# Patient Record
Sex: MALE | Race: OTHER | NOT HISPANIC OR LATINO
[De-identification: names, ages, dates, MRNs, and addresses within clinical notes are randomized per-mention and may not be internally consistent; named-entity substitution may affect disease eponyms.]

---

## 2017-02-20 ENCOUNTER — RX RENEWAL (OUTPATIENT)
Age: 67
End: 2017-02-20

## 2017-03-07 ENCOUNTER — MEDICATION RENEWAL (OUTPATIENT)
Age: 67
End: 2017-03-07

## 2017-03-17 ENCOUNTER — APPOINTMENT (OUTPATIENT)
Dept: INTERNAL MEDICINE | Facility: CLINIC | Age: 67
End: 2017-03-17

## 2017-03-17 ENCOUNTER — LABORATORY RESULT (OUTPATIENT)
Age: 67
End: 2017-03-17

## 2017-03-17 VITALS
DIASTOLIC BLOOD PRESSURE: 76 MMHG | SYSTOLIC BLOOD PRESSURE: 116 MMHG | OXYGEN SATURATION: 96 % | BODY MASS INDEX: 29.07 KG/M2 | HEIGHT: 68.5 IN | TEMPERATURE: 98.6 F | HEART RATE: 84 BPM | WEIGHT: 194 LBS

## 2017-03-17 DIAGNOSIS — Z01.818 ENCOUNTER FOR OTHER PREPROCEDURAL EXAMINATION: ICD-10-CM

## 2017-03-17 DIAGNOSIS — H26.9 UNSPECIFIED CATARACT: ICD-10-CM

## 2017-03-17 DIAGNOSIS — Z86.73 PERSONAL HISTORY OF TRANSIENT ISCHEMIC ATTACK (TIA), AND CEREBRAL INFARCTION W/OUT RESIDUAL DEFICITS: ICD-10-CM

## 2017-03-18 LAB
24R-OH-CALCIDIOL SERPL-MCNC: 41.3 PG/ML
25(OH)D3 SERPL-MCNC: 40.7 NG/ML
ALBUMIN SERPL ELPH-MCNC: 4.6 G/DL
ALP BLD-CCNC: 54 U/L
ALT SERPL-CCNC: 25 U/L
AMYLASE/CREAT SERPL: 91 U/L
ANION GAP SERPL CALC-SCNC: 18 MMOL/L
AST SERPL-CCNC: 36 U/L
BILIRUB SERPL-MCNC: 0.4 MG/DL
BUN SERPL-MCNC: 20 MG/DL
CALCIUM SERPL-MCNC: 9.5 MG/DL
CHLORIDE SERPL-SCNC: 98 MMOL/L
CHOLEST SERPL-MCNC: 213 MG/DL
CHOLEST/HDLC SERPL: 5 RATIO
CO2 SERPL-SCNC: 23 MMOL/L
CREAT SERPL-MCNC: 0.84 MG/DL
CRP SERPL-MCNC: <0.2 MG/DL
FOLATE SERPL-MCNC: >20 NG/ML
GLUCOSE SERPL-MCNC: 189 MG/DL
HDLC SERPL-MCNC: 43 MG/DL
LDLC SERPL CALC-MCNC: 110 MG/DL
LPL SERPL-CCNC: 56 U/L
POTASSIUM SERPL-SCNC: 4.6 MMOL/L
PROT SERPL-MCNC: 7.2 G/DL
PSA SERPL-MCNC: 0.71 NG/ML
SODIUM SERPL-SCNC: 139 MMOL/L
TRIGL SERPL-MCNC: 300 MG/DL
TSH SERPL-ACNC: 0.38 UIU/ML
URATE SERPL-MCNC: 5.6 MG/DL
VIT B12 SERPL-MCNC: 965 PG/ML

## 2017-04-03 ENCOUNTER — MEDICATION RENEWAL (OUTPATIENT)
Age: 67
End: 2017-04-03

## 2017-04-10 ENCOUNTER — MEDICATION RENEWAL (OUTPATIENT)
Age: 67
End: 2017-04-10

## 2017-05-03 ENCOUNTER — APPOINTMENT (OUTPATIENT)
Dept: INTERNAL MEDICINE | Facility: CLINIC | Age: 67
End: 2017-05-03

## 2017-05-03 VITALS
WEIGHT: 188 LBS | DIASTOLIC BLOOD PRESSURE: 72 MMHG | BODY MASS INDEX: 28.49 KG/M2 | HEIGHT: 68 IN | HEART RATE: 91 BPM | OXYGEN SATURATION: 95 % | SYSTOLIC BLOOD PRESSURE: 115 MMHG | TEMPERATURE: 98.1 F

## 2017-05-04 LAB
ALBUMIN SERPL ELPH-MCNC: 4.3 G/DL
ALP BLD-CCNC: 47 U/L
ALT SERPL-CCNC: 22 U/L
ANION GAP SERPL CALC-SCNC: 19 MMOL/L
AST SERPL-CCNC: 36 U/L
BASOPHILS # BLD AUTO: 0.02 K/UL
BASOPHILS NFR BLD AUTO: 0.6 %
BILIRUB SERPL-MCNC: 0.6 MG/DL
BUN SERPL-MCNC: 17 MG/DL
CALCIUM SERPL-MCNC: 9.2 MG/DL
CHLORIDE SERPL-SCNC: 97 MMOL/L
CO2 SERPL-SCNC: 22 MMOL/L
CREAT SERPL-MCNC: 0.88 MG/DL
CRP SERPL-MCNC: <0.2 MG/DL
EOSINOPHIL # BLD AUTO: 0.08 K/UL
EOSINOPHIL NFR BLD AUTO: 2.3 %
GLUCOSE SERPL-MCNC: 184 MG/DL
HBA1C MFR BLD HPLC: 6.4 %
HCT VFR BLD CALC: 31.7 %
HGB BLD-MCNC: 10.2 G/DL
IMM GRANULOCYTES NFR BLD AUTO: 0.3 %
LPL SERPL-CCNC: 53 U/L
LYMPHOCYTES # BLD AUTO: 1.11 K/UL
LYMPHOCYTES NFR BLD AUTO: 32.1 %
MAN DIFF?: NORMAL
MCHC RBC-ENTMCNC: 30.8 PG
MCHC RBC-ENTMCNC: 32.2 GM/DL
MCV RBC AUTO: 95.8 FL
MONOCYTES # BLD AUTO: 0.13 K/UL
MONOCYTES NFR BLD AUTO: 3.8 %
NEUTROPHILS # BLD AUTO: 2.11 K/UL
NEUTROPHILS NFR BLD AUTO: 60.9 %
PLATELET # BLD AUTO: 215 K/UL
POTASSIUM SERPL-SCNC: 4.2 MMOL/L
PROT SERPL-MCNC: 7.2 G/DL
RBC # BLD: 3.31 M/UL
RBC # FLD: 13.8 %
SODIUM SERPL-SCNC: 138 MMOL/L
URATE SERPL-MCNC: 5.1 MG/DL
WBC # FLD AUTO: 3.46 K/UL

## 2017-05-05 LAB — AMYLASE/CREAT SERPL: 89 U/L

## 2017-05-09 ENCOUNTER — APPOINTMENT (OUTPATIENT)
Dept: OTOLARYNGOLOGY | Facility: CLINIC | Age: 67
End: 2017-05-09

## 2017-05-09 VITALS — BODY MASS INDEX: 25.77 KG/M2 | HEIGHT: 70 IN | WEIGHT: 180 LBS

## 2017-05-09 DIAGNOSIS — H93.233 HYPERACUSIS, BILATERAL: ICD-10-CM

## 2017-05-09 DIAGNOSIS — H90.3 SENSORINEURAL HEARING LOSS, BILATERAL: ICD-10-CM

## 2017-05-11 ENCOUNTER — APPOINTMENT (OUTPATIENT)
Dept: PHARMACY | Facility: CLINIC | Age: 67
End: 2017-05-11

## 2017-05-14 LAB
6-MMPN: 1130
6-TGN: 286

## 2017-06-05 ENCOUNTER — MEDICATION RENEWAL (OUTPATIENT)
Age: 67
End: 2017-06-05

## 2017-06-15 ENCOUNTER — MEDICATION RENEWAL (OUTPATIENT)
Age: 67
End: 2017-06-15

## 2017-06-16 ENCOUNTER — APPOINTMENT (OUTPATIENT)
Dept: INTERNAL MEDICINE | Facility: CLINIC | Age: 67
End: 2017-06-16

## 2017-06-16 ENCOUNTER — LABORATORY RESULT (OUTPATIENT)
Age: 67
End: 2017-06-16

## 2017-06-16 VITALS
DIASTOLIC BLOOD PRESSURE: 75 MMHG | WEIGHT: 183 LBS | SYSTOLIC BLOOD PRESSURE: 137 MMHG | BODY MASS INDEX: 26.2 KG/M2 | TEMPERATURE: 98.2 F | OXYGEN SATURATION: 98 % | HEIGHT: 70 IN | HEART RATE: 90 BPM

## 2017-06-17 LAB
ALBUMIN SERPL ELPH-MCNC: 4.3 G/DL
ALP BLD-CCNC: 52 U/L
ALT SERPL-CCNC: 13 U/L
AMYLASE/CREAT SERPL: 91 U/L
ANION GAP SERPL CALC-SCNC: 17 MMOL/L
AST SERPL-CCNC: 20 U/L
BASOPHILS # BLD AUTO: 0.01 K/UL
BASOPHILS NFR BLD AUTO: 0.3 %
BILIRUB SERPL-MCNC: 0.7 MG/DL
BUN SERPL-MCNC: 18 MG/DL
CALCIUM SERPL-MCNC: 9.5 MG/DL
CHLORIDE SERPL-SCNC: 98 MMOL/L
CO2 SERPL-SCNC: 23 MMOL/L
CREAT SERPL-MCNC: 0.83 MG/DL
CRP SERPL-MCNC: 0.2 MG/DL
EOSINOPHIL # BLD AUTO: 0.05 K/UL
EOSINOPHIL NFR BLD AUTO: 1.3 %
GLUCOSE SERPL-MCNC: 192 MG/DL
HCT VFR BLD CALC: 29.2 %
HGB BLD-MCNC: 9.7 G/DL
IMM GRANULOCYTES NFR BLD AUTO: 0.3 %
LPL SERPL-CCNC: 58 U/L
LYMPHOCYTES # BLD AUTO: 2.02 K/UL
LYMPHOCYTES NFR BLD AUTO: 53 %
MAN DIFF?: NORMAL
MCHC RBC-ENTMCNC: 33.2 GM/DL
MCHC RBC-ENTMCNC: 33.2 PG
MCV RBC AUTO: 100 FL
MONOCYTES # BLD AUTO: 0.19 K/UL
MONOCYTES NFR BLD AUTO: 5 %
NEUTROPHILS # BLD AUTO: 1.53 K/UL
NEUTROPHILS NFR BLD AUTO: 40.1 %
PLATELET # BLD AUTO: 150 K/UL
POTASSIUM SERPL-SCNC: 4.5 MMOL/L
PROT SERPL-MCNC: 6.8 G/DL
RBC # BLD: 2.92 M/UL
RBC # FLD: 16.1 %
SODIUM SERPL-SCNC: 138 MMOL/L
WBC # FLD AUTO: 3.81 K/UL

## 2017-06-20 LAB — HBA1C MFR BLD HPLC: 6.4 %

## 2017-07-07 ENCOUNTER — RX RENEWAL (OUTPATIENT)
Age: 67
End: 2017-07-07

## 2017-08-18 ENCOUNTER — MEDICATION RENEWAL (OUTPATIENT)
Age: 67
End: 2017-08-18

## 2017-08-22 ENCOUNTER — APPOINTMENT (OUTPATIENT)
Dept: INTERNAL MEDICINE | Facility: CLINIC | Age: 67
End: 2017-08-22
Payer: COMMERCIAL

## 2017-08-22 ENCOUNTER — LABORATORY RESULT (OUTPATIENT)
Age: 67
End: 2017-08-22

## 2017-08-22 VITALS
HEIGHT: 70 IN | WEIGHT: 180 LBS | BODY MASS INDEX: 25.77 KG/M2 | DIASTOLIC BLOOD PRESSURE: 76 MMHG | SYSTOLIC BLOOD PRESSURE: 116 MMHG

## 2017-08-22 LAB
BILIRUB UR QL STRIP: NEGATIVE
GLUCOSE BLDC GLUCOMTR-MCNC: 244
GLUCOSE UR-MCNC: NORMAL
HCG UR QL: 0.2 EU/DL
HGB UR QL STRIP.AUTO: NEGATIVE
KETONES UR-MCNC: NORMAL
LEUKOCYTE ESTERASE UR QL STRIP: NEGATIVE
NITRITE UR QL STRIP: NEGATIVE
PH UR STRIP: 5
PROT UR STRIP-MCNC: NORMAL
SP GR UR STRIP: 1.03

## 2017-08-22 PROCEDURE — 82962 GLUCOSE BLOOD TEST: CPT

## 2017-08-22 PROCEDURE — 99214 OFFICE O/P EST MOD 30 MIN: CPT | Mod: 25

## 2017-08-22 PROCEDURE — 81002 URINALYSIS NONAUTO W/O SCOPE: CPT

## 2017-08-23 ENCOUNTER — CLINICAL ADVICE (OUTPATIENT)
Age: 67
End: 2017-08-23

## 2017-08-23 LAB
AMYLASE/CREAT SERPL: 109 U/L
BASOPHILS # BLD AUTO: 0.03 K/UL
BASOPHILS NFR BLD AUTO: 0.9 %
CRP SERPL-MCNC: <0.2 MG/DL
EOSINOPHIL # BLD AUTO: 0.06 K/UL
EOSINOPHIL NFR BLD AUTO: 1.9 %
HBA1C MFR BLD HPLC: 5.9 %
HCT VFR BLD CALC: 30.6 %
HGB BLD-MCNC: 9.8 G/DL
LPL SERPL-CCNC: 104 U/L
LYMPHOCYTES # BLD AUTO: 0.95 K/UL
LYMPHOCYTES NFR BLD AUTO: 28 %
MAN DIFF?: NORMAL
MCHC RBC-ENTMCNC: 32 GM/DL
MCHC RBC-ENTMCNC: 34 PG
MCV RBC AUTO: 106.3 FL
MONOCYTES # BLD AUTO: 0.22 K/UL
MONOCYTES NFR BLD AUTO: 6.5 %
NEUTROPHILS # BLD AUTO: 2.02 K/UL
NEUTROPHILS NFR BLD AUTO: 58 %
PLATELET # BLD AUTO: 236 K/UL
RBC # BLD: 2.88 M/UL
RBC # FLD: 17.1 %
VALPROATE SERPL-MCNC: <2 UG/ML
WBC # FLD AUTO: 3.38 K/UL

## 2017-08-28 ENCOUNTER — MOBILE ON CALL (OUTPATIENT)
Age: 67
End: 2017-08-28

## 2017-09-18 ENCOUNTER — APPOINTMENT (OUTPATIENT)
Dept: INTERNAL MEDICINE | Facility: CLINIC | Age: 67
End: 2017-09-18
Payer: COMMERCIAL

## 2017-09-18 VITALS
DIASTOLIC BLOOD PRESSURE: 76 MMHG | BODY MASS INDEX: 28.73 KG/M2 | TEMPERATURE: 98.7 F | HEART RATE: 104 BPM | SYSTOLIC BLOOD PRESSURE: 131 MMHG | HEIGHT: 69 IN | WEIGHT: 194 LBS | OXYGEN SATURATION: 98 %

## 2017-09-18 PROCEDURE — 99214 OFFICE O/P EST MOD 30 MIN: CPT | Mod: 25

## 2017-09-18 PROCEDURE — G0008: CPT

## 2017-09-18 PROCEDURE — 36415 COLL VENOUS BLD VENIPUNCTURE: CPT

## 2017-09-18 PROCEDURE — 90662 IIV NO PRSV INCREASED AG IM: CPT

## 2017-09-18 RX ORDER — MESALAMINE 375 MG/1
0.38 CAPSULE, EXTENDED RELEASE ORAL TWICE DAILY
Qty: 540 | Refills: 3 | Status: DISCONTINUED | COMMUNITY
Start: 2017-03-21 | End: 2017-09-18

## 2017-09-20 LAB
ALBUMIN SERPL ELPH-MCNC: 4.2 G/DL
ALP BLD-CCNC: 65 U/L
ALT SERPL-CCNC: 11 U/L
AMYLASE/CREAT SERPL: 137 U/L
ANION GAP SERPL CALC-SCNC: 21 MMOL/L
AST SERPL-CCNC: 17 U/L
BASOPHILS # BLD AUTO: 0.02 K/UL
BASOPHILS NFR BLD AUTO: 0.3 %
BILIRUB SERPL-MCNC: 0.2 MG/DL
BUN SERPL-MCNC: 22 MG/DL
CALCIUM SERPL-MCNC: 9.4 MG/DL
CHLORIDE SERPL-SCNC: 103 MMOL/L
CO2 SERPL-SCNC: 19 MMOL/L
CREAT SERPL-MCNC: 0.98 MG/DL
CRP SERPL-MCNC: <0.2 MG/DL
EOSINOPHIL # BLD AUTO: 0.07 K/UL
EOSINOPHIL NFR BLD AUTO: 1 %
ERYTHROCYTE [SEDIMENTATION RATE] IN BLOOD BY WESTERGREN METHOD: 6 MM/HR
GLUCOSE SERPL-MCNC: 227 MG/DL
HBA1C MFR BLD HPLC: 5.6 %
HCT VFR BLD CALC: 38.6 %
HGB BLD-MCNC: 11.6 G/DL
IMM GRANULOCYTES NFR BLD AUTO: 0.4 %
LPL SERPL-CCNC: 116 U/L
LYMPHOCYTES # BLD AUTO: 2.47 K/UL
LYMPHOCYTES NFR BLD AUTO: 34.4 %
MAN DIFF?: NORMAL
MCHC RBC-ENTMCNC: 30.1 GM/DL
MCHC RBC-ENTMCNC: 30.9 PG
MCV RBC AUTO: 102.7 FL
MONOCYTES # BLD AUTO: 0.94 K/UL
MONOCYTES NFR BLD AUTO: 13.1 %
NEUTROPHILS # BLD AUTO: 3.64 K/UL
NEUTROPHILS NFR BLD AUTO: 50.8 %
PLATELET # BLD AUTO: 142 K/UL
POTASSIUM SERPL-SCNC: 4.5 MMOL/L
PROT SERPL-MCNC: 6.9 G/DL
RBC # BLD: 3.76 M/UL
RBC # FLD: 13.3 %
SODIUM SERPL-SCNC: 143 MMOL/L
WBC # FLD AUTO: 7.17 K/UL

## 2017-11-20 ENCOUNTER — MEDICATION RENEWAL (OUTPATIENT)
Age: 67
End: 2017-11-20

## 2017-12-01 ENCOUNTER — LABORATORY RESULT (OUTPATIENT)
Age: 67
End: 2017-12-01

## 2017-12-01 ENCOUNTER — APPOINTMENT (OUTPATIENT)
Dept: INTERNAL MEDICINE | Facility: CLINIC | Age: 67
End: 2017-12-01
Payer: MEDICARE

## 2017-12-01 VITALS
BODY MASS INDEX: 27.4 KG/M2 | DIASTOLIC BLOOD PRESSURE: 70 MMHG | HEIGHT: 69 IN | SYSTOLIC BLOOD PRESSURE: 130 MMHG | WEIGHT: 185 LBS

## 2017-12-01 PROCEDURE — 99214 OFFICE O/P EST MOD 30 MIN: CPT | Mod: 25

## 2017-12-01 PROCEDURE — 36415 COLL VENOUS BLD VENIPUNCTURE: CPT

## 2017-12-02 ENCOUNTER — MOBILE ON CALL (OUTPATIENT)
Age: 67
End: 2017-12-02

## 2017-12-03 LAB
AMYLASE/CREAT SERPL: 87 U/L
BASOPHILS # BLD AUTO: 0.02 K/UL
BASOPHILS NFR BLD AUTO: 0.2 %
CHOLEST SERPL-MCNC: 174 MG/DL
CHOLEST/HDLC SERPL: 3.9 RATIO
CRP SERPL-MCNC: 0.4 MG/DL
EOSINOPHIL # BLD AUTO: 0.06 K/UL
EOSINOPHIL NFR BLD AUTO: 0.7 %
FOLATE SERPL-MCNC: >20 NG/ML
HBA1C MFR BLD HPLC: 5.6 %
HCT VFR BLD CALC: 38.4 %
HDLC SERPL-MCNC: 45 MG/DL
HGB BLD-MCNC: 12.6 G/DL
IMM GRANULOCYTES NFR BLD AUTO: 0.5 %
LDH SERPL-CCNC: 182 U/L
LDLC SERPL CALC-MCNC: 84 MG/DL
LPL SERPL-CCNC: 57 U/L
LYMPHOCYTES # BLD AUTO: 1.57 K/UL
LYMPHOCYTES NFR BLD AUTO: 19.1 %
MAN DIFF?: NORMAL
MCHC RBC-ENTMCNC: 30 PG
MCHC RBC-ENTMCNC: 32.8 GM/DL
MCV RBC AUTO: 91.4 FL
MONOCYTES # BLD AUTO: 0.46 K/UL
MONOCYTES NFR BLD AUTO: 5.6 %
NEUTROPHILS # BLD AUTO: 6.08 K/UL
NEUTROPHILS NFR BLD AUTO: 73.9 %
PLATELET # BLD AUTO: 142 K/UL
RBC # BLD: 4.2 M/UL
RBC # BLD: 4.2 M/UL
RBC # FLD: 13.4 %
RETICS # AUTO: 1.6 %
RETICS AGGREG/RBC NFR: 67.2 K/UL
TRIGL SERPL-MCNC: 225 MG/DL
VIT B12 SERPL-MCNC: 1250 PG/ML
WBC # FLD AUTO: 8.23 K/UL

## 2017-12-18 ENCOUNTER — APPOINTMENT (OUTPATIENT)
Dept: INTERNAL MEDICINE | Facility: CLINIC | Age: 67
End: 2017-12-18

## 2018-02-14 ENCOUNTER — LABORATORY RESULT (OUTPATIENT)
Age: 68
End: 2018-02-14

## 2018-02-14 ENCOUNTER — APPOINTMENT (OUTPATIENT)
Dept: INTERNAL MEDICINE | Facility: CLINIC | Age: 68
End: 2018-02-14
Payer: MEDICARE

## 2018-02-14 VITALS
OXYGEN SATURATION: 98 % | DIASTOLIC BLOOD PRESSURE: 73 MMHG | BODY MASS INDEX: 29.03 KG/M2 | SYSTOLIC BLOOD PRESSURE: 153 MMHG | RESPIRATION RATE: 14 BRPM | TEMPERATURE: 98.2 F | HEIGHT: 69 IN | WEIGHT: 196 LBS | HEART RATE: 96 BPM

## 2018-02-14 PROCEDURE — 36415 COLL VENOUS BLD VENIPUNCTURE: CPT

## 2018-02-14 PROCEDURE — 99214 OFFICE O/P EST MOD 30 MIN: CPT | Mod: 25

## 2018-02-14 RX ORDER — GLIMEPIRIDE 1 MG/1
1 TABLET ORAL
Refills: 0 | Status: DISCONTINUED | COMMUNITY
End: 2018-02-14

## 2018-02-14 RX ORDER — SACCHAROMYCES BOULARDII 50 MG
250 CAPSULE ORAL
Refills: 0 | Status: ACTIVE | COMMUNITY

## 2018-02-14 RX ORDER — CEFDINIR 300 MG/1
300 CAPSULE ORAL
Refills: 0 | Status: DISCONTINUED | COMMUNITY
End: 2018-02-14

## 2018-02-14 RX ORDER — METRONIDAZOLE 500 MG/1
500 TABLET ORAL
Refills: 0 | Status: DISCONTINUED | COMMUNITY
End: 2018-02-14

## 2018-02-15 LAB
25(OH)D3 SERPL-MCNC: 32.8 NG/ML
BASOPHILS # BLD AUTO: 0.03 K/UL
BASOPHILS NFR BLD AUTO: 0.7 %
CHOLEST SERPL-MCNC: 113 MG/DL
CHOLEST/HDLC SERPL: 4.7 RATIO
EOSINOPHIL # BLD AUTO: 0.12 K/UL
EOSINOPHIL NFR BLD AUTO: 2.8 %
FOLATE SERPL-MCNC: >20 NG/ML
HBA1C MFR BLD HPLC: 5.6 %
HCT VFR BLD CALC: 31.9 %
HDLC SERPL-MCNC: 24 MG/DL
HGB BLD-MCNC: 9.7 G/DL
IMM GRANULOCYTES NFR BLD AUTO: 0 %
IRON SATN MFR SERPL: 40 %
IRON SERPL-MCNC: 103 UG/DL
LDLC SERPL CALC-MCNC: 47 MG/DL
LYMPHOCYTES # BLD AUTO: 2.35 K/UL
LYMPHOCYTES NFR BLD AUTO: 55.4 %
MAN DIFF?: NORMAL
MCHC RBC-ENTMCNC: 29.8 PG
MCHC RBC-ENTMCNC: 30.4 GM/DL
MCV RBC AUTO: 97.9 FL
MONOCYTES # BLD AUTO: 0.36 K/UL
MONOCYTES NFR BLD AUTO: 8.5 %
NEUTROPHILS # BLD AUTO: 1.38 K/UL
NEUTROPHILS NFR BLD AUTO: 32.6 %
PLATELET # BLD AUTO: 235 K/UL
RBC # BLD: 3.26 M/UL
RBC # FLD: 15.3 %
T4 SERPL-MCNC: 10.9 UG/DL
TIBC SERPL-MCNC: 255 UG/DL
TRIGL SERPL-MCNC: 209 MG/DL
TSH SERPL-ACNC: 0.35 UIU/ML
UIBC SERPL-MCNC: 152 UG/DL
URATE SERPL-MCNC: 6 MG/DL
VIT B12 SERPL-MCNC: 1539 PG/ML
WBC # FLD AUTO: 4.24 K/UL

## 2018-02-16 LAB — FERRITIN SERPL-MCNC: 377 NG/ML

## 2018-02-22 LAB — C DIFF TOX B STL QL CT TISS CULT: NORMAL

## 2018-02-26 ENCOUNTER — MEDICATION RENEWAL (OUTPATIENT)
Age: 68
End: 2018-02-26

## 2018-03-05 RX ORDER — BUDESONIDE 9 MG/1
9 TABLET, EXTENDED RELEASE ORAL DAILY
Qty: 48 | Refills: 0 | Status: DISCONTINUED | OUTPATIENT
Start: 2018-02-14 | End: 2018-03-05

## 2018-03-12 ENCOUNTER — MED ADMIN CHARGE (OUTPATIENT)
Age: 68
End: 2018-03-12

## 2018-03-14 ENCOUNTER — APPOINTMENT (OUTPATIENT)
Dept: INTERNAL MEDICINE | Facility: CLINIC | Age: 68
End: 2018-03-14
Payer: MEDICARE

## 2018-03-14 ENCOUNTER — MESSAGE (OUTPATIENT)
Age: 68
End: 2018-03-14

## 2018-03-14 VITALS
BODY MASS INDEX: 27.25 KG/M2 | OXYGEN SATURATION: 98 % | TEMPERATURE: 98.6 F | WEIGHT: 184 LBS | HEIGHT: 69 IN | HEART RATE: 99 BPM | SYSTOLIC BLOOD PRESSURE: 138 MMHG | DIASTOLIC BLOOD PRESSURE: 78 MMHG

## 2018-03-14 DIAGNOSIS — B78.0: ICD-10-CM

## 2018-03-14 PROCEDURE — G0439: CPT

## 2018-03-14 PROCEDURE — 36415 COLL VENOUS BLD VENIPUNCTURE: CPT

## 2018-03-14 PROCEDURE — 99214 OFFICE O/P EST MOD 30 MIN: CPT | Mod: 25

## 2018-03-14 RX ORDER — MEMANTINE HYDROCHLORIDE 28 MG/1
28 CAPSULE, EXTENDED RELEASE ORAL
Qty: 90 | Refills: 2 | Status: DISCONTINUED | COMMUNITY
Start: 2017-01-19 | End: 2018-03-14

## 2018-03-14 RX ORDER — GLIMEPIRIDE 1 MG/1
1 TABLET ORAL
Qty: 90 | Refills: 3 | Status: DISCONTINUED | COMMUNITY
Start: 2017-08-18 | End: 2018-03-14

## 2018-03-15 LAB
ALBUMIN SERPL ELPH-MCNC: 3.9 G/DL
ALP BLD-CCNC: 76 U/L
ALT SERPL-CCNC: 15 U/L
ANION GAP SERPL CALC-SCNC: 18 MMOL/L
AST SERPL-CCNC: 14 U/L
BASOPHILS # BLD AUTO: 0.01 K/UL
BASOPHILS NFR BLD AUTO: 0.1 %
BILIRUB SERPL-MCNC: 0.3 MG/DL
BUN SERPL-MCNC: 24 MG/DL
CALCIUM SERPL-MCNC: 9 MG/DL
CHLORIDE SERPL-SCNC: 99 MMOL/L
CO2 SERPL-SCNC: 23 MMOL/L
CREAT SERPL-MCNC: 0.82 MG/DL
EOSINOPHIL # BLD AUTO: 0 K/UL
EOSINOPHIL NFR BLD AUTO: 0 %
GLUCOSE SERPL-MCNC: 258 MG/DL
HBA1C MFR BLD HPLC: 6.8 %
HCT VFR BLD CALC: 40.1 %
HGB BLD-MCNC: 12.2 G/DL
IMM GRANULOCYTES NFR BLD AUTO: 0.8 %
LYMPHOCYTES # BLD AUTO: 1.74 K/UL
LYMPHOCYTES NFR BLD AUTO: 22.3 %
MAN DIFF?: NORMAL
MCHC RBC-ENTMCNC: 27.2 PG
MCHC RBC-ENTMCNC: 30.4 GM/DL
MCV RBC AUTO: 89.3 FL
MONOCYTES # BLD AUTO: 0.49 K/UL
MONOCYTES NFR BLD AUTO: 6.3 %
NEUTROPHILS # BLD AUTO: 5.5 K/UL
NEUTROPHILS NFR BLD AUTO: 70.5 %
PLATELET # BLD AUTO: 214 K/UL
POTASSIUM SERPL-SCNC: 4.7 MMOL/L
PROT SERPL-MCNC: 7.4 G/DL
PSA SERPL-MCNC: 20.01 NG/ML
RBC # BLD: 4.49 M/UL
RBC # FLD: 12.9 %
SODIUM SERPL-SCNC: 140 MMOL/L
WBC # FLD AUTO: 7.8 K/UL

## 2018-03-16 ENCOUNTER — APPOINTMENT (OUTPATIENT)
Dept: GASTROENTEROLOGY | Facility: CLINIC | Age: 68
End: 2018-03-16
Payer: MEDICARE

## 2018-03-16 ENCOUNTER — LABORATORY RESULT (OUTPATIENT)
Age: 68
End: 2018-03-16

## 2018-03-16 ENCOUNTER — MEDICATION RENEWAL (OUTPATIENT)
Age: 68
End: 2018-03-16

## 2018-03-16 VITALS
SYSTOLIC BLOOD PRESSURE: 108 MMHG | BODY MASS INDEX: 27.11 KG/M2 | HEIGHT: 69 IN | WEIGHT: 183 LBS | DIASTOLIC BLOOD PRESSURE: 72 MMHG | OXYGEN SATURATION: 98 % | HEART RATE: 91 BPM

## 2018-03-16 PROCEDURE — 99215 OFFICE O/P EST HI 40 MIN: CPT

## 2018-03-19 ENCOUNTER — CHART COPY (OUTPATIENT)
Age: 68
End: 2018-03-19

## 2018-03-19 DIAGNOSIS — Z87.898 PERSONAL HISTORY OF OTHER SPECIFIED CONDITIONS: ICD-10-CM

## 2018-03-19 LAB
HBV CORE IGG+IGM SER QL: NONREACTIVE
HBV SURFACE AG SER QL: NONREACTIVE

## 2018-03-20 LAB
C DIFF TOX GENS STL QL NAA+PROBE: NORMAL
CDIFF BY PCR: NOT DETECTED

## 2018-03-21 LAB
CRP SERPL-MCNC: 0.2 MG/DL
FOLATE SERPL-MCNC: >20 NG/ML

## 2018-03-23 ENCOUNTER — CHART COPY (OUTPATIENT)
Age: 68
End: 2018-03-23

## 2018-03-23 LAB — CALPROTECTIN FECAL: 345 UG/G

## 2018-03-26 LAB — HBV E AB SER QL: NEGATIVE

## 2018-03-27 ENCOUNTER — APPOINTMENT (OUTPATIENT)
Dept: UROLOGY | Facility: CLINIC | Age: 68
End: 2018-03-27
Payer: MEDICARE

## 2018-03-27 VITALS
RESPIRATION RATE: 16 BRPM | WEIGHT: 185 LBS | BODY MASS INDEX: 26.48 KG/M2 | HEIGHT: 70 IN | HEART RATE: 101 BPM | DIASTOLIC BLOOD PRESSURE: 79 MMHG | SYSTOLIC BLOOD PRESSURE: 126 MMHG

## 2018-03-27 DIAGNOSIS — M79.622 PAIN IN LEFT UPPER ARM: ICD-10-CM

## 2018-03-27 PROCEDURE — 99205 OFFICE O/P NEW HI 60 MIN: CPT

## 2018-03-29 ENCOUNTER — MEDICATION RENEWAL (OUTPATIENT)
Age: 68
End: 2018-03-29

## 2018-04-08 LAB
PSA FREE FLD-MCNC: 4.3
PSA FREE SERPL-MCNC: 0.28 NG/ML
PSA SERPL-MCNC: 6.52 NG/ML
TESTOST SERPL-MCNC: 240.9 NG/DL

## 2018-04-09 ENCOUNTER — RESULT REVIEW (OUTPATIENT)
Age: 68
End: 2018-04-09

## 2018-04-09 LAB
APPEARANCE: ABNORMAL
BACTERIA UR CULT: ABNORMAL
BACTERIA: ABNORMAL
BILIRUBIN URINE: NEGATIVE
BLOOD URINE: ABNORMAL
COLOR: YELLOW
CORE LAB FLUID CYTOLOGY: NORMAL
GLUCOSE QUALITATIVE U: 500 MG/DL
HYALINE CASTS: 0 /LPF
KETONES URINE: ABNORMAL
LEUKOCYTE ESTERASE URINE: ABNORMAL
MICROSCOPIC-UA: NORMAL
NITRITE URINE: NEGATIVE
PH URINE: 6
PROTEIN URINE: NEGATIVE MG/DL
RED BLOOD CELLS URINE: 3 /HPF
SPECIFIC GRAVITY URINE: 1.02
SQUAMOUS EPITHELIAL CELLS: 0 /HPF
UROBILINOGEN URINE: 1 MG/DL
WHITE BLOOD CELLS URINE: 103 /HPF

## 2018-04-10 ENCOUNTER — APPOINTMENT (OUTPATIENT)
Dept: GASTROENTEROLOGY | Facility: CLINIC | Age: 68
End: 2018-04-10

## 2018-04-17 ENCOUNTER — APPOINTMENT (OUTPATIENT)
Dept: UROLOGY | Facility: CLINIC | Age: 68
End: 2018-04-17
Payer: MEDICARE

## 2018-04-17 LAB
APPEARANCE: CLEAR
BACTERIA UR CULT: NORMAL
BACTERIA: NEGATIVE
BILIRUBIN URINE: NEGATIVE
BLOOD URINE: NEGATIVE
COLOR: ABNORMAL
GLUCOSE QUALITATIVE U: 100 MG/DL
HYALINE CASTS: 1 /LPF
KETONES URINE: ABNORMAL
LEUKOCYTE ESTERASE URINE: NEGATIVE
MICROSCOPIC-UA: NORMAL
NITRITE URINE: NEGATIVE
PH URINE: 6
PROTEIN URINE: NEGATIVE MG/DL
PSA FREE FLD-MCNC: NORMAL
PSA FREE SERPL-MCNC: 0.1 NG/ML
PSA SERPL-MCNC: 2.73 NG/ML
RED BLOOD CELLS URINE: 7 /HPF
SPECIFIC GRAVITY URINE: 1.02
SQUAMOUS EPITHELIAL CELLS: 0 /HPF
UROBILINOGEN URINE: 1 MG/DL
WHITE BLOOD CELLS URINE: 2 /HPF

## 2018-04-17 PROCEDURE — 99214 OFFICE O/P EST MOD 30 MIN: CPT

## 2018-04-22 LAB
APPEARANCE: CLEAR
BACTERIA UR CULT: ABNORMAL
BACTERIA: NEGATIVE
BILIRUBIN URINE: NEGATIVE
BLOOD URINE: NEGATIVE
COLOR: ABNORMAL
CORE LAB FLUID CYTOLOGY: NORMAL
GLUCOSE QUALITATIVE U: 500 MG/DL
HYALINE CASTS: 2 /LPF
KETONES URINE: ABNORMAL
LEUKOCYTE ESTERASE URINE: NEGATIVE
MICROSCOPIC-UA: NORMAL
NITRITE URINE: NEGATIVE
PH URINE: 6
PROTEIN URINE: NEGATIVE MG/DL
RED BLOOD CELLS URINE: 8 /HPF
SPECIFIC GRAVITY URINE: 1.02
SQUAMOUS EPITHELIAL CELLS: 1 /HPF
UROBILINOGEN URINE: NEGATIVE MG/DL
WHITE BLOOD CELLS URINE: 2 /HPF

## 2018-04-24 ENCOUNTER — APPOINTMENT (OUTPATIENT)
Dept: MRI IMAGING | Facility: IMAGING CENTER | Age: 68
End: 2018-04-24

## 2018-04-30 ENCOUNTER — APPOINTMENT (OUTPATIENT)
Dept: UROLOGY | Facility: CLINIC | Age: 68
End: 2018-04-30

## 2018-05-16 ENCOUNTER — LABORATORY RESULT (OUTPATIENT)
Age: 68
End: 2018-05-16

## 2018-05-16 ENCOUNTER — APPOINTMENT (OUTPATIENT)
Dept: INTERNAL MEDICINE | Facility: CLINIC | Age: 68
End: 2018-05-16
Payer: MEDICARE

## 2018-05-16 VITALS
TEMPERATURE: 99 F | HEART RATE: 104 BPM | DIASTOLIC BLOOD PRESSURE: 72 MMHG | OXYGEN SATURATION: 98 % | WEIGHT: 180 LBS | HEIGHT: 70 IN | BODY MASS INDEX: 25.77 KG/M2 | SYSTOLIC BLOOD PRESSURE: 116 MMHG

## 2018-05-16 PROCEDURE — 99214 OFFICE O/P EST MOD 30 MIN: CPT | Mod: 25

## 2018-05-16 PROCEDURE — 36415 COLL VENOUS BLD VENIPUNCTURE: CPT

## 2018-05-16 RX ORDER — MESALAMINE 1000 MG/1
1000 SUPPOSITORY RECTAL
Qty: 28 | Refills: 11 | Status: DISCONTINUED | COMMUNITY
Start: 2017-12-01 | End: 2018-05-16

## 2018-05-16 NOTE — PHYSICAL EXAM
[No Acute Distress] : no acute distress [Well Nourished] : well nourished [Normal Outer Ear/Nose] : the outer ears and nose were normal in appearance [Normal Oropharynx] : the oropharynx was normal [No Respiratory Distress] : no respiratory distress  [Clear to Auscultation] : lungs were clear to auscultation bilaterally [Normal Rate] : normal rate  [Regular Rhythm] : with a regular rhythm

## 2018-05-16 NOTE — HISTORY OF PRESENT ILLNESS
[FreeTextEntry1] : colitis, diabetes, mental issue [de-identified] : Here for follow up\par more confused, not following instruction and some shaking\par may accidental be on both  memantine 10 bid plus memantine 28 mg xr?????\par eating sweets, despite diabetes, no insulin?\par eating salads etc\par \par psa was 20 now 2.73 after antibiotics  resolved prostatitis\par still severe nocturia despite flomax and finasteride\par \par bowel still an issue\par 4 bm daily  no blood??\par \par await colon and biolgoic therapy\par still on prednisone 40 daily

## 2018-05-16 NOTE — ASSESSMENT
[FreeTextEntry1] : colitis still active despite prednisone 40\par await colon and therapy with biologic and tapering of prednisone\par \par confusion  psych to adjust med\par to take memantine properly??\par will get valproic acid level\par \par diabetes to check blood\par

## 2018-05-17 LAB
25(OH)D3 SERPL-MCNC: 33.8 NG/ML
BASOPHILS # BLD AUTO: 0.01 K/UL
BASOPHILS NFR BLD AUTO: 0.2 %
CHOLEST SERPL-MCNC: 162 MG/DL
CHOLEST/HDLC SERPL: 2.3 RATIO
CRP SERPL-MCNC: <0.2 MG/DL
EOSINOPHIL # BLD AUTO: 0 K/UL
EOSINOPHIL NFR BLD AUTO: 0 %
ERYTHROCYTE [SEDIMENTATION RATE] IN BLOOD BY WESTERGREN METHOD: 8 MM/HR
HBA1C MFR BLD HPLC: 6.5 %
HCT VFR BLD CALC: 41.8 %
HDLC SERPL-MCNC: 72 MG/DL
HGB BLD-MCNC: 12.8 G/DL
IMM GRANULOCYTES NFR BLD AUTO: 1.6 %
LDLC SERPL CALC-MCNC: 63 MG/DL
LYMPHOCYTES # BLD AUTO: 2.83 K/UL
LYMPHOCYTES NFR BLD AUTO: 44.3 %
MAN DIFF?: NORMAL
MCHC RBC-ENTMCNC: 29 PG
MCHC RBC-ENTMCNC: 30.6 GM/DL
MCV RBC AUTO: 94.8 FL
MONOCYTES # BLD AUTO: 0.64 K/UL
MONOCYTES NFR BLD AUTO: 10 %
NEUTROPHILS # BLD AUTO: 2.81 K/UL
NEUTROPHILS NFR BLD AUTO: 43.9 %
PLATELET # BLD AUTO: 160 K/UL
RBC # BLD: 4.41 M/UL
RBC # FLD: 13.7 %
T4 SERPL-MCNC: 7.9 UG/DL
TRIGL SERPL-MCNC: 135 MG/DL
TSH SERPL-ACNC: 0.21 UIU/ML
VALPROATE SERPL-MCNC: 99 UG/ML
WBC # FLD AUTO: 6.39 K/UL

## 2018-05-21 ENCOUNTER — APPOINTMENT (OUTPATIENT)
Dept: GASTROENTEROLOGY | Facility: HOSPITAL | Age: 68
End: 2018-05-21

## 2018-05-21 ENCOUNTER — OUTPATIENT (OUTPATIENT)
Dept: OUTPATIENT SERVICES | Facility: HOSPITAL | Age: 68
LOS: 1 days | End: 2018-05-21
Payer: COMMERCIAL

## 2018-05-21 ENCOUNTER — RESULT REVIEW (OUTPATIENT)
Age: 68
End: 2018-05-21

## 2018-05-21 DIAGNOSIS — K51.90 ULCERATIVE COLITIS, UNSPECIFIED, WITHOUT COMPLICATIONS: ICD-10-CM

## 2018-05-21 LAB — GLUCOSE BLDC GLUCOMTR-MCNC: 70 MG/DL — SIGNIFICANT CHANGE UP (ref 70–99)

## 2018-05-21 PROCEDURE — 82962 GLUCOSE BLOOD TEST: CPT

## 2018-05-21 PROCEDURE — 88342 IMHCHEM/IMCYTCHM 1ST ANTB: CPT

## 2018-05-21 PROCEDURE — 88305 TISSUE EXAM BY PATHOLOGIST: CPT | Mod: 26

## 2018-05-21 PROCEDURE — 88342 IMHCHEM/IMCYTCHM 1ST ANTB: CPT | Mod: 26

## 2018-05-21 PROCEDURE — 45380 COLONOSCOPY AND BIOPSY: CPT

## 2018-05-21 PROCEDURE — 88305 TISSUE EXAM BY PATHOLOGIST: CPT

## 2018-05-23 LAB — SURGICAL PATHOLOGY STUDY: SIGNIFICANT CHANGE UP

## 2018-05-24 ENCOUNTER — MEDICATION RENEWAL (OUTPATIENT)
Age: 68
End: 2018-05-24

## 2018-05-24 ENCOUNTER — APPOINTMENT (OUTPATIENT)
Dept: UROLOGY | Facility: CLINIC | Age: 68
End: 2018-05-24
Payer: MEDICARE

## 2018-05-24 PROCEDURE — 99214 OFFICE O/P EST MOD 30 MIN: CPT

## 2018-05-25 ENCOUNTER — CHART COPY (OUTPATIENT)
Age: 68
End: 2018-05-25

## 2018-05-26 LAB
ANION GAP SERPL CALC-SCNC: 20 MMOL/L
APPEARANCE: ABNORMAL
BACTERIA UR CULT: ABNORMAL
BACTERIA: ABNORMAL
BILIRUBIN URINE: NEGATIVE
BLOOD URINE: ABNORMAL
BUN SERPL-MCNC: 16 MG/DL
CALCIUM SERPL-MCNC: 9.4 MG/DL
CHLORIDE SERPL-SCNC: 98 MMOL/L
CO2 SERPL-SCNC: 20 MMOL/L
COLOR: YELLOW
CORE LAB FLUID CYTOLOGY: NORMAL
CREAT SERPL-MCNC: 1.02 MG/DL
GLUCOSE QUALITATIVE U: 1000 MG/DL
GLUCOSE SERPL-MCNC: 316 MG/DL
HYALINE CASTS: 4 /LPF
KETONES URINE: ABNORMAL
LEUKOCYTE ESTERASE URINE: ABNORMAL
MICROSCOPIC-UA: NORMAL
NITRITE URINE: POSITIVE
OSMOLALITY SERPL: 307 MOS/KG
PH URINE: 7
POTASSIUM SERPL-SCNC: 4.5 MMOL/L
PROTEIN URINE: ABNORMAL MG/DL
RED BLOOD CELLS URINE: 6 /HPF
SODIUM SERPL-SCNC: 138 MMOL/L
SPECIFIC GRAVITY URINE: 1.02
SQUAMOUS EPITHELIAL CELLS: 0 /HPF
UROBILINOGEN URINE: NEGATIVE MG/DL
WHITE BLOOD CELLS URINE: 84 /HPF

## 2018-06-05 ENCOUNTER — APPOINTMENT (OUTPATIENT)
Dept: GASTROENTEROLOGY | Facility: CLINIC | Age: 68
End: 2018-06-05
Payer: MEDICARE

## 2018-06-05 VITALS
SYSTOLIC BLOOD PRESSURE: 112 MMHG | HEART RATE: 106 BPM | DIASTOLIC BLOOD PRESSURE: 70 MMHG | OXYGEN SATURATION: 98 % | BODY MASS INDEX: 25.77 KG/M2 | HEIGHT: 70 IN | WEIGHT: 180 LBS

## 2018-06-05 PROCEDURE — 99214 OFFICE O/P EST MOD 30 MIN: CPT

## 2018-06-12 ENCOUNTER — RX RENEWAL (OUTPATIENT)
Age: 68
End: 2018-06-12

## 2018-06-12 ENCOUNTER — OTHER (OUTPATIENT)
Age: 68
End: 2018-06-12

## 2018-06-12 ENCOUNTER — APPOINTMENT (OUTPATIENT)
Dept: UROLOGY | Facility: CLINIC | Age: 68
End: 2018-06-12
Payer: MEDICARE

## 2018-06-12 PROCEDURE — 99214 OFFICE O/P EST MOD 30 MIN: CPT

## 2018-06-13 ENCOUNTER — OTHER (OUTPATIENT)
Age: 68
End: 2018-06-13

## 2018-06-14 ENCOUNTER — APPOINTMENT (OUTPATIENT)
Dept: INTERNAL MEDICINE | Facility: CLINIC | Age: 68
End: 2018-06-14
Payer: MEDICARE

## 2018-06-14 VITALS
BODY MASS INDEX: 27.2 KG/M2 | DIASTOLIC BLOOD PRESSURE: 70 MMHG | HEIGHT: 70 IN | HEART RATE: 80 BPM | WEIGHT: 190 LBS | TEMPERATURE: 100.2 F | SYSTOLIC BLOOD PRESSURE: 110 MMHG

## 2018-06-14 LAB
BILIRUB UR QL STRIP: NORMAL
CLARITY UR: CLEAR
COLLECTION METHOD: NORMAL
GLUCOSE UR-MCNC: NEGATIVE
HCG UR QL: 1 EU/DL
HGB UR QL STRIP.AUTO: NORMAL
KETONES UR-MCNC: NORMAL
LEUKOCYTE ESTERASE UR QL STRIP: NORMAL
NITRITE UR QL STRIP: NEGATIVE
PH UR STRIP: 5.5
PROT UR STRIP-MCNC: NORMAL
SP GR UR STRIP: 1.02

## 2018-06-14 PROCEDURE — 99214 OFFICE O/P EST MOD 30 MIN: CPT | Mod: 25

## 2018-06-14 PROCEDURE — 81003 URINALYSIS AUTO W/O SCOPE: CPT | Mod: QW

## 2018-06-14 NOTE — HISTORY OF PRESENT ILLNESS
[FreeTextEntry1] : fever [de-identified] : Urine issue\par couple day fever\par had uti responded to bactrim before\par no apppetite\par off steroid on mesalamine\par blood sugar good\par mesalamine mine for colitis

## 2018-06-14 NOTE — REVIEW OF SYSTEMS
[Fever] : fever [Chills] : chills [Abdominal Pain] : no abdominal pain [Diarrhea] : diarrhea [Frequency] : frequency [Negative] : Respiratory

## 2018-06-14 NOTE — PHYSICAL EXAM
[No Acute Distress] : no acute distress [Normal Outer Ear/Nose] : the outer ears and nose were normal in appearance [Normal Oropharynx] : the oropharynx was normal [No JVD] : no jugular venous distention [Supple] : supple [No Respiratory Distress] : no respiratory distress  [Normal Rate] : normal rate  [Regular Rhythm] : with a regular rhythm [No Carotid Bruits] : no carotid bruits [No Edema] : there was no peripheral edema [Soft] : abdomen soft [No HSM] : no HSM [de-identified] : no distended bladder

## 2018-06-14 NOTE — PLAN
[FreeTextEntry1] : colitis off steroids on mesalamine\par diabetes under control\par urine larger wbc and blood\par recurrent uti\par will send culture and treat with bactrim ds

## 2018-06-17 LAB — BACTERIA UR CULT: ABNORMAL

## 2018-06-19 ENCOUNTER — CLINICAL ADVICE (OUTPATIENT)
Age: 68
End: 2018-06-19

## 2018-06-21 ENCOUNTER — APPOINTMENT (OUTPATIENT)
Dept: UROLOGY | Facility: CLINIC | Age: 68
End: 2018-06-21

## 2018-06-28 ENCOUNTER — MESSAGE (OUTPATIENT)
Age: 68
End: 2018-06-28

## 2018-06-29 RX ORDER — ACETAMINOPHEN 325 MG/1
325 TABLET ORAL
Qty: 2 | Refills: 6 | Status: DISCONTINUED | OUTPATIENT
Start: 2018-06-28 | End: 2018-06-29

## 2018-06-29 RX ORDER — VEDOLIZUMAB 300 MG/5ML
300 INJECTION, POWDER, LYOPHILIZED, FOR SOLUTION INTRAVENOUS
Qty: 1 | Refills: 2 | Status: DISCONTINUED | OUTPATIENT
Start: 2018-06-28 | End: 2018-06-29

## 2018-07-04 ENCOUNTER — FORM ENCOUNTER (OUTPATIENT)
Age: 68
End: 2018-07-04

## 2018-07-05 ENCOUNTER — OUTPATIENT (OUTPATIENT)
Dept: OUTPATIENT SERVICES | Facility: HOSPITAL | Age: 68
LOS: 1 days | End: 2018-07-05
Payer: COMMERCIAL

## 2018-07-05 ENCOUNTER — APPOINTMENT (OUTPATIENT)
Dept: CT IMAGING | Facility: IMAGING CENTER | Age: 68
End: 2018-07-05
Payer: MEDICARE

## 2018-07-05 ENCOUNTER — APPOINTMENT (OUTPATIENT)
Dept: UROLOGY | Facility: CLINIC | Age: 68
End: 2018-07-05
Payer: MEDICARE

## 2018-07-05 VITALS
RESPIRATION RATE: 18 BRPM | DIASTOLIC BLOOD PRESSURE: 78 MMHG | TEMPERATURE: 98.2 F | SYSTOLIC BLOOD PRESSURE: 136 MMHG | HEART RATE: 85 BPM

## 2018-07-05 DIAGNOSIS — N39.0 URINARY TRACT INFECTION, SITE NOT SPECIFIED: ICD-10-CM

## 2018-07-05 DIAGNOSIS — R35.0 FREQUENCY OF MICTURITION: ICD-10-CM

## 2018-07-05 PROCEDURE — 52000 CYSTOURETHROSCOPY: CPT

## 2018-07-05 PROCEDURE — 99215 OFFICE O/P EST HI 40 MIN: CPT | Mod: 25

## 2018-07-05 PROCEDURE — 74178 CT ABD&PLV WO CNTR FLWD CNTR: CPT

## 2018-07-05 PROCEDURE — 74178 CT ABD&PLV WO CNTR FLWD CNTR: CPT | Mod: 26

## 2018-07-05 PROCEDURE — 82565 ASSAY OF CREATININE: CPT

## 2018-07-08 LAB
ALBUMIN SERPL ELPH-MCNC: 3.9 G/DL
ALP BLD-CCNC: 64 U/L
ALT SERPL-CCNC: 10 U/L
ANION GAP SERPL CALC-SCNC: 18 MMOL/L
APPEARANCE: CLEAR
AST SERPL-CCNC: 21 U/L
BACTERIA UR CULT: NORMAL
BACTERIA: NEGATIVE
BASOPHILS # BLD AUTO: 0.04 K/UL
BASOPHILS NFR BLD AUTO: 0.7 %
BILIRUB SERPL-MCNC: 0.3 MG/DL
BILIRUBIN URINE: NEGATIVE
BLOOD URINE: NEGATIVE
BUN SERPL-MCNC: 9 MG/DL
CALCIUM SERPL-MCNC: 9.3 MG/DL
CHLORIDE SERPL-SCNC: 102 MMOL/L
CO2 SERPL-SCNC: 22 MMOL/L
COLOR: YELLOW
CREAT SERPL-MCNC: 1.09 MG/DL
EOSINOPHIL # BLD AUTO: 0.11 K/UL
EOSINOPHIL NFR BLD AUTO: 1.9 %
GLUCOSE QUALITATIVE U: NEGATIVE MG/DL
GLUCOSE SERPL-MCNC: 95 MG/DL
HBA1C MFR BLD HPLC: 5 %
HCT VFR BLD CALC: 38.4 %
HGB BLD-MCNC: 11.6 G/DL
HYALINE CASTS: 0 /LPF
IMM GRANULOCYTES NFR BLD AUTO: 0.5 %
KETONES URINE: NEGATIVE
LEUKOCYTE ESTERASE URINE: NEGATIVE
LYMPHOCYTES # BLD AUTO: 3.24 K/UL
LYMPHOCYTES NFR BLD AUTO: 57 %
MAN DIFF?: NORMAL
MCHC RBC-ENTMCNC: 26.9 PG
MCHC RBC-ENTMCNC: 30.2 GM/DL
MCV RBC AUTO: 89.1 FL
MICROSCOPIC-UA: NORMAL
MONOCYTES # BLD AUTO: 0.51 K/UL
MONOCYTES NFR BLD AUTO: 9 %
NEUTROPHILS # BLD AUTO: 1.75 K/UL
NEUTROPHILS NFR BLD AUTO: 30.9 %
NITRITE URINE: NEGATIVE
PH URINE: 6.5
PLATELET # BLD AUTO: 218 K/UL
POTASSIUM SERPL-SCNC: 4.4 MMOL/L
PROT SERPL-MCNC: 7.1 G/DL
PROTEIN URINE: NEGATIVE MG/DL
PSA FREE FLD-MCNC: 8.5
PSA FREE SERPL-MCNC: 0.2 NG/ML
PSA SERPL-MCNC: 2.36 NG/ML
RBC # BLD: 4.31 M/UL
RBC # FLD: 13.6 %
RED BLOOD CELLS URINE: 0 /HPF
SODIUM SERPL-SCNC: 142 MMOL/L
SPECIFIC GRAVITY URINE: 1.02
SQUAMOUS EPITHELIAL CELLS: 0 /HPF
TESTOST SERPL-MCNC: 512.5 NG/DL
UROBILINOGEN URINE: NEGATIVE MG/DL
WBC # FLD AUTO: 5.68 K/UL
WHITE BLOOD CELLS URINE: 0 /HPF

## 2018-07-09 ENCOUNTER — OTHER (OUTPATIENT)
Age: 68
End: 2018-07-09

## 2018-07-10 ENCOUNTER — APPOINTMENT (OUTPATIENT)
Dept: GASTROENTEROLOGY | Facility: CLINIC | Age: 68
End: 2018-07-10

## 2018-07-12 DIAGNOSIS — N41.0 ACUTE PROSTATITIS: ICD-10-CM

## 2018-07-12 DIAGNOSIS — R97.20 ELEVATED PROSTATE SPECIFIC ANTIGEN [PSA]: ICD-10-CM

## 2018-07-12 DIAGNOSIS — E11.65 TYPE 2 DIABETES MELLITUS WITH HYPERGLYCEMIA: ICD-10-CM

## 2018-07-12 DIAGNOSIS — N12 TUBULO-INTERSTITIAL NEPHRITIS, NOT SPECIFIED AS ACUTE OR CHRONIC: ICD-10-CM

## 2018-07-13 LAB — CORE LAB FLUID CYTOLOGY: NORMAL

## 2018-07-17 ENCOUNTER — OTHER (OUTPATIENT)
Age: 68
End: 2018-07-17

## 2018-07-17 DIAGNOSIS — Z79.2 LONG TERM (CURRENT) USE OF ANTIBIOTICS: ICD-10-CM

## 2018-07-18 ENCOUNTER — APPOINTMENT (OUTPATIENT)
Dept: INTERNAL MEDICINE | Facility: CLINIC | Age: 68
End: 2018-07-18
Payer: MEDICARE

## 2018-07-18 VITALS
OXYGEN SATURATION: 95 % | TEMPERATURE: 98.5 F | DIASTOLIC BLOOD PRESSURE: 58 MMHG | WEIGHT: 180 LBS | RESPIRATION RATE: 18 BRPM | HEIGHT: 70 IN | BODY MASS INDEX: 25.77 KG/M2 | SYSTOLIC BLOOD PRESSURE: 98 MMHG | HEART RATE: 98 BPM

## 2018-07-18 LAB
BILIRUB UR QL STRIP: NORMAL
CLARITY UR: CLEAR
COLLECTION METHOD: NORMAL
GLUCOSE UR-MCNC: NEGATIVE
HCG UR QL: 0.2 EU/DL
HGB UR QL STRIP.AUTO: NEGATIVE
KETONES UR-MCNC: 15
LEUKOCYTE ESTERASE UR QL STRIP: NEGATIVE
NITRITE UR QL STRIP: NEGATIVE
PH UR STRIP: 6
PROT UR STRIP-MCNC: NORMAL
SP GR UR STRIP: 1.01

## 2018-07-18 PROCEDURE — 81003 URINALYSIS AUTO W/O SCOPE: CPT | Mod: QW

## 2018-07-18 PROCEDURE — 99213 OFFICE O/P EST LOW 20 MIN: CPT | Mod: 25

## 2018-07-18 RX ORDER — FUROSEMIDE 20 MG/1
20 TABLET ORAL DAILY
Qty: 30 | Refills: 5 | Status: DISCONTINUED | COMMUNITY
Start: 2018-06-12 | End: 2018-07-18

## 2018-07-18 RX ORDER — NITROFURANTOIN MACROCRYSTALS 100 MG/1
100 CAPSULE ORAL TWICE DAILY
Qty: 20 | Refills: 0 | Status: DISCONTINUED | COMMUNITY
Start: 2018-05-26 | End: 2018-07-18

## 2018-07-18 RX ORDER — SODIUM SULFATE, POTASSIUM SULFATE, MAGNESIUM SULFATE 17.5; 3.13; 1.6 G/ML; G/ML; G/ML
17.5-3.13-1.6 SOLUTION, CONCENTRATE ORAL
Qty: 1 | Refills: 0 | Status: DISCONTINUED | COMMUNITY
Start: 2018-03-16 | End: 2018-07-18

## 2018-07-18 RX ORDER — SULFAMETHOXAZOLE AND TRIMETHOPRIM 800; 160 MG/1; MG/1
800-160 TABLET ORAL
Qty: 28 | Refills: 0 | Status: DISCONTINUED | COMMUNITY
Start: 2018-03-27 | End: 2018-07-18

## 2018-07-18 RX ORDER — PREDNISONE 20 MG/1
20 TABLET ORAL TWICE DAILY
Qty: 60 | Refills: 3 | Status: DISCONTINUED | COMMUNITY
Start: 2018-02-26 | End: 2018-07-18

## 2018-07-18 NOTE — PHYSICAL EXAM
[No Acute Distress] : no acute distress [Well Nourished] : well nourished [Normal Outer Ear/Nose] : the outer ears and nose were normal in appearance [Normal Oropharynx] : the oropharynx was normal [No JVD] : no jugular venous distention [Supple] : supple [No Respiratory Distress] : no respiratory distress  [Clear to Auscultation] : lungs were clear to auscultation bilaterally [Normal Rate] : normal rate  [Regular Rhythm] : with a regular rhythm [Soft] : abdomen soft [No HSM] : no HSM

## 2018-07-18 NOTE — HISTORY OF PRESENT ILLNESS
[FreeTextEntry1] : Fever yesterday [de-identified] : Fever yesterday\par none today\par on nitrofurantoin 100 bid\par to get prostate biopsy next week\par This am some bowel incontence

## 2018-07-25 ENCOUNTER — LABORATORY RESULT (OUTPATIENT)
Age: 68
End: 2018-07-25

## 2018-07-25 ENCOUNTER — APPOINTMENT (OUTPATIENT)
Dept: UROLOGY | Facility: CLINIC | Age: 68
End: 2018-07-25
Payer: MEDICARE

## 2018-07-25 ENCOUNTER — APPOINTMENT (OUTPATIENT)
Dept: MRI IMAGING | Facility: IMAGING CENTER | Age: 68
End: 2018-07-25

## 2018-07-25 ENCOUNTER — OUTPATIENT (OUTPATIENT)
Dept: OUTPATIENT SERVICES | Facility: HOSPITAL | Age: 68
LOS: 1 days | End: 2018-07-25
Payer: COMMERCIAL

## 2018-07-25 VITALS
RESPIRATION RATE: 16 BRPM | TEMPERATURE: 97.3 F | HEART RATE: 103 BPM | SYSTOLIC BLOOD PRESSURE: 144 MMHG | DIASTOLIC BLOOD PRESSURE: 78 MMHG

## 2018-07-25 DIAGNOSIS — R35.0 FREQUENCY OF MICTURITION: ICD-10-CM

## 2018-07-25 PROCEDURE — 76942 ECHO GUIDE FOR BIOPSY: CPT | Mod: 26,59

## 2018-07-25 PROCEDURE — 55700: CPT

## 2018-07-25 PROCEDURE — 76872 US TRANSRECTAL: CPT | Mod: 26

## 2018-07-25 PROCEDURE — 76942 ECHO GUIDE FOR BIOPSY: CPT | Mod: 59

## 2018-07-25 PROCEDURE — 76872 US TRANSRECTAL: CPT

## 2018-07-25 RX ORDER — AMIKACIN SULFATE 250 MG/ML
500 INJECTION, SOLUTION INTRAMUSCULAR; INTRAVENOUS
Qty: 2 | Refills: 0 | Status: COMPLETED | OUTPATIENT
Start: 2018-07-17 | End: 2018-07-25

## 2018-07-25 RX ORDER — AMIKACIN SULFATE 250 MG/ML
500 INJECTION, SOLUTION INTRAMUSCULAR; INTRAVENOUS
Refills: 0 | Status: COMPLETED | OUTPATIENT
Start: 2018-07-25

## 2018-07-25 RX ADMIN — AMIKACIN SULFATE 0 MG/2ML: 500 INJECTION, SOLUTION INTRAMUSCULAR; INTRAVENOUS at 00:00

## 2018-07-30 DIAGNOSIS — R97.20 ELEVATED PROSTATE SPECIFIC ANTIGEN [PSA]: ICD-10-CM

## 2018-08-06 ENCOUNTER — RX RENEWAL (OUTPATIENT)
Age: 68
End: 2018-08-06

## 2018-08-08 ENCOUNTER — APPOINTMENT (OUTPATIENT)
Dept: UROLOGY | Facility: CLINIC | Age: 68
End: 2018-08-08
Payer: MEDICARE

## 2018-08-08 PROCEDURE — 99214 OFFICE O/P EST MOD 30 MIN: CPT

## 2018-08-10 LAB — BACTERIA UR CULT: NORMAL

## 2018-08-16 ENCOUNTER — APPOINTMENT (OUTPATIENT)
Dept: GASTROENTEROLOGY | Facility: CLINIC | Age: 68
End: 2018-08-16
Payer: MEDICARE

## 2018-08-16 VITALS
WEIGHT: 169.38 LBS | TEMPERATURE: 97.1 F | HEART RATE: 96 BPM | OXYGEN SATURATION: 98 % | DIASTOLIC BLOOD PRESSURE: 60 MMHG | SYSTOLIC BLOOD PRESSURE: 110 MMHG | BODY MASS INDEX: 24.25 KG/M2 | HEIGHT: 70 IN

## 2018-08-16 PROCEDURE — 99214 OFFICE O/P EST MOD 30 MIN: CPT

## 2018-08-17 ENCOUNTER — INPATIENT (INPATIENT)
Facility: HOSPITAL | Age: 68
LOS: 6 days | Discharge: HOME | End: 2018-08-24
Attending: HOSPITALIST | Admitting: HOSPITALIST
Payer: MEDICARE

## 2018-08-17 VITALS
TEMPERATURE: 99 F | OXYGEN SATURATION: 98 % | DIASTOLIC BLOOD PRESSURE: 57 MMHG | SYSTOLIC BLOOD PRESSURE: 117 MMHG | HEART RATE: 86 BPM | RESPIRATION RATE: 16 BRPM

## 2018-08-17 DIAGNOSIS — E11.9 TYPE 2 DIABETES MELLITUS WITHOUT COMPLICATIONS: ICD-10-CM

## 2018-08-17 DIAGNOSIS — Z98.49 CATARACT EXTRACTION STATUS, UNSPECIFIED EYE: Chronic | ICD-10-CM

## 2018-08-17 DIAGNOSIS — F32.9 MAJOR DEPRESSIVE DISORDER, SINGLE EPISODE, UNSPECIFIED: ICD-10-CM

## 2018-08-17 DIAGNOSIS — Z29.9 ENCOUNTER FOR PROPHYLACTIC MEASURES, UNSPECIFIED: ICD-10-CM

## 2018-08-17 DIAGNOSIS — R55 SYNCOPE AND COLLAPSE: ICD-10-CM

## 2018-08-17 DIAGNOSIS — I10 ESSENTIAL (PRIMARY) HYPERTENSION: ICD-10-CM

## 2018-08-17 DIAGNOSIS — F03.90 UNSPECIFIED DEMENTIA WITHOUT BEHAVIORAL DISTURBANCE: ICD-10-CM

## 2018-08-17 DIAGNOSIS — R19.5 OTHER FECAL ABNORMALITIES: ICD-10-CM

## 2018-08-17 DIAGNOSIS — K50.90 CROHN'S DISEASE, UNSPECIFIED, WITHOUT COMPLICATIONS: ICD-10-CM

## 2018-08-17 DIAGNOSIS — I63.9 CEREBRAL INFARCTION, UNSPECIFIED: ICD-10-CM

## 2018-08-17 LAB
ALBUMIN SERPL ELPH-MCNC: 3.7 G/DL — SIGNIFICANT CHANGE UP (ref 3.3–5)
ALP SERPL-CCNC: 73 U/L — SIGNIFICANT CHANGE UP (ref 40–120)
ALT FLD-CCNC: 12 U/L — SIGNIFICANT CHANGE UP (ref 4–41)
APPEARANCE UR: CLEAR — SIGNIFICANT CHANGE UP
AST SERPL-CCNC: 24 U/L — SIGNIFICANT CHANGE UP (ref 4–40)
BACTERIA # UR AUTO: NEGATIVE — SIGNIFICANT CHANGE UP
BASOPHILS # BLD AUTO: 0.04 K/UL — SIGNIFICANT CHANGE UP (ref 0–0.2)
BASOPHILS NFR BLD AUTO: 0.6 % — SIGNIFICANT CHANGE UP (ref 0–2)
BILIRUB SERPL-MCNC: 0.4 MG/DL — SIGNIFICANT CHANGE UP (ref 0.2–1.2)
BILIRUB UR-MCNC: NEGATIVE — SIGNIFICANT CHANGE UP
BLOOD UR QL VISUAL: NEGATIVE — SIGNIFICANT CHANGE UP
BUN SERPL-MCNC: 15 MG/DL — SIGNIFICANT CHANGE UP (ref 7–23)
CALCIUM SERPL-MCNC: 9.1 MG/DL — SIGNIFICANT CHANGE UP (ref 8.4–10.5)
CHLORIDE SERPL-SCNC: 101 MMOL/L — SIGNIFICANT CHANGE UP (ref 98–107)
CO2 SERPL-SCNC: 23 MMOL/L — SIGNIFICANT CHANGE UP (ref 22–31)
COLOR SPEC: YELLOW — SIGNIFICANT CHANGE UP
CREAT SERPL-MCNC: 1.33 MG/DL — HIGH (ref 0.5–1.3)
EOSINOPHIL # BLD AUTO: 0.55 K/UL — HIGH (ref 0–0.5)
EOSINOPHIL NFR BLD AUTO: 8.3 % — HIGH (ref 0–6)
GLUCOSE SERPL-MCNC: 114 MG/DL — HIGH (ref 70–99)
GLUCOSE UR-MCNC: NEGATIVE — SIGNIFICANT CHANGE UP
HCT VFR BLD CALC: 31.2 % — LOW (ref 39–50)
HGB BLD-MCNC: 9.9 G/DL — LOW (ref 13–17)
HYALINE CASTS # UR AUTO: SIGNIFICANT CHANGE UP (ref 0–?)
IMM GRANULOCYTES # BLD AUTO: 0.01 # — SIGNIFICANT CHANGE UP
IMM GRANULOCYTES NFR BLD AUTO: 0.2 % — SIGNIFICANT CHANGE UP (ref 0–1.5)
KETONES UR-MCNC: NEGATIVE — SIGNIFICANT CHANGE UP
LEUKOCYTE ESTERASE UR-ACNC: NEGATIVE — SIGNIFICANT CHANGE UP
LYMPHOCYTES # BLD AUTO: 2.4 K/UL — SIGNIFICANT CHANGE UP (ref 1–3.3)
LYMPHOCYTES # BLD AUTO: 36 % — SIGNIFICANT CHANGE UP (ref 13–44)
MCHC RBC-ENTMCNC: 27.6 PG — SIGNIFICANT CHANGE UP (ref 27–34)
MCHC RBC-ENTMCNC: 31.7 % — LOW (ref 32–36)
MCV RBC AUTO: 86.9 FL — SIGNIFICANT CHANGE UP (ref 80–100)
MONOCYTES # BLD AUTO: 0.86 K/UL — SIGNIFICANT CHANGE UP (ref 0–0.9)
MONOCYTES NFR BLD AUTO: 12.9 % — SIGNIFICANT CHANGE UP (ref 2–14)
NEUTROPHILS # BLD AUTO: 2.8 K/UL — SIGNIFICANT CHANGE UP (ref 1.8–7.4)
NEUTROPHILS NFR BLD AUTO: 42 % — LOW (ref 43–77)
NITRITE UR-MCNC: NEGATIVE — SIGNIFICANT CHANGE UP
NRBC # FLD: 0 — SIGNIFICANT CHANGE UP
NT-PROBNP SERPL-SCNC: 129.7 PG/ML — SIGNIFICANT CHANGE UP
OB PNL STL: POSITIVE — SIGNIFICANT CHANGE UP
PH UR: 6 — SIGNIFICANT CHANGE UP (ref 5–8)
PLATELET # BLD AUTO: 168 K/UL — SIGNIFICANT CHANGE UP (ref 150–400)
PMV BLD: 10.1 FL — SIGNIFICANT CHANGE UP (ref 7–13)
POTASSIUM SERPL-MCNC: 3.5 MMOL/L — SIGNIFICANT CHANGE UP (ref 3.5–5.3)
POTASSIUM SERPL-SCNC: 3.5 MMOL/L — SIGNIFICANT CHANGE UP (ref 3.5–5.3)
PROT SERPL-MCNC: 6.6 G/DL — SIGNIFICANT CHANGE UP (ref 6–8.3)
PROT UR-MCNC: SIGNIFICANT CHANGE UP
RBC # BLD: 3.59 M/UL — LOW (ref 4.2–5.8)
RBC # FLD: 12.5 % — SIGNIFICANT CHANGE UP (ref 10.3–14.5)
RBC CASTS # UR COMP ASSIST: SIGNIFICANT CHANGE UP (ref 0–?)
SODIUM SERPL-SCNC: 138 MMOL/L — SIGNIFICANT CHANGE UP (ref 135–145)
SP GR SPEC: 1.02 — SIGNIFICANT CHANGE UP (ref 1–1.04)
SQUAMOUS # UR AUTO: SIGNIFICANT CHANGE UP
TROPONIN T, HIGH SENSITIVITY: 17 NG/L — SIGNIFICANT CHANGE UP (ref ?–14)
TROPONIN T, HIGH SENSITIVITY: 17 NG/L — SIGNIFICANT CHANGE UP (ref ?–14)
UROBILINOGEN FLD QL: SIGNIFICANT CHANGE UP
WBC # BLD: 6.66 K/UL — SIGNIFICANT CHANGE UP (ref 3.8–10.5)
WBC # FLD AUTO: 6.66 K/UL — SIGNIFICANT CHANGE UP (ref 3.8–10.5)
WBC UR QL: SIGNIFICANT CHANGE UP (ref 0–?)

## 2018-08-17 PROCEDURE — 71046 X-RAY EXAM CHEST 2 VIEWS: CPT | Mod: 26

## 2018-08-17 PROCEDURE — 99223 1ST HOSP IP/OBS HIGH 75: CPT

## 2018-08-17 PROCEDURE — 70450 CT HEAD/BRAIN W/O DYE: CPT | Mod: 26

## 2018-08-17 PROCEDURE — 72125 CT NECK SPINE W/O DYE: CPT | Mod: 26

## 2018-08-17 RX ORDER — SERTRALINE 25 MG/1
100 TABLET, FILM COATED ORAL DAILY
Qty: 0 | Refills: 0 | Status: DISCONTINUED | OUTPATIENT
Start: 2018-08-17 | End: 2018-08-24

## 2018-08-17 RX ORDER — DEXTROSE 50 % IN WATER 50 %
15 SYRINGE (ML) INTRAVENOUS ONCE
Qty: 0 | Refills: 0 | Status: DISCONTINUED | OUTPATIENT
Start: 2018-08-17 | End: 2018-08-24

## 2018-08-17 RX ORDER — QUETIAPINE FUMARATE 200 MG/1
25 TABLET, FILM COATED ORAL
Qty: 0 | Refills: 0 | Status: DISCONTINUED | OUTPATIENT
Start: 2018-08-17 | End: 2018-08-24

## 2018-08-17 RX ORDER — INSULIN LISPRO 100/ML
VIAL (ML) SUBCUTANEOUS
Qty: 0 | Refills: 0 | Status: DISCONTINUED | OUTPATIENT
Start: 2018-08-17 | End: 2018-08-24

## 2018-08-17 RX ORDER — DEXTROSE 50 % IN WATER 50 %
12.5 SYRINGE (ML) INTRAVENOUS ONCE
Qty: 0 | Refills: 0 | Status: DISCONTINUED | OUTPATIENT
Start: 2018-08-17 | End: 2018-08-24

## 2018-08-17 RX ORDER — GLUCAGON INJECTION, SOLUTION 0.5 MG/.1ML
1 INJECTION, SOLUTION SUBCUTANEOUS ONCE
Qty: 0 | Refills: 0 | Status: DISCONTINUED | OUTPATIENT
Start: 2018-08-17 | End: 2018-08-24

## 2018-08-17 RX ORDER — DEXTROSE 50 % IN WATER 50 %
25 SYRINGE (ML) INTRAVENOUS ONCE
Qty: 0 | Refills: 0 | Status: DISCONTINUED | OUTPATIENT
Start: 2018-08-17 | End: 2018-08-24

## 2018-08-17 RX ORDER — HEPARIN SODIUM 5000 [USP'U]/ML
5000 INJECTION INTRAVENOUS; SUBCUTANEOUS EVERY 8 HOURS
Qty: 0 | Refills: 0 | Status: DISCONTINUED | OUTPATIENT
Start: 2018-08-17 | End: 2018-08-17

## 2018-08-17 RX ORDER — SODIUM CHLORIDE 9 MG/ML
1000 INJECTION INTRAMUSCULAR; INTRAVENOUS; SUBCUTANEOUS
Qty: 0 | Refills: 0 | Status: COMPLETED | OUTPATIENT
Start: 2018-08-17 | End: 2018-08-17

## 2018-08-17 RX ORDER — SODIUM CHLORIDE 9 MG/ML
1000 INJECTION, SOLUTION INTRAVENOUS
Qty: 0 | Refills: 0 | Status: DISCONTINUED | OUTPATIENT
Start: 2018-08-17 | End: 2018-08-24

## 2018-08-17 RX ORDER — MEMANTINE HYDROCHLORIDE 10 MG/1
10 TABLET ORAL
Qty: 0 | Refills: 0 | Status: DISCONTINUED | OUTPATIENT
Start: 2018-08-17 | End: 2018-08-24

## 2018-08-17 RX ORDER — TAMSULOSIN HYDROCHLORIDE 0.4 MG/1
0.4 CAPSULE ORAL
Qty: 0 | Refills: 0 | Status: DISCONTINUED | OUTPATIENT
Start: 2018-08-17 | End: 2018-08-24

## 2018-08-17 RX ORDER — ATORVASTATIN CALCIUM 80 MG/1
40 TABLET, FILM COATED ORAL AT BEDTIME
Qty: 0 | Refills: 0 | Status: DISCONTINUED | OUTPATIENT
Start: 2018-08-17 | End: 2018-08-24

## 2018-08-17 RX ORDER — MESALAMINE 400 MG
1600 TABLET, DELAYED RELEASE (ENTERIC COATED) ORAL THREE TIMES A DAY
Qty: 0 | Refills: 0 | Status: DISCONTINUED | OUTPATIENT
Start: 2018-08-17 | End: 2018-08-24

## 2018-08-17 RX ORDER — CHOLECALCIFEROL (VITAMIN D3) 125 MCG
400 CAPSULE ORAL DAILY
Qty: 0 | Refills: 0 | Status: DISCONTINUED | OUTPATIENT
Start: 2018-08-17 | End: 2018-08-24

## 2018-08-17 RX ADMIN — TAMSULOSIN HYDROCHLORIDE 0.4 MILLIGRAM(S): 0.4 CAPSULE ORAL at 21:44

## 2018-08-17 RX ADMIN — Medication 1600 MILLIGRAM(S): at 21:44

## 2018-08-17 RX ADMIN — ATORVASTATIN CALCIUM 40 MILLIGRAM(S): 80 TABLET, FILM COATED ORAL at 21:44

## 2018-08-17 RX ADMIN — MEMANTINE HYDROCHLORIDE 10 MILLIGRAM(S): 10 TABLET ORAL at 17:42

## 2018-08-17 RX ADMIN — SODIUM CHLORIDE 75 MILLILITER(S): 9 INJECTION INTRAMUSCULAR; INTRAVENOUS; SUBCUTANEOUS at 18:49

## 2018-08-17 NOTE — H&P ADULT - NSHPSOCIALHISTORY_GEN_ALL_CORE
, lives with wife  smoked 1 ppd x 50 years, stopped 3 years ago  drank 2 glasses of whiskey per day in the past  no drug use

## 2018-08-17 NOTE — H&P ADULT - NEUROLOGICAL DETAILS
sensation intact/normal strength/alert and oriented x 3 sensation intact/responds to verbal commands/normal strength/alert and oriented x 3

## 2018-08-17 NOTE — H&P ADULT - FAMILY HISTORY
Family history of MI (myocardial infarction)     Family history of hypertension     Family history of Alzheimer's disease     Father  Still living? Unknown  Family history of cerebrovascular accident (CVA) in father, Age at diagnosis: Age Unknown

## 2018-08-17 NOTE — H&P ADULT - RS GEN PE MLT RESP DETAILS PC
good air movement/breath sounds equal/respirations non-labored/clear to auscultation bilaterally/airway patent airway patent/no wheezes/clear to auscultation bilaterally/no rhonchi/breath sounds equal/good air movement/respirations non-labored/no rales

## 2018-08-17 NOTE — H&P ADULT - PROBLEM SELECTOR PLAN 3
continue mesalamine - continue mesalamine though given his is still symptomatic he would liekly benefit from alternate therapy  - Would therefore consult GI in AM for help in management of his crohn's

## 2018-08-17 NOTE — ED ADULT NURSE NOTE - NSIMPLEMENTINTERV_GEN_ALL_ED
Implemented All Fall Risk Interventions:  [place] to call system. Call bell, personal items and telephone within reach. Instruct patient to call for assistance. Room bathroom lighting operational. Non-slip footwear when patient is off stretcher. Physically safe environment: no spills, clutter or unnecessary equipment. Stretcher in lowest position, wheels locked, appropriate side rails in place. Provide visual cue, wrist band, yellow gown, etc. Monitor gait and stability. Monitor for mental status changes and reorient to person, place, and time. Review medications for side effects contributing to fall risk. Reinforce activity limits and safety measures with patient and family.

## 2018-08-17 NOTE — PATIENT PROFILE ADULT. - NS SC CAGE ALCOHOL GUILTY ABOUT
Consent: Written consent was obtained and risks were reviewed including but not limited to scarring, infection, bleeding, scabbing, incomplete removal, nerve damage and allergy to anesthesia. Dressing: bandage Wound Care: Vaseline Cryotherapy Text: The wound bed was treated with cryotherapy after the biopsy was performed. X Size Of Lesion In Cm: 0 Electrodesiccation And Curettage Text: The wound bed was treated with electrodesiccation and curettage after the biopsy was performed. Size Of Lesion In Cm: 0.7 Render Post-Care Instructions In Note?: no Post-Care Instructions: I reviewed with the patient in detail post-care instructions. Patient is to keep the biopsy site dry overnight, and then apply bacitracin twice daily until healed. Patient may apply hydrogen peroxide soaks to remove any crusting. Silver Nitrate Text: The wound bed was treated with silver nitrate after the biopsy was performed. Notification Instructions: Patient will be notified of biopsy results. However, patient instructed to call the office if not contacted within 2 weeks. Anesthesia Volume In Cc (Will Not Render If 0): 0.5 Billing Type: Third-Party Bill Lab: [id number] Biopsy Type: H and E Electrodesiccation Text: The wound bed was treated with electrodesiccation after the biopsy was performed. Type Of Destruction Used: Curettage Anesthesia Type: 1% lidocaine with epinephrine Hemostasis: Aluminum Chloride Detail Level: Detailed Additional Anticipated Plans: If malignant consider excision Biopsy Method: Personna blade Curettage Text: The wound bed was treated with curettage after the biopsy was performed. Lab Facility: [id number] Billing Type: Third-Party Bill Lab: [id number] Lab Facility: [id number] no

## 2018-08-17 NOTE — H&P ADULT - NEGATIVE NEUROLOGICAL SYMPTOMS
no headache/no transient paralysis/no focal seizures/no paresthesias/no generalized seizures/no vertigo/no hemiparesis

## 2018-08-17 NOTE — ED PROVIDER NOTE - PHYSICAL EXAMINATION
MSK: 5/5 strength in UE, 4/5 strength in LE b/l  Neuro: sensation intact to light touch in UE/LE b/l, CN II-XII grossly intact, able to perform FTN/CHIDI/HTS  CV: pulses intact 2+ b/l in UE/LE

## 2018-08-17 NOTE — ED PROVIDER NOTE - PMH
Alcohol abuse    Crohn disease  diagnosed 2012  Diabetes type 2, controlled    H/O: CVA  2013 | residual speech deficits  Herniated disc    HLD (hyperlipidemia)    HTN (hypertension)  Not taking any meds, noncompliant

## 2018-08-17 NOTE — H&P ADULT - NEGATIVE ENMT SYMPTOMS
no hearing difficulty/no nasal congestion/no tinnitus/no nasal discharge/no ear pain/no throat pain/no vertigo

## 2018-08-17 NOTE — ED PROVIDER NOTE - OBJECTIVE STATEMENT
69 YO M hx of HTN, CVA, psychiatric illness, presents after syncopal episode. per wife bedside, pt used restroom this morning and after leaving fell onto his back. pt was unconsciousness for 2 minutes, and took an additional 2 minutes to return to baseline. denied incontinence or tongue biting during episode, no seizure-like activity noted. denies prodromal sx prior to syncopal episode, no n/v, cp, ha, f/c. pt states he had a similar episode 6 months ago and had a 5 day hospital stay, denies cardiac work up, unsure of previous syncope etiology. denies ever receiving stress test. in ED pt denies all acute complaints.

## 2018-08-17 NOTE — H&P ADULT - PROBLEM SELECTOR PLAN 4
Check Fingersticks with Meals and cover with ISS TID, check HgbA1C - Check Fingersticks with Meals and cover with ISS TID, check HgbA1C

## 2018-08-17 NOTE — ED ADULT TRIAGE NOTE - CHIEF COMPLAINT QUOTE
Pt s/p fall /syncope this am after coming from the BR, hitting her head on the floor.  Pt c/o dizziness , R knee pain, lower back pain and weakness.  Denies chest pain, sob

## 2018-08-17 NOTE — ED PROVIDER NOTE - MEDICAL DECISION MAKING DETAILS
69 YO M presents after syncopal episode. No reported cardiac hx however no work ups reported either. Labs/imaging. reassess. On multiple psychiatric medications, QTc prolonged from previous EKG by 50 s.

## 2018-08-17 NOTE — H&P ADULT - ASSESSMENT
67 yo M p/w Syncopal episode w/ head trauma  EKG- NSR @ 78 68M with history as above presents to the hospital with syncopal episode at home.

## 2018-08-17 NOTE — ED PROVIDER NOTE - PROGRESS NOTE DETAILS
Occult exam performed with stool on diaper. Medical Student Sage Murry chaperoned. occult +, Hg 9 with no baseline reference. no change in troponins. admit for echo and h/h serials.  -hermilo penn, pgy1

## 2018-08-17 NOTE — H&P ADULT - PMH
Alcohol abuse    Crohn disease  diagnosed 2012  Dementia    Depression    Diabetes type 2, controlled    H/O: CVA  2013 | residual speech deficits  Herniated disc    HLD (hyperlipidemia)    HTN (hypertension)  Not taking any meds, noncompliant

## 2018-08-17 NOTE — ED ADULT NURSE NOTE - OBJECTIVE STATEMENT
Pt presents to trauma C, A&Ox3, ambulatory at baseline without assistance, pmhx of etoh abuse, htn, hld, dm2, etoh abuse, and cva, here for evaluation of syncope after using the restroom. pt finished using the restroom, was walking out of the bathroom, got dizzy and passed out. pt hit his head on the floor. c/o left knee pain. has no other complaints. not on any a/c

## 2018-08-17 NOTE — ED PROVIDER NOTE - ATTENDING CONTRIBUTION TO CARE
MD Ni:  patient seen and evaluated with the resident.  I was present for key portions of the History & Physical, and I agree with the Impression & Plan.  MD Ni:  69 yo M, bib EMS after syncopal MD Ni:  patient seen and evaluated with the resident.  I was present for key portions of the History & Physical, and I agree with the Impression & Plan.  MD Ni:  69 yo M, bib EMS after syncopal episode at home.  Context:  patient went to urinate, and on way back to bed he suddenly lost consciousness; there was no aura; he remembers walking to bed and next thing he knows he's on the floor.  This is 2nd ED visit for same problem; last visit 6 mos ago, and was hospitalized, but no echo.  Patient has MHx of GI bleed.  Hb =9.9 (no prior).  No CP/SOB, no HA, no neck pain, no weakness/numbness.  VS: wnl. Physical Exam: adult M, NAD, NCAT, PERRL, EOMI, neck supple, CTA B, Abd: s/nd/nt, Rectal: brown stool, Ext: no edema, Neuro:  AAOx3, moving all 4 extremities equally strength is 5/5 throughout.  ECG NSR, but QTc 456.  Impression: cardiac vs vasovagal syncope.  Doubt seizure, given no loss of urine, tongue biting, no post-ictal state.  Plan:  CT head, cardiac labs, ua, rectal occult, reassess.

## 2018-08-17 NOTE — H&P ADULT - NSHPLABSRESULTS_GEN_ALL_CORE
LABS and ADDITIONAL STUDIES:                        9.9    6.66  )-----------( 168      ( 17 Aug 2018 11:30 )             31.2     Occult Blood: POSITIVE: ** Results**        138  |  101  |  15  ----------------------------<  114<H>  3.5   |  23  |  1.33<H>    Ca    9.1      17 Aug 2018 11:30    TPro  6.6  /  Alb  3.7  /  TBili  0.4  /  DBili  x   /  AST  24  /  ALT  12  /  AlkPhos  73      LIVER FUNCTIONS - ( 17 Aug 2018 11:30 )  Alb: 3.7 g/dL / Pro: 6.6 g/dL / ALK PHOS: 73 u/L / ALT: 12 u/L / AST: 24 u/L / GGT: x           Urinalysis Basic - ( 17 Aug 2018 15:07 )  Color: YELLOW / Appearance: CLEAR / S.023 / pH: 6.0  Gluc: NEGATIVE / Ketone: NEGATIVE  / Bili: NEGATIVE / Urobili: TRACE   Blood: NEGATIVE / Protein: TRACE / Nitrite: NEGATIVE   Leuk Esterase: NEGATIVE / RBC: 2-5 / WBC 2-5   Sq Epi: OCC / Non Sq Epi: x / Bacteria: NEGATIVE      < from: CT Head/Cervical Spine No Cont (18 @ 13:37) >    IMPRESSION:  No intracranial hemorrhage.  No cervical spine fracture.    < end of copied text >    < from: Xray Chest 2 Views PA/Lat (18 @ 12:51) >    IMPRESSION:  Clear lungs. No pleural effusions or pneumothorax.    Indistinct heart borders due to epicardial fat limits accurate assessment   of heart size.     Trachea midline.    Generalized osteopenia and mild spinal degenerative changes.    < end of copied text >    EKG - NSR at 78, QTc 456 LABS and ADDITIONAL STUDIES:                        9.9    6.66  )-----------( 168      ( 17 Aug 2018 11:30 )             31.2     Occult Blood: POSITIVE: ** Results**        138  |  101  |  15  ----------------------------<  114<H>  3.5   |  23  |  1.33<H>    Ca    9.1      17 Aug 2018 11:30    TPro  6.6  /  Alb  3.7  /  TBili  0.4  /  DBili  x   /  AST  24  /  ALT  12  /  AlkPhos  73      LIVER FUNCTIONS - ( 17 Aug 2018 11:30 )  Alb: 3.7 g/dL / Pro: 6.6 g/dL / ALK PHOS: 73 u/L / ALT: 12 u/L / AST: 24 u/L / GGT: x           hsTrop     Urinalysis Basic - ( 17 Aug 2018 15:07 )  Color: YELLOW / Appearance: CLEAR / S.023 / pH: 6.0  Gluc: NEGATIVE / Ketone: NEGATIVE  / Bili: NEGATIVE / Urobili: TRACE   Blood: NEGATIVE / Protein: TRACE / Nitrite: NEGATIVE   Leuk Esterase: NEGATIVE / RBC: 2-5 / WBC 2-5   Sq Epi: OCC / Non Sq Epi: x / Bacteria: NEGATIVE      < from: CT Head/Cervical Spine No Cont (18 @ 13:37) >    IMPRESSION:  No intracranial hemorrhage.  No cervical spine fracture.    < end of copied text >    < from: Xray Chest 2 Views PA/Lat (18 @ 12:51) >    IMPRESSION:  Clear lungs. No pleural effusions or pneumothorax.    Indistinct heart borders due to epicardial fat limits accurate assessment   of heart size.     Trachea midline.    Generalized osteopenia and mild spinal degenerative changes.    < end of copied text >    EKG - NSR at 78, QTc 456

## 2018-08-17 NOTE — H&P ADULT - PROBLEM SELECTOR PLAN 1
Admit to Telemetry, serial CE's, EKG's, check orthostatics, echocardiogram. F/U MD note - Unclear cause for patient's syncope though suspect it is due to his poor PO intake over the past few months  - Would monitor on telemetry for now, hsTrop was elevated to 17 but on repeat was same ruling out MI, EKG without acute changes   - TTE, check orthostatics

## 2018-08-17 NOTE — H&P ADULT - CARDIOVASCULAR SYMPTOMS
peripheral edema/has had pedal edema in the past, given amedicine in 6/18 short term with resolution of edema

## 2018-08-17 NOTE — H&P ADULT - PROBLEM SELECTOR PLAN 5
consider starting ASA if ok with GI - Patient denies being on standing aspirin, unclear why, would need to clarify in AM, c/w lipitor for now

## 2018-08-18 LAB
ALBUMIN SERPL ELPH-MCNC: 3.3 G/DL — SIGNIFICANT CHANGE UP (ref 3.3–5)
ALP SERPL-CCNC: 75 U/L — SIGNIFICANT CHANGE UP (ref 40–120)
ALT FLD-CCNC: 10 U/L — SIGNIFICANT CHANGE UP (ref 4–41)
AST SERPL-CCNC: 20 U/L — SIGNIFICANT CHANGE UP (ref 4–40)
BASOPHILS # BLD AUTO: 0.05 K/UL — SIGNIFICANT CHANGE UP (ref 0–0.2)
BASOPHILS NFR BLD AUTO: 1 % — SIGNIFICANT CHANGE UP (ref 0–2)
BILIRUB SERPL-MCNC: 0.5 MG/DL — SIGNIFICANT CHANGE UP (ref 0.2–1.2)
BUN SERPL-MCNC: 12 MG/DL — SIGNIFICANT CHANGE UP (ref 7–23)
C DIFF TOX GENS STL QL NAA+PROBE: SIGNIFICANT CHANGE UP
CALCIUM SERPL-MCNC: 8.6 MG/DL — SIGNIFICANT CHANGE UP (ref 8.4–10.5)
CHLORIDE SERPL-SCNC: 103 MMOL/L — SIGNIFICANT CHANGE UP (ref 98–107)
CHOLEST SERPL-MCNC: 98 MG/DL — LOW (ref 120–199)
CO2 SERPL-SCNC: 21 MMOL/L — LOW (ref 22–31)
CREAT SERPL-MCNC: 0.99 MG/DL — SIGNIFICANT CHANGE UP (ref 0.5–1.3)
EOSINOPHIL # BLD AUTO: 0.59 K/UL — HIGH (ref 0–0.5)
EOSINOPHIL NFR BLD AUTO: 11.2 % — HIGH (ref 0–6)
GLUCOSE SERPL-MCNC: 111 MG/DL — HIGH (ref 70–99)
HBA1C BLD-MCNC: 4.6 % — SIGNIFICANT CHANGE UP (ref 4–5.6)
HCT VFR BLD CALC: 31.1 % — LOW (ref 39–50)
HDLC SERPL-MCNC: 24 MG/DL — LOW (ref 35–55)
HGB BLD-MCNC: 9.6 G/DL — LOW (ref 13–17)
IMM GRANULOCYTES # BLD AUTO: 0.01 # — SIGNIFICANT CHANGE UP
IMM GRANULOCYTES NFR BLD AUTO: 0.2 % — SIGNIFICANT CHANGE UP (ref 0–1.5)
LIPID PNL WITH DIRECT LDL SERPL: 54 MG/DL — SIGNIFICANT CHANGE UP
LYMPHOCYTES # BLD AUTO: 2.37 K/UL — SIGNIFICANT CHANGE UP (ref 1–3.3)
LYMPHOCYTES # BLD AUTO: 45.1 % — HIGH (ref 13–44)
MAGNESIUM SERPL-MCNC: 2.1 MG/DL — SIGNIFICANT CHANGE UP (ref 1.6–2.6)
MCHC RBC-ENTMCNC: 27.6 PG — SIGNIFICANT CHANGE UP (ref 27–34)
MCHC RBC-ENTMCNC: 30.9 % — LOW (ref 32–36)
MCV RBC AUTO: 89.4 FL — SIGNIFICANT CHANGE UP (ref 80–100)
MONOCYTES # BLD AUTO: 0.63 K/UL — SIGNIFICANT CHANGE UP (ref 0–0.9)
MONOCYTES NFR BLD AUTO: 12 % — SIGNIFICANT CHANGE UP (ref 2–14)
NEUTROPHILS # BLD AUTO: 1.61 K/UL — LOW (ref 1.8–7.4)
NEUTROPHILS NFR BLD AUTO: 30.5 % — LOW (ref 43–77)
NRBC # FLD: 0 — SIGNIFICANT CHANGE UP
OB PNL STL: NEGATIVE — SIGNIFICANT CHANGE UP
PHOSPHATE SERPL-MCNC: 4.5 MG/DL — SIGNIFICANT CHANGE UP (ref 2.5–4.5)
PLATELET # BLD AUTO: 151 K/UL — SIGNIFICANT CHANGE UP (ref 150–400)
PMV BLD: 10.4 FL — SIGNIFICANT CHANGE UP (ref 7–13)
POTASSIUM SERPL-MCNC: 3.7 MMOL/L — SIGNIFICANT CHANGE UP (ref 3.5–5.3)
POTASSIUM SERPL-SCNC: 3.7 MMOL/L — SIGNIFICANT CHANGE UP (ref 3.5–5.3)
PROT SERPL-MCNC: 6.2 G/DL — SIGNIFICANT CHANGE UP (ref 6–8.3)
RBC # BLD: 3.48 M/UL — LOW (ref 4.2–5.8)
RBC # FLD: 12.4 % — SIGNIFICANT CHANGE UP (ref 10.3–14.5)
SODIUM SERPL-SCNC: 138 MMOL/L — SIGNIFICANT CHANGE UP (ref 135–145)
SPECIMEN SOURCE: SIGNIFICANT CHANGE UP
TRIGL SERPL-MCNC: 129 MG/DL — SIGNIFICANT CHANGE UP (ref 10–149)
TSH SERPL-MCNC: 0.66 UIU/ML — SIGNIFICANT CHANGE UP (ref 0.27–4.2)
WBC # BLD: 5.26 K/UL — SIGNIFICANT CHANGE UP (ref 3.8–10.5)
WBC # FLD AUTO: 5.26 K/UL — SIGNIFICANT CHANGE UP (ref 3.8–10.5)

## 2018-08-18 PROCEDURE — 99222 1ST HOSP IP/OBS MODERATE 55: CPT | Mod: GC

## 2018-08-18 PROCEDURE — 99233 SBSQ HOSP IP/OBS HIGH 50: CPT

## 2018-08-18 RX ORDER — SODIUM CHLORIDE 9 MG/ML
1000 INJECTION INTRAMUSCULAR; INTRAVENOUS; SUBCUTANEOUS
Qty: 0 | Refills: 0 | Status: COMPLETED | OUTPATIENT
Start: 2018-08-18 | End: 2018-08-18

## 2018-08-18 RX ADMIN — TAMSULOSIN HYDROCHLORIDE 0.4 MILLIGRAM(S): 0.4 CAPSULE ORAL at 17:54

## 2018-08-18 RX ADMIN — SERTRALINE 100 MILLIGRAM(S): 25 TABLET, FILM COATED ORAL at 11:20

## 2018-08-18 RX ADMIN — Medication 1600 MILLIGRAM(S): at 21:37

## 2018-08-18 RX ADMIN — Medication 1600 MILLIGRAM(S): at 05:53

## 2018-08-18 RX ADMIN — QUETIAPINE FUMARATE 25 MILLIGRAM(S): 200 TABLET, FILM COATED ORAL at 21:37

## 2018-08-18 RX ADMIN — MEMANTINE HYDROCHLORIDE 10 MILLIGRAM(S): 10 TABLET ORAL at 05:53

## 2018-08-18 RX ADMIN — TAMSULOSIN HYDROCHLORIDE 0.4 MILLIGRAM(S): 0.4 CAPSULE ORAL at 05:53

## 2018-08-18 RX ADMIN — MEMANTINE HYDROCHLORIDE 10 MILLIGRAM(S): 10 TABLET ORAL at 17:54

## 2018-08-18 RX ADMIN — Medication 1600 MILLIGRAM(S): at 13:06

## 2018-08-18 RX ADMIN — Medication 400 UNIT(S): at 11:20

## 2018-08-18 RX ADMIN — ATORVASTATIN CALCIUM 40 MILLIGRAM(S): 80 TABLET, FILM COATED ORAL at 21:37

## 2018-08-18 RX ADMIN — Medication 1: at 17:53

## 2018-08-18 RX ADMIN — SODIUM CHLORIDE 75 MILLILITER(S): 9 INJECTION INTRAMUSCULAR; INTRAVENOUS; SUBCUTANEOUS at 13:01

## 2018-08-18 NOTE — CONSULT NOTE ADULT - SUBJECTIVE AND OBJECTIVE BOX
Chief Complaint:  Patient is a 68y old  Male who presents with a chief complaint of Syncope and diarrhea.       HPI:  68-year-old male, with long-standing history of inflammatory bowel disease (Ulcerative colitis), CVA without residual deficits, HTN, HLD, and DM2 presented to the emergency room with chief complain of passing out at home.   As per the patient and his wife, he had gone to the bathroom at night and his wife heard a loud noise and went to see him and he had fallen down on the bathroom floor. Patient states that he has been having >10 bowel movements a day, watery/pinkish in color/consistency over the past few weeks and has thus been feeling weak. He has increased urgency to have a bowel movement, more after meals. He denies nausea, vomiting, abdominal pain, blood in stool, or black colored stool.   Of note, he has had a UTI and was on levofloxacin for it for the past week.     In the ER: VS T 98.7, P 86, /57, R 16, O2 sat 98% RA.     IBD History: Patient used to follow with Dr Mendoza and was transfered to Dr Gatito Oro's care in 2016. He was diagnosed with UC around . Patient was previously on 6-mercaptopurine which was discontinued in the setting of infectious complications and hospitalization. More recently, he has been on mesalamine 4.8 g daily along with a steroid taper. He was currently being evaluated for Entyvio. His last colonoscopy was performed on May 21, 2018 confirming colitis from the anal rectum to 25 cm consistent with left-sided ulcerative colitis.       Allergies:  No Known Allergies      Home Medications:   Patient Currently Takes Medications as of 17-Aug-2018 16:56 documented in Structured Notes  · 	mesalamine 800 mg oral delayed release tablet: 2 tab(s) orally 3 times a day  · 	Centrum Silver oral tablet: 1 tab(s) orally once a day  · 	tamsulosin 0.4 mg oral capsule: 1 cap(s) orally 2 times a day  · 	levoFLOXacin 500 mg oral tablet: 1 tab(s) orally every 24 hours x 14 days, last dose tomorrow   · 	sertraline 100 mg oral tablet: 1 tab(s) orally once a day  · 	Vitamin D3 400 intl units oral tablet: 1 tab(s) orally once a day  · 	Namenda XR 28 mg oral capsule, extended release: 1 cap(s) orally once a day  · 	glipiZIDE 5 mg oral tablet: 1 tab(s) orally 2 times a day  · 	QUEtiapine 25 mg oral tablet: 0.5 tab(s) orally 2 times a day, As Needed - for agitation  · 	atorvastatin 40 mg oral tablet: 1 tab(s) orally once a day (at bedtime)    Hospital Medications:  atorvastatin 40 milliGRAM(s) Oral at bedtime  cholecalciferol 400 Unit(s) Oral daily  dextrose 40% Gel 15 Gram(s) Oral once PRN  dextrose 5%. 1000 milliLiter(s) IV Continuous <Continuous>  dextrose 50% Injectable 12.5 Gram(s) IV Push once  dextrose 50% Injectable 25 Gram(s) IV Push once  dextrose 50% Injectable 25 Gram(s) IV Push once  glucagon  Injectable 1 milliGRAM(s) IntraMuscular once PRN  insulin lispro (HumaLOG) corrective regimen sliding scale   SubCutaneous three times a day before meals  memantine 10 milliGRAM(s) Oral two times a day  mesalamine DR Capsule 1600 milliGRAM(s) Oral three times a day  QUEtiapine 25 milliGRAM(s) Oral two times a day PRN  sertraline 100 milliGRAM(s) Oral daily  tamsulosin 0.4 milliGRAM(s) Oral two times a day      PMHX/PSHX:  Dementia  Depression  Alcohol abuse  Crohn disease  Diabetes type 2, controlled  HLD (hyperlipidemia)  H/O: CVA  Herniated disc  HTN (hypertension)  History of cataract surgery  No Past Surgical History      Family history:    Family history of cerebrovascular accident (CVA) in father (Father)  Family history of Alzheimer's disease  Family history of hypertension  Family history of MI (myocardial infarction)  Denies family history of colon cancer, liver cancer, uterine cancer, ovarian cancer, breast cancer, gastric ulcers, colon polyps, gallstones    Social History:   , lives with wife  smoked 1 ppd x 50 years, stopped 3 years ago  drank 2 glasses of whiskey per day in the past  no drug use    ROS:     General:  No wt loss, fevers, chills, night sweats, fatigue,   Eyes:  Good vision, no reported pain  ENT:  No sore throat, pain, runny nose, dysphagia  CV:  No pain, palpitations, hypo/hypertension  Resp:  No dyspnea, cough, tachypnea, wheezing  GI:  See HPI  :  No pain, bleeding, incontinence, nocturia  Muscle:  No pain, weakness  Neuro:  No weakness, tingling, memory problems  Psych:  No fatigue, insomnia, mood problems, depression  Endocrine:  No polyuria, polydipsia, cold/heat intolerance  Heme:  No petechiae, ecchymosis, easy bruisability  Skin:  No rash, edema      PHYSICAL EXAM:     GENERAL:  Appears stated age, well-groomed, well-nourished, no distress  HEENT:  NC/AT,  conjunctivae clear and pink,  no JVD  CHEST:  Full & symmetric excursion, no increased effort, breath sounds clear  HEART:  Regular rhythm, S1, S2, no murmur/rub/S3/S4, no abdominal bruit, no edema  ABDOMEN:  Soft, non-tender, non-distended, normoactive bowel sounds,  no masses ,  EXTREMITIES:  no cyanosis,clubbing or edema  SKIN:  No rash/erythema/ecchymoses/petechiae/wounds/abscess/warm/dry  NEURO:  Alert, oriented    Vital Signs:  Vital Signs Last 24 Hrs  T(C): 36.9 (18 Aug 2018 05:35), Max: 37.1 (17 Aug 2018 10:48)  T(F): 98.4 (18 Aug 2018 05:35), Max: 98.7 (17 Aug 2018 10:48)  HR: 75 (18 Aug 2018 05:35) (74 - 86)  BP: 102/59 (18 Aug 2018 05:35) (102/59 - 143/58)  BP(mean): --  RR: 16 (18 Aug 2018 05:35) (16 - 18)  SpO2: 100% (18 Aug 2018 05:35) (98% - 100%)  Daily Height in cm: 177.8 (17 Aug 2018 17:46)    Daily Weight in k.7 (18 Aug 2018 05:35)    LABS:                        9.6    5.26  )-----------( 151      ( 18 Aug 2018 06:15 )             31.1     08-18    138  |  103  |  12  ----------------------------<  111<H>  3.7   |  21<L>  |  0.99    Ca    8.6      18 Aug 2018 06:15  Phos  4.5     08-18  Mg     2.1     08-18    TPro  6.2  /  Alb  3.3  /  TBili  0.5  /  DBili  x   /  AST  20  /  ALT  10  /  AlkPhos  75      LIVER FUNCTIONS - ( 18 Aug 2018 06:15 )  Alb: 3.3 g/dL / Pro: 6.2 g/dL / ALK PHOS: 75 u/L / ALT: 10 u/L / AST: 20 u/L / GGT: x             Urinalysis Basic - ( 17 Aug 2018 15:07 )    Color: YELLOW / Appearance: CLEAR / S.023 / pH: 6.0  Gluc: NEGATIVE / Ketone: NEGATIVE  / Bili: NEGATIVE / Urobili: TRACE   Blood: NEGATIVE / Protein: TRACE / Nitrite: NEGATIVE   Leuk Esterase: NEGATIVE / RBC: 2-5 / WBC 2-5   Sq Epi: OCC / Non Sq Epi: x / Bacteria: NEGATIVE      Imaging:  < from: CT Abdomen and Pelvis w/wo IV Cont (18 @ 13:12) >  FINDINGS:  LOWER CHEST: Aortic and coronary artery calcification.  LIVER: Within normal limits.  BILE DUCTS: Normal caliber.  GALLBLADDER: Cholelithiasis.  SPLEEN: Within normal limits.  PANCREAS: Within normal limits.  ADRENALS: Within normal limits.  KIDNEYS/URETERS: A 3 mm nonobstructing right renal calculus. No hydronephrosis. Small left renal cyst. Heterogeneous enhancement of the right kidney upper pole suspicious for pyelonephritis. No suspicious renal mass or evidence of urotheliallesion.  BLADDER: Within normal limits.  REPRODUCTIVE ORGANS: Heterogeneous focal enhancement right aspect of the   prostate. Consider correlation with prostate MRI.  BOWEL: No bowel obstruction. Thickening of the rectosigmoid colon consistent with colitis.     PERITONEUM: No ascites.  VESSELS:  Atherosclerotic changes.  RETROPERITONEUM: No lymphadenopathy.    ABDOMINAL WALL: Within normal limits.  BONES: Degenerative changes of the spine.  IMPRESSION:   Heterogeneous enhancement of theright kidney upper pole suspicious for pyelonephritis.  A 3 mm nonobstructing right renal calculus. No hydronephrosis.  Heterogeneous enhancement of the right aspect of the prostate for which correlation with PSA and potentially prostate MRI is recommended.  Rectosigmoid colitis in this patient with known history of Crohn's disease.  < end of copied text >      < from: Colonoscopy (18 @ 11:05) >  Impression:            - Localized moderate inflammation was found from rectum to sigmoid colon secondary to left-sided colitis. Biopsied.  - Pseudopolyps in the descending colon.  - Localized mild inflammation was found in the cecum. Findings consistent with active ulcerative colitis Preparation adequate for assessment of disease activity, not adequate for dysplasia/surveillance purposes  < end of copied text >    Surgical Pathology Report (18 @ 11:30)    Surgical Pathology Report:   ACCESSION No:  [id number]    SMILEY VICKI GABRIELA                   3    Surgical Final Report  Final Diagnosis  1. Cecum, endoscopic biopsy:  - Colonic mucosa with active chronic colitis, see comment  2. Ascending colon, endoscopic biopsy:  - Colonic mucosa with crypt architectural irregularity, see comment  3. Proximal transverse colon, endoscopic biopsy:  - Colonic mucosa with crypt architectural irregularity, see comment  4. Distal transverse colon, endoscopic biopsy:  - Colonic mucosa with crypt architectural irregularity, see comment  5. Descending colon, endoscopic biopsy:  - Colonic mucosa with crypt architectural irregularity, see comment  6. Sigmoid colon, endoscopic biopsy:  - Colonic mucosa with crypt architectural irregularity, see comment  7. Distal sigmoid colon, endoscopic biopsy:  - Colonic mucosa with active chronic colitis, see comment  - Negative for cytomegalovirus (CMV), immunohistochemical stain  8. Rectum, endoscopic biopsy:  - Rectal mucosa with active chronic proctitis, see comment   - Negative for cytomegalovirus (CMV), immunohistochemical stain Chief Complaint:  Patient is a 68y old  Male who presents with a chief complaint of Syncope and diarrhea.       HPI:  68-year-old male, with long-standing history of inflammatory bowel disease (Ulcerative colitis), CVA without residual deficits, depression (on medications), HTN, HLD, and DM2 presented to the emergency room with chief complain of passing out at home.   As per the patient and his wife, he had gone to the bathroom at night and his wife heard a loud noise and went to see him and he had fallen down on the bathroom floor. Patient states that he has been having >10 bowel movements a day, watery/pinkish in color/consistency over the past few weeks and has thus been feeling weak. He has increased urgency to have a bowel movement, more after meals. He denies nausea, vomiting, abdominal pain, blood in stool, or black colored stool.   Of note, he has had a UTI and was on levofloxacin for it for the past week.     In the ER: VS T 98.7, P 86, /57, R 16, O2 sat 98% RA.     IBD History: Patient used to follow with Dr Mendoza and was transfered to Dr Gatito Oro's care in 2016. He was diagnosed with UC around . Patient was previously on 6-mercaptopurine which was discontinued in the setting of infectious complications and hospitalization. More recently, he has been on mesalamine 4.8 g daily along with a steroid taper. He was currently being evaluated for Entyvio. His last colonoscopy was performed on May 21, 2018 confirming colitis from the anal rectum to 25 cm consistent with left-sided ulcerative colitis.       Allergies:  No Known Allergies      Home Medications:   Patient Currently Takes Medications as of 17-Aug-2018 16:56 documented in Structured Notes  · 	mesalamine 800 mg oral delayed release tablet: 2 tab(s) orally 3 times a day  · 	Centrum Silver oral tablet: 1 tab(s) orally once a day  · 	tamsulosin 0.4 mg oral capsule: 1 cap(s) orally 2 times a day  · 	levoFLOXacin 500 mg oral tablet: 1 tab(s) orally every 24 hours x 14 days, last dose tomorrow   · 	sertraline 100 mg oral tablet: 1 tab(s) orally once a day  · 	Vitamin D3 400 intl units oral tablet: 1 tab(s) orally once a day  · 	Namenda XR 28 mg oral capsule, extended release: 1 cap(s) orally once a day  · 	glipiZIDE 5 mg oral tablet: 1 tab(s) orally 2 times a day  · 	QUEtiapine 25 mg oral tablet: 0.5 tab(s) orally 2 times a day, As Needed - for agitation  · 	atorvastatin 40 mg oral tablet: 1 tab(s) orally once a day (at bedtime)    Hospital Medications:  atorvastatin 40 milliGRAM(s) Oral at bedtime  cholecalciferol 400 Unit(s) Oral daily  dextrose 40% Gel 15 Gram(s) Oral once PRN  dextrose 5%. 1000 milliLiter(s) IV Continuous <Continuous>  dextrose 50% Injectable 12.5 Gram(s) IV Push once  dextrose 50% Injectable 25 Gram(s) IV Push once  dextrose 50% Injectable 25 Gram(s) IV Push once  glucagon  Injectable 1 milliGRAM(s) IntraMuscular once PRN  insulin lispro (HumaLOG) corrective regimen sliding scale   SubCutaneous three times a day before meals  memantine 10 milliGRAM(s) Oral two times a day  mesalamine DR Capsule 1600 milliGRAM(s) Oral three times a day  QUEtiapine 25 milliGRAM(s) Oral two times a day PRN  sertraline 100 milliGRAM(s) Oral daily  tamsulosin 0.4 milliGRAM(s) Oral two times a day      PMHX/PSHX:  Dementia  Depression  Alcohol abuse  Crohn disease  Diabetes type 2, controlled  HLD (hyperlipidemia)  H/O: CVA  Herniated disc  HTN (hypertension)  History of cataract surgery  No Past Surgical History      Family history:    Family history of cerebrovascular accident (CVA) in father (Father)  Family history of Alzheimer's disease  Family history of hypertension  Family history of MI (myocardial infarction)  Denies family history of colon cancer, liver cancer, uterine cancer, ovarian cancer, breast cancer, gastric ulcers, colon polyps, gallstones    Social History:   , lives with wife  smoked 1 ppd x 50 years, stopped 3 years ago  drank 2 glasses of whiskey per day in the past  no drug use    ROS:     General:  No wt loss, fevers, chills, night sweats, fatigue,   Eyes:  Good vision, no reported pain  ENT:  No sore throat, pain, runny nose, dysphagia  CV:  No pain, palpitations, hypo/hypertension  Resp:  No dyspnea, cough, tachypnea, wheezing  GI:  See HPI  :  No pain, bleeding, incontinence, nocturia  Muscle:  No pain, weakness  Neuro:  No weakness, tingling, memory problems  Psych:  No fatigue, insomnia, mood problems, depression  Endocrine:  No polyuria, polydipsia, cold/heat intolerance  Heme:  No petechiae, ecchymosis, easy bruisability  Skin:  No rash, edema      PHYSICAL EXAM:     GENERAL:  Appears stated age, well-groomed, well-nourished, no distress  HEENT:  NC/AT,  conjunctivae clear and pink,  no JVD  CHEST:  Full & symmetric excursion, no increased effort, breath sounds clear  HEART:  Regular rhythm, S1, S2, no murmur/rub/S3/S4, no abdominal bruit, no edema  ABDOMEN:  Soft, non-tender, non-distended, normoactive bowel sounds,  no masses ,  EXTREMITIES:  no cyanosis,clubbing or edema  SKIN:  No rash/erythema/ecchymoses/petechiae/wounds/abscess/warm/dry  NEURO:  Alert, oriented    Vital Signs:  Vital Signs Last 24 Hrs  T(C): 36.9 (18 Aug 2018 05:35), Max: 37.1 (17 Aug 2018 10:48)  T(F): 98.4 (18 Aug 2018 05:35), Max: 98.7 (17 Aug 2018 10:48)  HR: 75 (18 Aug 2018 05:35) (74 - 86)  BP: 102/59 (18 Aug 2018 05:35) (102/59 - 143/58)  BP(mean): --  RR: 16 (18 Aug 2018 05:35) (16 - 18)  SpO2: 100% (18 Aug 2018 05:35) (98% - 100%)  Daily Height in cm: 177.8 (17 Aug 2018 17:46)    Daily Weight in k.7 (18 Aug 2018 05:35)    LABS:                        9.6    5.26  )-----------( 151      ( 18 Aug 2018 06:15 )             31.1     08-18    138  |  103  |  12  ----------------------------<  111<H>  3.7   |  21<L>  |  0.99    Ca    8.6      18 Aug 2018 06:15  Phos  4.5     08-18  Mg     2.1     -    TPro  6.2  /  Alb  3.3  /  TBili  0.5  /  DBili  x   /  AST  20  /  ALT  10  /  AlkPhos  75      LIVER FUNCTIONS - ( 18 Aug 2018 06:15 )  Alb: 3.3 g/dL / Pro: 6.2 g/dL / ALK PHOS: 75 u/L / ALT: 10 u/L / AST: 20 u/L / GGT: x             Urinalysis Basic - ( 17 Aug 2018 15:07 )    Color: YELLOW / Appearance: CLEAR / S.023 / pH: 6.0  Gluc: NEGATIVE / Ketone: NEGATIVE  / Bili: NEGATIVE / Urobili: TRACE   Blood: NEGATIVE / Protein: TRACE / Nitrite: NEGATIVE   Leuk Esterase: NEGATIVE / RBC: 2-5 / WBC 2-5   Sq Epi: OCC / Non Sq Epi: x / Bacteria: NEGATIVE      Imaging:  < from: CT Abdomen and Pelvis w/wo IV Cont (18 @ 13:12) >  FINDINGS:  LOWER CHEST: Aortic and coronary artery calcification.  LIVER: Within normal limits.  BILE DUCTS: Normal caliber.  GALLBLADDER: Cholelithiasis.  SPLEEN: Within normal limits.  PANCREAS: Within normal limits.  ADRENALS: Within normal limits.  KIDNEYS/URETERS: A 3 mm nonobstructing right renal calculus. No hydronephrosis. Small left renal cyst. Heterogeneous enhancement of the right kidney upper pole suspicious for pyelonephritis. No suspicious renal mass or evidence of urotheliallesion.  BLADDER: Within normal limits.  REPRODUCTIVE ORGANS: Heterogeneous focal enhancement right aspect of the   prostate. Consider correlation with prostate MRI.  BOWEL: No bowel obstruction. Thickening of the rectosigmoid colon consistent with colitis.     PERITONEUM: No ascites.  VESSELS:  Atherosclerotic changes.  RETROPERITONEUM: No lymphadenopathy.    ABDOMINAL WALL: Within normal limits.  BONES: Degenerative changes of the spine.  IMPRESSION:   Heterogeneous enhancement of theright kidney upper pole suspicious for pyelonephritis.  A 3 mm nonobstructing right renal calculus. No hydronephrosis.  Heterogeneous enhancement of the right aspect of the prostate for which correlation with PSA and potentially prostate MRI is recommended.  Rectosigmoid colitis in this patient with known history of Crohn's disease.  < end of copied text >      < from: Colonoscopy (18 @ 11:05) >  Impression:            - Localized moderate inflammation was found from rectum to sigmoid colon secondary to left-sided colitis. Biopsied.  - Pseudopolyps in the descending colon.  - Localized mild inflammation was found in the cecum. Findings consistent with active ulcerative colitis Preparation adequate for assessment of disease activity, not adequate for dysplasia/surveillance purposes  < end of copied text >    Surgical Pathology Report (18 @ 11:30)    Surgical Pathology Report:   ACCESSION No:  [id number]    REISVICKI GABRIELA                   3    Surgical Final Report  Final Diagnosis  1. Cecum, endoscopic biopsy:  - Colonic mucosa with active chronic colitis, see comment  2. Ascending colon, endoscopic biopsy:  - Colonic mucosa with crypt architectural irregularity, see comment  3. Proximal transverse colon, endoscopic biopsy:  - Colonic mucosa with crypt architectural irregularity, see comment  4. Distal transverse colon, endoscopic biopsy:  - Colonic mucosa with crypt architectural irregularity, see comment  5. Descending colon, endoscopic biopsy:  - Colonic mucosa with crypt architectural irregularity, see comment  6. Sigmoid colon, endoscopic biopsy:  - Colonic mucosa with crypt architectural irregularity, see comment  7. Distal sigmoid colon, endoscopic biopsy:  - Colonic mucosa with active chronic colitis, see comment  - Negative for cytomegalovirus (CMV), immunohistochemical stain  8. Rectum, endoscopic biopsy:  - Rectal mucosa with active chronic proctitis, see comment   - Negative for cytomegalovirus (CMV), immunohistochemical stain

## 2018-08-18 NOTE — PROGRESS NOTE ADULT - PROBLEM SELECTOR PLAN 3
- continue mesalamine though given his is still symptomatic he would liekly benefit from alternate therapy  - Would therefore consult GI in AM for help in management of his crohn's

## 2018-08-18 NOTE — PROGRESS NOTE ADULT - SUBJECTIVE AND OBJECTIVE BOX
Patient is a 68y old  Male who presents with a chief complaint of Syncope (17 Aug 2018 17:46)      SUBJECTIVE / OVERNIGHT EVENTS  Patient reports feeling much better. Denies dizziness, headaches, nausea, or vomiting.      Review of Systems:   General: Denies fever or chills  Skin: Denies presence of new rash  Ophthalmologic: Denies acute changes in vision, discoloration or discharge  Respiratory and Thorax: Denies any shortness of breath  Cardiovascular: Denies chest pains or palpitations  Gastroenterology: Diarrhea and abdominal pain.  Genitourinary: Denies dysruria or urgency  Neurological: Denies new focal neurological deficits. Syncopal episode that brought him in.  Psychiatric: Denies hallucinations or delusions  Endocirine: Denies heat or cold intolerance    MEDICATIONS  (STANDING):  atorvastatin 40 milliGRAM(s) Oral at bedtime  cholecalciferol 400 Unit(s) Oral daily  dextrose 5%. 1000 milliLiter(s) (50 mL/Hr) IV Continuous <Continuous>  dextrose 50% Injectable 12.5 Gram(s) IV Push once  dextrose 50% Injectable 25 Gram(s) IV Push once  dextrose 50% Injectable 25 Gram(s) IV Push once  insulin lispro (HumaLOG) corrective regimen sliding scale   SubCutaneous three times a day before meals  memantine 10 milliGRAM(s) Oral two times a day  mesalamine DR Capsule 1600 milliGRAM(s) Oral three times a day  sertraline 100 milliGRAM(s) Oral daily  tamsulosin 0.4 milliGRAM(s) Oral two times a day    MEDICATIONS  (PRN):  dextrose 40% Gel 15 Gram(s) Oral once PRN Blood Glucose LESS THAN 70 milliGRAM(s)/deciliter  glucagon  Injectable 1 milliGRAM(s) IntraMuscular once PRN Glucose LESS THAN 70 milligrams/deciliter  QUEtiapine 25 milliGRAM(s) Oral two times a day PRN agitation      PHYSICAL EXAM:  T(C): 36.9 (18 @ 12:46), Max: 36.9 (18 @ 05:35)  HR: 76 (18 @ 12:46) (74 - 77)  BP: 92/54 (18 @ 12:46) (92/54 - 112/62)  RR: 18 (18 @ 12:46) (16 - 18)  SpO2: 100% (18 @ 12:46) (100% - 100%)  I&O's Summary    17 Aug 2018 07:  -  18 Aug 2018 07:00  --------------------------------------------------------  IN: 550 mL / OUT: 400 mL / NET: 150 mL    18 Aug 2018 07:  -  18 Aug 2018 15:56  --------------------------------------------------------  IN: 630 mL / OUT: 200 mL / NET: 430 mL      GENERAL: NAD, well-developed  HEAD:  Atraumatic, Normocephalic  EYES: EOMI, PERRLA, conjunctiva and sclera clear  NECK: Supple, No elevated JVD  CHEST/LUNG: Clear to auscultation bilaterally; No wheeze  HEART: Regular rate and rhythm; No murmurs, rubs, or gallops  ABDOMEN: Soft, Nontender, Nondistended; Bowel sounds present  EXTREMITIES:  2+ Peripheral Pulses, No clubbing, cyanosis, or edema  PSYCH: AAOx3  NEUROLOGY: CN II-XII grossly intact, moving all extremities  SKIN: No rashes or lesions    LABS:  CAPILLARY BLOOD GLUCOSE      POCT Blood Glucose.: 138 mg/dL (18 Aug 2018 12:43)  POCT Blood Glucose.: 142 mg/dL (18 Aug 2018 08:46)  POCT Blood Glucose.: 131 mg/dL (17 Aug 2018 21:40)  POCT Blood Glucose.: 124 mg/dL (17 Aug 2018 17:35)                          9.6    5.26  )-----------( 151      ( 18 Aug 2018 06:15 )             31.1         138  |  103  |  12  ----------------------------<  111<H>  3.7   |  21<L>  |  0.99    Ca    8.6      18 Aug 2018 06:15  Phos  4.5     08-18  Mg     2.1     08-18    TPro  6.2  /  Alb  3.3  /  TBili  0.5  /  DBili  x   /  AST  20  /  ALT  10  /  AlkPhos  75  -18          Urinalysis Basic - ( 17 Aug 2018 15:07 )    Color: YELLOW / Appearance: CLEAR / S.023 / pH: 6.0  Gluc: NEGATIVE / Ketone: NEGATIVE  / Bili: NEGATIVE / Urobili: TRACE   Blood: NEGATIVE / Protein: TRACE / Nitrite: NEGATIVE   Leuk Esterase: NEGATIVE / RBC: 2-5 / WBC 2-5   Sq Epi: OCC / Non Sq Epi: x / Bacteria: NEGATIVE        RADIOLOGY & ADDITIONAL TESTS:    CT Cervical spine/Head radiology report  IMPRESSION:   No intracranial hemorrhage.   No cervical spine fracture.     CXR  Clear lungs. No pleural effusions or pneumothorax.   Indistinct heart borders due to epicardial fat limits accurate assessment of   heart size.   Trachea midline.   Generalized osteopenia and mild spinal degenerative changes.     GI consult appreciated

## 2018-08-18 NOTE — PROGRESS NOTE ADULT - PROBLEM SELECTOR PLAN 5
- Patient denies being on standing aspirin, unclear why, would need to clarify in AM, c/w lipitor for now

## 2018-08-18 NOTE — PROGRESS NOTE ADULT - ASSESSMENT
68M with history of UC and multiple episodes of diarrhea lately presents to the hospital with syncopal episode at home.

## 2018-08-19 DIAGNOSIS — R82.90 UNSPECIFIED ABNORMAL FINDINGS IN URINE: ICD-10-CM

## 2018-08-19 LAB
-  AMIKACIN: SIGNIFICANT CHANGE UP
-  AMPICILLIN/SULBACTAM: SIGNIFICANT CHANGE UP
-  AMPICILLIN: SIGNIFICANT CHANGE UP
-  AZTREONAM: SIGNIFICANT CHANGE UP
-  CEFAZOLIN: SIGNIFICANT CHANGE UP
-  CEFEPIME: SIGNIFICANT CHANGE UP
-  CEFOXITIN: SIGNIFICANT CHANGE UP
-  CEFTAZIDIME: SIGNIFICANT CHANGE UP
-  CEFTRIAXONE: SIGNIFICANT CHANGE UP
-  CIPROFLOXACIN: SIGNIFICANT CHANGE UP
-  ERTAPENEM: SIGNIFICANT CHANGE UP
-  GENTAMICIN: SIGNIFICANT CHANGE UP
-  IMIPENEM: SIGNIFICANT CHANGE UP
-  LEVOFLOXACIN: SIGNIFICANT CHANGE UP
-  MEROPENEM: SIGNIFICANT CHANGE UP
-  NITROFURANTOIN: SIGNIFICANT CHANGE UP
-  PIPERACILLIN/TAZOBACTAM: SIGNIFICANT CHANGE UP
-  TIGECYCLINE: SIGNIFICANT CHANGE UP
-  TOBRAMYCIN: SIGNIFICANT CHANGE UP
-  TRIMETHOPRIM/SULFAMETHOXAZOLE: SIGNIFICANT CHANGE UP
BACTERIA UR CULT: SIGNIFICANT CHANGE UP
BUN SERPL-MCNC: 9 MG/DL — SIGNIFICANT CHANGE UP (ref 7–23)
CALCIUM SERPL-MCNC: 8.6 MG/DL — SIGNIFICANT CHANGE UP (ref 8.4–10.5)
CHLORIDE SERPL-SCNC: 105 MMOL/L — SIGNIFICANT CHANGE UP (ref 98–107)
CO2 SERPL-SCNC: 21 MMOL/L — LOW (ref 22–31)
CREAT SERPL-MCNC: 0.86 MG/DL — SIGNIFICANT CHANGE UP (ref 0.5–1.3)
FERRITIN SERPL-MCNC: 184.4 NG/ML — SIGNIFICANT CHANGE UP (ref 30–400)
FOLATE SERPL-MCNC: > 20 NG/ML — HIGH (ref 4.7–20)
GLUCOSE SERPL-MCNC: 122 MG/DL — HIGH (ref 70–99)
HCT VFR BLD CALC: 31.8 % — LOW (ref 39–50)
HGB BLD-MCNC: 10.1 G/DL — LOW (ref 13–17)
IRON SATN MFR SERPL: 215 UG/DL — SIGNIFICANT CHANGE UP (ref 155–535)
IRON SATN MFR SERPL: 55 UG/DL — SIGNIFICANT CHANGE UP (ref 45–165)
MAGNESIUM SERPL-MCNC: 2 MG/DL — SIGNIFICANT CHANGE UP (ref 1.6–2.6)
MCHC RBC-ENTMCNC: 28.5 PG — SIGNIFICANT CHANGE UP (ref 27–34)
MCHC RBC-ENTMCNC: 31.8 % — LOW (ref 32–36)
MCV RBC AUTO: 89.6 FL — SIGNIFICANT CHANGE UP (ref 80–100)
METHOD TYPE: SIGNIFICANT CHANGE UP
NRBC # FLD: 0 — SIGNIFICANT CHANGE UP
ORGANISM # SPEC MICROSCOPIC CNT: SIGNIFICANT CHANGE UP
PLATELET # BLD AUTO: 155 K/UL — SIGNIFICANT CHANGE UP (ref 150–400)
PMV BLD: 10.4 FL — SIGNIFICANT CHANGE UP (ref 7–13)
POTASSIUM SERPL-MCNC: 3.7 MMOL/L — SIGNIFICANT CHANGE UP (ref 3.5–5.3)
POTASSIUM SERPL-SCNC: 3.7 MMOL/L — SIGNIFICANT CHANGE UP (ref 3.5–5.3)
RBC # BLD: 3.55 M/UL — LOW (ref 4.2–5.8)
RBC # FLD: 12.6 % — SIGNIFICANT CHANGE UP (ref 10.3–14.5)
SODIUM SERPL-SCNC: 140 MMOL/L — SIGNIFICANT CHANGE UP (ref 135–145)
UIBC SERPL-MCNC: 159.6 UG/DL — SIGNIFICANT CHANGE UP (ref 110–370)
VIT B12 SERPL-MCNC: 774 PG/ML — SIGNIFICANT CHANGE UP (ref 200–900)
WBC # BLD: 6.33 K/UL — SIGNIFICANT CHANGE UP (ref 3.8–10.5)
WBC # FLD AUTO: 6.33 K/UL — SIGNIFICANT CHANGE UP (ref 3.8–10.5)

## 2018-08-19 PROCEDURE — 99233 SBSQ HOSP IP/OBS HIGH 50: CPT

## 2018-08-19 RX ORDER — SODIUM CHLORIDE 9 MG/ML
1000 INJECTION INTRAMUSCULAR; INTRAVENOUS; SUBCUTANEOUS
Qty: 0 | Refills: 0 | Status: COMPLETED | OUTPATIENT
Start: 2018-08-19 | End: 2018-08-19

## 2018-08-19 RX ADMIN — Medication 1600 MILLIGRAM(S): at 05:21

## 2018-08-19 RX ADMIN — MEMANTINE HYDROCHLORIDE 10 MILLIGRAM(S): 10 TABLET ORAL at 05:21

## 2018-08-19 RX ADMIN — MEMANTINE HYDROCHLORIDE 10 MILLIGRAM(S): 10 TABLET ORAL at 17:29

## 2018-08-19 RX ADMIN — ATORVASTATIN CALCIUM 40 MILLIGRAM(S): 80 TABLET, FILM COATED ORAL at 21:03

## 2018-08-19 RX ADMIN — Medication 400 UNIT(S): at 09:29

## 2018-08-19 RX ADMIN — Medication 1600 MILLIGRAM(S): at 12:35

## 2018-08-19 RX ADMIN — TAMSULOSIN HYDROCHLORIDE 0.4 MILLIGRAM(S): 0.4 CAPSULE ORAL at 17:29

## 2018-08-19 RX ADMIN — TAMSULOSIN HYDROCHLORIDE 0.4 MILLIGRAM(S): 0.4 CAPSULE ORAL at 05:21

## 2018-08-19 RX ADMIN — SERTRALINE 100 MILLIGRAM(S): 25 TABLET, FILM COATED ORAL at 09:29

## 2018-08-19 RX ADMIN — SODIUM CHLORIDE 75 MILLILITER(S): 9 INJECTION INTRAMUSCULAR; INTRAVENOUS; SUBCUTANEOUS at 15:41

## 2018-08-19 RX ADMIN — Medication 1600 MILLIGRAM(S): at 21:04

## 2018-08-19 NOTE — PROGRESS NOTE ADULT - SUBJECTIVE AND OBJECTIVE BOX
CC: Patient is a 68y old  Male who presents with a chief complaint of Syncope (17 Aug 2018 17:46    SUBJECTIVE / OVERNIGHT EVENTS: Denies headache, weakness, malaise, fevers, chills, visual changes, chest pain, dyspnea, cough, abdominal pain, nausea, vomiting, diarrhea, constipation, dysuria, hematuria, polyuria, pruritis, joint pain    MEDICATIONS  (STANDING):  atorvastatin 40 milliGRAM(s) Oral at bedtime  cholecalciferol 400 Unit(s) Oral daily  dextrose 5%. 1000 milliLiter(s) (50 mL/Hr) IV Continuous <Continuous>  dextrose 50% Injectable 12.5 Gram(s) IV Push once  dextrose 50% Injectable 25 Gram(s) IV Push once  dextrose 50% Injectable 25 Gram(s) IV Push once  insulin lispro (HumaLOG) corrective regimen sliding scale   SubCutaneous three times a day before meals  memantine 10 milliGRAM(s) Oral two times a day  mesalamine DR Capsule 1600 milliGRAM(s) Oral three times a day  sertraline 100 milliGRAM(s) Oral daily  tamsulosin 0.4 milliGRAM(s) Oral two times a day    MEDICATIONS  (PRN):  dextrose 40% Gel 15 Gram(s) Oral once PRN Blood Glucose LESS THAN 70 milliGRAM(s)/deciliter  glucagon  Injectable 1 milliGRAM(s) IntraMuscular once PRN Glucose LESS THAN 70 milligrams/deciliter  QUEtiapine 25 milliGRAM(s) Oral two times a day PRN agitation      Vital Signs Last 24 Hrs  T(C): 36.8 (19 Aug 2018 05:18), Max: 37.2 (18 Aug 2018 19:15)  T(F): 98.3 (19 Aug 2018 05:18), Max: 98.9 (18 Aug 2018 19:15)  HR: 77 (19 Aug 2018 05:18) (77 - 77)  BP: 110/56 (19 Aug 2018 05:18) (110/56 - 117/66)  BP(mean): --  RR: 16 (19 Aug 2018 05:18) (16 - 17)  SpO2: 100% (19 Aug 2018 05:18) (100% - 100%)  CAPILLARY BLOOD GLUCOSE    POCT Blood Glucose.: 140 mg/dL (19 Aug 2018 12:55)  POCT Blood Glucose.: 144 mg/dL (19 Aug 2018 08:56)  POCT Blood Glucose.: 119 mg/dL (18 Aug 2018 21:30)  POCT Blood Glucose.: 159 mg/dL (18 Aug 2018 17:40)    PHYSICAL EXAM:  GENERAL: NAD, well-developed  HEAD:  Atraumatic, Normocephalic  EYES: EOMI, conjunctiva and sclera clear  NECK: Supple, No JVD  CHEST/LUNG: Clear to auscultation bilaterally; No wheeze  HEART: Regular rate and rhythm; No murmurs, rubs, or gallops  ABDOMEN: Soft, Nontender, Nondistended; Bowel sounds present  EXTREMITIES:  2+ Peripheral Pulses, No clubbing, cyanosis, or edema  PSYCH: AAOx3  NEUROLOGY: non-focal  SKIN: No rashes or lesions    LABS:                        10.1   6.33  )-----------( 155      ( 19 Aug 2018 06:25 )             31.8     -    140  |  105  |  9   ----------------------------<  122<H>  3.7   |  21<L>  |  0.86    Ca    8.6      19 Aug 2018 06:25  Phos  4.5     -  Mg     2.0     -    TPro  6.2  /  Alb  3.3  /  TBili  0.5  /  DBili  x   /  AST  20  /  ALT  10  /  AlkPhos  75  08-18          Urinalysis Basic - ( 17 Aug 2018 15:07 )    Color: YELLOW / Appearance: CLEAR / S.023 / pH: 6.0  Gluc: NEGATIVE / Ketone: NEGATIVE  / Bili: NEGATIVE / Urobili: TRACE   Blood: NEGATIVE / Protein: TRACE / Nitrite: NEGATIVE   Leuk Esterase: NEGATIVE / RBC: 2-5 / WBC 2-5   Sq Epi: OCC / Non Sq Epi: x / Bacteria: NEGATIVE        RADIOLOGY & ADDITIONAL TESTS:    Imaging Personally Reviewed:    Consultant(s) Notes Reviewed:  GI    Care Discussed with Consultants/Other Providers:

## 2018-08-19 NOTE — PROGRESS NOTE ADULT - PROBLEM SELECTOR PLAN 4
- Check Fingersticks with Meals and cover with ISS TID, check HgbA1C As per GI, patient to continue mesalamine, Cdiff PCR negative, awaiting stool PCR.  If infectious workup completely negative patient might need biologics therapy as per GI note.

## 2018-08-19 NOTE — PROGRESS NOTE ADULT - PROBLEM SELECTOR PLAN 3
As per GI, patient to continue mesalamine, Cdiff PCR negative, awaiting stool PCR.  If infectious workup completely negative patient might need biologics therapy as per GI note. - Likely related to his known crohn's disease  - Monitor CBC

## 2018-08-19 NOTE — PROGRESS NOTE ADULT - PROBLEM SELECTOR PLAN 2
- Likely related to his known crohn's disease  - Monitor CBC UCx (+) for 10-49K ESBL E.Coli. Patient is asymptomatic and not meeting sepsis critieria. Will hold antibiotics for now, but primary team tomorrow to d/w epidemiology or ID tomorrow about patient need for isolation.

## 2018-08-19 NOTE — PROGRESS NOTE ADULT - PROBLEM SELECTOR PLAN 6
- monitor BP - Patient denies being on standing aspirin, unclear why, would need to clarify in AM, c/w lipitor for now

## 2018-08-19 NOTE — PROGRESS NOTE ADULT - PROBLEM SELECTOR PLAN 5
- Patient denies being on standing aspirin, unclear why, would need to clarify in AM, c/w lipitor for now - Check Fingersticks with Meals and cover with ISS TID, check HgbA1C

## 2018-08-20 ENCOUNTER — MESSAGE (OUTPATIENT)
Age: 68
End: 2018-08-20

## 2018-08-20 LAB
BUN SERPL-MCNC: 7 MG/DL — SIGNIFICANT CHANGE UP (ref 7–23)
CALCIUM SERPL-MCNC: 8.6 MG/DL — SIGNIFICANT CHANGE UP (ref 8.4–10.5)
CHLORIDE SERPL-SCNC: 107 MMOL/L — SIGNIFICANT CHANGE UP (ref 98–107)
CO2 SERPL-SCNC: 21 MMOL/L — LOW (ref 22–31)
CREAT SERPL-MCNC: 0.76 MG/DL — SIGNIFICANT CHANGE UP (ref 0.5–1.3)
GLUCOSE BLDC GLUCOMTR-MCNC: 120 MG/DL — HIGH (ref 70–99)
GLUCOSE BLDC GLUCOMTR-MCNC: 175 MG/DL — HIGH (ref 70–99)
GLUCOSE SERPL-MCNC: 123 MG/DL — HIGH (ref 70–99)
HCT VFR BLD CALC: 33.4 % — LOW (ref 39–50)
HGB BLD-MCNC: 10.6 G/DL — LOW (ref 13–17)
MAGNESIUM SERPL-MCNC: 2 MG/DL — SIGNIFICANT CHANGE UP (ref 1.6–2.6)
MCHC RBC-ENTMCNC: 28.4 PG — SIGNIFICANT CHANGE UP (ref 27–34)
MCHC RBC-ENTMCNC: 31.7 % — LOW (ref 32–36)
MCV RBC AUTO: 89.5 FL — SIGNIFICANT CHANGE UP (ref 80–100)
NRBC # FLD: 0 — SIGNIFICANT CHANGE UP
PLATELET # BLD AUTO: 142 K/UL — LOW (ref 150–400)
PMV BLD: 10.6 FL — SIGNIFICANT CHANGE UP (ref 7–13)
POTASSIUM SERPL-MCNC: 3.9 MMOL/L — SIGNIFICANT CHANGE UP (ref 3.5–5.3)
POTASSIUM SERPL-SCNC: 3.9 MMOL/L — SIGNIFICANT CHANGE UP (ref 3.5–5.3)
RBC # BLD: 3.73 M/UL — LOW (ref 4.2–5.8)
RBC # FLD: 12.5 % — SIGNIFICANT CHANGE UP (ref 10.3–14.5)
SODIUM SERPL-SCNC: 141 MMOL/L — SIGNIFICANT CHANGE UP (ref 135–145)
SPECIMEN SOURCE: SIGNIFICANT CHANGE UP
WBC # BLD: 5.12 K/UL — SIGNIFICANT CHANGE UP (ref 3.8–10.5)
WBC # FLD AUTO: 5.12 K/UL — SIGNIFICANT CHANGE UP (ref 3.8–10.5)

## 2018-08-20 PROCEDURE — 99232 SBSQ HOSP IP/OBS MODERATE 35: CPT | Mod: GC

## 2018-08-20 PROCEDURE — 99233 SBSQ HOSP IP/OBS HIGH 50: CPT

## 2018-08-20 RX ORDER — SODIUM CHLORIDE 9 MG/ML
1000 INJECTION INTRAMUSCULAR; INTRAVENOUS; SUBCUTANEOUS
Qty: 0 | Refills: 0 | Status: COMPLETED | OUTPATIENT
Start: 2018-08-20 | End: 2018-08-20

## 2018-08-20 RX ORDER — LANOLIN ALCOHOL/MO/W.PET/CERES
3 CREAM (GRAM) TOPICAL AT BEDTIME
Qty: 0 | Refills: 0 | Status: DISCONTINUED | OUTPATIENT
Start: 2018-08-20 | End: 2018-08-24

## 2018-08-20 RX ADMIN — TAMSULOSIN HYDROCHLORIDE 0.4 MILLIGRAM(S): 0.4 CAPSULE ORAL at 05:47

## 2018-08-20 RX ADMIN — SODIUM CHLORIDE 75 MILLILITER(S): 9 INJECTION INTRAMUSCULAR; INTRAVENOUS; SUBCUTANEOUS at 18:31

## 2018-08-20 RX ADMIN — Medication 1600 MILLIGRAM(S): at 14:07

## 2018-08-20 RX ADMIN — Medication 1600 MILLIGRAM(S): at 19:55

## 2018-08-20 RX ADMIN — Medication 400 UNIT(S): at 09:51

## 2018-08-20 RX ADMIN — SERTRALINE 100 MILLIGRAM(S): 25 TABLET, FILM COATED ORAL at 09:51

## 2018-08-20 RX ADMIN — MEMANTINE HYDROCHLORIDE 10 MILLIGRAM(S): 10 TABLET ORAL at 05:47

## 2018-08-20 RX ADMIN — Medication 1600 MILLIGRAM(S): at 05:47

## 2018-08-20 RX ADMIN — ATORVASTATIN CALCIUM 40 MILLIGRAM(S): 80 TABLET, FILM COATED ORAL at 19:56

## 2018-08-20 RX ADMIN — Medication 1: at 18:30

## 2018-08-20 RX ADMIN — TAMSULOSIN HYDROCHLORIDE 0.4 MILLIGRAM(S): 0.4 CAPSULE ORAL at 17:26

## 2018-08-20 RX ADMIN — Medication 3 MILLIGRAM(S): at 19:56

## 2018-08-20 RX ADMIN — Medication 1: at 13:00

## 2018-08-20 RX ADMIN — MEMANTINE HYDROCHLORIDE 10 MILLIGRAM(S): 10 TABLET ORAL at 17:26

## 2018-08-20 NOTE — PROGRESS NOTE ADULT - SUBJECTIVE AND OBJECTIVE BOX
Chief Complaint:  Patient is a 68y old  Male who presents with a chief complaint of Syncope and diarrhea.       Interval Events:   -      HPI: (from admission)  68-year-old male, with long-standing history of inflammatory bowel disease (Ulcerative colitis), CVA without residual deficits, depression (on medications), HTN, HLD, and DM2 presented to the emergency room with chief complain of passing out at home.   As per the patient and his wife, he had gone to the bathroom at night and his wife heard a loud noise and went to see him and he had fallen down on the bathroom floor. Patient states that he has been having >10 bowel movements a day, watery/pinkish in color/consistency over the past few weeks and has thus been feeling weak. He has increased urgency to have a bowel movement, more after meals. He denies nausea, vomiting, abdominal pain, blood in stool, or black colored stool.   Of note, he has had a UTI and was on levofloxacin for it for the past week. In the ER: VS T 98.7, P 86, /57, R 16, O2 sat 98% RA.     IBD History: Patient used to follow with Dr Mendoza and was transfered to Dr Gatito Oro's care in 2016. He was diagnosed with UC around . Patient was previously on 6-mercaptopurine which was discontinued in the setting of infectious complications and hospitalization. More recently, he has been on mesalamine 4.8 g daily along with a steroid taper. He was currently being evaluated for Entyvio. His last colonoscopy was performed on May 21, 2018 confirming colitis from the anal rectum to 25 cm consistent with left-sided ulcerative colitis.         Allergies:  No Known Allergies      Hospital Medications:  atorvastatin 40 milliGRAM(s) Oral at bedtime  cholecalciferol 400 Unit(s) Oral daily  dextrose 40% Gel 15 Gram(s) Oral once PRN  dextrose 5%. 1000 milliLiter(s) IV Continuous <Continuous>  dextrose 50% Injectable 12.5 Gram(s) IV Push once  dextrose 50% Injectable 25 Gram(s) IV Push once  dextrose 50% Injectable 25 Gram(s) IV Push once  glucagon  Injectable 1 milliGRAM(s) IntraMuscular once PRN  insulin lispro (HumaLOG) corrective regimen sliding scale   SubCutaneous three times a day before meals  memantine 10 milliGRAM(s) Oral two times a day  mesalamine DR Capsule 1600 milliGRAM(s) Oral three times a day  QUEtiapine 25 milliGRAM(s) Oral two times a day PRN  sertraline 100 milliGRAM(s) Oral daily  tamsulosin 0.4 milliGRAM(s) Oral two times a day      PMHX/PSHX:  Dementia  Depression  Alcohol abuse  Crohn disease  Diabetes type 2, controlled  HLD (hyperlipidemia)  H/O: CVA  Herniated disc  HTN (hypertension)  History of cataract surgery  No Past Surgical History      Family history:  Family history of cerebrovascular accident (CVA) in father (Father)  Family history of Alzheimer's disease  Family history of hypertension  Family history of MI (myocardial infarction)      ROS:     General:  No wt loss, fevers, chills, night sweats, fatigue,   Eyes:  Good vision, no reported pain  ENT:  No sore throat, pain, runny nose, dysphagia  CV:  No pain, palpitations, hypo/hypertension  Resp:  No dyspnea, cough, tachypnea, wheezing  GI:  See HPI  :  No pain, bleeding, incontinence, nocturia  Muscle:  No pain, weakness  Neuro:  No weakness, tingling, memory problems  Psych:  No fatigue, insomnia, mood problems, depression  Endocrine:  No polyuria, polydipsia, cold/heat intolerance  Heme:  No petechiae, ecchymosis, easy bruisability  Skin:  No rash, edema      PHYSICAL EXAM:     GENERAL:  Appears stated age, well-groomed, well-nourished, no distress  HEENT:  NC/AT,  conjunctivae clear, sclera -anicteric  CHEST:  Full & symmetric excursion, no increased effort, breath sounds clear  HEART:  Regular rhythm, S1, S2, no murmur/rub/S3/S4,  no edema  ABDOMEN:  Soft, non-tender, non-distended, normoactive bowel sounds,  no masses ,no hepato-splenomegaly,   EXTREMITIES:  no cyanosis,clubbing or edema  SKIN:  No rash/erythema/ecchymoses/petechiae/wounds/abscess/warm/dry  NEURO:  Alert, oriented    Vital Signs:  Vital Signs Last 24 Hrs  T(C): 37 (20 Aug 2018 05:49), Max: 37 (20 Aug 2018 05:49)  T(F): 98.6 (20 Aug 2018 05:49), Max: 98.6 (20 Aug 2018 05:49)  HR: 76 (20 Aug 2018 05:49) (70 - 77)  BP: 127/50 (20 Aug 2018 05:49) (110/70 - 127/50)  BP(mean): --  RR: 16 (20 Aug 2018 05:49) (16 - 19)  SpO2: 100% (20 Aug 2018 05:49) (98% - 100%)  Daily     Daily Weight in k.6 (20 Aug 2018 01:12)    LABS:                        10.6   5.12  )-----------( 142      ( 20 Aug 2018 06:45 )             33.4         140  |  105  |  9   ----------------------------<  122<H>  3.7   |  21<L>  |  0.86    Ca    8.6      19 Aug 2018 06:25  Mg     2.0           Imaging:  < from: CT Abdomen and Pelvis w/wo IV Cont (18 @ 13:12) >  FINDINGS:  LOWER CHEST: Aortic and coronary artery calcification.  LIVER: Within normal limits.  BILE DUCTS: Normal caliber.  GALLBLADDER: Cholelithiasis.  SPLEEN: Within normal limits.  PANCREAS: Within normal limits.  ADRENALS: Within normal limits.  KIDNEYS/URETERS: A 3 mm nonobstructing right renal calculus. No hydronephrosis. Small left renal cyst. Heterogeneous enhancement of the right kidney upper pole suspicious for pyelonephritis. No suspicious renal mass or evidence of urotheliallesion.  BLADDER: Within normal limits.  REPRODUCTIVE ORGANS: Heterogeneous focal enhancement right aspect of the   prostate. Consider correlation with prostate MRI.  BOWEL: No bowel obstruction. Thickening of the rectosigmoid colon consistent with colitis.     PERITONEUM: No ascites.  VESSELS:  Atherosclerotic changes.  RETROPERITONEUM: No lymphadenopathy.    ABDOMINAL WALL: Within normal limits.  BONES: Degenerative changes of the spine.  IMPRESSION:   Heterogeneous enhancement of theright kidney upper pole suspicious for pyelonephritis.  A 3 mm nonobstructing right renal calculus. No hydronephrosis.  Heterogeneous enhancement of the right aspect of the prostate for which correlation with PSA and potentially prostate MRI is recommended.  Rectosigmoid colitis in this patient with known history of Crohn's disease.  < end of copied text >      < from: Colonoscopy (18 @ 11:05) >  Impression:            - Localized moderate inflammation was found from rectum to sigmoid colon secondary to left-sided colitis. Biopsied.  - Pseudopolyps in the descending colon.  - Localized mild inflammation was found in the cecum. Findings consistent with active ulcerative colitis Preparation adequate for assessment of disease activity, not adequate for dysplasia/surveillance purposes  < end of copied text >    Surgical Pathology Report (18 @ 11:30)    Surgical Pathology Report:   ACCESSION No:  [id number]    VICKI REIS                   3    Surgical Final Report  Final Diagnosis  1. Cecum, endoscopic biopsy:  - Colonic mucosa with active chronic colitis, see comment  2. Ascending colon, endoscopic biopsy:  - Colonic mucosa with crypt architectural irregularity, see comment  3. Proximal transverse colon, endoscopic biopsy:  - Colonic mucosa with crypt architectural irregularity, see comment  4. Distal transverse colon, endoscopic biopsy:  - Colonic mucosa with crypt architectural irregularity, see comment  5. Descending colon, endoscopic biopsy:  - Colonic mucosa with crypt architectural irregularity, see comment  6. Sigmoid colon, endoscopic biopsy:  - Colonic mucosa with crypt architectural irregularity, see comment  7. Distal sigmoid colon, endoscopic biopsy:  - Colonic mucosa with active chronic colitis, see comment  - Negative for cytomegalovirus (CMV), immunohistochemical stain  8. Rectum, endoscopic biopsy:  - Rectal mucosa with active chronic proctitis, see comment   - Negative for cytomegalovirus (CMV), immunohistochemical stain Chief Complaint:  Patient is a 68y old  Male who presents with a chief complaint of Syncope and diarrhea.       Interval Events:   - patient seen and examined.  - he still continues to have multiple loose watery BMs, unchanged from admission.   - he denies nausea, vomiting or abdominal pain.       HPI: (from admission)  68-year-old male, with long-standing history of inflammatory bowel disease (Ulcerative colitis), CVA without residual deficits, depression (on medications), HTN, HLD, and DM2 presented to the emergency room with chief complain of passing out at home.   As per the patient and his wife, he had gone to the bathroom at night and his wife heard a loud noise and went to see him and he had fallen down on the bathroom floor. Patient states that he has been having >10 bowel movements a day, watery/pinkish in color/consistency over the past few weeks and has thus been feeling weak. He has increased urgency to have a bowel movement, more after meals. He denies nausea, vomiting, abdominal pain, blood in stool, or black colored stool.   Of note, he has had a UTI and was on levofloxacin for it for the past week. In the ER: VS T 98.7, P 86, /57, R 16, O2 sat 98% RA.     IBD History: Patient used to follow with Dr Mendoza and was transfered to Dr Gatito Oro's care in 2016. He was diagnosed with UC around . Patient was previously on 6-mercaptopurine which was discontinued in the setting of infectious complications and hospitalization. More recently, he has been on mesalamine 4.8 g daily along with a steroid taper. He was currently being evaluated for Entyvio. His last colonoscopy was performed on May 21, 2018 confirming colitis from the anal rectum to 25 cm consistent with left-sided ulcerative colitis.         Allergies:  No Known Allergies      Hospital Medications:  atorvastatin 40 milliGRAM(s) Oral at bedtime  cholecalciferol 400 Unit(s) Oral daily  dextrose 40% Gel 15 Gram(s) Oral once PRN  dextrose 5%. 1000 milliLiter(s) IV Continuous <Continuous>  dextrose 50% Injectable 12.5 Gram(s) IV Push once  dextrose 50% Injectable 25 Gram(s) IV Push once  dextrose 50% Injectable 25 Gram(s) IV Push once  glucagon  Injectable 1 milliGRAM(s) IntraMuscular once PRN  insulin lispro (HumaLOG) corrective regimen sliding scale   SubCutaneous three times a day before meals  memantine 10 milliGRAM(s) Oral two times a day  mesalamine DR Capsule 1600 milliGRAM(s) Oral three times a day  QUEtiapine 25 milliGRAM(s) Oral two times a day PRN  sertraline 100 milliGRAM(s) Oral daily  tamsulosin 0.4 milliGRAM(s) Oral two times a day      PMHX/PSHX:  Dementia  Depression  Alcohol abuse  Crohn disease  Diabetes type 2, controlled  HLD (hyperlipidemia)  H/O: CVA  Herniated disc  HTN (hypertension)  History of cataract surgery  No Past Surgical History      Family history:  Family history of cerebrovascular accident (CVA) in father (Father)  Family history of Alzheimer's disease  Family history of hypertension  Family history of MI (myocardial infarction)      ROS:     General:  No wt loss, fevers, chills, night sweats, fatigue,   Eyes:  Good vision, no reported pain  ENT:  No sore throat, pain, runny nose, dysphagia  CV:  No pain, palpitations, hypo/hypertension  Resp:  No dyspnea, cough, tachypnea, wheezing  GI:  See HPI  :  No pain, bleeding, incontinence, nocturia  Muscle:  No pain, weakness  Neuro:  No weakness, tingling, memory problems  Psych:  No fatigue, insomnia, mood problems, depression  Endocrine:  No polyuria, polydipsia, cold/heat intolerance  Heme:  No petechiae, ecchymosis, easy bruisability  Skin:  No rash, edema      PHYSICAL EXAM:     GENERAL:  Appears stated age, well-groomed, well-nourished, no distress  HEENT:  NC/AT,  conjunctivae clear, sclera -anicteric  CHEST:  Full & symmetric excursion, no increased effort, breath sounds clear  HEART:  Regular rhythm, S1, S2, no murmur/rub/S3/S4,  no edema  ABDOMEN:  Soft, non-tender, non-distended, normoactive bowel sounds,  no masses ,no hepato-splenomegaly,   EXTREMITIES:  no cyanosis,clubbing or edema  SKIN:  No rash/erythema/ecchymoses/petechiae/wounds/abscess/warm/dry  NEURO:  Alert, oriented    Vital Signs:  Vital Signs Last 24 Hrs  T(C): 37 (20 Aug 2018 05:49), Max: 37 (20 Aug 2018 05:49)  T(F): 98.6 (20 Aug 2018 05:49), Max: 98.6 (20 Aug 2018 05:49)  HR: 76 (20 Aug 2018 05:49) (70 - 77)  BP: 127/50 (20 Aug 2018 05:49) (110/70 - 127/50)  BP(mean): --  RR: 16 (20 Aug 2018 05:49) (16 - 19)  SpO2: 100% (20 Aug 2018 05:49) (98% - 100%)  Daily     Daily Weight in k.6 (20 Aug 2018 01:12)    LABS:                        10.6   5.12  )-----------( 142      ( 20 Aug 2018 06:45 )             33.4         140  |  105  |  9   ----------------------------<  122<H>  3.7   |  21<L>  |  0.86    Ca    8.6      19 Aug 2018 06:25  Mg     2.0           Imaging:  < from: CT Abdomen and Pelvis w/wo IV Cont (18 @ 13:12) >  FINDINGS:  LOWER CHEST: Aortic and coronary artery calcification.  LIVER: Within normal limits.  BILE DUCTS: Normal caliber.  GALLBLADDER: Cholelithiasis.  SPLEEN: Within normal limits.  PANCREAS: Within normal limits.  ADRENALS: Within normal limits.  KIDNEYS/URETERS: A 3 mm nonobstructing right renal calculus. No hydronephrosis. Small left renal cyst. Heterogeneous enhancement of the right kidney upper pole suspicious for pyelonephritis. No suspicious renal mass or evidence of urotheliallesion.  BLADDER: Within normal limits.  REPRODUCTIVE ORGANS: Heterogeneous focal enhancement right aspect of the   prostate. Consider correlation with prostate MRI.  BOWEL: No bowel obstruction. Thickening of the rectosigmoid colon consistent with colitis.     PERITONEUM: No ascites.  VESSELS:  Atherosclerotic changes.  RETROPERITONEUM: No lymphadenopathy.    ABDOMINAL WALL: Within normal limits.  BONES: Degenerative changes of the spine.  IMPRESSION:   Heterogeneous enhancement of theright kidney upper pole suspicious for pyelonephritis.  A 3 mm nonobstructing right renal calculus. No hydronephrosis.  Heterogeneous enhancement of the right aspect of the prostate for which correlation with PSA and potentially prostate MRI is recommended.  Rectosigmoid colitis in this patient with known history of Crohn's disease.  < end of copied text >      < from: Colonoscopy (18 @ 11:05) >  Impression:            - Localized moderate inflammation was found from rectum to sigmoid colon secondary to left-sided colitis. Biopsied.  - Pseudopolyps in the descending colon.  - Localized mild inflammation was found in the cecum. Findings consistent with active ulcerative colitis Preparation adequate for assessment of disease activity, not adequate for dysplasia/surveillance purposes  < end of copied text >    Surgical Pathology Report (18 @ 11:30)    Surgical Pathology Report:   ACCESSION No:  [id number]    VICKI REIS                   3    Surgical Final Report  Final Diagnosis  1. Cecum, endoscopic biopsy:  - Colonic mucosa with active chronic colitis, see comment  2. Ascending colon, endoscopic biopsy:  - Colonic mucosa with crypt architectural irregularity, see comment  3. Proximal transverse colon, endoscopic biopsy:  - Colonic mucosa with crypt architectural irregularity, see comment  4. Distal transverse colon, endoscopic biopsy:  - Colonic mucosa with crypt architectural irregularity, see comment  5. Descending colon, endoscopic biopsy:  - Colonic mucosa with crypt architectural irregularity, see comment  6. Sigmoid colon, endoscopic biopsy:  - Colonic mucosa with crypt architectural irregularity, see comment  7. Distal sigmoid colon, endoscopic biopsy:  - Colonic mucosa with active chronic colitis, see comment  - Negative for cytomegalovirus (CMV), immunohistochemical stain  8. Rectum, endoscopic biopsy:  - Rectal mucosa with active chronic proctitis, see comment   - Negative for cytomegalovirus (CMV), immunohistochemical stain

## 2018-08-20 NOTE — PROGRESS NOTE ADULT - PROBLEM SELECTOR PLAN 4
As per GI, patient to continue mesalamine,   Cdiff PCR negative, awaiting stool O&P, cx  If infectious workup completely negative patient might need biologics therapy as per GI note.

## 2018-08-20 NOTE — PROGRESS NOTE ADULT - ASSESSMENT
68-year-old male, with long-standing history of inflammatory bowel disease (Ulcerative colitis), CVA without residual deficits, depression (on medications), HTN, HLD, and DM2 presented to the emergency room with chief complain of passing out at home. Patient complains of >10BMs a day and thus GI was consulted.     IMPRESSION  - Diarrhea, could be 2/2 infection vs UC flare vs recent antibiotic use   - Syncope, unclear cause - no obvious dehydration or blood loss; pending workup: CT head negative blood glucose level 115, pending TTE  - Normocytic anemia, could be 2/2 blood loss with bowel movements (7/2018 with Hb 11.6)  - History of Ulcerative colitis: diagnosed in 2011, previously on 6-mercaptopurine but discontinued due to infectious complications; recently on mesalamine 4.8 g daily with steroid taper; being evaluated for Entyvio; last colonoscopy 5/2018 with colitis from the anal rectum to 25 cm consistent with left-sided ulcerative colitis and no dysplasia, follows with Dr Oro     RECOMMENDATIONS    - trend CBC, CMP, INR  - pending infectious workup: GI stool PCR (C diff is negative)  - continue mesalamine home dose  - pending TTE  - once infectious workup is negative, will discuss with Dr Oro re: starting biologics as inpatient vs outpatient  - patient will follow with Dr Gatito Oro on discharge as outpatient 68-year-old male, with long-standing history of inflammatory bowel disease (Ulcerative colitis), CVA without residual deficits, depression (on medications), HTN, HLD, and DM2 presented to the emergency room with chief complain of passing out at home. Patient complains of >10BMs a day and thus GI was consulted.     IMPRESSION  - Diarrhea, could be 2/2 infection vs UC flare vs recent antibiotic use     - Syncope, unclear cause - no obvious dehydration or blood loss; pending workup: CT head negative blood glucose level 115, pending TTE    - Normocytic anemia, could be 2/2 blood loss with bowel movements (7/2018 with Hb 11.6)    - History of Ulcerative colitis: diagnosed in 2011, previously on 6-mercaptopurine but discontinued due to infectious complications; recently on mesalamine 4.8 g daily with steroid taper; being evaluated for Entyvio; last colonoscopy 5/2018 with colitis from the anal rectum to 25 cm consistent with left-sided ulcerative colitis and no dysplasia, follows with Dr Oro     RECOMMENDATIONS    - trend CBC, CMP, INR  - pending infectious workup: GI stool PCR (C diff is negative)  - continue mesalamine home dose  - pending TTE as part of syncope workup  - once infectious workup is negative, will discuss with Dr Oro re: starting biologics as inpatient vs outpatient  - patient will follow with Dr Gatito Oro on discharge as outpatient 68-year-old male, with long-standing history of inflammatory bowel disease (Ulcerative colitis), CVA without residual deficits, depression (on medications), HTN, HLD, and DM2 presented to the emergency room with chief complain of passing out at home. Patient complains of >10BMs a day and thus GI was consulted.     IMPRESSION  - Diarrhea, could be 2/2 infection vs UC flare vs recent antibiotic use     - Syncope, unclear cause - no obvious dehydration or blood loss; pending workup: CT head negative blood glucose level 115, pending TTE    - Normocytic anemia, could be 2/2 blood loss with bowel movements (7/2018 with Hb 11.6)    - History of Ulcerative colitis: diagnosed in 2011, previously on 6-mercaptopurine but discontinued due to infectious complications; recently on mesalamine 4.8 g daily with steroid taper; being evaluated for Entyvio; last colonoscopy 5/2018 with colitis from the anal rectum to 25 cm consistent with left-sided ulcerative colitis and no dysplasia, follows with Dr Oro     RECOMMENDATIONS  - Plan for Flex sig with biopsies to rule out superimposed viral infection on Tuesday 8/21 (HSV, CMV)  - trend CBC, CMP, INR  - pending infectious workup: GI stool PCR (C diff is negative)  - continue mesalamine home dose  - pending TTE as part of syncope workup  - once infectious workup is negative, will discuss with Dr Oro re: starting biologics as inpatient vs outpatient  - patient will follow with Dr Gatito Oro on discharge as outpatient

## 2018-08-20 NOTE — PROGRESS NOTE ADULT - SUBJECTIVE AND OBJECTIVE BOX
Patient is a 68y old  Male who presents with a chief complaint of Syncope         SUBJECTIVE / OVERNIGHT EVENTS: wife at bedside states pt lost weight bc of not wanting to eat due to fear of diarrhea; reports he is unsteady on his feet; had a similar syncopal episode approx 6 month ago, felt to be due to diarrhea; wife reports recent abx for UTI from  as outpt      MEDICATIONS  (STANDING):  atorvastatin 40 milliGRAM(s) Oral at bedtime  cholecalciferol 400 Unit(s) Oral daily  dextrose 5%. 1000 milliLiter(s) (50 mL/Hr) IV Continuous <Continuous>  dextrose 50% Injectable 12.5 Gram(s) IV Push once  dextrose 50% Injectable 25 Gram(s) IV Push once  dextrose 50% Injectable 25 Gram(s) IV Push once  insulin lispro (HumaLOG) corrective regimen sliding scale   SubCutaneous three times a day before meals  memantine 10 milliGRAM(s) Oral two times a day  mesalamine DR Capsule 1600 milliGRAM(s) Oral three times a day  sertraline 100 milliGRAM(s) Oral daily  tamsulosin 0.4 milliGRAM(s) Oral two times a day    MEDICATIONS  (PRN):  dextrose 40% Gel 15 Gram(s) Oral once PRN Blood Glucose LESS THAN 70 milliGRAM(s)/deciliter  glucagon  Injectable 1 milliGRAM(s) IntraMuscular once PRN Glucose LESS THAN 70 milligrams/deciliter  QUEtiapine 25 milliGRAM(s) Oral two times a day PRN agitation      Vital Signs Last 24 Hrs  T(F): 98.6 (20 Aug 2018 05:49), Max: 98.6 (20 Aug 2018 05:49)  HR: 76 (20 Aug 2018 05:49) (76 - 77)  BP: 127/50 (20 Aug 2018 05:49) (117/60 - 127/50)  RR: 16 (20 Aug 2018 05:49) (16 - 19)  SpO2: 100% (20 Aug 2018 05:49) (98% - 100%)      CAPILLARY BLOOD GLUCOSE  POCT Blood Glucose.: 132 mg/dL (20 Aug 2018 08:41)  POCT Blood Glucose.: 143 mg/dL (19 Aug 2018 21:27)  POCT Blood Glucose.: 144 mg/dL (19 Aug 2018 17:31)  POCT Blood Glucose.: 140 mg/dL (19 Aug 2018 12:55)            PHYSICAL EXAM  GENERAL: NAD, well-developed  HEAD:  Atraumatic, Normocephalic  EYES: EOMI, PERRLA, conjunctiva and sclera clear  CHEST/LUNG: Clear to auscultation bilaterally;  HEART: Regular rate and rhythm;  ABDOMEN: Soft, Nontender, Nondistended; Bowel sounds present  EXTREMITIES:   No clubbing, cyanosis, or edema  PSYCH: pleasant, coop; poor hsitorian      LABS:                        10.6   5.12  )-----------( 142      ( 20 Aug 2018 06:45 )             33.4     08-20    141  |  107  |  7   ----------------------------<  123<H>  3.9   |  21<L>  |  0.76    Ca    8.6      20 Aug 2018 06:45  Mg     2.0     08-20

## 2018-08-20 NOTE — PROGRESS NOTE ADULT - ASSESSMENT
68M with history of UC and multiple episodes of diarrhea lately presents to the hospital with syncopal episode

## 2018-08-21 LAB
BUN SERPL-MCNC: 7 MG/DL — SIGNIFICANT CHANGE UP (ref 7–23)
CALCIUM SERPL-MCNC: 8.7 MG/DL — SIGNIFICANT CHANGE UP (ref 8.4–10.5)
CHLORIDE SERPL-SCNC: 101 MMOL/L — SIGNIFICANT CHANGE UP (ref 98–107)
CO2 SERPL-SCNC: 20 MMOL/L — LOW (ref 22–31)
CREAT SERPL-MCNC: 0.77 MG/DL — SIGNIFICANT CHANGE UP (ref 0.5–1.3)
GLUCOSE BLDC GLUCOMTR-MCNC: 123 MG/DL — HIGH (ref 70–99)
GLUCOSE BLDC GLUCOMTR-MCNC: 128 MG/DL — HIGH (ref 70–99)
GLUCOSE BLDC GLUCOMTR-MCNC: 128 MG/DL — HIGH (ref 70–99)
GLUCOSE BLDC GLUCOMTR-MCNC: 172 MG/DL — HIGH (ref 70–99)
GLUCOSE SERPL-MCNC: 128 MG/DL — HIGH (ref 70–99)
HCT VFR BLD CALC: 32.7 % — LOW (ref 39–50)
HGB BLD-MCNC: 10.3 G/DL — LOW (ref 13–17)
MAGNESIUM SERPL-MCNC: 1.8 MG/DL — SIGNIFICANT CHANGE UP (ref 1.6–2.6)
MCHC RBC-ENTMCNC: 27.5 PG — SIGNIFICANT CHANGE UP (ref 27–34)
MCHC RBC-ENTMCNC: 31.5 % — LOW (ref 32–36)
MCV RBC AUTO: 87.2 FL — SIGNIFICANT CHANGE UP (ref 80–100)
NRBC # FLD: 0 — SIGNIFICANT CHANGE UP
O+P SPEC CONC: SIGNIFICANT CHANGE UP
PLATELET # BLD AUTO: 145 K/UL — LOW (ref 150–400)
PMV BLD: 10.6 FL — SIGNIFICANT CHANGE UP (ref 7–13)
POTASSIUM SERPL-MCNC: 3.9 MMOL/L — SIGNIFICANT CHANGE UP (ref 3.5–5.3)
POTASSIUM SERPL-SCNC: 3.9 MMOL/L — SIGNIFICANT CHANGE UP (ref 3.5–5.3)
RBC # BLD: 3.75 M/UL — LOW (ref 4.2–5.8)
RBC # FLD: 12.3 % — SIGNIFICANT CHANGE UP (ref 10.3–14.5)
SODIUM SERPL-SCNC: 139 MMOL/L — SIGNIFICANT CHANGE UP (ref 135–145)
SPECIMEN SOURCE: SIGNIFICANT CHANGE UP
TRI STN SPEC: SIGNIFICANT CHANGE UP
WBC # BLD: 5.17 K/UL — SIGNIFICANT CHANGE UP (ref 3.8–10.5)
WBC # FLD AUTO: 5.17 K/UL — SIGNIFICANT CHANGE UP (ref 3.8–10.5)

## 2018-08-21 PROCEDURE — 99232 SBSQ HOSP IP/OBS MODERATE 35: CPT | Mod: GC

## 2018-08-21 PROCEDURE — 99233 SBSQ HOSP IP/OBS HIGH 50: CPT

## 2018-08-21 RX ORDER — SODIUM CHLORIDE 9 MG/ML
1000 INJECTION INTRAMUSCULAR; INTRAVENOUS; SUBCUTANEOUS
Qty: 0 | Refills: 0 | Status: COMPLETED | OUTPATIENT
Start: 2018-08-21 | End: 2018-08-21

## 2018-08-21 RX ADMIN — SERTRALINE 100 MILLIGRAM(S): 25 TABLET, FILM COATED ORAL at 12:34

## 2018-08-21 RX ADMIN — TAMSULOSIN HYDROCHLORIDE 0.4 MILLIGRAM(S): 0.4 CAPSULE ORAL at 05:03

## 2018-08-21 RX ADMIN — Medication 1600 MILLIGRAM(S): at 05:03

## 2018-08-21 RX ADMIN — MEMANTINE HYDROCHLORIDE 10 MILLIGRAM(S): 10 TABLET ORAL at 16:54

## 2018-08-21 RX ADMIN — Medication 1600 MILLIGRAM(S): at 21:01

## 2018-08-21 RX ADMIN — ATORVASTATIN CALCIUM 40 MILLIGRAM(S): 80 TABLET, FILM COATED ORAL at 21:01

## 2018-08-21 RX ADMIN — Medication 1: at 12:38

## 2018-08-21 RX ADMIN — SODIUM CHLORIDE 75 MILLILITER(S): 9 INJECTION INTRAMUSCULAR; INTRAVENOUS; SUBCUTANEOUS at 15:43

## 2018-08-21 RX ADMIN — Medication 400 UNIT(S): at 12:34

## 2018-08-21 RX ADMIN — MEMANTINE HYDROCHLORIDE 10 MILLIGRAM(S): 10 TABLET ORAL at 05:03

## 2018-08-21 RX ADMIN — Medication 3 MILLIGRAM(S): at 21:13

## 2018-08-21 RX ADMIN — TAMSULOSIN HYDROCHLORIDE 0.4 MILLIGRAM(S): 0.4 CAPSULE ORAL at 16:54

## 2018-08-21 RX ADMIN — Medication 1600 MILLIGRAM(S): at 12:34

## 2018-08-21 NOTE — CHART NOTE - NSCHARTNOTEFT_GEN_A_CORE
Reviewed orthostatic blood pressures with Dr. Wright. Patient remains orthostatic despite hydration with IVF.  May be due to diarrhea vs component of autonomic dysfunction. Will additional 1L of fluid and repeat orthostatics. If remains orthostatic then will likely start Florinef 0.1mg qD in addition to compression stockings to improve venous return.

## 2018-08-21 NOTE — PROGRESS NOTE ADULT - PROBLEM SELECTOR PLAN 4
continue mesalamine,   Cdiff PCR negative, awaiting stool O&P, cx  If infectious workup completely negative patient might need biologics therapy as per GI   flex sig tomorrow

## 2018-08-21 NOTE — PROGRESS NOTE ADULT - ASSESSMENT
68-year-old male, with long-standing history of inflammatory bowel disease (Ulcerative colitis), CVA without residual deficits, depression (on medications), HTN, HLD, and DM2 presented to the emergency room with chief complain of passing out at home. Patient complains of >10BMs a day and thus GI was consulted.     IMPRESSION  - Diarrhea, could be 2/2 infection vs UC flare vs recent antibiotic use     - Syncope, unclear cause - no obvious dehydration or blood loss; pending workup: CT head negative blood glucose level 115, pending TTE    - Normocytic anemia, could be 2/2 blood loss with bowel movements (7/2018 with Hb 11.6)    - History of Ulcerative colitis: diagnosed in 2011, previously on 6-mercaptopurine but discontinued due to infectious complications; recently on mesalamine 4.8 g daily with steroid taper; being evaluated for Entyvio; last colonoscopy 5/2018 with colitis from the anal rectum to 25 cm consistent with left-sided ulcerative colitis and no dysplasia, follows with Dr Oro     RECOMMENDATIONS      - trend CBC, CMP, INR  - pending infectious workup: GI stool PCR (C diff is negative)  - continue mesalamine home dose  - plan for Flex sig with biopsies to rule out superimposed viral infection on Wednesday 8/22/2018 (HSV, CMV)  - please obtain a neurology consult for new dementia, changes in gait, postural changes (concern for Parkinson's disease etc)  - once infectious workup is negative, will discuss with Dr Oro re: starting biologics as inpatient vs outpatient  - patient will follow with Dr Gatito Oro on discharge as outpatient 68-year-old male, with long-standing history of inflammatory bowel disease (Ulcerative colitis), CVA without residual deficits, depression (on medications), HTN, HLD, and DM2 presented to the emergency room with chief complain of passing out at home. Patient complains of >10BMs a day and thus GI was consulted.     IMPRESSION  - Diarrhea, could be 2/2 infection vs UC flare vs recent antibiotic use     - Syncope, unclear cause - no obvious dehydration or blood loss; pending workup: CT head negative blood glucose level 115, pending TTE    - Normocytic anemia, could be 2/2 blood loss with bowel movements (7/2018 with Hb 11.6)    - History of Ulcerative colitis: diagnosed in 2011, previously on 6-mercaptopurine but discontinued due to infectious complications; recently on mesalamine 4.8 g daily with steroid taper; being evaluated for Entyvio; last colonoscopy 5/2018 with colitis from the anal rectum to 25 cm consistent with left-sided ulcerative colitis and no dysplasia, follows with Dr Oro     RECOMMENDATIONS      - trend CBC, CMP, INR  - pending infectious workup: GI stool PCR (C diff is negative)  - continue mesalamine home dose  - plan for Flex sig with biopsies to rule out superimposed viral infection on Wednesday 8/22/2018 (HSV, CMV)  - please keep NPO after midnight tonight   - please obtain a neurology consult for new dementia, changes in gait, postural changes (concern for Parkinson's disease etc)  - patient will follow with Dr Gatito Oro on discharge as outpatient, for Entyvio

## 2018-08-21 NOTE — PROGRESS NOTE ADULT - SUBJECTIVE AND OBJECTIVE BOX
Patient is a 68y old  Male who presents with a chief complaint of Syncope assoc with freq diarrhea and poor PO intake      SUBJECTIVE / OVERNIGHT EVENTS: feeling better, not dizzy, wife reports still limited PO; pt states BM freq 3-4/day rather than 10; no change in urinary freq, no burning      MEDICATIONS  (STANDING):  atorvastatin 40 milliGRAM(s) Oral at bedtime  cholecalciferol 400 Unit(s) Oral daily  dextrose 5%. 1000 milliLiter(s) (50 mL/Hr) IV Continuous <Continuous>  dextrose 50% Injectable 12.5 Gram(s) IV Push once  dextrose 50% Injectable 25 Gram(s) IV Push once  dextrose 50% Injectable 25 Gram(s) IV Push once  insulin lispro (HumaLOG) corrective regimen sliding scale   SubCutaneous three times a day before meals  memantine 10 milliGRAM(s) Oral two times a day  mesalamine DR Capsule 1600 milliGRAM(s) Oral three times a day  sertraline 100 milliGRAM(s) Oral daily  tamsulosin 0.4 milliGRAM(s) Oral two times a day    MEDICATIONS  (PRN):  dextrose 40% Gel 15 Gram(s) Oral once PRN Blood Glucose LESS THAN 70 milliGRAM(s)/deciliter  glucagon  Injectable 1 milliGRAM(s) IntraMuscular once PRN Glucose LESS THAN 70 milligrams/deciliter  melatonin 3 milliGRAM(s) Oral at bedtime PRN Insomnia  QUEtiapine 25 milliGRAM(s) Oral two times a day PRN agitation      Vital Signs Last 24 Hrs  T(F): 98 (21 Aug 2018 05:06), Max: 98.7 (20 Aug 2018 19:32)  HR: 70 (21 Aug 2018 05:06) (67 - 72)  BP: 112/62 (21 Aug 2018 05:06) (110/67 - 120/66)  RR: 16 (21 Aug 2018 05:06) (16 - 18)  SpO2: 100% (21 Aug 2018 05:06) (98% - 100%)      CAPILLARY BLOOD GLUCOSE  POCT Blood Glucose.: 128 mg/dL (21 Aug 2018 08:44)  POCT Blood Glucose.: 120 mg/dL (20 Aug 2018 22:15)  POCT Blood Glucose.: 175 mg/dL (20 Aug 2018 17:37)  POCT Blood Glucose.: 185 mg/dL (20 Aug 2018 13:03)              PHYSICAL EXAM  GENERAL: NAD, well-developed  HEAD:  Atraumatic, Normocephalic  EYES: EOMI, PERRLA, conjunctiva and sclera clear  CHEST/LUNG: Clear to auscultation bilaterally  HEART: Regular rate and rhythm  ABDOMEN: Soft, Nontender, Nondistended; Bowel sounds present  EXTREMITIES:  No clubbing, cyanosis, or edema  PSYCH: pleasant, poor hsitorian      LABS:                        10.3   5.17  )-----------( 145      ( 21 Aug 2018 07:40 )             32.7     08-21    139  |  101  |  7   ----------------------------<  128<H>  3.9   |  20<L>  |  0.77    Ca    8.7      21 Aug 2018 07:40  Mg     1.8     08-21

## 2018-08-21 NOTE — PROGRESS NOTE ADULT - SUBJECTIVE AND OBJECTIVE BOX
Chief Complaint:  Patient is a 68y old  Male who presents with a chief complaint of Syncope (17 Aug 2018 17:46)      Interval Events:     HPI: (from admission)  68-year-old male, with long-standing history of inflammatory bowel disease (Ulcerative colitis), CVA without residual deficits, depression (on medications), HTN, HLD, and DM2 presented to the emergency room with chief complain of passing out at home.   As per the patient and his wife, he had gone to the bathroom at night and his wife heard a loud noise and went to see him and he had fallen down on the bathroom floor. Patient states that he has been having >10 bowel movements a day, watery/pinkish in color/consistency over the past few weeks and has thus been feeling weak. He has increased urgency to have a bowel movement, more after meals. He denies nausea, vomiting, abdominal pain, blood in stool, or black colored stool.   Of note, he has had a UTI and was on levofloxacin for it for the past week. In the ER: VS T 98.7, P 86, /57, R 16, O2 sat 98% RA.     IBD History: Patient used to follow with Dr Mendoza and was transfered to Dr Gatito Oro's care in 2016. He was diagnosed with UC around 2011. Patient was previously on 6-mercaptopurine which was discontinued in the setting of infectious complications and hospitalization. More recently, he has been on mesalamine 4.8 g daily along with a steroid taper. He was currently being evaluated for Entyvio. His last colonoscopy was performed on May 21, 2018 confirming colitis from the anal rectum to 25 cm consistent with left-sided ulcerative colitis.       Allergies:  No Known Allergies      Hospital Medications:  atorvastatin 40 milliGRAM(s) Oral at bedtime  cholecalciferol 400 Unit(s) Oral daily  dextrose 40% Gel 15 Gram(s) Oral once PRN  dextrose 5%. 1000 milliLiter(s) IV Continuous <Continuous>  dextrose 50% Injectable 12.5 Gram(s) IV Push once  dextrose 50% Injectable 25 Gram(s) IV Push once  dextrose 50% Injectable 25 Gram(s) IV Push once  glucagon  Injectable 1 milliGRAM(s) IntraMuscular once PRN  insulin lispro (HumaLOG) corrective regimen sliding scale   SubCutaneous three times a day before meals  melatonin 3 milliGRAM(s) Oral at bedtime PRN  memantine 10 milliGRAM(s) Oral two times a day  mesalamine DR Capsule 1600 milliGRAM(s) Oral three times a day  QUEtiapine 25 milliGRAM(s) Oral two times a day PRN  sertraline 100 milliGRAM(s) Oral daily  tamsulosin 0.4 milliGRAM(s) Oral two times a day      PMHX/PSHX:  Dementia  Depression  Alcohol abuse  Crohn disease  Diabetes type 2, controlled  HLD (hyperlipidemia)  H/O: CVA  Herniated disc  HTN (hypertension)  History of cataract surgery  No Past Surgical History      Family history:  Family history of cerebrovascular accident (CVA) in father (Father)  Family history of Alzheimer's disease  Family history of hypertension  Family history of MI (myocardial infarction)      ROS:     General:  No wt loss, fevers, chills, night sweats, fatigue,   Eyes:  Good vision, no reported pain  ENT:  No sore throat, pain, runny nose, dysphagia  CV:  No pain, palpitations, hypo/hypertension  Resp:  No dyspnea, cough, tachypnea, wheezing  GI:  See HPI  :  No pain, bleeding, incontinence, nocturia  Muscle:  No pain, weakness  Neuro:  No weakness, tingling, memory problems  Psych:  No fatigue, insomnia, mood problems, depression  Endocrine:  No polyuria, polydipsia, cold/heat intolerance  Heme:  No petechiae, ecchymosis, easy bruisability  Skin:  No rash, edema      PHYSICAL EXAM:     GENERAL:  Appears stated age, well-groomed, well-nourished, no distress  HEENT:  NC/AT,  conjunctivae clear, sclera -anicteric  CHEST:  Full & symmetric excursion, no increased effort, breath sounds clear  HEART:  Regular rhythm, S1, S2, no murmur/rub/S3/S4,  no edema  ABDOMEN:  Soft, non-tender, non-distended, normoactive bowel sounds,  no masses ,no hepato-splenomegaly,   EXTREMITIES:  no cyanosis,clubbing or edema  SKIN:  No rash/erythema/ecchymoses/petechiae/wounds/abscess/warm/dry  NEURO:  Alert, oriented    Vital Signs:  Vital Signs Last 24 Hrs  T(C): 36.7 (21 Aug 2018 05:06), Max: 37.1 (20 Aug 2018 19:32)  T(F): 98 (21 Aug 2018 05:06), Max: 98.7 (20 Aug 2018 19:32)  HR: 70 (21 Aug 2018 05:06) (67 - 72)  BP: 112/62 (21 Aug 2018 05:06) (110/67 - 120/66)  BP(mean): --  RR: 16 (21 Aug 2018 05:06) (16 - 18)  SpO2: 100% (21 Aug 2018 05:06) (98% - 100%)  Daily     Daily     LABS:                        10.3   5.17  )-----------( 145      ( 21 Aug 2018 07:40 )             32.7     08-21    139  |  101  |  7   ----------------------------<  128<H>  3.9   |  20<L>  |  0.77    Ca    8.7      21 Aug 2018 07:40  Mg     1.8     08-21                Imaging:      < from: CT Abdomen and Pelvis w/wo IV Cont (07.05.18 @ 13:12) >  FINDINGS:  LOWER CHEST: Aortic and coronary artery calcification.  LIVER: Within normal limits.  BILE DUCTS: Normal caliber.  GALLBLADDER: Cholelithiasis.  SPLEEN: Within normal limits.  PANCREAS: Within normal limits.  ADRENALS: Within normal limits.  KIDNEYS/URETERS: A 3 mm nonobstructing right renal calculus. No hydronephrosis. Small left renal cyst. Heterogeneous enhancement of the right kidney upper pole suspicious for pyelonephritis. No suspicious renal mass or evidence of urotheliallesion.  BLADDER: Within normal limits.  REPRODUCTIVE ORGANS: Heterogeneous focal enhancement right aspect of the   prostate. Consider correlation with prostate MRI.  BOWEL: No bowel obstruction. Thickening of the rectosigmoid colon consistent with colitis.     PERITONEUM: No ascites.  VESSELS:  Atherosclerotic changes.  RETROPERITONEUM: No lymphadenopathy.    ABDOMINAL WALL: Within normal limits.  BONES: Degenerative changes of the spine.  IMPRESSION:   Heterogeneous enhancement of theright kidney upper pole suspicious for pyelonephritis.  A 3 mm nonobstructing right renal calculus. No hydronephrosis.  Heterogeneous enhancement of the right aspect of the prostate for which correlation with PSA and potentially prostate MRI is recommended.  Rectosigmoid colitis in this patient with known history of Crohn's disease.  < end of copied text >      < from: Colonoscopy (05.21.18 @ 11:05) >  Impression:            - Localized moderate inflammation was found from rectum to sigmoid colon secondary to left-sided colitis. Biopsied.  - Pseudopolyps in the descending colon.  - Localized mild inflammation was found in the cecum. Findings consistent with active ulcerative colitis Preparation adequate for assessment of disease activity, not adequate for dysplasia/surveillance purposes  < end of copied text >    Surgical Pathology Report (05.21.18 @ 11:30)    Surgical Pathology Report:   ACCESSION No:  [id number]    VICKI REIS                   3    Surgical Final Report  Final Diagnosis  1. Cecum, endoscopic biopsy:  - Colonic mucosa with active chronic colitis, see comment  2. Ascending colon, endoscopic biopsy:  - Colonic mucosa with crypt architectural irregularity, see comment  3. Proximal transverse colon, endoscopic biopsy:  - Colonic mucosa with crypt architectural irregularity, see comment  4. Distal transverse colon, endoscopic biopsy:  - Colonic mucosa with crypt architectural irregularity, see comment  5. Descending colon, endoscopic biopsy:  - Colonic mucosa with crypt architectural irregularity, see comment  6. Sigmoid colon, endoscopic biopsy:  - Colonic mucosa with crypt architectural irregularity, see comment  7. Distal sigmoid colon, endoscopic biopsy:  - Colonic mucosa with active chronic colitis, see comment  - Negative for cytomegalovirus (CMV), immunohistochemical stain  8. Rectum, endoscopic biopsy:  - Rectal mucosa with active chronic proctitis, see comment   - Negative for cytomegalovirus (CMV), immunohistochemical stain      < from: JUDY w/o TTE (w/3D Echo) (12.31.12 @ 12:37) >  ------------------------------------------------------------------------  Conclusions:  1. Mitral annular calcification, otherwise normal mitral  valve. Mild mitral regurgitation.  2. Calcified trileafletaortic valve with normal opening.  No aortic valve regurgitation seen.  3. Normal aortic root. Moderate atheroma is seen in the  descending aorta. Severe non-mobile atheroma noted in  aortic arch measuring 0.8 cm at its longest dimension.  4. Normal left atrium.  No left atrial or left atrial  appendage (THERESA) thrombus visualized. Spectral doppler  revealed normal velocities in the THERESA (0.46 m/s).  5. Normal left ventricular systolic function. No segmental  wall motion abnormalities.  6. Normal right ventricular size and function.  7. Contrast injection demonstrates no evidence of a patent  foramen ovale.    < end of copied text > Chief Complaint:  Patient is a 68y old  Male who presents with a chief complaint of Syncope and diarrhea.      Interval Events:   - C diff negative, prelim of GI stool PCR negative  - denies nay new complains overnight.     HPI: (from admission)  68-year-old male, with long-standing history of inflammatory bowel disease (Ulcerative colitis), CVA without residual deficits, depression (on medications), HTN, HLD, and DM2 presented to the emergency room with chief complain of passing out at home.   As per the patient and his wife, he had gone to the bathroom at night and his wife heard a loud noise and went to see him and he had fallen down on the bathroom floor. Patient states that he has been having >10 bowel movements a day, watery/pinkish in color/consistency over the past few weeks and has thus been feeling weak. He has increased urgency to have a bowel movement, more after meals. He denies nausea, vomiting, abdominal pain, blood in stool, or black colored stool.   Of note, he has had a UTI and was on levofloxacin for it for the past week. In the ER: VS T 98.7, P 86, /57, R 16, O2 sat 98% RA.     IBD History: Patient used to follow with Dr Mendoza and was transfered to Dr Gatito Oro's care in 2016. He was diagnosed with UC around 2011. Patient was previously on 6-mercaptopurine which was discontinued in the setting of infectious complications and hospitalization. More recently, he has been on mesalamine 4.8 g daily along with a steroid taper. He was currently being evaluated for Entyvio. His last colonoscopy was performed on May 21, 2018 confirming colitis from the anal rectum to 25 cm consistent with left-sided ulcerative colitis.       Allergies:  No Known Allergies      Hospital Medications:  atorvastatin 40 milliGRAM(s) Oral at bedtime  cholecalciferol 400 Unit(s) Oral daily  dextrose 40% Gel 15 Gram(s) Oral once PRN  dextrose 5%. 1000 milliLiter(s) IV Continuous <Continuous>  dextrose 50% Injectable 12.5 Gram(s) IV Push once  dextrose 50% Injectable 25 Gram(s) IV Push once  dextrose 50% Injectable 25 Gram(s) IV Push once  glucagon  Injectable 1 milliGRAM(s) IntraMuscular once PRN  insulin lispro (HumaLOG) corrective regimen sliding scale   SubCutaneous three times a day before meals  melatonin 3 milliGRAM(s) Oral at bedtime PRN  memantine 10 milliGRAM(s) Oral two times a day  mesalamine DR Capsule 1600 milliGRAM(s) Oral three times a day  QUEtiapine 25 milliGRAM(s) Oral two times a day PRN  sertraline 100 milliGRAM(s) Oral daily  tamsulosin 0.4 milliGRAM(s) Oral two times a day      PMHX/PSHX:  Dementia  Depression  Alcohol abuse  Crohn disease  Diabetes type 2, controlled  HLD (hyperlipidemia)  H/O: CVA  Herniated disc  HTN (hypertension)  History of cataract surgery  No Past Surgical History      Family history:  Family history of cerebrovascular accident (CVA) in father (Father)  Family history of Alzheimer's disease  Family history of hypertension  Family history of MI (myocardial infarction)      ROS:     General:  No wt loss, fevers, chills, night sweats, fatigue,   Eyes:  Good vision, no reported pain  ENT:  No sore throat, pain, runny nose, dysphagia  CV:  No pain, palpitations, hypo/hypertension  Resp:  No dyspnea, cough, tachypnea, wheezing  GI:  See HPI  :  No pain, bleeding, incontinence, nocturia  Muscle:  No pain, weakness  Neuro:  No weakness, tingling, memory problems  Psych:  No fatigue, insomnia, mood problems, depression  Endocrine:  No polyuria, polydipsia, cold/heat intolerance  Heme:  No petechiae, ecchymosis, easy bruisability  Skin:  No rash, edema      PHYSICAL EXAM:     GENERAL:  Appears stated age, well-groomed, well-nourished, no distress  HEENT:  NC/AT,  conjunctivae clear, sclera -anicteric  CHEST:  Full & symmetric excursion, no increased effort, breath sounds clear  HEART:  Regular rhythm, S1, S2, no murmur/rub/S3/S4,  no edema  ABDOMEN:  Soft, non-tender, non-distended, normoactive bowel sounds,  no masses ,no hepato-splenomegaly,   EXTREMITIES:  no cyanosis,clubbing or edema  SKIN:  No rash/erythema/ecchymoses/petechiae/wounds/abscess/warm/dry  NEURO:  Alert, oriented    Vital Signs:  Vital Signs Last 24 Hrs  T(C): 36.7 (21 Aug 2018 05:06), Max: 37.1 (20 Aug 2018 19:32)  T(F): 98 (21 Aug 2018 05:06), Max: 98.7 (20 Aug 2018 19:32)  HR: 70 (21 Aug 2018 05:06) (67 - 72)  BP: 112/62 (21 Aug 2018 05:06) (110/67 - 120/66)  BP(mean): --  RR: 16 (21 Aug 2018 05:06) (16 - 18)  SpO2: 100% (21 Aug 2018 05:06) (98% - 100%)  Daily     Daily     LABS:                        10.3   5.17  )-----------( 145      ( 21 Aug 2018 07:40 )             32.7     08-21    139  |  101  |  7   ----------------------------<  128<H>  3.9   |  20<L>  |  0.77    Ca    8.7      21 Aug 2018 07:40  Mg     1.8     08-21                Imaging:      < from: CT Abdomen and Pelvis w/wo IV Cont (07.05.18 @ 13:12) >  FINDINGS:  LOWER CHEST: Aortic and coronary artery calcification.  LIVER: Within normal limits.  BILE DUCTS: Normal caliber.  GALLBLADDER: Cholelithiasis.  SPLEEN: Within normal limits.  PANCREAS: Within normal limits.  ADRENALS: Within normal limits.  KIDNEYS/URETERS: A 3 mm nonobstructing right renal calculus. No hydronephrosis. Small left renal cyst. Heterogeneous enhancement of the right kidney upper pole suspicious for pyelonephritis. No suspicious renal mass or evidence of urotheliallesion.  BLADDER: Within normal limits.  REPRODUCTIVE ORGANS: Heterogeneous focal enhancement right aspect of the   prostate. Consider correlation with prostate MRI.  BOWEL: No bowel obstruction. Thickening of the rectosigmoid colon consistent with colitis.     PERITONEUM: No ascites.  VESSELS:  Atherosclerotic changes.  RETROPERITONEUM: No lymphadenopathy.    ABDOMINAL WALL: Within normal limits.  BONES: Degenerative changes of the spine.  IMPRESSION:   Heterogeneous enhancement of theright kidney upper pole suspicious for pyelonephritis.  A 3 mm nonobstructing right renal calculus. No hydronephrosis.  Heterogeneous enhancement of the right aspect of the prostate for which correlation with PSA and potentially prostate MRI is recommended.  Rectosigmoid colitis in this patient with known history of Crohn's disease.  < end of copied text >      < from: Colonoscopy (05.21.18 @ 11:05) >  Impression:            - Localized moderate inflammation was found from rectum to sigmoid colon secondary to left-sided colitis. Biopsied.  - Pseudopolyps in the descending colon.  - Localized mild inflammation was found in the cecum. Findings consistent with active ulcerative colitis Preparation adequate for assessment of disease activity, not adequate for dysplasia/surveillance purposes  < end of copied text >    Surgical Pathology Report (05.21.18 @ 11:30)    Surgical Pathology Report:   ACCESSION No:  [id number]    VICKI REIS                   3    Surgical Final Report  Final Diagnosis  1. Cecum, endoscopic biopsy:  - Colonic mucosa with active chronic colitis, see comment  2. Ascending colon, endoscopic biopsy:  - Colonic mucosa with crypt architectural irregularity, see comment  3. Proximal transverse colon, endoscopic biopsy:  - Colonic mucosa with crypt architectural irregularity, see comment  4. Distal transverse colon, endoscopic biopsy:  - Colonic mucosa with crypt architectural irregularity, see comment  5. Descending colon, endoscopic biopsy:  - Colonic mucosa with crypt architectural irregularity, see comment  6. Sigmoid colon, endoscopic biopsy:  - Colonic mucosa with crypt architectural irregularity, see comment  7. Distal sigmoid colon, endoscopic biopsy:  - Colonic mucosa with active chronic colitis, see comment  - Negative for cytomegalovirus (CMV), immunohistochemical stain  8. Rectum, endoscopic biopsy:  - Rectal mucosa with active chronic proctitis, see comment   - Negative for cytomegalovirus (CMV), immunohistochemical stain      < from: JUDY w/o TTE (w/3D Echo) (12.31.12 @ 12:37) >  ------------------------------------------------------------------------  Conclusions:  1. Mitral annular calcification, otherwise normal mitral  valve. Mild mitral regurgitation.  2. Calcified trileafletaortic valve with normal opening.  No aortic valve regurgitation seen.  3. Normal aortic root. Moderate atheroma is seen in the  descending aorta. Severe non-mobile atheroma noted in  aortic arch measuring 0.8 cm at its longest dimension.  4. Normal left atrium.  No left atrial or left atrial  appendage (THERESA) thrombus visualized. Spectral doppler  revealed normal velocities in the THERESA (0.46 m/s).  5. Normal left ventricular systolic function. No segmental  wall motion abnormalities.  6. Normal right ventricular size and function.  7. Contrast injection demonstrates no evidence of a patent  foramen ovale.    < end of copied text >

## 2018-08-21 NOTE — PROGRESS NOTE ADULT - PROBLEM SELECTOR PLAN 2
recent rx for UTI  no further treatment at this time as no change in sx  pt with freq + ESBL E Coli cx as outpt, likely colonized

## 2018-08-22 ENCOUNTER — RESULT REVIEW (OUTPATIENT)
Age: 68
End: 2018-08-22

## 2018-08-22 LAB
BACTERIA STL CULT: SIGNIFICANT CHANGE UP
BUN SERPL-MCNC: 7 MG/DL — SIGNIFICANT CHANGE UP (ref 7–23)
CALCIUM SERPL-MCNC: 8.6 MG/DL — SIGNIFICANT CHANGE UP (ref 8.4–10.5)
CHLORIDE SERPL-SCNC: 106 MMOL/L — SIGNIFICANT CHANGE UP (ref 98–107)
CO2 SERPL-SCNC: 21 MMOL/L — LOW (ref 22–31)
CREAT SERPL-MCNC: 0.75 MG/DL — SIGNIFICANT CHANGE UP (ref 0.5–1.3)
GLUCOSE BLDC GLUCOMTR-MCNC: 117 MG/DL — HIGH (ref 70–99)
GLUCOSE BLDC GLUCOMTR-MCNC: 130 MG/DL — HIGH (ref 70–99)
GLUCOSE BLDC GLUCOMTR-MCNC: 137 MG/DL — HIGH (ref 70–99)
GLUCOSE SERPL-MCNC: 134 MG/DL — HIGH (ref 70–99)
HCT VFR BLD CALC: 32.3 % — LOW (ref 39–50)
HGB BLD-MCNC: 10 G/DL — LOW (ref 13–17)
MAGNESIUM SERPL-MCNC: 1.7 MG/DL — SIGNIFICANT CHANGE UP (ref 1.6–2.6)
MCHC RBC-ENTMCNC: 27.5 PG — SIGNIFICANT CHANGE UP (ref 27–34)
MCHC RBC-ENTMCNC: 31 % — LOW (ref 32–36)
MCV RBC AUTO: 89 FL — SIGNIFICANT CHANGE UP (ref 80–100)
NRBC # FLD: 0 — SIGNIFICANT CHANGE UP
PLATELET # BLD AUTO: 116 K/UL — LOW (ref 150–400)
PMV BLD: 10.4 FL — SIGNIFICANT CHANGE UP (ref 7–13)
POTASSIUM SERPL-MCNC: 3.7 MMOL/L — SIGNIFICANT CHANGE UP (ref 3.5–5.3)
POTASSIUM SERPL-SCNC: 3.7 MMOL/L — SIGNIFICANT CHANGE UP (ref 3.5–5.3)
RBC # BLD: 3.63 M/UL — LOW (ref 4.2–5.8)
RBC # FLD: 12.1 % — SIGNIFICANT CHANGE UP (ref 10.3–14.5)
SODIUM SERPL-SCNC: 138 MMOL/L — SIGNIFICANT CHANGE UP (ref 135–145)
WBC # BLD: 5.03 K/UL — SIGNIFICANT CHANGE UP (ref 3.8–10.5)
WBC # FLD AUTO: 5.03 K/UL — SIGNIFICANT CHANGE UP (ref 3.8–10.5)

## 2018-08-22 PROCEDURE — 88342 IMHCHEM/IMCYTCHM 1ST ANTB: CPT | Mod: 26

## 2018-08-22 PROCEDURE — 99233 SBSQ HOSP IP/OBS HIGH 50: CPT

## 2018-08-22 PROCEDURE — 45331 SIGMOIDOSCOPY AND BIOPSY: CPT | Mod: GC

## 2018-08-22 PROCEDURE — 88305 TISSUE EXAM BY PATHOLOGIST: CPT | Mod: 26

## 2018-08-22 RX ORDER — FLUDROCORTISONE ACETATE 0.1 MG/1
0.1 TABLET ORAL DAILY
Qty: 0 | Refills: 0 | Status: DISCONTINUED | OUTPATIENT
Start: 2018-08-22 | End: 2018-08-24

## 2018-08-22 RX ORDER — MESALAMINE 400 MG
4 TABLET, DELAYED RELEASE (ENTERIC COATED) ORAL AT BEDTIME
Qty: 0 | Refills: 0 | Status: DISCONTINUED | OUTPATIENT
Start: 2018-08-22 | End: 2018-08-23

## 2018-08-22 RX ADMIN — MEMANTINE HYDROCHLORIDE 10 MILLIGRAM(S): 10 TABLET ORAL at 04:51

## 2018-08-22 RX ADMIN — Medication 1600 MILLIGRAM(S): at 04:51

## 2018-08-22 RX ADMIN — TAMSULOSIN HYDROCHLORIDE 0.4 MILLIGRAM(S): 0.4 CAPSULE ORAL at 04:51

## 2018-08-22 RX ADMIN — ATORVASTATIN CALCIUM 40 MILLIGRAM(S): 80 TABLET, FILM COATED ORAL at 21:26

## 2018-08-22 RX ADMIN — Medication 1600 MILLIGRAM(S): at 21:26

## 2018-08-22 RX ADMIN — Medication 3 MILLIGRAM(S): at 22:08

## 2018-08-22 NOTE — PROGRESS NOTE ADULT - PROBLEM SELECTOR PLAN 4
continue mesalamine,   Cdiff PCR negative, stool cx and O&P neg  If infectious workup completely negative patient might need biologics therapy as per GI

## 2018-08-22 NOTE — PROGRESS NOTE ADULT - SUBJECTIVE AND OBJECTIVE BOX
Patient is a 68y old  Male who presents with a chief complaint of Syncope        SUBJECTIVE / OVERNIGHT EVENTS: awaiting flex sig today; wife at bedside states PO remains limited although pt states he's hungry bc he's NPO; denies any dysuria, inc freq      MEDICATIONS  (STANDING):  atorvastatin 40 milliGRAM(s) Oral at bedtime  cholecalciferol 400 Unit(s) Oral daily  dextrose 5%. 1000 milliLiter(s) (50 mL/Hr) IV Continuous <Continuous>  dextrose 50% Injectable 12.5 Gram(s) IV Push once  dextrose 50% Injectable 25 Gram(s) IV Push once  dextrose 50% Injectable 25 Gram(s) IV Push once  insulin lispro (HumaLOG) corrective regimen sliding scale   SubCutaneous three times a day before meals  memantine 10 milliGRAM(s) Oral two times a day  mesalamine DR Capsule 1600 milliGRAM(s) Oral three times a day  sertraline 100 milliGRAM(s) Oral daily  tamsulosin 0.4 milliGRAM(s) Oral two times a day    MEDICATIONS  (PRN):  dextrose 40% Gel 15 Gram(s) Oral once PRN Blood Glucose LESS THAN 70 milliGRAM(s)/deciliter  glucagon  Injectable 1 milliGRAM(s) IntraMuscular once PRN Glucose LESS THAN 70 milligrams/deciliter  melatonin 3 milliGRAM(s) Oral at bedtime PRN Insomnia  QUEtiapine 25 milliGRAM(s) Oral two times a day PRN agitation      Vital Signs Last 24 Hrs  T(F): 98.7 (22 Aug 2018 11:43), Max: 98.7 (21 Aug 2018 19:25)  HR: 68 (22 Aug 2018 11:43) (68 - 73)  BP: 108/65 (22 Aug 2018 11:43) (108/65 - 127/66)  RR: 16 (22 Aug 2018 11:43) (16 - 16)  SpO2: 100% (22 Aug 2018 11:43) (100% - 100%)      CAPILLARY BLOOD GLUCOSE  POCT Blood Glucose.: 130 mg/dL (22 Aug 2018 12:38)  POCT Blood Glucose.: 137 mg/dL (22 Aug 2018 08:15)  POCT Blood Glucose.: 123 mg/dL (21 Aug 2018 21:43)  POCT Blood Glucose.: 128 mg/dL (21 Aug 2018 17:15)            PHYSICAL EXAM  GENERAL: NAD, well-developed  HEAD:  Atraumatic, Normocephalic  EYES: EOMI, PERRLA, conjunctiva and sclera clear  CHEST/LUNG: non labored  EXTREMITIES:   No clubbing, cyanosis, or edema  PSYCH: isidoro, covern  NEURO: moves all ext    LABS:                        10.0   5.03  )-----------( 116      ( 22 Aug 2018 08:07 )             32.3     08-22    138  |  106  |  7   ----------------------------<  134<H>  3.7   |  21<L>  |  0.75    Ca    8.6      22 Aug 2018 08:07  Mg     1.7     08-22

## 2018-08-23 LAB
BUN SERPL-MCNC: 8 MG/DL — SIGNIFICANT CHANGE UP (ref 7–23)
CALCIUM SERPL-MCNC: 8.7 MG/DL — SIGNIFICANT CHANGE UP (ref 8.4–10.5)
CHLORIDE SERPL-SCNC: 104 MMOL/L — SIGNIFICANT CHANGE UP (ref 98–107)
CO2 SERPL-SCNC: 21 MMOL/L — LOW (ref 22–31)
CREAT SERPL-MCNC: 0.73 MG/DL — SIGNIFICANT CHANGE UP (ref 0.5–1.3)
GI PCR PANEL, STOOL: SIGNIFICANT CHANGE UP
GLUCOSE BLDC GLUCOMTR-MCNC: 103 MG/DL — HIGH (ref 70–99)
GLUCOSE BLDC GLUCOMTR-MCNC: 113 MG/DL — HIGH (ref 70–99)
GLUCOSE BLDC GLUCOMTR-MCNC: 117 MG/DL — HIGH (ref 70–99)
GLUCOSE BLDC GLUCOMTR-MCNC: 173 MG/DL — HIGH (ref 70–99)
GLUCOSE SERPL-MCNC: 111 MG/DL — HIGH (ref 70–99)
HCT VFR BLD CALC: 31.9 % — LOW (ref 39–50)
HGB BLD-MCNC: 10.2 G/DL — LOW (ref 13–17)
MAGNESIUM SERPL-MCNC: 1.8 MG/DL — SIGNIFICANT CHANGE UP (ref 1.6–2.6)
MCHC RBC-ENTMCNC: 27.8 PG — SIGNIFICANT CHANGE UP (ref 27–34)
MCHC RBC-ENTMCNC: 32 % — SIGNIFICANT CHANGE UP (ref 32–36)
MCV RBC AUTO: 86.9 FL — SIGNIFICANT CHANGE UP (ref 80–100)
NRBC # FLD: 0 — SIGNIFICANT CHANGE UP
PLATELET # BLD AUTO: 129 K/UL — LOW (ref 150–400)
PMV BLD: 10.7 FL — SIGNIFICANT CHANGE UP (ref 7–13)
POTASSIUM SERPL-MCNC: 3.9 MMOL/L — SIGNIFICANT CHANGE UP (ref 3.5–5.3)
POTASSIUM SERPL-SCNC: 3.9 MMOL/L — SIGNIFICANT CHANGE UP (ref 3.5–5.3)
RBC # BLD: 3.67 M/UL — LOW (ref 4.2–5.8)
RBC # FLD: 12.2 % — SIGNIFICANT CHANGE UP (ref 10.3–14.5)
SODIUM SERPL-SCNC: 138 MMOL/L — SIGNIFICANT CHANGE UP (ref 135–145)
SPECIMEN SOURCE: SIGNIFICANT CHANGE UP
WBC # BLD: 5.76 K/UL — SIGNIFICANT CHANGE UP (ref 3.8–10.5)
WBC # FLD AUTO: 5.76 K/UL — SIGNIFICANT CHANGE UP (ref 3.8–10.5)

## 2018-08-23 PROCEDURE — 99232 SBSQ HOSP IP/OBS MODERATE 35: CPT | Mod: GC

## 2018-08-23 PROCEDURE — 93306 TTE W/DOPPLER COMPLETE: CPT | Mod: 26

## 2018-08-23 PROCEDURE — 99233 SBSQ HOSP IP/OBS HIGH 50: CPT

## 2018-08-23 RX ORDER — MESALAMINE 400 MG
4 TABLET, DELAYED RELEASE (ENTERIC COATED) ORAL
Qty: 0 | Refills: 0 | Status: DISCONTINUED | OUTPATIENT
Start: 2018-08-23 | End: 2018-08-24

## 2018-08-23 RX ADMIN — Medication 1600 MILLIGRAM(S): at 13:08

## 2018-08-23 RX ADMIN — SERTRALINE 100 MILLIGRAM(S): 25 TABLET, FILM COATED ORAL at 10:50

## 2018-08-23 RX ADMIN — MEMANTINE HYDROCHLORIDE 10 MILLIGRAM(S): 10 TABLET ORAL at 17:57

## 2018-08-23 RX ADMIN — Medication 3 MILLIGRAM(S): at 22:45

## 2018-08-23 RX ADMIN — Medication 400 UNIT(S): at 10:50

## 2018-08-23 RX ADMIN — Medication 1600 MILLIGRAM(S): at 05:47

## 2018-08-23 RX ADMIN — Medication 4 GRAM(S): at 10:50

## 2018-08-23 RX ADMIN — Medication 1: at 13:08

## 2018-08-23 RX ADMIN — ATORVASTATIN CALCIUM 40 MILLIGRAM(S): 80 TABLET, FILM COATED ORAL at 21:17

## 2018-08-23 RX ADMIN — MEMANTINE HYDROCHLORIDE 10 MILLIGRAM(S): 10 TABLET ORAL at 05:47

## 2018-08-23 RX ADMIN — TAMSULOSIN HYDROCHLORIDE 0.4 MILLIGRAM(S): 0.4 CAPSULE ORAL at 17:57

## 2018-08-23 RX ADMIN — Medication 1600 MILLIGRAM(S): at 21:17

## 2018-08-23 RX ADMIN — FLUDROCORTISONE ACETATE 0.1 MILLIGRAM(S): 0.1 TABLET ORAL at 10:50

## 2018-08-23 RX ADMIN — Medication 4 GRAM(S): at 22:45

## 2018-08-23 RX ADMIN — TAMSULOSIN HYDROCHLORIDE 0.4 MILLIGRAM(S): 0.4 CAPSULE ORAL at 05:47

## 2018-08-23 NOTE — PROGRESS NOTE ADULT - SUBJECTIVE AND OBJECTIVE BOX
Chief Complaint:  Patient is a 68y old  Male who presents with a chief complaint of Syncope (17 Aug 2018 17:46)      Interval Events:   - patient had a flexible sigmoidoscopy done yesterday and tolerated the procedure.       HPI: (from admission)  68-year-old male, with long-standing history of inflammatory bowel disease (Ulcerative colitis), CVA without residual deficits, depression (on medications), HTN, HLD, and DM2 presented to the emergency room with chief complain of passing out at home.   As per the patient and his wife, he had gone to the bathroom at night and his wife heard a loud noise and went to see him and he had fallen down on the bathroom floor. Patient states that he has been having >10 bowel movements a day, watery/pinkish in color/consistency over the past few weeks and has thus been feeling weak. He has increased urgency to have a bowel movement, more after meals. He denies nausea, vomiting, abdominal pain, blood in stool, or black colored stool.   Of note, he has had a UTI and was on levofloxacin for it for the past week. In the ER: VS T 98.7, P 86, /57, R 16, O2 sat 98% RA.     IBD History: Patient used to follow with Dr Mendoza and was transfered to Dr Gatito Oro's care in 2016. He was diagnosed with UC around . Patient was previously on 6-mercaptopurine which was discontinued in the setting of infectious complications and hospitalization. More recently, he has been on mesalamine 4.8 g daily along with a steroid taper. He was currently being evaluated for Entyvio. His last colonoscopy was performed on May 21, 2018 confirming colitis from the anal rectum to 25 cm consistent with left-sided ulcerative colitis.    Allergies:  No Known Allergies      Hospital Medications:  atorvastatin 40 milliGRAM(s) Oral at bedtime  cholecalciferol 400 Unit(s) Oral daily  dextrose 40% Gel 15 Gram(s) Oral once PRN  dextrose 5%. 1000 milliLiter(s) IV Continuous <Continuous>  dextrose 50% Injectable 12.5 Gram(s) IV Push once  dextrose 50% Injectable 25 Gram(s) IV Push once  dextrose 50% Injectable 25 Gram(s) IV Push once  fludroCORTISONE 0.1 milliGRAM(s) Oral daily  glucagon  Injectable 1 milliGRAM(s) IntraMuscular once PRN  insulin lispro (HumaLOG) corrective regimen sliding scale   SubCutaneous three times a day before meals  melatonin 3 milliGRAM(s) Oral at bedtime PRN  memantine 10 milliGRAM(s) Oral two times a day  mesalamine DR Capsule 1600 milliGRAM(s) Oral three times a day  QUEtiapine 25 milliGRAM(s) Oral two times a day PRN  sertraline 100 milliGRAM(s) Oral daily  tamsulosin 0.4 milliGRAM(s) Oral two times a day      PMHX/PSHX:  Dementia  Depression  Alcohol abuse  Crohn disease  Diabetes type 2, controlled  HLD (hyperlipidemia)  H/O: CVA  Herniated disc  HTN (hypertension)  History of cataract surgery  No Past Surgical History      Family history:  Family history of cerebrovascular accident (CVA) in father (Father)  Family history of Alzheimer's disease  Family history of hypertension  Family history of MI (myocardial infarction)      ROS:     General:  No wt loss, fevers, chills, night sweats, fatigue,   Eyes:  Good vision, no reported pain  ENT:  No sore throat, pain, runny nose, dysphagia  CV:  No pain, palpitations, hypo/hypertension  Resp:  No dyspnea, cough, tachypnea, wheezing  GI:  See HPI  :  No pain, bleeding, incontinence, nocturia  Muscle:  No pain, weakness  Neuro:  No weakness, tingling, memory problems  Psych:  No fatigue, insomnia, mood problems, depression  Endocrine:  No polyuria, polydipsia, cold/heat intolerance  Heme:  No petechiae, ecchymosis, easy bruisability  Skin:  No rash, edema      PHYSICAL EXAM:     GENERAL:  Appears stated age, well-groomed, well-nourished, no distress  HEENT:  NC/AT,  conjunctivae clear, sclera -anicteric  CHEST:  Full & symmetric excursion, no increased effort, breath sounds clear  HEART:  Regular rhythm, S1, S2, no murmur/rub/S3/S4,  no edema  ABDOMEN:  Soft, non-tender, non-distended, normoactive bowel sounds,  no masses ,no hepato-splenomegaly,   EXTREMITIES:  no cyanosis,clubbing or edema  SKIN:  No rash/erythema/ecchymoses/petechiae/wounds/abscess/warm/dry  NEURO:  Alert, oriented    Vital Signs:  Vital Signs Last 24 Hrs  T(C): 37.1 (23 Aug 2018 05:43), Max: 37.1 (22 Aug 2018 11:41)  T(F): 98.7 (23 Aug 2018 05:43), Max: 98.7 (22 Aug 2018 11:41)  HR: 74 (23 Aug 2018 05:43) (68 - 81)  BP: 127/72 (23 Aug 2018 05:43) (108/65 - 127/72)  BP(mean): --  RR: 16 (23 Aug 2018 05:43) (16 - 16)  SpO2: 100% (23 Aug 2018 05:43) (100% - 100%)  Daily Height in cm: 177.8 (22 Aug 2018 11:43)    Daily Weight in k (23 Aug 2018 05:43)    LABS:                        10.2   5.76  )-----------( 129      ( 23 Aug 2018 07:29 )             31.9         138  |  106  |  7   ----------------------------<  134<H>  3.7   |  21<L>  |  0.75    Ca    8.6      22 Aug 2018 08:07  Mg     1.7             Imaging:      < from: Colonoscopy (18 @ 15:16) >  Impression:          - Non-thrombosed internal hemorrhoids found on perianal exam.                       - Inflammation was found secondary to left-sided                        ulcerative colitis. The findings are improved compared                        to previous examinations. Biopsied.                       - A few small, non-bleeding pseudopolyps (from                  inflammation) in the descending colon. Biopsied.                       - Diverticulosis in the left colon.                       - Stool in the colon.  Recommendation:      - [facility] patient to [facility] for [facility] care.             - Resume regular diet.                       - Await pathology results.                       - Start mesalamine enemas BID.                       - Patient will follow up with Dr Gatito Oro on                        discharge.    < end of copied text >      < from: CT Abdomen and Pelvis w/wo IV Cont (18 @ 13:12) >  FINDINGS:  LOWER CHEST: Aortic and coronary artery calcification.  LIVER: Within normal limits.  BILE DUCTS: Normal caliber.  GALLBLADDER: Cholelithiasis.  SPLEEN: Within normal limits.  PANCREAS: Within normal limits.  ADRENALS: Within normal limits.  KIDNEYS/URETERS: A 3 mm nonobstructing right renal calculus. No hydronephrosis. Small left renal cyst. Heterogeneous enhancement of the right kidney upper pole suspicious for pyelonephritis. No suspicious renal mass or evidence of urotheliallesion.  BLADDER: Within normal limits.  REPRODUCTIVE ORGANS: Heterogeneous focal enhancement right aspect of the   prostate. Consider correlation with prostate MRI.  BOWEL: No bowel obstruction. Thickening of the rectosigmoid colon consistent with colitis.     PERITONEUM: No ascites.  VESSELS:  Atherosclerotic changes.  RETROPERITONEUM: No lymphadenopathy.    ABDOMINAL WALL: Within normal limits.  BONES: Degenerative changes of the spine.  IMPRESSION:   Heterogeneous enhancement of theright kidney upper pole suspicious for pyelonephritis.  A 3 mm nonobstructing right renal calculus. No hydronephrosis.  Heterogeneous enhancement of the right aspect of the prostate for which correlation with PSA and potentially prostate MRI is recommended.  Rectosigmoid colitis in this patient with known history of Crohn's disease.  < end of copied text >      < from: Colonoscopy (18 @ 11:05) >  Impression:            - Localized moderate inflammation was found from rectum to sigmoid colon secondary to left-sided colitis. Biopsied.  - Pseudopolyps in the descending colon.  - Localized mild inflammation was found in the cecum. Findings consistent with active ulcerative colitis Preparation adequate for assessment of disease activity, not adequate for dysplasia/surveillance purposes  < end of copied text >    Surgical Pathology Report (18 @ 11:30)    Surgical Pathology Report:   ACCESSION No:  [id number]    VICKI REIS                   3    Surgical Final Report  Final Diagnosis  1. Cecum, endoscopic biopsy:  - Colonic mucosa with active chronic colitis, see comment  2. Ascending colon, endoscopic biopsy:  - Colonic mucosa with crypt architectural irregularity, see comment  3. Proximal transverse colon, endoscopic biopsy:  - Colonic mucosa with crypt architectural irregularity, see comment  4. Distal transverse colon, endoscopic biopsy:  - Colonic mucosa with crypt architectural irregularity, see comment  5. Descending colon, endoscopic biopsy:  - Colonic mucosa with crypt architectural irregularity, see comment  6. Sigmoid colon, endoscopic biopsy:  - Colonic mucosa with crypt architectural irregularity, see comment  7. Distal sigmoid colon, endoscopic biopsy:  - Colonic mucosa with active chronic colitis, see comment  - Negative for cytomegalovirus (CMV), immunohistochemical stain  8. Rectum, endoscopic biopsy:  - Rectal mucosa with active chronic proctitis, see comment   - Negative for cytomegalovirus (CMV), immunohistochemical stain      < from: JUDY w/o TTE (w/3D Echo) (12 @ 12:37) >  ------------------------------------------------------------------------  Conclusions:  1. Mitral annular calcification, otherwise normal mitral  valve. Mild mitral regurgitation.  2. Calcified trileafletaortic valve with normal opening.  No aortic valve regurgitation seen.  3. Normal aortic root. Moderate atheroma is seen in the  descending aorta. Severe non-mobile atheroma noted in  aortic arch measuring 0.8 cm at its longest dimension.  4. Normal left atrium.  No left atrial or left atrial  appendage (THERESA) thrombus visualized. Spectral doppler  revealed normal velocities in the THERESA (0.46 m/s).  5. Normal left ventricular systolic function. No segmental  wall motion abnormalities.  6. Normal right ventricular size and function.  7. Contrast injection demonstrates no evidence of a patent  foramen ovale.    < end of copied text >

## 2018-08-23 NOTE — PROGRESS NOTE ADULT - SUBJECTIVE AND OBJECTIVE BOX
Patient is a 68y old  Male who presents with a chief complaint of Syncope, diarrhea        SUBJECTIVE / OVERNIGHT EVENTS: reports he slept well; still with c/o nocturia (chronic)      MEDICATIONS  (STANDING):  atorvastatin 40 milliGRAM(s) Oral at bedtime  cholecalciferol 400 Unit(s) Oral daily  dextrose 5%. 1000 milliLiter(s) (50 mL/Hr) IV Continuous <Continuous>  dextrose 50% Injectable 12.5 Gram(s) IV Push once  dextrose 50% Injectable 25 Gram(s) IV Push once  dextrose 50% Injectable 25 Gram(s) IV Push once  fludroCORTISONE 0.1 milliGRAM(s) Oral daily  insulin lispro (HumaLOG) corrective regimen sliding scale   SubCutaneous three times a day before meals  memantine 10 milliGRAM(s) Oral two times a day  mesalamine DR Capsule 1600 milliGRAM(s) Oral three times a day  mesalamine Enema 4 Gram(s) Rectal <User Schedule>  sertraline 100 milliGRAM(s) Oral daily  tamsulosin 0.4 milliGRAM(s) Oral two times a day    MEDICATIONS  (PRN):  dextrose 40% Gel 15 Gram(s) Oral once PRN Blood Glucose LESS THAN 70 milliGRAM(s)/deciliter  glucagon  Injectable 1 milliGRAM(s) IntraMuscular once PRN Glucose LESS THAN 70 milligrams/deciliter  melatonin 3 milliGRAM(s) Oral at bedtime PRN Insomnia  QUEtiapine 25 milliGRAM(s) Oral two times a day PRN agitation      Vital Signs Last 24 Hrs  T(F): 98.7 (23 Aug 2018 05:43), Max: 98.7 (22 Aug 2018 11:43)  HR: 74 (23 Aug 2018 05:43) (68 - 81)  BP: 127/72 (23 Aug 2018 05:43) (108/65 - 127/72)  RR: 16 (23 Aug 2018 05:43) (16 - 16)  SpO2: 100% (23 Aug 2018 05:43) (100% - 100%)      CAPILLARY BLOOD GLUCOSE  POCT Blood Glucose.: 117 mg/dL (23 Aug 2018 08:46)  POCT Blood Glucose.: 117 mg/dL (22 Aug 2018 22:34)  POCT Blood Glucose.: 130 mg/dL (22 Aug 2018 12:38)          PHYSICAL EXAM  GENERAL: NAD, well-developed  HEAD:  Atraumatic, Normocephalic  EYES: EOMI, PERRLA, conjunctiva and sclera clear  CHEST/LUNG: Clear to auscultation bilaterally  HEART: Regular rate and rhythm;  ABDOMEN: Soft, Nontender, Nondistended; Bowel sounds present  EXTREMITIES:   No clubbing, cyanosis, or edema  PSYCH: isidoro, rodo      LABS:                        10.2   5.76  )-----------( 129      ( 23 Aug 2018 07:29 )             31.9     08-23    138  |  104  |  8   ----------------------------<  111<H>  3.9   |  21<L>  |  0.73    Ca    8.7      23 Aug 2018 07:29  Mg     1.8     08-23

## 2018-08-23 NOTE — PROGRESS NOTE ADULT - ASSESSMENT
68-year-old male, with long-standing history of inflammatory bowel disease (Ulcerative colitis), CVA without residual deficits, depression (on medications), HTN, HLD, and DM2 presented to the emergency room with chief complain of passing out at home. Patient complains of >10BMs a day and thus GI was consulted.     IMPRESSION  - Diarrhea, could be 2/2 infection vs UC flare vs recent antibiotic use, colonoscopy on 8/22/2018 with left sided UC, GI PCR and C diff negative     - Normocytic anemia, could be 2/2 blood loss with bowel movements (7/2018 with Hb 11.6)    - History of Ulcerative colitis: diagnosed in 2011, previously on 6-mercaptopurine but discontinued due to infectious complications; recently on mesalamine 4.8 g daily with steroid taper; being evaluated for Entyvio; last colonoscopy 5/2018 with colitis from the anal rectum to 25 cm consistent with left-sided ulcerative colitis and no dysplasia, follows with Dr Oro     RECOMMENDATIONS      - trend CBC, CMP, INR  - pending pathology from colonoscopy 8.22.2018  - continue mesalamine home dose  - please start mesalamine enemas BID  - patient will follow with Dr Gatito Oro on discharge as outpatient, for Entyvio 68-year-old male, with long-standing history of inflammatory bowel disease (Ulcerative colitis), CVA without residual deficits, depression (on medications), HTN, HLD, and DM2 presented to the emergency room with chief complain of passing out at home. Patient complains of >10BMs a day and thus GI was consulted.     IMPRESSION  - Diarrhea, could be 2/2 infection vs UC flare vs recent antibiotic use, colonoscopy on 8/22/2018 with left sided UC, GI PCR and C diff negative     - Normocytic anemia, could be 2/2 blood loss with bowel movements (7/2018 with Hb 11.6)    - History of Ulcerative colitis: diagnosed in 2011, previously on 6-mercaptopurine but discontinued due to infectious complications; recently on mesalamine 4.8 g daily with steroid taper; being evaluated for Entyvio; last colonoscopy 5/2018 with colitis from the anal rectum to 25 cm consistent with left-sided ulcerative colitis and no dysplasia, follows with Dr Oro     RECOMMENDATIONS      - trend CBC, CMP, INR  - pending pathology from colonoscopy 8.22.2018 to rule out CMV/HSV though does not appear that way on flex sig  - continue mesalamine home dose  - please start mesalamine 1 g enemas BID  - patient will follow with Dr Gatito Oro on discharge as outpatient, for Entyvio

## 2018-08-23 NOTE — PROGRESS NOTE ADULT - PROBLEM SELECTOR PLAN 1
- Unclear cause for patient's syncope though suspect it is due to multiple episodes of diarrhea and poor po intake  - Would monitor on telemetry for now, hsTrop was elevated to 17 but on repeat was same ruling out MI, EKG without acute changes   - GI consult appreciated  - TTE, check orthostatics
suspect syncope is due to multiple episodes of diarrhea and poor po intake  Would monitor on telemetry for now   ECHO P  pt remains orthostatic despite now being euvolemic, suspect element of autonomic dysfunction, trial of florinef and compression stockings
suspect syncope is due to multiple episodes of diarrhea and poor po intake  Would monitor on telemetry for now   ECHO P  pt remains orthostatic despite now being euvolemic, suspect element of autonomic dysfunction, trial of florinef and compression stockings; which may also be responsible for syncope
suspect syncope is due to multiple episodes of diarrhea and poor po intake  Would monitor on telemetry for now   ECHO P  orthostatics positive last PM ,add'l IVF given will repeat today
suspect syncope is due to multiple episodes of diarrhea and poor po intake  Would monitor on telemetry for now   - GI consult appreciated  ECHO P  orthostatics
- Unclear cause for patient's syncope though suspect it is due to multiple episodes of diarrhea and poor po intake  - Would monitor on telemetry for now, hsTrop was elevated to 17 but on repeat was same ruling out MI, EKG without acute changes   - GI consult appreciated  - TTE, check orthostatics

## 2018-08-24 ENCOUNTER — TRANSCRIPTION ENCOUNTER (OUTPATIENT)
Age: 68
End: 2018-08-24

## 2018-08-24 VITALS — WEIGHT: 179.9 LBS

## 2018-08-24 LAB
ALBUMIN SERPL ELPH-MCNC: 3.5 G/DL — SIGNIFICANT CHANGE UP (ref 3.3–5)
ALP SERPL-CCNC: 80 U/L — SIGNIFICANT CHANGE UP (ref 40–120)
ALT FLD-CCNC: 10 U/L — SIGNIFICANT CHANGE UP (ref 4–41)
AST SERPL-CCNC: 18 U/L — SIGNIFICANT CHANGE UP (ref 4–40)
BILIRUB SERPL-MCNC: 0.6 MG/DL — SIGNIFICANT CHANGE UP (ref 0.2–1.2)
BUN SERPL-MCNC: 10 MG/DL — SIGNIFICANT CHANGE UP (ref 7–23)
CALCIUM SERPL-MCNC: 9.2 MG/DL — SIGNIFICANT CHANGE UP (ref 8.4–10.5)
CHLORIDE SERPL-SCNC: 104 MMOL/L — SIGNIFICANT CHANGE UP (ref 98–107)
CO2 SERPL-SCNC: 21 MMOL/L — LOW (ref 22–31)
CREAT SERPL-MCNC: 0.8 MG/DL — SIGNIFICANT CHANGE UP (ref 0.5–1.3)
GLUCOSE BLDC GLUCOMTR-MCNC: 116 MG/DL — HIGH (ref 70–99)
GLUCOSE BLDC GLUCOMTR-MCNC: 137 MG/DL — HIGH (ref 70–99)
GLUCOSE BLDC GLUCOMTR-MCNC: 179 MG/DL — HIGH (ref 70–99)
GLUCOSE SERPL-MCNC: 120 MG/DL — HIGH (ref 70–99)
HCT VFR BLD CALC: 33.8 % — LOW (ref 39–50)
HGB BLD-MCNC: 10.9 G/DL — LOW (ref 13–17)
INR BLD: 1.06 — SIGNIFICANT CHANGE UP (ref 0.88–1.17)
MAGNESIUM SERPL-MCNC: 1.9 MG/DL — SIGNIFICANT CHANGE UP (ref 1.6–2.6)
MCHC RBC-ENTMCNC: 28.5 PG — SIGNIFICANT CHANGE UP (ref 27–34)
MCHC RBC-ENTMCNC: 32.2 % — SIGNIFICANT CHANGE UP (ref 32–36)
MCV RBC AUTO: 88.3 FL — SIGNIFICANT CHANGE UP (ref 80–100)
NRBC # FLD: 0 — SIGNIFICANT CHANGE UP
PLATELET # BLD AUTO: 135 K/UL — LOW (ref 150–400)
PMV BLD: 10.7 FL — SIGNIFICANT CHANGE UP (ref 7–13)
POTASSIUM SERPL-MCNC: 4.1 MMOL/L — SIGNIFICANT CHANGE UP (ref 3.5–5.3)
POTASSIUM SERPL-SCNC: 4.1 MMOL/L — SIGNIFICANT CHANGE UP (ref 3.5–5.3)
PROT SERPL-MCNC: 6.6 G/DL — SIGNIFICANT CHANGE UP (ref 6–8.3)
PROTHROM AB SERPL-ACNC: 12.2 SEC — SIGNIFICANT CHANGE UP (ref 9.8–13.1)
RBC # BLD: 3.83 M/UL — LOW (ref 4.2–5.8)
RBC # FLD: 12.3 % — SIGNIFICANT CHANGE UP (ref 10.3–14.5)
SODIUM SERPL-SCNC: 139 MMOL/L — SIGNIFICANT CHANGE UP (ref 135–145)
SURGICAL PATHOLOGY STUDY: SIGNIFICANT CHANGE UP
WBC # BLD: 5.6 K/UL — SIGNIFICANT CHANGE UP (ref 3.8–10.5)
WBC # FLD AUTO: 5.6 K/UL — SIGNIFICANT CHANGE UP (ref 3.8–10.5)

## 2018-08-24 PROCEDURE — 99232 SBSQ HOSP IP/OBS MODERATE 35: CPT | Mod: GC

## 2018-08-24 PROCEDURE — 99239 HOSP IP/OBS DSCHRG MGMT >30: CPT

## 2018-08-24 RX ORDER — FLUDROCORTISONE ACETATE 0.1 MG/1
1 TABLET ORAL
Qty: 0 | Refills: 0 | COMMUNITY
Start: 2018-08-24

## 2018-08-24 RX ORDER — MESALAMINE 400 MG
1 TABLET, DELAYED RELEASE (ENTERIC COATED) ORAL
Qty: 0 | Refills: 0 | Status: DISCONTINUED | OUTPATIENT
Start: 2018-08-24 | End: 2018-08-24

## 2018-08-24 RX ORDER — QUETIAPINE FUMARATE 200 MG/1
0.5 TABLET, FILM COATED ORAL
Qty: 0 | Refills: 0 | COMMUNITY

## 2018-08-24 RX ORDER — QUETIAPINE FUMARATE 200 MG/1
1 TABLET, FILM COATED ORAL
Qty: 0 | Refills: 0 | DISCHARGE
Start: 2018-08-24

## 2018-08-24 RX ORDER — QUETIAPINE FUMARATE 200 MG/1
2 TABLET, FILM COATED ORAL
Qty: 0 | Refills: 0 | DISCHARGE
Start: 2018-08-24

## 2018-08-24 RX ORDER — MESALAMINE 400 MG
1 TABLET, DELAYED RELEASE (ENTERIC COATED) ORAL
Qty: 28 | Refills: 0 | OUTPATIENT
Start: 2018-08-24 | End: 2018-09-06

## 2018-08-24 RX ORDER — FLUDROCORTISONE ACETATE 0.1 MG/1
1 TABLET ORAL
Qty: 30 | Refills: 0
Start: 2018-08-24 | End: 2018-09-22

## 2018-08-24 RX ORDER — MESALAMINE 400 MG
1 TABLET, DELAYED RELEASE (ENTERIC COATED) ORAL
Qty: 14 | Refills: 0
Start: 2018-08-24 | End: 2018-09-06

## 2018-08-24 RX ADMIN — Medication 400 UNIT(S): at 11:56

## 2018-08-24 RX ADMIN — TAMSULOSIN HYDROCHLORIDE 0.4 MILLIGRAM(S): 0.4 CAPSULE ORAL at 05:26

## 2018-08-24 RX ADMIN — Medication 1600 MILLIGRAM(S): at 05:26

## 2018-08-24 RX ADMIN — MEMANTINE HYDROCHLORIDE 10 MILLIGRAM(S): 10 TABLET ORAL at 18:00

## 2018-08-24 RX ADMIN — Medication 1600 MILLIGRAM(S): at 13:25

## 2018-08-24 RX ADMIN — FLUDROCORTISONE ACETATE 0.1 MILLIGRAM(S): 0.1 TABLET ORAL at 05:26

## 2018-08-24 RX ADMIN — MEMANTINE HYDROCHLORIDE 10 MILLIGRAM(S): 10 TABLET ORAL at 05:26

## 2018-08-24 RX ADMIN — TAMSULOSIN HYDROCHLORIDE 0.4 MILLIGRAM(S): 0.4 CAPSULE ORAL at 18:00

## 2018-08-24 RX ADMIN — SERTRALINE 100 MILLIGRAM(S): 25 TABLET, FILM COATED ORAL at 11:56

## 2018-08-24 NOTE — DISCHARGE NOTE ADULT - MEDICATION SUMMARY - MEDICATIONS TO TAKE
I will START or STAY ON the medications listed below when I get home from the hospital:    mesalamine 800 mg oral delayed release tablet  -- 2 tab(s) by mouth 3 times a day  -- Indication: For Ulcerative colitis    mesalamine 4 g/60 mL rectal enema  -- 1 gram(s) rectally 2 times a day   -- For rectal use only.  Shake well before use.    -- Indication: For Ulcerative colitis    fludrocortisone 0.1 mg oral tablet  -- 1 tab(s) by mouth once a day  -- Indication: For Orthostatic hypotension    tamsulosin 0.4 mg oral capsule  -- 1 cap(s) by mouth 2 times a day  -- Indication: For BPH    sertraline 100 mg oral tablet  -- 1 tab(s) by mouth once a day  -- Indication: For Depression    glipiZIDE 5 mg oral tablet  -- 1 tab(s) by mouth 2 times a day  -- Indication: For Diabetes type 2, controlled    atorvastatin 40 mg oral tablet  -- 1 tab(s) by mouth once a day (at bedtime)  -- Indication: For High cholesterol    QUEtiapine 25 mg oral tablet  -- 1 tab(s) by mouth 2 times a day, As needed, agitation  -- Indication: For As needed for agitation    Namenda XR 28 mg oral capsule, extended release  -- 1 cap(s) by mouth once a day  -- Indication: For Dementia    Centrum Silver oral tablet  -- 1 tab(s) by mouth once a day  -- Indication: For Multivitamin    Vitamin D3 400 intl units oral tablet  -- 1 tab(s) by mouth once a day  -- Indication: For Supplement I will START or STAY ON the medications listed below when I get home from the hospital:    mesalamine 800 mg oral delayed release tablet  -- 2 tab(s) by mouth 3 times a day  -- Indication: For Ulcerative colitis    mesalamine 1000 mg rectal suppository  -- 1 suppository(ies) rectally 2 times a day   -- For rectal use only.    -- Indication: For Ulcerative colitis    fludrocortisone 0.1 mg oral tablet  -- 1 tab(s) by mouth once a day  -- Indication: For Orthostatic hypotension    tamsulosin 0.4 mg oral capsule  -- 1 cap(s) by mouth 2 times a day  -- Indication: For BPH    sertraline 100 mg oral tablet  -- 1 tab(s) by mouth once a day  -- Indication: For Depression    glipiZIDE 5 mg oral tablet  -- 1 tab(s) by mouth 2 times a day  -- Indication: For Diabetes type 2, controlled    atorvastatin 40 mg oral tablet  -- 1 tab(s) by mouth once a day (at bedtime)  -- Indication: For High cholesterol    QUEtiapine 25 mg oral tablet  -- 1 tab(s) by mouth 2 times a day, As needed, agitation  -- Indication: For As needed for agitation    Namenda XR 28 mg oral capsule, extended release  -- 1 cap(s) by mouth once a day  -- Indication: For Dementia    Centrum Silver oral tablet  -- 1 tab(s) by mouth once a day  -- Indication: For Multivitamin    Vitamin D3 400 intl units oral tablet  -- 1 tab(s) by mouth once a day  -- Indication: For Supplement

## 2018-08-24 NOTE — DISCHARGE NOTE ADULT - HOSPITAL COURSE
HPI:  68M with history of Crohn's disease, CVA without residual deficits, HTN, HLD, and DM2 presents s/p syncopal episode last night. Wife heard a noise ~1am and found pt on the floor, pt's son had arrived before her and noticed pt was slightly confused, didn't know what happened. Pt recalls going to the bathroom to urinate and walking into the hallway before he suddenly "knocked out". Pt states he didn't know what happened, felt lost, wife reports pt fell backwards, hitting the back of his head on a closed door, which opened after hit. Per wife pt c/o feeling dizzy before falling but did not c/o CP, palp's or SOB. No  or GI incontinence was observed. Pt has had 1 episode of LOC ~6 months ago while on a plane, was hospitalized in [facility] where he was found to have an infection. Currently pt is on his last day of levofloxacin for a UTI.     Pt admits to decreased appetite for the last 3 months and reports a 21 lb weight loss over the last 2 months due to being scared of eating due to his diarrhea/ fecal incontinence at times which he attributes to Crohn's disease. Pt describes BM's as small and loose and notices the toilet paper is pinkish in color after wiping but no gross hematochezia or melena or BRBPR. Pt had his last colonoscopy this year and last EGD 1 year ago which was reportedly negative for ulcers.       On arrival to the ED, his vitals were T 98.7, P 86, /57, R 16, O2 sat 98% RA. His lab work here showed him to be anemic to 9.9 (baseline Hgb is ~11-12) with occult positive stools. He was given 1L NS. He was admitted to medicine on telemetry. (17 Aug 2018 17:46)    On admission:   EKG: NSR at 78 bpm  CE x2: Trop 17-->17  Occult: Positive  UA: Negative  C. diff: Negative  8/17 CXR: Clear lungs. No pleural effusions or pneumothorax. Indistinct heart borders due to epicardial fat limits accurate assessment of heart size. Trachea midline. Generalized osteopenia and mild spinal degenerative changes.  8/17 CT head and C-spine: No intracranial hemorrhage. No cervical spine fracture.  8/22 FlegSig/ colonoscopy: - Non-thrombosed internal hemorrhoids found on perianal   exam. - Inflammation was found secondary to left-sided  ulcerative colitis. The findings are improved compared   to previous examinations. Biopsied.  A few small, non-bleeding pseudopolyps (from inflammation) in the descending colon. Biopsied.  Diverticulosis in the left colon. Stool in the colon.  8/23 Echo: EF 67% 1. Mitral annular calcification, otherwise normal mitral  valve.  2. Calcified trileaflet aortic valve with decreased  opening. Peak transaortic valve gradient equals 19 mm Hg,  mean transaortic valve gradient equals 11 mm Hg, consistent  with mild aortic stenosis. Minimal aortic regurgitation.  3. Normal left ventricular internal dimensions and wall  thicknesses.  4. Endocardium not well visualized; grossly normal left  ventricular systolic function.  5. Normal right ventricular size and function.    Evaluated by GI: 68-year-old male, with long-standing history of inflammatory bowel disease (Ulcerative colitis), CVA without residual deficits, depression (on medications), HTN, HLD, and DM2 presented to the emergency room with chief complain of passing out at home. Patient complains of >10BMs a day and thus GI was consulted.   - Diarrhea, could be 2/2 infection vs UC flare vs recent antibiotic use, colonoscopy on 8/22/2018 with left sided UC, GI PCR and C diff negative   - Normocytic anemia, could be 2/2 blood loss with bowel movements (7/2018 with Hb 11.6)  - History of Ulcerative colitis: diagnosed in 2011, previously on 6-mercaptopurine but discontinued due to infectious complications; recently on mesalamine 4.8 g daily with steroid taper; being evaluated for Entyvio; last colonoscopy 5/2018 with colitis from the anal rectum to 25 cm consistent with left-sided ulcerative colitis and no dysplasia, follows with Dr Oro   - pending pathology from colonoscopy 8.22.2018 to rule out CMV/HSV though does not appear that way on flex sig  - continue mesalamine home dose  - continue mesalamine 1 g enemas BID  - patient will follow with Dr Gatito Oro on discharge as outpatient, for Entyvio     Evaluated by medicine: 68M with history of UC and multiple episodes of diarrhea lately presents to the hospital with syncopal episode  -Syncope and collapse: suspect syncope is due to multiple episodes of diarrhea and poor po intake. Would monitor on telemetry for now.  Echo as above.  pt remains orthostatic despite now being euvolemic, suspect element of autonomic dysfunction, trial of florinef and compression stockings; which may also be responsible for syncope.   -Abnormal urinalysis: recent rx for UTI. no further treatment at this time as no change in sx. pt with freq + ESBL E Coli cx as outpt, likely colonized.   -Occult blood in stools: flex sig as above revealed L sided colitis, improved from previous.   -Crohn disease: flex sig with L sided colitis. added mesalamine enema BID per GI recs.       Patient cleared for discharge home.  Patient to f/u with PCP, GI, outpt neuro, geripsych follow up for dementia.

## 2018-08-24 NOTE — DISCHARGE NOTE ADULT - CARE PROVIDER_API CALL
Gatito Oro), Gastroenterology; Internal Medicine  [address]  [address]  [place] [address]  Phone: [phone]  Fax: [phone]    Osei Mendoza), Gastroenterology; Internal Medicine  [address]  [place] [address]  Phone: [phone]  Fax: [phone] Gatito Oro), Gastroenterology; Internal Medicine  [address]  [address]  [place] [address]  Phone: [phone]  Fax: [phone]    Osei Mendoza), Gastroenterology; Internal Medicine  [address]  [address]  [place] [address]  Phone: [phone]  Fax: [phone]    Psychiatry Follow Up,   [facility] ([facility]) Geriatric outpatient clinic: [phone]  [facility] outpt. walk in clinic : [phone] (9AM-7PM)  [facility] Outpatient clinic: [phone]  Phone: (   )    -  Fax: (   )    -    Neurology Clinic at [facility],   Phone: [phone]  Fax: (   )    -

## 2018-08-24 NOTE — CHART NOTE - NSCHARTNOTEFT_GEN_A_CORE
pt seen and examined  reports some improvement of diarrhea  eating some  denies abd pain  ABD exam: soft +BS, NT, ND  stable for dc  f/u with Dr Oro  35 min spent with dc planning

## 2018-08-24 NOTE — DIETITIAN INITIAL EVALUATION ADULT. - OTHER INFO
Initial Dietitian Evaluation 2/2 to extended length of stay. 69y/o Female with medical history of CVA, HTN, HLD, T2DM, Crohn's disease and dementia presents to ED s/p syncope episode, decreased appetite. Patient reports fair appetite and po intake at this time. Patient denies any nausea/vomiting/diarrhea/constipation or difficulty chewing and swallowing.  NKFA. As per outpatient record, patient weighed 180lbs (7/18/18) and he currently weighs 169lbs (8/16/18). This is suggestive of 11lbs weight loss over 1 month. Encouraged patient to continue with nutrient and protein dense foods.

## 2018-08-24 NOTE — DIETITIAN INITIAL EVALUATION ADULT. - NS AS NUTRI INTERV MEALS SNACK
General/healthful diet/1. Suggest liberalize diet to Regular given HbA1c 4.6 (8/18/18), suboptimal po intake and recent unintentional weight loss./Fluid - modified diet General/healthful diet/Fluid - modified diet/1. Suggest liberalize diet to Regular given HbA1c 4.6 (8/18/18), suboptimal po intake and recent unintentional weight loss. 2. Recommend Glucerna Therapeutic Nutrition 240mls 2x daily (440kcals, 20g protein)for optimal nutrition.

## 2018-08-24 NOTE — PROGRESS NOTE ADULT - PROVIDER SPECIALTY LIST ADULT
Gastroenterology
Hospitalist

## 2018-08-24 NOTE — DISCHARGE NOTE ADULT - INSTRUCTIONS
Continue diet modification. Avoid complex carbohydrates such as bread, pasta, cereal, white rice, white potatoes, etc. Avoid concentrated sugar as found in desserts, candy, soda, juice, etc. Consume a diet based on lean protein (chicken, fish) and vegetables. Low salt diet. Low salt diet.  Glucerna Therapeutic Nutrition 240mls 2x daily (440kcals, 20g protein).

## 2018-08-24 NOTE — DISCHARGE NOTE ADULT - ADDITIONAL INSTRUCTIONS
Follow with gastroenterologist Dr. Gatito Oro within 1-2 weeks for further management & to follow up the report of your colonoscopy; call for appointment.   Follow up with neurology & geriatric psychiatry within 1-2 weeks; call for appointment.  Follow up with your PCP within 1-2 weeks; call for appointment. Follow with gastroenterologist Dr. Gatito Oro within 1-2 weeks for further management & to follow up the report of your colonoscopy; call for appointment.     Follow up with geriatric psychiatry within 1-2 weeks; call for appointment.  [facility] ([facility]) Geriatric outpatient clinic: [phone]  [facility] outpt. walk in clinic : [phone] (9AM-7PM)  [facility] Outpatient clinic: [phone]    Follow up with your PCP within 1-2 weeks; call for appointment.    Follow up with neurology within 1-2 weeks.  You may follow up at the neurology clinic at [facility] by calling [phone] to make an appointment.

## 2018-08-24 NOTE — DISCHARGE NOTE ADULT - PROVIDER TOKENS
TOKEN:'[id number]',TOKEN:'[id number]' TOKEN:'[id number]',TOKEN:'[id number]',FREE:[LAST:[Psychiatry Follow Up],PHONE:[(   )    -],FAX:[(   )    -],ADDRESS:[[facility] ([facility]) Geriatric outpatient clinic: [phone]  [facility] outpt. walk in clinic : [phone] (9AM-7PM)  [facility] Outpatient clinic: [phone]]],FREE:[LAST:[Neurology Clinic at [facility]],PHONE:[phone]],FAX:[(   )    -]]

## 2018-08-24 NOTE — DISCHARGE NOTE ADULT - CARE PROVIDERS DIRECT ADDRESSES
,[email or web address],DirectAddress_Unknown,DirectAddress_Unknown

## 2018-08-24 NOTE — PROGRESS NOTE ADULT - ASSESSMENT
68-year-old male, with long-standing history of inflammatory bowel disease (Ulcerative colitis), CVA without residual deficits, depression (on medications), HTN, HLD, and DM2 presented to the emergency room with chief complain of passing out at home. Patient complains of >10BMs a day and thus GI was consulted.     IMPRESSION  - Diarrhea, could be 2/2 infection vs UC flare vs recent antibiotic use, colonoscopy on 8/22/2018 with left sided UC, GI PCR and C diff negative     - Normocytic anemia, could be 2/2 blood loss with bowel movements (7/2018 with Hb 11.6)    - History of Ulcerative colitis: diagnosed in 2011, previously on 6-mercaptopurine but discontinued due to infectious complications; recently on mesalamine 4.8 g daily with steroid taper; being evaluated for Entyvio; last colonoscopy 5/2018 with colitis from the anal rectum to 25 cm consistent with left-sided ulcerative colitis and no dysplasia, follows with Dr Oro     RECOMMENDATIONS      - trend CBC, CMP, INR  - pending pathology from colonoscopy 8.22.2018 to rule out CMV/HSV though does not appear that way on flex sig  - continue mesalamine home dose  - please start mesalamine 1 g enemas BID  - patient will follow with Dr Gatito Oro on discharge as outpatient, for Entyvio 68-year-old male, with long-standing history of inflammatory bowel disease (Ulcerative colitis), CVA without residual deficits, depression (on medications), HTN, HLD, and DM2 presented to the emergency room with chief complain of passing out at home. Patient complains of >10BMs a day and thus GI was consulted.     IMPRESSION  - Diarrhea, could be 2/2 infection vs UC flare vs recent antibiotic use, colonoscopy on 8/22/2018 with left sided UC, GI PCR and C diff negative   - Normocytic anemia, could be 2/2 blood loss with bowel movements (7/2018 with Hb 11.6)  - History of Ulcerative colitis: diagnosed in 2011, previously on 6-mercaptopurine but discontinued due to infectious complications; recently on mesalamine 4.8 g daily with steroid taper; being evaluated for Entyvio; last colonoscopy 5/2018 with colitis from the anal rectum to 25 cm consistent with left-sided ulcerative colitis and no dysplasia, follows with Dr Oro     RECOMMENDATIONS    - trend CBC, CMP, INR  - pending pathology from colonoscopy 8.22.2018 to rule out CMV/HSV though does not appear that way on flex sig  - continue mesalamine home dose  - continue mesalamine 1 g enemas BID  - patient will follow with Dr Gatito Oro on discharge as outpatient, for Entyvio     Please call us back with questions.

## 2018-08-24 NOTE — DIETITIAN INITIAL EVALUATION ADULT. - PROBLEM SELECTOR PLAN 1
- Unclear cause for patient's syncope though suspect it is due to his poor PO intake over the past few months  - Would monitor on telemetry for now, hsTrop was elevated to 17 but on repeat was same ruling out MI, EKG without acute changes   - TTE, check orthostatics

## 2018-08-24 NOTE — DISCHARGE NOTE ADULT - SECONDARY DIAGNOSIS.
Diabetes type 2, controlled HTN (hypertension) Depression Dementia HLD (hyperlipidemia) Orthostatic hypotension

## 2018-08-24 NOTE — PROGRESS NOTE ADULT - SUBJECTIVE AND OBJECTIVE BOX
Chief Complaint:  Patient is a 68y old  Male who presents with a chief complaint of Syncope (17 Aug 2018 17:46)      Interval Events:   -   -    HPI: (from admission)  68-year-old male, with long-standing history of inflammatory bowel disease (Ulcerative colitis), CVA without residual deficits, depression (on medications), HTN, HLD, and DM2 presented to the emergency room with chief complain of passing out at home.   As per the patient and his wife, he had gone to the bathroom at night and his wife heard a loud noise and went to see him and he had fallen down on the bathroom floor. Patient states that he has been having >10 bowel movements a day, watery/pinkish in color/consistency over the past few weeks and has thus been feeling weak. He has increased urgency to have a bowel movement, more after meals. He denies nausea, vomiting, abdominal pain, blood in stool, or black colored stool.   Of note, he has had a UTI and was on levofloxacin for it for the past week. In the ER: VS T 98.7, P 86, /57, R 16, O2 sat 98% RA.     IBD History: Patient used to follow with Dr Mendoza and was transfered to Dr Gatito Oro's care in 2016. He was diagnosed with UC around . Patient was previously on 6-mercaptopurine which was discontinued in the setting of infectious complications and hospitalization. More recently, he has been on mesalamine 4.8 g daily along with a steroid taper. He was currently being evaluated for Entyvio. His last colonoscopy was performed on May 21, 2018 confirming colitis from the anal rectum to 25 cm consistent with left-sided ulcerative colitis.      Allergies:  No Known Allergies      Hospital Medications:  atorvastatin 40 milliGRAM(s) Oral at bedtime  cholecalciferol 400 Unit(s) Oral daily  dextrose 40% Gel 15 Gram(s) Oral once PRN  dextrose 5%. 1000 milliLiter(s) IV Continuous <Continuous>  dextrose 50% Injectable 12.5 Gram(s) IV Push once  dextrose 50% Injectable 25 Gram(s) IV Push once  dextrose 50% Injectable 25 Gram(s) IV Push once  fludroCORTISONE 0.1 milliGRAM(s) Oral daily  glucagon  Injectable 1 milliGRAM(s) IntraMuscular once PRN  insulin lispro (HumaLOG) corrective regimen sliding scale   SubCutaneous three times a day before meals  melatonin 3 milliGRAM(s) Oral at bedtime PRN  memantine 10 milliGRAM(s) Oral two times a day  mesalamine DR Capsule 1600 milliGRAM(s) Oral three times a day  mesalamine Enema 4 Gram(s) Rectal <User Schedule>  QUEtiapine 25 milliGRAM(s) Oral two times a day PRN  sertraline 100 milliGRAM(s) Oral daily  tamsulosin 0.4 milliGRAM(s) Oral two times a day      PMHX/PSHX:  Dementia  Depression  Alcohol abuse  Crohn disease  Diabetes type 2, controlled  HLD (hyperlipidemia)  H/O: CVA  Herniated disc  HTN (hypertension)  History of cataract surgery  No Past Surgical History      Family history:  Family history of cerebrovascular accident (CVA) in father (Father)  Family history of Alzheimer's disease  Family history of hypertension  Family history of MI (myocardial infarction)      ROS:     General:  No wt loss, fevers, chills, night sweats, fatigue,   Eyes:  Good vision, no reported pain  ENT:  No sore throat, pain, runny nose, dysphagia  CV:  No pain, palpitations, hypo/hypertension  Resp:  No dyspnea, cough, tachypnea, wheezing  GI:  See HPI  :  No pain, bleeding, incontinence, nocturia  Muscle:  No pain, weakness  Neuro:  No weakness, tingling, memory problems  Psych:  No fatigue, insomnia, mood problems, depression  Endocrine:  No polyuria, polydipsia, cold/heat intolerance  Heme:  No petechiae, ecchymosis, easy bruisability  Skin:  No rash, edema      PHYSICAL EXAM:     GENERAL:  Appears stated age, well-groomed, well-nourished, no distress  HEENT:  NC/AT,  conjunctivae clear, sclera -anicteric  CHEST:  Full & symmetric excursion, no increased effort, breath sounds clear  HEART:  Regular rhythm, S1, S2, no murmur/rub/S3/S4,  no edema  ABDOMEN:  Soft, non-tender, non-distended, normoactive bowel sounds,  no masses ,no hepato-splenomegaly,   EXTREMITIES:  no cyanosis,clubbing or edema  SKIN:  No rash/erythema/ecchymoses/petechiae/wounds/abscess/warm/dry  NEURO:  Alert, oriented    Vital Signs:  Vital Signs Last 24 Hrs  T(C): 36.4 (24 Aug 2018 05:24), Max: 37 (23 Aug 2018 11:30)  T(F): 97.6 (24 Aug 2018 05:24), Max: 98.6 (23 Aug 2018 11:30)  HR: 78 (24 Aug 2018 05:24) (78 - 86)  BP: 120/68 (24 Aug 2018 05:24) (120/68 - 129/71)  BP(mean): --  RR: 17 (24 Aug 2018 05:24) (17 - 17)  SpO2: 100% (24 Aug 2018 05:24) (100% - 100%)  Daily     Daily Weight in k.3 (24 Aug 2018 07:05)    LABS:                        10.2   5.76  )-----------( 129      ( 23 Aug 2018 07:29 )             31.9         138  |  104  |  8   ----------------------------<  111<H>  3.9   |  21<L>  |  0.73    Ca    8.7      23 Aug 2018 07:29  Mg     1.8           Imaging:      < from: Colonoscopy (18 @ 15:16) >  Impression:          - Non-thrombosed internal hemorrhoids found on perianal exam.                       - Inflammation was found secondary to left-sided                        ulcerative colitis. The findings are improved compared                        to previous examinations. Biopsied.                       - A few small, non-bleeding pseudopolyps (from                  inflammation) in the descending colon. Biopsied.                       - Diverticulosis in the left colon.                       - Stool in the colon.  Recommendation:      - Return patient to hospital [facility] for ongoing care.             - Resume regular diet.                       - Await pathology results.                       - Start mesalamine enemas BID.                       - Patient will follow up with Dr Gatito Oro on                        discharge.    < end of copied text >      < from: CT Abdomen and Pelvis w/wo IV Cont (18 @ 13:12) >  FINDINGS:  LOWER CHEST: Aortic and coronary artery calcification.  LIVER: Within normal limits.  BILE DUCTS: Normal caliber.  GALLBLADDER: Cholelithiasis.  SPLEEN: Within normal limits.  PANCREAS: Within normal limits.  ADRENALS: Within normal limits.  KIDNEYS/URETERS: A 3 mm nonobstructing right renal calculus. No hydronephrosis. Small left renal cyst. Heterogeneous enhancement of the right kidney upper pole suspicious for pyelonephritis. No suspicious renal mass or evidence of urotheliallesion.  BLADDER: Within normal limits.  REPRODUCTIVE ORGANS: Heterogeneous focal enhancement right aspect of the   prostate. Consider correlation with prostate MRI.  BOWEL: No bowel obstruction. Thickening of the rectosigmoid colon consistent with colitis.     PERITONEUM: No ascites.  VESSELS:  Atherosclerotic changes.  RETROPERITONEUM: No lymphadenopathy.    ABDOMINAL WALL: Within normal limits.  BONES: Degenerative changes of the spine.  IMPRESSION:   Heterogeneous enhancement of theright kidney upper pole suspicious for pyelonephritis.  A 3 mm nonobstructing right renal calculus. No hydronephrosis.  Heterogeneous enhancement of the right aspect of the prostate for which correlation with PSA and potentially prostate MRI is recommended.  Rectosigmoid colitis in this patient with known history of Crohn's disease.  < end of copied text >      < from: Colonoscopy (18 @ 11:05) >  Impression:            - Localized moderate inflammation was found from rectum to sigmoid colon secondary to left-sided colitis. Biopsied.  - Pseudopolyps in the descending colon.  - Localized mild inflammation was found in the cecum. Findings consistent with active ulcerative colitis Preparation adequate for assessment of disease activity, not adequate for dysplasia/surveillance purposes  < end of copied text >    Surgical Pathology Report (18 @ 11:30)    Surgical Pathology Report:   ACCESSION No:  [id number]    VICKI REIS                   3    Surgical Final Report  Final Diagnosis  1. Cecum, endoscopic biopsy:  - Colonic mucosa with active chronic colitis, see comment  2. Ascending colon, endoscopic biopsy:  - Colonic mucosa with crypt architectural irregularity, see comment  3. Proximal transverse colon, endoscopic biopsy:  - Colonic mucosa with crypt architectural irregularity, see comment  4. Distal transverse colon, endoscopic biopsy:  - Colonic mucosa with crypt architectural irregularity, see comment  5. Descending colon, endoscopic biopsy:  - Colonic mucosa with crypt architectural irregularity, see comment  6. Sigmoid colon, endoscopic biopsy:  - Colonic mucosa with crypt architectural irregularity, see comment  7. Distal sigmoid colon, endoscopic biopsy:  - Colonic mucosa with active chronic colitis, see comment  - Negative for cytomegalovirus (CMV), immunohistochemical stain  8. Rectum, endoscopic biopsy:  - Rectal mucosa with active chronic proctitis, see comment   - Negative for cytomegalovirus (CMV), immunohistochemical stain      < from: JUDY w/o TTE (w/3D Echo) (12 @ 12:37) >  ------------------------------------------------------------------------  Conclusions:  1. Mitral annular calcification, otherwise normal mitral  valve. Mild mitral regurgitation.  2. Calcified trileafletaortic valve with normal opening.  No aortic valve regurgitation seen.  3. Normal aortic root. Moderate atheroma is seen in the  descending aorta. Severe non-mobile atheroma noted in  aortic arch measuring 0.8 cm at its longest dimension.  4. Normal left atrium.  No left atrial or left atrial  appendage (THERESA) thrombus visualized. Spectral doppler  revealed normal velocities in the THERESA (0.46 m/s).  5. Normal left ventricular systolic function. No segmental  wall motion abnormalities.  6. Normal right ventricular size and function.  7. Contrast injection demonstrates no evidence of a patent  foramen ovale.    < end of copied text > Chief Complaint:  Patient is a 68y old  Male who presents with a chief complaint of Syncope and ulcerative colitis.       Interval Events:   - patient seen and examined.  - he feels well and denies nausea, vomiting, abdominal pain.   - patient states his diarrhea is better now.     HPI: (from admission)  68-year-old male, with long-standing history of inflammatory bowel disease (Ulcerative colitis), CVA without residual deficits, depression (on medications), HTN, HLD, and DM2 presented to the emergency room with chief complain of passing out at home.   As per the patient and his wife, he had gone to the bathroom at night and his wife heard a loud noise and went to see him and he had fallen down on the bathroom floor. Patient states that he has been having >10 bowel movements a day, watery/pinkish in color/consistency over the past few weeks and has thus been feeling weak. He has increased urgency to have a bowel movement, more after meals. He denies nausea, vomiting, abdominal pain, blood in stool, or black colored stool.   Of note, he has had a UTI and was on levofloxacin for it for the past week. In the ER: VS T 98.7, P 86, /57, R 16, O2 sat 98% RA.     IBD History: Patient used to follow with Dr Mendoza and was transfered to Dr Gatito Oro's care in 2016. He was diagnosed with UC around . Patient was previously on 6-mercaptopurine which was discontinued in the setting of infectious complications and hospitalization. More recently, he has been on mesalamine 4.8 g daily along with a steroid taper. He was currently being evaluated for Entyvio. His last colonoscopy was performed on May 21, 2018 confirming colitis from the anal rectum to 25 cm consistent with left-sided ulcerative colitis.      Allergies:  No Known Allergies      Hospital Medications:  atorvastatin 40 milliGRAM(s) Oral at bedtime  cholecalciferol 400 Unit(s) Oral daily  dextrose 40% Gel 15 Gram(s) Oral once PRN  dextrose 5%. 1000 milliLiter(s) IV Continuous <Continuous>  dextrose 50% Injectable 12.5 Gram(s) IV Push once  dextrose 50% Injectable 25 Gram(s) IV Push once  dextrose 50% Injectable 25 Gram(s) IV Push once  fludroCORTISONE 0.1 milliGRAM(s) Oral daily  glucagon  Injectable 1 milliGRAM(s) IntraMuscular once PRN  insulin lispro (HumaLOG) corrective regimen sliding scale   SubCutaneous three times a day before meals  melatonin 3 milliGRAM(s) Oral at bedtime PRN  memantine 10 milliGRAM(s) Oral two times a day  mesalamine DR Capsule 1600 milliGRAM(s) Oral three times a day  mesalamine Enema 4 Gram(s) Rectal <User Schedule>  QUEtiapine 25 milliGRAM(s) Oral two times a day PRN  sertraline 100 milliGRAM(s) Oral daily  tamsulosin 0.4 milliGRAM(s) Oral two times a day      PMHX/PSHX:  Dementia  Depression  Alcohol abuse  Crohn disease  Diabetes type 2, controlled  HLD (hyperlipidemia)  H/O: CVA  Herniated disc  HTN (hypertension)  History of cataract surgery  No Past Surgical History      Family history:  Family history of cerebrovascular accident (CVA) in father (Father)  Family history of Alzheimer's disease  Family history of hypertension  Family history of MI (myocardial infarction)      ROS:     General:  No wt loss, fevers, chills, night sweats, fatigue,   Eyes:  Good vision, no reported pain  ENT:  No sore throat, pain, runny nose, dysphagia  CV:  No pain, palpitations, hypo/hypertension  Resp:  No dyspnea, cough, tachypnea, wheezing  GI:  See HPI  :  No pain, bleeding, incontinence, nocturia  Muscle:  No pain, weakness  Neuro:  No weakness, tingling, memory problems  Psych:  No fatigue, insomnia, mood problems, depression  Endocrine:  No polyuria, polydipsia, cold/heat intolerance  Heme:  No petechiae, ecchymosis, easy bruisability  Skin:  No rash, edema      PHYSICAL EXAM:     GENERAL:  Appears stated age, well-groomed, well-nourished, no distress  HEENT:  NC/AT,  conjunctivae clear, sclera -anicteric  CHEST:  Full & symmetric excursion, no increased effort, breath sounds clear  HEART:  Regular rhythm, S1, S2, no murmur/rub/S3/S4,  no edema  ABDOMEN:  Soft, non-tender, non-distended, normoactive bowel sounds,  no masses ,no hepato-splenomegaly,   EXTREMITIES:  no cyanosis,clubbing or edema  SKIN:  No rash/erythema/ecchymoses/petechiae/wounds/abscess/warm/dry  NEURO:  Alert, oriented    Vital Signs:  Vital Signs Last 24 Hrs  T(C): 36.4 (24 Aug 2018 05:24), Max: 37 (23 Aug 2018 11:30)  T(F): 97.6 (24 Aug 2018 05:24), Max: 98.6 (23 Aug 2018 11:30)  HR: 78 (24 Aug 2018 05:24) (78 - 86)  BP: 120/68 (24 Aug 2018 05:24) (120/68 - 129/71)  BP(mean): --  RR: 17 (24 Aug 2018 05:24) (17 - 17)  SpO2: 100% (24 Aug 2018 05:24) (100% - 100%)  Daily     Daily Weight in k.3 (24 Aug 2018 07:05)    LABS:                        10.2   5.76  )-----------( 129      ( 23 Aug 2018 07:29 )             31.9     08-    138  |  104  |  8   ----------------------------<  111<H>  3.9   |  21<L>  |  0.73    Ca    8.7      23 Aug 2018 07:29  Mg     1.8     08-23      Imaging:      < from: Colonoscopy (18 @ 15:16) >  Impression:          - Non-thrombosed internal hemorrhoids found on perianal exam.                       - Inflammation was found secondary to left-sided                        ulcerative colitis. The findings are improved compared                        to previous examinations. Biopsied.                       - A few small, non-bleeding pseudopolyps (from                  inflammation) in the descending colon. Biopsied.                       - Diverticulosis in the left colon.                       - Stool in the colon.  Recommendation:      - Return patient to [facility] for ongoing care.             - Resume regular diet.                       - Await pathology results.                       - Start mesalamine enemas BID.                       - Patient will follow up with Dr Gatito Oro on                        discharge.    < end of copied text >      < from: CT Abdomen and Pelvis w/wo IV Cont (18 @ 13:12) >  FINDINGS:  LOWER CHEST: Aortic and coronary artery calcification.  LIVER: Within normal limits.  BILE DUCTS: Normal caliber.  GALLBLADDER: Cholelithiasis.  SPLEEN: Within normal limits.  PANCREAS: Within normal limits.  ADRENALS: Within normal limits.  KIDNEYS/URETERS: A 3 mm nonobstructing right renal calculus. No hydronephrosis. Small left renal cyst. Heterogeneous enhancement of the right kidney upper pole suspicious for pyelonephritis. No suspicious renal mass or evidence of urotheliallesion.  BLADDER: Within normal limits.  REPRODUCTIVE ORGANS: Heterogeneous focal enhancement right aspect of the   prostate. Consider correlation with prostate MRI.  BOWEL: No bowel obstruction. Thickening of the rectosigmoid colon consistent with colitis.     PERITONEUM: No ascites.  VESSELS:  Atherosclerotic changes.  RETROPERITONEUM: No lymphadenopathy.    ABDOMINAL WALL: Within normal limits.  BONES: Degenerative changes of the spine.  IMPRESSION:   Heterogeneous enhancement of theright kidney upper pole suspicious for pyelonephritis.  A 3 mm nonobstructing right renal calculus. No hydronephrosis.  Heterogeneous enhancement of the right aspect of the prostate for which correlation with PSA and potentially prostate MRI is recommended.  Rectosigmoid colitis in this patient with known history of Crohn's disease.  < end of copied text >      < from: Colonoscopy (18 @ 11:05) >  Impression:            - Localized moderate inflammation was found from rectum to sigmoid colon secondary to left-sided colitis. Biopsied.  - Pseudopolyps in the descending colon.  - Localized mild inflammation was found in the cecum. Findings consistent with active ulcerative colitis Preparation adequate for assessment of disease activity, not adequate for dysplasia/surveillance purposes  < end of copied text >    Surgical Pathology Report (18 @ 11:30)    Surgical Pathology Report:   ACCESSION No:  [id number]    VICKI REIS                   3    Surgical Final Report  Final Diagnosis  1. Cecum, endoscopic biopsy:  - Colonic mucosa with active chronic colitis, see comment  2. Ascending colon, endoscopic biopsy:  - Colonic mucosa with crypt architectural irregularity, see comment  3. Proximal transverse colon, endoscopic biopsy:  - Colonic mucosa with crypt architectural irregularity, see comment  4. Distal transverse colon, endoscopic biopsy:  - Colonic mucosa with crypt architectural irregularity, see comment  5. Descending colon, endoscopic biopsy:  - Colonic mucosa with crypt architectural irregularity, see comment  6. Sigmoid colon, endoscopic biopsy:  - Colonic mucosa with crypt architectural irregularity, see comment  7. Distal sigmoid colon, endoscopic biopsy:  - Colonic mucosa with active chronic colitis, see comment  - Negative for cytomegalovirus (CMV), immunohistochemical stain  8. Rectum, endoscopic biopsy:  - Rectal mucosa with active chronic proctitis, see comment   - Negative for cytomegalovirus (CMV), immunohistochemical stain      < from: JUDY w/o TTE (w/3D Echo) (12.31.12 @ 12:37) >  ------------------------------------------------------------------------  Conclusions:  1. Mitral annular calcification, otherwise normal mitral valve. Mild mitral regurgitation.  2. Calcified trileafletaortic valve with normal opening.  No aortic valve regurgitation seen.  3. Normal aortic root. Moderate atheroma is seen in the descending aorta. Severe non-mobile atheroma noted in  aortic arch measuring 0.8 cm at its longest dimension.  4. Normal left atrium.  No left atrial or left atrial appendage (THERESA) thrombus visualized. Spectral doppler  revealed normal velocities in the THERESA (0.46 m/s).  5. Normal left ventricular systolic function. No segmental wall motion abnormalities.  6. Normal right ventricular size and function.  7. Contrast injection demonstrates no evidence of a patent foramen ovale.    < end of copied text >

## 2018-08-24 NOTE — DISCHARGE NOTE ADULT - PLAN OF CARE
To maintain a normal blood glucose level and HgA1C level < 5.7 and to prevent diabetic complications such as uncontrolled diabetes, diabetic coma, blindness, renal failure, and amputations. Continue diet modification. Avoid complex carbohydrates such as bread, pasta, cereal, white rice, white potatoes, etc. Avoid concentrated sugar as found in desserts, candy, soda, juice, etc. Consume a diet based on lean protein (chicken, fish) and vegetables.   Follow up with your PCP for a repeat HgA1C blood test in 3 months; call for appointment.  You should have yearly routine eye & foot examinations. To maintain a normal blood pressure to prevent heart attack, stroke and renal failure. Low salt diet.  Follow up with your PCP within 1-2 weeks; call for appointment. Outpatient geriatric psychiatry follow up. Continue sertraline as prescribed.  Follow up with geriatric psychiatry within 1-2 weeks; call for appointment. Outpatient neurology follow up. Continue memantine as prescribed.  Follow up with neurology within 1-2 weeks; call for appointment. To maintain normal cholesterol levels to prevent stroke, coronary artery disease, peripheral vascular disease and heart attacks. Low fat diet and continue current medications. Follow up with primary care physician and cardiologist for management. Close outpatient gastroenterology follow up for further management. Continue mesalamine by mouth & enemas as prescribed.   Follow up with gastroenterologist Dr. Gatito Oro within 1-2 weeks to go over your biopsy results from your colonoscopy & for further management; call for appointment.  Your gastroenterologist may start Entyvio as outpatient. Resolution of orthostasis.  To prevent dizziness & falls. Continue flornief as prescribed.  Stay well hydrated.  When getting up from a sitting position to a standing position, please do so slowly to prevent dizziness.  If you feel dizzy when ambulating, please return to a seated position to avoid falls.  Practice fall precautions in your home by removing obstacles on the floor.  Follow up with your PCP within 1-2 weeks; call for appointment. Continue flornief as prescribed.  Continue wearing your compression stockings on your lower extremities.   Stay well hydrated.  When getting up from a sitting position to a standing position, please do so slowly to prevent dizziness.  If you feel dizzy when ambulating, please return to a seated position to avoid falls.  Practice fall precautions in your home by removing obstacles on the floor.  Follow up with your PCP within 1-2 weeks; call for appointment. Continue sertraline as prescribed.  Follow up with geriatric psychiatry within 1-2 weeks; call for appointment.  [facility] ([facility] Geriatric outpatient clinic: [phone]  [facility] outpt. walk in clinic : [phone] (9AM-7PM)  [facility] Outpatient clinic: [phone] Continue memantine as prescribed.  Follow up with neurology within 1-2 weeks.  You may follow up at the neurology clinic at [facility] by calling [phone] to make an appointment. Continue mesalamine by mouth & suppositories as prescribed.   Follow up with gastroenterologist Dr. Gatito Oro within 1-2 weeks to go over your biopsy results from your colonoscopy & for further management; call for appointment.  Your gastroenterologist may start Entyvio as outpatient.

## 2018-08-24 NOTE — DISCHARGE NOTE ADULT - MEDICATION SUMMARY - MEDICATIONS TO STOP TAKING
I will STOP taking the medications listed below when I get home from the hospital:    levoFLOXacin 500 mg oral tablet  -- 1 tab(s) by mouth every 24 hours x 14 days, last dose tomorrow 8/18

## 2018-08-24 NOTE — DISCHARGE NOTE ADULT - MEDICATION SUMMARY - MEDICATIONS TO CHANGE
I will SWITCH the dose or number of times a day I take the medications listed below when I get home from the hospital:    QUEtiapine 25 mg oral tablet  -- 0.5 tab(s) by mouth 2 times a day, As Needed - for agitation

## 2018-08-24 NOTE — DISCHARGE NOTE ADULT - PATIENT PORTAL LINK FT
You can access the [facility] Patient Portal, offered by [facility], by registering with the following website: [email or web address]

## 2018-08-24 NOTE — DIETITIAN INITIAL EVALUATION ADULT. - ENERGY NEEDS
current weight 169lbs HT 5'10" BMI 24.2kg/m2  IBW: 166lbs (+/-10%) %IBW: 102%  No edema noted. Skin intact.

## 2018-08-24 NOTE — PROGRESS NOTE ADULT - ATTENDING COMMENTS
as per wife, pt's memory issues/cognitive impairment and unsteady gait are not new; Pt has private neurologist and shade [facility] with whom he follows every 3-4 months.  He has an appt at the end of this month.  No acute need for inpt neuro eval
I have seen and examined this patient with the GI fellow. Agree with above.
outpt neuro, geripsych follow up foe dementia

## 2018-08-24 NOTE — DISCHARGE NOTE ADULT - CARE PLAN
Principal Discharge DX:	Ulcerative colitis  Goal:	Close outpatient gastroenterology follow up for further management.  Assessment and plan of treatment:	Continue mesalamine by mouth & enemas as prescribed.   Follow up with gastroenterologist Dr. Gatito Oro within 1-2 weeks to go over your biopsy results from your colonoscopy & for further management; call for appointment.  Your gastroenterologist may start Entyvio as outpatient.  Secondary Diagnosis:	Orthostatic hypotension  Goal:	Resolution of orthostasis.  To prevent dizziness & falls.  Assessment and plan of treatment:	Continue flornief as prescribed.  Stay well hydrated.  When getting up from a sitting position to a standing position, please do so slowly to prevent dizziness.  If you feel dizzy when ambulating, please return to a seated position to avoid falls.  Practice fall precautions in your home by removing obstacles on the floor.  Follow up with your PCP within 1-2 weeks; call for appointment.  Secondary Diagnosis:	Diabetes type 2, controlled  Goal:	To maintain a normal blood glucose level and HgA1C level < 5.7 and to prevent diabetic complications such as uncontrolled diabetes, diabetic coma, blindness, renal failure, and amputations.  Assessment and plan of treatment:	Continue diet modification. Avoid complex carbohydrates such as bread, pasta, cereal, white rice, white potatoes, etc. Avoid concentrated sugar as found in desserts, candy, soda, juice, etc. Consume a diet based on lean protein (chicken, fish) and vegetables.   Follow up with your PCP for a repeat HgA1C blood test in 3 months; call for appointment.  You should have yearly routine eye & foot examinations.  Secondary Diagnosis:	HTN (hypertension)  Goal:	To maintain a normal blood pressure to prevent heart attack, stroke and renal failure.  Assessment and plan of treatment:	Low salt diet.  Follow up with your PCP within 1-2 weeks; call for appointment.  Secondary Diagnosis:	Depression  Goal:	Outpatient geriatric psychiatry follow up.  Assessment and plan of treatment:	Continue sertraline as prescribed.  Follow up with geriatric psychiatry within 1-2 weeks; call for appointment.  Secondary Diagnosis:	Dementia  Goal:	Outpatient neurology follow up.  Assessment and plan of treatment:	Continue memantine as prescribed.  Follow up with neurology within 1-2 weeks; call for appointment.  Secondary Diagnosis:	HLD (hyperlipidemia)  Goal:	To maintain normal cholesterol levels to prevent stroke, coronary artery disease, peripheral vascular disease and heart attacks.  Assessment and plan of treatment:	Low fat diet and continue current medications. Follow up with primary care physician and cardiologist for management. Principal Discharge DX:	Ulcerative colitis  Goal:	Close outpatient gastroenterology follow up for further management.  Assessment and plan of treatment:	Continue mesalamine by mouth & enemas as prescribed.   Follow up with gastroenterologist Dr. Gatito Oro within 1-2 weeks to go over your biopsy results from your colonoscopy & for further management; call for appointment.  Your gastroenterologist may start Entyvio as outpatient.  Secondary Diagnosis:	Orthostatic hypotension  Goal:	Resolution of orthostasis.  To prevent dizziness & falls.  Assessment and plan of treatment:	Continue flornief as prescribed.  Continue wearing your compression stockings on your lower extremities.   Stay well hydrated.  When getting up from a sitting position to a standing position, please do so slowly to prevent dizziness.  If you feel dizzy when ambulating, please return to a seated position to avoid falls.  Practice fall precautions in your home by removing obstacles on the floor.  Follow up with your PCP within 1-2 weeks; call for appointment.  Secondary Diagnosis:	Diabetes type 2, controlled  Goal:	To maintain a normal blood glucose level and HgA1C level < 5.7 and to prevent diabetic complications such as uncontrolled diabetes, diabetic coma, blindness, renal failure, and amputations.  Assessment and plan of treatment:	Continue diet modification. Avoid complex carbohydrates such as bread, pasta, cereal, white rice, white potatoes, etc. Avoid concentrated sugar as found in desserts, candy, soda, juice, etc. Consume a diet based on lean protein (chicken, fish) and vegetables.   Follow up with your PCP for a repeat HgA1C blood test in 3 months; call for appointment.  You should have yearly routine eye & foot examinations.  Secondary Diagnosis:	HTN (hypertension)  Goal:	To maintain a normal blood pressure to prevent heart attack, stroke and renal failure.  Assessment and plan of treatment:	Low salt diet.  Follow up with your PCP within 1-2 weeks; call for appointment.  Secondary Diagnosis:	Depression  Goal:	Outpatient geriatric psychiatry follow up.  Assessment and plan of treatment:	Continue sertraline as prescribed.  Follow up with geriatric psychiatry within 1-2 weeks; call for appointment.  [facility] ([facility]) Geriatric outpatient clinic: [phone]  [facility] outpt. walk in clinic : [phone] (9AM-7PM)  [facility] Outpatient clinic: [phone]  Secondary Diagnosis:	Dementia  Goal:	Outpatient neurology follow up.  Assessment and plan of treatment:	Continue memantine as prescribed.  Follow up with neurology within 1-2 weeks.  You may follow up at the neurology clinic at [facility] by calling [phone] to make an appointment.  Secondary Diagnosis:	HLD (hyperlipidemia)  Goal:	To maintain normal cholesterol levels to prevent stroke, coronary artery disease, peripheral vascular disease and heart attacks.  Assessment and plan of treatment:	Low fat diet and continue current medications. Follow up with primary care physician and cardiologist for management. Principal Discharge DX:	Ulcerative colitis  Goal:	Close outpatient gastroenterology follow up for further management.  Assessment and plan of treatment:	Continue mesalamine by mouth & suppositories as prescribed.   Follow up with gastroenterologist Dr. Gatito Oro within 1-2 weeks to go over your biopsy results from your colonoscopy & for further management; call for appointment.  Your gastroenterologist may start Entyvio as outpatient.  Secondary Diagnosis:	Orthostatic hypotension  Goal:	Resolution of orthostasis.  To prevent dizziness & falls.  Assessment and plan of treatment:	Continue flornief as prescribed.  Continue wearing your compression stockings on your lower extremities.   Stay well hydrated.  When getting up from a sitting position to a standing position, please do so slowly to prevent dizziness.  If you feel dizzy when ambulating, please return to a seated position to avoid falls.  Practice fall precautions in your home by removing obstacles on the floor.  Follow up with your PCP within 1-2 weeks; call for appointment.  Secondary Diagnosis:	Diabetes type 2, controlled  Goal:	To maintain a normal blood glucose level and HgA1C level < 5.7 and to prevent diabetic complications such as uncontrolled diabetes, diabetic coma, blindness, renal failure, and amputations.  Assessment and plan of treatment:	Continue diet modification. Avoid complex carbohydrates such as bread, pasta, cereal, white rice, white potatoes, etc. Avoid concentrated sugar as found in desserts, candy, soda, juice, etc. Consume a diet based on lean protein (chicken, fish) and vegetables.   Follow up with your PCP for a repeat HgA1C blood test in 3 months; call for appointment.  You should have yearly routine eye & foot examinations.  Secondary Diagnosis:	HTN (hypertension)  Goal:	To maintain a normal blood pressure to prevent heart attack, stroke and renal failure.  Assessment and plan of treatment:	Low salt diet.  Follow up with your PCP within 1-2 weeks; call for appointment.  Secondary Diagnosis:	Depression  Goal:	Outpatient geriatric psychiatry follow up.  Assessment and plan of treatment:	Continue sertraline as prescribed.  Follow up with geriatric psychiatry within 1-2 weeks; call for appointment.  [facility] ([facility]) Geriatric outpatient clinic: [phone]  [facility] outpt. walk in clinic : [phone] (9AM-7PM)  [facility] Outpatient clinic: [phone]  Secondary Diagnosis:	Dementia  Goal:	Outpatient neurology follow up.  Assessment and plan of treatment:	Continue memantine as prescribed.  Follow up with neurology within 1-2 weeks.  You may follow up at the neurology clinic at [facility] by calling [phone] to make an appointment.  Secondary Diagnosis:	HLD (hyperlipidemia)  Goal:	To maintain normal cholesterol levels to prevent stroke, coronary artery disease, peripheral vascular disease and heart attacks.  Assessment and plan of treatment:	Low fat diet and continue current medications. Follow up with primary care physician and cardiologist for management.

## 2018-08-27 ENCOUNTER — CHART COPY (OUTPATIENT)
Age: 68
End: 2018-08-27

## 2018-08-27 PROBLEM — F03.90 UNSPECIFIED DEMENTIA, UNSPECIFIED SEVERITY, WITHOUT BEHAVIORAL DISTURBANCE, PSYCHOTIC DISTURBANCE, MOOD DISTURBANCE, AND ANXIETY: Chronic | Status: ACTIVE | Noted: 2018-08-17

## 2018-08-27 PROBLEM — F32.9 MAJOR DEPRESSIVE DISORDER, SINGLE EPISODE, UNSPECIFIED: Chronic | Status: ACTIVE | Noted: 2018-08-17

## 2018-08-27 PROBLEM — F03.90 UNSPECIFIED DEMENTIA WITHOUT BEHAVIORAL DISTURBANCE: Chronic | Status: ACTIVE | Noted: 2018-08-17

## 2018-08-29 ENCOUNTER — APPOINTMENT (OUTPATIENT)
Dept: UROLOGY | Facility: CLINIC | Age: 68
End: 2018-08-29
Payer: MEDICARE

## 2018-08-29 PROCEDURE — 99214 OFFICE O/P EST MOD 30 MIN: CPT

## 2018-08-29 RX ORDER — NITROFURANTOIN (MONOHYDRATE/MACROCRYSTALS) 25; 75 MG/1; MG/1
100 CAPSULE ORAL
Qty: 30 | Refills: 11 | Status: COMPLETED | COMMUNITY
Start: 2018-07-05 | End: 2018-08-29

## 2018-08-30 ENCOUNTER — NON-APPOINTMENT (OUTPATIENT)
Age: 68
End: 2018-08-30

## 2018-08-30 ENCOUNTER — APPOINTMENT (OUTPATIENT)
Dept: CARDIOLOGY | Facility: CLINIC | Age: 68
End: 2018-08-30
Payer: MEDICARE

## 2018-08-30 VITALS
BODY MASS INDEX: 24.05 KG/M2 | DIASTOLIC BLOOD PRESSURE: 66 MMHG | SYSTOLIC BLOOD PRESSURE: 104 MMHG | HEIGHT: 70 IN | OXYGEN SATURATION: 97 % | WEIGHT: 168 LBS | HEART RATE: 96 BPM

## 2018-08-30 DIAGNOSIS — I34.0 NONRHEUMATIC MITRAL (VALVE) INSUFFICIENCY: ICD-10-CM

## 2018-08-30 PROCEDURE — 99214 OFFICE O/P EST MOD 30 MIN: CPT

## 2018-08-30 PROCEDURE — 93000 ELECTROCARDIOGRAM COMPLETE: CPT

## 2018-09-03 LAB
ALBUMIN SERPL ELPH-MCNC: 4.3 G/DL
ALP BLD-CCNC: 90 U/L
ALT SERPL-CCNC: 8 U/L
ANION GAP SERPL CALC-SCNC: 15 MMOL/L
AST SERPL-CCNC: 22 U/L
BASOPHILS # BLD AUTO: 0.02 K/UL
BASOPHILS NFR BLD AUTO: 0.3 %
BILIRUB SERPL-MCNC: 0.7 MG/DL
BUN SERPL-MCNC: 11 MG/DL
CALCIUM SERPL-MCNC: 9.5 MG/DL
CHLORIDE SERPL-SCNC: 103 MMOL/L
CO2 SERPL-SCNC: 22 MMOL/L
CREAT SERPL-MCNC: 0.89 MG/DL
EOSINOPHIL # BLD AUTO: 0.79 K/UL
EOSINOPHIL NFR BLD AUTO: 12.3 %
GLUCOSE SERPL-MCNC: 132 MG/DL
HBA1C MFR BLD HPLC: 4.9 %
HCT VFR BLD CALC: 34.6 %
HGB BLD-MCNC: 10.8 G/DL
IMM GRANULOCYTES NFR BLD AUTO: 0 %
LYMPHOCYTES # BLD AUTO: 3.2 K/UL
LYMPHOCYTES NFR BLD AUTO: 49.8 %
MAN DIFF?: NORMAL
MCHC RBC-ENTMCNC: 27 PG
MCHC RBC-ENTMCNC: 31.2 GM/DL
MCV RBC AUTO: 86.5 FL
MONOCYTES # BLD AUTO: 0.58 K/UL
MONOCYTES NFR BLD AUTO: 9 %
NEUTROPHILS # BLD AUTO: 1.83 K/UL
NEUTROPHILS NFR BLD AUTO: 28.6 %
PLATELET # BLD AUTO: 186 K/UL
POTASSIUM SERPL-SCNC: 3.8 MMOL/L
PROT SERPL-MCNC: 6.9 G/DL
PSA SERPL-MCNC: 1.46 NG/ML
RBC # BLD: 4 M/UL
RBC # FLD: 12.9 %
SODIUM SERPL-SCNC: 140 MMOL/L
TESTOST SERPL-MCNC: 680.6 NG/DL
TSH SERPL-ACNC: 0.43 UIU/ML
WBC # FLD AUTO: 6.42 K/UL

## 2018-09-07 ENCOUNTER — APPOINTMENT (OUTPATIENT)
Dept: INTERNAL MEDICINE | Facility: CLINIC | Age: 68
End: 2018-09-07
Payer: MEDICARE

## 2018-09-07 VITALS
BODY MASS INDEX: 24.34 KG/M2 | SYSTOLIC BLOOD PRESSURE: 91 MMHG | HEIGHT: 70 IN | TEMPERATURE: 99 F | HEART RATE: 97 BPM | OXYGEN SATURATION: 97 % | WEIGHT: 170 LBS | DIASTOLIC BLOOD PRESSURE: 58 MMHG

## 2018-09-07 VITALS — SYSTOLIC BLOOD PRESSURE: 80 MMHG | DIASTOLIC BLOOD PRESSURE: 50 MMHG

## 2018-09-07 DIAGNOSIS — R26.89 OTHER ABNORMALITIES OF GAIT AND MOBILITY: ICD-10-CM

## 2018-09-07 PROCEDURE — 99214 OFFICE O/P EST MOD 30 MIN: CPT

## 2018-09-07 RX ORDER — POLYETHYLENE GLYCOL-3350 AND ELECTROLYTES 236; 6.74; 5.86; 2.97; 22.74 G/274.31G; G/274.31G; G/274.31G; G/274.31G; G/274.31G
236 POWDER, FOR SOLUTION ORAL
Qty: 1 | Refills: 0 | Status: DISCONTINUED | OUTPATIENT
Start: 2018-03-19 | End: 2018-09-07

## 2018-09-07 RX ORDER — LEVOFLOXACIN 500 MG/1
500 TABLET, FILM COATED ORAL DAILY
Qty: 14 | Refills: 0 | Status: DISCONTINUED | COMMUNITY
Start: 2018-08-08 | End: 2018-09-07

## 2018-09-07 RX ORDER — MEMANTINE HYDROCHLORIDE 10 MG/1
10 TABLET, FILM COATED ORAL
Qty: 180 | Refills: 3 | Status: DISCONTINUED | COMMUNITY
Start: 2018-03-29 | End: 2018-09-07

## 2018-09-07 RX ORDER — AMIKACIN SULFATE 250 MG/ML
500 INJECTION, SOLUTION INTRAMUSCULAR; INTRAVENOUS
Qty: 2 | Refills: 0 | Status: DISCONTINUED | OUTPATIENT
Start: 2018-07-25 | End: 2018-09-07

## 2018-09-07 RX ORDER — PANTOPRAZOLE 40 MG/1
40 TABLET, DELAYED RELEASE ORAL
Refills: 0 | Status: DISCONTINUED | COMMUNITY
End: 2018-09-07

## 2018-09-07 RX ORDER — MIRTAZAPINE 45 MG/1
45 TABLET, FILM COATED ORAL
Qty: 90 | Refills: 3 | Status: DISCONTINUED | COMMUNITY
End: 2018-09-07

## 2018-09-07 RX ORDER — DIAZEPAM 5 MG/1
5 TABLET ORAL
Qty: 3 | Refills: 0 | Status: DISCONTINUED | COMMUNITY
Start: 2018-07-17 | End: 2018-09-07

## 2018-09-07 RX ORDER — CEFDINIR 300 MG/1
300 CAPSULE ORAL
Qty: 6 | Refills: 0 | Status: DISCONTINUED | COMMUNITY
Start: 2018-07-17 | End: 2018-09-07

## 2018-09-07 RX ORDER — FUROSEMIDE 20 MG/1
20 TABLET ORAL DAILY
Qty: 90 | Refills: 1 | Status: DISCONTINUED | COMMUNITY
Start: 2018-05-07 | End: 2018-09-07

## 2018-09-07 RX ORDER — TRIMETHOPRIM 100 MG/1
100 TABLET ORAL
Qty: 90 | Refills: 3 | Status: DISCONTINUED | COMMUNITY
Start: 2018-08-29 | End: 2018-09-07

## 2018-09-07 NOTE — REVIEW OF SYSTEMS
[Chills] : no chills [Night Sweats] : no night sweats [Discharge] : no discharge [Vision Problems] : no vision problems [Earache] : no earache [Nasal Discharge] : no nasal discharge [Chest Pain] : no chest pain [Orthopena] : no orthopnea [Abdominal Pain] : no abdominal pain [Vomiting] : no vomiting [Dysuria] : no dysuria [Hematuria] : no hematuria

## 2018-09-07 NOTE — HISTORY OF PRESENT ILLNESS
[FreeTextEntry1] : follow up hospital [de-identified] : Follow up hospital\par syncope with orthostatic hypotension\par bowels still bad await entyvio\par under care of psych\par recent blood hgb 10.8

## 2018-09-07 NOTE — PLAN
[FreeTextEntry1] : Blood pressure  still orthostatic and low\par high salt diet and increased fluid\par reduce glipizide to 1 daily\par continue psych care\par await entyvio\par need able ride unable to drive and gait poor\par edema resolved\par

## 2018-09-24 ENCOUNTER — APPOINTMENT (OUTPATIENT)
Dept: INTERNAL MEDICINE | Facility: CLINIC | Age: 68
End: 2018-09-24
Payer: MEDICARE

## 2018-09-24 VITALS
HEART RATE: 98 BPM | SYSTOLIC BLOOD PRESSURE: 120 MMHG | BODY MASS INDEX: 24.48 KG/M2 | HEIGHT: 70 IN | TEMPERATURE: 98.5 F | DIASTOLIC BLOOD PRESSURE: 60 MMHG | OXYGEN SATURATION: 97 % | WEIGHT: 171 LBS

## 2018-09-24 DIAGNOSIS — L30.9 DERMATITIS, UNSPECIFIED: ICD-10-CM

## 2018-09-24 PROCEDURE — 99213 OFFICE O/P EST LOW 20 MIN: CPT

## 2018-09-24 RX ORDER — DIVALPROEX SODIUM 500 1/1
500 TABLET, EXTENDED RELEASE ORAL AT BEDTIME
Qty: 90 | Refills: 3 | Status: DISCONTINUED | COMMUNITY
Start: 2018-03-14 | End: 2018-09-24

## 2018-09-24 NOTE — REVIEW OF SYSTEMS
[Chills] : chills [Itching] : itching [Skin Rash] : skin rash [Fever] : no fever [Fatigue] : no fatigue [Postnasal Drip] : no postnasal drip [Nasal Discharge] : no nasal discharge [Sore Throat] : no sore throat [Chest Pain] : no chest pain [Palpitations] : no palpitations [Shortness Of Breath] : no shortness of breath [Cough] : no cough [Abdominal Pain] : no abdominal pain [Nausea] : no nausea [Constipation] : no constipation [Diarrhea] : no diarrhea [Vomiting] : no vomiting [Dysuria] : no dysuria [Headache] : no headache [Dizziness] : no dizziness [de-identified] : itchy rash back and bilateral arms and legs

## 2018-09-24 NOTE — PLAN
[FreeTextEntry1] : Dermatitis likely due to xerosis/dry skin\par -encourage patient to apply thicker moisturizers and creams 1-2 times per day and keep skin well hydrated throughout the day \par -can try oatmeal bath to sooth skin irritation\par -if no relief from moisturizers, trial of OTC hydrocortisone cream on small area to see if any relief\par -will consider more potent topical agent if symptoms worsen/persist

## 2018-09-24 NOTE — PHYSICAL EXAM
[No Acute Distress] : no acute distress [Well Nourished] : well nourished [Well-Appearing] : well-appearing [Normal Sclera/Conjunctiva] : normal sclera/conjunctiva [No Respiratory Distress] : no respiratory distress  [Clear to Auscultation] : lungs were clear to auscultation bilaterally [No Accessory Muscle Use] : no accessory muscle use [Normal Rate] : normal rate  [Regular Rhythm] : with a regular rhythm [Normal S1, S2] : normal S1 and S2 [No Edema] : there was no peripheral edema [Soft] : abdomen soft [Non Tender] : non-tender [Non-distended] : non-distended [No Focal Deficits] : no focal deficits [de-identified] : very dry skin noted on back and bilateral upper and lower extremities, mild excoriations noted due to scratching, no erythema/edema/discharge noted

## 2018-09-24 NOTE — HISTORY OF PRESENT ILLNESS
[Rash (Location) ___] : rash on [unfilled] [Spouse] : spouse [FreeTextEntry8] : Patient presents with rash noted on his back and bilateral forearms/calves. He states that this began about one week ago. Admits to rash being itchy, has tried using moisturizers at times. He denies any changes in body washes, soaps, detergents, medications, etc. Denies any recent travel; denies fevers. Admits to having chills on Sunday but wife states that he is generally cold. Denies other acute symptoms.

## 2018-09-27 ENCOUNTER — APPOINTMENT (OUTPATIENT)
Dept: CARDIOLOGY | Facility: CLINIC | Age: 68
End: 2018-09-27

## 2018-09-27 DIAGNOSIS — N41.0 ACUTE PROSTATITIS: ICD-10-CM

## 2018-10-01 PROBLEM — M25.562 CHRONIC PAIN OF LEFT KNEE: Status: ACTIVE | Noted: 2018-10-01

## 2018-10-02 ENCOUNTER — APPOINTMENT (OUTPATIENT)
Dept: ORTHOPEDIC SURGERY | Facility: CLINIC | Age: 68
End: 2018-10-02
Payer: MEDICARE

## 2018-10-02 VITALS — DIASTOLIC BLOOD PRESSURE: 65 MMHG | HEART RATE: 109 BPM | SYSTOLIC BLOOD PRESSURE: 111 MMHG

## 2018-10-02 VITALS — HEIGHT: 70 IN | BODY MASS INDEX: 25.62 KG/M2 | WEIGHT: 179 LBS

## 2018-10-02 DIAGNOSIS — Z86.79 PERSONAL HISTORY OF OTHER DISEASES OF THE CIRCULATORY SYSTEM: ICD-10-CM

## 2018-10-02 DIAGNOSIS — Z72.3 LACK OF PHYSICAL EXERCISE: ICD-10-CM

## 2018-10-02 DIAGNOSIS — Z78.9 OTHER SPECIFIED HEALTH STATUS: ICD-10-CM

## 2018-10-02 DIAGNOSIS — G89.29 PAIN IN LEFT KNEE: ICD-10-CM

## 2018-10-02 DIAGNOSIS — M25.562 PAIN IN LEFT KNEE: ICD-10-CM

## 2018-10-02 PROCEDURE — 20610 DRAIN/INJ JOINT/BURSA W/O US: CPT | Mod: RT

## 2018-10-02 PROCEDURE — 73564 X-RAY EXAM KNEE 4 OR MORE: CPT | Mod: RT

## 2018-10-02 PROCEDURE — 99204 OFFICE O/P NEW MOD 45 MIN: CPT | Mod: 25

## 2018-10-17 ENCOUNTER — APPOINTMENT (OUTPATIENT)
Dept: UROLOGY | Facility: CLINIC | Age: 68
End: 2018-10-17
Payer: MEDICARE

## 2018-10-17 VITALS
HEART RATE: 87 BPM | RESPIRATION RATE: 18 BRPM | SYSTOLIC BLOOD PRESSURE: 118 MMHG | TEMPERATURE: 98.5 F | DIASTOLIC BLOOD PRESSURE: 65 MMHG

## 2018-10-17 DIAGNOSIS — N39.41 URGE INCONTINENCE: ICD-10-CM

## 2018-10-17 PROCEDURE — 99214 OFFICE O/P EST MOD 30 MIN: CPT

## 2018-10-17 RX ORDER — INSULIN LISPRO 100 [IU]/ML
100 INJECTION, SOLUTION INTRAVENOUS; SUBCUTANEOUS
Qty: 1 | Refills: 1 | Status: COMPLETED | COMMUNITY
Start: 2018-03-12 | End: 2018-10-17

## 2018-10-19 LAB — BACTERIA UR CULT: NORMAL

## 2018-10-20 LAB
APPEARANCE: ABNORMAL
BACTERIA: NEGATIVE
BILIRUBIN URINE: NEGATIVE
BLOOD URINE: NEGATIVE
COLOR: YELLOW
GLUCOSE QUALITATIVE U: 100 MG/DL
HYALINE CASTS: 0 /LPF
KETONES URINE: ABNORMAL
LEUKOCYTE ESTERASE URINE: NEGATIVE
MICROSCOPIC-UA: NORMAL
NITRITE URINE: NEGATIVE
PH URINE: 5.5
PROTEIN URINE: NEGATIVE MG/DL
RED BLOOD CELLS URINE: 2 /HPF
SPECIFIC GRAVITY URINE: 1.02
SQUAMOUS EPITHELIAL CELLS: 0 /HPF
URINE COMMENTS: NORMAL
UROBILINOGEN URINE: NEGATIVE MG/DL
WHITE BLOOD CELLS URINE: 3 /HPF

## 2018-10-22 LAB — CORE LAB FLUID CYTOLOGY: NORMAL

## 2018-10-23 ENCOUNTER — APPOINTMENT (OUTPATIENT)
Dept: RHEUMATOLOGY | Facility: CLINIC | Age: 68
End: 2018-10-23
Payer: MEDICARE

## 2018-10-23 PROCEDURE — 96413 CHEMO IV INFUSION 1 HR: CPT

## 2018-11-06 ENCOUNTER — APPOINTMENT (OUTPATIENT)
Dept: RHEUMATOLOGY | Facility: CLINIC | Age: 68
End: 2018-11-06
Payer: MEDICARE

## 2018-11-06 PROCEDURE — 96413 CHEMO IV INFUSION 1 HR: CPT

## 2018-11-07 ENCOUNTER — APPOINTMENT (OUTPATIENT)
Dept: UROLOGY | Facility: CLINIC | Age: 68
End: 2018-11-07

## 2018-11-09 ENCOUNTER — APPOINTMENT (OUTPATIENT)
Dept: INTERNAL MEDICINE | Facility: CLINIC | Age: 68
End: 2018-11-09
Payer: MEDICARE

## 2018-11-09 VITALS — HEIGHT: 70 IN | WEIGHT: 171 LBS | BODY MASS INDEX: 24.48 KG/M2

## 2018-11-09 DIAGNOSIS — E10.65 TYPE 1 DIABETES MELLITUS WITH HYPERGLYCEMIA: ICD-10-CM

## 2018-11-09 DIAGNOSIS — R60.0 LOCALIZED EDEMA: ICD-10-CM

## 2018-11-09 DIAGNOSIS — Z87.898 PERSONAL HISTORY OF OTHER SPECIFIED CONDITIONS: ICD-10-CM

## 2018-11-09 DIAGNOSIS — E11.65 TYPE 2 DIABETES MELLITUS WITH HYPERGLYCEMIA: ICD-10-CM

## 2018-11-09 LAB
BILIRUB UR QL STRIP: NEGATIVE
CLARITY UR: CLEAR
COLLECTION METHOD: NORMAL
GLUCOSE UR-MCNC: 500
HCG UR QL: 0.2 EU/DL
HGB UR QL STRIP.AUTO: NEGATIVE
KETONES UR-MCNC: NORMAL
LEUKOCYTE ESTERASE UR QL STRIP: NEGATIVE
NITRITE UR QL STRIP: NEGATIVE
PH UR STRIP: 5.5
PROT UR STRIP-MCNC: NEGATIVE
SP GR UR STRIP: 1.03

## 2018-11-09 PROCEDURE — 36415 COLL VENOUS BLD VENIPUNCTURE: CPT

## 2018-11-09 PROCEDURE — 99214 OFFICE O/P EST MOD 30 MIN: CPT | Mod: 25

## 2018-11-09 PROCEDURE — 81002 URINALYSIS NONAUTO W/O SCOPE: CPT

## 2018-11-09 RX ORDER — INSULIN ASPART 100 [IU]/ML
100 INJECTION, SOLUTION INTRAVENOUS; SUBCUTANEOUS
Qty: 1 | Refills: 3 | Status: DISCONTINUED | COMMUNITY
Start: 2018-03-16 | End: 2018-11-09

## 2018-11-09 RX ORDER — PEN NEEDLE, DIABETIC 31 GX3/16"
31G X 5 MM NEEDLE, DISPOSABLE MISCELLANEOUS
Qty: 200 | Refills: 1 | Status: DISCONTINUED | COMMUNITY
Start: 2018-03-12 | End: 2018-11-09

## 2018-11-09 RX ORDER — SERTRALINE HYDROCHLORIDE 25 MG/1
TABLET, FILM COATED ORAL
Refills: 0 | Status: DISCONTINUED | COMMUNITY
End: 2018-11-09

## 2018-11-09 RX ORDER — TAMSULOSIN HYDROCHLORIDE 0.4 MG/1
CAPSULE ORAL
Refills: 0 | Status: DISCONTINUED | COMMUNITY
End: 2018-11-09

## 2018-11-09 NOTE — HISTORY OF PRESENT ILLNESS
[FreeTextEntry1] : Colitis [de-identified] : Colitis improving with entyvio\par on glipizide 5 once daily blood sugar 120\par on florinef for bp support\par urine still an issue under care\par itching skin?\par Psych issue persist on med undercare

## 2018-11-09 NOTE — REVIEW OF SYSTEMS
[Fever] : no fever [Night Sweats] : no night sweats [Earache] : no earache [Nasal Discharge] : no nasal discharge [Chest Pain] : no chest pain [Orthopena] : no orthopnea [Shortness Of Breath] : no shortness of breath [Wheezing] : no wheezing [Abdominal Pain] : no abdominal pain [Diarrhea] : diarrhea [Vomiting] : no vomiting [Nocturia] : nocturia [Frequency] : frequency

## 2018-11-09 NOTE — PLAN
[FreeTextEntry1] : Colitis improved\par check blood continue entyvio per gi\par check urine\par cerave for pruritis\par diabetes to check if a1c good to stop glipizide\par bp better or florinef to continue

## 2018-11-10 LAB
25(OH)D3 SERPL-MCNC: 31.4 NG/ML
ALBUMIN SERPL ELPH-MCNC: 4.2 G/DL
ALP BLD-CCNC: 126 U/L
ALT SERPL-CCNC: 13 U/L
ANION GAP SERPL CALC-SCNC: 12 MMOL/L
AST SERPL-CCNC: 17 U/L
BASOPHILS # BLD AUTO: 0.03 K/UL
BASOPHILS NFR BLD AUTO: 0.4 %
BILIRUB SERPL-MCNC: 0.5 MG/DL
BUN SERPL-MCNC: 15 MG/DL
CALCIUM SERPL-MCNC: 9.2 MG/DL
CHLORIDE SERPL-SCNC: 102 MMOL/L
CHOLEST SERPL-MCNC: 126 MG/DL
CHOLEST/HDLC SERPL: 3.5 RATIO
CO2 SERPL-SCNC: 24 MMOL/L
CREAT SERPL-MCNC: 0.69 MG/DL
CRP SERPL-MCNC: 0.33 MG/DL
EOSINOPHIL # BLD AUTO: 0.83 K/UL
EOSINOPHIL NFR BLD AUTO: 12 %
ERYTHROCYTE [SEDIMENTATION RATE] IN BLOOD BY WESTERGREN METHOD: 26 MM/HR
FOLATE SERPL-MCNC: 18 NG/ML
GLUCOSE SERPL-MCNC: 186 MG/DL
HBA1C MFR BLD HPLC: 5.4 %
HCT VFR BLD CALC: 35.3 %
HDLC SERPL-MCNC: 36 MG/DL
HGB BLD-MCNC: 10.8 G/DL
IMM GRANULOCYTES NFR BLD AUTO: 0.3 %
LDLC SERPL CALC-MCNC: 62 MG/DL
LYMPHOCYTES # BLD AUTO: 3.24 K/UL
LYMPHOCYTES NFR BLD AUTO: 46.8 %
MAGNESIUM SERPL-MCNC: 2.1 MG/DL
MAN DIFF?: NORMAL
MCHC RBC-ENTMCNC: 27.2 PG
MCHC RBC-ENTMCNC: 30.6 GM/DL
MCV RBC AUTO: 88.9 FL
MONOCYTES # BLD AUTO: 0.79 K/UL
MONOCYTES NFR BLD AUTO: 11.4 %
NEUTROPHILS # BLD AUTO: 2.02 K/UL
NEUTROPHILS NFR BLD AUTO: 29.1 %
PLATELET # BLD AUTO: 272 K/UL
POTASSIUM SERPL-SCNC: 4.4 MMOL/L
PROT SERPL-MCNC: 7.2 G/DL
RBC # BLD: 3.97 M/UL
RBC # FLD: 13.3 %
SODIUM SERPL-SCNC: 138 MMOL/L
T4 SERPL-MCNC: 9.5 UG/DL
TRIGL SERPL-MCNC: 142 MG/DL
TSH SERPL-ACNC: 0.54 UIU/ML
VIT B12 SERPL-MCNC: 765 PG/ML
WBC # FLD AUTO: 6.93 K/UL

## 2018-11-13 ENCOUNTER — MEDICATION RENEWAL (OUTPATIENT)
Age: 68
End: 2018-11-13

## 2018-11-16 ENCOUNTER — RX RENEWAL (OUTPATIENT)
Age: 68
End: 2018-11-16

## 2018-12-04 ENCOUNTER — APPOINTMENT (OUTPATIENT)
Dept: RHEUMATOLOGY | Facility: CLINIC | Age: 68
End: 2018-12-04
Payer: MEDICARE

## 2018-12-04 PROCEDURE — 96413 CHEMO IV INFUSION 1 HR: CPT

## 2018-12-27 ENCOUNTER — INPATIENT (INPATIENT)
Facility: HOSPITAL | Age: 68
LOS: 2 days | Discharge: HOME | End: 2018-12-30
Attending: HOSPITALIST | Admitting: HOSPITALIST
Payer: MEDICARE

## 2018-12-27 ENCOUNTER — MEDICATION RENEWAL (OUTPATIENT)
Age: 68
End: 2018-12-27

## 2018-12-27 VITALS
TEMPERATURE: 98 F | RESPIRATION RATE: 16 BRPM | SYSTOLIC BLOOD PRESSURE: 144 MMHG | DIASTOLIC BLOOD PRESSURE: 75 MMHG | HEART RATE: 102 BPM | OXYGEN SATURATION: 100 %

## 2018-12-27 DIAGNOSIS — Z98.49 CATARACT EXTRACTION STATUS, UNSPECIFIED EYE: Chronic | ICD-10-CM

## 2018-12-27 LAB
ALBUMIN SERPL ELPH-MCNC: 4.4 G/DL — SIGNIFICANT CHANGE UP (ref 3.3–5)
ALP SERPL-CCNC: 107 U/L — SIGNIFICANT CHANGE UP (ref 40–120)
ALT FLD-CCNC: 22 U/L — SIGNIFICANT CHANGE UP (ref 4–41)
AST SERPL-CCNC: 27 U/L — SIGNIFICANT CHANGE UP (ref 4–40)
BASE EXCESS BLDV CALC-SCNC: -4.1 MMOL/L — SIGNIFICANT CHANGE UP
BILIRUB SERPL-MCNC: 0.7 MG/DL — SIGNIFICANT CHANGE UP (ref 0.2–1.2)
BLOOD GAS VENOUS - CREATININE: 1.38 MG/DL — HIGH (ref 0.5–1.3)
BUN SERPL-MCNC: 33 MG/DL — HIGH (ref 7–23)
CALCIUM SERPL-MCNC: 8.9 MG/DL — SIGNIFICANT CHANGE UP (ref 8.4–10.5)
CHLORIDE BLDV-SCNC: 102 MMOL/L — SIGNIFICANT CHANGE UP (ref 96–108)
CHLORIDE SERPL-SCNC: 92 MMOL/L — LOW (ref 98–107)
CO2 SERPL-SCNC: 16 MMOL/L — LOW (ref 22–31)
CREAT SERPL-MCNC: 1.38 MG/DL — HIGH (ref 0.5–1.3)
GAS PNL BLDV: 125 MMOL/L — LOW (ref 136–146)
GLUCOSE BLDV-MCNC: 239 — HIGH (ref 70–99)
GLUCOSE SERPL-MCNC: 229 MG/DL — HIGH (ref 70–99)
HCO3 BLDV-SCNC: 20 MMOL/L — SIGNIFICANT CHANGE UP (ref 20–27)
HCT VFR BLD CALC: 36.5 % — LOW (ref 39–50)
HCT VFR BLDV CALC: 38.8 % — LOW (ref 39–51)
HGB BLD-MCNC: 12.1 G/DL — LOW (ref 13–17)
HGB BLDV-MCNC: 12.6 G/DL — LOW (ref 13–17)
LACTATE BLDV-MCNC: 2 MMOL/L — SIGNIFICANT CHANGE UP (ref 0.5–2)
MAGNESIUM SERPL-MCNC: 1.6 MG/DL — SIGNIFICANT CHANGE UP (ref 1.6–2.6)
MCHC RBC-ENTMCNC: 26.9 PG — LOW (ref 27–34)
MCHC RBC-ENTMCNC: 33.2 % — SIGNIFICANT CHANGE UP (ref 32–36)
MCV RBC AUTO: 81.1 FL — SIGNIFICANT CHANGE UP (ref 80–100)
NRBC # FLD: 0 — SIGNIFICANT CHANGE UP
PCO2 BLDV: 42 MMHG — SIGNIFICANT CHANGE UP (ref 41–51)
PH BLDV: 7.32 PH — SIGNIFICANT CHANGE UP (ref 7.32–7.43)
PHOSPHATE SERPL-MCNC: 3.7 MG/DL — SIGNIFICANT CHANGE UP (ref 2.5–4.5)
PLATELET # BLD AUTO: 185 K/UL — SIGNIFICANT CHANGE UP (ref 150–400)
PMV BLD: 10.8 FL — SIGNIFICANT CHANGE UP (ref 7–13)
PO2 BLDV: < 24 MMHG — LOW (ref 35–40)
POTASSIUM BLDV-SCNC: 3.4 MMOL/L — SIGNIFICANT CHANGE UP (ref 3.4–4.5)
POTASSIUM SERPL-MCNC: 3.6 MMOL/L — SIGNIFICANT CHANGE UP (ref 3.5–5.3)
POTASSIUM SERPL-SCNC: 3.6 MMOL/L — SIGNIFICANT CHANGE UP (ref 3.5–5.3)
PROT SERPL-MCNC: 7.8 G/DL — SIGNIFICANT CHANGE UP (ref 6–8.3)
RBC # BLD: 4.5 M/UL — SIGNIFICANT CHANGE UP (ref 4.2–5.8)
RBC # FLD: 12.4 % — SIGNIFICANT CHANGE UP (ref 10.3–14.5)
SAO2 % BLDV: 28.9 % — LOW (ref 60–85)
SODIUM SERPL-SCNC: 127 MMOL/L — LOW (ref 135–145)
TROPONIN T, HIGH SENSITIVITY: 8 NG/L — SIGNIFICANT CHANGE UP (ref ?–14)
WBC # BLD: 7.66 K/UL — SIGNIFICANT CHANGE UP (ref 3.8–10.5)
WBC # FLD AUTO: 7.66 K/UL — SIGNIFICANT CHANGE UP (ref 3.8–10.5)

## 2018-12-27 PROCEDURE — 74177 CT ABD & PELVIS W/CONTRAST: CPT | Mod: 26

## 2018-12-27 RX ORDER — CIPROFLOXACIN LACTATE 400MG/40ML
400 VIAL (ML) INTRAVENOUS EVERY 12 HOURS
Qty: 0 | Refills: 0 | Status: DISCONTINUED | OUTPATIENT
Start: 2018-12-27 | End: 2018-12-29

## 2018-12-27 RX ORDER — METRONIDAZOLE 500 MG
500 TABLET ORAL ONCE
Qty: 0 | Refills: 0 | Status: COMPLETED | OUTPATIENT
Start: 2018-12-27 | End: 2018-12-28

## 2018-12-27 RX ORDER — SODIUM CHLORIDE 9 MG/ML
1000 INJECTION INTRAMUSCULAR; INTRAVENOUS; SUBCUTANEOUS ONCE
Qty: 0 | Refills: 0 | Status: COMPLETED | OUTPATIENT
Start: 2018-12-27 | End: 2018-12-27

## 2018-12-27 RX ADMIN — SODIUM CHLORIDE 1000 MILLILITER(S): 9 INJECTION INTRAMUSCULAR; INTRAVENOUS; SUBCUTANEOUS at 23:01

## 2018-12-27 RX ADMIN — SODIUM CHLORIDE 2000 MILLILITER(S): 9 INJECTION INTRAMUSCULAR; INTRAVENOUS; SUBCUTANEOUS at 19:32

## 2018-12-27 RX ADMIN — SODIUM CHLORIDE 1000 MILLILITER(S): 9 INJECTION INTRAMUSCULAR; INTRAVENOUS; SUBCUTANEOUS at 23:05

## 2018-12-27 NOTE — ED PROVIDER NOTE - OBJECTIVE STATEMENT
68y m with pmhx of UC on remicade and mesalamine, HTN, DM2, HLD, prior hx of syncope p/w 2 episodes of NBNB vomitus on 10/26 followed by 20 episodes of diarrhea. Endorses syncopal episode today at 430 am. Denies CP, SOB, abdominal pain, fever, chills, sick contacts.

## 2018-12-27 NOTE — ED ADULT TRIAGE NOTE - CHIEF COMPLAINT QUOTE
c/o abdominal pain with nausea/vomiting/diarrhea. Hx Crohn's disease. Pt also had unwitnessed fall in bathroom at 0300 today. Pt states was in bathroom and when coming out of bathroom and loss balance and fell onto left shoulder.

## 2018-12-27 NOTE — ED PROVIDER NOTE - MEDICAL DECISION MAKING DETAILS
68y m with pmhx of UC on remicade and mesalamine, HTN, DM2, HLD, prior hx of syncope p/w GI sx's and syncope. Will check labs, EKG, CT A/P with oral contrast

## 2018-12-27 NOTE — ED ADULT NURSE NOTE - OBJECTIVE STATEMENT
Pt c/o lower abd pain since yesterday with multiple episodes of diarrhea. Denies N/V, fever/chills. Hx: DM, HTN, Crohns, Dementia. Pt Awake/ alert, in NAD. Pt is ambulatory with cane at baseline. Aox3 at this time. Awaiting MD umana. Will continue to monitor. Pt also reports falling last night due to weakness. Denies LOC. C/o L shoulder pain

## 2018-12-27 NOTE — ED PROVIDER NOTE - NS ED ROS FT
Constitutional: denies fevers, chills, night sweats, weight loss  HEENT: denies visual changes, hearing changes, rhinitis, odynophagia, or dysphagia  Cardiovascular: denies palpitations, chest pain, edema  Respiratory: denies SOB, wheezing  Gastrointestinal: +N/V/D, abdominal pain. Denies hematochezia, melena  : denies dysuria, hematuria  MSK: denies weakness, joint pain  Neuro: Denies Paresthesia/ weakness

## 2018-12-27 NOTE — ED PROVIDER NOTE - CARE PLAN
Principal Discharge DX:	Colitis  Secondary Diagnosis:	CRISTINA (acute kidney injury)  Secondary Diagnosis:	Hyponatremia

## 2018-12-27 NOTE — ED PROVIDER NOTE - NS ED MD DISPO SPECIAL CONSIDERATION1
Danish Thacker MD  [address] [id number]  [place] [phone]    Akil Jain MD  09/22/18 [id number] None

## 2018-12-27 NOTE — ED PROVIDER NOTE - PHYSICAL EXAMINATION
Constitutional: NAD, awake and alert  EYES: EOMI  ENT:  Normal Hearing, no tonsillar exudates   Neck: Soft and supple, No JVD  Respiratory: Breath sounds are clear bilaterally, No wheezing, rales or rhonchi  Cardiovascular: S1 and S2, regular rate and rhythm, no Murmurs, gallops or rubs  Gastrointestinal: Bowel Sounds present, soft, nontender, distended, no guarding, no rebound  Extremities: No cyanosis or clubbing; warm to touch  Vascular: 2+ peripheral pulses lower ex  Neurological: A/O x 3, no focal deficits  Musculoskeletal: 5/5 strength b/l upper and lower extremities  Skin: No rashes, warm & dry

## 2018-12-27 NOTE — ED PROVIDER NOTE - ATTENDING CONTRIBUTION TO CARE
Dr. Neal: I have personally performed a face to face bedside history and physical examination of this patient. I have discussed the history, examination, review of systems, assessment and plan of management with the resident. I have reviewed the electronic medical record and amended it to reflect my history, review of systems, physical exam, assessment and plan.    Attending Exam - Dr. Neal: GEN: well appearing, NAD  HEENT: +PERRL, EOMI  RESP: CTAB, no signs of respiratory distress CV: s1s2 RRR ABD: soft/non tender/non distended  MSK: no deformities / swelling, normal range of motion, spine grossly normal NEURO: alert, non focal exam SKIN: normal color / temperature / condition.

## 2018-12-28 DIAGNOSIS — K50.118 CROHN'S DISEASE OF LARGE INTESTINE WITH OTHER COMPLICATION: ICD-10-CM

## 2018-12-28 DIAGNOSIS — E87.1 HYPO-OSMOLALITY AND HYPONATREMIA: ICD-10-CM

## 2018-12-28 DIAGNOSIS — I95.1 ORTHOSTATIC HYPOTENSION: ICD-10-CM

## 2018-12-28 DIAGNOSIS — E11.69 TYPE 2 DIABETES MELLITUS WITH OTHER SPECIFIED COMPLICATION: ICD-10-CM

## 2018-12-28 DIAGNOSIS — N17.9 ACUTE KIDNEY FAILURE, UNSPECIFIED: ICD-10-CM

## 2018-12-28 DIAGNOSIS — K52.9 NONINFECTIVE GASTROENTERITIS AND COLITIS, UNSPECIFIED: ICD-10-CM

## 2018-12-28 LAB
APPEARANCE UR: CLEAR — SIGNIFICANT CHANGE UP
BACTERIA # UR AUTO: NEGATIVE — SIGNIFICANT CHANGE UP
BILIRUB UR-MCNC: NEGATIVE — SIGNIFICANT CHANGE UP
BLOOD UR QL VISUAL: NEGATIVE — SIGNIFICANT CHANGE UP
BUN SERPL-MCNC: 23 MG/DL — SIGNIFICANT CHANGE UP (ref 7–23)
C DIFF TOX GENS STL QL NAA+PROBE: SIGNIFICANT CHANGE UP
CALCIUM SERPL-MCNC: 8 MG/DL — LOW (ref 8.4–10.5)
CHLORIDE SERPL-SCNC: 103 MMOL/L — SIGNIFICANT CHANGE UP (ref 98–107)
CO2 SERPL-SCNC: 14 MMOL/L — LOW (ref 22–31)
COLOR SPEC: YELLOW — SIGNIFICANT CHANGE UP
CREAT SERPL-MCNC: 0.85 MG/DL — SIGNIFICANT CHANGE UP (ref 0.5–1.3)
GLUCOSE BLDC GLUCOMTR-MCNC: 130 MG/DL — HIGH (ref 70–99)
GLUCOSE BLDC GLUCOMTR-MCNC: 202 MG/DL — HIGH (ref 70–99)
GLUCOSE SERPL-MCNC: 189 MG/DL — HIGH (ref 70–99)
GLUCOSE UR-MCNC: NEGATIVE — SIGNIFICANT CHANGE UP
HCT VFR BLD CALC: 32.4 % — LOW (ref 39–50)
HGB BLD-MCNC: 10.7 G/DL — LOW (ref 13–17)
HYALINE CASTS # UR AUTO: SIGNIFICANT CHANGE UP
KETONES UR-MCNC: NEGATIVE — SIGNIFICANT CHANGE UP
LEUKOCYTE ESTERASE UR-ACNC: NEGATIVE — SIGNIFICANT CHANGE UP
MAGNESIUM SERPL-MCNC: 1.6 MG/DL — SIGNIFICANT CHANGE UP (ref 1.6–2.6)
MCHC RBC-ENTMCNC: 26.7 PG — LOW (ref 27–34)
MCHC RBC-ENTMCNC: 33 % — SIGNIFICANT CHANGE UP (ref 32–36)
MCV RBC AUTO: 80.8 FL — SIGNIFICANT CHANGE UP (ref 80–100)
NITRITE UR-MCNC: NEGATIVE — SIGNIFICANT CHANGE UP
NRBC # FLD: 0 — SIGNIFICANT CHANGE UP
PH UR: 6 — SIGNIFICANT CHANGE UP (ref 5–8)
PHOSPHATE SERPL-MCNC: 2.9 MG/DL — SIGNIFICANT CHANGE UP (ref 2.5–4.5)
PLATELET # BLD AUTO: 173 K/UL — SIGNIFICANT CHANGE UP (ref 150–400)
PMV BLD: 11.4 FL — SIGNIFICANT CHANGE UP (ref 7–13)
POTASSIUM SERPL-MCNC: 3.9 MMOL/L — SIGNIFICANT CHANGE UP (ref 3.5–5.3)
POTASSIUM SERPL-SCNC: 3.9 MMOL/L — SIGNIFICANT CHANGE UP (ref 3.5–5.3)
PROT UR-MCNC: 20 — SIGNIFICANT CHANGE UP
RBC # BLD: 4.01 M/UL — LOW (ref 4.2–5.8)
RBC # FLD: 12.4 % — SIGNIFICANT CHANGE UP (ref 10.3–14.5)
RBC CASTS # UR COMP ASSIST: SIGNIFICANT CHANGE UP (ref 0–?)
SODIUM SERPL-SCNC: 131 MMOL/L — LOW (ref 135–145)
SP GR SPEC: > 1.05 — HIGH (ref 1–1.04)
SQUAMOUS # UR AUTO: SIGNIFICANT CHANGE UP
TROPONIN T, HIGH SENSITIVITY: 8 NG/L — SIGNIFICANT CHANGE UP (ref ?–14)
UROBILINOGEN FLD QL: NORMAL — SIGNIFICANT CHANGE UP
WBC # BLD: 5.83 K/UL — SIGNIFICANT CHANGE UP (ref 3.8–10.5)
WBC # FLD AUTO: 5.83 K/UL — SIGNIFICANT CHANGE UP (ref 3.8–10.5)
WBC UR QL: SIGNIFICANT CHANGE UP (ref 0–?)

## 2018-12-28 PROCEDURE — 99233 SBSQ HOSP IP/OBS HIGH 50: CPT

## 2018-12-28 PROCEDURE — 99222 1ST HOSP IP/OBS MODERATE 55: CPT | Mod: GC

## 2018-12-28 RX ORDER — GLUCAGON INJECTION, SOLUTION 0.5 MG/.1ML
1 INJECTION, SOLUTION SUBCUTANEOUS ONCE
Qty: 0 | Refills: 0 | Status: DISCONTINUED | OUTPATIENT
Start: 2018-12-28 | End: 2018-12-30

## 2018-12-28 RX ORDER — ATORVASTATIN CALCIUM 80 MG/1
40 TABLET, FILM COATED ORAL AT BEDTIME
Qty: 0 | Refills: 0 | Status: DISCONTINUED | OUTPATIENT
Start: 2018-12-28 | End: 2018-12-30

## 2018-12-28 RX ORDER — SODIUM CHLORIDE 9 MG/ML
1000 INJECTION, SOLUTION INTRAVENOUS
Qty: 0 | Refills: 0 | Status: DISCONTINUED | OUTPATIENT
Start: 2018-12-28 | End: 2018-12-30

## 2018-12-28 RX ORDER — INFLUENZA VIRUS VACCINE 15; 15; 15; 15 UG/.5ML; UG/.5ML; UG/.5ML; UG/.5ML
0.5 SUSPENSION INTRAMUSCULAR ONCE
Qty: 0 | Refills: 0 | Status: DISCONTINUED | OUTPATIENT
Start: 2018-12-28 | End: 2018-12-29

## 2018-12-28 RX ORDER — INSULIN LISPRO 100/ML
VIAL (ML) SUBCUTANEOUS
Qty: 0 | Refills: 0 | Status: DISCONTINUED | OUTPATIENT
Start: 2018-12-28 | End: 2018-12-30

## 2018-12-28 RX ORDER — SODIUM CHLORIDE 9 MG/ML
1000 INJECTION, SOLUTION INTRAVENOUS ONCE
Qty: 0 | Refills: 0 | Status: COMPLETED | OUTPATIENT
Start: 2018-12-28 | End: 2018-12-28

## 2018-12-28 RX ORDER — METRONIDAZOLE 500 MG
500 TABLET ORAL EVERY 8 HOURS
Qty: 0 | Refills: 0 | Status: DISCONTINUED | OUTPATIENT
Start: 2018-12-28 | End: 2018-12-29

## 2018-12-28 RX ORDER — HEPARIN SODIUM 5000 [USP'U]/ML
5000 INJECTION INTRAVENOUS; SUBCUTANEOUS EVERY 12 HOURS
Qty: 0 | Refills: 0 | Status: DISCONTINUED | OUTPATIENT
Start: 2018-12-28 | End: 2018-12-30

## 2018-12-28 RX ORDER — SODIUM CHLORIDE 9 MG/ML
250 INJECTION, SOLUTION INTRAVENOUS ONCE
Qty: 0 | Refills: 0 | Status: COMPLETED | OUTPATIENT
Start: 2018-12-28 | End: 2018-12-28

## 2018-12-28 RX ORDER — DEXTROSE 50 % IN WATER 50 %
12.5 SYRINGE (ML) INTRAVENOUS ONCE
Qty: 0 | Refills: 0 | Status: DISCONTINUED | OUTPATIENT
Start: 2018-12-28 | End: 2018-12-30

## 2018-12-28 RX ORDER — MEMANTINE HYDROCHLORIDE 10 MG/1
10 TABLET ORAL
Qty: 0 | Refills: 0 | Status: DISCONTINUED | OUTPATIENT
Start: 2018-12-28 | End: 2018-12-30

## 2018-12-28 RX ORDER — INSULIN LISPRO 100/ML
VIAL (ML) SUBCUTANEOUS AT BEDTIME
Qty: 0 | Refills: 0 | Status: DISCONTINUED | OUTPATIENT
Start: 2018-12-28 | End: 2018-12-30

## 2018-12-28 RX ORDER — DEXTROSE 50 % IN WATER 50 %
15 SYRINGE (ML) INTRAVENOUS ONCE
Qty: 0 | Refills: 0 | Status: DISCONTINUED | OUTPATIENT
Start: 2018-12-28 | End: 2018-12-30

## 2018-12-28 RX ORDER — FLUDROCORTISONE ACETATE 0.1 MG/1
0.1 TABLET ORAL DAILY
Qty: 0 | Refills: 0 | Status: DISCONTINUED | OUTPATIENT
Start: 2018-12-28 | End: 2018-12-30

## 2018-12-28 RX ORDER — DEXTROSE 50 % IN WATER 50 %
25 SYRINGE (ML) INTRAVENOUS ONCE
Qty: 0 | Refills: 0 | Status: DISCONTINUED | OUTPATIENT
Start: 2018-12-28 | End: 2018-12-30

## 2018-12-28 RX ORDER — SERTRALINE 25 MG/1
100 TABLET, FILM COATED ORAL DAILY
Qty: 0 | Refills: 0 | Status: DISCONTINUED | OUTPATIENT
Start: 2018-12-28 | End: 2018-12-30

## 2018-12-28 RX ORDER — MESALAMINE 400 MG
1000 TABLET, DELAYED RELEASE (ENTERIC COATED) ORAL AT BEDTIME
Qty: 0 | Refills: 0 | Status: DISCONTINUED | OUTPATIENT
Start: 2018-12-28 | End: 2018-12-30

## 2018-12-28 RX ORDER — CHOLECALCIFEROL (VITAMIN D3) 125 MCG
400 CAPSULE ORAL DAILY
Qty: 0 | Refills: 0 | Status: DISCONTINUED | OUTPATIENT
Start: 2018-12-28 | End: 2018-12-30

## 2018-12-28 RX ORDER — TAMSULOSIN HYDROCHLORIDE 0.4 MG/1
0.4 CAPSULE ORAL
Qty: 0 | Refills: 0 | Status: DISCONTINUED | OUTPATIENT
Start: 2018-12-28 | End: 2018-12-30

## 2018-12-28 RX ORDER — MESALAMINE 400 MG
1000 TABLET, DELAYED RELEASE (ENTERIC COATED) ORAL DAILY
Qty: 0 | Refills: 0 | Status: DISCONTINUED | OUTPATIENT
Start: 2018-12-28 | End: 2018-12-30

## 2018-12-28 RX ORDER — QUETIAPINE FUMARATE 200 MG/1
25 TABLET, FILM COATED ORAL
Qty: 0 | Refills: 0 | Status: DISCONTINUED | OUTPATIENT
Start: 2018-12-28 | End: 2018-12-30

## 2018-12-28 RX ORDER — MESALAMINE 400 MG
1600 TABLET, DELAYED RELEASE (ENTERIC COATED) ORAL EVERY 8 HOURS
Qty: 0 | Refills: 0 | Status: DISCONTINUED | OUTPATIENT
Start: 2018-12-28 | End: 2018-12-30

## 2018-12-28 RX ADMIN — SERTRALINE 100 MILLIGRAM(S): 25 TABLET, FILM COATED ORAL at 13:45

## 2018-12-28 RX ADMIN — Medication 100 MILLIGRAM(S): at 02:11

## 2018-12-28 RX ADMIN — Medication 200 MILLIGRAM(S): at 00:36

## 2018-12-28 RX ADMIN — TAMSULOSIN HYDROCHLORIDE 0.4 MILLIGRAM(S): 0.4 CAPSULE ORAL at 05:45

## 2018-12-28 RX ADMIN — Medication 1000 MILLIGRAM(S): at 13:34

## 2018-12-28 RX ADMIN — Medication 100 MILLIGRAM(S): at 18:01

## 2018-12-28 RX ADMIN — MEMANTINE HYDROCHLORIDE 10 MILLIGRAM(S): 10 TABLET ORAL at 05:45

## 2018-12-28 RX ADMIN — Medication 1600 MILLIGRAM(S): at 05:45

## 2018-12-28 RX ADMIN — SODIUM CHLORIDE 1000 MILLILITER(S): 9 INJECTION, SOLUTION INTRAVENOUS at 05:45

## 2018-12-28 RX ADMIN — HEPARIN SODIUM 5000 UNIT(S): 5000 INJECTION INTRAVENOUS; SUBCUTANEOUS at 18:05

## 2018-12-28 RX ADMIN — SODIUM CHLORIDE 100 MILLILITER(S): 9 INJECTION, SOLUTION INTRAVENOUS at 07:25

## 2018-12-28 RX ADMIN — FLUDROCORTISONE ACETATE 0.1 MILLIGRAM(S): 0.1 TABLET ORAL at 05:45

## 2018-12-28 RX ADMIN — Medication 100 MILLIGRAM(S): at 10:18

## 2018-12-28 RX ADMIN — Medication 200 MILLIGRAM(S): at 13:34

## 2018-12-28 RX ADMIN — ATORVASTATIN CALCIUM 40 MILLIGRAM(S): 80 TABLET, FILM COATED ORAL at 21:45

## 2018-12-28 RX ADMIN — Medication 1000 MILLIGRAM(S): at 21:46

## 2018-12-28 RX ADMIN — SODIUM CHLORIDE 250 MILLILITER(S): 9 INJECTION, SOLUTION INTRAVENOUS at 16:40

## 2018-12-28 RX ADMIN — HEPARIN SODIUM 5000 UNIT(S): 5000 INJECTION INTRAVENOUS; SUBCUTANEOUS at 05:45

## 2018-12-28 RX ADMIN — Medication 2: at 15:15

## 2018-12-28 RX ADMIN — Medication 1: at 10:18

## 2018-12-28 RX ADMIN — Medication 1600 MILLIGRAM(S): at 21:46

## 2018-12-28 NOTE — H&P ADULT - GASTROINTESTINAL COMMENTS
pain at lower abdomen, especially LLQ tenderness over lower abdomen - especially at LLQ area, slightly hyperactive bowel sounds

## 2018-12-28 NOTE — CONSULT NOTE ADULT - ATTENDING COMMENTS
History/PE as above. Patient seen/examined. Wife at bedside. History of chronic colitis noted-? UC versus Crohn's colitis (current CT with detection of colitis associated with skip areas-possibly compatible with Crohn's colitis). Wife reports patient was doing relatively well on current regimen of mesalamine 1600 mg b.i.d. and Entyvio up until 2 days ago when he experienced acute onset of watery nonbloody diarrhea. No fever or vomiting. PE/labs as noted. Evaluate for possible intercurrent acute diarrhea of infectious or foodborne etiology versus possible IBD flare. Recommendations as noted above-for now, continue IV hydration as needed in conjunction with low residue diet and maintenance medications including mesalamine and Canasa. Stool studies advised as noted. Pending clinical course may warrant sigmoidoscopy on 12/31/18. Defer corticosteroids for now but will reassess pending clinical course, stool studies and laboratory evaluation.

## 2018-12-28 NOTE — H&P ADULT - NSHPLABSRESULTS_GEN_ALL_CORE
12.1   7.66  )-----------( 185      ( 27 Dec 2018 18:50 )             36.5       12-27    127<L>  |  92<L>  |  33<H>  ----------------------------<  229<H>  3.6   |  16<L>  |  1.38<H>    Ca    8.9      27 Dec 2018 18:50  Phos  3.7     12-27  Mg     1.6     12-27    TPro  7.8  /  Alb  4.4  /  TBili  0.7  /  DBili  x   /  AST  27  /  ALT  22  /  AlkPhos  107  12-27          Urinalysis Basic - ( 27 Dec 2018 23:50 )    Color: YELLOW / Appearance: CLEAR / SG: > 1.050 / pH: 6.0  Gluc: NEGATIVE / Ketone: NEGATIVE  / Bili: NEGATIVE / Urobili: NORMAL   Blood: NEGATIVE / Protein: 20 / Nitrite: NEGATIVE   Leuk Esterase: NEGATIVE / RBC: 0-2 / WBC 0-2   Sq Epi: OCC / Non Sq Epi: x / Bacteria: NEGATIVE    Lactate Trend    CAPILLARY BLOOD GLUCOSE    =============================================    ECG = NSR at 97 bpm, QTc = 485    ============================================= 12.1   7.66  )-----------( 185      ( 27 Dec 2018 18:50 )             36.5       12-27    127<L>  |  92<L>  |  33<H>  ----------------------------<  229<H>  3.6   |  16<L>  |  1.38<H>    Ca    8.9      27 Dec 2018 18:50  Phos  3.7     12-27  Mg     1.6     12-27    TPro  7.8  /  Alb  4.4  /  TBili  0.7  /  DBili  x   /  AST  27  /  ALT  22  /  AlkPhos  107  12-27          Urinalysis Basic - ( 27 Dec 2018 23:50 )    Color: YELLOW / Appearance: CLEAR / SG: > 1.050 / pH: 6.0  Gluc: NEGATIVE / Ketone: NEGATIVE  / Bili: NEGATIVE / Urobili: NORMAL   Blood: NEGATIVE / Protein: 20 / Nitrite: NEGATIVE   Leuk Esterase: NEGATIVE / RBC: 0-2 / WBC 0-2   Sq Epi: OCC / Non Sq Epi: x / Bacteria: NEGATIVE    Lactate Trend    CAPILLARY BLOOD GLUCOSE    =======================================================    ECG = NSR at 97 bpm, QTc = 485    =======================================================  < from: CT Abdomen and Pelvis w/ Oral Cont and w/ IV Cont (12.27.18 @ 22:40) >    EXAM:  CT ABDOMEN AND PELVIS OC IC;           PROCEDURE DATE:  Dec 27 2018       INTERPRETATION:  CLINICAL INFORMATION: Crohn's disease with abdominal   pain, vomiting, and diarrhea.    COMPARISON: CT abdomen pelvis 7/5/2018.    PROCEDURE:   CT of the Abdomen and Pelvis was performed with intravenous contrast.   Intravenous contrast: 90 ml Omnipaque 350. 10 ml discarded.  Oral contrast: None.  Sagittal and coronal reformats were performed.    FINDINGS:    LOWER CHEST: Within normal limits.    LIVER: Within normal limits.  BILE DUCTS: Normal caliber.  GALLBLADDER: Within normal limits.  SPLEEN: Within normal limits.  PANCREAS: Within normal limits.  ADRENALS: Within normal limits.  KIDNEYS/URETERS: Nonobstructing right renal calculus measures 3 mm. Small   left renal cyst. No hydronephrosis.    BLADDER: Within normal limits.  REPRODUCTIVE ORGANS: Prostate is within normal limits.    BOWEL: Skip areas of colonic wall thickening and pericolonic infiltration involving the rectum/distal sigmoid colon and cecum/appendix.   The terminal ileum is unremarkable. Ingested pills are noted within the duodenum.  PERITONEUM: No ascites.  VESSELS:  Atheromatous disease of aorta and iliac vessels.  RETROPERITONEUM: No lymphadenopathy.    ABDOMINALWALL: Within normal limits.  BONES: Degenerative changes of the spine.    IMPRESSION:   Crohn's disease flare involving the rectum/distal sigmoid colon and the cecum/appendix.      VICKIE HARRIS M.D., RADIOLOGY RESIDENT  This document has been electronically signed.  BENEDICTO SHERWOOD M.D., ATTENDING RADIOLOGIST  This document has been electronically signed. Dec 27 2018 11:44PM    < end of copied text >

## 2018-12-28 NOTE — H&P ADULT - HISTORY OF PRESENT ILLNESS
68 year old male, with past history significant for Crohn disease, Alcohol abuse, Dementia, Depression, Type-II DM, CVA w/ residual speech deficits, Herniated disc, Hypertension, Hyperlipidemia and Syncope, presented to the ED secondary to abdominal pain with persistent diarrhea.  Seen and evaluated at bedside;    Vital signs upon ED presentation as follows: BP = 144/75, HR = 102, RR = 16, T = 36.8 C (98.2 F), O2 Sat = 100% on RA.  CT of A/P with finding of ..."Crohn's diease flare involving the rectum/distal sigmoid colon and the cecum/appendix."  BUN/Cr = 33/1.38.  Sodium = 127.  Diagnosed with Acute Kidney Injury and Hyponatremia.  Prescribed NaCl 2 liters and ciprofloxacin 400 mg IV x one in the ED. 68 year old male, with past history significant for Crohn disease, Alcohol abuse, Dementia, Depression, Type-II DM, CVA w/ residual speech deficits, Herniated disc, Hypertension, Hyperlipidemia and Syncope, presented to the ED secondary to abdominal pain with persistent diarrhea.  Seen and evaluated at bedside;    Vital signs upon ED presentation as follows: BP = 144/75, HR = 102, RR = 16, T = 36.8 C (98.2 F), O2 Sat = 100% on RA.  CT of A/P with finding of ..."Crohn's diease flare involving the rectum/distal sigmoid colon and the cecum/appendix."  BUN/Cr = 33/1.38.  Sodium = 127.  Diagnosed with Acute Kidney Injury and Hyponatremia.  Prescribed NaCl 2 liters and ciprofloxacin 400 mg IV, and metronidazole 500 mg IV in the ED. 68 year old male, with past history significant for Crohn disease, Alcohol abuse, Dementia, Depression, Type-II DM, CVA w/ residual speech deficits, Herniated disc, Hypertension, Hyperlipidemia and Syncope, presented to the ED secondary to abdominal pain with persistent diarrhea.  Seen and evaluated at bedside; NAD.  Patient relates onset of diarrhea after drinking 2 shots of liquor on [place] day.  Cites multiple episodes of non-bloody diarrhea.  States no nausea or vomiting, even though documented report of 2 instances of NBNB vomitus on 10/26.  Has lower abdominal pain.  No reports of fevers, chills, sweating.  Reports decreased oral intake since onset of diarrhea.    Vital signs upon ED presentation as follows: BP = 144/75, HR = 102, RR = 16, T = 36.8 C (98.2 F), O2 Sat = 100% on RA.  CT of A/P with finding of ..."Crohn's diease flare involving the rectum/distal sigmoid colon and the cecum/appendix."  BUN/Cr = 33/1.38.  Sodium = 127.  Diagnosed with Acute Kidney Injury and Hyponatremia.  Prescribed NaCl 2 liters and ciprofloxacin 400 mg IV, and metronidazole 500 mg IV in the ED.

## 2018-12-28 NOTE — H&P ADULT - FAMILY HISTORY
Family history of MI (myocardial infarction)     Family history of Alzheimer's disease     Father  Still living? Unknown  Family history of hypertension, Age at diagnosis: Age Unknown  Family history of cerebrovascular accident (CVA) in father, Age at diagnosis: Age Unknown     Sibling  Still living? Unknown  Family history of cerebrovascular accident (CVA) in father, Age at diagnosis: Age Unknown

## 2018-12-28 NOTE — H&P ADULT - PROBLEM SELECTOR PLAN 4
- syncopal episode in 08/2018 thought to be due to same, in the setting of fluid loss similar to current situation  - orthostatic also on this admission  - continues on florinef 0.1 mg daily  - IVF as above  - f/u with repeat measurements

## 2018-12-28 NOTE — H&P ADULT - PMH
Alcohol abuse    Anemia    Crohn disease  diagnosed 2012  Dementia    Depression    Diabetes type 2, controlled    H/O: CVA  2013 | residual speech deficits  Herniated disc    HLD (hyperlipidemia)    HTN (hypertension)  Not taking any meds, noncompliant

## 2018-12-28 NOTE — H&P ADULT - NSHPSOCIALHISTORY_GEN_ALL_CORE
Lives with wife  History of smoking; stopped mid 2015 after 30 years max daily usage of 2 ppd, and tapering down to discontinuation  No history of alcohol abuse (per patient), but documented history of alcohol abuse noted  No history of illegal drug use

## 2018-12-28 NOTE — H&P ADULT - PROBLEM SELECTOR PLAN 5
- glucose = 229 on CMP  - on glipizide 5 mg BID PTA; held for now  - ISS per FS  - consistent carb diet  - f/u HgbA1c level in the AM

## 2018-12-28 NOTE — H&P ADULT - PROBLEM SELECTOR PLAN 2
- BUN/Cr = 33/1.38 (10/0.80 in 08/2018)  - in the setting of decreased oral intake and multiple episodes of diarrhea  - s/p 2 liters NaCl in the ED; additional fluid of 1 liter LR and maintenance at 100 mL x 10 hours added  - f/u AM lab-work  - if no improvement, would do renal ultrasound, obtain nephrology consult

## 2018-12-28 NOTE — H&P ADULT - PROBLEM SELECTOR PLAN 1
- onset of multiple episodes of NB diarrhea after drinking 2 shots of liquor on 12/25/2018  - reports decreased diarrheal episodes since coming to the ED  - appears dehydrated  - on mesalamine suppository and orally PTA; continued  - no steroids (aside from PTA fludrocortisone)  - started on metronidazole and ciprofloxacin in the ED; continued  - IVF hydration  - f/u AM lab-work  - f/u GI consult in the AM (e-mailed)

## 2018-12-28 NOTE — ED ADULT NURSE REASSESSMENT NOTE - NS ED NURSE REASSESS COMMENT FT1
Pt admitted to med.  awaiting bed.  VSS.  pt denies pain.  awaiting further orders.  will continue to monitor

## 2018-12-28 NOTE — H&P ADULT - PROBLEM SELECTOR PLAN 3
- sodium = 127  - in the setting of dehydration (decreased oral intake, multiple episodes of diarrhea)  - fluid administration as above  - f/u for improvement with repeat lab-work

## 2018-12-28 NOTE — CONSULT NOTE ADULT - SUBJECTIVE AND OBJECTIVE BOX
Chief Complaint:  Patient is a 68y old  Male who presents with a chief complaint of Acute kidney injury, Hyponatremia, Crohn's disease flare (28 Dec 2018 04:32)      HPI:  68M with IBD (documented Ulcerative colitis and/or Crohn's dz since 2012; previously on 6-MP, most recently started on Entivyo 9/2018, last infusion 12/4/18; in addition on Mesalamine 1600mg BID and Canasa suppository qhs), +ETOH abuse, HTN, DM2 (A1c 4.6%), presenting from home with diarrhea. The patient reports 3 loose stools yesterday and one loose stool today associated with abdominal cramping. He denies fevers, chills, sick contacts or recent travel. His diarrhea is non-bloody and associated with lower abdominal (LLQ and RLQ) cramping. He denies nausea or vomiting and is able to tolerate PO. The patient is a poor historian and is unsure of his current medications. His medication history was confirmed with [employer] record.  Primary GI- Dr. Gatito Oro     Allergies:  No Known Allergies      Home Medications:  Home Medications:   * Patient Currently Takes Medications as of 24-Aug-2018 16:31 documented in Structured Notes  · 	mesalamine 1000 mg rectal suppository: 1 suppository(ies) rectally 2 times a day   · 	fludrocortisone 0.1 mg oral tablet: 1 tab(s) orally once a day  · 	QUEtiapine 25 mg oral tablet: 1 tab(s) orally 2 times a day, As needed, agitation  · 	mesalamine 800 mg oral delayed release tablet: 2 tab(s) orally 3 times a day  · 	Centrum Silver oral tablet: 1 tab(s) orally once a day  · 	tamsulosin 0.4 mg oral capsule: 1 cap(s) orally 2 times a day  · 	sertraline 100 mg oral tablet: 1 tab(s) orally once a day  · 	Vitamin D3 400 intl units oral tablet: 1 tab(s) orally once a day  · 	Namenda XR 28 mg oral capsule, extended release: 1 cap(s) orally once a day  · 	glipiZIDE 5 mg oral tablet: 1 tab(s) orally 2 times a day  · 	atorvastatin 40 mg oral tablet: 1 tab(s) orally once a day (at bedtime)    Hospital Medications:  atorvastatin 40 milliGRAM(s) Oral at bedtime  cholecalciferol 400 Unit(s) Oral daily  ciprofloxacin   IVPB 400 milliGRAM(s) IV Intermittent every 12 hours  dextrose 40% Gel 15 Gram(s) Oral once PRN  dextrose 5%. 1000 milliLiter(s) IV Continuous <Continuous>  dextrose 50% Injectable 12.5 Gram(s) IV Push once  dextrose 50% Injectable 25 Gram(s) IV Push once  dextrose 50% Injectable 25 Gram(s) IV Push once  fludroCORTISONE 0.1 milliGRAM(s) Oral daily  glucagon  Injectable 1 milliGRAM(s) IntraMuscular once PRN  heparin  Injectable 5000 Unit(s) SubCutaneous every 12 hours  insulin lispro (HumaLOG) corrective regimen sliding scale   SubCutaneous three times a day before meals  insulin lispro (HumaLOG) corrective regimen sliding scale   SubCutaneous at bedtime  lactated ringers. 1000 milliLiter(s) IV Continuous <Continuous>  memantine 10 milliGRAM(s) Oral two times a day  mesalamine DR Capsule 1600 milliGRAM(s) Oral every 8 hours  mesalamine Suppository 1000 milliGRAM(s) Rectal daily  mesalamine Suppository 1000 milliGRAM(s) Rectal at bedtime  metroNIDAZOLE  IVPB 500 milliGRAM(s) IV Intermittent every 8 hours  multivitamin 1 Tablet(s) Oral daily  QUEtiapine 25 milliGRAM(s) Oral two times a day PRN  sertraline 100 milliGRAM(s) Oral daily  tamsulosin 0.4 milliGRAM(s) Oral two times a day      PMHX/PSHX:  Anemia  Dementia  Depression  Alcohol abuse  Crohn disease  Diabetes type 2, controlled  HLD (hyperlipidemia)  H/O: CVA  Herniated disc  HTN (hypertension)  History of cataract surgery  No Past Surgical History      Family history:  Family history of cerebrovascular accident (CVA) in father (Father, Sibling)  Family history of Alzheimer's disease  Family history of hypertension (Father)  Family history of MI (myocardial infarction)      Social History: +ETOH, former tobacco, no illicit drug use     ROS:     General:  No wt loss, fevers, chills, night sweats, fatigue,   Eyes:  Good vision, no reported pain  ENT:  No sore throat, pain, runny nose, dysphagia  CV:  No pain, palpitations, hypo/hypertension  Resp:  No dyspnea, cough, tachypnea, wheezing  GI:  + pain, No nausea, No vomiting, + diarrhea, No constipation, No weight loss, No fever, No pruritis, No rectal bleeding, No tarry stools, No dysphagia,  :  No pain, bleeding, incontinence, nocturia  Muscle:  No pain, weakness  Neuro:  No weakness, tingling, memory problems  Psych:  No fatigue, insomnia, mood problems, depression  Endocrine:  No polyuria, polydipsia, cold/heat intolerance  Heme:  No petechiae, ecchymosis, easy bruisability  Skin:  No rash, tattoos, scars, edema      PHYSICAL EXAM:     GENERAL:  Appears stated age, well-groomed, well-nourished, no distress  HEENT:  NC/AT,  conjunctivae clear and pink, no thyromegaly, nodules, adenopathy, no JVD, sclera -anicteric  CHEST:  Full & symmetric excursion, no increased effort, breath sounds clear  HEART:  Regular rhythm, S1, S2, no murmur/rub/S3/S4, no abdominal bruit, no edema  ABDOMEN:  Soft, periumbilical tenderness to palpation , non-distended, normoactive bowel sounds +adipose tissue  EXTEREMITIES:  no cyanosis,clubbing or edema  SKIN:  No rash/erythema, intact   NEURO:  Alert, oriented, no asterixis, no tremor, no encephalopathy    Vital Signs:  Vital Signs Last 24 Hrs  T(C): 36.8 (28 Dec 2018 05:44), Max: 36.8 (27 Dec 2018 16:41)  T(F): 98.2 (28 Dec 2018 05:44), Max: 98.2 (27 Dec 2018 16:41)  HR: 117 (28 Dec 2018 05:44) (102 - 117)  BP: 110/54 (28 Dec 2018 05:44) (110/54 - 163/80)  BP(mean): --  RR: 18 (28 Dec 2018 05:44) (16 - 18)  SpO2: 100% (28 Dec 2018 05:44) (100% - 100%)  Daily     Daily     LABS:                        10.7   5.83  )-----------( 173      ( 28 Dec 2018 06:00 )             32.4     Mean Cell Volume: 80.8 fL (12-28-18 @ 06:00)    12-28    131<L>  |  103  |  23  ----------------------------<  189<H>  3.9   |  14<L>  |  0.85    Ca    8.0<L>      28 Dec 2018 06:38  Phos  2.9     12-28  Mg     1.6     12-28    TPro  7.8  /  Alb  4.4  /  TBili  0.7  /  DBili  x   /  AST  27  /  ALT  22  /  AlkPhos  107  12-27    LIVER FUNCTIONS - ( 27 Dec 2018 18:50 )  Alb: 4.4 g/dL / Pro: 7.8 g/dL / ALK PHOS: 107 u/L / ALT: 22 u/L / AST: 27 u/L / GGT: x             Urinalysis Basic - ( 27 Dec 2018 23:50 )    Color: YELLOW / Appearance: CLEAR / SG: > 1.050 / pH: 6.0  Gluc: NEGATIVE / Ketone: NEGATIVE  / Bili: NEGATIVE / Urobili: NORMAL   Blood: NEGATIVE / Protein: 20 / Nitrite: NEGATIVE   Leuk Esterase: NEGATIVE / RBC: 0-2 / WBC 0-2   Sq Epi: OCC / Non Sq Epi: x / Bacteria: NEGATIVE                              10.7   5.83  )-----------( 173      ( 28 Dec 2018 06:00 )             32.4                         12.1   7.66  )-----------( 185      ( 27 Dec 2018 18:50 )             36.5     Imaging:  < from: CT Abdomen and Pelvis w/ Oral Cont and w/ IV Cont (12.27.18 @ 22:40) >  EXAM:  CT ABDOMEN AND PELVIS OC IC        PROCEDURE DATE:  Dec 27 2018         INTERPRETATION:  CLINICAL INFORMATION: Crohn's disease with abdominal   pain, vomiting, and diarrhea.    COMPARISON: CT abdomen pelvis 7/5/2018.    PROCEDURE:   CT of the Abdomen and Pelvis was performed with intravenous contrast.   Intravenous contrast: 90 ml Omnipaque 350. 10 ml discarded.  Oral contrast: None.  Sagittal and coronal reformats were performed.    FINDINGS:    LOWER CHEST: Within normal limits.    LIVER: Within normal limits.  BILE DUCTS: Normal caliber.  GALLBLADDER: Within normal limits.  SPLEEN: Within normal limits.  PANCREAS: Within normal limits.  ADRENALS: Within normal limits.  KIDNEYS/URETERS: Nonobstructing right renal calculus measures 3 mm. Small   left renal cyst. No hydronephrosis.    BLADDER: Within normal limits.  REPRODUCTIVE ORGANS: Prostate is within normal limits.    BOWEL: Skip areas of colonic wall thickening and pericolonic infiltration   involving the rectum/distal sigmoid colon and cecum/appendix. The   terminal ileum is unremarkable. Ingested pills are noted within the   duodenum.  PERITONEUM: No ascites.  VESSELS:  Atheromatous disease of aorta and iliac vessels.  RETROPERITONEUM: No lymphadenopathy.    ABDOMINALWALL: Within normal limits.  BONES: Degenerative changes of the spine.    IMPRESSION:   Crohn's disease flare involving the rectum/distal sigmoid colon and the   cecum/appendix.    < end of copied text >

## 2018-12-28 NOTE — CONSULT NOTE ADULT - ASSESSMENT
Impression:  1)Diarrhea- differential includes infectious versus IBD flare versus ischemic colitis   2)IBD- on Entivyo (last dose 12/4/18); mesalamine (PO and suppository)  3)ETOH abuse- no signs of withdrawal at this time     Recommendations:  -check GI stool PCR and C. Diff PCR  -check ESR, CRP  -monitor bowel movements  -IVF hydration, low residue diet as tolerated  -DVT PPx   -hold steroids for now, await results of stool studies  -encouraged ETOH cessation   -can c/w mesalamine 1600mg BID and Canasa suppository qhs     Please call with questions  Tresa Acosta  GI Fellow  Pager: [phone] Impression:  1)Diarrhea- differential includes infectious versus IBD flare versus ischemic colitis   2)IBD- on Entivyo (last dose 12/4/18); mesalamine (PO and suppository)  3)ETOH abuse- no signs of withdrawal at this time     Recommendations:  -check GI stool PCR, Stool culture and C. Diff PCR. Fecal calprotectin.  -check ESR, CRP  -monitor bowel movements  -Low residue diet  -No aspirin/NSAIDs  -IVF hydration, low residue diet as tolerated  -DVT PPx   -hold steroids for now, await results of stool studies  -encouraged ETOH cessation   -can c/w mesalamine 1600mg BID and Canasa suppository qhs     Please call with questions  Tresa Acosta  GI Fellow  Pager: [phone]

## 2018-12-28 NOTE — H&P ADULT - ASSESSMENT
68 year old male, with past history significant for Crohn disease, Alcohol abuse, Dementia, Depression, Type-II DM, CVA w/ residual speech deficits, Herniated disc, Hypertension, Hyperlipidemia and Syncope, presented to the ED secondary to abdominal pain with persistent diarrhea.  Vital signs upon ED presentation as follows: BP = 144/75, HR = 102, RR = 16, T = 36.8 C (98.2 F), O2 Sat = 100% on RA.  CT of A/P with finding of ..."Crohn's diease flare involving the rectum/distal sigmoid colon and the cecum/appendix."  BUN/Cr = 33/1.38.  Sodium = 127.  Diagnosed with Acute Kidney Injury and Hyponatremia.  Admitted for further management of:

## 2018-12-29 LAB
BUN SERPL-MCNC: 8 MG/DL — SIGNIFICANT CHANGE UP (ref 7–23)
CALCIUM SERPL-MCNC: 8.2 MG/DL — LOW (ref 8.4–10.5)
CHLORIDE SERPL-SCNC: 105 MMOL/L — SIGNIFICANT CHANGE UP (ref 98–107)
CO2 SERPL-SCNC: 18 MMOL/L — LOW (ref 22–31)
CREAT SERPL-MCNC: 0.66 MG/DL — SIGNIFICANT CHANGE UP (ref 0.5–1.3)
GI PCR PANEL, STOOL: SIGNIFICANT CHANGE UP
GLUCOSE BLDC GLUCOMTR-MCNC: 127 MG/DL — HIGH (ref 70–99)
GLUCOSE BLDC GLUCOMTR-MCNC: 134 MG/DL — HIGH (ref 70–99)
GLUCOSE BLDC GLUCOMTR-MCNC: 139 MG/DL — HIGH (ref 70–99)
GLUCOSE BLDC GLUCOMTR-MCNC: 156 MG/DL — HIGH (ref 70–99)
GLUCOSE SERPL-MCNC: 132 MG/DL — HIGH (ref 70–99)
HBA1C BLD-MCNC: 6.7 % — HIGH (ref 4–5.6)
POTASSIUM SERPL-MCNC: 3.1 MMOL/L — LOW (ref 3.5–5.3)
POTASSIUM SERPL-SCNC: 3.1 MMOL/L — LOW (ref 3.5–5.3)
SODIUM SERPL-SCNC: 138 MMOL/L — SIGNIFICANT CHANGE UP (ref 135–145)
SPECIMEN SOURCE: SIGNIFICANT CHANGE UP

## 2018-12-29 PROCEDURE — 99232 SBSQ HOSP IP/OBS MODERATE 35: CPT

## 2018-12-29 RX ORDER — BACLOFEN 100 %
2.5 POWDER (GRAM) MISCELLANEOUS AT BEDTIME
Qty: 0 | Refills: 0 | Status: DISCONTINUED | OUTPATIENT
Start: 2018-12-29 | End: 2018-12-30

## 2018-12-29 RX ORDER — POTASSIUM CHLORIDE 20 MEQ
40 PACKET (EA) ORAL EVERY 4 HOURS
Qty: 0 | Refills: 0 | Status: COMPLETED | OUTPATIENT
Start: 2018-12-29 | End: 2018-12-30

## 2018-12-29 RX ORDER — INFLUENZA VIRUS VACCINE 15; 15; 15; 15 UG/.5ML; UG/.5ML; UG/.5ML; UG/.5ML
0.5 SUSPENSION INTRAMUSCULAR ONCE
Qty: 0 | Refills: 0 | Status: DISCONTINUED | OUTPATIENT
Start: 2018-12-29 | End: 2018-12-30

## 2018-12-29 RX ADMIN — TAMSULOSIN HYDROCHLORIDE 0.4 MILLIGRAM(S): 0.4 CAPSULE ORAL at 05:16

## 2018-12-29 RX ADMIN — FLUDROCORTISONE ACETATE 0.1 MILLIGRAM(S): 0.1 TABLET ORAL at 05:16

## 2018-12-29 RX ADMIN — Medication 1600 MILLIGRAM(S): at 13:10

## 2018-12-29 RX ADMIN — Medication 1 TABLET(S): at 11:25

## 2018-12-29 RX ADMIN — Medication 40 MILLIEQUIVALENT(S): at 17:30

## 2018-12-29 RX ADMIN — Medication 40 MILLIEQUIVALENT(S): at 21:57

## 2018-12-29 RX ADMIN — ATORVASTATIN CALCIUM 40 MILLIGRAM(S): 80 TABLET, FILM COATED ORAL at 21:57

## 2018-12-29 RX ADMIN — Medication 1: at 08:05

## 2018-12-29 RX ADMIN — Medication 100 MILLIGRAM(S): at 03:31

## 2018-12-29 RX ADMIN — Medication 100 MILLIGRAM(S): at 09:00

## 2018-12-29 RX ADMIN — Medication 1600 MILLIGRAM(S): at 22:02

## 2018-12-29 RX ADMIN — SODIUM CHLORIDE 100 MILLILITER(S): 9 INJECTION, SOLUTION INTRAVENOUS at 01:28

## 2018-12-29 RX ADMIN — MEMANTINE HYDROCHLORIDE 10 MILLIGRAM(S): 10 TABLET ORAL at 17:30

## 2018-12-29 RX ADMIN — Medication 1600 MILLIGRAM(S): at 06:44

## 2018-12-29 RX ADMIN — SERTRALINE 100 MILLIGRAM(S): 25 TABLET, FILM COATED ORAL at 11:24

## 2018-12-29 RX ADMIN — TAMSULOSIN HYDROCHLORIDE 0.4 MILLIGRAM(S): 0.4 CAPSULE ORAL at 17:30

## 2018-12-29 RX ADMIN — Medication 1000 MILLIGRAM(S): at 22:03

## 2018-12-29 RX ADMIN — Medication 200 MILLIGRAM(S): at 12:54

## 2018-12-29 RX ADMIN — Medication 1000 MILLIGRAM(S): at 13:10

## 2018-12-29 RX ADMIN — Medication 400 UNIT(S): at 11:25

## 2018-12-29 RX ADMIN — Medication 200 MILLIGRAM(S): at 01:27

## 2018-12-29 RX ADMIN — HEPARIN SODIUM 5000 UNIT(S): 5000 INJECTION INTRAVENOUS; SUBCUTANEOUS at 17:30

## 2018-12-29 RX ADMIN — HEPARIN SODIUM 5000 UNIT(S): 5000 INJECTION INTRAVENOUS; SUBCUTANEOUS at 05:16

## 2018-12-29 RX ADMIN — MEMANTINE HYDROCHLORIDE 10 MILLIGRAM(S): 10 TABLET ORAL at 05:16

## 2018-12-29 NOTE — PROGRESS NOTE ADULT - PROBLEM SELECTOR PLAN 4
- syncopal episode in 08/2018 thought to be due to same, in the setting of fluid loss similar to current situation  - orthostatic also on this admission  - continues on florinef 0.1 mg daily  - IVF as above  - f/u with repeat measurements
- syncopal episode in 08/2018 thought to be due to same, in the setting of fluid loss similar to current situation  - orthostatic also on this admission  - continues on florinef 0.1 mg daily  - IVF as above  - f/u with repeat measurements

## 2018-12-29 NOTE — CHART NOTE - NSCHARTNOTEFT_GEN_A_CORE
Called by nursing staff for positive stool PCR for norovirus.Notified attending. will place patient on contact precautions.

## 2018-12-29 NOTE — PROGRESS NOTE ADULT - PROBLEM SELECTOR PROBLEM 1
Crohn's disease of large intestine with other complication
Crohn's disease of large intestine with other complication

## 2018-12-29 NOTE — PROGRESS NOTE ADULT - PROBLEM SELECTOR PROBLEM 5
Type 2 diabetes mellitus with other specified complication, without long-term current use of insulin
Type 2 diabetes mellitus with other specified complication, without long-term current use of insulin

## 2018-12-29 NOTE — PROGRESS NOTE ADULT - PROBLEM SELECTOR PLAN 5
- ISS per FS  - consistent carb diet  -a1c 6.7
- ISS per FS  - consistent carb diet  - f/u HgbA1c level

## 2018-12-29 NOTE — PROGRESS NOTE ADULT - PROBLEM SELECTOR PLAN 2
- prerenal azotemia improving  cont iv fluids  monitor bicarb- improving  AG 15
- BUN/Cr = 33/1.38 (10/0.80 in 08/2018)  - prerenal azotemia improving  cont iv fluids  monitor bicarb

## 2018-12-29 NOTE — PROGRESS NOTE ADULT - PROBLEM SELECTOR PLAN 1
- reports decreased diarrheal episodes since coming to the ED  seen by GI- f/u recs  - await stool studies   - cont metronidazole and ciprofloxacin  - IVF hydration
- reports decreased diarrheal episodes since coming to the ED  seen by GI- f/u recs  - await stool studies to r/o infection before steroids can be initiated  - cont metronidazole and ciprofloxacin in the ED;  - IVF hydration

## 2018-12-30 ENCOUNTER — TRANSCRIPTION ENCOUNTER (OUTPATIENT)
Age: 68
End: 2018-12-30

## 2018-12-30 VITALS
OXYGEN SATURATION: 99 % | RESPIRATION RATE: 18 BRPM | TEMPERATURE: 99 F | HEART RATE: 81 BPM | DIASTOLIC BLOOD PRESSURE: 57 MMHG | SYSTOLIC BLOOD PRESSURE: 112 MMHG

## 2018-12-30 LAB
BUN SERPL-MCNC: 6 MG/DL — LOW (ref 7–23)
CALCIUM SERPL-MCNC: 8.6 MG/DL — SIGNIFICANT CHANGE UP (ref 8.4–10.5)
CHLORIDE SERPL-SCNC: 104 MMOL/L — SIGNIFICANT CHANGE UP (ref 98–107)
CO2 SERPL-SCNC: 21 MMOL/L — LOW (ref 22–31)
CREAT SERPL-MCNC: 0.68 MG/DL — SIGNIFICANT CHANGE UP (ref 0.5–1.3)
GLUCOSE BLDC GLUCOMTR-MCNC: 116 MG/DL — HIGH (ref 70–99)
GLUCOSE BLDC GLUCOMTR-MCNC: 200 MG/DL — HIGH (ref 70–99)
GLUCOSE BLDC GLUCOMTR-MCNC: 211 MG/DL — HIGH (ref 70–99)
GLUCOSE SERPL-MCNC: 241 MG/DL — HIGH (ref 70–99)
HCT VFR BLD CALC: 33.2 % — LOW (ref 39–50)
HGB BLD-MCNC: 10.4 G/DL — LOW (ref 13–17)
MAGNESIUM SERPL-MCNC: 1.5 MG/DL — LOW (ref 1.6–2.6)
MCHC RBC-ENTMCNC: 26.5 PG — LOW (ref 27–34)
MCHC RBC-ENTMCNC: 31.3 % — LOW (ref 32–36)
MCV RBC AUTO: 84.5 FL — SIGNIFICANT CHANGE UP (ref 80–100)
NRBC # FLD: 0 — SIGNIFICANT CHANGE UP
PHOSPHATE SERPL-MCNC: 2.7 MG/DL — SIGNIFICANT CHANGE UP (ref 2.5–4.5)
PLATELET # BLD AUTO: 166 K/UL — SIGNIFICANT CHANGE UP (ref 150–400)
PMV BLD: 10.4 FL — SIGNIFICANT CHANGE UP (ref 7–13)
POTASSIUM SERPL-MCNC: 3.6 MMOL/L — SIGNIFICANT CHANGE UP (ref 3.5–5.3)
POTASSIUM SERPL-SCNC: 3.6 MMOL/L — SIGNIFICANT CHANGE UP (ref 3.5–5.3)
RBC # BLD: 3.93 M/UL — LOW (ref 4.2–5.8)
RBC # FLD: 12.7 % — SIGNIFICANT CHANGE UP (ref 10.3–14.5)
SODIUM SERPL-SCNC: 137 MMOL/L — SIGNIFICANT CHANGE UP (ref 135–145)
WBC # BLD: 3.89 K/UL — SIGNIFICANT CHANGE UP (ref 3.8–10.5)
WBC # FLD AUTO: 3.89 K/UL — SIGNIFICANT CHANGE UP (ref 3.8–10.5)

## 2018-12-30 PROCEDURE — 99232 SBSQ HOSP IP/OBS MODERATE 35: CPT | Mod: GC

## 2018-12-30 PROCEDURE — 99239 HOSP IP/OBS DSCHRG MGMT >30: CPT

## 2018-12-30 RX ORDER — MAGNESIUM SULFATE 500 MG/ML
1 VIAL (ML) INJECTION ONCE
Qty: 0 | Refills: 0 | Status: COMPLETED | OUTPATIENT
Start: 2018-12-30 | End: 2018-12-30

## 2018-12-30 RX ADMIN — SODIUM CHLORIDE 100 MILLILITER(S): 9 INJECTION, SOLUTION INTRAVENOUS at 02:13

## 2018-12-30 RX ADMIN — TAMSULOSIN HYDROCHLORIDE 0.4 MILLIGRAM(S): 0.4 CAPSULE ORAL at 05:15

## 2018-12-30 RX ADMIN — Medication 1600 MILLIGRAM(S): at 13:16

## 2018-12-30 RX ADMIN — MEMANTINE HYDROCHLORIDE 10 MILLIGRAM(S): 10 TABLET ORAL at 05:15

## 2018-12-30 RX ADMIN — Medication 1: at 11:42

## 2018-12-30 RX ADMIN — Medication 40 MILLIEQUIVALENT(S): at 02:09

## 2018-12-30 RX ADMIN — TAMSULOSIN HYDROCHLORIDE 0.4 MILLIGRAM(S): 0.4 CAPSULE ORAL at 17:16

## 2018-12-30 RX ADMIN — SERTRALINE 100 MILLIGRAM(S): 25 TABLET, FILM COATED ORAL at 11:42

## 2018-12-30 RX ADMIN — Medication 100 GRAM(S): at 09:56

## 2018-12-30 RX ADMIN — Medication 2: at 17:02

## 2018-12-30 RX ADMIN — Medication 1000 MILLIGRAM(S): at 11:42

## 2018-12-30 RX ADMIN — Medication 1 TABLET(S): at 11:41

## 2018-12-30 RX ADMIN — Medication 1600 MILLIGRAM(S): at 05:16

## 2018-12-30 RX ADMIN — HEPARIN SODIUM 5000 UNIT(S): 5000 INJECTION INTRAVENOUS; SUBCUTANEOUS at 05:15

## 2018-12-30 RX ADMIN — FLUDROCORTISONE ACETATE 0.1 MILLIGRAM(S): 0.1 TABLET ORAL at 05:16

## 2018-12-30 RX ADMIN — Medication 400 UNIT(S): at 11:42

## 2018-12-30 RX ADMIN — HEPARIN SODIUM 5000 UNIT(S): 5000 INJECTION INTRAVENOUS; SUBCUTANEOUS at 17:16

## 2018-12-30 RX ADMIN — MEMANTINE HYDROCHLORIDE 10 MILLIGRAM(S): 10 TABLET ORAL at 17:16

## 2018-12-30 NOTE — PROGRESS NOTE ADULT - ASSESSMENT
68 year old male, with past history significant for Crohn disease, Alcohol abuse, Dementia, Depression, Type-II DM, CVA w/ residual speech deficits, Herniated disc, Hypertension, Hyperlipidemia and Syncope, presented to the ED secondary to abdominal pain with persistent diarrhea.  likely d/t Crohn's flare- diarrhea improving
68 year old male, with past history significant for Crohn disease, Alcohol abuse, Dementia, Depression, Type-II DM, CVA w/ residual speech deficits, Herniated disc, Hypertension, Hyperlipidemia and Syncope, presented to the ED secondary to abdominal pain with persistent diarrhea.  likely d/t Crohn's flare
Impression:  1)Diarrhea- 2/2 Norovirus. Improving.  2)IBD- on Entivyo (last dose 12/4/18); mesalamine (PO and suppository)  3)ETOH abuse- no signs of withdrawal at this time     Recommendations:  -patient may soon be stable for d/c given resolving viral enteritis  -can c/w mesalamine 1600mg BID and Canasa suppository qhs

## 2018-12-30 NOTE — PROGRESS NOTE ADULT - SUBJECTIVE AND OBJECTIVE BOX
Dr. Leone pager [phone]    Patient is a 68y old  Male who presents with a chief complaint of Acute kidney injury, Hyponatremia, Crohn's disease flare (28 Dec 2018 15:05)      SUBJECTIVE / OVERNIGHT EVENTS: seen at 1145am w/ wife at bedside- diarrhea improving.  having hiccups on/off since he's come in  explained to wife that meds for hiccups will make him sleepy.      MEDICATIONS  (STANDING):  atorvastatin 40 milliGRAM(s) Oral at bedtime  cholecalciferol 400 Unit(s) Oral daily  ciprofloxacin   IVPB 400 milliGRAM(s) IV Intermittent every 12 hours  dextrose 5%. 1000 milliLiter(s) (50 mL/Hr) IV Continuous <Continuous>  dextrose 50% Injectable 12.5 Gram(s) IV Push once  dextrose 50% Injectable 25 Gram(s) IV Push once  dextrose 50% Injectable 25 Gram(s) IV Push once  fludroCORTISONE 0.1 milliGRAM(s) Oral daily  heparin  Injectable 5000 Unit(s) SubCutaneous every 12 hours  influenza   Vaccine 0.5 milliLiter(s) IntraMuscular once  influenza  Vaccine (HIGH DOSE) 0.5 milliLiter(s) IntraMuscular once  insulin lispro (HumaLOG) corrective regimen sliding scale   SubCutaneous three times a day before meals  insulin lispro (HumaLOG) corrective regimen sliding scale   SubCutaneous at bedtime  lactated ringers. 1000 milliLiter(s) (100 mL/Hr) IV Continuous <Continuous>  lactated ringers. 1000 milliLiter(s) (100 mL/Hr) IV Continuous <Continuous>  memantine 10 milliGRAM(s) Oral two times a day  mesalamine DR Capsule 1600 milliGRAM(s) Oral every 8 hours  mesalamine Suppository 1000 milliGRAM(s) Rectal daily  mesalamine Suppository 1000 milliGRAM(s) Rectal at bedtime  metroNIDAZOLE  IVPB 500 milliGRAM(s) IV Intermittent every 8 hours  multivitamin 1 Tablet(s) Oral daily  potassium chloride    Tablet ER 40 milliEquivalent(s) Oral every 4 hours  sertraline 100 milliGRAM(s) Oral daily  tamsulosin 0.4 milliGRAM(s) Oral two times a day    MEDICATIONS  (PRN):  baclofen 2.5 milliGRAM(s) Oral at bedtime PRN hiccups  dextrose 40% Gel 15 Gram(s) Oral once PRN Blood Glucose LESS THAN 70 milliGRAM(s)/deciliter  glucagon  Injectable 1 milliGRAM(s) IntraMuscular once PRN Glucose LESS THAN 70 milligrams/deciliter  QUEtiapine 25 milliGRAM(s) Oral two times a day PRN agitation      Meds ordered within last 24hours  (ADM OVERRIDE):   Qty Removed: 1 each  Route - Dose Given <see task> (12-28 @ 17:57)  (ADM OVERRIDE):   Qty Removed: 1 each  Route - Dose Given <see task> (12-28 @ 17:57)  influenza   Vaccine:   0.5 milliLiter(s), IntraMuscular, once  Indication: Immunization  Administration Instructions: Shake well and refrigerate.  If vaccine is to be given at time of discharge, please allow a miniumum of 30 minutes for patient observation.  If to be given concomitantly with Pneumococcal Vaccine, please use a different arm for each vaccination.  This is (12-28 @ 19:46)  baclofen: [Known as LIORESAL]  2.5 milliGRAM(s), Oral, at bedtime, PRN for hiccups, Stop After 1 Doses  Provider's Contact #: [phone] (12-29 @ 12:03)  influenza  Vaccine (HIGH DOSE): [Ordered as FLUZONE HIGH DOSE]  0.5 milliLiter(s), IntraMuscular, once, Stop After 1 Doses  Administration Instructions: Shake well and refrigerate  If vaccine is to be given at time of discharge, please allow a minimum of 30 minutes for patient observation.  If to be given concomitantly with Pneumococcal Vaccine, please use a different arm for each vaccination.  This is a  Provider's Contact #: [phone] (12-29 @ 15:15)  potassium chloride    Tablet ER:   40 milliEquivalent(s), Oral, every 4 hours, Stop After 3 Doses  Administration Instructions: swallow whole * don't crush/chew  Provider's Contact #: [phone] (12-29 @ 15:39)      T(C): 37.2 (12-29-18 @ 13:55), Max: 37.4 (12-28-18 @ 19:46)  HR: 87 (12-29-18 @ 13:55) (84 - 87)  BP: 125/63 (12-29-18 @ 13:55) (100/47 - 125/63)  RR: 18 (12-29-18 @ 13:55) (18 - 18)  SpO2: 99% (12-29-18 @ 13:55) (99% - 100%)    CAPILLARY BLOOD GLUCOSE      POCT Blood Glucose.: 134 mg/dL (29 Dec 2018 11:35)  POCT Blood Glucose.: 156 mg/dL (29 Dec 2018 07:43)  POCT Blood Glucose.: 130 mg/dL (28 Dec 2018 21:44)    I&O's Summary      PHYSICAL EXAM:  GENERAL: NAD  CHEST/LUNG: Clear to auscultation bilaterally; No wheeze  HEART: Regular rate and rhythm; No murmurs, rubs, or gallops  ABDOMEN: Soft, Nontender, Nondistended; Bowel sounds present  EXTREMITIES:  No clubbing, cyanosis, or edema        LABS:                        10.7   5.83  )-----------( 173      ( 28 Dec 2018 06:00 )             32.4     12-29    138  |  105  |  8   ----------------------------<  132<H>  3.1<L>   |  18<L>  |  0.66    Ca    8.2<L>      29 Dec 2018 11:46  Phos  2.9     12-28  Mg     1.6     12-28    TPro  7.8  /  Alb  4.4  /  TBili  0.7  /  DBili  x   /  AST  27  /  ALT  22  /  AlkPhos  107  12-27          Urinalysis Basic - ( 27 Dec 2018 23:50 )    Color: YELLOW / Appearance: CLEAR / SG: > 1.050 / pH: 6.0  Gluc: NEGATIVE / Ketone: NEGATIVE  / Bili: NEGATIVE / Urobili: NORMAL   Blood: NEGATIVE / Protein: 20 / Nitrite: NEGATIVE   Leuk Esterase: NEGATIVE / RBC: 0-2 / WBC 0-2   Sq Epi: OCC / Non Sq Epi: x / Bacteria: NEGATIVE        RADIOLOGY & ADDITIONAL TESTS:    Imaging Personally Reviewed:    Consultant(s) Notes Reviewed:      Care Discussed with Consultants/Other Providers:
Dr. Leone pager [phone]    Patient is a 68y old  Male who presents with a chief complaint of Acute kidney injury, Hyponatremia, Crohn's disease flare (28 Dec 2018 08:56)      SUBJECTIVE / OVERNIGHT EVENTS: pt seen in [facility] at 1130am- was feeling slightly better less diarrhea and less abd pain  no vomiting; +sick contacts  wife was answering questions for the pt    MEDICATIONS  (STANDING):  atorvastatin 40 milliGRAM(s) Oral at bedtime  cholecalciferol 400 Unit(s) Oral daily  ciprofloxacin   IVPB 400 milliGRAM(s) IV Intermittent every 12 hours  dextrose 5%. 1000 milliLiter(s) (50 mL/Hr) IV Continuous <Continuous>  dextrose 50% Injectable 12.5 Gram(s) IV Push once  dextrose 50% Injectable 25 Gram(s) IV Push once  dextrose 50% Injectable 25 Gram(s) IV Push once  fludroCORTISONE 0.1 milliGRAM(s) Oral daily  heparin  Injectable 5000 Unit(s) SubCutaneous every 12 hours  insulin lispro (HumaLOG) corrective regimen sliding scale   SubCutaneous three times a day before meals  insulin lispro (HumaLOG) corrective regimen sliding scale   SubCutaneous at bedtime  lactated ringers. 1000 milliLiter(s) (100 mL/Hr) IV Continuous <Continuous>  lactated ringers. 1000 milliLiter(s) (100 mL/Hr) IV Continuous <Continuous>  memantine 10 milliGRAM(s) Oral two times a day  mesalamine DR Capsule 1600 milliGRAM(s) Oral every 8 hours  mesalamine Suppository 1000 milliGRAM(s) Rectal daily  mesalamine Suppository 1000 milliGRAM(s) Rectal at bedtime  metroNIDAZOLE  IVPB 500 milliGRAM(s) IV Intermittent every 8 hours  multivitamin 1 Tablet(s) Oral daily  sertraline 100 milliGRAM(s) Oral daily  tamsulosin 0.4 milliGRAM(s) Oral two times a day    MEDICATIONS  (PRN):  dextrose 40% Gel 15 Gram(s) Oral once PRN Blood Glucose LESS THAN 70 milliGRAM(s)/deciliter  glucagon  Injectable 1 milliGRAM(s) IntraMuscular once PRN Glucose LESS THAN 70 milligrams/deciliter  QUEtiapine 25 milliGRAM(s) Oral two times a day PRN agitation      Meds ordered within last 24hours  sodium chloride 0.9% Bolus:   1000 milliLiter(s), IV Bolus, once, infuse over 30 Minute(s), Stop After 1 Doses  Provider's Contact #: [phone] (12-27 @ 18:48)  sodium chloride 0.9% Bolus:   1000 milliLiter(s), IV Bolus, once, infuse over 1 Hr, Stop After 1 Doses  Provider's Contact #: [phone] (12-27 @ 20:59)  ciprofloxacin   IVPB: [Ordered as CIPRO  IVPB]  400 milliGRAM(s) in dextrose 5% 200 milliLiter(s), IV Intermittent, every 12 hours, infuse over 60 Minute(s)  Indication: crohns flare  Administration Instructions: DO NOT Refrigerate  Provider's Contact #: [phone] (12-27 @ 23:54)  metroNIDAZOLE  IVPB: [Ordered as FLAGYL IVPB]  500 milliGRAM(s) in IV Solution 100 milliLiter(s), IV Intermittent, Once, infuse over 60 Minute(s), Stop After 1 Doses  Indication: colitis  Administration Instructions: DO NOT Refrigerate  This is a Look-alike/Sound-alike Medication  Provider's Contact #: [phone] (12-27 @ 23:54)  (Floorstock):   Qty Removed: 1 each (12-28 @ 00:38)  (Floorstock):   Qty Removed: 1 each (12-28 @ 02:11)  lactated ringers Bolus:   1000 milliLiter(s), IV Bolus, once, infuse over 60 Minute(s), Stop After 1 Doses  Provider's Contact #: [phone] (12-28 @ 04:28)  lactated ringers.: Solution, 1000 milliLiter(s) infuse at 100 mL/Hr, Stop After 10 Hours  Special Instructions: ~ Please start after IVF LR 1 liter bolus ends.  Re-evaluate after 10 hours.  Thanks.  Provider's Contact #: [phone] (12-28 @ 04:28)  mesalamine Suppository: [Known as CANASA]  1000 milliGRAM(s), Rectal, daily  Administration Instructions: for rectal use  Provider's Contact #: [phone] (12-28 @ 04:57)  mesalamine Suppository: [Known as CANASA]  1000 milliGRAM(s), Rectal, at bedtime  Administration Instructions: for rectal use  Provider's Contact #: [phone] (12-28 @ 04:57)  mesalamine DR Capsule: [Known as DELZICOL]  1600 milliGRAM(s), Oral, every 8 hours  Administration Instructions: Give 1 hour before or 2 hours after a meal. Swallow whole. Do not crush or chew.  Provider's Contact #: [phone] (12-28 @ 04:57)  fludroCORTISONE: [Known as FLORINEF]  0.1 milliGRAM(s), Oral, daily  Administration Instructions: This is a Look-alike/Sound-alike Medication  Provider's Contact #: [phone] (12-28 @ 04:57)  tamsulosin: [Known as FLOMAX]  0.4 milliGRAM(s), Oral, two times a day  Indication: BPH  Administration Instructions: swallow whole *don't crush/chew  This is a Look-alike/Sound-alike Medication  Provider's Contact #: [phone] (12-28 @ 04:57)  sertraline: [Known as ZOLOFT]  100 milliGRAM(s), Oral, daily  Provider's Contact #: [phone] (12-28 @ 04:57)  atorvastatin: [Known as LIPITOR]  40 milliGRAM(s), Oral, at bedtime  Provider's Contact #: [phone] (12-28 @ 04:57)  QUEtiapine: [Known as SEROquel]  25 milliGRAM(s), Oral, two times a day, PRN for agitation  Administration Instructions: This is a Look-alike/Sound-alike Medication  Provider's Contact #: [phone] (12-28 @ 04:57)  multivitamin:   1 Tablet(s), Oral, daily  Provider's Contact #: [phone] (12-28 @ 04:57)  cholecalciferol: [Known as VITAMIN D3]  400 Unit(s), Oral, daily  Provider's Contact #: [phone] (12-28 @ 04:57)  memantine: [Known as NAMENDA]  10 milliGRAM(s), Oral, two times a day  Provider's Contact #: [phone] (12-28 @ 04:57)  heparin  Injectable:   5000 Unit(s), SubCutaneous, every 12 hours  Provider's Contact #: [phone] (12-28 @ 05:02)  (Floorstock):   Qty Removed: 1 each (12-28 @ 05:41)  (Floorstock):   Qty Removed: 1 each (12-28 @ 05:41)  (Floorstock):   Qty Removed: 1 each (12-28 @ 05:41)  (Floorstock):   Qty Removed: 4 each (12-28 @ 05:41)  (Floorstock):   Qty Removed: 1 each (12-28 @ 05:41)  insulin lispro (HumaLOG) corrective regimen sliding scale:       1 Unit(s) if Glucose 151 - 200      2 Unit(s) if Glucose 201 - 250      3 Unit(s) if Glucose 251 - 300      4 Unit(s) if Glucose 301 - 350      5 Unit(s) if Glucose 351 - 400      6 Unit(s) if Glucose Greater Than 400 + Contact MD  SubCutaneous, three times a day before meals  Special Instructions: Give correctional scale insulin REGARDLESS of PO status NOTIFY Provider for blood glucose LESS THAN 70 milliGRAM(s)/deciLiter or above 400 milliGRAM(s)/deciLiter.  Administration Instructions: *Per Sliding Scale*  Dispose unused medication in BLACK bin.  This is a Look-alike/Sound-alike Medication  Provider's Contact #: [phone] (12-28 @ 06:04)  insulin lispro (HumaLOG) corrective regimen sliding scale:       0 Unit(s) if Glucose 0 - 250      1 Unit(s) if Glucose 251 - 300      2 Unit(s) if Glucose 301 - 350      3 Unit(s) if Glucose 351 - 400      4 Unit(s) if Glucose Greater Than 400 + Contact Provider  SubCutaneous, at bedtime  Special Instructions: Give correctional scale insulin REGARDLESS of PO status NOTIFY Provider for blood glucose LESS THAN 70 milliGRAM(s)/deciLiter or above 400 milliGRAM(s)/deciLiter.  Administration Instructions: Dispose unused medication in BLACK bin.  This is a Look-alike/Sound-alike Medication  Provider's Contact #: [phone] (12-28 @ 06:04)  dextrose 5%.: Solution, 1000 milliLiter(s) infuse at 50 mL/Hr  Special Instructions: Conditional Order: HYPOGLYCEMIA PROTOCOL  Provider's Contact #: [phone] (12-28 @ 06:04)  dextrose 40% Gel: [Known as GLUTOSE 15]  15 Gram(s), Oral, once, PRN for Blood Glucose LESS THAN 70 milliGRAM(s)/deciliter, Stop After 1 Doses  Special Instructions: Conditional Order: HYPOGLYCEMIA PROTOCOL  Administration Instructions: Contents of 37.5 Gram(s) tube delivers 15 Gram(s) dextrose  Provider's Contact #: [phone] (12-28 @ 06:04)  dextrose 50% Injectable:   12.5 Gram(s), IV Push, once, Stop After 1 Doses  Special Instructions: Conditional Order: HYPOGLYCEMIA PROTOCOL  Provider's Contact #: [phone] (12-28 @ 06:04)  dextrose 50% Injectable:   25 Gram(s), IV Push, once, Stop After 1 Doses  Special Instructions: Conditional Order: HYPOGLYCEMIA PROTOCOL  Provider's Contact #: [phone] (12-28 @ 06:04)  dextrose 50% Injectable:   25 Gram(s), IV Push, once, Stop After 1 Doses  Special Instructions: Conditional Order: HYPOGLYCEMIA PROTOCOL  Provider's Contact #: [phone] (12-28 @ 06:04)  glucagon  Injectable:   1 milliGRAM(s), IntraMuscular, once, PRN for Glucose LESS THAN 70 milligrams/deciliter, Stop After 1 Doses  Special Instructions: Conditional Order: HYPOGLYCEMIA PROTOCOL  Provider's Contact #: [phone] (12-28 @ 06:04)  metroNIDAZOLE  IVPB: [Ordered as FLAGYL IVPB]  500 milliGRAM(s) in IV Solution 100 milliLiter(s), IV Intermittent, every 8 hours, infuse over 60 Minute(s)  Indication: Colitis  Administration Instructions: DO NOT Refrigerate  This is a Look-alike/Sound-alike Medication  Provider's Contact #: [phone] (12-28 @ 06:04)  (ADM OVERRIDE):   Qty Removed: 1 each  Route - Dose Given <see task> (12-28 @ 10:14)  (ADM OVERRIDE):   Qty Removed: 1 each  Route - Dose Given <see task> (12-28 @ 10:18)  lactated ringers.: Solution, 1000 milliLiter(s) infuse at 100 mL/Hr  Provider's Contact #: [phone] (12-28 @ 10:19)  (Floorstock):   Qty Removed: 1 each (12-28 @ 12:22)  (Floorstock):   Qty Removed: 1 each (12-28 @ 12:22)  (Floorstock):   Qty Removed: 1 each (12-28 @ 12:22)  (Floorstock):   Qty Removed: 1 each (12-28 @ 13:31)      T(C): 36.8 (12-28-18 @ 05:44), Max: 36.8 (12-27-18 @ 16:41)  HR: 117 (12-28-18 @ 05:44) (102 - 117)  BP: 110/54 (12-28-18 @ 05:44) (110/54 - 163/80)  RR: 18 (12-28-18 @ 05:44) (16 - 18)  SpO2: 100% (12-28-18 @ 05:44) (100% - 100%)    CAPILLARY BLOOD GLUCOSE      POCT Blood Glucose.: 194 mg/dL (28 Dec 2018 09:49)    I&O's Summary      PHYSICAL EXAM:  GENERAL: NAD, looks tired  CHEST/LUNG: Clear to auscultation bilaterally; No wheeze  HEART: Regular rate and rhythm; No murmurs, rubs, or gallops  ABDOMEN: Soft, Nontender, slightly distended; Bowel sounds present  EXTREMITIES:  No clubbing, cyanosis, or edema        LABS:                        10.7   5.83  )-----------( 173      ( 28 Dec 2018 06:00 )             32.4     12-28    131<L>  |  103  |  23  ----------------------------<  189<H>  3.9   |  14<L>  |  0.85    Ca    8.0<L>      28 Dec 2018 06:38  Phos  2.9     12-28  Mg     1.6     12-28    TPro  7.8  /  Alb  4.4  /  TBili  0.7  /  DBili  x   /  AST  27  /  ALT  22  /  AlkPhos  107  12-27          Urinalysis Basic - ( 27 Dec 2018 23:50 )    Color: YELLOW / Appearance: CLEAR / SG: > 1.050 / pH: 6.0  Gluc: NEGATIVE / Ketone: NEGATIVE  / Bili: NEGATIVE / Urobili: NORMAL   Blood: NEGATIVE / Protein: 20 / Nitrite: NEGATIVE   Leuk Esterase: NEGATIVE / RBC: 0-2 / WBC 0-2   Sq Epi: OCC / Non Sq Epi: x / Bacteria: NEGATIVE        RADIOLOGY & ADDITIONAL TESTS:    Imaging Personally Reviewed:    Consultant(s) Notes Reviewed:      Care Discussed with Consultants/Other Providers:
Patient is a 68y old  Male who presents with a chief complaint of Acute kidney injury, Hyponatremia, Crohn's disease flare (29 Dec 2018 15:37)      SUBJECTIVE / OVERNIGHT EVENTS:  2 small bm overnight. Appears to be improving.  MEDICATIONS  (STANDING):  atorvastatin 40 milliGRAM(s) Oral at bedtime  cholecalciferol 400 Unit(s) Oral daily  dextrose 5%. 1000 milliLiter(s) (50 mL/Hr) IV Continuous <Continuous>  dextrose 50% Injectable 12.5 Gram(s) IV Push once  dextrose 50% Injectable 25 Gram(s) IV Push once  dextrose 50% Injectable 25 Gram(s) IV Push once  fludroCORTISONE 0.1 milliGRAM(s) Oral daily  heparin  Injectable 5000 Unit(s) SubCutaneous every 12 hours  influenza  Vaccine (HIGH DOSE) 0.5 milliLiter(s) IntraMuscular once  insulin lispro (HumaLOG) corrective regimen sliding scale   SubCutaneous three times a day before meals  insulin lispro (HumaLOG) corrective regimen sliding scale   SubCutaneous at bedtime  lactated ringers. 1000 milliLiter(s) (100 mL/Hr) IV Continuous <Continuous>  lactated ringers. 1000 milliLiter(s) (100 mL/Hr) IV Continuous <Continuous>  memantine 10 milliGRAM(s) Oral two times a day  mesalamine DR Capsule 1600 milliGRAM(s) Oral every 8 hours  mesalamine Suppository 1000 milliGRAM(s) Rectal daily  mesalamine Suppository 1000 milliGRAM(s) Rectal at bedtime  multivitamin 1 Tablet(s) Oral daily  sertraline 100 milliGRAM(s) Oral daily  tamsulosin 0.4 milliGRAM(s) Oral two times a day    MEDICATIONS  (PRN):  baclofen 2.5 milliGRAM(s) Oral at bedtime PRN hiccups  dextrose 40% Gel 15 Gram(s) Oral once PRN Blood Glucose LESS THAN 70 milliGRAM(s)/deciliter  glucagon  Injectable 1 milliGRAM(s) IntraMuscular once PRN Glucose LESS THAN 70 milligrams/deciliter  QUEtiapine 25 milliGRAM(s) Oral two times a day PRN agitation              PHYSICAL EXAM:  GENERAL: NAD, well-developed  HEAD:  Atraumatic, Normocephalic  EYES: EOMI, PERRLA, conjunctiva and sclera anicteric  NECK: Supple, No JVD  CHEST/LUNG: Clear to auscultation bilaterally; No wheeze  HEART: Regular rate and rhythm; No murmurs, rubs, or gallops  ABDOMEN: Soft, Nontender, Nondistended; Bowel sounds present, no hepatosplenomegaly, no rebound or guarding  EXTREMITIES:  2+ Peripheral Pulses, No clubbing, cyanosis, or edema  PSYCH: AAOx3  NEUROLOGY: non-focal, no asterixis  SKIN: No rashes or lesion    LABS:                        10.4   3.89  )-----------( 166      ( 30 Dec 2018 09:17 )             33.2     12-30    137  |  104  |  6<L>  ----------------------------<  241<H>  3.6   |  21<L>  |  0.68    Ca    8.6      30 Dec 2018 09:17  Phos  2.7     12-30  Mg     1.5     12-30                  RADIOLOGY & ADDITIONAL TESTS:

## 2018-12-30 NOTE — CHART NOTE - NSCHARTNOTEFT_GEN_A_CORE
pt seen and examined at 315p- felt better  for dc home today  op f/u w/ GI  d/w gi fellow as well  stools less frequent  wife would prefer dc home today  see dc note time 40min

## 2018-12-30 NOTE — DISCHARGE NOTE ADULT - HOSPITAL COURSE
68 year old male, with past history significant for Crohn disease, Alcohol abuse, Dementia, Depression, Type-II DM, CVA w/ residual speech deficits, Herniated disc, Hypertension, Hyperlipidemia and Syncope, presented to the ED secondary to abdominal pain with persistent diarrhea.      Crohn's disease   onset of multiple episodes of NB diarrhea after drinking 2 shots of liquor on 12/25/2018  - reports decreased diarrheal episodes since coming to the ED  - appears dehydrated  - on mesalamine suppository and orally PTA; continued  - no steroids (aside from PTA fludrocortisone)  - started on metronidazole and ciprofloxacin    CRISTINA    Plan:  - BUN/Cr = 33/1.38 (10/0.80 in 08/2018)  - in the setting of decreased oral intake and multiple episodes of diarrhea  - if no improvement, would do renal ultrasound, obtain nephrology consult.     Hyponatremia.     Plan:  - sodium = 127  - in the setting of dehydration (decreased oral intake, multiple episodes of diarrhea)  - fluid administration as above  - f/u for improvement with repeat lab-work.      Orthostatic hypotension.    Plan:  - syncopal episode in 08/2018 thought to be due to same, in the setting of fluid loss similar to current situation  - orthostatic also on this admission  - continues on florinef 0.1 mg daily  - IVF as above  - f/u with repeat measurements.     Type 2 diabetes  - glucose = 229 on CMP  - on glipizide 5 mg BID PTA; held for now  - ISS per FS  - consistent carb diet  - A1c: 6.7 68 year old male, with past history significant for Crohn disease, Alcohol abuse, Dementia, Depression, Type-II DM, CVA w/ residual speech deficits, Herniated disc, Hypertension, Hyperlipidemia and Syncope, presented to the ED secondary to abdominal pain with persistent diarrhea.      Crohn's disease   onset of multiple episodes of NB diarrhea after drinking 2 shots of liquor on 12/25/2018  - reports decreased diarrheal episodes since coming to the ED  - appears dehydrated  - on mesalamine suppository and orally PTA; continued  - no steroids (aside from PTA fludrocortisone)  - started on metronidazole and ciprofloxacin---> d/c'd   - Stool cx +norovirus, supportive care   - outpatient follow up with GI     +Norovirus  -s/p IVF  -Supportive care, diarrhea resolved     CRISTINA    Plan:  - BUN/Cr = 33/1.38 (10/0.80 in 08/2018)  - in the setting of decreased oral intake and multiple episodes of diarrhea  - if no improvement, would do renal ultrasound, obtain nephrology consult.     Hyponatremia.    - in the setting of dehydration (decreased oral intake, multiple episodes of diarrhea)  - resolved w/ IVF      Orthostatic hypotension.   - syncopal episode in 08/2018 thought to be due to same, in the setting of fluid loss similar to current situation  - orthostatic also on this admission  - continues on florinef 0.1 mg daily  - IVF as above      Type 2 diabetes  - on glipizide 5 mg BID PTA; resumed on d/c   - ISS per FS while inpatient   - consistent carb diet  - A1c: 6.7    Dispo: Home, PT c/s: no skilled needs. Pt medically stable for d/c per attg

## 2018-12-30 NOTE — DISCHARGE NOTE ADULT - PATIENT PORTAL LINK FT
You can access the [facility] Patient Portal, offered by [facility], by registering with the following website: [email or web address]

## 2018-12-30 NOTE — DISCHARGE NOTE ADULT - CARE PLAN
Principal Discharge DX:	Colitis  Assessment and plan of treatment:	You tested positive for norovirus in the stool, no antibiotics are needed. Continue supportive care. Outpatient follow up with PCP within 1 week of discharge from hospital  Secondary Diagnosis:	Hyponatremia  Assessment and plan of treatment:	resolved  Secondary Diagnosis:	Type 2 diabetes mellitus with other specified complication, without long-term current use of insulin  Assessment and plan of treatment:	Continue consistent carbohydrate diet.  Monitor blood glucose levels throughout the day before meals and at bedtime.  Record blood sugars and bring to outpatient providers appointment in order to be reviewed by your doctor for management modifications.  Be aware of diabetes management symptoms including feeling cool and clammy may be related to low glucose levels.  Feeling hot and dry may indicate high glucose levels.  If  you feel these symptoms, check your blood sugar.  Make regular podiatry appointments in order to have feet checked for wounds and toe nails cut by a doctor to prevent infections.  Secondary Diagnosis:	Crohn's disease of large intestine with other complication  Assessment and plan of treatment:	Continue medications as directed. Outpatient follow up with your PCP/gastroenterologist within 1 week of discharge from hospital  Secondary Diagnosis:	CRISTINA (acute kidney injury)  Assessment and plan of treatment:	resolved after receiving intravenous fluids.  Secondary Diagnosis:	Dementia  Assessment and plan of treatment:	Continue Namenda Principal Discharge DX:	Colitis  Goal:	resolution of symptoms  Assessment and plan of treatment:	You tested positive for norovirus in the stool, no antibiotics are needed. Continue supportive care. Outpatient follow up with PCP within 1 week of discharge from hospital  Secondary Diagnosis:	Hyponatremia  Assessment and plan of treatment:	resolved  Secondary Diagnosis:	Type 2 diabetes mellitus with other specified complication, without long-term current use of insulin  Assessment and plan of treatment:	Continue consistent carbohydrate diet.  Monitor blood glucose levels throughout the day before meals and at bedtime.  Record blood sugars and bring to outpatient providers appointment in order to be reviewed by your doctor for management modifications.  Be aware of diabetes management symptoms including feeling cool and clammy may be related to low glucose levels.  Feeling hot and dry may indicate high glucose levels.  If  you feel these symptoms, check your blood sugar.  Make regular podiatry appointments in order to have feet checked for wounds and toe nails cut by a doctor to prevent infections.  Secondary Diagnosis:	Crohn's disease of large intestine with other complication  Assessment and plan of treatment:	Continue medications as directed. Outpatient follow up with your PCP/gastroenterologist within 1 week of discharge from hospital  Secondary Diagnosis:	CRISTINA (acute kidney injury)  Assessment and plan of treatment:	resolved after receiving intravenous fluids.  Secondary Diagnosis:	Dementia  Assessment and plan of treatment:	Continue Namenda  Secondary Diagnosis:	Alcohol abuse  Assessment and plan of treatment:	Please abstain from ingesting alcohol in order to optimize your health and prevent adverse events. Continue to supplement with vitamins and follow-up with your primary care provider for further medical care. If you need immediate assistance with substance abuse you may contact the [facility] by calling [phone] open 9am-7pm.

## 2018-12-30 NOTE — DISCHARGE NOTE ADULT - PLAN OF CARE
You tested positive for norovirus in the stool, no antibiotics are needed. Continue supportive care. Outpatient follow up with PCP within 1 week of discharge from hospital resolved Continue consistent carbohydrate diet.  Monitor blood glucose levels throughout the day before meals and at bedtime.  Record blood sugars and bring to outpatient providers appointment in order to be reviewed by your doctor for management modifications.  Be aware of diabetes management symptoms including feeling cool and clammy may be related to low glucose levels.  Feeling hot and dry may indicate high glucose levels.  If  you feel these symptoms, check your blood sugar.  Make regular podiatry appointments in order to have feet checked for wounds and toe nails cut by a doctor to prevent infections. Continue medications as directed. Outpatient follow up with your PCP/gastroenterologist within 1 week of discharge from hospital resolved after receiving intravenous fluids. Continue Namenda resolution of symptoms Please abstain from ingesting alcohol in order to optimize your health and prevent adverse events. Continue to supplement with vitamins and follow-up with your primary care provider for further medical care. If you need immediate assistance with substance abuse you may contact the [facility] by calling [phone] open 9am-7pm.

## 2018-12-30 NOTE — PHYSICAL THERAPY INITIAL EVALUATION ADULT - ACTIVE RANGE OF MOTION EXAMINATION, REHAB EVAL
bilateral lower extremity Active ROM was WNL (within normal limits)/adelso. upper extremity Active ROM was WNL (within normal limits)

## 2018-12-30 NOTE — PROGRESS NOTE ADULT - ATTENDING COMMENTS
Symptomatically improved today-decreased diarrhea. Reports no fever, nausea or vomiting. GI PCR positive for Norovirus. Admission for acute onset increased severity of watery diarrhea consistent with acute viral gastroenteritis from Nororvirus without indication of IBD flare. Continue maintenance IBD meds of mesalamine/Canasa. Low-residue diet.

## 2018-12-30 NOTE — PHYSICAL THERAPY INITIAL EVALUATION ADULT - PERTINENT HX OF CURRENT PROBLEM, REHAB EVAL
68 year old male, with past history significant for Crohn disease, Alcohol abuse, Dementia, Depression, Type-II DM, CVA w/ residual speech deficits, Herniated disc, Hypertension, Hyperlipidemia and Syncope, presented to the ED secondary to abdominal pain with persistent diarrhea

## 2018-12-30 NOTE — DISCHARGE NOTE ADULT - ADDITIONAL INSTRUCTIONS
Please follow up with your primary care physician/gastroenterologist within 1 week of discharge from hospital.

## 2018-12-30 NOTE — PHYSICAL THERAPY INITIAL EVALUATION ADULT - PATIENT PROFILE REVIEW, REHAB EVAL
No formal activity order in the computer; spoke with RN Emanuel White prior to PT evaluation--> Pt OK for PT consult/OOB activity/yes

## 2018-12-30 NOTE — DISCHARGE NOTE ADULT - SECONDARY DIAGNOSIS.
Hyponatremia Type 2 diabetes mellitus with other specified complication, without long-term current use of insulin Crohn's disease of large intestine with other complication CRISTINA (acute kidney injury) Dementia Alcohol abuse

## 2018-12-30 NOTE — DISCHARGE NOTE ADULT - CARE PROVIDER_API CALL
Osei Mendoza), Gastroenterology; Internal Medicine  [address]  [place] [address]  Phone: [phone]  Fax: [phone]

## 2018-12-30 NOTE — PHYSICAL THERAPY INITIAL EVALUATION ADULT - ADDITIONAL COMMENTS
Pt reports that he lives in a private house with wife, son, daughter, and daughter in law, with ~2 SPENSER; (+)bilateral Handrails, and a flight of stairs to negotiate to bedroom; (+)1 handrail. Prior to hospital admission pt was completely independent and used no assistive device with ambulation, although wife does supervise. Pt's last fall was 12/26/2018 after he slipped trying to get to the bathroom and prior to that he fell in July of 2018.    Pt left comfortable in bed, NAD, all lines intact, all precautions maintained, with call bell in reach, wife @bedside, and RN aware of PT evaluation.

## 2018-12-30 NOTE — DISCHARGE NOTE ADULT - MEDICATION SUMMARY - MEDICATIONS TO TAKE
I will START or STAY ON the medications listed below when I get home from the hospital:    mesalamine 800 mg oral delayed release tablet  -- 2 tab(s) by mouth 3 times a day  -- Indication: For Crohn's disease of large intestine with other complication    mesalamine 1000 mg rectal suppository  -- 1 suppository(ies) rectally 2 times a day   -- For rectal use only.    -- Indication: For Crohn's disease of large intestine with other complication    fludrocortisone 0.1 mg oral tablet  -- 1 tab(s) by mouth once a day  -- Indication: For Crohn's disease of large intestine with other complication    tamsulosin 0.4 mg oral capsule  -- 1 cap(s) by mouth 2 times a day  -- Indication: For bph     sertraline 100 mg oral tablet  -- 1 tab(s) by mouth once a day  -- Indication: For Dementia    glipiZIDE 5 mg oral tablet  -- 1 tab(s) by mouth 2 times a day  -- Indication: For Type 2 diabetes mellitus with other specified complication, without long-term current use of insulin    atorvastatin 40 mg oral tablet  -- 1 tab(s) by mouth once a day (at bedtime)  -- Indication: For elevated cholesterol     QUEtiapine 25 mg oral tablet  -- 1 tab(s) by mouth 2 times a day, As needed, agitation  -- Indication: For Dementia    Namenda XR 28 mg oral capsule, extended release  -- 1 cap(s) by mouth once a day  -- Indication: For Dementia    Centrum Silver oral tablet  -- 1 tab(s) by mouth once a day  -- Indication: For vitamin     Vitamin D3 400 intl units oral tablet  -- 1 tab(s) by mouth once a day  -- Indication: For supplement

## 2018-12-30 NOTE — PROGRESS NOTE ADULT - REASON FOR ADMISSION
Acute kidney injury, Hyponatremia, Crohn's disease flare

## 2019-01-02 PROBLEM — D64.9 ANEMIA, UNSPECIFIED: Chronic | Status: ACTIVE | Noted: 2018-12-28

## 2019-01-07 ENCOUNTER — APPOINTMENT (OUTPATIENT)
Dept: INTERNAL MEDICINE | Facility: CLINIC | Age: 69
End: 2019-01-07
Payer: MEDICARE

## 2019-01-07 VITALS
BODY MASS INDEX: 24.34 KG/M2 | HEIGHT: 70 IN | SYSTOLIC BLOOD PRESSURE: 124 MMHG | TEMPERATURE: 98.6 F | HEART RATE: 86 BPM | WEIGHT: 170 LBS | DIASTOLIC BLOOD PRESSURE: 79 MMHG | OXYGEN SATURATION: 95 %

## 2019-01-07 PROCEDURE — 99495 TRANSJ CARE MGMT MOD F2F 14D: CPT | Mod: 25

## 2019-01-07 PROCEDURE — G0009: CPT

## 2019-01-07 PROCEDURE — 90732 PPSV23 VACC 2 YRS+ SUBQ/IM: CPT

## 2019-01-07 PROCEDURE — 90662 IIV NO PRSV INCREASED AG IM: CPT

## 2019-01-07 PROCEDURE — G0008: CPT

## 2019-01-07 RX ORDER — FLUDROCORTISONE ACETATE 0.1 MG/1
0.1 TABLET ORAL
Qty: 30 | Refills: 5 | Status: DISCONTINUED | COMMUNITY
Start: 2018-09-07 | End: 2019-01-07

## 2019-01-07 NOTE — HEALTH RISK ASSESSMENT
[] : No [No falls in past year] : Patient reported no falls in the past year [1] : 2) Feeling down, depressed, or hopeless for several days (1) [[id number]] : 2

## 2019-01-07 NOTE — REVIEW OF SYSTEMS
[Night Sweats] : no night sweats [Earache] : no earache [Nasal Discharge] : no nasal discharge [Chest Pain] : no chest pain [Orthopena] : no orthopnea [Shortness Of Breath] : no shortness of breath [Diarrhea] : no diarrhea [Vomiting] : no vomiting

## 2019-01-07 NOTE — PLAN
[FreeTextEntry1] : crohns  continue meds and entyvio infusion\par diabetes good control a 1 c 6.7\par recent norwalk virus resolved\par under psych care

## 2019-01-07 NOTE — PHYSICAL EXAM
[No Acute Distress] : no acute distress [Well Nourished] : well nourished [Normal Outer Ear/Nose] : the outer ears and nose were normal in appearance [No Respiratory Distress] : no respiratory distress  [Clear to Auscultation] : lungs were clear to auscultation bilaterally [Normal Rate] : normal rate  [Regular Rhythm] : with a regular rhythm [Soft] : abdomen soft [No HSM] : no HSM [Normal Bowel Sounds] : normal bowel sounds [No Joint Swelling] : no joint swelling [Moderate Complexity requires multiple possible diagnoses and/or the management options, moderate complexity of the medical data (tests, etc.) to be reviewed, and moderate risk of significant complications, morbidity, and/or mortality as well as co-morbidi] : Moderate Complexity

## 2019-01-07 NOTE — HISTORY OF PRESENT ILLNESS
[Post-hospitalization from ___ Hospital] : Post-hospitalization from [unfilled] Hospital [Admitted on: ___] : The patient was admitted on [unfilled] [Discharged on ___] : discharged on [unfilled] [FreeTextEntry2] : Hospitalized with vomting and diarrhea\par stool positive for norwalk virus\par ct scan showed crohns sith skip\par has started entyvio and also on mesalamine

## 2019-01-08 ENCOUNTER — APPOINTMENT (OUTPATIENT)
Dept: GASTROENTEROLOGY | Facility: CLINIC | Age: 69
End: 2019-01-08

## 2019-01-14 ENCOUNTER — APPOINTMENT (OUTPATIENT)
Dept: INTERNAL MEDICINE | Facility: CLINIC | Age: 69
End: 2019-01-14

## 2019-01-18 ENCOUNTER — MEDICATION RENEWAL (OUTPATIENT)
Age: 69
End: 2019-01-18

## 2019-01-30 ENCOUNTER — APPOINTMENT (OUTPATIENT)
Dept: RHEUMATOLOGY | Facility: CLINIC | Age: 69
End: 2019-01-30
Payer: MEDICARE

## 2019-01-30 PROCEDURE — 96413 CHEMO IV INFUSION 1 HR: CPT

## 2019-02-22 NOTE — PHYSICAL THERAPY INITIAL EVALUATION ADULT - STANDING BALANCE: DYNAMIC, REHAB EVAL
[facility]    PATIENT'S NAME: Herminio Wangnldebbie   ATTENDING PHYSICIAN: Dean Leija M.D. OPERATING PHYSICIAN: Alexander Portillo M.D.    PATIENT ACCOUNT#:   [de-identified]    LOCATION:  [address] [place] [address]  MEDICAL RECORD #:   [id number]       DATE OF BIRTH:  [birth year] good [birth year]

## 2019-03-08 ENCOUNTER — APPOINTMENT (OUTPATIENT)
Dept: INTERNAL MEDICINE | Facility: CLINIC | Age: 69
End: 2019-03-08
Payer: MEDICARE

## 2019-03-08 VITALS
WEIGHT: 174 LBS | HEIGHT: 70 IN | OXYGEN SATURATION: 97 % | DIASTOLIC BLOOD PRESSURE: 65 MMHG | SYSTOLIC BLOOD PRESSURE: 134 MMHG | HEART RATE: 88 BPM | BODY MASS INDEX: 24.91 KG/M2 | TEMPERATURE: 99.5 F

## 2019-03-08 PROCEDURE — 36415 COLL VENOUS BLD VENIPUNCTURE: CPT

## 2019-03-08 PROCEDURE — 99214 OFFICE O/P EST MOD 30 MIN: CPT | Mod: 25

## 2019-03-08 PROCEDURE — G0439: CPT

## 2019-03-08 RX ORDER — GLIPIZIDE 5 MG/1
5 TABLET ORAL DAILY
Qty: 90 | Refills: 3 | Status: DISCONTINUED | COMMUNITY
Start: 2018-03-14 | End: 2019-03-08

## 2019-03-08 RX ORDER — MEMANTINE HYDROCHLORIDE 28 MG/1
28 CAPSULE, EXTENDED RELEASE ORAL
Qty: 90 | Refills: 0 | Status: DISCONTINUED | COMMUNITY
Start: 2018-09-07 | End: 2019-03-08

## 2019-03-08 NOTE — HISTORY OF PRESENT ILLNESS
[de-identified] : Annual visit\par Had all vaccines and recent colon\par \par \par \par Crohns under reasonable control  only 2-3 bm daily\par diabetes on med\par some neck pain\par take occasional celebrex\par urine with chronic suppressive therapy with trimethoprim\par sleep ok with seroquel\par on zoloft also\par still with aphasia

## 2019-03-08 NOTE — REVIEW OF SYSTEMS
[Nocturia] : nocturia [Frequency] : frequency [Back Pain] : back pain [Fever] : no fever [Night Sweats] : no night sweats [Earache] : no earache [Nasal Discharge] : no nasal discharge [Chest Pain] : no chest pain [Shortness Of Breath] : no shortness of breath [Wheezing] : no wheezing [Abdominal Pain] : no abdominal pain [Vomiting] : no vomiting [Dysuria] : no dysuria [FreeTextEntry9] : neck pain

## 2019-03-08 NOTE — HEALTH RISK ASSESSMENT
[Fair] :  ~his/her~ mood as fair [One fall no injury in past year] : Patient reported one fall in the past year without injury [1] : 2) Feeling down, depressed, or hopeless for several days (1) [Learning/Retaining New Information] : difficulty learning/retaining new information [Handling Complex Tasks] : difficulty handling complex tasks [Reasoning] : difficulty with reasoning [Spatial Ability and Orientation] : difficulty with spatial ability and orientation [None] : None [Retired] : retired [] :  [Feels Safe at Home] : Feels safe at home [Fully functional (bathing, dressing, toileting, transferring, walking, feeding)] : Fully functional (bathing, dressing, toileting, transferring, walking, feeding) [Fully functional (using the telephone, shopping, preparing meals, housekeeping, doing laundry, using] : Fully functional and needs no help or supervision to perform IADLs (using the telephone, shopping, preparing meals, housekeeping, doing laundry, using transportation, managing medications and managing finances) [High School] : high school [Reports changes in vision] : Reports changes in vision [Smoke Detector] : smoke detector [Carbon Monoxide Detector] : carbon monoxide detector [] : No [[id number]] : 2 [Change in mental status noted] : No change in mental status noted [Language] : denies difficulty with language [Behavior] : denies difficulty with behavior [Reports changes in hearing] : Reports no changes in hearing [Reports changes in dental health] : Reports no changes in dental health [Guns at Home] : no guns at home

## 2019-03-08 NOTE — HEALTH RISK ASSESSMENT
[Fair] :  ~his/her~ mood as fair [One fall no injury in past year] : Patient reported one fall in the past year without injury [1] : 2) Feeling down, depressed, or hopeless for several days (1) [Learning/Retaining New Information] : difficulty learning/retaining new information [Handling Complex Tasks] : difficulty handling complex tasks [Reasoning] : difficulty with reasoning [Spatial Ability and Orientation] : difficulty with spatial ability and orientation [None] : None [Retired] : retired [] :  [Feels Safe at Home] : Feels safe at home [Fully functional (bathing, dressing, toileting, transferring, walking, feeding)] : Fully functional (bathing, dressing, toileting, transferring, walking, feeding) [Fully functional (using the telephone, shopping, preparing meals, housekeeping, doing laundry, using] : Fully functional and needs no help or supervision to perform IADLs (using the telephone, shopping, preparing meals, housekeeping, doing laundry, using transportation, managing medications and managing finances) [High School] : high school [Reports changes in vision] : Reports changes in vision [Smoke Detector] : smoke detector [Carbon Monoxide Detector] : carbon monoxide detector [] : No [[id number]] : 2 [Change in mental status noted] : No change in mental status noted [Language] : denies difficulty with language [Behavior] : denies difficulty with behavior [Reports changes in hearing] : Reports no changes in hearing [Reports changes in dental health] : Reports no changes in dental health [Guns at Home] : no guns at home

## 2019-03-08 NOTE — PLAN
[FreeTextEntry1] : Medicare Annual\par had flu and pneumonia vaccine\par recent colon\par \par crohns under control\par diabetes on med\par bph and frequent uti on med\par psy on med\par full bloods

## 2019-03-08 NOTE — PHYSICAL EXAM
[No Acute Distress] : no acute distress [Well Nourished] : well nourished [Normal Outer Ear/Nose] : the outer ears and nose were normal in appearance [Normal Oropharynx] : the oropharynx was normal [No JVD] : no jugular venous distention [Supple] : supple [No Respiratory Distress] : no respiratory distress  [Clear to Auscultation] : lungs were clear to auscultation bilaterally [Normal Rate] : normal rate  [Regular Rhythm] : with a regular rhythm [No Edema] : there was no peripheral edema [Soft] : abdomen soft [No HSM] : no HSM [Normal Bowel Sounds] : normal bowel sounds [No Joint Swelling] : no joint swelling [Grossly Normal Strength/Tone] : grossly normal strength/tone [Normal Gait] : normal gait [Coordination Grossly Intact] : coordination grossly intact

## 2019-03-08 NOTE — HISTORY OF PRESENT ILLNESS
[de-identified] : Annual visit\par Had all vaccines and recent colon\par \par \par \par Crohns under reasonable control  only 2-3 bm daily\par diabetes on med\par some neck pain\par take occasional celebrex\par urine with chronic suppressive therapy with trimethoprim\par sleep ok with seroquel\par on zoloft also\par still with aphasia

## 2019-03-09 LAB
ALBUMIN SERPL ELPH-MCNC: 4.5 G/DL
ALP BLD-CCNC: 120 U/L
ALT SERPL-CCNC: 16 U/L
ANION GAP SERPL CALC-SCNC: 11 MMOL/L
AST SERPL-CCNC: 17 U/L
BASOPHILS # BLD AUTO: 0.04 K/UL
BASOPHILS NFR BLD AUTO: 0.5 %
BILIRUB SERPL-MCNC: 0.5 MG/DL
BUN SERPL-MCNC: 17 MG/DL
CALCIUM SERPL-MCNC: 10 MG/DL
CHLORIDE SERPL-SCNC: 101 MMOL/L
CHOLEST SERPL-MCNC: 107 MG/DL
CHOLEST/HDLC SERPL: 3.5 RATIO
CO2 SERPL-SCNC: 25 MMOL/L
CREAT SERPL-MCNC: 0.91 MG/DL
CRP SERPL-MCNC: <0.1 MG/DL
EOSINOPHIL # BLD AUTO: 0.31 K/UL
EOSINOPHIL NFR BLD AUTO: 3.6 %
ERYTHROCYTE [SEDIMENTATION RATE] IN BLOOD BY WESTERGREN METHOD: 39 MM/HR
GLUCOSE SERPL-MCNC: 169 MG/DL
HBA1C MFR BLD HPLC: 5.6 %
HCT VFR BLD CALC: 38.2 %
HDLC SERPL-MCNC: 31 MG/DL
HGB BLD-MCNC: 11.6 G/DL
IMM GRANULOCYTES NFR BLD AUTO: 0.3 %
LDLC SERPL CALC-MCNC: 42 MG/DL
LYMPHOCYTES # BLD AUTO: 3.83 K/UL
LYMPHOCYTES NFR BLD AUTO: 44 %
MAN DIFF?: NORMAL
MCHC RBC-ENTMCNC: 26.7 PG
MCHC RBC-ENTMCNC: 30.4 GM/DL
MCV RBC AUTO: 87.8 FL
MONOCYTES # BLD AUTO: 0.81 K/UL
MONOCYTES NFR BLD AUTO: 9.3 %
NEUTROPHILS # BLD AUTO: 3.68 K/UL
NEUTROPHILS NFR BLD AUTO: 42.3 %
PLATELET # BLD AUTO: 219 K/UL
POTASSIUM SERPL-SCNC: 4.3 MMOL/L
PROT SERPL-MCNC: 7.6 G/DL
RBC # BLD: 4.35 M/UL
RBC # FLD: 12.1 %
SODIUM SERPL-SCNC: 137 MMOL/L
T4 SERPL-MCNC: 9.4 UG/DL
TRIGL SERPL-MCNC: 172 MG/DL
TSH SERPL-ACNC: 0.6 UIU/ML
WBC # FLD AUTO: 8.7 K/UL

## 2019-03-25 ENCOUNTER — LABORATORY RESULT (OUTPATIENT)
Age: 69
End: 2019-03-25

## 2019-03-25 ENCOUNTER — APPOINTMENT (OUTPATIENT)
Dept: RHEUMATOLOGY | Facility: CLINIC | Age: 69
End: 2019-03-25
Payer: MEDICARE

## 2019-03-25 PROCEDURE — 36415 COLL VENOUS BLD VENIPUNCTURE: CPT

## 2019-03-25 PROCEDURE — 96413 CHEMO IV INFUSION 1 HR: CPT

## 2019-04-03 ENCOUNTER — APPOINTMENT (OUTPATIENT)
Dept: ORTHOPEDIC SURGERY | Facility: CLINIC | Age: 69
End: 2019-04-03

## 2019-04-09 NOTE — ED ADULT NURSE NOTE - NSFALLRSKASSESSTYPE_ED_ALL_ED
Initial (On Arrival)
63 y/o male on dialysis (MWF) c/o vomiting x4 days and SOB after missing dialysis Monday. Denies abd pain, fevers, chills. Abd soft, non tender. Reports SOB is worse with exertion. LUE AV fistula in place, +thrill/bruit.

## 2019-04-12 ENCOUNTER — APPOINTMENT (OUTPATIENT)
Dept: GASTROENTEROLOGY | Facility: CLINIC | Age: 69
End: 2019-04-12

## 2019-05-07 ENCOUNTER — MEDICATION RENEWAL (OUTPATIENT)
Age: 69
End: 2019-05-07

## 2019-05-23 ENCOUNTER — APPOINTMENT (OUTPATIENT)
Dept: GASTROENTEROLOGY | Facility: CLINIC | Age: 69
End: 2019-05-23
Payer: MEDICARE

## 2019-05-23 VITALS
HEART RATE: 110 BPM | SYSTOLIC BLOOD PRESSURE: 122 MMHG | WEIGHT: 189 LBS | RESPIRATION RATE: 16 BRPM | HEIGHT: 70 IN | DIASTOLIC BLOOD PRESSURE: 68 MMHG | OXYGEN SATURATION: 97 % | BODY MASS INDEX: 27.06 KG/M2

## 2019-05-23 PROCEDURE — 99214 OFFICE O/P EST MOD 30 MIN: CPT

## 2019-05-23 NOTE — ASSESSMENT
[FreeTextEntry1] : Ulcerative colitis good response to vedolizumab\par \par Plan\par Continue current medications including mesalamine\par Office visit in 4 months prior to surveillance colonoscopy\par \par Note: Some paperwork filled out for patient, his wife documenting his ulcerative colitis, persistent bowel complaints, need for diapers, but also potential need for some homecare given his neuropsychiatric issues.\par Patient's wife advised to have additional paperwork filled out by his geriatric psychiatry to help support home care request

## 2019-05-23 NOTE — PHYSICAL EXAM
[General Appearance - Alert] : alert [General Appearance - In No Acute Distress] : in no acute distress [Sclera] : the sclera and conjunctiva were normal [PERRL With Normal Accommodation] : pupils were equal in size, round, and reactive to light [Extraocular Movements] : extraocular movements were intact [Outer Ear] : the ears and nose were normal in appearance [Oropharynx] : the oropharynx was normal [Neck Appearance] : the appearance of the neck was normal [Neck Cervical Mass (___cm)] : no neck mass was observed [Jugular Venous Distention Increased] : there was no jugular-venous distention [Thyroid Diffuse Enlargement] : the thyroid was not enlarged [Thyroid Nodule] : there were no palpable thyroid nodules [Auscultation Breath Sounds / Voice Sounds] : lungs were clear to auscultation bilaterally [Heart Rate And Rhythm] : heart rate was normal and rhythm regular [Heart Sounds] : normal S1 and S2 [Heart Sounds Gallop] : no gallops [Murmurs] : no murmurs [Heart Sounds Pericardial Friction Rub] : no pericardial rub [Edema] : there was no peripheral edema [Bowel Sounds] : normal bowel sounds [Abdomen Soft] : soft [Abdomen Tenderness] : non-tender [] : no hepato-splenomegaly [Abdomen Mass (___ Cm)] : no abdominal mass palpated [FreeTextEntry1] : Alert, well-oriented, flat affect

## 2019-05-24 ENCOUNTER — APPOINTMENT (OUTPATIENT)
Dept: RHEUMATOLOGY | Facility: CLINIC | Age: 69
End: 2019-05-24
Payer: MEDICARE

## 2019-05-24 PROCEDURE — 96413 CHEMO IV INFUSION 1 HR: CPT

## 2019-06-11 ENCOUNTER — APPOINTMENT (OUTPATIENT)
Dept: INTERNAL MEDICINE | Facility: CLINIC | Age: 69
End: 2019-06-11
Payer: MEDICARE

## 2019-06-11 VITALS
HEART RATE: 89 BPM | WEIGHT: 190 LBS | OXYGEN SATURATION: 95 % | HEIGHT: 70 IN | DIASTOLIC BLOOD PRESSURE: 68 MMHG | BODY MASS INDEX: 27.2 KG/M2 | SYSTOLIC BLOOD PRESSURE: 133 MMHG

## 2019-06-11 DIAGNOSIS — Z87.898 PERSONAL HISTORY OF OTHER SPECIFIED CONDITIONS: ICD-10-CM

## 2019-06-11 DIAGNOSIS — A08.11 ACUTE GASTROENTEROPATHY DUE TO NORWALK AGENT: ICD-10-CM

## 2019-06-11 DIAGNOSIS — R53.81 OTHER MALAISE: ICD-10-CM

## 2019-06-11 DIAGNOSIS — N39.0 URINARY TRACT INFECTION, SITE NOT SPECIFIED: ICD-10-CM

## 2019-06-11 DIAGNOSIS — K52.9 NONINFECTIVE GASTROENTERITIS AND COLITIS, UNSPECIFIED: ICD-10-CM

## 2019-06-11 DIAGNOSIS — Z87.438 PERSONAL HISTORY OF OTHER DISEASES OF MALE GENITAL ORGANS: ICD-10-CM

## 2019-06-11 DIAGNOSIS — R06.09 OTHER FORMS OF DYSPNEA: ICD-10-CM

## 2019-06-11 DIAGNOSIS — R39.9 UNSPECIFIED SYMPTOMS AND SIGNS INVOLVING THE GENITOURINARY SYSTEM: ICD-10-CM

## 2019-06-11 DIAGNOSIS — Z87.448 PERSONAL HISTORY OF OTHER DISEASES OF URINARY SYSTEM: ICD-10-CM

## 2019-06-11 DIAGNOSIS — R53.83 OTHER MALAISE: ICD-10-CM

## 2019-06-11 DIAGNOSIS — Z87.19 PERSONAL HISTORY OF OTHER DISEASES OF THE DIGESTIVE SYSTEM: ICD-10-CM

## 2019-06-11 DIAGNOSIS — N40.1 BENIGN PROSTATIC HYPERPLASIA WITH LOWER URINARY TRACT SYMPMS: ICD-10-CM

## 2019-06-11 DIAGNOSIS — N13.8 BENIGN PROSTATIC HYPERPLASIA WITH LOWER URINARY TRACT SYMPMS: ICD-10-CM

## 2019-06-11 DIAGNOSIS — Z86.79 PERSONAL HISTORY OF OTHER DISEASES OF THE CIRCULATORY SYSTEM: ICD-10-CM

## 2019-06-11 PROCEDURE — 99214 OFFICE O/P EST MOD 30 MIN: CPT | Mod: 25

## 2019-06-11 PROCEDURE — 36415 COLL VENOUS BLD VENIPUNCTURE: CPT

## 2019-06-11 NOTE — HISTORY OF PRESENT ILLNESS
[FreeTextEntry1] : diabetes, cholesterol and crohn [de-identified] : Diabetes on glucotrol\par does not do fs last a1c low\par 2-3 formed bm on entyvio every 8 week\par on statin\par psych issues under control with med

## 2019-06-11 NOTE — PHYSICAL EXAM
[Well Nourished] : well nourished [No Acute Distress] : no acute distress [No JVD] : no jugular venous distention [Supple] : supple [No Respiratory Distress] : no respiratory distress  [Normal Rate] : normal rate  [Clear to Auscultation] : lungs were clear to auscultation bilaterally [No HSM] : no HSM [Regular Rhythm] : with a regular rhythm

## 2019-06-11 NOTE — PLAN
[FreeTextEntry1] : Crohns excellent control\par blood pressure good\par bph good\par psych issue good\par diabetes and lipids to check\par full blood\par if well f/u 6 months

## 2019-06-12 LAB
24R-OH-CALCIDIOL SERPL-MCNC: 40.7 PG/ML
25(OH)D3 SERPL-MCNC: 29.5 NG/ML
ALBUMIN SERPL ELPH-MCNC: 4.2 G/DL
ALP BLD-CCNC: 128 U/L
ALT SERPL-CCNC: 12 U/L
ANION GAP SERPL CALC-SCNC: 14 MMOL/L
AST SERPL-CCNC: 15 U/L
BASOPHILS # BLD AUTO: 0.05 K/UL
BASOPHILS NFR BLD AUTO: 0.7 %
BILIRUB SERPL-MCNC: 0.4 MG/DL
BUN SERPL-MCNC: 15 MG/DL
CALCIUM SERPL-MCNC: 9.5 MG/DL
CHLORIDE SERPL-SCNC: 100 MMOL/L
CHOLEST SERPL-MCNC: 127 MG/DL
CHOLEST/HDLC SERPL: 3.7 RATIO
CO2 SERPL-SCNC: 23 MMOL/L
CREAT SERPL-MCNC: 1.04 MG/DL
CRP SERPL-MCNC: 0.11 MG/DL
EOSINOPHIL # BLD AUTO: 0.33 K/UL
EOSINOPHIL NFR BLD AUTO: 4.4 %
ERYTHROCYTE [SEDIMENTATION RATE] IN BLOOD BY WESTERGREN METHOD: 38 MM/HR
ESTIMATED AVERAGE GLUCOSE: 126 MG/DL
FOLATE SERPL-MCNC: >20 NG/ML
GLUCOSE SERPL-MCNC: 268 MG/DL
HBA1C MFR BLD HPLC: 6 %
HCT VFR BLD CALC: 35.9 %
HDLC SERPL-MCNC: 34 MG/DL
HGB BLD-MCNC: 11.1 G/DL
IMM GRANULOCYTES NFR BLD AUTO: 0.4 %
LDLC SERPL CALC-MCNC: 55 MG/DL
LYMPHOCYTES # BLD AUTO: 3.02 K/UL
LYMPHOCYTES NFR BLD AUTO: 40 %
MAN DIFF?: NORMAL
MCHC RBC-ENTMCNC: 26.4 PG
MCHC RBC-ENTMCNC: 30.9 GM/DL
MCV RBC AUTO: 85.3 FL
MONOCYTES # BLD AUTO: 0.57 K/UL
MONOCYTES NFR BLD AUTO: 7.5 %
NEUTROPHILS # BLD AUTO: 3.55 K/UL
NEUTROPHILS NFR BLD AUTO: 47 %
PLATELET # BLD AUTO: 209 K/UL
POTASSIUM SERPL-SCNC: 4.5 MMOL/L
PROT SERPL-MCNC: 7.2 G/DL
RBC # BLD: 4.21 M/UL
RBC # FLD: 12 %
SODIUM SERPL-SCNC: 137 MMOL/L
T4 FREE SERPL-MCNC: 1.3 NG/DL
TRIGL SERPL-MCNC: 189 MG/DL
TSH SERPL-ACNC: 0.56 UIU/ML
VIT B12 SERPL-MCNC: 670 PG/ML
WBC # FLD AUTO: 7.55 K/UL

## 2019-07-15 ENCOUNTER — APPOINTMENT (OUTPATIENT)
Dept: RHEUMATOLOGY | Facility: CLINIC | Age: 69
End: 2019-07-15
Payer: MEDICARE

## 2019-07-15 PROCEDURE — 96413 CHEMO IV INFUSION 1 HR: CPT

## 2019-07-18 LAB
25(OH)D3 SERPL-MCNC: 35.2 NG/ML
ADJUSTED MITOGEN: 1.05 IU/ML
ADJUSTED TB AG: 0.01 IU/ML
M TB IFN-G BLD-IMP: NEGATIVE
QUANTIFERON GOLD NIL: 0.03 IU/ML
VIT B12 SERPL-MCNC: 836 PG/ML

## 2019-09-09 ENCOUNTER — APPOINTMENT (OUTPATIENT)
Dept: RHEUMATOLOGY | Facility: CLINIC | Age: 69
End: 2019-09-09
Payer: MEDICARE

## 2019-09-09 PROCEDURE — 96413 CHEMO IV INFUSION 1 HR: CPT

## 2019-09-24 ENCOUNTER — APPOINTMENT (OUTPATIENT)
Dept: GASTROENTEROLOGY | Facility: CLINIC | Age: 69
End: 2019-09-24

## 2019-10-14 ENCOUNTER — MEDICATION RENEWAL (OUTPATIENT)
Age: 69
End: 2019-10-14

## 2019-11-04 ENCOUNTER — APPOINTMENT (OUTPATIENT)
Dept: RHEUMATOLOGY | Facility: CLINIC | Age: 69
End: 2019-11-04
Payer: MEDICARE

## 2019-11-04 PROCEDURE — 96413 CHEMO IV INFUSION 1 HR: CPT

## 2019-12-31 ENCOUNTER — APPOINTMENT (OUTPATIENT)
Dept: RHEUMATOLOGY | Facility: CLINIC | Age: 69
End: 2019-12-31
Payer: MEDICARE

## 2019-12-31 PROCEDURE — 96413 CHEMO IV INFUSION 1 HR: CPT

## 2020-01-20 ENCOUNTER — APPOINTMENT (OUTPATIENT)
Dept: INTERNAL MEDICINE | Facility: CLINIC | Age: 70
End: 2020-01-20
Payer: MEDICARE

## 2020-01-20 VITALS
HEART RATE: 96 BPM | WEIGHT: 193 LBS | DIASTOLIC BLOOD PRESSURE: 72 MMHG | SYSTOLIC BLOOD PRESSURE: 117 MMHG | TEMPERATURE: 99 F | OXYGEN SATURATION: 95 % | BODY MASS INDEX: 27.63 KG/M2 | HEIGHT: 70 IN

## 2020-01-20 PROCEDURE — 90653 IIV ADJUVANT VACCINE IM: CPT

## 2020-01-20 PROCEDURE — G0008: CPT

## 2020-01-20 PROCEDURE — 99214 OFFICE O/P EST MOD 30 MIN: CPT | Mod: 25

## 2020-01-20 PROCEDURE — 36415 COLL VENOUS BLD VENIPUNCTURE: CPT

## 2020-01-20 NOTE — PHYSICAL EXAM
[Normal Outer Ear/Nose] : the outer ears and nose were normal in appearance [Normal Voice/Communication] : normal voice/communication [No Acute Distress] : no acute distress [No JVD] : no jugular venous distention [Normal Oropharynx] : the oropharynx was normal [No Accessory Muscle Use] : no accessory muscle use [No Lymphadenopathy] : no lymphadenopathy [No Respiratory Distress] : no respiratory distress  [No Edema] : there was no peripheral edema [Normal Rate] : normal rate  [Soft] : abdomen soft [Regular Rhythm] : with a regular rhythm [No HSM] : no HSM

## 2020-01-20 NOTE — HISTORY OF PRESENT ILLNESS
[FreeTextEntry1] : follow up [de-identified] : colitis getting entyvio doing well\par diabetes on orals\par tests sugar good\par not low or high\par bp on med\par dementia/psych on med and stable\par no sob or cp\par on chronic med to avoid uti\par s/p cva with some aphasia

## 2020-01-20 NOTE — ASSESSMENT
[FreeTextEntry1] : diabetes to check blood\par bp good\par s/p cva stable\par crohns clinically stable check sed and crp\par psych stable\par check urine\par f/u 6 month physical

## 2020-01-21 LAB
24R-OH-CALCIDIOL SERPL-MCNC: 36.8 PG/ML
25(OH)D3 SERPL-MCNC: 30.5 NG/ML
ALBUMIN SERPL ELPH-MCNC: 4.2 G/DL
ALP BLD-CCNC: 130 U/L
ALT SERPL-CCNC: 13 U/L
ANION GAP SERPL CALC-SCNC: 13 MMOL/L
AST SERPL-CCNC: 14 U/L
BASOPHILS # BLD AUTO: 0.06 K/UL
BASOPHILS NFR BLD AUTO: 0.9 %
BILIRUB SERPL-MCNC: 0.5 MG/DL
BUN SERPL-MCNC: 16 MG/DL
CALCIUM SERPL-MCNC: 9.1 MG/DL
CHLORIDE SERPL-SCNC: 105 MMOL/L
CHOLEST SERPL-MCNC: 114 MG/DL
CHOLEST/HDLC SERPL: 3.8 RATIO
CO2 SERPL-SCNC: 21 MMOL/L
CREAT SERPL-MCNC: 1.02 MG/DL
CRP SERPL-MCNC: 0.22 MG/DL
EOSINOPHIL # BLD AUTO: 0.34 K/UL
EOSINOPHIL NFR BLD AUTO: 5.3 %
ERYTHROCYTE [SEDIMENTATION RATE] IN BLOOD BY WESTERGREN METHOD: 61 MM/HR
ESTIMATED AVERAGE GLUCOSE: 137 MG/DL
FOLATE SERPL-MCNC: >20 NG/ML
GLUCOSE SERPL-MCNC: 240 MG/DL
HBA1C MFR BLD HPLC: 6.4 %
HCT VFR BLD CALC: 35.3 %
HDLC SERPL-MCNC: 30 MG/DL
HGB BLD-MCNC: 10.7 G/DL
IMM GRANULOCYTES NFR BLD AUTO: 0.3 %
LDLC SERPL CALC-MCNC: 56 MG/DL
LYMPHOCYTES # BLD AUTO: 3.06 K/UL
LYMPHOCYTES NFR BLD AUTO: 47.9 %
MAN DIFF?: NORMAL
MCHC RBC-ENTMCNC: 24.8 PG
MCHC RBC-ENTMCNC: 30.3 GM/DL
MCV RBC AUTO: 81.9 FL
MONOCYTES # BLD AUTO: 0.64 K/UL
MONOCYTES NFR BLD AUTO: 10 %
NEUTROPHILS # BLD AUTO: 2.27 K/UL
NEUTROPHILS NFR BLD AUTO: 35.6 %
PLATELET # BLD AUTO: 256 K/UL
POTASSIUM SERPL-SCNC: 4.6 MMOL/L
PROT SERPL-MCNC: 7.3 G/DL
PSA FREE FLD-MCNC: 15 %
PSA FREE SERPL-MCNC: 0.07 NG/ML
PSA SERPL-MCNC: 0.49 NG/ML
RBC # BLD: 4.31 M/UL
RBC # FLD: 12.9 %
SODIUM SERPL-SCNC: 139 MMOL/L
T4 FREE SERPL-MCNC: 1.3 NG/DL
TRIGL SERPL-MCNC: 140 MG/DL
TSH SERPL-ACNC: 0.53 UIU/ML
VIT B12 SERPL-MCNC: 842 PG/ML
WBC # FLD AUTO: 6.39 K/UL

## 2020-02-04 ENCOUNTER — RX RENEWAL (OUTPATIENT)
Age: 70
End: 2020-02-04

## 2020-02-21 ENCOUNTER — APPOINTMENT (OUTPATIENT)
Dept: GASTROENTEROLOGY | Facility: CLINIC | Age: 70
End: 2020-02-21
Payer: MEDICARE

## 2020-02-21 VITALS
DIASTOLIC BLOOD PRESSURE: 60 MMHG | WEIGHT: 192.25 LBS | BODY MASS INDEX: 27.52 KG/M2 | TEMPERATURE: 98.8 F | HEIGHT: 70 IN | HEART RATE: 100 BPM | SYSTOLIC BLOOD PRESSURE: 112 MMHG | OXYGEN SATURATION: 97 %

## 2020-02-21 PROCEDURE — 99214 OFFICE O/P EST MOD 30 MIN: CPT

## 2020-02-21 NOTE — HISTORY OF PRESENT ILLNESS
[de-identified] : 69-year-old male long-standing history of colitis, suspected Crohn's colitis\par Clinical improvement since he began entyvio \par Patient on maintenance therapy\par Patient himself reports 2 bowel movements a day, wife states that it's more like 5 or 6\par Loose but no bleeding\par Last colonoscopy August of 2018 with significant disease activity\par \par Recent blood work reveals continued anemia\par \par Patient denies abdominal pain\par Weight stable\par \par Mild dementia, history of CVA, much of the history provided by patient's wife\par \par Patient's wife has observed that the patient is significantly improved immediately following his entyvio infusions, with diminishing effects in the weeks that follow leading up to his 8 week interval

## 2020-02-21 NOTE — ASSESSMENT
[FreeTextEntry1] : Clinically continued colitis activity evidenced by bowel frequency, continued anemia, foul-smelling flatus\par Diminishing effect of entyvio following infusion suggests potential benefit of decreased interval between infusions\par As noted last colonoscopy August of 2018 with significant disease activity,\par Patient at high risk for polyps and cancer, due for surveillance examination\par \par Plan\par Recommend entyvio every 6 weeks\par Will update orders and await authorization\par Indications risks benefits and alternatives to colonoscopy reviewed\par Patient agreeable to examination to be performed in the hospital endoscopy unit\par Patient and his wife understand the patient does not stop aspirin\par Continue mesalamine as ordered\par Office visit 6 months

## 2020-02-21 NOTE — PHYSICAL EXAM
[General Appearance - Alert] : alert [General Appearance - In No Acute Distress] : in no acute distress [Sclera] : the sclera and conjunctiva were normal [Extraocular Movements] : extraocular movements were intact [PERRL With Normal Accommodation] : pupils were equal in size, round, and reactive to light [Outer Ear] : the ears and nose were normal in appearance [Oropharynx] : the oropharynx was normal [Neck Appearance] : the appearance of the neck was normal [Jugular Venous Distention Increased] : there was no jugular-venous distention [Neck Cervical Mass (___cm)] : no neck mass was observed [Thyroid Diffuse Enlargement] : the thyroid was not enlarged [Thyroid Nodule] : there were no palpable thyroid nodules [Auscultation Breath Sounds / Voice Sounds] : lungs were clear to auscultation bilaterally [Heart Sounds] : normal S1 and S2 [Heart Rate And Rhythm] : heart rate was normal and rhythm regular [Heart Sounds Gallop] : no gallops [Murmurs] : no murmurs [Heart Sounds Pericardial Friction Rub] : no pericardial rub [Edema] : there was no peripheral edema [Bowel Sounds] : normal bowel sounds [Abdomen Soft] : soft [Abdomen Tenderness] : non-tender [] : no hepato-splenomegaly [Abdomen Mass (___ Cm)] : no abdominal mass palpated [FreeTextEntry1] : Alert, pleasant affect

## 2020-02-25 ENCOUNTER — APPOINTMENT (OUTPATIENT)
Dept: RHEUMATOLOGY | Facility: CLINIC | Age: 70
End: 2020-02-25
Payer: MEDICARE

## 2020-02-25 PROCEDURE — 96413 CHEMO IV INFUSION 1 HR: CPT

## 2020-03-10 ENCOUNTER — RX RENEWAL (OUTPATIENT)
Age: 70
End: 2020-03-10

## 2020-04-07 ENCOUNTER — APPOINTMENT (OUTPATIENT)
Dept: RHEUMATOLOGY | Facility: CLINIC | Age: 70
End: 2020-04-07
Payer: MEDICARE

## 2020-04-07 PROCEDURE — 36415 COLL VENOUS BLD VENIPUNCTURE: CPT

## 2020-04-07 PROCEDURE — 96413 CHEMO IV INFUSION 1 HR: CPT

## 2020-05-19 ENCOUNTER — APPOINTMENT (OUTPATIENT)
Dept: RHEUMATOLOGY | Facility: CLINIC | Age: 70
End: 2020-05-19
Payer: MEDICARE

## 2020-05-19 PROCEDURE — 96413 CHEMO IV INFUSION 1 HR: CPT

## 2020-05-22 ENCOUNTER — APPOINTMENT (OUTPATIENT)
Dept: INTERNAL MEDICINE | Facility: CLINIC | Age: 70
End: 2020-05-22
Payer: MEDICARE

## 2020-05-22 ENCOUNTER — NON-APPOINTMENT (OUTPATIENT)
Age: 70
End: 2020-05-22

## 2020-05-22 VITALS
SYSTOLIC BLOOD PRESSURE: 145 MMHG | TEMPERATURE: 98.8 F | DIASTOLIC BLOOD PRESSURE: 76 MMHG | HEIGHT: 70 IN | HEART RATE: 96 BPM | WEIGHT: 195 LBS | OXYGEN SATURATION: 95 % | BODY MASS INDEX: 27.92 KG/M2

## 2020-05-22 VITALS
HEIGHT: 70 IN | WEIGHT: 195 LBS | BODY MASS INDEX: 27.92 KG/M2 | DIASTOLIC BLOOD PRESSURE: 81 MMHG | OXYGEN SATURATION: 95 % | TEMPERATURE: 97.87 F | SYSTOLIC BLOOD PRESSURE: 133 MMHG

## 2020-05-22 DIAGNOSIS — G47.00 INSOMNIA, UNSPECIFIED: ICD-10-CM

## 2020-05-22 DIAGNOSIS — Z11.59 ENCOUNTER FOR SCREENING FOR OTHER VIRAL DISEASES: ICD-10-CM

## 2020-05-22 DIAGNOSIS — M17.11 UNILATERAL PRIMARY OSTEOARTHRITIS, RIGHT KNEE: ICD-10-CM

## 2020-05-22 PROCEDURE — 36415 COLL VENOUS BLD VENIPUNCTURE: CPT

## 2020-05-22 PROCEDURE — 99214 OFFICE O/P EST MOD 30 MIN: CPT | Mod: 25

## 2020-05-22 PROCEDURE — 93000 ELECTROCARDIOGRAM COMPLETE: CPT

## 2020-05-22 PROCEDURE — G0439: CPT

## 2020-05-22 RX ORDER — MESALAMINE 1000 MG/1
1000 SUPPOSITORY RECTAL
Qty: 14 | Refills: 5 | Status: DISCONTINUED | COMMUNITY
Start: 2018-09-07 | End: 2020-05-22

## 2020-05-22 RX ORDER — CETIRIZINE HYDROCHLORIDE 10 MG/1
10 TABLET, FILM COATED ORAL
Qty: 1 | Refills: 5 | Status: DISCONTINUED | OUTPATIENT
Start: 2018-08-16 | End: 2020-05-22

## 2020-05-22 RX ORDER — SODIUM SULFATE, POTASSIUM SULFATE, MAGNESIUM SULFATE 17.5; 3.13; 1.6 G/ML; G/ML; G/ML
17.5-3.13-1.6 SOLUTION, CONCENTRATE ORAL
Qty: 1 | Refills: 0 | Status: DISCONTINUED | COMMUNITY
Start: 2020-02-21 | End: 2020-05-22

## 2020-05-22 RX ORDER — QUETIAPINE FUMARATE 25 MG/1
25 TABLET ORAL
Qty: 30 | Refills: 0 | Status: DISCONTINUED | COMMUNITY
Start: 2018-07-18 | End: 2020-05-22

## 2020-05-22 RX ORDER — ONDANSETRON 4 MG/1
4 TABLET, ORALLY DISINTEGRATING ORAL
Qty: 15 | Refills: 3 | Status: DISCONTINUED | COMMUNITY
Start: 2018-12-27 | End: 2020-05-22

## 2020-05-22 NOTE — PHYSICAL EXAM
[No Acute Distress] : no acute distress [Well Nourished] : well nourished [Normal Outer Ear/Nose] : the outer ears and nose were normal in appearance [Normal Oropharynx] : the oropharynx was normal [No JVD] : no jugular venous distention [No Respiratory Distress] : no respiratory distress  [No Lymphadenopathy] : no lymphadenopathy [No Accessory Muscle Use] : no accessory muscle use [Regular Rhythm] : with a regular rhythm [Normal Rate] : normal rate  [Soft] : abdomen soft [No Edema] : there was no peripheral edema [No Joint Swelling] : no joint swelling [No HSM] : no HSM [de-identified] : aphasic

## 2020-05-22 NOTE — PLAN
[FreeTextEntry1] : Annual exam\par had all vaccine\par colon 2 years ago\par \par \par diabetes on med to check blood\par s/p cva with aphasia\par confusion and depression under care of psych\par bph on med\par colitis on entyvio

## 2020-05-22 NOTE — HISTORY OF PRESENT ILLNESS
[de-identified] : Annual exam\par had all vaccine\par last colon 2 years ago\par \par diabetes with sugar 160 and good a1c\par see opth\par on statin and arb\par colitis on entyvio\par bph on med\par depression on med\par s/p cva\par right knee issue need f/u ortho\par med reviewed\par diaper for stool\par no issue with urine

## 2020-05-24 ENCOUNTER — NON-APPOINTMENT (OUTPATIENT)
Age: 70
End: 2020-05-24

## 2020-05-26 LAB
25(OH)D3 SERPL-MCNC: 25.4 NG/ML
ALBUMIN SERPL ELPH-MCNC: 4.1 G/DL
ALP BLD-CCNC: 127 U/L
ALT SERPL-CCNC: 17 U/L
ANION GAP SERPL CALC-SCNC: 14 MMOL/L
AST SERPL-CCNC: 16 U/L
BASOPHILS # BLD AUTO: 0.07 K/UL
BASOPHILS NFR BLD AUTO: 1 %
BILIRUB SERPL-MCNC: 0.2 MG/DL
BUN SERPL-MCNC: 16 MG/DL
CALCIUM SERPL-MCNC: 9.2 MG/DL
CHLORIDE SERPL-SCNC: 99 MMOL/L
CHOLEST SERPL-MCNC: 112 MG/DL
CHOLEST/HDLC SERPL: 4.1 RATIO
CO2 SERPL-SCNC: 23 MMOL/L
CREAT SERPL-MCNC: 1.03 MG/DL
EOSINOPHIL # BLD AUTO: 0.33 K/UL
EOSINOPHIL NFR BLD AUTO: 4.9 %
ESTIMATED AVERAGE GLUCOSE: 157 MG/DL
FERRITIN SERPL-MCNC: 26 NG/ML
GLUCOSE SERPL-MCNC: 329 MG/DL
HBA1C MFR BLD HPLC: 7.1 %
HCT VFR BLD CALC: 33.9 %
HDLC SERPL-MCNC: 27 MG/DL
HGB BLD-MCNC: 9.6 G/DL
IMM GRANULOCYTES NFR BLD AUTO: 0.4 %
IRON SATN MFR SERPL: 8 %
IRON SERPL-MCNC: 28 UG/DL
LDLC SERPL CALC-MCNC: 28 MG/DL
LYMPHOCYTES # BLD AUTO: 3.17 K/UL
LYMPHOCYTES NFR BLD AUTO: 46.8 %
MAN DIFF?: NORMAL
MCHC RBC-ENTMCNC: 23.7 PG
MCHC RBC-ENTMCNC: 28.3 GM/DL
MCV RBC AUTO: 83.7 FL
MONOCYTES # BLD AUTO: 0.66 K/UL
MONOCYTES NFR BLD AUTO: 9.7 %
NEUTROPHILS # BLD AUTO: 2.51 K/UL
NEUTROPHILS NFR BLD AUTO: 37.2 %
PLATELET # BLD AUTO: 246 K/UL
POTASSIUM SERPL-SCNC: 4.6 MMOL/L
PROT SERPL-MCNC: 7.2 G/DL
RBC # BLD: 4.05 M/UL
RBC # FLD: 14 %
SARS-COV-2 IGG SERPL IA-ACNC: <0.1 INDEX
SARS-COV-2 IGG SERPL QL IA: NEGATIVE
SODIUM SERPL-SCNC: 136 MMOL/L
T4 FREE SERPL-MCNC: 1.2 NG/DL
TIBC SERPL-MCNC: 361 UG/DL
TRIGL SERPL-MCNC: 281 MG/DL
TSH SERPL-ACNC: 0.81 UIU/ML
UIBC SERPL-MCNC: 334 UG/DL
WBC # FLD AUTO: 6.77 K/UL

## 2020-06-02 ENCOUNTER — APPOINTMENT (OUTPATIENT)
Dept: ORTHOPEDIC SURGERY | Facility: CLINIC | Age: 70
End: 2020-06-02
Payer: MEDICARE

## 2020-06-02 VITALS
BODY MASS INDEX: 27.92 KG/M2 | DIASTOLIC BLOOD PRESSURE: 74 MMHG | TEMPERATURE: 97.3 F | HEART RATE: 85 BPM | SYSTOLIC BLOOD PRESSURE: 144 MMHG | HEIGHT: 70 IN | WEIGHT: 195 LBS

## 2020-06-02 DIAGNOSIS — M25.561 PAIN IN RIGHT KNEE: ICD-10-CM

## 2020-06-02 DIAGNOSIS — M17.11 UNILATERAL PRIMARY OSTEOARTHRITIS, RIGHT KNEE: ICD-10-CM

## 2020-06-02 PROCEDURE — 73564 X-RAY EXAM KNEE 4 OR MORE: CPT | Mod: RT

## 2020-06-02 PROCEDURE — 20611 DRAIN/INJ JOINT/BURSA W/US: CPT | Mod: RT

## 2020-06-02 PROCEDURE — 99214 OFFICE O/P EST MOD 30 MIN: CPT | Mod: 25

## 2020-06-02 NOTE — PROCEDURE
[de-identified] : The right knee was injected with Depo-Medrol 40 mg with lidocaine.\par \par A discussion took place with the patient regarding the procedure. General risks and benefits were reviewed.\par The suprapatellar region of the knee was prepped to attain a sterile field. Ethyl Chloride spray was used as a topical anesthetic. \par A 22-gauge 1-1/2 inch needle was used to inject the medication.\par The procedure was performed utilizing ultrasound guided needle placement with the Sonosite linear transducer in order to visualize and confirm the location of the needle tip and intra-articular delivery of the medication in the exact location desired to achieve maximal benefit from the medication. The images were recorded and saved.\par The injection site was sterilely dressed and the patient tolerated the procedure well, without complications.\par The patient was given post injection instructions including rest, cool pack applications, and take NSAIDs or acetaminophen. The patient was advised that if there are worsening symptoms or any associated problems, to contact our office accordingly

## 2020-06-02 NOTE — HISTORY OF PRESENT ILLNESS
[Worsening] : worsening [___ yrs] : [unfilled] year(s) ago [Standing] : standing [10] : a maximum pain level of 10/10 [Constant] : ~He/She~ states the symptoms seem to be constant [Knee Flexion] : worsened with knee flexion [Walking] : worsened by walking [None] : No exacerbating factors are noted [de-identified] : Pt presents for initial evaluation with pain in his right knee. Pt was ref by Dr Begum. Pt received a cortisone injection approx 3 years ago with relief. Pt states there is buckling and crepitus.  Pt is using a soft knee wrap. [de-identified] : stairs, certain movments,

## 2020-06-02 NOTE — DISCUSSION/SUMMARY
[de-identified] : Patient was informed of his findings.  You elected to undergo a cortisone injection at this time.  The patient will continue taking OTC analgesics or NSAIDs as previously prescribed.  Follow-up in 4 to 6 weeks if he still symptomatic

## 2020-06-02 NOTE — PHYSICAL EXAM
[de-identified] : Patient is noted to have tenderness to palpation medial joint line right knee.  Range of motion between 5 to 110 degrees with terminal tenderness.  No acute instability.  Mild effusion [de-identified] : X-rays taken of the right knee and AP lateral skyline and open notch views disclose mild to moderate joint space narrowing limited to those x-rays obtained in 2018.

## 2020-06-30 ENCOUNTER — APPOINTMENT (OUTPATIENT)
Dept: RHEUMATOLOGY | Facility: CLINIC | Age: 70
End: 2020-06-30
Payer: MEDICARE

## 2020-06-30 PROCEDURE — 96413 CHEMO IV INFUSION 1 HR: CPT

## 2020-07-10 ENCOUNTER — APPOINTMENT (OUTPATIENT)
Dept: DISASTER EMERGENCY | Facility: CLINIC | Age: 70
End: 2020-07-10

## 2020-07-11 LAB — SARS-COV-2 N GENE NPH QL NAA+PROBE: NOT DETECTED

## 2020-07-13 ENCOUNTER — OUTPATIENT (OUTPATIENT)
Dept: OUTPATIENT SERVICES | Facility: HOSPITAL | Age: 70
LOS: 1 days | End: 2020-07-13
Payer: MEDICARE

## 2020-07-13 ENCOUNTER — RESULT REVIEW (OUTPATIENT)
Age: 70
End: 2020-07-13

## 2020-07-13 ENCOUNTER — APPOINTMENT (OUTPATIENT)
Dept: GASTROENTEROLOGY | Facility: HOSPITAL | Age: 70
End: 2020-07-13

## 2020-07-13 DIAGNOSIS — Z98.49 CATARACT EXTRACTION STATUS, UNSPECIFIED EYE: Chronic | ICD-10-CM

## 2020-07-13 DIAGNOSIS — K50.80 CROHN'S DISEASE OF BOTH SMALL AND LARGE INTESTINE WITHOUT COMPLICATIONS: ICD-10-CM

## 2020-07-13 LAB — GLUCOSE BLDC GLUCOMTR-MCNC: 199 MG/DL — HIGH (ref 70–99)

## 2020-07-13 PROCEDURE — 45385 COLONOSCOPY W/LESION REMOVAL: CPT

## 2020-07-13 PROCEDURE — 88305 TISSUE EXAM BY PATHOLOGIST: CPT

## 2020-07-13 PROCEDURE — 88342 IMHCHEM/IMCYTCHM 1ST ANTB: CPT | Mod: 26

## 2020-07-13 PROCEDURE — 45380 COLONOSCOPY AND BIOPSY: CPT | Mod: XS

## 2020-07-13 PROCEDURE — 88305 TISSUE EXAM BY PATHOLOGIST: CPT | Mod: 26

## 2020-07-13 PROCEDURE — 88341 IMHCHEM/IMCYTCHM EA ADD ANTB: CPT

## 2020-07-13 PROCEDURE — 88341 IMHCHEM/IMCYTCHM EA ADD ANTB: CPT | Mod: 26

## 2020-07-13 PROCEDURE — 82962 GLUCOSE BLOOD TEST: CPT

## 2020-07-16 LAB — SURGICAL PATHOLOGY STUDY: SIGNIFICANT CHANGE UP

## 2020-07-30 ENCOUNTER — APPOINTMENT (OUTPATIENT)
Dept: RHEUMATOLOGY | Facility: CLINIC | Age: 70
End: 2020-07-30
Payer: MEDICARE

## 2020-07-30 PROCEDURE — 96413 CHEMO IV INFUSION 1 HR: CPT

## 2020-08-03 ENCOUNTER — APPOINTMENT (OUTPATIENT)
Dept: INTERNAL MEDICINE | Facility: CLINIC | Age: 70
End: 2020-08-03
Payer: MEDICARE

## 2020-08-03 VITALS
WEIGHT: 201 LBS | OXYGEN SATURATION: 96 % | TEMPERATURE: 99 F | DIASTOLIC BLOOD PRESSURE: 78 MMHG | SYSTOLIC BLOOD PRESSURE: 145 MMHG | BODY MASS INDEX: 28.77 KG/M2 | HEART RATE: 103 BPM | HEIGHT: 70 IN

## 2020-08-03 PROCEDURE — 99213 OFFICE O/P EST LOW 20 MIN: CPT

## 2020-08-03 RX ORDER — GLIPIZIDE 5 MG/1
5 TABLET ORAL
Qty: 180 | Refills: 3 | Status: DISCONTINUED | COMMUNITY
Start: 2019-01-07 | End: 2020-08-03

## 2020-08-03 NOTE — HISTORY OF PRESENT ILLNESS
[FreeTextEntry1] : weight gain\par  [de-identified] : Crohns colitis\par now on entyvio every 4 weeks\par gqaining weight\par diabetic on glybizide\par \par

## 2020-08-25 ENCOUNTER — APPOINTMENT (OUTPATIENT)
Dept: RHEUMATOLOGY | Facility: CLINIC | Age: 70
End: 2020-08-25

## 2020-08-31 ENCOUNTER — APPOINTMENT (OUTPATIENT)
Dept: RHEUMATOLOGY | Facility: CLINIC | Age: 70
End: 2020-08-31
Payer: MEDICARE

## 2020-08-31 PROCEDURE — 96413 CHEMO IV INFUSION 1 HR: CPT

## 2020-09-14 ENCOUNTER — APPOINTMENT (OUTPATIENT)
Dept: INTERNAL MEDICINE | Facility: CLINIC | Age: 70
End: 2020-09-14
Payer: MEDICARE

## 2020-09-14 VITALS
BODY MASS INDEX: 28.63 KG/M2 | HEIGHT: 70 IN | HEART RATE: 103 BPM | TEMPERATURE: 98.7 F | SYSTOLIC BLOOD PRESSURE: 144 MMHG | WEIGHT: 200 LBS | DIASTOLIC BLOOD PRESSURE: 68 MMHG | OXYGEN SATURATION: 95 %

## 2020-09-14 PROCEDURE — G0008: CPT

## 2020-09-14 PROCEDURE — 90662 IIV NO PRSV INCREASED AG IM: CPT

## 2020-09-14 PROCEDURE — 36415 COLL VENOUS BLD VENIPUNCTURE: CPT

## 2020-09-14 PROCEDURE — 99214 OFFICE O/P EST MOD 30 MIN: CPT | Mod: 25

## 2020-09-14 NOTE — HISTORY OF PRESENT ILLNESS
[FreeTextEntry1] : diabetes [de-identified] : diabetes on med\par colitis better on entyvio\par  every 4 week doing well\par on zoloft doing better\par trouble sleeping\par \par

## 2020-09-14 NOTE — PLAN
[FreeTextEntry1] : coloitis better\par blood pressure good\par insomnia trial of benadryl\par psych on med under care\par diabetes check blood\par cholesterol check blood\par flu shot given

## 2020-09-16 LAB
24R-OH-CALCIDIOL SERPL-MCNC: 44.8 PG/ML
25(OH)D3 SERPL-MCNC: 30.9 NG/ML
ALBUMIN SERPL ELPH-MCNC: 4.6 G/DL
ALP BLD-CCNC: 127 U/L
ALT SERPL-CCNC: 14 U/L
ANION GAP SERPL CALC-SCNC: 14 MMOL/L
AST SERPL-CCNC: 15 U/L
BASOPHILS # BLD AUTO: 0.07 K/UL
BASOPHILS NFR BLD AUTO: 1 %
BILIRUB SERPL-MCNC: 0.3 MG/DL
BUN SERPL-MCNC: 16 MG/DL
CALCIUM SERPL-MCNC: 9.5 MG/DL
CHLORIDE SERPL-SCNC: 98 MMOL/L
CHOLEST SERPL-MCNC: 144 MG/DL
CHOLEST/HDLC SERPL: 4.5 RATIO
CO2 SERPL-SCNC: 22 MMOL/L
CREAT SERPL-MCNC: 1 MG/DL
EOSINOPHIL # BLD AUTO: 0.28 K/UL
EOSINOPHIL NFR BLD AUTO: 3.9 %
ESTIMATED AVERAGE GLUCOSE: 212 MG/DL
GLUCOSE SERPL-MCNC: 401 MG/DL
HBA1C MFR BLD HPLC: 9 %
HCT VFR BLD CALC: 34.4 %
HDLC SERPL-MCNC: 32 MG/DL
HGB BLD-MCNC: 9.7 G/DL
IMM GRANULOCYTES NFR BLD AUTO: 0.4 %
LDLC SERPL CALC-MCNC: 54 MG/DL
LYMPHOCYTES # BLD AUTO: 2.88 K/UL
LYMPHOCYTES NFR BLD AUTO: 39.7 %
MAN DIFF?: NORMAL
MCHC RBC-ENTMCNC: 20.7 PG
MCHC RBC-ENTMCNC: 28.2 GM/DL
MCV RBC AUTO: 73.5 FL
MONOCYTES # BLD AUTO: 0.57 K/UL
MONOCYTES NFR BLD AUTO: 7.9 %
NEUTROPHILS # BLD AUTO: 3.43 K/UL
NEUTROPHILS NFR BLD AUTO: 47.1 %
PLATELET # BLD AUTO: 233 K/UL
POTASSIUM SERPL-SCNC: 4.4 MMOL/L
PROT SERPL-MCNC: 7.4 G/DL
RBC # BLD: 4.68 M/UL
RBC # FLD: 15.9 %
SODIUM SERPL-SCNC: 134 MMOL/L
T4 FREE SERPL-MCNC: 1.4 NG/DL
TRIGL SERPL-MCNC: 288 MG/DL
TSH SERPL-ACNC: 0.71 UIU/ML
WBC # FLD AUTO: 7.26 K/UL

## 2020-09-24 RX ORDER — LANCETS 33 GAUGE
EACH MISCELLANEOUS
Qty: 1 | Refills: 5 | Status: ACTIVE | COMMUNITY
Start: 2020-09-24 | End: 1900-01-01

## 2020-09-24 RX ORDER — BLOOD-GLUCOSE METER
W/DEVICE KIT MISCELLANEOUS
Qty: 1 | Refills: 0 | Status: ACTIVE | COMMUNITY
Start: 2020-09-24 | End: 1900-01-01

## 2020-10-01 ENCOUNTER — APPOINTMENT (OUTPATIENT)
Dept: RHEUMATOLOGY | Facility: CLINIC | Age: 70
End: 2020-10-01
Payer: MEDICARE

## 2020-10-01 PROCEDURE — 96413 CHEMO IV INFUSION 1 HR: CPT

## 2020-10-30 ENCOUNTER — APPOINTMENT (OUTPATIENT)
Dept: RHEUMATOLOGY | Facility: CLINIC | Age: 70
End: 2020-10-30
Payer: MEDICARE

## 2020-10-30 PROCEDURE — 99072 ADDL SUPL MATRL&STAF TM PHE: CPT

## 2020-10-30 PROCEDURE — 96413 CHEMO IV INFUSION 1 HR: CPT

## 2020-11-03 RX ORDER — METFORMIN ER 500 MG 500 MG/1
500 TABLET ORAL TWICE DAILY
Qty: 180 | Refills: 3 | Status: DISCONTINUED | COMMUNITY
Start: 2020-09-23 | End: 2020-11-03

## 2020-11-05 ENCOUNTER — NON-APPOINTMENT (OUTPATIENT)
Age: 70
End: 2020-11-05

## 2020-11-11 ENCOUNTER — RX RENEWAL (OUTPATIENT)
Age: 70
End: 2020-11-11

## 2020-11-18 ENCOUNTER — NON-APPOINTMENT (OUTPATIENT)
Age: 70
End: 2020-11-18

## 2020-11-23 ENCOUNTER — NON-APPOINTMENT (OUTPATIENT)
Age: 70
End: 2020-11-23

## 2020-11-24 ENCOUNTER — APPOINTMENT (OUTPATIENT)
Dept: RHEUMATOLOGY | Facility: CLINIC | Age: 70
End: 2020-11-24
Payer: MEDICARE

## 2020-11-24 PROCEDURE — 96413 CHEMO IV INFUSION 1 HR: CPT

## 2020-12-02 ENCOUNTER — APPOINTMENT (OUTPATIENT)
Dept: ENDOCRINOLOGY | Facility: CLINIC | Age: 70
End: 2020-12-02
Payer: MEDICARE

## 2020-12-02 ENCOUNTER — NON-APPOINTMENT (OUTPATIENT)
Age: 70
End: 2020-12-02

## 2020-12-02 VITALS
BODY MASS INDEX: 29.03 KG/M2 | SYSTOLIC BLOOD PRESSURE: 122 MMHG | DIASTOLIC BLOOD PRESSURE: 56 MMHG | OXYGEN SATURATION: 98 % | HEART RATE: 113 BPM | TEMPERATURE: 98.9 F | HEIGHT: 68 IN | WEIGHT: 191.55 LBS

## 2020-12-02 DIAGNOSIS — Z01.818 ENCOUNTER FOR OTHER PREPROCEDURAL EXAMINATION: ICD-10-CM

## 2020-12-02 DIAGNOSIS — Z86.39 PERSONAL HISTORY OF OTHER ENDOCRINE, NUTRITIONAL AND METABOLIC DISEASE: ICD-10-CM

## 2020-12-02 LAB — GLUCOSE BLDC GLUCOMTR-MCNC: 276

## 2020-12-02 PROCEDURE — 99072 ADDL SUPL MATRL&STAF TM PHE: CPT

## 2020-12-02 PROCEDURE — 97802 MEDICAL NUTRITION INDIV IN: CPT

## 2020-12-02 PROCEDURE — 99205 OFFICE O/P NEW HI 60 MIN: CPT

## 2020-12-02 RX ORDER — ASPIRIN 81 MG
81 TABLET, DELAYED RELEASE (ENTERIC COATED) ORAL
Refills: 0 | Status: COMPLETED | COMMUNITY
End: 2020-12-02

## 2020-12-02 RX ORDER — PEN NEEDLE, DIABETIC 29 G X1/2"
32G X 4 MM NEEDLE, DISPOSABLE MISCELLANEOUS
Qty: 4 | Refills: 3 | Status: ACTIVE | COMMUNITY
Start: 2020-12-02 | End: 1900-01-01

## 2020-12-02 NOTE — PHYSICAL EXAM
[Alert] : alert [Well Nourished] : well nourished [Healthy Appearance] : healthy appearance [No Acute Distress] : no acute distress [Well Developed] : well developed [Normal Voice/Communication] : normal voice communication [Normal Sclera/Conjunctiva] : normal sclera/conjunctiva [No Proptosis] : no proptosis [No Neck Mass] : no neck mass was observed [No LAD] : no lymphadenopathy [Supple] : the neck was supple [Thyroid Not Enlarged] : the thyroid was not enlarged [No Thyroid Nodules] : no palpable thyroid nodules [No Respiratory Distress] : no respiratory distress [No Accessory Muscle Use] : no accessory muscle use [Normal Rate and Effort] : normal respiratory rate and effort [Normal S1, S2] : normal S1 and S2 [Normal Rate] : heart rate was normal [No Edema] : no peripheral edema [Pedal Pulses Normal] : the pedal pulses are present [Not Tender] : non-tender [Not Distended] : not distended [Soft] : abdomen soft [Spine Straight] : spine straight [No Stigmata of Cushings Syndrome] : no stigmata of Cushings Syndrome [Normal Gait] : normal gait [No Clubbing, Cyanosis] : no clubbing  or cyanosis of the fingernails [No Involuntary Movements] : no involuntary movements were seen [Acanthosis Nigricans] : no acanthosis nigricans [Left Foot Was Examined] : left foot ~C was examined [Normal] : normal [2+] : 2+ in the dorsalis pedis [#4 Diminished] : number 4 was diminished [#6 Diminished] : number 6 was diminished [#5 Diminished] : number 5 was diminished [#7 Diminished] : number 7 was diminished [No Tremors] : no tremors [Normal Affect] : the affect was normal [Normal Insight/Judgement] : insight and judgment were intact [Normal Mood] : the mood was normal

## 2020-12-02 NOTE — REASON FOR VISIT
[Consultation] : a consultation visit [DM Type 2] : DM Type 2 [Other: _____] : [unfilled] [FreeTextEntry2] : Dr. Mendoza

## 2020-12-02 NOTE — HISTORY OF PRESENT ILLNESS
[FreeTextEntry1] : CC: Diabetes\par This is a 70-year-old male with type 2 diabetes mellitus, hypertension, hyperlipidemia, anemia, CVA, anxiety, depression, BPH, Crohn's disease, ulcerative colitis, here for consultation.\par Type 2 diabetes was diagnosed at the age of 65.  He is on glimepiride 2 mg 2 times a day.  He tried Metformin in the past but developed GI side effects.  He also tried Januvia but developed GI side effects as well.  His wife reports that he has GI side effects to many medications due to his Crohn's disease and ulcerative colitis.\par He self monitors blood glucose 1 time a day.  Fasting levels are in mid 200s–300s.  He denies hypoglycemia.\par He saw his ophthalmologist yesterday and denies retinopathy.\par He denies neuropathy.\par He denies nephropathy.\par He is on atorvastatin 40 mg daily.

## 2020-12-02 NOTE — CONSULT LETTER
[Dear  ___] : Dear  [unfilled], [Consult Letter:] : I had the pleasure of evaluating your patient, [unfilled]. [Please see my note below.] : Please see my note below. [Consult Closing:] : Thank you very much for allowing me to participate in the care of this patient.  If you have any questions, please do not hesitate to contact me. [Sincerely,] : Sincerely, [FreeTextEntry3] : Isis Contreras MD, FACE\par

## 2020-12-02 NOTE — ASSESSMENT
[FreeTextEntry1] : This is a 70-year-old male with type 2 diabetes mellitus, hypertension, hyperlipidemia, CVA, anxiety, depression, BPH, Crohn's disease, ulcerative colitis, here for consultation.\par 1.  T2DM\par Hemoglobin A1c from September 2020 was 9.0%, however hemoglobin A1c is not accurate in setting of anemia.\par Fingerstick glucose levels are between 200-300s at home.  He has tried Metformin and Januvia in the past but developed GI side effects.\par Will initiate basal/bolus insulin.\par Appointment with CDE today.\par Check urine microalbumin to evaluate for diabetic nephropathy.\par His last ophthalmology appointment was yesterday and he denies diabetic retinopathy.\par He denies neuropathy.\par He is on atorvastatin 40 mg daily.\par He is interested in Dexcom CGM and will see if it is covered.\par 2.  Hypertension\par Controlled.\par 3.  Hyperlipidemia\par LDL is at goal (54).\par Continue atorvastatin 40 mg daily.

## 2020-12-23 ENCOUNTER — APPOINTMENT (OUTPATIENT)
Dept: RHEUMATOLOGY | Facility: CLINIC | Age: 70
End: 2020-12-23

## 2020-12-31 ENCOUNTER — APPOINTMENT (OUTPATIENT)
Dept: ENDOCRINOLOGY | Facility: CLINIC | Age: 70
End: 2020-12-31
Payer: MEDICARE

## 2020-12-31 VITALS
WEIGHT: 192.66 LBS | OXYGEN SATURATION: 98 % | DIASTOLIC BLOOD PRESSURE: 65 MMHG | TEMPERATURE: 98.4 F | BODY MASS INDEX: 29.54 KG/M2 | HEART RATE: 102 BPM | HEIGHT: 67.91 IN | SYSTOLIC BLOOD PRESSURE: 112 MMHG

## 2020-12-31 DIAGNOSIS — E13.29 OTHER SPECIFIED DIABETES MELLITUS WITH OTHER DIABETIC KIDNEY COMPLICATION: ICD-10-CM

## 2020-12-31 DIAGNOSIS — R80.9 OTHER SPECIFIED DIABETES MELLITUS WITH OTHER DIABETIC KIDNEY COMPLICATION: ICD-10-CM

## 2020-12-31 LAB — GLUCOSE BLDC GLUCOMTR-MCNC: 180

## 2020-12-31 PROCEDURE — 99214 OFFICE O/P EST MOD 30 MIN: CPT

## 2020-12-31 PROCEDURE — 99072 ADDL SUPL MATRL&STAF TM PHE: CPT

## 2021-01-03 PROBLEM — E13.29 DIABETES MELLITUS OF OTHER TYPE WITH MICROALBUMINURIA, WITHOUT LONG-TERM CURRENT USE OF INSULIN: Status: RESOLVED | Noted: 2018-07-05 | Resolved: 2021-01-03

## 2021-01-03 NOTE — PHYSICAL EXAM
[Alert] : alert [Well Nourished] : well nourished [Healthy Appearance] : healthy appearance [No Acute Distress] : no acute distress [Well Developed] : well developed [Normal Voice/Communication] : normal voice communication [Normal Sclera/Conjunctiva] : normal sclera/conjunctiva [No Proptosis] : no proptosis [No Neck Mass] : no neck mass was observed [No LAD] : no lymphadenopathy [Supple] : the neck was supple [Thyroid Not Enlarged] : the thyroid was not enlarged [No Thyroid Nodules] : no palpable thyroid nodules [No Respiratory Distress] : no respiratory distress [No Accessory Muscle Use] : no accessory muscle use [Normal Rate and Effort] : normal respiratory rate and effort [Normal S1, S2] : normal S1 and S2 [Normal Rate] : heart rate was normal [No Edema] : no peripheral edema [Pedal Pulses Normal] : the pedal pulses are present [Not Tender] : non-tender [Not Distended] : not distended [Soft] : abdomen soft [Spine Straight] : spine straight [No Stigmata of Cushings Syndrome] : no stigmata of Cushings Syndrome [Normal Gait] : normal gait [No Clubbing, Cyanosis] : no clubbing  or cyanosis of the fingernails [No Involuntary Movements] : no involuntary movements were seen [Left Foot Was Examined] : left foot ~C was examined [Normal] : normal [2+] : 2+ in the dorsalis pedis [#4 Diminished] : number 4 was diminished [#6 Diminished] : number 6 was diminished [#5 Diminished] : number 5 was diminished [#7 Diminished] : number 7 was diminished [No Tremors] : no tremors [Normal Affect] : the affect was normal [Normal Insight/Judgement] : insight and judgment were intact [Normal Mood] : the mood was normal [Acanthosis Nigricans] : no acanthosis nigricans

## 2021-01-03 NOTE — ASSESSMENT
[FreeTextEntry1] : This is a 70-year-old male with type 2 diabetes mellitus, hypertension, hyperlipidemia, CVA, anxiety, depression, BPH, Crohn's disease, ulcerative colitis, here for follow-up.\par 1.  T2DM\par Increase Fiasp to 10 units before meals to prevent postprandial hyperglycemia.\par Discontinue glimepiride.\par Hemoglobin A1c from September 2020 was 9.0%, however hemoglobin A1c is not accurate in setting of anemia.\par Check urine microalbumin to evaluate for diabetic nephropathy.\par His last ophthalmology appointment was in 12/2020 and he denies diabetic retinopathy.\par He denies neuropathy.\par He is on atorvastatin 40 mg daily.\par He is interested in Dexcom CGM and will see if it is covered.\par 2.  Hypertension\par Controlled.\par 3.  Hyperlipidemia\par LDL is at goal (54).\par Continue atorvastatin 40 mg daily.

## 2021-01-03 NOTE — HISTORY OF PRESENT ILLNESS
[FreeTextEntry1] : CC: Diabetes\par This is a 70-year-old male with type 2 diabetes mellitus, hypertension, hyperlipidemia, anemia, CVA, anxiety, depression, BPH, Crohn's disease, ulcerative colitis, here for follow up.\par Type 2 diabetes was diagnosed at the age of 65.  He is currently on Tresiba 21 units at bedtime and Fiasp 7 units before meals plus correction scale.  He is still taking glimepiride 2 mg 2 times a day.  He tried Metformin in the past but developed GI side effects.  He also tried Januvia but developed GI side effects as well.  His wife reports that he has GI side effects to many medications due to his Crohn's disease and ulcerative colitis.\par He self monitors blood glucose 4 times a day.  See scanned log sheet in chart.\par He denies hypoglycemia.\par He saw his ophthalmologist in December 2020 and denies retinopathy.\par He denies neuropathy.\par He denies nephropathy.\par He is on atorvastatin 40 mg daily.

## 2021-01-09 ENCOUNTER — APPOINTMENT (OUTPATIENT)
Dept: RHEUMATOLOGY | Facility: CLINIC | Age: 71
End: 2021-01-09
Payer: MEDICARE

## 2021-01-09 PROCEDURE — 96413 CHEMO IV INFUSION 1 HR: CPT

## 2021-01-09 PROCEDURE — 99072 ADDL SUPL MATRL&STAF TM PHE: CPT

## 2021-01-20 ENCOUNTER — APPOINTMENT (OUTPATIENT)
Dept: RHEUMATOLOGY | Facility: CLINIC | Age: 71
End: 2021-01-20

## 2021-01-25 ENCOUNTER — RX RENEWAL (OUTPATIENT)
Age: 71
End: 2021-01-25

## 2021-02-03 ENCOUNTER — NON-APPOINTMENT (OUTPATIENT)
Age: 71
End: 2021-02-03

## 2021-02-04 ENCOUNTER — APPOINTMENT (OUTPATIENT)
Dept: RHEUMATOLOGY | Facility: CLINIC | Age: 71
End: 2021-02-04
Payer: MEDICARE

## 2021-02-04 PROCEDURE — 96413 CHEMO IV INFUSION 1 HR: CPT

## 2021-02-04 PROCEDURE — 99072 ADDL SUPL MATRL&STAF TM PHE: CPT

## 2021-02-16 ENCOUNTER — APPOINTMENT (OUTPATIENT)
Dept: RHEUMATOLOGY | Facility: CLINIC | Age: 71
End: 2021-02-16

## 2021-02-25 ENCOUNTER — LABORATORY RESULT (OUTPATIENT)
Age: 71
End: 2021-02-25

## 2021-02-25 ENCOUNTER — APPOINTMENT (OUTPATIENT)
Dept: ENDOCRINOLOGY | Facility: CLINIC | Age: 71
End: 2021-02-25
Payer: MEDICARE

## 2021-02-25 VITALS
HEART RATE: 103 BPM | BODY MASS INDEX: 29.78 KG/M2 | WEIGHT: 194.2 LBS | DIASTOLIC BLOOD PRESSURE: 64 MMHG | SYSTOLIC BLOOD PRESSURE: 114 MMHG | OXYGEN SATURATION: 98 % | TEMPERATURE: 99.5 F | HEIGHT: 67.72 IN

## 2021-02-25 PROCEDURE — 99072 ADDL SUPL MATRL&STAF TM PHE: CPT

## 2021-02-25 PROCEDURE — 36415 COLL VENOUS BLD VENIPUNCTURE: CPT

## 2021-02-25 PROCEDURE — 99214 OFFICE O/P EST MOD 30 MIN: CPT | Mod: 25

## 2021-02-26 ENCOUNTER — APPOINTMENT (OUTPATIENT)
Dept: GASTROENTEROLOGY | Facility: CLINIC | Age: 71
End: 2021-02-26
Payer: MEDICARE

## 2021-02-26 VITALS
OXYGEN SATURATION: 96 % | SYSTOLIC BLOOD PRESSURE: 130 MMHG | TEMPERATURE: 97.5 F | HEART RATE: 104 BPM | DIASTOLIC BLOOD PRESSURE: 60 MMHG

## 2021-02-26 LAB
BASOPHILS # BLD AUTO: 0.06 K/UL
BASOPHILS NFR BLD AUTO: 0.9 %
EOSINOPHIL # BLD AUTO: 0.32 K/UL
EOSINOPHIL NFR BLD AUTO: 4.9 %
HCT VFR BLD CALC: 33.8 %
HGB BLD-MCNC: 9.4 G/DL
IMM GRANULOCYTES NFR BLD AUTO: 0.2 %
IRON SATN MFR SERPL: 9 %
IRON SERPL-MCNC: 33 UG/DL
LYMPHOCYTES # BLD AUTO: 2.48 K/UL
LYMPHOCYTES NFR BLD AUTO: 38.2 %
MAN DIFF?: NORMAL
MCHC RBC-ENTMCNC: 21.9 PG
MCHC RBC-ENTMCNC: 27.8 GM/DL
MCV RBC AUTO: 78.6 FL
MONOCYTES # BLD AUTO: 0.57 K/UL
MONOCYTES NFR BLD AUTO: 8.8 %
NEUTROPHILS # BLD AUTO: 3.05 K/UL
NEUTROPHILS NFR BLD AUTO: 47 %
PLATELET # BLD AUTO: 243 K/UL
RBC # BLD: 4.3 M/UL
RBC # FLD: 15.9 %
TIBC SERPL-MCNC: 372 UG/DL
UIBC SERPL-MCNC: 339 UG/DL
WBC # FLD AUTO: 6.49 K/UL

## 2021-02-26 PROCEDURE — 99214 OFFICE O/P EST MOD 30 MIN: CPT

## 2021-02-26 PROCEDURE — 99072 ADDL SUPL MATRL&STAF TM PHE: CPT

## 2021-02-26 NOTE — HISTORY OF PRESENT ILLNESS
[de-identified] : 70-year-old male, Crohn's colitis\par Active disease noted on last colonoscopy July 2020\par Inflammation from the anus to the descending colon\par Diverticulosis\par Inflammation in the cecum\par At that time patient had been experiencing clinical improvement with Entyvio but still active complaints diarrhea\par Patient's therapy tightened to every 4-week infusion\par Both patient and his wife report improvement of complaints 2-3 stools daily\par No abdominal pain\par Review of recent blood work shows continuing anemia\par History of prior iron deficiency, not on any iron supplement

## 2021-02-26 NOTE — REASON FOR VISIT
[Follow-Up: _____] : a [unfilled] follow-up visit [Spouse] : spouse [FreeTextEntry1] : Crohn's disease, anemia

## 2021-02-26 NOTE — ASSESSMENT
[FreeTextEntry1] : Colitis, history of Crohn's, though clinical appearance more consistent with ulcerative colitis\par Improved on Entyvio, now with shorter interval every 4 weeks\par Anemia, likely still related to iron deficiency\par \par Plan\par Routine blood testing ordered including QuantiFERON and hepatitis B\par Iron studies ordered for follow-up of anemia\par Consider oral iron supplement if levels are still low\par Continue Entyvio as ordered\par Continue oral mesalamine as ordered\par Office visit in 4 months\par We will discuss surveillance colonoscopy with patient and his wife at his next visit

## 2021-02-27 LAB — HBV SURFACE AG SER QL: NONREACTIVE

## 2021-02-28 LAB
ALBUMIN SERPL ELPH-MCNC: 4.2 G/DL
ALP BLD-CCNC: 137 U/L
ALT SERPL-CCNC: 13 U/L
ANION GAP SERPL CALC-SCNC: 15 MMOL/L
AST SERPL-CCNC: 14 U/L
BILIRUB SERPL-MCNC: 0.4 MG/DL
BUN SERPL-MCNC: 20 MG/DL
CALCIUM SERPL-MCNC: 9 MG/DL
CHLORIDE SERPL-SCNC: 101 MMOL/L
CHOLEST SERPL-MCNC: 115 MG/DL
CO2 SERPL-SCNC: 21 MMOL/L
CREAT SERPL-MCNC: 1.12 MG/DL
CREAT SPEC-SCNC: 127 MG/DL
ESTIMATED AVERAGE GLUCOSE: 148 MG/DL
FRUCTOSAMINE SERPL-MCNC: 333 UMOL/L
GLUCOSE SERPL-MCNC: 276 MG/DL
HBA1C MFR BLD HPLC: 6.8 %
HDLC SERPL-MCNC: 28 MG/DL
LDLC SERPL CALC-MCNC: 58 MG/DL
MICROALBUMIN 24H UR DL<=1MG/L-MCNC: <1.2 MG/DL
MICROALBUMIN/CREAT 24H UR-RTO: NORMAL MG/G
NONHDLC SERPL-MCNC: 87 MG/DL
POTASSIUM SERPL-SCNC: 4.6 MMOL/L
PROT SERPL-MCNC: 7.5 G/DL
SODIUM SERPL-SCNC: 137 MMOL/L
TRIGL SERPL-MCNC: 143 MG/DL

## 2021-03-01 ENCOUNTER — NON-APPOINTMENT (OUTPATIENT)
Age: 71
End: 2021-03-01

## 2021-03-01 NOTE — ASSESSMENT
[FreeTextEntry1] : This is a 70-year-old male with type 2 diabetes mellitus, hypertension, hyperlipidemia, CVA, anxiety, depression, BPH, Crohn's disease, ulcerative colitis, here for follow-up.\par 1.  T2DM\par Increase Fiasp to 18 units before meals to prevent postprandial hyperglycemia.\par Continue Tresiba 26 units daily.\par Check hemoglobin A1c.\par Check urine microalbumin to evaluate for diabetic nephropathy.\par His last ophthalmology appointment was in 12/2020 and he denies diabetic retinopathy.\par He denies neuropathy.\par He is on atorvastatin 40 mg daily.\par He is interested in Dexcom CGM and will see if it is covered.\par 2.  Hypertension\par Controlled.\par 3.  Hyperlipidemia\par Check lipid panel.\par Continue atorvastatin 40 mg daily pending results.

## 2021-03-01 NOTE — HISTORY OF PRESENT ILLNESS
[FreeTextEntry1] : CC: Diabetes\par This is a 70-year-old male with type 2 diabetes mellitus, hypertension, hyperlipidemia, anemia, CVA, anxiety, depression, BPH, Crohn's disease, ulcerative colitis, here for follow up.\par Type 2 diabetes was diagnosed at the age of 65.  He is currently on Tresiba 26 units at bedtime and Fiasp 14 units before meals plus correction scale.  He tried Metformin in the past but developed GI side effects.  He also tried Januvia but developed GI side effects as well.  His wife reports that he has GI side effects to many medications due to his Crohn's disease and ulcerative colitis.\par He self monitors blood glucose 3 times a day.  Fasting 110–200s, lunch 230–280s, dinner 208–300s.\par He denies hypoglycemia.\par He saw his ophthalmologist in December 2020 and denies retinopathy.\par He denies neuropathy.\par He denies nephropathy.\par He is on atorvastatin 40 mg daily.

## 2021-03-03 LAB
M TB IFN-G BLD-IMP: NEGATIVE
QUANTIFERON TB PLUS MITOGEN MINUS NIL: 7.6 IU/ML
QUANTIFERON TB PLUS NIL: 0.06 IU/ML
QUANTIFERON TB PLUS TB1 MINUS NIL: 0.05 IU/ML
QUANTIFERON TB PLUS TB2 MINUS NIL: 0.04 IU/ML

## 2021-03-04 ENCOUNTER — APPOINTMENT (OUTPATIENT)
Dept: RHEUMATOLOGY | Facility: CLINIC | Age: 71
End: 2021-03-04
Payer: MEDICARE

## 2021-03-04 PROCEDURE — 99072 ADDL SUPL MATRL&STAF TM PHE: CPT

## 2021-03-04 PROCEDURE — 96413 CHEMO IV INFUSION 1 HR: CPT

## 2021-03-08 ENCOUNTER — NON-APPOINTMENT (OUTPATIENT)
Age: 71
End: 2021-03-08

## 2021-03-19 ENCOUNTER — APPOINTMENT (OUTPATIENT)
Dept: INTERNAL MEDICINE | Facility: CLINIC | Age: 71
End: 2021-03-19
Payer: MEDICARE

## 2021-03-19 VITALS
HEART RATE: 97 BPM | WEIGHT: 197 LBS | HEIGHT: 67 IN | BODY MASS INDEX: 30.92 KG/M2 | SYSTOLIC BLOOD PRESSURE: 140 MMHG | DIASTOLIC BLOOD PRESSURE: 70 MMHG | TEMPERATURE: 98 F | OXYGEN SATURATION: 95 %

## 2021-03-19 PROCEDURE — 99214 OFFICE O/P EST MOD 30 MIN: CPT

## 2021-03-19 PROCEDURE — 99072 ADDL SUPL MATRL&STAF TM PHE: CPT

## 2021-03-19 NOTE — PLAN
[FreeTextEntry1] : painful feet\par neuropathy??\par Trial of gabapentin 100 bid and titrate up\par continue celebrex\par continue psych med\par entyvio and insulin\par cholesterol good on med\par anemia chronic and stable

## 2021-03-19 NOTE — HISTORY OF PRESENT ILLNESS
[FreeTextEntry1] : f/u [de-identified] : diabetes under care of endocrine\par on long and short acting insulin\par a1c down to 6.9 excellent\par colitis under care of GI\par on entyvio every 4 week with improvement\par leg pain almost constant

## 2021-03-26 ENCOUNTER — NON-APPOINTMENT (OUTPATIENT)
Age: 71
End: 2021-03-26

## 2021-04-08 ENCOUNTER — APPOINTMENT (OUTPATIENT)
Dept: RHEUMATOLOGY | Facility: CLINIC | Age: 71
End: 2021-04-08
Payer: MEDICARE

## 2021-04-08 PROCEDURE — 96413 CHEMO IV INFUSION 1 HR: CPT

## 2021-04-08 PROCEDURE — 99072 ADDL SUPL MATRL&STAF TM PHE: CPT

## 2021-05-06 ENCOUNTER — APPOINTMENT (OUTPATIENT)
Dept: RHEUMATOLOGY | Facility: CLINIC | Age: 71
End: 2021-05-06
Payer: MEDICARE

## 2021-05-06 PROCEDURE — 96413 CHEMO IV INFUSION 1 HR: CPT

## 2021-05-06 PROCEDURE — 99072 ADDL SUPL MATRL&STAF TM PHE: CPT

## 2021-05-20 ENCOUNTER — APPOINTMENT (OUTPATIENT)
Dept: ENDOCRINOLOGY | Facility: CLINIC | Age: 71
End: 2021-05-20
Payer: MEDICARE

## 2021-05-20 VITALS
TEMPERATURE: 96.6 F | HEART RATE: 105 BPM | SYSTOLIC BLOOD PRESSURE: 124 MMHG | DIASTOLIC BLOOD PRESSURE: 60 MMHG | BODY MASS INDEX: 30.48 KG/M2 | OXYGEN SATURATION: 98 % | WEIGHT: 194.2 LBS | HEIGHT: 66.93 IN

## 2021-05-20 DIAGNOSIS — E66.9 OBESITY, UNSPECIFIED: ICD-10-CM

## 2021-05-20 LAB — GLUCOSE BLDC GLUCOMTR-MCNC: 254

## 2021-05-20 PROCEDURE — 36415 COLL VENOUS BLD VENIPUNCTURE: CPT

## 2021-05-20 PROCEDURE — 99214 OFFICE O/P EST MOD 30 MIN: CPT | Mod: 25

## 2021-05-20 PROCEDURE — 99072 ADDL SUPL MATRL&STAF TM PHE: CPT

## 2021-05-21 LAB
ALBUMIN SERPL ELPH-MCNC: 4.2 G/DL
ALP BLD-CCNC: 128 U/L
ALT SERPL-CCNC: 11 U/L
ANION GAP SERPL CALC-SCNC: 15 MMOL/L
AST SERPL-CCNC: 13 U/L
BASOPHILS # BLD AUTO: 0.06 K/UL
BASOPHILS NFR BLD AUTO: 0.7 %
BILIRUB SERPL-MCNC: 0.4 MG/DL
BUN SERPL-MCNC: 22 MG/DL
CALCIUM SERPL-MCNC: 9.1 MG/DL
CHLORIDE SERPL-SCNC: 102 MMOL/L
CHOLEST SERPL-MCNC: 123 MG/DL
CO2 SERPL-SCNC: 22 MMOL/L
CREAT SERPL-MCNC: 1.11 MG/DL
EOSINOPHIL # BLD AUTO: 0.39 K/UL
EOSINOPHIL NFR BLD AUTO: 4.7 %
ESTIMATED AVERAGE GLUCOSE: 128 MG/DL
GLUCOSE SERPL-MCNC: 253 MG/DL
HBA1C MFR BLD HPLC: 6.1 %
HCT VFR BLD CALC: 34.2 %
HDLC SERPL-MCNC: 27 MG/DL
HGB BLD-MCNC: 9.6 G/DL
IMM GRANULOCYTES NFR BLD AUTO: 0.5 %
LDLC SERPL CALC-MCNC: 53 MG/DL
LYMPHOCYTES # BLD AUTO: 3.37 K/UL
LYMPHOCYTES NFR BLD AUTO: 40.4 %
MAN DIFF?: NORMAL
MCHC RBC-ENTMCNC: 22.8 PG
MCHC RBC-ENTMCNC: 28.1 GM/DL
MCV RBC AUTO: 81.2 FL
MONOCYTES # BLD AUTO: 0.83 K/UL
MONOCYTES NFR BLD AUTO: 10 %
NEUTROPHILS # BLD AUTO: 3.65 K/UL
NEUTROPHILS NFR BLD AUTO: 43.7 %
NONHDLC SERPL-MCNC: 95 MG/DL
PLATELET # BLD AUTO: 244 K/UL
POTASSIUM SERPL-SCNC: 3.9 MMOL/L
PROT SERPL-MCNC: 7.6 G/DL
RBC # BLD: 4.21 M/UL
RBC # FLD: 15.7 %
SODIUM SERPL-SCNC: 139 MMOL/L
TRIGL SERPL-MCNC: 209 MG/DL
WBC # FLD AUTO: 8.34 K/UL

## 2021-05-22 PROBLEM — E66.9 OBESITY (BMI 30-39.9): Status: ACTIVE | Noted: 2021-05-22

## 2021-05-22 LAB — FRUCTOSAMINE SERPL-MCNC: 317 UMOL/L

## 2021-05-22 RX ORDER — PEN NEEDLE, DIABETIC 29 G X1/2"
29G X 12.7MM NEEDLE, DISPOSABLE MISCELLANEOUS
Qty: 400 | Refills: 0 | Status: ACTIVE | COMMUNITY
Start: 2021-02-25

## 2021-05-22 NOTE — HISTORY OF PRESENT ILLNESS
[FreeTextEntry1] : CC: Diabetes\par This is a 70-year-old male with type 2 diabetes mellitus, hypertension, hyperlipidemia, anemia, CVA, anxiety, depression, BPH, Crohn's disease, ulcerative colitis, here for follow up.\par Type 2 diabetes was diagnosed at the age of 65.  He is currently on Tresiba 26 units at bedtime and Fiasp 14 units before meals plus correction scale.  He tried Metformin in the past but developed GI side effects.  He also tried Januvia but developed GI side effects as well.  His wife reports that he has GI side effects to many medications due to his Crohn's disease and ulcerative colitis.\par He self monitors blood glucose 3 times a day.  Fasting 200s, lunch 170s, dinner 160-200s, HS 200s.\par He denies hypoglycemia.\par He saw his ophthalmologist in December 2020 and denies retinopathy.\par He denies neuropathy.\par He denies nephropathy.\par He is on atorvastatin 40 mg daily.

## 2021-05-22 NOTE — ASSESSMENT
[FreeTextEntry1] : This is a 70-year-old male with type 2 diabetes mellitus, hypertension, hyperlipidemia, CVA, anxiety, depression, BPH, Crohn's disease, ulcerative colitis, here for follow-up.\par 1.  T2DM\par Increase Fiasp to 28 units before meals to prevent postprandial hyperglycemia.\par Increase Tresiba to 30 units qd. \par Check hemoglobin A1c.\par Check urine microalbumin to evaluate for diabetic nephropathy.\par His last ophthalmology appointment was in 12/2020 and he denies diabetic retinopathy.\par He denies neuropathy.\par He is on atorvastatin 40 mg daily.\par He is interested in Dexcom CGM and will see if it is covered.\par 2.  Hypertension\par Controlled.\par 3.  Hyperlipidemia\par Check lipid panel.\par Continue atorvastatin 40 mg daily pending results.\par 4. Obesity\par Lifestyle modification. \par \par

## 2021-05-22 NOTE — PHYSICAL EXAM
[Alert] : alert [Well Nourished] : well nourished [Healthy Appearance] : healthy appearance [No Acute Distress] : no acute distress [Well Developed] : well developed [Normal Voice/Communication] : normal voice communication [Normal Sclera/Conjunctiva] : normal sclera/conjunctiva [No Proptosis] : no proptosis [No Neck Mass] : no neck mass was observed [No LAD] : no lymphadenopathy [Supple] : the neck was supple [Thyroid Not Enlarged] : the thyroid was not enlarged [No Thyroid Nodules] : no palpable thyroid nodules [No Respiratory Distress] : no respiratory distress [No Accessory Muscle Use] : no accessory muscle use [Normal Rate and Effort] : normal respiratory rate and effort [Normal S1, S2] : normal S1 and S2 [Normal Rate] : heart rate was normal [No Edema] : no peripheral edema [Pedal Pulses Normal] : the pedal pulses are present [Not Tender] : non-tender [Not Distended] : not distended [Soft] : abdomen soft [Spine Straight] : spine straight [No Stigmata of Cushings Syndrome] : no stigmata of Cushings Syndrome [Normal Gait] : normal gait [No Clubbing, Cyanosis] : no clubbing  or cyanosis of the fingernails [No Involuntary Movements] : no involuntary movements were seen [Left Foot Was Examined] : left foot ~C was examined [Acanthosis Nigricans] : no acanthosis nigricans [Normal] : normal [2+] : 2+ in the dorsalis pedis [#4 Diminished] : number 4 was diminished [#6 Diminished] : number 6 was diminished [#5 Diminished] : number 5 was diminished [#7 Diminished] : number 7 was diminished [No Tremors] : no tremors [Normal Insight/Judgement] : insight and judgment were intact [Normal Affect] : the affect was normal [Normal Mood] : the mood was normal

## 2021-05-25 ENCOUNTER — NON-APPOINTMENT (OUTPATIENT)
Age: 71
End: 2021-05-25

## 2021-05-28 ENCOUNTER — APPOINTMENT (OUTPATIENT)
Dept: INTERNAL MEDICINE | Facility: CLINIC | Age: 71
End: 2021-05-28
Payer: MEDICARE

## 2021-05-28 ENCOUNTER — NON-APPOINTMENT (OUTPATIENT)
Age: 71
End: 2021-05-28

## 2021-05-28 VITALS
OXYGEN SATURATION: 97 % | HEIGHT: 66.93 IN | BODY MASS INDEX: 30.45 KG/M2 | TEMPERATURE: 97.9 F | WEIGHT: 194 LBS | HEART RATE: 89 BPM | SYSTOLIC BLOOD PRESSURE: 130 MMHG | DIASTOLIC BLOOD PRESSURE: 68 MMHG

## 2021-05-28 DIAGNOSIS — R47.01 APHASIA: ICD-10-CM

## 2021-05-28 PROCEDURE — 99072 ADDL SUPL MATRL&STAF TM PHE: CPT

## 2021-05-28 PROCEDURE — 99213 OFFICE O/P EST LOW 20 MIN: CPT

## 2021-05-31 NOTE — HISTORY OF PRESENT ILLNESS
[FreeTextEntry1] : Yearly forms [de-identified] : Yearly form\par \par colitis on entyvio under reasonable control\par diabetes on med and under care of endocrine\par psych and dementia under care\par bph on med\par s/p cva with some residual aphasia

## 2021-05-31 NOTE — PLAN
[FreeTextEntry1] : continue meds as is\par diabetes good diet and meds\par colitis continue entyvio\par bph on med\par \par continue psych care

## 2021-06-03 ENCOUNTER — APPOINTMENT (OUTPATIENT)
Dept: RHEUMATOLOGY | Facility: CLINIC | Age: 71
End: 2021-06-03
Payer: MEDICARE

## 2021-06-03 PROCEDURE — 99072 ADDL SUPL MATRL&STAF TM PHE: CPT

## 2021-06-03 PROCEDURE — 96413 CHEMO IV INFUSION 1 HR: CPT

## 2021-06-28 ENCOUNTER — RX RENEWAL (OUTPATIENT)
Age: 71
End: 2021-06-28

## 2021-07-01 ENCOUNTER — APPOINTMENT (OUTPATIENT)
Dept: RHEUMATOLOGY | Facility: CLINIC | Age: 71
End: 2021-07-01
Payer: MEDICARE

## 2021-07-01 PROCEDURE — 96413 CHEMO IV INFUSION 1 HR: CPT

## 2021-07-01 PROCEDURE — 99072 ADDL SUPL MATRL&STAF TM PHE: CPT

## 2021-07-22 ENCOUNTER — APPOINTMENT (OUTPATIENT)
Dept: RHEUMATOLOGY | Facility: CLINIC | Age: 71
End: 2021-07-22
Payer: MEDICARE

## 2021-07-22 PROCEDURE — 96413 CHEMO IV INFUSION 1 HR: CPT

## 2021-07-22 PROCEDURE — 99072 ADDL SUPL MATRL&STAF TM PHE: CPT

## 2021-08-13 ENCOUNTER — RX RENEWAL (OUTPATIENT)
Age: 71
End: 2021-08-13

## 2021-08-27 ENCOUNTER — APPOINTMENT (OUTPATIENT)
Dept: GASTROENTEROLOGY | Facility: CLINIC | Age: 71
End: 2021-08-27
Payer: MEDICARE

## 2021-08-27 VITALS
RESPIRATION RATE: 16 BRPM | DIASTOLIC BLOOD PRESSURE: 80 MMHG | TEMPERATURE: 98 F | HEART RATE: 94 BPM | WEIGHT: 200 LBS | HEIGHT: 67 IN | BODY MASS INDEX: 31.39 KG/M2 | OXYGEN SATURATION: 98 % | SYSTOLIC BLOOD PRESSURE: 120 MMHG

## 2021-08-27 LAB
ALBUMIN SERPL ELPH-MCNC: 4.3 G/DL
ALP BLD-CCNC: 123 U/L
ALT SERPL-CCNC: 11 U/L
ANION GAP SERPL CALC-SCNC: 12 MMOL/L
AST SERPL-CCNC: 14 U/L
BASOPHILS # BLD AUTO: 0.06 K/UL
BASOPHILS NFR BLD AUTO: 0.7 %
BILIRUB SERPL-MCNC: 0.4 MG/DL
BUN SERPL-MCNC: 19 MG/DL
CALCIUM SERPL-MCNC: 9.2 MG/DL
CHLORIDE SERPL-SCNC: 102 MMOL/L
CO2 SERPL-SCNC: 24 MMOL/L
CREAT SERPL-MCNC: 1.1 MG/DL
CRP SERPL-MCNC: <3 MG/L
EOSINOPHIL # BLD AUTO: 0.39 K/UL
EOSINOPHIL NFR BLD AUTO: 4.7 %
GLUCOSE SERPL-MCNC: 224 MG/DL
HCT VFR BLD CALC: 35.6 %
HGB BLD-MCNC: 11 G/DL
IMM GRANULOCYTES NFR BLD AUTO: 0.4 %
IRON SATN MFR SERPL: 12 %
IRON SERPL-MCNC: 39 UG/DL
LYMPHOCYTES # BLD AUTO: 3.06 K/UL
LYMPHOCYTES NFR BLD AUTO: 36.6 %
MAN DIFF?: NORMAL
MCHC RBC-ENTMCNC: 25.5 PG
MCHC RBC-ENTMCNC: 30.9 GM/DL
MCV RBC AUTO: 82.6 FL
MONOCYTES # BLD AUTO: 0.66 K/UL
MONOCYTES NFR BLD AUTO: 7.9 %
NEUTROPHILS # BLD AUTO: 4.17 K/UL
NEUTROPHILS NFR BLD AUTO: 49.7 %
PLATELET # BLD AUTO: 240 K/UL
POTASSIUM SERPL-SCNC: 4.6 MMOL/L
PROT SERPL-MCNC: 7.4 G/DL
RBC # BLD: 4.31 M/UL
RBC # FLD: 13.7 %
SODIUM SERPL-SCNC: 138 MMOL/L
TIBC SERPL-MCNC: 325 UG/DL
UIBC SERPL-MCNC: 286 UG/DL
WBC # FLD AUTO: 8.37 K/UL

## 2021-08-27 PROCEDURE — 99214 OFFICE O/P EST MOD 30 MIN: CPT

## 2021-08-27 RX ORDER — VEDOLIZUMAB 300 MG/5ML
300 INJECTION, POWDER, LYOPHILIZED, FOR SOLUTION INTRAVENOUS
Qty: 1 | Refills: 5 | Status: DISCONTINUED | OUTPATIENT
Start: 2018-08-16 | End: 2021-08-27

## 2021-08-27 NOTE — HISTORY OF PRESENT ILLNESS
[de-identified] : Crohn's colitis\par Several years of maintenance therapy on Entyvio, now every 4 weeks\par Still some bloody mucus, stools after each meal\par Persistent anemia, persistent urgency\par Last colonoscopy June 2020 with persistent disease activity\par \par Patient additionally complaining of prominent knee pain

## 2021-08-27 NOTE — ASSESSMENT
[FreeTextEntry1] : Suboptimal response to Entyvio\par Response but clearly without remission\par Patient and his wife agreeable to therapeutic switch to Remicade\par We will send orders today, office visit 3 months for follow-up

## 2021-08-27 NOTE — END OF VISIT
[Time Spent: ___ minutes] : I have spent [unfilled] minutes of time on the encounter. 25yo  at 20w0d GA, EDC 12/3/21 by first trimester sono, presenting to the ED with dysuria, hematuria, urinary urgency, frequency x2 days. Associated with chills and right sided back pain, 6/10 in intensity. Reports nausea and 3 episodes of NBNB vomiting today.Denies fever, chills, CP, SOB, N/V, abdominal pain, contractions, vaginal bleeding, or LE pain/swelling. Good FM. Denies any complications with this pregnancy. Last PO intake at 7pm.

## 2021-08-31 ENCOUNTER — APPOINTMENT (OUTPATIENT)
Dept: RHEUMATOLOGY | Facility: CLINIC | Age: 71
End: 2021-08-31

## 2021-09-05 ENCOUNTER — RX RENEWAL (OUTPATIENT)
Age: 71
End: 2021-09-05

## 2021-09-06 ENCOUNTER — NON-APPOINTMENT (OUTPATIENT)
Age: 71
End: 2021-09-06

## 2021-09-23 ENCOUNTER — APPOINTMENT (OUTPATIENT)
Dept: ENDOCRINOLOGY | Facility: CLINIC | Age: 71
End: 2021-09-23
Payer: MEDICARE

## 2021-09-23 VITALS
BODY MASS INDEX: 29.66 KG/M2 | HEART RATE: 104 BPM | OXYGEN SATURATION: 97 % | SYSTOLIC BLOOD PRESSURE: 130 MMHG | WEIGHT: 197.95 LBS | DIASTOLIC BLOOD PRESSURE: 63 MMHG | HEIGHT: 68.7 IN | TEMPERATURE: 97.7 F

## 2021-09-23 LAB — GLUCOSE BLDC GLUCOMTR-MCNC: 216

## 2021-09-23 PROCEDURE — 99214 OFFICE O/P EST MOD 30 MIN: CPT

## 2021-09-24 ENCOUNTER — APPOINTMENT (OUTPATIENT)
Dept: INTERNAL MEDICINE | Facility: CLINIC | Age: 71
End: 2021-09-24
Payer: MEDICARE

## 2021-09-24 VITALS
WEIGHT: 198 LBS | HEART RATE: 104 BPM | OXYGEN SATURATION: 94 % | DIASTOLIC BLOOD PRESSURE: 69 MMHG | BODY MASS INDEX: 31.82 KG/M2 | SYSTOLIC BLOOD PRESSURE: 133 MMHG | HEIGHT: 66 IN | TEMPERATURE: 98 F

## 2021-09-24 DIAGNOSIS — Z23 ENCOUNTER FOR IMMUNIZATION: ICD-10-CM

## 2021-09-24 PROCEDURE — G0008: CPT

## 2021-09-24 PROCEDURE — G0439: CPT

## 2021-09-24 PROCEDURE — 90662 IIV NO PRSV INCREASED AG IM: CPT

## 2021-09-24 NOTE — HEALTH RISK ASSESSMENT
[Fair] :  ~his/her~ mood as fair [] : No [No] : No [Never (0 pts)] : Never (0 points) [No falls in past year] : Patient reported no falls in the past year [1] : 2) Feeling down, depressed, or hopeless for several days (1) [[id number]] : 2 [Change in mental status noted] : No change in mental status noted [Behavior] : difficulty with behavior [Learning/Retaining New Information] : difficulty learning/retaining new information [Handling Complex Tasks] : difficulty handling complex tasks [Reasoning] : difficulty with reasoning [Spatial Ability and Orientation] : difficulty with spatial ability and orientation [None] : None [With Family] : lives with family [Retired] : retired [High School] : high school [] :  [Feels Safe at Home] : Feels safe at home [Fully functional (bathing, dressing, toileting, transferring, walking, feeding)] : Fully functional (bathing, dressing, toileting, transferring, walking, feeding) [Some assistance needed] : using telephone [Full assistance needed] : managing finances [Reports changes in hearing] : Reports no changes in hearing [Reports changes in vision] : Reports no changes in vision [Smoke Detector] : smoke detector [Carbon Monoxide Detector] : carbon monoxide detector [Guns at Home] : no guns at home [Seat Belt] :  uses seat belt

## 2021-09-24 NOTE — HISTORY OF PRESENT ILLNESS
[de-identified] : Annual\par flu shot today\par had other vaccine\par colon 1 year ago\par \par under care of\par gi endocrine and psych\par to switch to remacaide  colitiis still not in remission\par last blood count good\par no cp or sob\par some trouble sleeping

## 2021-09-24 NOTE — REVIEW OF SYSTEMS
[Shortness Of Breath] : no shortness of breath [Wheezing] : no wheezing [Frequency] : frequency [Diarrhea] : diarrhea [Negative] : Cardiovascular

## 2021-09-24 NOTE — PLAN
[FreeTextEntry1] : Annual exam\par had flu shot\par recent colon\par \par diabetes good\par anemia good\par colitis not good to start remaciade\par to see cardiologst\par flu shot given

## 2021-09-26 NOTE — HISTORY OF PRESENT ILLNESS
[FreeTextEntry1] : CC: Diabetes\par This is a 71-year-old male with type 2 diabetes mellitus, hypertension, hyperlipidemia, anemia, CVA, anxiety, depression, BPH, Crohn's disease, ulcerative colitis, here for follow up.\par Type 2 diabetes was diagnosed at the age of 65.  He is currently on Tresiba 28 units at bedtime and Fiasp 30 units before meals plus correction scale.  He tried Metformin in the past but developed GI side effects.  He also tried Januvia but developed GI side effects as well.  His wife reports that he has GI side effects to many medications due to his Crohn's disease and ulcerative colitis.\par He self monitors blood glucose 3 times a day.  Fasting 170- 200s, lunch 130s-200s, dinner 160-200s.\par He denies hypoglycemia.\par He saw his ophthalmologist in December 2020 and denies retinopathy.\par He denies neuropathy.\par He denies nephropathy.\par He is on atorvastatin 40 mg daily.

## 2021-09-26 NOTE — PHYSICAL EXAM
[Alert] : alert [Well Nourished] : well nourished [Healthy Appearance] : healthy appearance [No Acute Distress] : no acute distress [Well Developed] : well developed [Normal Voice/Communication] : normal voice communication [Normal Sclera/Conjunctiva] : normal sclera/conjunctiva [No Proptosis] : no proptosis [No Neck Mass] : no neck mass was observed [No LAD] : no lymphadenopathy [Supple] : the neck was supple [Thyroid Not Enlarged] : the thyroid was not enlarged [No Thyroid Nodules] : no palpable thyroid nodules [No Respiratory Distress] : no respiratory distress [No Accessory Muscle Use] : no accessory muscle use [Normal Rate and Effort] : normal respiratory rate and effort [Normal S1, S2] : normal S1 and S2 [Normal Rate] : heart rate was normal [No Edema] : no peripheral edema [Pedal Pulses Normal] : the pedal pulses are present [Not Tender] : non-tender [Not Distended] : not distended [Soft] : abdomen soft [Spine Straight] : spine straight [No Stigmata of Cushings Syndrome] : no stigmata of Cushings Syndrome [Normal Gait] : normal gait [No Clubbing, Cyanosis] : no clubbing  or cyanosis of the fingernails [No Involuntary Movements] : no involuntary movements were seen [Acanthosis Nigricans] : no acanthosis nigricans [Left Foot Was Examined] : left foot ~C was examined [Normal] : normal [2+] : 2+ in the dorsalis pedis [#6 Diminished] : number 6 was diminished [#4 Diminished] : number 4 was diminished [#5 Diminished] : number 5 was diminished [#7 Diminished] : number 7 was diminished [No Tremors] : no tremors [Normal Affect] : the affect was normal [Normal Insight/Judgement] : insight and judgment were intact [Normal Mood] : the mood was normal

## 2021-09-26 NOTE — ASSESSMENT
[FreeTextEntry1] : This is a 71-year-old male with type 2 diabetes mellitus, hypertension, hyperlipidemia, CVA, anxiety, depression, BPH, Crohn's disease, ulcerative colitis, here for follow-up.\par 1.  T2DM\par Increase Fiasp to 35 units before meals to prevent postprandial hyperglycemia.\par Increase Tresiba to 33 units qd. \par Urine microalbumin is normal.\par His last ophthalmology appointment was in 12/2020 and he denies diabetic retinopathy.\par He denies neuropathy.\par He is on atorvastatin 40 mg daily. LDL is 53.\par He is interested in Dexcom CGM and will see if it is covered.\par 2.  Hypertension\par Controlled.\par 3.  Hyperlipidemia\par LDL is at goal. \par Continue atorvastatin 40 mg daily.\par 4. Obesity\par Lifestyle modification. \par \par

## 2021-10-01 ENCOUNTER — APPOINTMENT (OUTPATIENT)
Dept: RHEUMATOLOGY | Facility: CLINIC | Age: 71
End: 2021-10-01
Payer: MEDICARE

## 2021-10-01 PROCEDURE — 96413 CHEMO IV INFUSION 1 HR: CPT

## 2021-10-01 PROCEDURE — 96415 CHEMO IV INFUSION ADDL HR: CPT

## 2021-10-01 RX ORDER — ACETAMINOPHEN 325 MG/1
325 TABLET ORAL
Qty: 2 | Refills: 3 | Status: ACTIVE | OUTPATIENT
Start: 2018-08-16

## 2021-10-01 RX ORDER — CETIRIZINE HYDROCHLORIDE 10 MG/1
10 TABLET, FILM COATED ORAL
Qty: 1 | Refills: 5 | Status: ACTIVE | OUTPATIENT
Start: 2018-06-28

## 2021-10-15 ENCOUNTER — APPOINTMENT (OUTPATIENT)
Dept: RHEUMATOLOGY | Facility: CLINIC | Age: 71
End: 2021-10-15
Payer: MEDICARE

## 2021-10-15 ENCOUNTER — APPOINTMENT (OUTPATIENT)
Dept: UROLOGY | Facility: CLINIC | Age: 71
End: 2021-10-15
Payer: MEDICARE

## 2021-10-15 DIAGNOSIS — N40.2 NODULAR PROSTATE W/OUT LOWER URINARY TRACT SYMPTOMS: ICD-10-CM

## 2021-10-15 PROCEDURE — 99215 OFFICE O/P EST HI 40 MIN: CPT

## 2021-10-15 PROCEDURE — 96413 CHEMO IV INFUSION 1 HR: CPT

## 2021-10-15 PROCEDURE — 96415 CHEMO IV INFUSION ADDL HR: CPT

## 2021-10-16 LAB
ALP BLD-CCNC: 118 U/L
ANION GAP SERPL CALC-SCNC: 12 MMOL/L
APPEARANCE: CLEAR
BACTERIA: NEGATIVE
BILIRUBIN URINE: NEGATIVE
BLOOD URINE: NEGATIVE
BUN SERPL-MCNC: 22 MG/DL
CALCIUM SERPL-MCNC: 9.2 MG/DL
CHLORIDE SERPL-SCNC: 106 MMOL/L
CO2 SERPL-SCNC: 20 MMOL/L
COLOR: NORMAL
CREAT SERPL-MCNC: 1.04 MG/DL
GLUCOSE QUALITATIVE U: NEGATIVE
GLUCOSE SERPL-MCNC: 106 MG/DL
HYALINE CASTS: 2 /LPF
KETONES URINE: NEGATIVE
LEUKOCYTE ESTERASE URINE: NEGATIVE
MICROSCOPIC-UA: NORMAL
NITRITE URINE: NEGATIVE
PH URINE: 5.5
POTASSIUM SERPL-SCNC: 4.7 MMOL/L
PROTEIN URINE: NEGATIVE
PSA FREE FLD-MCNC: NORMAL %
PSA FREE SERPL-MCNC: <0.01 NG/ML
PSA SERPL-MCNC: 0.17 NG/ML
RED BLOOD CELLS URINE: 2 /HPF
SODIUM SERPL-SCNC: 139 MMOL/L
SPECIFIC GRAVITY URINE: 1.02
SQUAMOUS EPITHELIAL CELLS: 2 /HPF
UROBILINOGEN URINE: NORMAL
WHITE BLOOD CELLS URINE: 2 /HPF

## 2021-10-17 NOTE — ADDENDUM
[FreeTextEntry1] : I, Kelle Sanchez, acted as the sole scribe for Dr. Hitesh Sorensen on 10/15/2021.

## 2021-10-17 NOTE — HISTORY OF PRESENT ILLNESS
[FreeTextEntry1] : Mr. Best is a 68 year old male presented with elevated PSA, nocturia, and urgency. PSA from was 20.01 ng/mL. Currently taking Tamsulosin 0.4 mg once daily and Finasteride 5 mg. Complained of pain in the left humorous. Patient's wife noted he has mild dementia with episodes of confusion and has depression. Patient has Crohn's disease and Diabetes.  4/17/18: The patient returned and noted he has finished his antibiotics and is currently taking Tamsulosin and Finasteride. PSA from 4/10/18 was 2.73 ng/mL. UA from 4/10/18 had 7 RBC/HPF.  5/24/18: The patient returned and reported he did not undergo a MRI due to his insurance. PSA from 4/10/18 was 2.73 ng/mL. Discontinued use of Finasteride. MRI is not needed. Wife noted he urinates every hour during the night. Currently prescribed but not taking  Furosemide. 6/12/18: The patient returned and reported he urinates every 2 hours during the day and the night. Complained of urinary urgency and urge incontinence. Wife noted he has been having fevers during the night, around 101 F. 7/5/18: The patient returned for a cystoscopy. Aside from cystoscopy, patient was brought back to the office to discuss further evaluation management. Wife noted he had an UTI since last visit and was prescribed Bactrim by Dr. Oro.  Discontinued use of Bactrim due to diarrhea after 6 days of treatment.  7/2518: The patient returned for a US guided biopsy. US of the prostate showed a 2.0 cm hyperechoic right anterior apical nodule.  8/8/18: The patient returned to discuss biopsy results. Pathology report from 7/25/18 findings showed benign prostatic tissue for MRI targeted lesions and all other lesions. Noted his urination s/p biopsy was normal until one day ago he had urinary frequency. Noted he urinated every 1.5 hours. Wakes up 3-4 times during the night to urinate.  I advised the patient to take his Furosemide around 3-4 pm to avoid nocturia.  8/29/18: The patient returned and reported he was recently hospitalized after a fall. Wife noted he had an UTI in the hospital. Complained of urinary urgency and nocturia. Wakes up 4 times during the night to urinate.   10/17/18: The patient returned today and complains of nocturia, urgency, and urge incontinence. Wakes up 5-6 times during the night to urinate. Currently taking tamsulosin once daily. Notes he does not recall if he takes Finasteride. Finished trimethoprim and has not had any infections in the past 2 months. \par \par 10/15/2021: Patient presents today for follow up visit. He was accompanied by his wife who helped him report. He has nocturia 3-4 times per night. The patient does have urinary urgency. No recent UTIs. He has not been taking the Tamsulosin or Finasteride and his urination has gotten worse since he stopped taking it. Denies burning with micturition. Denies any ED. His diabetes is not well controlled. No FHx of prostate cancer. His PSA on 1/20/20 was 0.49 ng/mL. 8/8/18 the patient had a prostate biopsy which was negative.

## 2021-10-17 NOTE — ASSESSMENT
[FreeTextEntry1] : Mr. Best is a 68 year old male presented with elevated PSA, nocturia, and urgency. PSA from was 20.01 ng/mL. Currently taking Tamsulosin 0.4 mg once daily and Finasteride 5 mg. Complained of pain in the left humorous. Patient's wife noted he has mild dementia with episodes of confusion and has depression. Patient has Crohn's disease and Diabetes. \par 4/17/18: The patient returned and noted he has finished his antibiotics and is currently taking Tamsulosin and Finasteride. PSA from 4/10/18 was 2.73 ng/mL. UA from 4/10/18 had 7 RBC/HPF. \par 5/24/18: The patient returned and reported he did not undergo a MRI due to his insurance. PSA from 4/10/18 was 2.73 ng/mL. Discontinued use of Finasteride. MRI is not needed. Wife noted he urinates every hour during the night. Currently prescribed but not taking  Furosemide.\par 6/12/18: The patient returned and reported he urinates every 2 hours during the day and the night. Complained of urinary urgency and urge incontinence. Wife noted he has been having fevers during the night, around 101 F.\par 7/5/18: The patient returned for a cystoscopy. Aside from cystoscopy, patient was brought back to the office to discuss further evaluation management. Wife noted he had an UTI since last visit and was prescribed Bactrim by Dr. Oro.  Discontinued use of Bactrim due to diarrhea after 6 days of treatment. \par 7/2518: The patient returned for a US guided biopsy. US of the prostate showed a 2.0 cm hyperechoic right anterior apical nodule. \par 8/8/18: The patient returned to discuss biopsy results. Pathology report from 7/25/18 findings showed benign prostatic tissue for MRI targeted lesions and all other lesions. Noted his urination s/p biopsy was normal until one day ago he had urinary frequency. Noted he urinated every 1.5 hours. Wakes up 3-4 times during the night to urinate.  I advised the patient to take his Furosemide around 3-4 pm to avoid nocturia. \par 8/29/18: The patient returned and reported he was recently hospitalized after a fall. Wife noted he had an UTI in the hospital. Complained of urinary urgency and nocturia. Wakes up 4 times during the night to urinate. \par \par 10/17/18: The patient returned today and complains of nocturia, urgency, and urge incontinence. Wakes up 5-6 times during the night to urinate. Currently taking tamsulosin once daily. Notes he does not recall if he takes Finasteride. Finished trimethoprim and has not had any infections in the past 2 months. \par \par 10/15/2021: Patient presents today for follow up visit. He was accompanied by his wife who helped him report. He has nocturia 3-4 times per night. The patient does have urinary urgency. No recent UTIs. He has not been taking the Tamsulosin or Finasteride and his urination has gotten worse since he stopped taking it. Denies burning with micturition. Denies any ED. His diabetes is not well controlled. No FHx of prostate cancer. His PSA on 1/20/20 was 0.49 ng/mL. 8/8/18 the patient had a prostate biopsy which was negative. \par \par Digital rectal exam found no suspicious rectal masses. Anal tone is normal. The prostate is non tender, with normal texture, discrete borders, and no nodules. It is an 10g transurethral resection size. There were no rectal mucosal lesions. There was no gross blood on the exam finger. Right side of his prostate is firm. Nodules 1-1.5 cm in size.  No FHx of prostate cancer\par \par The patient produced a urine sample which will be sent for urinalysis, urine cytology, and urine culture. \par Blood work today included BMP, CBC, alkaline phosphatase, PSA, and testosterone. \par \par I am prescribing Tamsulosin 0.4 mg, one capsule daily for 2 weeks. After 2 weeks, he can begin taking Tamsulosin 0.4 mg, one capsule in the morning and one in the evening. I advised them of the side effects of lightheadedness and retrograde ejaculation. \par I am prescribing Finasteride 5 mg, one tablet daily. \par I renewed his Trimethoprim 100 mg, one tablet daily to prevent UTIs. \par \par The patient needs to have an MRI of the prostate. \par \par The patient will return to the office in one week after the MRI. \par \par Preparation, in-person office visit, and coordination of care took: 40 minutes.

## 2021-10-17 NOTE — PHYSICAL EXAM
[General Appearance - Well Developed] : well developed [General Appearance - Well Nourished] : well nourished [Normal Appearance] : normal appearance [Well Groomed] : well groomed [General Appearance - In No Acute Distress] : no acute distress [Abdomen Soft] : soft [Abdomen Tenderness] : non-tender [Edema] : no peripheral edema [] : no respiratory distress [Respiration, Rhythm And Depth] : normal respiratory rhythm and effort [Exaggerated Use Of Accessory Muscles For Inspiration] : no accessory muscle use [Oriented To Time, Place, And Person] : oriented to person, place, and time [Affect] : the affect was normal [Mood] : the mood was normal [Not Anxious] : not anxious [Normal Station and Gait] : the gait and station were normal for the patient's age [No Focal Deficits] : no focal deficits [Prostate Size ___ gm] : prostate size [unfilled] gm [FreeTextEntry1] : Digital rectal exam found no suspicious rectal masses. Anal tone is normal. The prostate is non tender, with normal texture, discrete borders, and no nodules. It is an 10g transurethral resection size. There were no rectal mucosal lesions. There was no gross blood on the exam finger. Right side of his prostate is firm. Nodules 1-1.5 cm in size.

## 2021-10-24 ENCOUNTER — NON-APPOINTMENT (OUTPATIENT)
Age: 71
End: 2021-10-24

## 2021-10-24 LAB
BACTERIA UR CULT: NORMAL
TESTOST BND SERPL-MCNC: 2.7 PG/ML
TESTOSTERONE TOTAL S: 415 NG/DL
URINE CYTOLOGY: NORMAL

## 2021-10-29 ENCOUNTER — APPOINTMENT (OUTPATIENT)
Dept: MRI IMAGING | Facility: IMAGING CENTER | Age: 71
End: 2021-10-29
Payer: MEDICARE

## 2021-10-29 ENCOUNTER — OUTPATIENT (OUTPATIENT)
Dept: OUTPATIENT SERVICES | Facility: HOSPITAL | Age: 71
LOS: 1 days | End: 2021-10-29
Payer: MEDICARE

## 2021-10-29 ENCOUNTER — RESULT REVIEW (OUTPATIENT)
Age: 71
End: 2021-10-29

## 2021-10-29 DIAGNOSIS — N40.2 NODULAR PROSTATE WITHOUT LOWER URINARY TRACT SYMPTOMS: ICD-10-CM

## 2021-10-29 DIAGNOSIS — Z98.49 CATARACT EXTRACTION STATUS, UNSPECIFIED EYE: Chronic | ICD-10-CM

## 2021-10-29 PROCEDURE — 76498 UNLISTED MR PROCEDURE: CPT

## 2021-10-29 PROCEDURE — 72197 MRI PELVIS W/O & W/DYE: CPT | Mod: 26

## 2021-10-29 PROCEDURE — 72197 MRI PELVIS W/O & W/DYE: CPT

## 2021-10-29 PROCEDURE — A9585: CPT

## 2021-10-29 PROCEDURE — 76498P: CUSTOM | Mod: 26

## 2021-11-10 ENCOUNTER — APPOINTMENT (OUTPATIENT)
Dept: INTERNAL MEDICINE | Facility: CLINIC | Age: 71
End: 2021-11-10
Payer: MEDICARE

## 2021-11-10 ENCOUNTER — APPOINTMENT (OUTPATIENT)
Dept: RHEUMATOLOGY | Facility: CLINIC | Age: 71
End: 2021-11-10
Payer: MEDICARE

## 2021-11-10 ENCOUNTER — NON-APPOINTMENT (OUTPATIENT)
Age: 71
End: 2021-11-10

## 2021-11-10 DIAGNOSIS — M79.641 PAIN IN RIGHT HAND: ICD-10-CM

## 2021-11-10 DIAGNOSIS — M79.642 PAIN IN RIGHT HAND: ICD-10-CM

## 2021-11-10 PROCEDURE — 96413 CHEMO IV INFUSION 1 HR: CPT

## 2021-11-10 PROCEDURE — 96415 CHEMO IV INFUSION ADDL HR: CPT

## 2021-11-10 PROCEDURE — 99442: CPT

## 2021-11-10 NOTE — PLAN
[FreeTextEntry1] : hand pain\par \par Arthritis vs gout vs neuropathy \par \par increase gabapentim to 100 tid\par if not better blood and either rheum or ortho hand

## 2021-11-10 NOTE — HISTORY OF PRESENT ILLNESS
[FreeTextEntry1] : Hand pain [de-identified] : Hand pan and swelling over 1 week\par also with foot pains\par on celebrex 100 bid and gabatpentim 100 hs\par last uric acid 6\par being treated for colitis and diabetes on insulin

## 2021-12-23 ENCOUNTER — APPOINTMENT (OUTPATIENT)
Dept: ENDOCRINOLOGY | Facility: CLINIC | Age: 71
End: 2021-12-23
Payer: MEDICARE

## 2021-12-23 VITALS
HEIGHT: 66 IN | BODY MASS INDEX: 32.27 KG/M2 | OXYGEN SATURATION: 97 % | HEART RATE: 93 BPM | DIASTOLIC BLOOD PRESSURE: 74 MMHG | TEMPERATURE: 97.4 F | SYSTOLIC BLOOD PRESSURE: 129 MMHG | WEIGHT: 200.82 LBS

## 2021-12-23 LAB — GLUCOSE BLDC GLUCOMTR-MCNC: 148

## 2021-12-23 PROCEDURE — 99214 OFFICE O/P EST MOD 30 MIN: CPT | Mod: 25

## 2021-12-23 PROCEDURE — 95250 CONT GLUC MNTR PHYS/QHP EQP: CPT

## 2021-12-23 PROCEDURE — 95251 CONT GLUC MNTR ANALYSIS I&R: CPT

## 2021-12-23 PROCEDURE — 36415 COLL VENOUS BLD VENIPUNCTURE: CPT

## 2021-12-25 LAB
ALBUMIN SERPL ELPH-MCNC: 4.5 G/DL
ALP BLD-CCNC: 119 U/L
ALT SERPL-CCNC: 12 U/L
ANION GAP SERPL CALC-SCNC: 16 MMOL/L
AST SERPL-CCNC: 19 U/L
BASOPHILS # BLD AUTO: 0.05 K/UL
BASOPHILS NFR BLD AUTO: 0.7 %
BILIRUB SERPL-MCNC: 0.4 MG/DL
BUN SERPL-MCNC: 20 MG/DL
CALCIUM SERPL-MCNC: 9.2 MG/DL
CHLORIDE SERPL-SCNC: 102 MMOL/L
CHOLEST SERPL-MCNC: 131 MG/DL
CO2 SERPL-SCNC: 23 MMOL/L
CREAT SERPL-MCNC: 1.19 MG/DL
CREAT SPEC-SCNC: 80 MG/DL
EOSINOPHIL # BLD AUTO: 0.35 K/UL
EOSINOPHIL NFR BLD AUTO: 4.9 %
ESTIMATED AVERAGE GLUCOSE: 120 MG/DL
GLUCOSE SERPL-MCNC: 134 MG/DL
HBA1C MFR BLD HPLC: 5.8 %
HCT VFR BLD CALC: 37.8 %
HDLC SERPL-MCNC: 32 MG/DL
HGB BLD-MCNC: 11.1 G/DL
IMM GRANULOCYTES NFR BLD AUTO: 0.3 %
LDLC SERPL CALC-MCNC: 66 MG/DL
LYMPHOCYTES # BLD AUTO: 3.38 K/UL
LYMPHOCYTES NFR BLD AUTO: 47.6 %
MAN DIFF?: NORMAL
MCHC RBC-ENTMCNC: 26 PG
MCHC RBC-ENTMCNC: 29.4 GM/DL
MCV RBC AUTO: 88.5 FL
MICROALBUMIN 24H UR DL<=1MG/L-MCNC: <1.2 MG/DL
MICROALBUMIN/CREAT 24H UR-RTO: NORMAL MG/G
MONOCYTES # BLD AUTO: 0.75 K/UL
MONOCYTES NFR BLD AUTO: 10.6 %
NEUTROPHILS # BLD AUTO: 2.55 K/UL
NEUTROPHILS NFR BLD AUTO: 35.9 %
NONHDLC SERPL-MCNC: 99 MG/DL
PLATELET # BLD AUTO: 186 K/UL
POTASSIUM SERPL-SCNC: 4.3 MMOL/L
PROT SERPL-MCNC: 7.5 G/DL
RBC # BLD: 4.27 M/UL
RBC # FLD: 12.8 %
SODIUM SERPL-SCNC: 140 MMOL/L
TRIGL SERPL-MCNC: 163 MG/DL
WBC # FLD AUTO: 7.1 K/UL

## 2021-12-26 NOTE — PHYSICAL EXAM
[Alert] : alert [Healthy Appearance] : healthy appearance [Well Nourished] : well nourished [No Acute Distress] : no acute distress [Well Developed] : well developed [Normal Voice/Communication] : normal voice communication [Normal Sclera/Conjunctiva] : normal sclera/conjunctiva [No Proptosis] : no proptosis [No Neck Mass] : no neck mass was observed [No LAD] : no lymphadenopathy [Supple] : the neck was supple [Thyroid Not Enlarged] : the thyroid was not enlarged [No Thyroid Nodules] : no palpable thyroid nodules [No Respiratory Distress] : no respiratory distress [No Accessory Muscle Use] : no accessory muscle use [Normal Rate and Effort] : normal respiratory rate and effort [Normal S1, S2] : normal S1 and S2 [Normal Rate] : heart rate was normal [No Edema] : no peripheral edema [Pedal Pulses Normal] : the pedal pulses are present [Not Tender] : non-tender [Not Distended] : not distended [Soft] : abdomen soft [Spine Straight] : spine straight [No Stigmata of Cushings Syndrome] : no stigmata of Cushings Syndrome [Normal Gait] : normal gait [No Clubbing, Cyanosis] : no clubbing  or cyanosis of the fingernails [No Involuntary Movements] : no involuntary movements were seen [Acanthosis Nigricans] : no acanthosis nigricans [Right foot was examined, including] : right foot ~C was examined, including visual inspection with sensory and pulse exams [Left foot was examined, including] : left foot ~C was examined, including visual inspection with sensory and pulse exams [Normal] : normal [2+] : 2+ in the dorsalis pedis [#4 Diminished] : number 4 was diminished [#6 Diminished] : number 6 was diminished [#5 Diminished] : number 5 was diminished [#7 Diminished] : number 7 was diminished [No Tremors] : no tremors [Normal Affect] : the affect was normal [Normal Insight/Judgement] : insight and judgment were intact [Normal Mood] : the mood was normal

## 2021-12-26 NOTE — REVIEW OF SYSTEMS
[Fatigue] : fatigue [Recent Weight Gain (___ Lbs)] : recent weight gain: [unfilled] lbs [All other systems negative] : All other systems negative

## 2021-12-26 NOTE — HISTORY OF PRESENT ILLNESS
[FreeTextEntry1] : CC: Diabetes\par Patient's wife was on the phone during this visit.\par This is a 71-year-old male with type 2 diabetes mellitus, hypertension, hyperlipidemia, anemia, CVA, anxiety, depression, BPH, Crohn's disease, ulcerative colitis, here for follow up.\par Type 2 diabetes was diagnosed at the age of 65.  He is currently on Tresiba 28 units at bedtime and Fiasp 30 units before meals plus correction scale.  He tried Metformin in the past but developed GI side effects.  He also tried Januvia but developed GI side effects as well.  His wife reports that he has GI side effects to many medications due to his Crohn's disease and ulcerative colitis.\par He self monitors blood glucose 3 times a day.  Glucose levels are between 140- 200s.\par He denies hypoglycemia.\par He saw his ophthalmologist in December 2021 and denies retinopathy.\par He denies neuropathy.\par He denies nephropathy.\par He is on atorvastatin 40 mg daily.  He is not on an ACE inhibitor or ARB.

## 2021-12-26 NOTE — ASSESSMENT
[FreeTextEntry1] : This is a 71-year-old male with type 2 diabetes mellitus, hypertension, hyperlipidemia, CVA, anxiety, depression, BPH, Crohn's disease, ulcerative colitis, here for follow-up.\par 1.  T2DM\par Check hemoglobin A1c.\par Freestyle jimmie CGM Pro placed today.  Will adjust insulin based on download.\par Check urine microalbumin to evaluate for diabetic nephropathy.\par His last ophthalmology appointment was in 12/2021 and he denies diabetic retinopathy.\par He denies neuropathy.\par He is on atorvastatin 40 mg daily. \par He is not on an ACE inhibitor or ARB.\par 2.  Hypertension\par Controlled.\par 3.  Hyperlipidemia\par Check lipid panel.\par Continue atorvastatin 40 mg daily pending results.\par 4. Obesity\par Lifestyle modification. \par \par

## 2021-12-28 LAB — FRUCTOSAMINE SERPL-MCNC: 297 UMOL/L

## 2022-01-11 ENCOUNTER — APPOINTMENT (OUTPATIENT)
Dept: RHEUMATOLOGY | Facility: CLINIC | Age: 72
End: 2022-01-11
Payer: MEDICARE

## 2022-01-11 PROCEDURE — 96415 CHEMO IV INFUSION ADDL HR: CPT

## 2022-01-11 PROCEDURE — 96413 CHEMO IV INFUSION 1 HR: CPT

## 2022-01-13 ENCOUNTER — APPOINTMENT (OUTPATIENT)
Dept: ENDOCRINOLOGY | Facility: CLINIC | Age: 72
End: 2022-01-13
Payer: MEDICARE

## 2022-01-13 PROCEDURE — ZZZZZ: CPT

## 2022-01-14 RX ORDER — INSULIN ASPART INJECTION 100 [IU]/ML
100 INJECTION, SOLUTION SUBCUTANEOUS
Qty: 4 | Refills: 2 | Status: COMPLETED | COMMUNITY
Start: 2020-12-10 | End: 2022-10-11

## 2022-01-31 ENCOUNTER — RX RENEWAL (OUTPATIENT)
Age: 72
End: 2022-01-31

## 2022-03-10 ENCOUNTER — APPOINTMENT (OUTPATIENT)
Dept: RHEUMATOLOGY | Facility: CLINIC | Age: 72
End: 2022-03-10
Payer: MEDICARE

## 2022-03-10 ENCOUNTER — LABORATORY RESULT (OUTPATIENT)
Age: 72
End: 2022-03-10

## 2022-03-10 PROCEDURE — 96415 CHEMO IV INFUSION ADDL HR: CPT

## 2022-03-10 PROCEDURE — 96413 CHEMO IV INFUSION 1 HR: CPT

## 2022-03-10 PROCEDURE — 36415 COLL VENOUS BLD VENIPUNCTURE: CPT

## 2022-03-22 ENCOUNTER — RX RENEWAL (OUTPATIENT)
Age: 72
End: 2022-03-22

## 2022-03-24 ENCOUNTER — APPOINTMENT (OUTPATIENT)
Dept: ENDOCRINOLOGY | Facility: CLINIC | Age: 72
End: 2022-03-24
Payer: MEDICARE

## 2022-03-24 VITALS
DIASTOLIC BLOOD PRESSURE: 68 MMHG | WEIGHT: 205 LBS | TEMPERATURE: 96.5 F | HEIGHT: 66 IN | OXYGEN SATURATION: 97 % | SYSTOLIC BLOOD PRESSURE: 145 MMHG | BODY MASS INDEX: 32.95 KG/M2 | HEART RATE: 100 BPM

## 2022-03-24 LAB — GLUCOSE BLDC GLUCOMTR-MCNC: 127

## 2022-03-24 PROCEDURE — 36415 COLL VENOUS BLD VENIPUNCTURE: CPT

## 2022-03-24 PROCEDURE — 99214 OFFICE O/P EST MOD 30 MIN: CPT | Mod: 25

## 2022-03-24 NOTE — ASSESSMENT
[FreeTextEntry1] : This is a 71-year-old male with type 2 diabetes mellitus, hypertension, hyperlipidemia, CVA, anxiety, depression, BPH, Crohn's disease, ulcerative colitis, here for follow-up.\par 1.  T2DM\par Check hemoglobin A1c.\par Decreased Tresiba from 25 units daily to 21 units daily to prevent fasting hypoglycemia.\par Check urine microalbumin to evaluate for diabetic nephropathy.\par His last ophthalmology appointment was in 12/2021 and he denies diabetic retinopathy.\par He denies neuropathy.\par He is on atorvastatin 40 mg daily. \par He is not on an ACE inhibitor or ARB.\par 2.  Hypertension\par Elevated.  Check BP at home and call with readings.\par 3.  Hyperlipidemia\par Check lipid panel.\par Continue atorvastatin 40 mg daily pending results.\par 4. Obesity\par Lifestyle modification. \par \par

## 2022-03-24 NOTE — HISTORY OF PRESENT ILLNESS
[FreeTextEntry1] : CC: Diabetes\par He is accompanied by his son.\par This is a 71-year-old male with type 2 diabetes mellitus, hypertension, hyperlipidemia, anemia, CVA, anxiety, depression, BPH, Crohn's disease, ulcerative colitis, here for follow up.\par Type 2 diabetes was diagnosed at the age of 65.  He is currently on Tresiba 25 units at bedtime and Fiasp 35 units before meals plus correction scale.  He tried Metformin in the past but developed GI side effects.  He also tried Januvia but developed GI side effects as well.  His wife reports that he has GI side effects to many medications due to his Crohn's disease and ulcerative colitis.\par He self monitors blood glucose 4 times a day.  Fasting 60-130s, lunch 120-160s, snack low 100s, dinner 130-150s.  He has hypoglycemia approximately every 2 weeks, usually fasting.\par He saw his ophthalmologist in December 2021 and denies retinopathy.\par He denies neuropathy.\par He denies nephropathy.\par He is on atorvastatin 40 mg daily.  He is not on an ACE inhibitor or ARB.

## 2022-03-24 NOTE — PHYSICAL EXAM
[Alert] : alert [Well Nourished] : well nourished [Healthy Appearance] : healthy appearance [No Acute Distress] : no acute distress [Well Developed] : well developed [Normal Voice/Communication] : normal voice communication [Normal Sclera/Conjunctiva] : normal sclera/conjunctiva [No Proptosis] : no proptosis [No Neck Mass] : no neck mass was observed [No LAD] : no lymphadenopathy [Supple] : the neck was supple [Thyroid Not Enlarged] : the thyroid was not enlarged [No Thyroid Nodules] : no palpable thyroid nodules [No Respiratory Distress] : no respiratory distress [No Accessory Muscle Use] : no accessory muscle use [Normal S1, S2] : normal S1 and S2 [Normal Rate and Effort] : normal respiratory rate and effort [Normal Rate] : heart rate was normal [No Edema] : no peripheral edema [Pedal Pulses Normal] : the pedal pulses are present [Not Tender] : non-tender [Not Distended] : not distended [Soft] : abdomen soft [Spine Straight] : spine straight [No Stigmata of Cushings Syndrome] : no stigmata of Cushings Syndrome [Normal Gait] : normal gait [No Clubbing, Cyanosis] : no clubbing  or cyanosis of the fingernails [No Involuntary Movements] : no involuntary movements were seen [Acanthosis Nigricans] : no acanthosis nigricans [Right foot was examined, including] : right foot ~C was examined, including visual inspection with sensory and pulse exams [Left foot was examined, including] : left foot ~C was examined, including visual inspection with sensory and pulse exams [Normal] : normal [2+] : 2+ in the dorsalis pedis [#4 Diminished] : number 4 was diminished [#6 Diminished] : number 6 was diminished [#5 Diminished] : number 5 was diminished [#7 Diminished] : number 7 was diminished [No Tremors] : no tremors [Normal Affect] : the affect was normal [Normal Insight/Judgement] : insight and judgment were intact [Normal Mood] : the mood was normal

## 2022-03-25 LAB
ALBUMIN SERPL ELPH-MCNC: 4.4 G/DL
ALP BLD-CCNC: 121 U/L
ALT SERPL-CCNC: 13 U/L
ANION GAP SERPL CALC-SCNC: 14 MMOL/L
AST SERPL-CCNC: 16 U/L
BASOPHILS # BLD AUTO: 0.05 K/UL
BASOPHILS NFR BLD AUTO: 0.8 %
BILIRUB SERPL-MCNC: 0.4 MG/DL
BUN SERPL-MCNC: 24 MG/DL
CALCIUM SERPL-MCNC: 9.2 MG/DL
CHLORIDE SERPL-SCNC: 105 MMOL/L
CHOLEST SERPL-MCNC: 126 MG/DL
CO2 SERPL-SCNC: 23 MMOL/L
CREAT SERPL-MCNC: 1.11 MG/DL
CREAT SPEC-SCNC: 106 MG/DL
EGFR: 70 ML/MIN/1.73M2
EOSINOPHIL # BLD AUTO: 0.31 K/UL
EOSINOPHIL NFR BLD AUTO: 4.7 %
ESTIMATED AVERAGE GLUCOSE: 103 MG/DL
GLUCOSE SERPL-MCNC: 151 MG/DL
HBA1C MFR BLD HPLC: 5.2 %
HCT VFR BLD CALC: 36 %
HDLC SERPL-MCNC: 35 MG/DL
HGB BLD-MCNC: 10.7 G/DL
IMM GRANULOCYTES NFR BLD AUTO: 0.3 %
LDLC SERPL CALC-MCNC: 60 MG/DL
LYMPHOCYTES # BLD AUTO: 2.68 K/UL
LYMPHOCYTES NFR BLD AUTO: 40.5 %
MAN DIFF?: NORMAL
MCHC RBC-ENTMCNC: 26 PG
MCHC RBC-ENTMCNC: 29.7 GM/DL
MCV RBC AUTO: 87.6 FL
MICROALBUMIN 24H UR DL<=1MG/L-MCNC: <1.2 MG/DL
MICROALBUMIN/CREAT 24H UR-RTO: NORMAL MG/G
MONOCYTES # BLD AUTO: 0.53 K/UL
MONOCYTES NFR BLD AUTO: 8 %
NEUTROPHILS # BLD AUTO: 3.02 K/UL
NEUTROPHILS NFR BLD AUTO: 45.7 %
NONHDLC SERPL-MCNC: 92 MG/DL
PLATELET # BLD AUTO: 173 K/UL
POTASSIUM SERPL-SCNC: 4.7 MMOL/L
PROT SERPL-MCNC: 7.6 G/DL
RBC # BLD: 4.11 M/UL
RBC # FLD: 13.1 %
SODIUM SERPL-SCNC: 142 MMOL/L
TRIGL SERPL-MCNC: 158 MG/DL
WBC # FLD AUTO: 6.61 K/UL

## 2022-03-28 LAB — FRUCTOSAMINE SERPL-MCNC: 287 UMOL/L

## 2022-03-31 ENCOUNTER — NON-APPOINTMENT (OUTPATIENT)
Age: 72
End: 2022-03-31

## 2022-04-07 NOTE — PATIENT PROFILE ADULT. - FUNCTIONAL SCREEN CURRENT LEVEL: TOILETING, MLM
Discharge Summary - [facility]  Patient: Elly Service 78 y o  male   MRN: [id number]  PCP: Rebecca Gordon DO  Unit/Bed#: [facility] 905-01 Encounter: [id number]            Discharging Physician / Practitioner: Maria T Patel MD  PCP: Rebecca Gordon DO  Admission Date:   Admission Orders (From admission, onward)     Ordered        04/04/22 1548  Inpatient Admission  Once            04/03/22 1420  Place in Observation  Once                      Discharge Date: 04/07/22      Reason for Admission:  Altered mental status       Discharge Diagnoses:     Principal Problem:    Acute encephalopathy    Active Problems:    Atrial fibrillation possibly new onset    Chronic combined systolic and diastolic CHF    Chronic kidney disease stage 4    Coronary artery disease    Essential hypertension    Severe protein calorie malnutrition         Resolved Problems:    Leukocytosis    Thrombocytosis    Hypokalemia      Consultations During Hospital Stay:  · Cardiology  · Neurology      Hospital Course:     * Acute encephalopathy  Assessment & Plan  Etiology fully unclear at this time  CT of head negative for acute intracranial etiology  Urinalysis negative for signs of infection - TSH/ammonia levels within normal limits  EEG normal - MRI of brain and MR angiogram of the carotids MR angiogram of head noting a poorly visualized M1 segment of the right MCA possibly occlusion vs technical artifact, however, no further workup per neurology  Questionable new onset atrial fibrillation noted on admission - stroke workup pending as above  Mentation  improved to baseline over the last few days but still occasionally sluggish in responses - likelihood of underlying cognitive impairment noted  Appreciate PT/OT input -> plan for discharge to skilled rehab today     Suspected atrial fibrillation possibly new onset  Assessment & Plan  As reported on admission -> appreciate cardiology input noting EKG shows sinus rhythm with PACs rather than true atrial fibrillation  Rate controlled on Lopressor - not chronically on anticoagulation, however, on dual anti-platelet therapy with ASA/Plavix  In the setting of altered mentation on presentation, stroke workup ongoing  Echocardiogram earlier this year revealed mild biatrial dilatation     Chronic combined systolic and diastolic CHF  Assessment & Plan  Last EF of 25% in February 2022 w/ severe global LV hypokinesis, mild biatrial dilatation, mild-moderate AR/TR, and moderate MR  Diuresis w/ Lasix previously held due to soft blood pressures and hyponatremia -> per cardiology recommendation, will implement a 3x/weekly regimen when resumed on discharge - on beta-blockade w/ Lopressor - on afterload reduction w/ Imdur     Chronic kidney disease stage 4  Assessment & Plan  Baseline creatinine approximately 1 8-2 0 - currently @  1 78 on day of discharge  Monitor renal function and urine output - limit/avoid nephrotoxins if possible - avoid hypotension as possible   Note chronic Lasix use     Coronary artery disease  Assessment & Plan  On dual anti-platelet therapy with ASA/Plavix - continue statin/Lopressor/Imdur  S/p CABG     Essential hypertension  Assessment & Plan  Continue Lopressor     Severe protein calorie malnutrition   Assessment & Plan  BMI of 21 63 in the setting of aging coupled with decreased appetite/oral intake and evidence of severe muscle wasting/atrophy and subcutaneous fat loss on exam   Initiated Ensure nutritional supplementation w/ meals while hospitalized     Hypokalemia  Assessment & Plan  Likely multifactorial secondary to oral intake and chronic diuretic use  Normalized prior to discharge     Thrombocytopenia   Assessment & Plan  Likely reactive acute medical issue(s)  Monitor platelet count - normalized     Leukocytosis  Assessment & Plan  Likely reactive acute medical issue(s)  Normalized prior to discharge        Condition at Discharge: fair       Discharge Day Visit / Exam: Vitals: Blood Pressure: 116/75 (04/07/22 0829)  Pulse: 98 (04/07/22 0829)  Temperature: 97 8 °F (36 6 °C) (04/07/22 0626)  Temp Source: Axillary (04/06/22 0336)  Respirations: 18 (04/07/22 0626)  Weight - Scale: 59 kg (130 lb) (04/03/22 1124)  SpO2: 94 % (04/07/22 0829)      Physical exam - I had a face-to-face encounter with the patient on day of discharge  Discussion with Patient and/or Family:  The patient has been advised to return to the ER immediately if any symptoms recur or worsen  Discharge instructions/Information to Patient and/or Family:   See after visit summary for information provided to patient and/or family  Provisions for Follow-Up Care:  See after visit summary for information related to follow-up care and any pertinent home health orders  Disposition:   Skilled rehab facility      Discharge Medications:  See after visit summary for reconciled discharge medications provided to patient and/or family  Discharge Statement:  I spent 38 minutes discharging the patient  This time was spent on the day of discharge  I had direct contact with the patient on the day of discharge  Greater than 50% of the total time was spent examining patient, answering all patient questions, arranging and discussing plan of care with patient as well as directly providing post-discharge instructions  Additional time then spent on discharge activities  ** Please Note: This note is constructed using a voice recognition dictation system  An occasional wrong word/phrase or sound-a-like substitution may have been picked up by dictation device due to the inherent limitations of voice recognition software  Read the chart carefully and recognize, using reasonable context, where substitutions may have occurred  ** (0) independent

## 2022-04-11 PROBLEM — Z11.59 SCREENING FOR VIRAL DISEASE: Status: ACTIVE | Noted: 2020-05-22

## 2022-04-21 ENCOUNTER — NON-APPOINTMENT (OUTPATIENT)
Age: 72
End: 2022-04-21

## 2022-04-21 ENCOUNTER — APPOINTMENT (OUTPATIENT)
Dept: INTERNAL MEDICINE | Facility: CLINIC | Age: 72
End: 2022-04-21
Payer: MEDICARE

## 2022-04-21 VITALS
HEART RATE: 115 BPM | OXYGEN SATURATION: 98 % | TEMPERATURE: 97.9 F | WEIGHT: 206 LBS | SYSTOLIC BLOOD PRESSURE: 124 MMHG | HEIGHT: 66 IN | BODY MASS INDEX: 33.11 KG/M2 | DIASTOLIC BLOOD PRESSURE: 94 MMHG

## 2022-04-21 DIAGNOSIS — E11.65 TYPE 2 DIABETES MELLITUS WITH HYPERGLYCEMIA: ICD-10-CM

## 2022-04-21 DIAGNOSIS — K50.10 CROHN'S DISEASE OF LARGE INTESTINE W/OUT COMPLICATIONS: ICD-10-CM

## 2022-04-21 DIAGNOSIS — R06.02 SHORTNESS OF BREATH: ICD-10-CM

## 2022-04-21 PROCEDURE — 93000 ELECTROCARDIOGRAM COMPLETE: CPT

## 2022-04-21 PROCEDURE — 99213 OFFICE O/P EST LOW 20 MIN: CPT | Mod: 25

## 2022-04-21 NOTE — HISTORY OF PRESENT ILLNESS
[FreeTextEntry1] : f/u [de-identified] : colitis doing well on Remicade\par diabetes on insulin\par no chest pain\par shortness of breath on exertion new\par

## 2022-04-21 NOTE — REVIEW OF SYSTEMS
[Chest Pain] : no chest pain [Lower Ext Edema] : no lower extremity edema [Dyspnea on Exertion] : dyspnea on exertion [Negative] : Gastrointestinal

## 2022-04-21 NOTE — PLAN
[FreeTextEntry1] : EKG normal\par Need to f/u with cardiology\par diabetes and colitis and labs are good

## 2022-04-28 ENCOUNTER — RX RENEWAL (OUTPATIENT)
Age: 72
End: 2022-04-28

## 2022-04-29 NOTE — ED PROVIDER NOTE - TEMPLATE, MLM
Called and requested refill(s): patient  Medications: Losartan  Amount: 90 day supply  Pharmacy to be sent to: [facility] [address]  Call back number: [phone]  Okay to leave detailed voice message: yes
General

## 2022-05-04 ENCOUNTER — RX RENEWAL (OUTPATIENT)
Age: 72
End: 2022-05-04

## 2022-05-05 ENCOUNTER — APPOINTMENT (OUTPATIENT)
Dept: RHEUMATOLOGY | Facility: CLINIC | Age: 72
End: 2022-05-05
Payer: MEDICARE

## 2022-05-05 PROCEDURE — 96415 CHEMO IV INFUSION ADDL HR: CPT

## 2022-05-05 PROCEDURE — 96413 CHEMO IV INFUSION 1 HR: CPT

## 2022-05-28 ENCOUNTER — NON-APPOINTMENT (OUTPATIENT)
Age: 72
End: 2022-05-28

## 2022-06-08 ENCOUNTER — NON-APPOINTMENT (OUTPATIENT)
Age: 72
End: 2022-06-08

## 2022-06-23 ENCOUNTER — APPOINTMENT (OUTPATIENT)
Dept: ENDOCRINOLOGY | Facility: CLINIC | Age: 72
End: 2022-06-23

## 2022-06-30 ENCOUNTER — APPOINTMENT (OUTPATIENT)
Dept: RHEUMATOLOGY | Facility: CLINIC | Age: 72
End: 2022-06-30

## 2022-06-30 VITALS
OXYGEN SATURATION: 97 % | RESPIRATION RATE: 18 BRPM | SYSTOLIC BLOOD PRESSURE: 109 MMHG | DIASTOLIC BLOOD PRESSURE: 66 MMHG | HEART RATE: 83 BPM

## 2022-06-30 VITALS
DIASTOLIC BLOOD PRESSURE: 72 MMHG | OXYGEN SATURATION: 98 % | SYSTOLIC BLOOD PRESSURE: 151 MMHG | HEART RATE: 93 BPM | RESPIRATION RATE: 18 BRPM | TEMPERATURE: 98 F

## 2022-06-30 DIAGNOSIS — R19.7 DIARRHEA, UNSPECIFIED: ICD-10-CM

## 2022-06-30 PROCEDURE — 36415 COLL VENOUS BLD VENIPUNCTURE: CPT

## 2022-06-30 PROCEDURE — 96413 CHEMO IV INFUSION 1 HR: CPT

## 2022-06-30 PROCEDURE — 96415 CHEMO IV INFUSION ADDL HR: CPT

## 2022-06-30 RX ORDER — INFLIXIMAB 100 MG/10ML
100 INJECTION, POWDER, LYOPHILIZED, FOR SOLUTION INTRAVENOUS
Qty: 1 | Refills: 0 | Status: COMPLETED | OUTPATIENT
Start: 2022-06-24

## 2022-06-30 RX ORDER — ACETAMINOPHEN 325 MG/1
325 TABLET ORAL
Qty: 0 | Refills: 0 | Status: COMPLETED | OUTPATIENT
Start: 2022-06-24

## 2022-06-30 RX ORDER — CETIRIZINE HYDROCHLORIDE 10 MG/1
10 TABLET, COATED ORAL
Qty: 0 | Refills: 0 | Status: COMPLETED | OUTPATIENT
Start: 2022-06-24

## 2022-07-01 LAB
ALBUMIN SERPL ELPH-MCNC: 4 G/DL
ALP BLD-CCNC: 123 U/L
ALT SERPL-CCNC: 14 U/L
ANION GAP SERPL CALC-SCNC: 18 MMOL/L
AST SERPL-CCNC: 20 U/L
BASOPHILS # BLD AUTO: 0.04 K/UL
BASOPHILS NFR BLD AUTO: 0.6 %
BILIRUB SERPL-MCNC: 0.4 MG/DL
BUN SERPL-MCNC: 22 MG/DL
CALCIUM SERPL-MCNC: 8.7 MG/DL
CHLORIDE SERPL-SCNC: 106 MMOL/L
CO2 SERPL-SCNC: 18 MMOL/L
CREAT SERPL-MCNC: 1.19 MG/DL
EGFR: 64 ML/MIN/1.73M2
EOSINOPHIL # BLD AUTO: 0.51 K/UL
EOSINOPHIL NFR BLD AUTO: 7.5 %
HCT VFR BLD CALC: 32.8 %
HGB BLD-MCNC: 9.6 G/DL
IMM GRANULOCYTES NFR BLD AUTO: 0.6 %
LYMPHOCYTES # BLD AUTO: 2.8 K/UL
LYMPHOCYTES NFR BLD AUTO: 41.4 %
MAN DIFF?: NORMAL
MCHC RBC-ENTMCNC: 25.3 PG
MCHC RBC-ENTMCNC: 29.3 GM/DL
MCV RBC AUTO: 86.5 FL
MONOCYTES # BLD AUTO: 0.73 K/UL
MONOCYTES NFR BLD AUTO: 10.8 %
NEUTROPHILS # BLD AUTO: 2.64 K/UL
NEUTROPHILS NFR BLD AUTO: 39.1 %
PLATELET # BLD AUTO: 207 K/UL
POTASSIUM SERPL-SCNC: 3.9 MMOL/L
PROT SERPL-MCNC: 7.3 G/DL
RBC # BLD: 3.79 M/UL
RBC # FLD: 14 %
SODIUM SERPL-SCNC: 142 MMOL/L
WBC # FLD AUTO: 6.76 K/UL

## 2022-07-11 ENCOUNTER — NON-APPOINTMENT (OUTPATIENT)
Age: 72
End: 2022-07-11

## 2022-07-11 LAB
INFLIXIMAB AB SERPL-MCNC: 308 NG/ML
INFLIXIMAB SERPL-MCNC: <0.4 UG/ML

## 2022-07-29 ENCOUNTER — RX RENEWAL (OUTPATIENT)
Age: 72
End: 2022-07-29

## 2022-08-15 NOTE — REASON FOR VISIT
"Routine Foot Care    Date/Time: 8/15/2022 2:30 PM  Performed by: Colton Aguirre DPM  Authorized by: Colton Aguirre DPM     Time out: Immediately prior to procedure a "time out" was called to verify the correct patient, procedure, equipment, support staff and site/side marked as required.    Consent Done?:  Yes (Verbal)  Hyperkeratotic Skin Lesions?: Yes    Number of trimmed lesions:  2  Location(s):  Left 1st Metatarsal Head and Right 1st Metatarsal Head (plantar medial)    Nail Care Type:  Debride  Location(s): All  (Left 1st Toe, Left 3rd Toe, Left 2nd Toe, Left 4th Toe, Left 5th Toe, Right 1st Toe, Right 2nd Toe, Right 3rd Toe, Right 4th Toe and Right 5th Toe)  Patient tolerance:  Patient tolerated the procedure well with no immediate complications     Used sterile nail nipper and #15 scalpel.        " [Annual Visit] : an annual visit [FreeTextEntry1] : Annual visit, crohns and other

## 2022-08-19 ENCOUNTER — NON-APPOINTMENT (OUTPATIENT)
Age: 72
End: 2022-08-19

## 2022-08-19 PROBLEM — R19.7 ACUTE DIARRHEA: Status: ACTIVE | Noted: 2022-08-19

## 2022-08-25 ENCOUNTER — APPOINTMENT (OUTPATIENT)
Dept: RHEUMATOLOGY | Facility: CLINIC | Age: 72
End: 2022-08-25

## 2022-08-25 ENCOUNTER — RX RENEWAL (OUTPATIENT)
Age: 72
End: 2022-08-25

## 2022-08-25 VITALS — HEART RATE: 79 BPM | SYSTOLIC BLOOD PRESSURE: 113 MMHG | OXYGEN SATURATION: 98 % | DIASTOLIC BLOOD PRESSURE: 65 MMHG

## 2022-08-25 VITALS
TEMPERATURE: 97.1 F | SYSTOLIC BLOOD PRESSURE: 115 MMHG | OXYGEN SATURATION: 100 % | DIASTOLIC BLOOD PRESSURE: 70 MMHG | HEART RATE: 80 BPM

## 2022-08-25 PROCEDURE — 96413 CHEMO IV INFUSION 1 HR: CPT

## 2022-08-25 PROCEDURE — 96415 CHEMO IV INFUSION ADDL HR: CPT

## 2022-08-25 RX ORDER — CETIRIZINE HYDROCHLORIDE 10 MG/1
10 TABLET, COATED ORAL
Qty: 0 | Refills: 0 | Status: COMPLETED | OUTPATIENT
Start: 2022-08-19

## 2022-08-25 RX ORDER — INFLIXIMAB 100 MG/10ML
100 INJECTION, POWDER, LYOPHILIZED, FOR SOLUTION INTRAVENOUS
Qty: 1 | Refills: 0 | Status: COMPLETED | OUTPATIENT
Start: 2022-08-19

## 2022-08-25 RX ORDER — ACETAMINOPHEN 325 MG/1
325 TABLET ORAL
Qty: 0 | Refills: 0 | Status: COMPLETED | OUTPATIENT
Start: 2022-08-19

## 2022-09-06 ENCOUNTER — APPOINTMENT (OUTPATIENT)
Dept: ENDOCRINOLOGY | Facility: CLINIC | Age: 72
End: 2022-09-06

## 2022-09-06 PROCEDURE — 98968 PH1 ASSMT&MGMT NQHP 21-30: CPT

## 2022-09-16 ENCOUNTER — APPOINTMENT (OUTPATIENT)
Dept: GASTROENTEROLOGY | Facility: CLINIC | Age: 72
End: 2022-09-16

## 2022-09-16 VITALS
SYSTOLIC BLOOD PRESSURE: 108 MMHG | HEART RATE: 102 BPM | DIASTOLIC BLOOD PRESSURE: 50 MMHG | TEMPERATURE: 97.7 F | BODY MASS INDEX: 27.2 KG/M2 | OXYGEN SATURATION: 97 % | WEIGHT: 190 LBS | HEIGHT: 70 IN

## 2022-09-16 PROCEDURE — 99214 OFFICE O/P EST MOD 30 MIN: CPT

## 2022-09-16 NOTE — ASSESSMENT
[FreeTextEntry1] : 72-year-old male\par Worsening diarrhea\par Recent blood work showing elevation of antiinfliximab antibodies, absent trough\par Clinically losing response, cannot rule out infection\par \par Plan\par Stool testing as ordered\par Blood testing including infliximab drug and antibody level today\par Telephone follow-up next week\par Patient likely to require switch to alternate therapy\par For now, begin desonide 9 mg daily

## 2022-09-16 NOTE — HISTORY OF PRESENT ILLNESS
[FreeTextEntry1] : 72-year-old male\par Crohn's disease versus ulcerative colitis\par Minimal then loss of response to Entyvio\par Initial excellent response to Remicade for most of the past year\par Recent elevation of antibodies, decline trough, subsequent recurrence of diarrhea\par No bleeding\par Patient's last infusion several weeks ago, per his wife, no improvement following infusion\par \par

## 2022-09-17 ENCOUNTER — RX RENEWAL (OUTPATIENT)
Age: 72
End: 2022-09-17

## 2022-09-19 ENCOUNTER — LABORATORY RESULT (OUTPATIENT)
Age: 72
End: 2022-09-19

## 2022-09-19 LAB
ALBUMIN SERPL ELPH-MCNC: 3.5 G/DL
ALP BLD-CCNC: 96 U/L
ALT SERPL-CCNC: 11 U/L
ANION GAP SERPL CALC-SCNC: 13 MMOL/L
AST SERPL-CCNC: 11 U/L
BASOPHILS # BLD AUTO: 0.03 K/UL
BASOPHILS NFR BLD AUTO: 0.4 %
BILIRUB SERPL-MCNC: 0.4 MG/DL
BUN SERPL-MCNC: 14 MG/DL
CALCIUM SERPL-MCNC: 8.6 MG/DL
CHLORIDE SERPL-SCNC: 106 MMOL/L
CO2 SERPL-SCNC: 20 MMOL/L
CREAT SERPL-MCNC: 1.15 MG/DL
CRP SERPL-MCNC: 23 MG/L
EGFR: 67 ML/MIN/1.73M2
EOSINOPHIL # BLD AUTO: 0.61 K/UL
EOSINOPHIL NFR BLD AUTO: 7.6 %
GLUCOSE SERPL-MCNC: 223 MG/DL
HCT VFR BLD CALC: 28.7 %
HGB BLD-MCNC: 9 G/DL
IMM GRANULOCYTES NFR BLD AUTO: 0.4 %
LYMPHOCYTES # BLD AUTO: 2.6 K/UL
LYMPHOCYTES NFR BLD AUTO: 32.5 %
MAN DIFF?: NORMAL
MCHC RBC-ENTMCNC: 24.9 PG
MCHC RBC-ENTMCNC: 31.4 GM/DL
MCV RBC AUTO: 79.5 FL
MONOCYTES # BLD AUTO: 0.81 K/UL
MONOCYTES NFR BLD AUTO: 10.1 %
NEUTROPHILS # BLD AUTO: 3.91 K/UL
NEUTROPHILS NFR BLD AUTO: 49 %
PLATELET # BLD AUTO: 258 K/UL
POTASSIUM SERPL-SCNC: 3.9 MMOL/L
PROT SERPL-MCNC: 6.8 G/DL
RBC # BLD: 3.61 M/UL
RBC # FLD: 13.5 %
SODIUM SERPL-SCNC: 139 MMOL/L
WBC # FLD AUTO: 7.99 K/UL

## 2022-09-20 ENCOUNTER — NON-APPOINTMENT (OUTPATIENT)
Age: 72
End: 2022-09-20

## 2022-09-20 LAB
VZV AB TITR SER: POSITIVE
VZV IGG SER IF-ACNC: 1225 INDEX

## 2022-09-24 LAB
C DIFF TOXIN B QL PCR REFLEX: NORMAL
CALPROTECTIN FECAL: 1133 UG/G
GDH ANTIGEN: DETECTED
TOXIN A AND B: NOT DETECTED

## 2022-09-26 ENCOUNTER — NON-APPOINTMENT (OUTPATIENT)
Age: 72
End: 2022-09-26

## 2022-09-27 ENCOUNTER — NON-APPOINTMENT (OUTPATIENT)
Age: 72
End: 2022-09-27

## 2022-09-27 ENCOUNTER — APPOINTMENT (OUTPATIENT)
Dept: ENDOCRINOLOGY | Facility: CLINIC | Age: 72
End: 2022-09-27

## 2022-09-27 ENCOUNTER — INPATIENT (INPATIENT)
Facility: HOSPITAL | Age: 72
LOS: 11 days | Discharge: HOME HEALTH | End: 2022-10-09
Attending: INTERNAL MEDICINE | Admitting: INTERNAL MEDICINE

## 2022-09-27 VITALS
HEART RATE: 82 BPM | DIASTOLIC BLOOD PRESSURE: 45 MMHG | RESPIRATION RATE: 16 BRPM | HEIGHT: 67 IN | TEMPERATURE: 98 F | OXYGEN SATURATION: 99 % | SYSTOLIC BLOOD PRESSURE: 108 MMHG

## 2022-09-27 DIAGNOSIS — R19.7 DIARRHEA, UNSPECIFIED: ICD-10-CM

## 2022-09-27 DIAGNOSIS — Z98.49 CATARACT EXTRACTION STATUS, UNSPECIFIED EYE: Chronic | ICD-10-CM

## 2022-09-27 LAB
ALBUMIN SERPL ELPH-MCNC: 3.1 G/DL — LOW (ref 3.3–5)
ALP SERPL-CCNC: 84 U/L — SIGNIFICANT CHANGE UP (ref 40–120)
ALT FLD-CCNC: 17 U/L — SIGNIFICANT CHANGE UP (ref 4–41)
ANION GAP SERPL CALC-SCNC: 10 MMOL/L — SIGNIFICANT CHANGE UP (ref 7–14)
APPEARANCE UR: CLEAR — SIGNIFICANT CHANGE UP
APTT BLD: 22.1 SEC — LOW (ref 27–36.3)
AST SERPL-CCNC: 60 U/L — HIGH (ref 4–40)
BASE EXCESS BLDV CALC-SCNC: -1.7 MMOL/L — SIGNIFICANT CHANGE UP (ref -2–3)
BASOPHILS # BLD AUTO: 0.01 K/UL — SIGNIFICANT CHANGE UP (ref 0–0.2)
BASOPHILS NFR BLD AUTO: 0.1 % — SIGNIFICANT CHANGE UP (ref 0–2)
BILIRUB SERPL-MCNC: 0.7 MG/DL — SIGNIFICANT CHANGE UP (ref 0.2–1.2)
BILIRUB UR-MCNC: NEGATIVE — SIGNIFICANT CHANGE UP
BLD GP AB SCN SERPL QL: NEGATIVE — SIGNIFICANT CHANGE UP
BLOOD GAS VENOUS COMPREHENSIVE RESULT: SIGNIFICANT CHANGE UP
BUN SERPL-MCNC: 24 MG/DL — HIGH (ref 7–23)
CALCIUM SERPL-MCNC: 8.6 MG/DL — SIGNIFICANT CHANGE UP (ref 8.4–10.5)
CHLORIDE BLDV-SCNC: 104 MMOL/L — SIGNIFICANT CHANGE UP (ref 96–108)
CHLORIDE SERPL-SCNC: 105 MMOL/L — SIGNIFICANT CHANGE UP (ref 98–107)
CO2 BLDV-SCNC: 25 MMOL/L — SIGNIFICANT CHANGE UP (ref 22–26)
CO2 SERPL-SCNC: 23 MMOL/L — SIGNIFICANT CHANGE UP (ref 22–31)
COLOR SPEC: YELLOW — SIGNIFICANT CHANGE UP
CREAT SERPL-MCNC: 0.87 MG/DL — SIGNIFICANT CHANGE UP (ref 0.5–1.3)
DIFF PNL FLD: NEGATIVE — SIGNIFICANT CHANGE UP
EGFR: 91 ML/MIN/1.73M2 — SIGNIFICANT CHANGE UP
EOSINOPHIL # BLD AUTO: 0.01 K/UL — SIGNIFICANT CHANGE UP (ref 0–0.5)
EOSINOPHIL NFR BLD AUTO: 0.1 % — SIGNIFICANT CHANGE UP (ref 0–6)
ERYTHROCYTE [SEDIMENTATION RATE] IN BLOOD: 79 MM/HR — HIGH (ref 1–15)
FLUAV AG NPH QL: SIGNIFICANT CHANGE UP
FLUBV AG NPH QL: SIGNIFICANT CHANGE UP
GAS PNL BLDV: 137 MMOL/L — SIGNIFICANT CHANGE UP (ref 136–145)
GLUCOSE BLDV-MCNC: 147 MG/DL — HIGH (ref 70–99)
GLUCOSE SERPL-MCNC: 148 MG/DL — HIGH (ref 70–99)
GLUCOSE UR QL: NEGATIVE — SIGNIFICANT CHANGE UP
HCO3 BLDV-SCNC: 24 MMOL/L — SIGNIFICANT CHANGE UP (ref 22–29)
HCT VFR BLD CALC: 29.5 % — LOW (ref 39–50)
HCT VFR BLDA CALC: 29 % — LOW (ref 39–51)
HGB BLD CALC-MCNC: 9.5 G/DL — LOW (ref 13–17)
HGB BLD-MCNC: 9 G/DL — LOW (ref 13–17)
IANC: 3.91 K/UL — SIGNIFICANT CHANGE UP (ref 1.8–7.4)
IMM GRANULOCYTES NFR BLD AUTO: 0.5 % — SIGNIFICANT CHANGE UP (ref 0–0.9)
INR BLD: 1.38 RATIO — HIGH (ref 0.88–1.16)
KETONES UR-MCNC: ABNORMAL
LACTATE BLDV-MCNC: 1.2 MMOL/L — SIGNIFICANT CHANGE UP (ref 0.5–2)
LEUKOCYTE ESTERASE UR-ACNC: NEGATIVE — SIGNIFICANT CHANGE UP
LYMPHOCYTES # BLD AUTO: 2.84 K/UL — SIGNIFICANT CHANGE UP (ref 1–3.3)
LYMPHOCYTES # BLD AUTO: 36.7 % — SIGNIFICANT CHANGE UP (ref 13–44)
MAGNESIUM SERPL-MCNC: 2.2 MG/DL — SIGNIFICANT CHANGE UP (ref 1.6–2.6)
MCHC RBC-ENTMCNC: 24.1 PG — LOW (ref 27–34)
MCHC RBC-ENTMCNC: 30.5 GM/DL — LOW (ref 32–36)
MCV RBC AUTO: 78.9 FL — LOW (ref 80–100)
MONOCYTES # BLD AUTO: 0.93 K/UL — HIGH (ref 0–0.9)
MONOCYTES NFR BLD AUTO: 12 % — SIGNIFICANT CHANGE UP (ref 2–14)
NEUTROPHILS # BLD AUTO: 3.91 K/UL — SIGNIFICANT CHANGE UP (ref 1.8–7.4)
NEUTROPHILS NFR BLD AUTO: 50.6 % — SIGNIFICANT CHANGE UP (ref 43–77)
NITRITE UR-MCNC: NEGATIVE — SIGNIFICANT CHANGE UP
NRBC # BLD: 0 /100 WBCS — SIGNIFICANT CHANGE UP (ref 0–0)
NRBC # FLD: 0 K/UL — SIGNIFICANT CHANGE UP (ref 0–0)
OB PNL STL: POSITIVE
PCO2 BLDV: 42 MMHG — SIGNIFICANT CHANGE UP (ref 42–55)
PH BLDV: 7.36 — SIGNIFICANT CHANGE UP (ref 7.32–7.43)
PH UR: 6 — SIGNIFICANT CHANGE UP (ref 5–8)
PLATELET # BLD AUTO: 279 K/UL — SIGNIFICANT CHANGE UP (ref 150–400)
PO2 BLDV: 27 MMHG — SIGNIFICANT CHANGE UP
POTASSIUM BLDV-SCNC: 3.7 MMOL/L — SIGNIFICANT CHANGE UP (ref 3.5–5.1)
POTASSIUM SERPL-MCNC: 5.1 MMOL/L — SIGNIFICANT CHANGE UP (ref 3.5–5.3)
POTASSIUM SERPL-SCNC: 5.1 MMOL/L — SIGNIFICANT CHANGE UP (ref 3.5–5.3)
PROT SERPL-MCNC: 7.3 G/DL — SIGNIFICANT CHANGE UP (ref 6–8.3)
PROT UR-MCNC: ABNORMAL
PROTHROM AB SERPL-ACNC: 16.1 SEC — HIGH (ref 10.5–13.4)
RBC # BLD: 3.74 M/UL — LOW (ref 4.2–5.8)
RBC # FLD: 13.8 % — SIGNIFICANT CHANGE UP (ref 10.3–14.5)
RH IG SCN BLD-IMP: POSITIVE — SIGNIFICANT CHANGE UP
RH IG SCN BLD-IMP: POSITIVE — SIGNIFICANT CHANGE UP
RSV RNA NPH QL NAA+NON-PROBE: SIGNIFICANT CHANGE UP
SAO2 % BLDV: 36.7 % — SIGNIFICANT CHANGE UP
SARS-COV-2 RNA SPEC QL NAA+PROBE: SIGNIFICANT CHANGE UP
SODIUM SERPL-SCNC: 138 MMOL/L — SIGNIFICANT CHANGE UP (ref 135–145)
SP GR SPEC: 1.02 — SIGNIFICANT CHANGE UP (ref 1.01–1.05)
UROBILINOGEN FLD QL: SIGNIFICANT CHANGE UP
WBC # BLD: 7.74 K/UL — SIGNIFICANT CHANGE UP (ref 3.8–10.5)
WBC # FLD AUTO: 7.74 K/UL — SIGNIFICANT CHANGE UP (ref 3.8–10.5)

## 2022-09-27 PROCEDURE — 99285 EMERGENCY DEPT VISIT HI MDM: CPT

## 2022-09-27 PROCEDURE — 74177 CT ABD & PELVIS W/CONTRAST: CPT | Mod: 26,MA

## 2022-09-27 RX ORDER — SODIUM CHLORIDE 9 MG/ML
1000 INJECTION, SOLUTION INTRAVENOUS
Refills: 0 | Status: DISCONTINUED | OUTPATIENT
Start: 2022-09-27 | End: 2022-09-28

## 2022-09-27 RX ORDER — SODIUM CHLORIDE 9 MG/ML
1000 INJECTION INTRAMUSCULAR; INTRAVENOUS; SUBCUTANEOUS ONCE
Refills: 0 | Status: COMPLETED | OUTPATIENT
Start: 2022-09-27 | End: 2022-09-27

## 2022-09-27 RX ADMIN — SODIUM CHLORIDE 666.67 MILLILITER(S): 9 INJECTION INTRAMUSCULAR; INTRAVENOUS; SUBCUTANEOUS at 13:36

## 2022-09-27 NOTE — ED PROVIDER NOTE - NS ED ATTENDING STATEMENT MOD
This was a shared visit with the DMITRI. I reviewed and verified the documentation and independently performed the documented:

## 2022-09-27 NOTE — ED PROVIDER NOTE - OBJECTIVE STATEMENT
72yoM with PMH of Crohns on mesalamine, IDDM, neuropathy, HTN, HLD, BPH, CVA on ASA 81mg daily, presenting to ED w/ diarrhea x 1month. Wife at bedside states that he has had brown stools w/ BRBPR over past month, approx 6 episodes / day. also concerned as he has not been eating/drinking sufficiently and admits to increasing fatigue. Recently started on some "infusion" earlier this month by his GI specialist Dr. Oro. Pt denies fevers, abdominal pain, recent travel, recent hospitilizations. last colonoscopy done last year. no hx of abdominal surgeries. Denies sore throat, dysuria, n/v, chest pain, shortness of breath, weakness, dizziness, numbness, tingling.

## 2022-09-27 NOTE — ED PROVIDER NOTE - NSICDXFAMILYHX_GEN_ALL_CORE_FT
FAMILY HISTORY:  Family history of Alzheimer's disease  Family history of MI (myocardial infarction)    Father  Still living? Unknown  Family history of cerebrovascular accident (CVA) in father, Age at diagnosis: Age Unknown  Family history of hypertension, Age at diagnosis: Age Unknown    Sibling  Still living? Unknown  Family history of cerebrovascular accident (CVA) in father, Age at diagnosis: Age Unknown

## 2022-09-27 NOTE — ED PROVIDER NOTE - CLINICAL SUMMARY MEDICAL DECISION MAKING FREE TEXT BOX
72yoM with PMH of Crohns on mesalamine, IDDM, neuropathy, HTN, HLD, BPH, CVA on ASA 81mg daily, presenting to ED w/ diarrhea x 1month, bloody, as well as increasing fatigue. poor po intake  pt HOTNsive on arrival, + LLQ ttp on exam    PLAN:  labs, imaging, fluids, stool studies, reassess

## 2022-09-27 NOTE — ED PROVIDER NOTE - PROGRESS NOTE DETAILS
pt reassessed, still having persistent episodes of diarrhea. + blood noted during rectal exam. no visible external hemorrhoids or external abnormalities. FOBT card sent to lab. Driss PGY2: took pt in sign out - awaiting hbg lv with CT already showing likely flare. CBC 9 w/o leukocytosis, Hbg 9-10 at baseline. Admit. ordered stool studies.

## 2022-09-27 NOTE — ED ADULT NURSE NOTE - NSIMPLEMENTINTERV_GEN_ALL_ED
Implemented All Universal Safety Interventions:  [place] to call system. Call bell, personal items and telephone within reach. Instruct patient to call for assistance. Room bathroom lighting operational. Non-slip footwear when patient is off stretcher. Physically safe environment: no spills, clutter or unnecessary equipment. Stretcher in lowest position, wheels locked, appropriate side rails in place.

## 2022-09-27 NOTE — ED PROVIDER NOTE - ATTENDING APP SHARED VISIT CONTRIBUTION OF CARE
71 y/o M with PMH DM, HTN, HLD, CVA on aspirin peripheral neuropathy, crohns on mescalamine p/w diarrhea x 1 month. Pt reports increase in the diarrhea which now includes blood. pts has been getting up frequently and had been having increased blood in his stool. pt denies recent travel or fever. pt reports taking bactrim for chronic uti ppx. He states he has been more fatigues and has been having more issues with walking. He denies chest pain, syncope, nausea, vomiting. pt states he has abdominal pain in the left lower abdomen. Pt has not had recent colonoscopy follows dr. lopez   denies fever, chills, chest pain, SOB,+ abdominal pain, +diarrhea, dysuria, syncope, bleeding, new rash,weakness, numbness, blurred vision    ROS  otherwise negative as per HPI  Gen: Awake, Alert, WD, WN, NAD  Head:  NC/AT  Eyes:  PERRL, EOMI, Conjunctiva pink, lids normal, no scleral icterus  ENT: OP clear,  moist mucus membranes  Neck: supple, nontender, no meningismus, no JVD, trachea midline  Cardiac/CV:  S1 S2, RRR, no M/G/R  Respiratory/Pulm:  CTAB, good air movement, normal resp effort, no wheezes/stridor/retractions/rales/rhonchi  Gastrointestinal/Abdomen:  Soft, tender LLQ nondistended, +BS, no rebound/guarding  Back:  no CVAT, no MLT  Ext:  warm, well perfused, moving all extremities spontaneously, no peripheral edema, distal pulses intact  Skin: intact, no rash  Neuro:  AAOx3, sensation intact, motor 5/5 x 4 extremities, normal gait, speech clear  mdm as above

## 2022-09-27 NOTE — ED PROVIDER NOTE - PHYSICAL EXAMINATION
CONSTITUTIONAL: Well-appearing; well-nourished; in no apparent distress;  HEAD: Normocephalic, atraumatic;  EYES: conjunctiva and sclera WNL;  ENT: normal nose; no rhinorrhea; unremarkable pharynx  NECK/LYMPH: Supple; non-tender;  CARD: Normal S1, S2; no murmurs, rubs, or gallops noted  RESP: Normal chest excursion with respiration; breath sounds clear and equal bilaterally; no wheezes, rhonchi, or rales noted  ABD/GI: soft, non-distended; +LLQ ttp, negative RLQ ttp; no palpable organomegaly, no pulsatile mass  EXT/MS: moves all extremities; distal pulses are normal, no pedal edema  SKIN: Normal for age and race; warm; dry; good turgor; no apparent lesions or exudate noted  NEURO: Awake, alert, oriented x 3, no gross deficits, no motor or sensory deficit noted  PSYCH: Normal mood; appropriate affect

## 2022-09-27 NOTE — ED PROVIDER NOTE - NSICDXPASTMEDICALHX_GEN_ALL_CORE_FT
PAST MEDICAL HISTORY:  Alcohol abuse     Anemia     Crohn disease diagnosed 2012    Dementia     Depression     Diabetes type 2, controlled     H/O: CVA 2013 | residual speech deficits    Herniated disc     HLD (hyperlipidemia)     HTN (hypertension) Not taking any meds, noncompliant

## 2022-09-27 NOTE — ED ADULT NURSE NOTE - OBJECTIVE STATEMENT
Receive pt  in spot 28 alert and oriented x 4  c/o diarrhea x 1 month and weakness.  Denies chest pain, sob, chills , N/V  cough, chills, N/V.  Resp unlabored , skin intact.  Labs sent.

## 2022-09-27 NOTE — ED ADULT TRIAGE NOTE - CHIEF COMPLAINT QUOTE
Pt accompanied by wife reports pt having multiple episodes of diarrhea/day x 1month. endorsing weakness. denies fever, chills, n/v. PMHx: crohns, dm2, fs: 170

## 2022-09-28 ENCOUNTER — NON-APPOINTMENT (OUTPATIENT)
Age: 72
End: 2022-09-28

## 2022-09-28 DIAGNOSIS — N40.0 BENIGN PROSTATIC HYPERPLASIA WITHOUT LOWER URINARY TRACT SYMPTOMS: ICD-10-CM

## 2022-09-28 DIAGNOSIS — R19.7 DIARRHEA, UNSPECIFIED: ICD-10-CM

## 2022-09-28 DIAGNOSIS — E11.9 TYPE 2 DIABETES MELLITUS WITHOUT COMPLICATIONS: ICD-10-CM

## 2022-09-28 DIAGNOSIS — I63.9 CEREBRAL INFARCTION, UNSPECIFIED: ICD-10-CM

## 2022-09-28 DIAGNOSIS — E78.5 HYPERLIPIDEMIA, UNSPECIFIED: ICD-10-CM

## 2022-09-28 DIAGNOSIS — Z29.9 ENCOUNTER FOR PROPHYLACTIC MEASURES, UNSPECIFIED: ICD-10-CM

## 2022-09-28 DIAGNOSIS — N28.1 CYST OF KIDNEY, ACQUIRED: ICD-10-CM

## 2022-09-28 LAB
A1C WITH ESTIMATED AVERAGE GLUCOSE RESULT: 5.4 % — SIGNIFICANT CHANGE UP (ref 4–5.6)
ALBUMIN SERPL ELPH-MCNC: 2.8 G/DL — LOW (ref 3.3–5)
ALP SERPL-CCNC: 73 U/L — SIGNIFICANT CHANGE UP (ref 40–120)
ALT FLD-CCNC: 9 U/L — SIGNIFICANT CHANGE UP (ref 4–41)
ANION GAP SERPL CALC-SCNC: 13 MMOL/L — SIGNIFICANT CHANGE UP (ref 7–14)
AST SERPL-CCNC: 11 U/L — SIGNIFICANT CHANGE UP (ref 4–40)
BILIRUB SERPL-MCNC: 1 MG/DL — SIGNIFICANT CHANGE UP (ref 0.2–1.2)
BUN SERPL-MCNC: 18 MG/DL — SIGNIFICANT CHANGE UP (ref 7–23)
CALCIUM SERPL-MCNC: 8 MG/DL — LOW (ref 8.4–10.5)
CHLORIDE SERPL-SCNC: 104 MMOL/L — SIGNIFICANT CHANGE UP (ref 98–107)
CO2 SERPL-SCNC: 19 MMOL/L — LOW (ref 22–31)
CREAT SERPL-MCNC: 0.77 MG/DL — SIGNIFICANT CHANGE UP (ref 0.5–1.3)
EGFR: 94 ML/MIN/1.73M2 — SIGNIFICANT CHANGE UP
ESTIMATED AVERAGE GLUCOSE: 108 — SIGNIFICANT CHANGE UP
GI PCR PANEL: SIGNIFICANT CHANGE UP
GLUCOSE BLDC GLUCOMTR-MCNC: 124 MG/DL — HIGH (ref 70–99)
GLUCOSE BLDC GLUCOMTR-MCNC: 131 MG/DL — HIGH (ref 70–99)
GLUCOSE BLDC GLUCOMTR-MCNC: 156 MG/DL — HIGH (ref 70–99)
GLUCOSE BLDC GLUCOMTR-MCNC: 171 MG/DL — HIGH (ref 70–99)
GLUCOSE SERPL-MCNC: 159 MG/DL — HIGH (ref 70–99)
HCT VFR BLD CALC: 27.4 % — LOW (ref 39–50)
HCT VFR BLD CALC: 27.7 % — LOW (ref 39–50)
HGB BLD-MCNC: 8.1 G/DL — LOW (ref 13–17)
HGB BLD-MCNC: 8.3 G/DL — LOW (ref 13–17)
INFLIXIMAB AB SERPL-MCNC: 434 NG/ML
INFLIXIMAB SERPL-MCNC: <0.4 UG/ML
MCHC RBC-ENTMCNC: 23.4 PG — LOW (ref 27–34)
MCHC RBC-ENTMCNC: 23.5 PG — LOW (ref 27–34)
MCHC RBC-ENTMCNC: 29.6 GM/DL — LOW (ref 32–36)
MCHC RBC-ENTMCNC: 30 GM/DL — LOW (ref 32–36)
MCV RBC AUTO: 78.2 FL — LOW (ref 80–100)
MCV RBC AUTO: 79.4 FL — LOW (ref 80–100)
NRBC # BLD: 0 /100 WBCS — SIGNIFICANT CHANGE UP (ref 0–0)
NRBC # BLD: 0 /100 WBCS — SIGNIFICANT CHANGE UP (ref 0–0)
NRBC # FLD: 0 K/UL — SIGNIFICANT CHANGE UP (ref 0–0)
NRBC # FLD: 0 K/UL — SIGNIFICANT CHANGE UP (ref 0–0)
PLATELET # BLD AUTO: 273 K/UL — SIGNIFICANT CHANGE UP (ref 150–400)
PLATELET # BLD AUTO: 277 K/UL — SIGNIFICANT CHANGE UP (ref 150–400)
POTASSIUM SERPL-MCNC: 3.4 MMOL/L — LOW (ref 3.5–5.3)
POTASSIUM SERPL-SCNC: 3.4 MMOL/L — LOW (ref 3.5–5.3)
PROT SERPL-MCNC: 6.3 G/DL — SIGNIFICANT CHANGE UP (ref 6–8.3)
RBC # BLD: 3.45 M/UL — LOW (ref 4.2–5.8)
RBC # BLD: 3.54 M/UL — LOW (ref 4.2–5.8)
RBC # FLD: 13.9 % — SIGNIFICANT CHANGE UP (ref 10.3–14.5)
RBC # FLD: 14 % — SIGNIFICANT CHANGE UP (ref 10.3–14.5)
SODIUM SERPL-SCNC: 136 MMOL/L — SIGNIFICANT CHANGE UP (ref 135–145)
WBC # BLD: 5.89 K/UL — SIGNIFICANT CHANGE UP (ref 3.8–10.5)
WBC # BLD: 6.34 K/UL — SIGNIFICANT CHANGE UP (ref 3.8–10.5)
WBC # FLD AUTO: 5.89 K/UL — SIGNIFICANT CHANGE UP (ref 3.8–10.5)
WBC # FLD AUTO: 6.34 K/UL — SIGNIFICANT CHANGE UP (ref 3.8–10.5)

## 2022-09-28 PROCEDURE — 99223 1ST HOSP IP/OBS HIGH 75: CPT | Mod: GC

## 2022-09-28 PROCEDURE — 99222 1ST HOSP IP/OBS MODERATE 55: CPT | Mod: GC

## 2022-09-28 RX ORDER — GABAPENTIN 400 MG/1
100 CAPSULE ORAL
Refills: 0 | Status: DISCONTINUED | OUTPATIENT
Start: 2022-09-28 | End: 2022-10-09

## 2022-09-28 RX ORDER — QUETIAPINE FUMARATE 200 MG/1
50 TABLET, FILM COATED ORAL AT BEDTIME
Refills: 0 | Status: DISCONTINUED | OUTPATIENT
Start: 2022-09-28 | End: 2022-10-09

## 2022-09-28 RX ORDER — POTASSIUM CHLORIDE 20 MEQ
40 PACKET (EA) ORAL ONCE
Refills: 0 | Status: COMPLETED | OUTPATIENT
Start: 2022-09-28 | End: 2022-09-28

## 2022-09-28 RX ORDER — MESALAMINE 400 MG
1600 TABLET, DELAYED RELEASE (ENTERIC COATED) ORAL
Refills: 0 | Status: DISCONTINUED | OUTPATIENT
Start: 2022-09-28 | End: 2022-10-09

## 2022-09-28 RX ORDER — INSULIN LISPRO 100/ML
24 VIAL (ML) SUBCUTANEOUS
Refills: 0 | Status: DISCONTINUED | OUTPATIENT
Start: 2022-09-28 | End: 2022-09-29

## 2022-09-28 RX ORDER — FINASTERIDE 5 MG/1
5 TABLET, FILM COATED ORAL DAILY
Refills: 0 | Status: DISCONTINUED | OUTPATIENT
Start: 2022-09-28 | End: 2022-10-09

## 2022-09-28 RX ORDER — MEMANTINE HYDROCHLORIDE 10 MG/1
10 TABLET ORAL
Refills: 0 | Status: DISCONTINUED | OUTPATIENT
Start: 2022-09-28 | End: 2022-10-09

## 2022-09-28 RX ORDER — INSULIN LISPRO 100/ML
VIAL (ML) SUBCUTANEOUS
Refills: 0 | Status: DISCONTINUED | OUTPATIENT
Start: 2022-09-28 | End: 2022-09-30

## 2022-09-28 RX ORDER — DEXTROSE 50 % IN WATER 50 %
25 SYRINGE (ML) INTRAVENOUS ONCE
Refills: 0 | Status: DISCONTINUED | OUTPATIENT
Start: 2022-09-28 | End: 2022-10-09

## 2022-09-28 RX ORDER — SODIUM CHLORIDE 9 MG/ML
1000 INJECTION, SOLUTION INTRAVENOUS
Refills: 0 | Status: DISCONTINUED | OUTPATIENT
Start: 2022-09-28 | End: 2022-10-09

## 2022-09-28 RX ORDER — GLUCAGON INJECTION, SOLUTION 0.5 MG/.1ML
1 INJECTION, SOLUTION SUBCUTANEOUS ONCE
Refills: 0 | Status: DISCONTINUED | OUTPATIENT
Start: 2022-09-28 | End: 2022-10-09

## 2022-09-28 RX ORDER — ENOXAPARIN SODIUM 100 MG/ML
40 INJECTION SUBCUTANEOUS EVERY 24 HOURS
Refills: 0 | Status: DISCONTINUED | OUTPATIENT
Start: 2022-09-28 | End: 2022-10-09

## 2022-09-28 RX ORDER — ENOXAPARIN SODIUM 100 MG/ML
30 INJECTION SUBCUTANEOUS EVERY 24 HOURS
Refills: 0 | Status: DISCONTINUED | OUTPATIENT
Start: 2022-09-28 | End: 2022-09-28

## 2022-09-28 RX ORDER — MESALAMINE 400 MG
2 TABLET, DELAYED RELEASE (ENTERIC COATED) ORAL
Qty: 0 | Refills: 0 | DISCHARGE

## 2022-09-28 RX ORDER — DEXTROSE 50 % IN WATER 50 %
15 SYRINGE (ML) INTRAVENOUS ONCE
Refills: 0 | Status: DISCONTINUED | OUTPATIENT
Start: 2022-09-28 | End: 2022-10-09

## 2022-09-28 RX ORDER — LANOLIN ALCOHOL/MO/W.PET/CERES
3 CREAM (GRAM) TOPICAL AT BEDTIME
Refills: 0 | Status: DISCONTINUED | OUTPATIENT
Start: 2022-09-28 | End: 2022-10-09

## 2022-09-28 RX ORDER — INSULIN LISPRO 100/ML
VIAL (ML) SUBCUTANEOUS AT BEDTIME
Refills: 0 | Status: DISCONTINUED | OUTPATIENT
Start: 2022-09-28 | End: 2022-09-30

## 2022-09-28 RX ORDER — TAMSULOSIN HYDROCHLORIDE 0.4 MG/1
0.4 CAPSULE ORAL AT BEDTIME
Refills: 0 | Status: DISCONTINUED | OUTPATIENT
Start: 2022-09-28 | End: 2022-10-09

## 2022-09-28 RX ORDER — INSULIN GLARGINE 100 [IU]/ML
24 INJECTION, SOLUTION SUBCUTANEOUS AT BEDTIME
Refills: 0 | Status: DISCONTINUED | OUTPATIENT
Start: 2022-09-28 | End: 2022-09-29

## 2022-09-28 RX ORDER — DEXTROSE 50 % IN WATER 50 %
12.5 SYRINGE (ML) INTRAVENOUS ONCE
Refills: 0 | Status: DISCONTINUED | OUTPATIENT
Start: 2022-09-28 | End: 2022-10-09

## 2022-09-28 RX ORDER — SERTRALINE 25 MG/1
100 TABLET, FILM COATED ORAL DAILY
Refills: 0 | Status: DISCONTINUED | OUTPATIENT
Start: 2022-09-28 | End: 2022-10-09

## 2022-09-28 RX ORDER — ATORVASTATIN CALCIUM 80 MG/1
40 TABLET, FILM COATED ORAL AT BEDTIME
Refills: 0 | Status: DISCONTINUED | OUTPATIENT
Start: 2022-09-28 | End: 2022-10-09

## 2022-09-28 RX ORDER — ASPIRIN/CALCIUM CARB/MAGNESIUM 324 MG
81 TABLET ORAL DAILY
Refills: 0 | Status: DISCONTINUED | OUTPATIENT
Start: 2022-09-28 | End: 2022-10-09

## 2022-09-28 RX ORDER — PANTOPRAZOLE SODIUM 20 MG/1
40 TABLET, DELAYED RELEASE ORAL EVERY 12 HOURS
Refills: 0 | Status: DISCONTINUED | OUTPATIENT
Start: 2022-09-28 | End: 2022-10-09

## 2022-09-28 RX ORDER — SODIUM CHLORIDE 9 MG/ML
1000 INJECTION, SOLUTION INTRAVENOUS
Refills: 0 | Status: DISCONTINUED | OUTPATIENT
Start: 2022-09-28 | End: 2022-10-05

## 2022-09-28 RX ORDER — MEMANTINE HYDROCHLORIDE 10 MG/1
1 TABLET ORAL
Qty: 0 | Refills: 0 | DISCHARGE

## 2022-09-28 RX ADMIN — SODIUM CHLORIDE 75 MILLILITER(S): 9 INJECTION, SOLUTION INTRAVENOUS at 10:31

## 2022-09-28 RX ADMIN — Medication 20 MILLIGRAM(S): at 21:54

## 2022-09-28 RX ADMIN — MEMANTINE HYDROCHLORIDE 10 MILLIGRAM(S): 10 TABLET ORAL at 18:48

## 2022-09-28 RX ADMIN — TAMSULOSIN HYDROCHLORIDE 0.4 MILLIGRAM(S): 0.4 CAPSULE ORAL at 21:54

## 2022-09-28 RX ADMIN — Medication 81 MILLIGRAM(S): at 10:38

## 2022-09-28 RX ADMIN — ATORVASTATIN CALCIUM 40 MILLIGRAM(S): 80 TABLET, FILM COATED ORAL at 21:54

## 2022-09-28 RX ADMIN — SODIUM CHLORIDE 100 MILLILITER(S): 9 INJECTION, SOLUTION INTRAVENOUS at 00:49

## 2022-09-28 RX ADMIN — ENOXAPARIN SODIUM 40 MILLIGRAM(S): 100 INJECTION SUBCUTANEOUS at 10:27

## 2022-09-28 RX ADMIN — Medication 40 MILLIEQUIVALENT(S): at 10:28

## 2022-09-28 RX ADMIN — GABAPENTIN 100 MILLIGRAM(S): 400 CAPSULE ORAL at 18:48

## 2022-09-28 RX ADMIN — Medication 1600 MILLIGRAM(S): at 18:49

## 2022-09-28 RX ADMIN — SERTRALINE 100 MILLIGRAM(S): 25 TABLET, FILM COATED ORAL at 10:28

## 2022-09-28 RX ADMIN — INSULIN GLARGINE 24 UNIT(S): 100 INJECTION, SOLUTION SUBCUTANEOUS at 22:16

## 2022-09-28 RX ADMIN — Medication 1600 MILLIGRAM(S): at 06:18

## 2022-09-28 RX ADMIN — Medication 3 MILLIGRAM(S): at 21:54

## 2022-09-28 RX ADMIN — PANTOPRAZOLE SODIUM 40 MILLIGRAM(S): 20 TABLET, DELAYED RELEASE ORAL at 18:48

## 2022-09-28 RX ADMIN — GABAPENTIN 100 MILLIGRAM(S): 400 CAPSULE ORAL at 06:18

## 2022-09-28 RX ADMIN — QUETIAPINE FUMARATE 50 MILLIGRAM(S): 200 TABLET, FILM COATED ORAL at 21:54

## 2022-09-28 RX ADMIN — FINASTERIDE 5 MILLIGRAM(S): 5 TABLET, FILM COATED ORAL at 10:28

## 2022-09-28 RX ADMIN — MEMANTINE HYDROCHLORIDE 10 MILLIGRAM(S): 10 TABLET ORAL at 06:17

## 2022-09-28 RX ADMIN — SODIUM CHLORIDE 75 MILLILITER(S): 9 INJECTION, SOLUTION INTRAVENOUS at 13:51

## 2022-09-28 NOTE — PROGRESS NOTE ADULT - PROBLEM SELECTOR PLAN 3
on lantus 30 and admelog 28-30 at home  - c/w lantus 24  - c/w admelog 24  - low sliding scale on lantus 30 and admelog 28-30 at home  - c/w lantus 24  - c/w admelog 24  - sliding scale

## 2022-09-28 NOTE — PROGRESS NOTE ADULT - PROBLEM SELECTOR PLAN 7
DVT ppx w lovenox   CC diet   Dispo pending course DVT ppx w lovenox   DASH/CC diet pending bedside swallow  Dispo pending course

## 2022-09-28 NOTE — PATIENT PROFILE ADULT - LEGAL HELP
I will START or STAY ON the medications listed below when I get home from the hospital:    aspirin 81 mg oral delayed release tablet  -- 1 tab(s) by mouth once a day  -- Indication: For Blood thinner    lisinopril 20 mg oral tablet  -- 1 tab(s) by mouth once a day  -- Indication: For Blood pressure    rivaroxaban 20 mg oral tablet  -- 1 tab(s) by mouth every 24 hours  -- Indication: For Blood thinner    simvastatin 40 mg oral tablet  -- 1 tab(s) by mouth once a day (at bedtime)  -- Indication: For cholesterol    metoprolol tartrate 25 mg oral tablet  -- 1 tab(s) by mouth 2 times a day  -- Indication: For Heart rate/function    budesonide-formoterol 80 mcg-4.5 mcg/inh inhalation aerosol  -- 2 puff(s) inhaled 2 times a day   -- Indication: For Breathing no

## 2022-09-28 NOTE — H&P ADULT - PROBLEM SELECTOR PLAN 2
on lantus 30 and admelog 28-30 at home  c/w lantus 25  c/w admelog 25  moderate sliding scale on lantus 30 and admelog 28-30 at home  c/w lantus 24  c/w admelog 24  moderate sliding scale 2cm L upper pole renal cyst, on comparison also had a small renal cyst in L kidney back in 2018, seemst o be in about the same location and same size  - Likely chronic, would recommend outpatient follow up for further eval if needed  - UA without significant findings

## 2022-09-28 NOTE — H&P ADULT - HISTORY OF PRESENT ILLNESS
72yoM with PMH of Crohn's IDDM, neuropathy, HTN, HLD, BPH, CVA on ASA 81mg daily, presenting for bloody diarrhea. Patient was taking Entyvio w/ suboptimal for many years and switched over to remicade last year. Initially pt had a good response to Remicade however per outpt labs started to develop antibodies. On 6/30/22 pts remicade level was <0.4 and antibody was elevated at 308. Patients started developing progressively worsening bloody diarrhea over the past month, per wife they were waiting to see if the infusion (last 1.5-2 weeks ago) would help improve his symptoms and brought him in as it didn't They called their GI doctor, Dr. Oro, on Saturday who prescribed 20mg prednisone. Pt endorses increased fatigue and mild LLQ abdominal pain on palpation. Denies overall abdominal pain w/ bowel movements. He denies fevers, N/V, recent travel, chest pain, SOB, sore throat, dysuria, hematuria, numbness, tingling or recent hospitalizations Pt has not had any abdominal surgeries. Pt's last colonoscopy was 7/13/22, significant for inflammation, diverticulosis and benign polyp.     Vitals on presentation were 108/45, 82 bpm, 97.8F, 99% on RA    In the ED the patient received 1L NS. CTA/P showed Crohn's colitis involving the transverse and descending colon and appendix.   72yoM with PMH of Crohn's IDDM, neuropathy, HTN, HLD, BPH, CVA on ASA 81mg daily, presenting for bloody diarrhea. Patient was taking Entyvio w/ suboptimal for many years and switched over to remicade last year. Initially pt had a good response to Remicade however per outpt labs started to develop antibodies. On 6/30/22 pts remicade level was <0.4 and antibody was elevated at 308. Patients started developing progressively worsening bloody diarrhea over the past month, per wife they were waiting to see if the infusion (last 1.5-2 weeks ago) would help improve his symptoms and brought him in as it didn't They called their GI doctor, Dr. Oro, on Saturday who prescribed 20mg prednisone. Pt endorses increased fatigue and mild LLQ abdominal pain on palpation. Denies overall abdominal pain w/ bowel movements. He denies fevers, N/V, recent travel, chest pain, SOB, sore throat, dysuria, hematuria, numbness, tingling or recent hospitalizations Pt has not had any abdominal surgeries. Pt's last colonoscopy was 7/13/20, significant for inflammation, diverticulosis and benign polyp.     Vitals on presentation were 108/45, 82 bpm, 97.8F, 99% on RA    In the ED the patient received 1L NS. CTA/P showed Crohn's colitis involving the transverse and descending colon and appendix.   72yoM with PMH of Crohn's IDDM, neuropathy, HTN, HLD, BPH, CVA on ASA 81mg daily, presenting for bloody diarrhea. Patient was taking Entyvio w/ suboptimal for many years and switched over to remicade last year. Initially pt had a good response to Remicade however per outpt labs started to develop antibodies. On 6/30/22 pts remicade level was <0.4 and antibody was elevated at 308. Patients started developing progressively worsening bloody diarrhea over the past month, per wife they were waiting to see if the infusion (last 1.5-2 weeks ago) would help improve his symptoms and brought him in as it didn't. They called their GI doctor, Dr. Oro, on Saturday who prescribed 20mg prednisone. Pt endorses increased fatigue and mild LLQ abdominal pain on palpation. Denies overall abdominal pain w/ bowel movements.  He denies fevers, N/V, recent travel, chest pain, SOB, sore throat, dysuria, hematuria, numbness, tingling or recent hospitalizations Pt has not had any abdominal surgeries. Pt's last colonoscopy was 7/13/20, significant for inflammation, diverticulosis and benign polyp.     Vitals on presentation were 108/45, 82 bpm, 97.8F, 99% on RA    In the ED the patient received 1L NS. CTA/P showed Crohn's colitis involving the transverse and descending colon and appendix.

## 2022-09-28 NOTE — PROGRESS NOTE ADULT - ASSESSMENT
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

## 2022-09-28 NOTE — PATIENT PROFILE ADULT - FALL HARM RISK - HARM RISK INTERVENTIONS
Assistance OOB with selected safe patient handling equipment/Communicate Risk of Fall with Harm to all staff/Reinforce activity limits and safety measures with patient and family/Tailored Fall Risk Interventions/Toileting schedule using arm’s reach rule for commode and bathroom/Visual Cue: Yellow wristband and red socks/Bed in lowest position, wheels locked, appropriate side rails in place/Call bell, personal items and telephone in reach/Instruct patient to call for assistance before getting out of bed or chair/Non-slip footwear when patient is out of bed/[place] to call system/Physically safe environment - no spills, clutter or unnecessary equipment/Purposeful Proactive Rounding/Room/bathroom lighting operational, light cord in reach

## 2022-09-28 NOTE — H&P ADULT - NSHPREVIEWOFSYSTEMS_GEN_ALL_CORE
ROS as above CONSTITUTIONAL:  No weight loss, fever, +chills/weakness/fatigue.  HEENT:  Eyes:  No visual loss, blurred vision, double vision or yellow sclerae. Ears, Nose, Throat:  No hearing loss, sneezing, congestion, runny nose or sore throat.  CARDIOVASCULAR:  No chest pain, chest pressure or chest discomfort. No palpitations.  RESPIRATORY:  No shortness of breath, cough or sputum.  GASTROINTESTINAL:  No anorexia, nausea, vomiting No abdominal pain +bloody diarrhea  GENITOURINARY:  Denies hematuria, dysuria.   NEUROLOGICAL:  No headache, dizziness, syncope, paralysis, ataxia, numbness or tingling in the extremities. No change in bowel or bladder control.  MUSCULOSKELETAL:  No muscle, back pain, joint pain or stiffness.  HEMATOLOGIC:  No anemia, bleeding or bruising.  LYMPHATICS:  No enlarged nodes.   ENDOCRINOLOGIC:  No reports of sweating, cold or heat intolerance. No polyuria or polydipsia.  ALLERGIES:  No history of asthma, hives, eczema or rhinitis.

## 2022-09-28 NOTE — CONSULT NOTE ADULT - ATTENDING COMMENTS
72yoM with PMH of Crohn's disease, IDDM, neuropathy, HTN, HLD, BPH, CVA on ASA 81mg daily who presented with bloody diarrhea, concerning for CD flare. Patient follows with Dr. Oro, last seen on 9/16. Was dx with CD several years ago, had poor response to Entyvio, so was switched to Remicaide for the past 1 years, last dose was 2 weeks ago. Patient had elevated abs to remicaide in 6/2022 with no levels, so dosage has been increased since aug 2022.  On 6/30/22 pts remicade level was <0.4 and antibody was elevated at 308    Recommendation   #1.  C. difficile by PCR  #2.  Low residue diet as tolerated  3.  Check CRP/sed rate daily  4.  Solu-Medrol 20 mg IV every 8  5.  Given his inter-mediate Remicade antibody levels, will likely need immunosuppression  We will trend CRP, plan to start methotrexate 10 mg every weekly  6.  Start folate 1 g daily  7.  We will likely give Remicade dosing at 1000 mg on Friday  8.  Plan for colonoscopy on Friday as well, clear liquid diet on Thursday, we will write prep order

## 2022-09-28 NOTE — PROGRESS NOTE ADULT - PROBLEM SELECTOR PLAN 2
2cm L upper pole renal cyst, on comparison also had a small renal cyst in L kidney back in 2018, seemst o be in about the same location and same size  - Likely chronic, would recommend outpatient follow up for further eval if needed  - UA without significant findings 2cm L upper pole renal cyst, on comparison also had a small renal cyst in L kidney back in 2018, seems to be in about the same location and same size  - Likely chronic, would recommend outpatient follow up for further eval if needed  - UA without significant findings

## 2022-09-28 NOTE — H&P ADULT - NSHPSOCIALHISTORY_GEN_ALL_CORE
Pt previously smoked + drank alcohol (quit 5 years ago), denies recreational drug use Pt previously smoked + drank alcohol (quit 5 years ago), denies recreational drug use  Ind ambulator

## 2022-09-28 NOTE — PROGRESS NOTE ADULT - ATTENDING COMMENTS
Patient is a 72 year old man w/ past medical history of Crohn's disease, IDDM, neuropathy, HTN, HLD, BPH, CVA on ASA 81mg daily a/w likely Crohn's Disease flare. GI consulted for further management. GI PCR negative, awaiting stool culture and C diff testing. Pending infectious workup, anticipate patient will require steroids for flare. Patient notably a diabetic will need to titrate insulin and watch fingersticks closely to avoid hyperglycemia if steroids are required. Inpatient blood glucose goal 140-180.

## 2022-09-28 NOTE — H&P ADULT - PROBLEM SELECTOR PLAN 1
Likely secondary to crohns flare  r/o infectious etx w/ GI PCR, Stool O+p  F/up fecal calprotectin   GI Consult for crohn's flare  steroids? solumedrol 50q8? Likely secondary to crohns flare  r/o infectious etx w/ GI PCR, Stool O+p  F/up fecal calprotectin   GI Consult for Crohn's flare  steroids? solumedrol 50q8? Likely secondary to crohns flare  Per outpt w/up, pt colonized w C diff   r/o infectious etx w/ GI PCR, Stool O+p  F/up fecal calprotectin   LR @75 for rehydration  GI Consult for Crohn's flare  potential role for solumedrol 50q8 Likely secondary to ongoing crohns flare  - Per outpt w/up, pt colonized w C diff   - r/o infectious etx w/ GI PCR, Stool O+p  - F/up fecal calprotectin   - LR @75 for rehydration  - GI Consult for Crohn's flare  - potential role for solumedrol 50q8  - holding trimethoprim for now, clarify on reason patient takes it and restart as needed

## 2022-09-28 NOTE — H&P ADULT - ATTENDING COMMENTS
Patient seen and examined on 9/28/22, case discussed with Dr. Angelica Sykes. This is a 72M with history as above who presents to the hospital with persistent crohn's flare and worsening genealized weakness. Patient said that he has been having persistent bloody diarrhea since August, was given a dose of remicade and recently started on prednisone but the bloody diarrhea has still continued. Said that currently he is having multiple episodes of bloody diarrhea (red blood, small amount) with some L sided abdominal pain and worsening generalized weakness. Denies any fevers/chills, no chest pain/dizziness/palpitations, no new rashes/ulcerations, no LE edema, no arthralgias/myalgias, no URI complaints, no cough/SOB, no  complaints. Labs and imaging reviewed. On examination patient with mild abdominal distension and some pain on palpation of the L side of his abdomen but otherwise no significant findings. Patient likely with refractory Crohn's disease, outpatient stool studies showed patient is colonized with CDiff but no active infection. For now would repeat infectious work up again, if negative then likely trial of IV steroids. GI eval in AM. Anemic but baseline Hgb is 9-11 (most recent outpatient Hgb was 9 on Sept 19), would trend Hgb. Start lovenox for DVT ppx due to high risk for VTE in active inflammatory bowel disease. Other management as above.

## 2022-09-28 NOTE — PROGRESS NOTE ADULT - SUBJECTIVE AND OBJECTIVE BOX
Progress Note     == draft in progress ==  Britt Childress  PGY-1 Medicine  Teams | [id number]    Patient is a 72y old  Male who presents with a chief complaint of Persistent bloody diarrhea, generalized weakness (28 Sep 2022 18:46)          SUBJECTIVE / INTERVAL EVENTS         MEDICATIONS    Home Medications:  aspirin 81 mg oral delayed release tablet: 1 tab(s) orally once a day (28 Sep 2022 01:21)  atorvastatin 40 mg oral tablet: 1 tab(s) orally once a day (at bedtime) (28 Sep 2022 01:)  Centrum Silver oral tablet: 1 tab(s) orally once a day (28 Sep 2022 01:)  finasteride 5 mg oral tablet: 1 tab(s) orally once a day (28 Sep 2022 01:)  gabapentin 100 mg oral capsule: 1 tab(s) orally 2 times a day (28 Sep 2022 01:21)  insulin aspart 100 units/mL injectable solution: 28 unit(s) injectable 3 times a day (before meals) (28 Sep 2022 01:)  Januvia 50 mg oral tablet: 1 tab(s) orally once a day (28 Sep 2022 01:)  memantine 10 mg oral tablet: 1 tab(s) orally 2 times a day (28 Sep 2022 01:)  mesalamine 800 mg oral delayed release tablet: 2 tab(s) orally 2 times a day (28 Sep 2022 01:21)  QUEtiapine 25 mg oral tablet: 2 tab(s) orally once a day (at bedtime) (28 Sep 2022 01:)  Remicade 100 mg intravenous injection:  (28 Sep 2022 01:)  sertraline 100 mg oral tablet: 1 tab(s) orally once a day (28 Sep 2022 01:)  tamsulosin 0.4 mg oral capsule: 1 cap(s) orally 2 times a day (28 Sep 2022 01:)  Tresiba 100 units/mL subcutaneous solution: 30 unit(s) subcutaneous once a day (at bedtime) (28 Sep 2022 01:)  trimethoprim 100 mg oral tablet: 1 tab(s) orally once a day (at bedtime) (28 Sep 2022 01:)  Vitamin D3 400 intl units oral tablet: 1 tab(s) orally once a day (28 Sep 2022 01:)      MEDICATIONS  (STANDING):  aspirin enteric coated 81 milliGRAM(s) Oral daily  atorvastatin 40 milliGRAM(s) Oral at bedtime  dextrose 5%. 1000 milliLiter(s) (50 mL/Hr) IV Continuous <Continuous>  dextrose 5%. 1000 milliLiter(s) (100 mL/Hr) IV Continuous <Continuous>  dextrose 50% Injectable 25 Gram(s) IV Push once  dextrose 50% Injectable 12.5 Gram(s) IV Push once  dextrose 50% Injectable 25 Gram(s) IV Push once  enoxaparin Injectable 40 milliGRAM(s) SubCutaneous every 24 hours  finasteride 5 milliGRAM(s) Oral daily  gabapentin 100 milliGRAM(s) Oral two times a day  glucagon  Injectable 1 milliGRAM(s) IntraMuscular once  insulin glargine Injectable (LANTUS) 24 Unit(s) SubCutaneous at bedtime  insulin lispro (ADMELOG) corrective regimen sliding scale   SubCutaneous three times a day before meals  insulin lispro (ADMELOG) corrective regimen sliding scale   SubCutaneous at bedtime  insulin lispro Injectable (ADMELOG) 24 Unit(s) SubCutaneous three times a day before meals  lactated ringers. 1000 milliLiter(s) (75 mL/Hr) IV Continuous <Continuous>  memantine 10 milliGRAM(s) Oral two times a day  mesalamine DR Capsule 1600 milliGRAM(s) Oral two times a day  methylPREDNISolone sodium succinate Injectable 20 milliGRAM(s) IV Push every 8 hours  pantoprazole  Injectable 40 milliGRAM(s) IV Push every 12 hours  QUEtiapine 50 milliGRAM(s) Oral at bedtime  sertraline 100 milliGRAM(s) Oral daily  tamsulosin 0.4 milliGRAM(s) Oral at bedtime    MEDICATIONS  (PRN):  dextrose Oral Gel 15 Gram(s) Oral once PRN Blood Glucose LESS THAN 70 milliGRAM(s)/deciliter  melatonin 3 milliGRAM(s) Oral at bedtime PRN Insomnia         VITALS / I&O's  T(C): 37.1 (22 @ 21:48), Max: 37.2 (22 @ 09:30)  HR: 97 (22 @ 21:48) (88 - 100)  BP: 125/56 (22 @ 21:48) (118/54 - 127/59)  RR: 17 (22 @ 21:48) (17 - 18)  SpO2: 99% (22 @ 21:48) (97% - 100%)  I&O's Summary    28 Sep 2022 07:01  -  28 Sep 2022 23:25  --------------------------------------------------------  IN: 0 mL / OUT: 400 mL / NET: -400 mL           PHYSICAL EXAM           LABS                        8.3    5.89  )-----------( 277      ( 28 Sep 2022 19:00 )             27.7         136  |  104  |  18  ----------------------------<  159<H>  3.4<L>   |  19<L>  |  0.77    Ca    8.0<L>      28 Sep 2022 06:17  Mg     2.20         TPro  6.3  /  Alb  2.8<L>  /  TBili  1.0  /  DBili  x   /  AST  11  /  ALT  9   /  AlkPhos  73      CAPILLARY BLOOD GLUCOSE      POCT Blood Glucose.: 171 mg/dL (28 Sep 2022 22:15)  POCT Blood Glucose.: 124 mg/dL (28 Sep 2022 18:19)  POCT Blood Glucose.: 131 mg/dL (28 Sep 2022 12:28)  POCT Blood Glucose.: 156 mg/dL (28 Sep 2022 08:40)    PT/INR - ( 27 Sep 2022 11:48 )   PT: 16.1 sec;   INR: 1.38 ratio         PTT - ( 27 Sep 2022 11:48 )  PTT:22.1 sec  LIVER FUNCTIONS - ( 28 Sep 2022 06:17 )  Alb: 2.8 g/dL / Pro: 6.3 g/dL / ALK PHOS: 73 U/L / ALT: 9 U/L / AST: 11 U/L / GGT: x                   Urinalysis Basic - ( 27 Sep 2022 17:33 )    Color: Yellow / Appearance: Clear / S.023 / pH: x  Gluc: x / Ketone: Trace  / Bili: Negative / Urobili: <2 mg/dL   Blood: x / Protein: Trace / Nitrite: Negative   Leuk Esterase: Negative / RBC: x / WBC x   Sq Epi: x / Non Sq Epi: x / Bacteria: x               RADIOLOGY & ADDITIONAL TESTS    Imaging Personally Reviewed:     Consultant(s) Notes Reviewed:     Care Discussed with Consultants/Other Providers:     Progress Note     Britt Childress  PGY-1 Medicine  Teams | [id number]    Patient is a 72y old  Male who presents with a chief complaint of Persistent bloody diarrhea, generalized weakness (28 Sep 2022 18:46)          SUBJECTIVE / INTERVAL EVENTS  Patient seen and evaluated at bedside. A&O*2 (name, , [place]). Patient denied fever, nausea/vomiting, shortness of breath, new pain (headache, chest pain, abdominal pain, limb pain), new swelling, new numbness/tingling, dysuria or hematuria.  History discussed mostly with son and wife at bedside; collateral as follows. Patient received 3 doses Remicade, 6 weeks in between doses. Patient has had 1 mo worsening bright red bloody diarrhea, sometimes black, every 15 minutes; wife cleans him. No blood in urine. No history of surgeries for Crohn disease. Patient prefers English over Bulgarian. Patient had stroke ~14 years ago, was told that damage was localized to memory center, no residual motor deficits or sensory deficits they know about. Patient negotiates steps normally.       MEDICATIONS    Home Medications:  aspirin 81 mg oral delayed release tablet: 1 tab(s) orally once a day (28 Sep 2022 01:)  atorvastatin 40 mg oral tablet: 1 tab(s) orally once a day (at bedtime) (28 Sep 2022 01:)  Centrum Silver oral tablet: 1 tab(s) orally once a day (28 Sep 2022 01:)  finasteride 5 mg oral tablet: 1 tab(s) orally once a day (28 Sep 2022 01:)  gabapentin 100 mg oral capsule: 1 tab(s) orally 2 times a day (28 Sep 2022 01:)  insulin aspart 100 units/mL injectable solution: 28 unit(s) injectable 3 times a day (before meals) (28 Sep 2022 01:)  Januvia 50 mg oral tablet: 1 tab(s) orally once a day (28 Sep 2022 01:)  memantine 10 mg oral tablet: 1 tab(s) orally 2 times a day (28 Sep 2022 01:)  mesalamine 800 mg oral delayed release tablet: 2 tab(s) orally 2 times a day (28 Sep 2022 01:)  QUEtiapine 25 mg oral tablet: 2 tab(s) orally once a day (at bedtime) (28 Sep 2022 01:)  Remicade 100 mg intravenous injection:  (28 Sep 2022 01:21)  sertraline 100 mg oral tablet: 1 tab(s) orally once a day (28 Sep 2022 01:)  tamsulosin 0.4 mg oral capsule: 1 cap(s) orally 2 times a day (28 Sep 2022 01:)  Tresiba 100 units/mL subcutaneous solution: 30 unit(s) subcutaneous once a day (at bedtime) (28 Sep 2022 01:)  trimethoprim 100 mg oral tablet: 1 tab(s) orally once a day (at bedtime) (28 Sep 2022 01:)  Vitamin D3 400 intl units oral tablet: 1 tab(s) orally once a day (28 Sep 2022 01:)      MEDICATIONS  (STANDING):  aspirin enteric coated 81 milliGRAM(s) Oral daily  atorvastatin 40 milliGRAM(s) Oral at bedtime  dextrose 5%. 1000 milliLiter(s) (50 mL/Hr) IV Continuous <Continuous>  dextrose 5%. 1000 milliLiter(s) (100 mL/Hr) IV Continuous <Continuous>  dextrose 50% Injectable 25 Gram(s) IV Push once  dextrose 50% Injectable 12.5 Gram(s) IV Push once  dextrose 50% Injectable 25 Gram(s) IV Push once  enoxaparin Injectable 40 milliGRAM(s) SubCutaneous every 24 hours  finasteride 5 milliGRAM(s) Oral daily  gabapentin 100 milliGRAM(s) Oral two times a day  glucagon  Injectable 1 milliGRAM(s) IntraMuscular once  insulin glargine Injectable (LANTUS) 24 Unit(s) SubCutaneous at bedtime  insulin lispro (ADMELOG) corrective regimen sliding scale   SubCutaneous three times a day before meals  insulin lispro (ADMELOG) corrective regimen sliding scale   SubCutaneous at bedtime  insulin lispro Injectable (ADMELOG) 24 Unit(s) SubCutaneous three times a day before meals  lactated ringers. 1000 milliLiter(s) (75 mL/Hr) IV Continuous <Continuous>  memantine 10 milliGRAM(s) Oral two times a day  mesalamine DR Capsule 1600 milliGRAM(s) Oral two times a day  methylPREDNISolone sodium succinate Injectable 20 milliGRAM(s) IV Push every 8 hours  pantoprazole  Injectable 40 milliGRAM(s) IV Push every 12 hours  QUEtiapine 50 milliGRAM(s) Oral at bedtime  sertraline 100 milliGRAM(s) Oral daily  tamsulosin 0.4 milliGRAM(s) Oral at bedtime    MEDICATIONS  (PRN):  dextrose Oral Gel 15 Gram(s) Oral once PRN Blood Glucose LESS THAN 70 milliGRAM(s)/deciliter  melatonin 3 milliGRAM(s) Oral at bedtime PRN Insomnia         VITALS / I&O's  T(C): 37.1 (22 @ 21:48), Max: 37.2 (22 @ 09:30)  HR: 97 (22 @ 21:48) (88 - 100)  BP: 125/56 (22 @ 21:48) (118/54 - 127/59)  RR: 17 (22 @ 21:48) (17 - 18)  SpO2: 99% (22 @ 21:48) (97% - 100%)  I&O's Summary    28 Sep 2022 07:01  -  28 Sep 2022 23:25  --------------------------------------------------------  IN: 0 mL / OUT: 400 mL / NET: -400 mL           PHYSICAL EXAM  General: Reclining in bed in no acute distress.  HEENT: Normocephalic, atraumatic. Extraocular movements grossly intact. No nasal discharge.  Neuro: Alert and oriented. No facial asymmetry or dysarthria. Moving all extremities.  CV: no isolation stethoscope in room. Radial pulses appreciated. Limbs warm.  Respiratory: no isolation stethoscope in room. No increased work of breathing, speaking comfortably.  Abdomen: Soft; somewhat distended; tender in LLQ. No rebound or guarding.  : Suprapubic region nontender.  Skin: No rashes or bruising of face, forearms, or calves bilaterally.  Psych: Mood and affect appropriate.          LABS                        8.3    5.89  )-----------( 277      ( 28 Sep 2022 19:00 )             27.7         136  |  104  |  18  ----------------------------<  159<H>  3.4<L>   |  19<L>  |  0.77    Ca    8.0<L>      28 Sep 2022 06:17  Mg     2.20         TPro  6.3  /  Alb  2.8<L>  /  TBili  1.0  /  DBili  x   /  AST  11  /  ALT  9   /  AlkPhos  73      CAPILLARY BLOOD GLUCOSE      POCT Blood Glucose.: 171 mg/dL (28 Sep 2022 22:15)  POCT Blood Glucose.: 124 mg/dL (28 Sep 2022 18:19)  POCT Blood Glucose.: 131 mg/dL (28 Sep 2022 12:28)  POCT Blood Glucose.: 156 mg/dL (28 Sep 2022 08:40)    PT/INR - ( 27 Sep 2022 11:48 )   PT: 16.1 sec;   INR: 1.38 ratio         PTT - ( 27 Sep 2022 11:48 )  PTT:22.1 sec  LIVER FUNCTIONS - ( 28 Sep 2022 06:17 )  Alb: 2.8 g/dL / Pro: 6.3 g/dL / ALK PHOS: 73 U/L / ALT: 9 U/L / AST: 11 U/L / GGT: x                   Urinalysis Basic - ( 27 Sep 2022 17:33 )    Color: Yellow / Appearance: Clear / S.023 / pH: x  Gluc: x / Ketone: Trace  / Bili: Negative / Urobili: <2 mg/dL   Blood: x / Protein: Trace / Nitrite: Negative   Leuk Esterase: Negative / RBC: x / WBC x   Sq Epi: x / Non Sq Epi: x / Bacteria: x               RADIOLOGY & ADDITIONAL TESTS    Imaging Personally Reviewed: CT    Consultant(s) Notes Reviewed

## 2022-09-28 NOTE — CONSULT NOTE ADULT - ASSESSMENT
72yoM with PMH of Crohn's disease, IDDM, neuropathy, HTN, HLD, BPH, CVA on ASA 81mg daily who presented with bloody diarrhea, concerning for CD flare.     #Bloody diarrhea, concerning for CD flare  unclear CD vs. UC as per Dr. Oro note. s/p poor prior response to Entyvio. On remicade for the past one year, last infusion 2 weeks.  On 6/30/22 pts remicade level was <0.4 and antibody was elevated at 308. Remicade dosage was increased since aug 2022. Last colonoscopy in 7/2020 showed inflammation from the anus to the descending colon (left sided colitis) and diverticulosis.   - GI PCR negative, will need c. diff testing  will proceed with IV steroids today and possibly Infliximab infusion with MTX on Friday. Will also consider flex sig for Friday .     Recommendations:   - Start IV solumedrol 20mg Q8hrs  - please obtain c diff testing.   - obtain CRP and ESR daily.   - please obtain fecal calprotectin  - will consider flex sig for Friday.   - Place him on DVT PPx as IBD patients are high risk for thrombosis.     GI will continue to follow.     All recommendations are tentative until note is attested by an attending.     Jorge Arboleda, PGY-4  Gastroenterology/Hepatology Fellow  Available on [employer] Teams  [phone] (Short Range Pager)  [phone] (Long Range Pager)    After 5pm, please contact the on-call GI fellow. [phone]

## 2022-09-28 NOTE — PROGRESS NOTE ADULT - PROBLEM SELECTOR PLAN 1
Likely secondary to ongoing crohns flare  - Per outpt w/up, pt colonized w C diff   - r/o infectious etx w/ GI PCR, Stool O+p  - F/up fecal calprotectin   - LR @75 for rehydration  - GI Consult for Crohn's flare  - potential role for solumedrol 50q8  - holding trimethoprim for now, clarify on reason patient takes it and restart as needed - Per outpt w/up, pt colonized w C diff   - r/o infectious etx w/ GI PCR, Stool O+p  - F/up fecal calprotectin   - LR @75 for rehydration  - GI Consult for Crohn's flare - appreciate recs     - solumedrol 20 q8  - holding trimethoprim for now  - CBC q12  - pantoprazole

## 2022-09-28 NOTE — CONSULT NOTE ADULT - SUBJECTIVE AND OBJECTIVE BOX
HPI:     is a 72yoM with PMH of Crohn's disease, IDDM, neuropathy, HTN, HLD, BPH, CVA on ASA 81mg daily who presented with bloody diarrhea, concerning for CD flare. Patient follows with Dr. Oro, last seen on . Was dx with CD several years ago, had poor response to Entyvio, so was switched to Remicaide for the past 1 years, last dose was 2 weeks ago. Patient had elevated abs to remicaide in 2022 with no levels, so dosage has been increased since aug 2022.  On 22 pts remicade level was <0.4 and antibody was elevated at 308. Patients started developing progressively worsening bloody diarrhea over the past month, per wife they were waiting to see if the infusion (last 1.5-2 weeks ago) would help improve his symptoms and brought him in as it didn't. They called their GI doctor, Dr. Oro, on Saturday who prescribed 20mg prednisone. Pt endorses increased fatigue and mild LLQ abdominal pain on palpation. Denies overall abdominal pain w/ bowel movements.  He denies fevers, N/V, recent travel, chest pain, SOB, sore throat, dysuria, hematuria, numbness, tingling or recent hospitalizations Pt has not had any abdominal surgeries. Pt's last colonoscopy was 20, significant for inflammation, diverticulosis and benign polyp. He had c diff infection several years ago. On admission, vitals stable and hgb 9, imaging showed colitis of the transverse and descending colon.     Allergies:  No Known Allergies    Home Medications:  · 	QUEtiapine 25 mg oral tablet: Last Dose Taken:  , 2 tab(s) orally once a day (at bedtime)  · 	Centrum Silver oral tablet: Last Dose Taken:  , 1 tab(s) orally once a day  · 	tamsulosin 0.4 mg oral capsule: Last Dose Taken:  , 1 cap(s) orally 2 times a day  · 	sertraline 100 mg oral tablet: Last Dose Taken:  , 1 tab(s) orally once a day  · 	Vitamin D3 400 intl units oral tablet: Last Dose Taken:  , 1 tab(s) orally once a day  · 	atorvastatin 40 mg oral tablet: Last Dose Taken:  , 1 tab(s) orally once a day (at bedtime)  · 	mesalamine 800 mg oral delayed release tablet: Last Dose Taken:  , 2 tab(s) orally 2 times a day  · 	memantine 10 mg oral tablet: Last Dose Taken:  , 1 tab(s) orally 2 times a day  · 	Remicade 100 mg intravenous injection: Last Dose Taken:    · 	finasteride 5 mg oral tablet: Last Dose Taken:  , 1 tab(s) orally once a day  · 	trimethoprim 100 mg oral tablet: Last Dose Taken:  , 1 tab(s) orally once a day (at bedtime)  · 	Januvia 50 mg oral tablet: 1 tab(s) orally once a day  · 	aspirin 81 mg oral delayed release tablet: Last Dose Taken:  , 1 tab(s) orally once a day  · 	gabapentin 100 mg oral capsule: Last Dose Taken:  , 1 tab(s) orally 2 times a day  · 	Tresiba 100 units/mL subcutaneous solution: Last Dose Taken:  , 30 unit(s) subcutaneous once a day (at bedtime)  · 	insulin aspart 100 units/mL injectable solution: 28 unit(s) injectable 3 times a day (before meals)    Hospital Medications:  aspirin enteric coated 81 milliGRAM(s) Oral daily  atorvastatin 40 milliGRAM(s) Oral at bedtime  dextrose 5%. 1000 milliLiter(s) IV Continuous <Continuous>  dextrose 5%. 1000 milliLiter(s) IV Continuous <Continuous>  dextrose 50% Injectable 25 Gram(s) IV Push once  dextrose 50% Injectable 12.5 Gram(s) IV Push once  dextrose 50% Injectable 25 Gram(s) IV Push once  dextrose Oral Gel 15 Gram(s) Oral once PRN  enoxaparin Injectable 40 milliGRAM(s) SubCutaneous every 24 hours  finasteride 5 milliGRAM(s) Oral daily  gabapentin 100 milliGRAM(s) Oral two times a day  glucagon  Injectable 1 milliGRAM(s) IntraMuscular once  insulin glargine Injectable (LANTUS) 24 Unit(s) SubCutaneous at bedtime  insulin lispro (ADMELOG) corrective regimen sliding scale   SubCutaneous three times a day before meals  insulin lispro (ADMELOG) corrective regimen sliding scale   SubCutaneous at bedtime  insulin lispro Injectable (ADMELOG) 24 Unit(s) SubCutaneous three times a day before meals  lactated ringers. 1000 milliLiter(s) IV Continuous <Continuous>  melatonin 3 milliGRAM(s) Oral at bedtime PRN  memantine 10 milliGRAM(s) Oral two times a day  mesalamine DR Capsule 1600 milliGRAM(s) Oral two times a day  pantoprazole  Injectable 40 milliGRAM(s) IV Push every 12 hours  QUEtiapine 50 milliGRAM(s) Oral at bedtime  sertraline 100 milliGRAM(s) Oral daily  tamsulosin 0.4 milliGRAM(s) Oral at bedtime      PMHX/PSHX:  HTN (hypertension)    Herniated disc    H/O: CVA    HLD (hyperlipidemia)    Diabetes type 2, controlled    Crohn disease    Alcohol abuse    Depression    Dementia    Anemia    No Past Surgical History    History of cataract surgery        Family history:  Family history of MI (myocardial infarction)    Family history of hypertension (Father)    Family history of Alzheimer&#x27;s disease    Family history of cerebrovascular accident (CVA) in father (Father, Sibling)        Social History:   Tob: Denies  EtOH: Denies  Illicit Drugs: Denies    ROS:     General:  No wt loss, fevers, chills, night sweats, fatigue  Eyes:  Good vision, no reported pain  ENT:  No sore throat, pain, runny nose, dysphagia  CV:  No pain, palpitations, hypo/hypertension  Pulm:  No dyspnea, cough, tachypnea, wheezing  GI:  see HPI  :  No pain, bleeding, incontinence, nocturia  Muscle:  No pain, weakness  Neuro:  No weakness, tingling, memory problems  Psych:  No fatigue, insomnia, mood problems, depression  Endocrine:  No polyuria, polydipsia, cold/heat intolerance  Heme:  No petechiae, ecchymosis, easy bruisability  Skin:  No rash, tattoos, scars, edema    PHYSICAL EXAM:     GENERAL:  No acute distress, lying in bed.   HEENT:  NCAT, no scleral icterus   CHEST:  no respiratory distress  HEART:  Regular rate and rhythm  ABDOMEN:  Soft, mild tenderness in the LUQ and LLQ quadrant, non-distended, no palpable masses  EXTREMITIES: No LE edema b/l.   NEURO:  Alert and oriented x 3.     Vital Signs:  Vital Signs Last 24 Hrs  T(C): 36.7 (28 Sep 2022 12:15), Max: 37.5 (27 Sep 2022 21:33)  T(F): 98.1 (28 Sep 2022 12:15), Max: 99.5 (27 Sep 2022 21:33)  HR: 92 (28 Sep 2022 12:15) (92 - 100)  BP: 127/59 (28 Sep 2022 12:15) (118/48 - 127/59)  BP(mean): --  RR: 17 (28 Sep 2022 12:15) (17 - 18)  SpO2: 100% (28 Sep 2022 12:15) (97% - 100%)    Parameters below as of 28 Sep 2022 12:15  Patient On (Oxygen Delivery Method): room air      Daily Height in cm: 177.8 (28 Sep 2022 12:15)    Daily Weight in k.2 (28 Sep 2022 12:15)    LABS:                        8.1    6.34  )-----------( 273      ( 28 Sep 2022 06:17 )             27.4     Mean Cell Volume: 79.4 fL (22 @ 06:17)        136  |  104  |  18  ----------------------------<  159<H>  3.4<L>   |  19<L>  |  0.77    Ca    8.0<L>      28 Sep 2022 06:17  Mg     2.20         TPro  6.3  /  Alb  2.8<L>  /  TBili  1.0  /  DBili  x   /  AST  11  /  ALT  9   /  AlkPhos  73      LIVER FUNCTIONS - ( 28 Sep 2022 06:17 )  Alb: 2.8 g/dL / Pro: 6.3 g/dL / ALK PHOS: 73 U/L / ALT: 9 U/L / AST: 11 U/L / GGT: x           PT/INR - ( 27 Sep 2022 11:48 )   PT: 16.1 sec;   INR: 1.38 ratio         PTT - ( 27 Sep 2022 11:48 )  PTT:22.1 sec  Urinalysis Basic - ( 27 Sep 2022 17:33 )    Color: Yellow / Appearance: Clear / S.023 / pH: x  Gluc: x / Ketone: Trace  / Bili: Negative / Urobili: <2 mg/dL   Blood: x / Protein: Trace / Nitrite: Negative   Leuk Esterase: Negative / RBC: x / WBC x   Sq Epi: x / Non Sq Epi: x / Bacteria: x                              8.1    6.34  )-----------( 273      ( 28 Sep 2022 06:17 )             27.4                         9.0    7.74  )-----------( 279      ( 27 Sep 2022 11:48 )             29.5       Imaging:    Ct abd and pelvis    FINDINGS:  LOWER CHEST: Within normal limits.    LIVER: Within normal limits.  BILE DUCTS: Normal caliber.  GALLBLADDER: Small gallstone.  SPLEEN: Within normal limits.  PANCREAS: Within normal limits.  ADRENALS: Within normal limits.  KIDNEYS/URETERS: 4 mm nonobstructive calculus lower pole right kidney. 2   cm cyst upper pole left kidney.    BLADDER: Within normal limits.  REPRODUCTIVE ORGANS: Prostate within normal limits.    BOWEL: No bowel obstruction. Mural thickening and associated mesenteric   hyperemia involving the colon from the hepatic flexure through the mid   descending colon. Submucosal fat deposition in the cecum and rectosigmoid   colon consistent with chronic information. Appendix mural thickening and   mucosal hyperenhancement in the mid appendix.  PERITONEUM: No ascites.  VESSELS: Atheromatous calcifications.  RETROPERITONEUM/LYMPH NODES: No lymphadenopathy.  ABDOMINAL WALL: Within normal limits.  BONES: Hemisacralization of the fifth lumbar vertebra.    IMPRESSION:  Crohn's colitis involving the transverse and descending colon and   appendix.    Colonoscopy in 2020    Impression:          - Inflammation was found from the anus to the descending colon secondary to                        left-sided colitis. The findings are unchanged compared to previous                        examinations. Biopsied.          - One 15 mm polyp in the rectum, benign appearing likely inflammatory,                        removed with a hot snare. Resected and retrieved.                       - Diverticulosis in the sigmoid colon.                       - Localized mild inflammation was found in the cecum.                       Clinical improvement with Entyvio BUT no endoscopic improvement.

## 2022-09-28 NOTE — H&P ADULT - NSHPLABSRESULTS_GEN_ALL_CORE
LABS:                        9.0    7.74  )-----------( 279      ( 27 Sep 2022 11:48 )             29.5     27 Sep 2022 11:48    138    |  105    |  24     ----------------------------<  148    5.1     |  23     |  0.87     Ca    8.6        27 Sep 2022 11:48  Mg     2.20      27 Sep 2022 11:48    TPro  7.3    /  Alb  3.1    /  TBili  0.7    /  DBili  x      /  AST  60     /  ALT  17     /  AlkPhos  84     27 Sep 2022 11:48    PT/INR - ( 27 Sep 2022 11:48 )   PT: 16.1 sec;   INR: 1.38 ratio         PTT - ( 27 Sep 2022 11:48 )  PTT:22.1 sec  CAPILLARY BLOOD GLUCOSE      POCT Blood Glucose.: 170 mg/dL (27 Sep 2022 09:45)    BLOOD CULTURE    RADIOLOGY & ADDITIONAL TESTS:  < from: CT Abdomen and Pelvis w/ IV Cont (09.27.22 @ 17:22) >    IMPRESSION:  Crohn's colitis involving the transverse and descending colon and   appendix.    < end of copied text >      Imaging Personally Reviewed:  [ ] YES LABS:                        9.0    7.74  )-----------( 279      ( 27 Sep 2022 11:48 )             29.5     27 Sep 2022 11:48    138    |  105    |  24     ----------------------------<  148    5.1     |  23     |  0.87     Ca    8.6        27 Sep 2022 11:48  Mg     2.20      27 Sep 2022 11:48    TPro  7.3    /  Alb  3.1    /  TBili  0.7    /  DBili  x      /  AST  60     /  ALT  17     /  AlkPhos  84     27 Sep 2022 11:48    PT/INR - ( 27 Sep 2022 11:48 )   PT: 16.1 sec;   INR: 1.38 ratio    PTT - ( 27 Sep 2022 11:48 )  PTT:22.1 sec    CAPILLARY BLOOD GLUCOSE  POCT Blood Glucose.: 170 mg/dL (27 Sep 2022 09:45)    Blood Gas Profile - Venous (09.27.22 @ 11:48)   pH, Venous: 7.36   pCO2, Venous: 42 mmHg   pO2, Venous: 27 mmHg   HCO3, Venous: 24 mmol/L   Base Excess, Venous: -1.7 mmol/L   Oxygen Saturation, Venous: 36.7 %   Total CO2, Venous: 25.0 mmol/L   Blood Gas Venous - Lactate (09.27.22 @ 11:48)   Blood Gas Venous - Lactate: 1.2 mmol/L     RADIOLOGY & ADDITIONAL TESTS:  < from: CT Abdomen and Pelvis w/ IV Cont (09.27.22 @ 17:22) >    IMPRESSION:  Crohn's colitis involving the transverse and descending colon and   appendix.    < end of copied text >    EKG - NSR at 86, QTc 466, no significant ST-T wave changes    Imaging Personally Reviewed:  [ ] YES

## 2022-09-28 NOTE — H&P ADULT - PROBLEM SELECTOR PLAN 3
c/w asp 81 on lantus 30 and admelog 28-30 at home  - c/w lantus 24  - c/w admelog 24  - low sliding scale

## 2022-09-28 NOTE — H&P ADULT - ASSESSMENT
72yoM with PMH of Crohn's IDDM, neuropathy, HTN, HLD, BPH, CVA on ASA 81mg daily, presenting for bloody diarrhea likely secondary to crohn's flare refractory to remicade infusion 72yoM with PMH of Crohn's, IDDM, neuropathy, HTN, HLD, BPH, CVA on ASA 81mg daily, presenting for bloody diarrhea likely secondary to crohn's flare refractory to remicade infusion

## 2022-09-28 NOTE — H&P ADULT - NSHPPHYSICALEXAM_GEN_ALL_CORE
GENERAL APPEARANCE: Well developed, NAD  HEENT:  PERRL, EOMI. hearing grossly intact.  NECK: Neck supple, non-tender no lymphadenopathy, masses or thyromegaly.  CARDIAC: Normal S1 and S2. no mrg. RRR  LUNGS: Clear to auscultation B/L, no rales, rhonchi, or wheezing  ABDOMEN: Soft , distended, mild TTP on LLQ  MUSCULOSKELETAL: ROM intact.  No joint erythema or tenderness.   EXTREMITIES: No edema. Peripheral pulses intact.   NEUROLOGICAL: Non focal. Strength and sensation symmetric and intact throughout.   SKIN: Warm and dry , Well perfused  PSYCHIATRIC: AOx3 , Normal mood and affect Vital Signs Last 24 Hrs  T(C): 36.9 (28 Sep 2022 06:15), Max: 37.5 (27 Sep 2022 21:33)  T(F): 98.5 (28 Sep 2022 06:15), Max: 99.5 (27 Sep 2022 21:33)  HR: 100 (28 Sep 2022 06:15) (82 - 100)  BP: 120/60 (28 Sep 2022 06:15) (108/45 - 120/60)  BP(mean): --  RR: 18 (28 Sep 2022 06:15) (16 - 18)  SpO2: 97% (28 Sep 2022 06:15) (97% - 100%)    Parameters below as of 28 Sep 2022 06:15  Patient On (Oxygen Delivery Method): room air    GENERAL APPEARANCE: Well developed, NAD  HEENT:  PERRL, EOMI. hearing grossly intact.  NECK: Neck supple, non-tender no lymphadenopathy, masses or thyromegaly.  CARDIAC: Normal S1 and S2. no mrg. RRR  LUNGS: Clear to auscultation B/L, no rales, rhonchi, or wheezing  ABDOMEN: Soft , distended, mild TTP on LLQ, +BS  MUSCULOSKELETAL: ROM intact.  No joint erythema or tenderness.   EXTREMITIES: No edema. Peripheral pulses intact.   NEUROLOGICAL: Non focal. Strength and sensation symmetric and intact throughout.   SKIN: Warm and dry , Well perfused  PSYCHIATRIC: AOx3 , Normal mood and affect

## 2022-09-29 ENCOUNTER — NON-APPOINTMENT (OUTPATIENT)
Age: 72
End: 2022-09-29

## 2022-09-29 LAB
ALBUMIN SERPL ELPH-MCNC: 2.9 G/DL — LOW (ref 3.3–5)
ALP SERPL-CCNC: 72 U/L — SIGNIFICANT CHANGE UP (ref 40–120)
ALT FLD-CCNC: 9 U/L — SIGNIFICANT CHANGE UP (ref 4–41)
ANION GAP SERPL CALC-SCNC: 12 MMOL/L — SIGNIFICANT CHANGE UP (ref 7–14)
AST SERPL-CCNC: 12 U/L — SIGNIFICANT CHANGE UP (ref 4–40)
BILIRUB SERPL-MCNC: 0.7 MG/DL — SIGNIFICANT CHANGE UP (ref 0.2–1.2)
BUN SERPL-MCNC: 18 MG/DL — SIGNIFICANT CHANGE UP (ref 7–23)
C DIFF BY PCR RESULT: SIGNIFICANT CHANGE UP
C DIFF TOX GENS STL QL NAA+PROBE: SIGNIFICANT CHANGE UP
CALCIUM SERPL-MCNC: 7.9 MG/DL — LOW (ref 8.4–10.5)
CHLORIDE SERPL-SCNC: 100 MMOL/L — SIGNIFICANT CHANGE UP (ref 98–107)
CO2 SERPL-SCNC: 19 MMOL/L — LOW (ref 22–31)
CREAT SERPL-MCNC: 0.72 MG/DL — SIGNIFICANT CHANGE UP (ref 0.5–1.3)
CRP SERPL-MCNC: 83.6 MG/L — HIGH
EGFR: 96 ML/MIN/1.73M2 — SIGNIFICANT CHANGE UP
ERYTHROCYTE [SEDIMENTATION RATE] IN BLOOD: 86 MM/HR — HIGH (ref 1–15)
GLUCOSE BLDC GLUCOMTR-MCNC: 125 MG/DL — HIGH (ref 70–99)
GLUCOSE BLDC GLUCOMTR-MCNC: 170 MG/DL — HIGH (ref 70–99)
GLUCOSE BLDC GLUCOMTR-MCNC: 222 MG/DL — HIGH (ref 70–99)
GLUCOSE BLDC GLUCOMTR-MCNC: 222 MG/DL — HIGH (ref 70–99)
GLUCOSE SERPL-MCNC: 186 MG/DL — HIGH (ref 70–99)
HCT VFR BLD CALC: 26.6 % — LOW (ref 39–50)
HCV AB S/CO SERPL IA: 0.09 S/CO — SIGNIFICANT CHANGE UP (ref 0–0.99)
HCV AB SERPL-IMP: SIGNIFICANT CHANGE UP
HGB BLD-MCNC: 7.9 G/DL — LOW (ref 13–17)
MAGNESIUM SERPL-MCNC: 2.1 MG/DL — SIGNIFICANT CHANGE UP (ref 1.6–2.6)
MCHC RBC-ENTMCNC: 23.4 PG — LOW (ref 27–34)
MCHC RBC-ENTMCNC: 29.7 GM/DL — LOW (ref 32–36)
MCV RBC AUTO: 78.7 FL — LOW (ref 80–100)
NRBC # BLD: 0 /100 WBCS — SIGNIFICANT CHANGE UP (ref 0–0)
NRBC # FLD: 0 K/UL — SIGNIFICANT CHANGE UP (ref 0–0)
PHOSPHATE SERPL-MCNC: 4.6 MG/DL — HIGH (ref 2.5–4.5)
PLATELET # BLD AUTO: 249 K/UL — SIGNIFICANT CHANGE UP (ref 150–400)
POTASSIUM SERPL-MCNC: 3.7 MMOL/L — SIGNIFICANT CHANGE UP (ref 3.5–5.3)
POTASSIUM SERPL-SCNC: 3.7 MMOL/L — SIGNIFICANT CHANGE UP (ref 3.5–5.3)
PROT SERPL-MCNC: 6.3 G/DL — SIGNIFICANT CHANGE UP (ref 6–8.3)
RBC # BLD: 3.38 M/UL — LOW (ref 4.2–5.8)
RBC # FLD: 14 % — SIGNIFICANT CHANGE UP (ref 10.3–14.5)
SARS-COV-2 RNA SPEC QL NAA+PROBE: SIGNIFICANT CHANGE UP
SODIUM SERPL-SCNC: 131 MMOL/L — LOW (ref 135–145)
WBC # BLD: 3.98 K/UL — SIGNIFICANT CHANGE UP (ref 3.8–10.5)
WBC # FLD AUTO: 3.98 K/UL — SIGNIFICANT CHANGE UP (ref 3.8–10.5)

## 2022-09-29 PROCEDURE — 99232 SBSQ HOSP IP/OBS MODERATE 35: CPT | Mod: GC

## 2022-09-29 PROCEDURE — 99233 SBSQ HOSP IP/OBS HIGH 50: CPT | Mod: GC

## 2022-09-29 RX ORDER — SOD SULF/SODIUM/NAHCO3/KCL/PEG
4000 SOLUTION, RECONSTITUTED, ORAL ORAL ONCE
Refills: 0 | Status: COMPLETED | OUTPATIENT
Start: 2022-09-29 | End: 2022-09-29

## 2022-09-29 RX ORDER — INSULIN GLARGINE 100 [IU]/ML
20 INJECTION, SOLUTION SUBCUTANEOUS AT BEDTIME
Refills: 0 | Status: DISCONTINUED | OUTPATIENT
Start: 2022-09-29 | End: 2022-09-30

## 2022-09-29 RX ORDER — INSULIN GLARGINE 100 [IU]/ML
19 INJECTION, SOLUTION SUBCUTANEOUS AT BEDTIME
Refills: 0 | Status: DISCONTINUED | OUTPATIENT
Start: 2022-09-29 | End: 2022-09-29

## 2022-09-29 RX ORDER — INSULIN LISPRO 100/ML
19 VIAL (ML) SUBCUTANEOUS
Refills: 0 | Status: DISCONTINUED | OUTPATIENT
Start: 2022-09-29 | End: 2022-09-29

## 2022-09-29 RX ORDER — INSULIN LISPRO 100/ML
15 VIAL (ML) SUBCUTANEOUS
Refills: 0 | Status: DISCONTINUED | OUTPATIENT
Start: 2022-09-29 | End: 2022-09-30

## 2022-09-29 RX ADMIN — PANTOPRAZOLE SODIUM 40 MILLIGRAM(S): 20 TABLET, DELAYED RELEASE ORAL at 18:21

## 2022-09-29 RX ADMIN — Medication 15 UNIT(S): at 18:20

## 2022-09-29 RX ADMIN — TAMSULOSIN HYDROCHLORIDE 0.4 MILLIGRAM(S): 0.4 CAPSULE ORAL at 22:48

## 2022-09-29 RX ADMIN — QUETIAPINE FUMARATE 50 MILLIGRAM(S): 200 TABLET, FILM COATED ORAL at 22:48

## 2022-09-29 RX ADMIN — Medication 4000 MILLILITER(S): at 18:21

## 2022-09-29 RX ADMIN — Medication 2: at 18:20

## 2022-09-29 RX ADMIN — Medication 20 MILLIGRAM(S): at 13:05

## 2022-09-29 RX ADMIN — Medication 1600 MILLIGRAM(S): at 22:49

## 2022-09-29 RX ADMIN — Medication 1600 MILLIGRAM(S): at 06:13

## 2022-09-29 RX ADMIN — Medication 20 MILLIGRAM(S): at 06:14

## 2022-09-29 RX ADMIN — Medication 2: at 13:27

## 2022-09-29 RX ADMIN — MEMANTINE HYDROCHLORIDE 10 MILLIGRAM(S): 10 TABLET ORAL at 06:12

## 2022-09-29 RX ADMIN — PANTOPRAZOLE SODIUM 40 MILLIGRAM(S): 20 TABLET, DELAYED RELEASE ORAL at 06:14

## 2022-09-29 RX ADMIN — INSULIN GLARGINE 20 UNIT(S): 100 INJECTION, SOLUTION SUBCUTANEOUS at 22:49

## 2022-09-29 RX ADMIN — GABAPENTIN 100 MILLIGRAM(S): 400 CAPSULE ORAL at 18:21

## 2022-09-29 RX ADMIN — FINASTERIDE 5 MILLIGRAM(S): 5 TABLET, FILM COATED ORAL at 12:59

## 2022-09-29 RX ADMIN — GABAPENTIN 100 MILLIGRAM(S): 400 CAPSULE ORAL at 06:12

## 2022-09-29 RX ADMIN — MEMANTINE HYDROCHLORIDE 10 MILLIGRAM(S): 10 TABLET ORAL at 18:21

## 2022-09-29 RX ADMIN — Medication 1: at 09:46

## 2022-09-29 RX ADMIN — Medication 81 MILLIGRAM(S): at 12:59

## 2022-09-29 RX ADMIN — ATORVASTATIN CALCIUM 40 MILLIGRAM(S): 80 TABLET, FILM COATED ORAL at 22:48

## 2022-09-29 RX ADMIN — Medication 3 MILLIGRAM(S): at 22:49

## 2022-09-29 RX ADMIN — ENOXAPARIN SODIUM 40 MILLIGRAM(S): 100 INJECTION SUBCUTANEOUS at 09:47

## 2022-09-29 RX ADMIN — Medication 20 MILLIGRAM(S): at 22:49

## 2022-09-29 RX ADMIN — SERTRALINE 100 MILLIGRAM(S): 25 TABLET, FILM COATED ORAL at 13:00

## 2022-09-29 NOTE — PROGRESS NOTE ADULT - PROBLEM SELECTOR PLAN 3
Patient established with [facility] on 8/19/21. Now needing script for Zolmitriptan (see ECC note), unable to contact his migraine specialist. Is [facility] able to do this? on lantus 30 and admelog 28-30 at home  - c/w lantus 24  - c/w admelog 24  - sliding scale on Lantus 30 and Admelog 28-30 at home  - Lantus 24 -> 19 preop   - Admelog 24 -> 15 preop  - sliding scale

## 2022-09-29 NOTE — PROGRESS NOTE ADULT - PROBLEM SELECTOR PLAN 1
- Per outpt w/up, pt colonized w C diff   - r/o infectious etx w/ GI PCR, Stool O+p  - F/up fecal calprotectin   - LR @75 for rehydration  - GI Consult for Crohn's flare - appreciate recs     - solumedrol 20 q8  - holding trimethoprim for now  - CBC q12  - pantoprazole - elevated ESR, CRP  - infectious work up: Cdiff PCR, GI PCR, Stool O+p  - F/up fecal calprotectin   - LR @75 for rehydration  - GI Consult for Crohn's flare - appreciate recs     - solumedrol 20 q8  - holding trimethoprim for now  - pantoprazole  - colonoscopy tomorrow - clears, prep, NPO@MN. Preop labs.

## 2022-09-29 NOTE — PROGRESS NOTE ADULT - ASSESSMENT
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 C-scope tomorrow.

## 2022-09-29 NOTE — PROGRESS NOTE ADULT - ASSESSMENT
72yoM with PMH of Crohn's disease, IDDM, neuropathy, HTN, HLD, BPH, CVA on ASA 81mg daily who presented with bloody diarrhea, concerning for CD flare.     #Bloody diarrhea, concerning for CD flare  unclear CD vs. UC as per Dr. Oro note. s/p poor prior response to Entyvio. On remicade for the past one year, last infusion 2 weeks.  On 6/30/22 pts remicade level was <0.4 and antibody was elevated at 308. Remicade dosage was increased since aug 2022. Last colonoscopy in 7/2020 showed inflammation from the anus to the descending colon (left sided colitis) and diverticulosis.   - GI PCR negative, c diff test pending.   - ESR and CRP elevated.   - s/p IV steroids day 2.  - will proceed with infliximab infusion and methotrexate tomorrow.   - will proceed with flex sig tomorrow.     Recommendations:   - Continue with IV Solumedrol 20mg Q8hrs.   - C diff results pending.   - will give infliximab and methotrexate tomorrow.   - will proceed with flex sig tomorrow.   - make him npo after midnight.   - will give tap water enemas in the morning.   - obtain CRP and ESR daily.   - obtain fecal protectin  - Place him on DVT PPx as IBD patients are high risk for thrombosis.     GI will continue to follow.     All recommendations are tentative until note is attested by an attending.     Jorge Arboleda, PGY-4  Gastroenterology/Hepatology Fellow  Available on [employer] Teams  [phone] (Short Range Pager)  [phone] (Long Range Pager)    After 5pm, please contact the on-call GI fellow. [phone] 72yoM with PMH of Crohn's disease, IDDM, neuropathy, HTN, HLD, BPH, CVA on ASA 81mg daily who presented with bloody diarrhea, concerning for CD flare.     #Bloody diarrhea, concerning for CD flare  unclear CD vs. UC as per Dr. Oro note. s/p poor prior response to Entyvio. On remicade for the past one year, last infusion 2 weeks.  On 6/30/22 pts remicade level was <0.4 and antibody was elevated at 308. Remicade dosage was increased since aug 2022. Last colonoscopy in 7/2020 showed inflammation from the anus to the descending colon (left sided colitis) and diverticulosis.   - GI PCR negative, c diff test pending.   - ESR and CRP elevated.   - s/p IV steroids day 2.  - will proceed with infliximab infusion and methotrexate tomorrow.   - will proceed with colonoscopy tomorrow.     Recommendations:   - Continue with IV Solumedrol 20mg Q8hrs.   - C diff results pending.   - will give infliximab and methotrexate tomorrow.   - will proceed with colonoscopy tomorrow.   - continue with CLD for now.   - will order prep this evening.    - make him npo after midnight.   - obtain CRP and ESR daily.   - obtain fecal protectin  - Place him on DVT PPx as IBD patients are high risk for thrombosis.     GI will continue to follow.     All recommendations are tentative until note is attested by an attending.     Jorge Arboleda, PGY-4  Gastroenterology/Hepatology Fellow  Available on [employer] Teams  [phone] (Short Range Pager)  [phone] (Long Range Pager)    After 5pm, please contact the on-call GI fellow. [phone]

## 2022-09-29 NOTE — PROGRESS NOTE ADULT - PROBLEM SELECTOR PLAN 2
2cm L upper pole renal cyst, on comparison also had a small renal cyst in L kidney back in 2018, seems to be in about the same location and same size  - Likely chronic, would recommend outpatient follow up for further eval if needed  - UA without significant findings

## 2022-09-29 NOTE — PROGRESS NOTE ADULT - ATTENDING COMMENTS
Patient seen and examined 9/29     Patient is a 72 year old man w/ past medical history of Crohn's disease, IDDM, neuropathy, HTN, HLD, BPH, CVA on ASA 81mg daily a/w likely Crohn's Disease flare. GI consulted for further management. GI PCR negative, c diff testing pending. Patient started on steroids for flare, solumedrol 20mg q 8 hours. GI plan to proceed with infliximab infusion and methotrexate 9/30. Plan is for colonoscopy 9/30 as well. NPO after midnight fro procedure, CLD for now.

## 2022-09-29 NOTE — PROGRESS NOTE ADULT - SUBJECTIVE AND OBJECTIVE BOX
Progress Note     == draft in progress ==  Britt Childress  PGY-1 Medicine  Teams | [id number]    Patient is a 72y old  Male who presents with a chief complaint of Persistent bloody diarrhea, generalized weakness (28 Sep 2022 23:25)          SUBJECTIVE / INTERVAL EVENTS  No acute events overnight. Patient seen and evaluated at bedside. No new complaints.       MEDICATIONS    Home Medications:  aspirin 81 mg oral delayed release tablet: 1 tab(s) orally once a day (28 Sep 2022 01:)  atorvastatin 40 mg oral tablet: 1 tab(s) orally once a day (at bedtime) (28 Sep 2022 01:21)  Centrum Silver oral tablet: 1 tab(s) orally once a day (28 Sep 2022 01:)  finasteride 5 mg oral tablet: 1 tab(s) orally once a day (28 Sep 2022 01:21)  gabapentin 100 mg oral capsule: 1 tab(s) orally 2 times a day (28 Sep 2022 01:21)  insulin aspart 100 units/mL injectable solution: 28 unit(s) injectable 3 times a day (before meals) (28 Sep 2022 01:21)  Januvia 50 mg oral tablet: 1 tab(s) orally once a day (28 Sep 2022 01:)  memantine 10 mg oral tablet: 1 tab(s) orally 2 times a day (28 Sep 2022 01:)  mesalamine 800 mg oral delayed release tablet: 2 tab(s) orally 2 times a day (28 Sep 2022 01:21)  QUEtiapine 25 mg oral tablet: 2 tab(s) orally once a day (at bedtime) (28 Sep 2022 01:)  Remicade 100 mg intravenous injection:  (28 Sep 2022 01:21)  sertraline 100 mg oral tablet: 1 tab(s) orally once a day (28 Sep 2022 01:)  tamsulosin 0.4 mg oral capsule: 1 cap(s) orally 2 times a day (28 Sep 2022 01:21)  Tresiba 100 units/mL subcutaneous solution: 30 unit(s) subcutaneous once a day (at bedtime) (28 Sep 2022 01:21)  trimethoprim 100 mg oral tablet: 1 tab(s) orally once a day (at bedtime) (28 Sep 2022 01:)  Vitamin D3 400 intl units oral tablet: 1 tab(s) orally once a day (28 Sep 2022 01:21)      MEDICATIONS  (STANDING):  aspirin enteric coated 81 milliGRAM(s) Oral daily  atorvastatin 40 milliGRAM(s) Oral at bedtime  dextrose 5%. 1000 milliLiter(s) (50 mL/Hr) IV Continuous <Continuous>  dextrose 5%. 1000 milliLiter(s) (100 mL/Hr) IV Continuous <Continuous>  dextrose 50% Injectable 25 Gram(s) IV Push once  dextrose 50% Injectable 12.5 Gram(s) IV Push once  dextrose 50% Injectable 25 Gram(s) IV Push once  enoxaparin Injectable 40 milliGRAM(s) SubCutaneous every 24 hours  finasteride 5 milliGRAM(s) Oral daily  gabapentin 100 milliGRAM(s) Oral two times a day  glucagon  Injectable 1 milliGRAM(s) IntraMuscular once  insulin glargine Injectable (LANTUS) 24 Unit(s) SubCutaneous at bedtime  insulin lispro (ADMELOG) corrective regimen sliding scale   SubCutaneous three times a day before meals  insulin lispro (ADMELOG) corrective regimen sliding scale   SubCutaneous at bedtime  insulin lispro Injectable (ADMELOG) 24 Unit(s) SubCutaneous three times a day before meals  lactated ringers. 1000 milliLiter(s) (75 mL/Hr) IV Continuous <Continuous>  memantine 10 milliGRAM(s) Oral two times a day  mesalamine DR Capsule 1600 milliGRAM(s) Oral two times a day  methylPREDNISolone sodium succinate Injectable 20 milliGRAM(s) IV Push every 8 hours  pantoprazole  Injectable 40 milliGRAM(s) IV Push every 12 hours  QUEtiapine 50 milliGRAM(s) Oral at bedtime  sertraline 100 milliGRAM(s) Oral daily  tamsulosin 0.4 milliGRAM(s) Oral at bedtime    MEDICATIONS  (PRN):  dextrose Oral Gel 15 Gram(s) Oral once PRN Blood Glucose LESS THAN 70 milliGRAM(s)/deciliter  melatonin 3 milliGRAM(s) Oral at bedtime PRN Insomnia         VITALS / I&O's  T(C): 36.6 (22 @ 06:10), Max: 37.2 (22 @ 09:30)  HR: 87 (22 @ 06:10) (87 - 100)  BP: 117/60 (22 @ 06:10) (117/60 - 127/59)  RR: 17 (22 @ 06:10) (17 - 18)  SpO2: 100% (22 @ 06:10) (99% - 100%)  I&O's Summary    28 Sep 2022 07:01  -  29 Sep 2022 07:00  --------------------------------------------------------  IN: 200 mL / OUT: 620 mL / NET: -420 mL           PHYSICAL EXAM           LABS                        7.9    3.98  )-----------( 249      ( 29 Sep 2022 06:20 )             26.6         131<L>  |  100  |  18  ----------------------------<  186<H>  3.7   |  19<L>  |  0.72    Ca    7.9<L>      29 Sep 2022 06:20  Phos  4.6       Mg     2.10         TPro  6.3  /  Alb  2.9<L>  /  TBili  0.7  /  DBili  x   /  AST  12  /  ALT  9   /  AlkPhos  72      CAPILLARY BLOOD GLUCOSE      POCT Blood Glucose.: 171 mg/dL (28 Sep 2022 22:15)  POCT Blood Glucose.: 124 mg/dL (28 Sep 2022 18:19)  POCT Blood Glucose.: 131 mg/dL (28 Sep 2022 12:28)  POCT Blood Glucose.: 156 mg/dL (28 Sep 2022 08:40)    PT/INR - ( 27 Sep 2022 11:48 )   PT: 16.1 sec;   INR: 1.38 ratio         PTT - ( 27 Sep 2022 11:48 )  PTT:22.1 sec  LIVER FUNCTIONS - ( 29 Sep 2022 06:20 )  Alb: 2.9 g/dL / Pro: 6.3 g/dL / ALK PHOS: 72 U/L / ALT: 9 U/L / AST: 12 U/L / GGT: x                   Urinalysis Basic - ( 27 Sep 2022 17:33 )    Color: Yellow / Appearance: Clear / S.023 / pH: x  Gluc: x / Ketone: Trace  / Bili: Negative / Urobili: <2 mg/dL   Blood: x / Protein: Trace / Nitrite: Negative   Leuk Esterase: Negative / RBC: x / WBC x   Sq Epi: x / Non Sq Epi: x / Bacteria: x             RADIOLOGY & ADDITIONAL TESTS    [] Personally reviewed imaging.  [] Consultant(s) notes reviewed.  [] Care discussed with consultants/other providers.  Progress Note       Britt Childress  PGY-1 Medicine  Teams | [id number]    Patient is a 72y old  Male who presents with a chief complaint of Persistent bloody diarrhea, generalized weakness (28 Sep 2022 23:25)          SUBJECTIVE / INTERVAL EVENTS  No acute events overnight. Patient seen and evaluated at bedside. Patient doing okay today. Patient denied fever, nausea/vomiting, shortness of breath, chest pain, limb pain, new swelling, new numbness/tingling, dysuria or hematuria.  Control of pain: 2/10 (patient says comfortable, rates 8-9/10)   Per RN: held AM premeal, had 4 bites yesterday, 6 stools yesterday, 2 overnight, loose dark brown watery output with particles  Wife feeding patient scrambled eggs this morning. Later, wife and daughter at bedside.       MEDICATIONS    Home Medications:  aspirin 81 mg oral delayed release tablet: 1 tab(s) orally once a day (28 Sep 2022 01:)  atorvastatin 40 mg oral tablet: 1 tab(s) orally once a day (at bedtime) (28 Sep 2022 01:)  Centrum Silver oral tablet: 1 tab(s) orally once a day (28 Sep 2022 01:)  finasteride 5 mg oral tablet: 1 tab(s) orally once a day (28 Sep 2022 01:)  gabapentin 100 mg oral capsule: 1 tab(s) orally 2 times a day (28 Sep 2022 01:21)  insulin aspart 100 units/mL injectable solution: 28 unit(s) injectable 3 times a day (before meals) (28 Sep 2022 01:)  Januvia 50 mg oral tablet: 1 tab(s) orally once a day (28 Sep 2022 01:21)  memantine 10 mg oral tablet: 1 tab(s) orally 2 times a day (28 Sep 2022 01:)  mesalamine 800 mg oral delayed release tablet: 2 tab(s) orally 2 times a day (28 Sep 2022 01:)  QUEtiapine 25 mg oral tablet: 2 tab(s) orally once a day (at bedtime) (28 Sep 2022 01:21)  Remicade 100 mg intravenous injection:  (28 Sep 2022 01:)  sertraline 100 mg oral tablet: 1 tab(s) orally once a day (28 Sep 2022 01:21)  tamsulosin 0.4 mg oral capsule: 1 cap(s) orally 2 times a day (28 Sep 2022 01:21)  Tresiba 100 units/mL subcutaneous solution: 30 unit(s) subcutaneous once a day (at bedtime) (28 Sep 2022 01:21)  trimethoprim 100 mg oral tablet: 1 tab(s) orally once a day (at bedtime) (28 Sep 2022 01:21)  Vitamin D3 400 intl units oral tablet: 1 tab(s) orally once a day (28 Sep 2022 01:21)      MEDICATIONS  (STANDING):  aspirin enteric coated 81 milliGRAM(s) Oral daily  atorvastatin 40 milliGRAM(s) Oral at bedtime  dextrose 5%. 1000 milliLiter(s) (50 mL/Hr) IV Continuous <Continuous>  dextrose 5%. 1000 milliLiter(s) (100 mL/Hr) IV Continuous <Continuous>  dextrose 50% Injectable 25 Gram(s) IV Push once  dextrose 50% Injectable 12.5 Gram(s) IV Push once  dextrose 50% Injectable 25 Gram(s) IV Push once  enoxaparin Injectable 40 milliGRAM(s) SubCutaneous every 24 hours  finasteride 5 milliGRAM(s) Oral daily  gabapentin 100 milliGRAM(s) Oral two times a day  glucagon  Injectable 1 milliGRAM(s) IntraMuscular once  insulin glargine Injectable (LANTUS) 24 Unit(s) SubCutaneous at bedtime  insulin lispro (ADMELOG) corrective regimen sliding scale   SubCutaneous three times a day before meals  insulin lispro (ADMELOG) corrective regimen sliding scale   SubCutaneous at bedtime  insulin lispro Injectable (ADMELOG) 24 Unit(s) SubCutaneous three times a day before meals  lactated ringers. 1000 milliLiter(s) (75 mL/Hr) IV Continuous <Continuous>  memantine 10 milliGRAM(s) Oral two times a day  mesalamine DR Capsule 1600 milliGRAM(s) Oral two times a day  methylPREDNISolone sodium succinate Injectable 20 milliGRAM(s) IV Push every 8 hours  pantoprazole  Injectable 40 milliGRAM(s) IV Push every 12 hours  QUEtiapine 50 milliGRAM(s) Oral at bedtime  sertraline 100 milliGRAM(s) Oral daily  tamsulosin 0.4 milliGRAM(s) Oral at bedtime    MEDICATIONS  (PRN):  dextrose Oral Gel 15 Gram(s) Oral once PRN Blood Glucose LESS THAN 70 milliGRAM(s)/deciliter  melatonin 3 milliGRAM(s) Oral at bedtime PRN Insomnia         VITALS / I&O's  T(C): 36.6 (22 @ 06:10), Max: 37.2 (22 @ 09:30)  HR: 87 (22 @ 06:10) (87 - 100)  BP: 117/60 (22 @ 06:10) (117/60 - 127/59)  RR: 17 (22 @ 06:10) (17 - 18)  SpO2: 100% (22 @ 06:10) (99% - 100%)  I&O's Summary    28 Sep 2022 07:01  -  29 Sep 2022 07:00  --------------------------------------------------------  IN: 200 mL / OUT: 620 mL / NET: -420 mL           PHYSICAL EXAM  General: Reclining in bed in no acute distress.  HEENT: Normocephalic, atraumatic. Extraocular movements grossly intact. No nasal discharge.  Neuro: Alert, somewhat oriented. No facial asymmetry or dysarthria. Moving all extremities.  CV: Regular rate and rhythm. S1/S2. +systolic murmur? Limbs warm, no distal edema.  Respiratory: Lung fields CTAB. No increased work of breathing, speaking comfortably.  Abdomen: Soft; somewhat distended; mildly tender in ?LLQ/?RLQ. No rebound or guarding. +hyperactive bowel sounds.  : Suprapubic region nontender.  Skin: No rashes or bruising of face, forearms, or calves bilaterally.  Psych: Mood and affect appropriate.          LABS                        7.9    3.98  )-----------( 249      ( 29 Sep 2022 06:20 )             26.6         131<L>  |  100  |  18  ----------------------------<  186<H>  3.7   |  19<L>  |  0.72    Ca    7.9<L>      29 Sep 2022 06:20  Phos  4.6       Mg     2.10         TPro  6.3  /  Alb  2.9<L>  /  TBili  0.7  /  DBili  x   /  AST  12  /  ALT  9   /  AlkPhos  72      CAPILLARY BLOOD GLUCOSE      POCT Blood Glucose.: 171 mg/dL (28 Sep 2022 22:15)  POCT Blood Glucose.: 124 mg/dL (28 Sep 2022 18:19)  POCT Blood Glucose.: 131 mg/dL (28 Sep 2022 12:28)  POCT Blood Glucose.: 156 mg/dL (28 Sep 2022 08:40)    PT/INR - ( 27 Sep 2022 11:48 )   PT: 16.1 sec;   INR: 1.38 ratio         PTT - ( 27 Sep 2022 11:48 )  PTT:22.1 sec  LIVER FUNCTIONS - ( 29 Sep 2022 06:20 )  Alb: 2.9 g/dL / Pro: 6.3 g/dL / ALK PHOS: 72 U/L / ALT: 9 U/L / AST: 12 U/L / GGT: x                   Urinalysis Basic - ( 27 Sep 2022 17:33 )    Color: Yellow / Appearance: Clear / S.023 / pH: x  Gluc: x / Ketone: Trace  / Bili: Negative / Urobili: <2 mg/dL   Blood: x / Protein: Trace / Nitrite: Negative   Leuk Esterase: Negative / RBC: x / WBC x   Sq Epi: x / Non Sq Epi: x / Bacteria: x

## 2022-09-29 NOTE — PROGRESS NOTE ADULT - SUBJECTIVE AND OBJECTIVE BOX
Gastroenterology/Hepatology Progress Note      Interval Events:   No acute events overnight. Patient's wife was at bedside. Patient had 4 BMs over the last 24 hours which was non bloody. Abd pain has improved. Denied n/v  and is tolerating PO diet. Denied fevers and chills.     Allergies:  No Known Allergies      Hospital Medications:  aspirin enteric coated 81 milliGRAM(s) Oral daily  atorvastatin 40 milliGRAM(s) Oral at bedtime  dextrose 5%. 1000 milliLiter(s) IV Continuous <Continuous>  dextrose 5%. 1000 milliLiter(s) IV Continuous <Continuous>  dextrose 50% Injectable 25 Gram(s) IV Push once  dextrose 50% Injectable 12.5 Gram(s) IV Push once  dextrose 50% Injectable 25 Gram(s) IV Push once  dextrose Oral Gel 15 Gram(s) Oral once PRN  enoxaparin Injectable 40 milliGRAM(s) SubCutaneous every 24 hours  finasteride 5 milliGRAM(s) Oral daily  gabapentin 100 milliGRAM(s) Oral two times a day  glucagon  Injectable 1 milliGRAM(s) IntraMuscular once  insulin glargine Injectable (LANTUS) 24 Unit(s) SubCutaneous at bedtime  insulin lispro (ADMELOG) corrective regimen sliding scale   SubCutaneous three times a day before meals  insulin lispro (ADMELOG) corrective regimen sliding scale   SubCutaneous at bedtime  insulin lispro Injectable (ADMELOG) 24 Unit(s) SubCutaneous three times a day before meals  lactated ringers. 1000 milliLiter(s) IV Continuous <Continuous>  melatonin 3 milliGRAM(s) Oral at bedtime PRN  memantine 10 milliGRAM(s) Oral two times a day  mesalamine DR Capsule 1600 milliGRAM(s) Oral two times a day  methylPREDNISolone sodium succinate Injectable 20 milliGRAM(s) IV Push every 8 hours  pantoprazole  Injectable 40 milliGRAM(s) IV Push every 12 hours  polyethylene glycol/electrolyte Solution. 4000 milliLiter(s) Oral once  QUEtiapine 50 milliGRAM(s) Oral at bedtime  sertraline 100 milliGRAM(s) Oral daily  tamsulosin 0.4 milliGRAM(s) Oral at bedtime      ROS: 14 point ROS negative unless otherwise state in subjective    PHYSICAL EXAM:   Vital Signs:  Vital Signs Last 24 Hrs  T(C): 36.7 (29 Sep 2022 12:58), Max: 37.1 (28 Sep 2022 21:48)  T(F): 98.1 (29 Sep 2022 12:58), Max: 98.8 (28 Sep 2022 21:48)  HR: 91 (29 Sep 2022 12:58) (87 - 97)  BP: 119/56 (29 Sep 2022 12:58) (117/60 - 125/56)  BP(mean): --  RR: 16 (29 Sep 2022 12:58) (16 - 18)  SpO2: 100% (29 Sep 2022 12:58) (99% - 100%)    Parameters below as of 29 Sep 2022 12:58  Patient On (Oxygen Delivery Method): room air      Daily     Daily     PHYSICAL EXAM:     GENERAL:  No acute distress, lying in bed.   HEENT:  NCAT, no scleral icterus   CHEST:  no respiratory distress  HEART:  Regular rate and rhythm  ABDOMEN:  Soft, mild tenderness in the LLQ quadrant, non-distended, no palpable masses  EXTREMITIES: No LE edema b/l.   NEURO:  Alert and oriented x 3.     LABS:                        7.9    3.98  )-----------( 249      ( 29 Sep 2022 06:20 )             26.6     Mean Cell Volume: 78.7 fL (- @ 06:20)        131<L>  |  100  |  18  ----------------------------<  186<H>  3.7   |  19<L>  |  0.72    Ca    7.9<L>      29 Sep 2022 06:20  Phos  4.6       Mg     2.10         TPro  6.3  /  Alb  2.9<L>  /  TBili  0.7  /  DBili  x   /  AST  12  /  ALT  9   /  AlkPhos  72      LIVER FUNCTIONS - ( 29 Sep 2022 06:20 )  Alb: 2.9 g/dL / Pro: 6.3 g/dL / ALK PHOS: 72 U/L / ALT: 9 U/L / AST: 12 U/L / GGT: x             Urinalysis Basic - ( 27 Sep 2022 17:33 )    Color: Yellow / Appearance: Clear / S.023 / pH: x  Gluc: x / Ketone: Trace  / Bili: Negative / Urobili: <2 mg/dL   Blood: x / Protein: Trace / Nitrite: Negative   Leuk Esterase: Negative / RBC: x / WBC x   Sq Epi: x / Non Sq Epi: x / Bacteria: x    Imaging:    Ct abd and pelvis    FINDINGS:  LOWER CHEST: Within normal limits.    LIVER: Within normal limits.  BILE DUCTS: Normal caliber.  GALLBLADDER: Small gallstone.  SPLEEN: Within normal limits.  PANCREAS: Within normal limits.  ADRENALS: Within normal limits.  KIDNEYS/URETERS: 4 mm nonobstructive calculus lower pole right kidney. 2   cm cyst upper pole left kidney.    BLADDER: Within normal limits.  REPRODUCTIVE ORGANS: Prostate within normal limits.    BOWEL: No bowel obstruction. Mural thickening and associated mesenteric   hyperemia involving the colon from the hepatic flexure through the mid   descending colon. Submucosal fat deposition in the cecum and rectosigmoid   colon consistent with chronic information. Appendix mural thickening and   mucosal hyperenhancement in the mid appendix.  PERITONEUM: No ascites.  VESSELS: Atheromatous calcifications.  RETROPERITONEUM/LYMPH NODES: No lymphadenopathy.  ABDOMINAL WALL: Within normal limits.  BONES: Hemisacralization of the fifth lumbar vertebra.    IMPRESSION:  Crohn's colitis involving the transverse and descending colon and   appendix.    Colonoscopy in 2020    Impression:          - Inflammation was found from the anus to the descending colon secondary to                        left-sided colitis. The findings are unchanged compared to previous                        examinations. Biopsied.          - One 15 mm polyp in the rectum, benign appearing likely inflammatory,                        removed with a hot snare. Resected and retrieved.                       - Diverticulosis in the sigmoid colon.                       - Localized mild inflammation was found in the cecum.                       Clinical improvement with Entyvio BUT no endoscopic improvement.

## 2022-09-30 ENCOUNTER — RESULT REVIEW (OUTPATIENT)
Age: 72
End: 2022-09-30

## 2022-09-30 LAB
ALBUMIN SERPL ELPH-MCNC: 3.3 G/DL — SIGNIFICANT CHANGE UP (ref 3.3–5)
ALP SERPL-CCNC: 77 U/L — SIGNIFICANT CHANGE UP (ref 40–120)
ALT FLD-CCNC: 11 U/L — SIGNIFICANT CHANGE UP (ref 4–41)
ANION GAP SERPL CALC-SCNC: 11 MMOL/L — SIGNIFICANT CHANGE UP (ref 7–14)
APTT BLD: 25.9 SEC — LOW (ref 27–36.3)
AST SERPL-CCNC: 14 U/L — SIGNIFICANT CHANGE UP (ref 4–40)
BILIRUB SERPL-MCNC: 0.8 MG/DL — SIGNIFICANT CHANGE UP (ref 0.2–1.2)
BLD GP AB SCN SERPL QL: NEGATIVE — SIGNIFICANT CHANGE UP
BUN SERPL-MCNC: 15 MG/DL — SIGNIFICANT CHANGE UP (ref 7–23)
CALCIUM SERPL-MCNC: 8.2 MG/DL — LOW (ref 8.4–10.5)
CHLORIDE SERPL-SCNC: 101 MMOL/L — SIGNIFICANT CHANGE UP (ref 98–107)
CO2 SERPL-SCNC: 25 MMOL/L — SIGNIFICANT CHANGE UP (ref 22–31)
CREAT SERPL-MCNC: 0.79 MG/DL — SIGNIFICANT CHANGE UP (ref 0.5–1.3)
CRP SERPL-MCNC: 36.2 MG/L — HIGH
CULTURE RESULTS: SIGNIFICANT CHANGE UP
CULTURE RESULTS: SIGNIFICANT CHANGE UP
EGFR: 93 ML/MIN/1.73M2 — SIGNIFICANT CHANGE UP
ERYTHROCYTE [SEDIMENTATION RATE] IN BLOOD: 89 MM/HR — HIGH (ref 1–15)
GLUCOSE BLDC GLUCOMTR-MCNC: 159 MG/DL — HIGH (ref 70–99)
GLUCOSE BLDC GLUCOMTR-MCNC: 169 MG/DL — HIGH (ref 70–99)
GLUCOSE BLDC GLUCOMTR-MCNC: 184 MG/DL — HIGH (ref 70–99)
GLUCOSE BLDC GLUCOMTR-MCNC: 186 MG/DL — HIGH (ref 70–99)
GLUCOSE BLDC GLUCOMTR-MCNC: 190 MG/DL — HIGH (ref 70–99)
GLUCOSE BLDC GLUCOMTR-MCNC: 203 MG/DL — HIGH (ref 70–99)
GLUCOSE SERPL-MCNC: 191 MG/DL — HIGH (ref 70–99)
HCT VFR BLD CALC: 29.2 % — LOW (ref 39–50)
HGB BLD-MCNC: 8.6 G/DL — LOW (ref 13–17)
INR BLD: 1.85 RATIO — HIGH (ref 0.88–1.16)
MAGNESIUM SERPL-MCNC: 2.2 MG/DL — SIGNIFICANT CHANGE UP (ref 1.6–2.6)
MCHC RBC-ENTMCNC: 23.5 PG — LOW (ref 27–34)
MCHC RBC-ENTMCNC: 29.5 GM/DL — LOW (ref 32–36)
MCV RBC AUTO: 79.8 FL — LOW (ref 80–100)
NRBC # BLD: 0 /100 WBCS — SIGNIFICANT CHANGE UP (ref 0–0)
NRBC # FLD: 0 K/UL — SIGNIFICANT CHANGE UP (ref 0–0)
PHOSPHATE SERPL-MCNC: 3.6 MG/DL — SIGNIFICANT CHANGE UP (ref 2.5–4.5)
PLATELET # BLD AUTO: 320 K/UL — SIGNIFICANT CHANGE UP (ref 150–400)
POTASSIUM SERPL-MCNC: 3.9 MMOL/L — SIGNIFICANT CHANGE UP (ref 3.5–5.3)
POTASSIUM SERPL-SCNC: 3.9 MMOL/L — SIGNIFICANT CHANGE UP (ref 3.5–5.3)
PROT SERPL-MCNC: 6.7 G/DL — SIGNIFICANT CHANGE UP (ref 6–8.3)
PROTHROM AB SERPL-ACNC: 21.6 SEC — HIGH (ref 10.5–13.4)
RBC # BLD: 3.66 M/UL — LOW (ref 4.2–5.8)
RBC # FLD: 14.1 % — SIGNIFICANT CHANGE UP (ref 10.3–14.5)
RH IG SCN BLD-IMP: POSITIVE — SIGNIFICANT CHANGE UP
SODIUM SERPL-SCNC: 137 MMOL/L — SIGNIFICANT CHANGE UP (ref 135–145)
SPECIMEN SOURCE: SIGNIFICANT CHANGE UP
SPECIMEN SOURCE: SIGNIFICANT CHANGE UP
WBC # BLD: 5.6 K/UL — SIGNIFICANT CHANGE UP (ref 3.8–10.5)
WBC # FLD AUTO: 5.6 K/UL — SIGNIFICANT CHANGE UP (ref 3.8–10.5)

## 2022-09-30 PROCEDURE — 88305 TISSUE EXAM BY PATHOLOGIST: CPT | Mod: 26

## 2022-09-30 PROCEDURE — 99233 SBSQ HOSP IP/OBS HIGH 50: CPT | Mod: GC

## 2022-09-30 PROCEDURE — 88342 IMHCHEM/IMCYTCHM 1ST ANTB: CPT | Mod: 26

## 2022-09-30 PROCEDURE — 45380 COLONOSCOPY AND BIOPSY: CPT | Mod: GC

## 2022-09-30 PROCEDURE — 88341 IMHCHEM/IMCYTCHM EA ADD ANTB: CPT | Mod: 26

## 2022-09-30 RX ORDER — INSULIN LISPRO 100/ML
18 VIAL (ML) SUBCUTANEOUS
Refills: 0 | Status: DISCONTINUED | OUTPATIENT
Start: 2022-09-30 | End: 2022-10-01

## 2022-09-30 RX ORDER — METHOTREXATE 2.5 MG/1
10 TABLET ORAL
Refills: 0 | Status: DISCONTINUED | OUTPATIENT
Start: 2022-09-30 | End: 2022-10-09

## 2022-09-30 RX ORDER — GLUCAGON INJECTION, SOLUTION 0.5 MG/.1ML
1 INJECTION, SOLUTION SUBCUTANEOUS ONCE
Refills: 0 | Status: DISCONTINUED | OUTPATIENT
Start: 2022-09-30 | End: 2022-10-09

## 2022-09-30 RX ORDER — SODIUM CHLORIDE 9 MG/ML
1000 INJECTION, SOLUTION INTRAVENOUS
Refills: 0 | Status: DISCONTINUED | OUTPATIENT
Start: 2022-09-30 | End: 2022-10-09

## 2022-09-30 RX ORDER — INSULIN LISPRO 100/ML
VIAL (ML) SUBCUTANEOUS EVERY 6 HOURS
Refills: 0 | Status: DISCONTINUED | OUTPATIENT
Start: 2022-09-30 | End: 2022-09-30

## 2022-09-30 RX ORDER — INSULIN GLARGINE 100 [IU]/ML
24 INJECTION, SOLUTION SUBCUTANEOUS AT BEDTIME
Refills: 0 | Status: DISCONTINUED | OUTPATIENT
Start: 2022-09-30 | End: 2022-10-09

## 2022-09-30 RX ORDER — INFLIXIMAB-DYYB 120 MG/ML
1000 INJECTION SUBCUTANEOUS ONCE
Refills: 0 | Status: COMPLETED | OUTPATIENT
Start: 2022-09-30 | End: 2022-10-03

## 2022-09-30 RX ORDER — INSULIN LISPRO 100/ML
VIAL (ML) SUBCUTANEOUS
Refills: 0 | Status: DISCONTINUED | OUTPATIENT
Start: 2022-09-30 | End: 2022-10-09

## 2022-09-30 RX ADMIN — SERTRALINE 100 MILLIGRAM(S): 25 TABLET, FILM COATED ORAL at 13:09

## 2022-09-30 RX ADMIN — Medication 15 UNIT(S): at 18:51

## 2022-09-30 RX ADMIN — GABAPENTIN 100 MILLIGRAM(S): 400 CAPSULE ORAL at 06:22

## 2022-09-30 RX ADMIN — Medication 1600 MILLIGRAM(S): at 18:00

## 2022-09-30 RX ADMIN — MEMANTINE HYDROCHLORIDE 10 MILLIGRAM(S): 10 TABLET ORAL at 18:01

## 2022-09-30 RX ADMIN — Medication 81 MILLIGRAM(S): at 13:09

## 2022-09-30 RX ADMIN — ATORVASTATIN CALCIUM 40 MILLIGRAM(S): 80 TABLET, FILM COATED ORAL at 21:37

## 2022-09-30 RX ADMIN — TAMSULOSIN HYDROCHLORIDE 0.4 MILLIGRAM(S): 0.4 CAPSULE ORAL at 21:37

## 2022-09-30 RX ADMIN — Medication 20 MILLIGRAM(S): at 13:09

## 2022-09-30 RX ADMIN — MEMANTINE HYDROCHLORIDE 10 MILLIGRAM(S): 10 TABLET ORAL at 06:22

## 2022-09-30 RX ADMIN — Medication 20 MILLIGRAM(S): at 21:38

## 2022-09-30 RX ADMIN — INSULIN GLARGINE 24 UNIT(S): 100 INJECTION, SOLUTION SUBCUTANEOUS at 22:20

## 2022-09-30 RX ADMIN — QUETIAPINE FUMARATE 50 MILLIGRAM(S): 200 TABLET, FILM COATED ORAL at 21:37

## 2022-09-30 RX ADMIN — Medication 2: at 08:07

## 2022-09-30 RX ADMIN — PANTOPRAZOLE SODIUM 40 MILLIGRAM(S): 20 TABLET, DELAYED RELEASE ORAL at 06:22

## 2022-09-30 RX ADMIN — FINASTERIDE 5 MILLIGRAM(S): 5 TABLET, FILM COATED ORAL at 13:09

## 2022-09-30 RX ADMIN — Medication 2: at 18:02

## 2022-09-30 RX ADMIN — Medication 4: at 13:07

## 2022-09-30 RX ADMIN — Medication 1600 MILLIGRAM(S): at 06:21

## 2022-09-30 RX ADMIN — GABAPENTIN 100 MILLIGRAM(S): 400 CAPSULE ORAL at 18:00

## 2022-09-30 RX ADMIN — PANTOPRAZOLE SODIUM 40 MILLIGRAM(S): 20 TABLET, DELAYED RELEASE ORAL at 18:01

## 2022-09-30 RX ADMIN — Medication 3 MILLIGRAM(S): at 21:39

## 2022-09-30 RX ADMIN — ENOXAPARIN SODIUM 40 MILLIGRAM(S): 100 INJECTION SUBCUTANEOUS at 13:10

## 2022-09-30 RX ADMIN — Medication 20 MILLIGRAM(S): at 06:22

## 2022-09-30 NOTE — PROGRESS NOTE ADULT - PROBLEM SELECTOR PLAN 7
DVT ppx w lovenox   DASH/CC diet pending bedside swallow  Dispo pending course DVT ppx w lovenox   NPO; to resume diet post-procedure  Dispo pending course

## 2022-09-30 NOTE — PROGRESS NOTE ADULT - PROBLEM SELECTOR PLAN 3
on Lantus 30 and Admelog 28-30 at home  - Lantus 24 -> 19 preop   - Admelog 24 -> 15 preop  - sliding scale on Lantus 30 and Admelog 28-30 at home  - Lantus 24 -> 19 preop   - Admelog 24 -> 15 preop  - sliding scale  - adjust insulin as needed post-procedure

## 2022-09-30 NOTE — PROGRESS NOTE ADULT - ATTENDING COMMENTS
Patient seen and examined 9/30    Patient is a 72 year old man w/ past medical history of Crohn's disease, IDDM, neuropathy, HTN, HLD, BPH, CVA on ASA 81mg daily a/w likely Crohn's Disease flare. GI consulted for further management. GI PCR negative, c diff negative. Patient on steroids for flare, solumedrol 20mg q 8 hours. GI plan to proceed with infliximab infusion and methotrexate 9/30. C-scope with inflammation from the anus to the splenic flexure. Left sided ulcerative colitis inflammation was found from transverse colon to cecum. Bloody diarrhea likely 2/2 to severe inflammation due to underlying UC. PT eval placed.

## 2022-09-30 NOTE — PROGRESS NOTE ADULT - ASSESSMENT
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 C-scope tomorrow. 72M with PMH of UC vs Crohn disease, IDDM, neuropathy, HTN, HLD, BPH, dementia 2/2 CVA on ASA 81mg daily, presenting for 1 mo of progressively worsening bloody diarrhea possibly secondary to IBD flare given response to steroids.

## 2022-09-30 NOTE — PROGRESS NOTE ADULT - PROBLEM SELECTOR PLAN 1
- elevated ESR, CRP  - infectious work up: Cdiff PCR, GI PCR, Stool O+p  - F/up fecal calprotectin   - LR @75 for rehydration  - GI Consult for Crohn's flare - appreciate recs     - solumedrol 20 q8  - holding trimethoprim for now  - pantoprazole  - colonoscopy tomorrow - clears, prep, NPO@MN. Preop labs. - elevated ESR, CRP  - infectious work up neg: Cdiff PCR, GI PCR,   - F/up fecal calprotectin, stool Cx, Stool O+p  - LR @75 for rehydration  - IBD flare management as per GI - appreciate recs     - solumedrol 20 q8  - pantoprazole  - pending colonoscopy

## 2022-09-30 NOTE — CHART NOTE - NSCHARTNOTEFT_GEN_A_CORE
Completed colonoscopy which showed left sided colitis, will proceed with infliximab and methotrexate today. Tried to call the patient's spouse to discuss the results but went to voicemail. Updated the primary team, Dr. Childress.       GI will continue to follow.     All recommendations are tentative until note is attested by an attending.     Jorge Arboleda, PGY-4  Gastroenterology/Hepatology Fellow  Available on [employer] Teams  [phone] (Short Range Pager)  [phone] (Long Range Pager)    After 5pm, please contact the on-call GI fellow. [phone]

## 2022-09-30 NOTE — PROGRESS NOTE ADULT - SUBJECTIVE AND OBJECTIVE BOX
Progress Note     == draft in progress ==  Britt Childress  PGY-1 Medicine  Teams | [id number]    Patient is a 72y old  Male who presents with a chief complaint of Persistent bloody diarrhea, generalized weakness (29 Sep 2022 13:56)          SUBJECTIVE / INTERVAL EVENTS  No acute events overnight. Patient seen and evaluated at bedside. No new complaints.       MEDICATIONS    Home Medications:  aspirin 81 mg oral delayed release tablet: 1 tab(s) orally once a day (28 Sep 2022 01:21)  atorvastatin 40 mg oral tablet: 1 tab(s) orally once a day (at bedtime) (28 Sep 2022 01:21)  Centrum Silver oral tablet: 1 tab(s) orally once a day (28 Sep 2022 01:21)  finasteride 5 mg oral tablet: 1 tab(s) orally once a day (28 Sep 2022 01:21)  gabapentin 100 mg oral capsule: 1 tab(s) orally 2 times a day (28 Sep 2022 01:21)  insulin aspart 100 units/mL injectable solution: 28 unit(s) injectable 3 times a day (before meals) (28 Sep 2022 01:21)  Januvia 50 mg oral tablet: 1 tab(s) orally once a day (28 Sep 2022 01:21)  memantine 10 mg oral tablet: 1 tab(s) orally 2 times a day (28 Sep 2022 01:21)  mesalamine 800 mg oral delayed release tablet: 2 tab(s) orally 2 times a day (28 Sep 2022 01:21)  QUEtiapine 25 mg oral tablet: 2 tab(s) orally once a day (at bedtime) (28 Sep 2022 01:21)  Remicade 100 mg intravenous injection:  (28 Sep 2022 01:21)  sertraline 100 mg oral tablet: 1 tab(s) orally once a day (28 Sep 2022 01:21)  tamsulosin 0.4 mg oral capsule: 1 cap(s) orally 2 times a day (28 Sep 2022 01:21)  Tresiba 100 units/mL subcutaneous solution: 30 unit(s) subcutaneous once a day (at bedtime) (28 Sep 2022 01:21)  trimethoprim 100 mg oral tablet: 1 tab(s) orally once a day (at bedtime) (28 Sep 2022 01:21)  Vitamin D3 400 intl units oral tablet: 1 tab(s) orally once a day (28 Sep 2022 01:21)      MEDICATIONS  (STANDING):  aspirin enteric coated 81 milliGRAM(s) Oral daily  atorvastatin 40 milliGRAM(s) Oral at bedtime  dextrose 5%. 1000 milliLiter(s) (50 mL/Hr) IV Continuous <Continuous>  dextrose 5%. 1000 milliLiter(s) (100 mL/Hr) IV Continuous <Continuous>  dextrose 50% Injectable 25 Gram(s) IV Push once  dextrose 50% Injectable 12.5 Gram(s) IV Push once  dextrose 50% Injectable 25 Gram(s) IV Push once  enoxaparin Injectable 40 milliGRAM(s) SubCutaneous every 24 hours  finasteride 5 milliGRAM(s) Oral daily  gabapentin 100 milliGRAM(s) Oral two times a day  glucagon  Injectable 1 milliGRAM(s) IntraMuscular once  insulin glargine Injectable (LANTUS) 20 Unit(s) SubCutaneous at bedtime  insulin lispro (ADMELOG) corrective regimen sliding scale   SubCutaneous every 6 hours  insulin lispro Injectable (ADMELOG) 15 Unit(s) SubCutaneous three times a day before meals  lactated ringers. 1000 milliLiter(s) (75 mL/Hr) IV Continuous <Continuous>  memantine 10 milliGRAM(s) Oral two times a day  mesalamine DR Capsule 1600 milliGRAM(s) Oral two times a day  methylPREDNISolone sodium succinate Injectable 20 milliGRAM(s) IV Push every 8 hours  pantoprazole  Injectable 40 milliGRAM(s) IV Push every 12 hours  QUEtiapine 50 milliGRAM(s) Oral at bedtime  sertraline 100 milliGRAM(s) Oral daily  tamsulosin 0.4 milliGRAM(s) Oral at bedtime    MEDICATIONS  (PRN):  dextrose Oral Gel 15 Gram(s) Oral once PRN Blood Glucose LESS THAN 70 milliGRAM(s)/deciliter  melatonin 3 milliGRAM(s) Oral at bedtime PRN Insomnia         VITALS / I&O's  T(C): 36.9 (09-30-22 @ 06:22), Max: 36.9 (09-30-22 @ 06:22)  HR: 79 (09-30-22 @ 06:22) (75 - 91)  BP: 125/61 (09-30-22 @ 06:22) (119/56 - 130/60)  RR: 16 (09-30-22 @ 06:22) (16 - 16)  SpO2: 100% (09-30-22 @ 06:22) (100% - 100%)  I&O's Summary    29 Sep 2022 07:01  -  30 Sep 2022 07:00  --------------------------------------------------------  IN: 700 mL / OUT: 1125 mL / NET: -425 mL           PHYSICAL EXAM           LABS                        8.6    5.60  )-----------( 320      ( 30 Sep 2022 06:57 )             29.2     09-30    137  |  101  |  15  ----------------------------<  191<H>  3.9   |  25  |  0.79    Ca    8.2<L>      30 Sep 2022 06:57  Phos  3.6     09-30  Mg     2.20     09-30    TPro  6.7  /  Alb  3.3  /  TBili  0.8  /  DBili  x   /  AST  14  /  ALT  11  /  AlkPhos  77  09-30    CAPILLARY BLOOD GLUCOSE      POCT Blood Glucose.: 186 mg/dL (30 Sep 2022 08:05)  POCT Blood Glucose.: 159 mg/dL (30 Sep 2022 06:52)  POCT Blood Glucose.: 125 mg/dL (29 Sep 2022 22:44)  POCT Blood Glucose.: 222 mg/dL (29 Sep 2022 18:06)  POCT Blood Glucose.: 222 mg/dL (29 Sep 2022 12:35)  POCT Blood Glucose.: 170 mg/dL (29 Sep 2022 09:15)    PT/INR - ( 30 Sep 2022 06:57 )   PT: 21.6 sec;   INR: 1.85 ratio         PTT - ( 30 Sep 2022 06:57 )  PTT:25.9 sec  LIVER FUNCTIONS - ( 30 Sep 2022 06:57 )  Alb: 3.3 g/dL / Pro: 6.7 g/dL / ALK PHOS: 77 U/L / ALT: 11 U/L / AST: 14 U/L / GGT: x                       Culture - Stool (collected 28 Sep 2022 16:46)  Source: .Stool Feces  Preliminary Report (29 Sep 2022 13:51):    No enteric pathogens to date: Final culture pending        Progress Note     Britt Childress  PGY-1 Medicine  Teams | [id number]    Patient is a 72y old  Male who presents with a chief complaint of Persistent bloody diarrhea, generalized weakness (29 Sep 2022 13:56)          SUBJECTIVE / INTERVAL EVENTS  No acute events overnight. Patient seen and evaluated at bedside. No new complaints.  Patient denied fever, nausea/vomiting, shortness of breath, chest pain, limb pain, new swelling, new numbness/tingling, dysuria or hematuria.  Control of pain: 2/10 (patient says comfortable, rates 8-9/10).  Per RN, 2 BM in last day, frozen yogurt consistency, neither black nor bloody.       MEDICATIONS    Home Medications:  aspirin 81 mg oral delayed release tablet: 1 tab(s) orally once a day (28 Sep 2022 01:)  atorvastatin 40 mg oral tablet: 1 tab(s) orally once a day (at bedtime) (28 Sep 2022 01:)  Centrum Silver oral tablet: 1 tab(s) orally once a day (28 Sep 2022 01:)  finasteride 5 mg oral tablet: 1 tab(s) orally once a day (28 Sep 2022 01:)  gabapentin 100 mg oral capsule: 1 tab(s) orally 2 times a day (28 Sep 2022 01:)  insulin aspart 100 units/mL injectable solution: 28 unit(s) injectable 3 times a day (before meals) (28 Sep 2022 01:)  Januvia 50 mg oral tablet: 1 tab(s) orally once a day (28 Sep 2022 01:)  memantine 10 mg oral tablet: 1 tab(s) orally 2 times a day (28 Sep 2022 01:)  mesalamine 800 mg oral delayed release tablet: 2 tab(s) orally 2 times a day (28 Sep 2022 01:)  QUEtiapine 25 mg oral tablet: 2 tab(s) orally once a day (at bedtime) (28 Sep 2022 01:)  Remicade 100 mg intravenous injection:  (28 Sep 2022 01:)  sertraline 100 mg oral tablet: 1 tab(s) orally once a day (28 Sep 2022 01:)  tamsulosin 0.4 mg oral capsule: 1 cap(s) orally 2 times a day (28 Sep 2022 01:)  Tresiba 100 units/mL subcutaneous solution: 30 unit(s) subcutaneous once a day (at bedtime) (28 Sep 2022 01:21)  trimethoprim 100 mg oral tablet: 1 tab(s) orally once a day (at bedtime) (28 Sep 2022 01:21)  Vitamin D3 400 intl units oral tablet: 1 tab(s) orally once a day (28 Sep 2022 01:21)      MEDICATIONS  (STANDING):  aspirin enteric coated 81 milliGRAM(s) Oral daily  atorvastatin 40 milliGRAM(s) Oral at bedtime  dextrose 5%. 1000 milliLiter(s) (50 mL/Hr) IV Continuous <Continuous>  dextrose 5%. 1000 milliLiter(s) (100 mL/Hr) IV Continuous <Continuous>  dextrose 50% Injectable 25 Gram(s) IV Push once  dextrose 50% Injectable 12.5 Gram(s) IV Push once  dextrose 50% Injectable 25 Gram(s) IV Push once  enoxaparin Injectable 40 milliGRAM(s) SubCutaneous every 24 hours  finasteride 5 milliGRAM(s) Oral daily  gabapentin 100 milliGRAM(s) Oral two times a day  glucagon  Injectable 1 milliGRAM(s) IntraMuscular once  insulin glargine Injectable (LANTUS) 20 Unit(s) SubCutaneous at bedtime  insulin lispro (ADMELOG) corrective regimen sliding scale   SubCutaneous every 6 hours  insulin lispro Injectable (ADMELOG) 15 Unit(s) SubCutaneous three times a day before meals  lactated ringers. 1000 milliLiter(s) (75 mL/Hr) IV Continuous <Continuous>  memantine 10 milliGRAM(s) Oral two times a day  mesalamine DR Capsule 1600 milliGRAM(s) Oral two times a day  methylPREDNISolone sodium succinate Injectable 20 milliGRAM(s) IV Push every 8 hours  pantoprazole  Injectable 40 milliGRAM(s) IV Push every 12 hours  QUEtiapine 50 milliGRAM(s) Oral at bedtime  sertraline 100 milliGRAM(s) Oral daily  tamsulosin 0.4 milliGRAM(s) Oral at bedtime    MEDICATIONS  (PRN):  dextrose Oral Gel 15 Gram(s) Oral once PRN Blood Glucose LESS THAN 70 milliGRAM(s)/deciliter  melatonin 3 milliGRAM(s) Oral at bedtime PRN Insomnia         VITALS / I&O's  T(C): 36.9 (22 @ 06:22), Max: 36.9 (22 @ 06:22)  HR: 79 (22 @ 06:22) (75 - 91)  BP: 125/61 (22 @ 06:22) (119/56 - 130/60)  RR: 16 (22 @ 06:22) (16 - 16)  SpO2: 100% (22 @ 06:22) (100% - 100%)  I&O's Summary    29 Sep 2022 07:01  -  30 Sep 2022 07:00  --------------------------------------------------------  IN: 700 mL / OUT: 1125 mL / NET: -425 mL           PHYSICAL EXAM  General: Reclining in bed in no acute distress.  HEENT: Normocephalic, atraumatic. Extraocular movements grossly intact. No nasal discharge.  Neuro: Alert, somewhat oriented. No facial asymmetry or dysarthria. Moving all extremities.  CV: Regular rate and rhythm. S1/S2. +systolic murmur? Limbs warm, no distal edema.  Respiratory: Lung fields CTAB. No increased work of breathing, speaking comfortably.  Abdomen: Soft; somewhat distended; mildly tender in RLQ. No rebound or guarding. +hyperactive bowel sounds.  : Suprapubic region nontender.  Skin: No rashes or bruising of face, forearms, or calves bilaterally.  Psych: Mood and affect appropriate.         LABS    - Ca 8.2 ~  - POC B pre dinner, 125 HS, 159 AM  - INR 1.85, aPTT 25.9  - COVID Not Detected  - CDiff toxin: Not Detected  - Stool Cx - no enteric pathogens - Prelim  - O&P - in progress - prelim  - HCV neg                        8.6    5.60  )-----------( 320      ( 30 Sep 2022 06:57 )             29.2         137  |  101  |  15  ----------------------------<  191<H>  3.9   |  25  |  0.79    Ca    8.2<L>      30 Sep 2022 06:57  Phos  3.6     09-30  Mg     2.20     09-30    TPro  6.7  /  Alb  3.3  /  TBili  0.8  /  DBili  x   /  AST  14  /  ALT  11  /  AlkPhos  77  09-30    CAPILLARY BLOOD GLUCOSE      POCT Blood Glucose.: 186 mg/dL (30 Sep 2022 08:05)  POCT Blood Glucose.: 159 mg/dL (30 Sep 2022 06:52)  POCT Blood Glucose.: 125 mg/dL (29 Sep 2022 22:44)  POCT Blood Glucose.: 222 mg/dL (29 Sep 2022 18:06)  POCT Blood Glucose.: 222 mg/dL (29 Sep 2022 12:35)  POCT Blood Glucose.: 170 mg/dL (29 Sep 2022 09:15)    PT/INR - ( 30 Sep 2022 06:57 )   PT: 21.6 sec;   INR: 1.85 ratio         PTT - ( 30 Sep 2022 06:57 )  PTT:25.9 sec  LIVER FUNCTIONS - ( 30 Sep 2022 06:57 )  Alb: 3.3 g/dL / Pro: 6.7 g/dL / ALK PHOS: 77 U/L / ALT: 11 U/L / AST: 14 U/L / GGT: x                       Culture - Stool (collected 28 Sep 2022 16:46)  Source: .Stool Feces  Preliminary Report (29 Sep 2022 13:51):    No enteric pathogens to date: Final culture pending

## 2022-10-01 ENCOUNTER — TRANSCRIPTION ENCOUNTER (OUTPATIENT)
Age: 72
End: 2022-10-01

## 2022-10-01 LAB
ALBUMIN SERPL ELPH-MCNC: 2.9 G/DL — LOW (ref 3.3–5)
ALP SERPL-CCNC: 68 U/L — SIGNIFICANT CHANGE UP (ref 40–120)
ALT FLD-CCNC: 8 U/L — SIGNIFICANT CHANGE UP (ref 4–41)
ANION GAP SERPL CALC-SCNC: 8 MMOL/L — SIGNIFICANT CHANGE UP (ref 7–14)
AST SERPL-CCNC: 13 U/L — SIGNIFICANT CHANGE UP (ref 4–40)
BILIRUB SERPL-MCNC: 0.6 MG/DL — SIGNIFICANT CHANGE UP (ref 0.2–1.2)
BUN SERPL-MCNC: 14 MG/DL — SIGNIFICANT CHANGE UP (ref 7–23)
CALCIUM SERPL-MCNC: 8 MG/DL — LOW (ref 8.4–10.5)
CHLORIDE SERPL-SCNC: 104 MMOL/L — SIGNIFICANT CHANGE UP (ref 98–107)
CO2 SERPL-SCNC: 25 MMOL/L — SIGNIFICANT CHANGE UP (ref 22–31)
CREAT SERPL-MCNC: 0.65 MG/DL — SIGNIFICANT CHANGE UP (ref 0.5–1.3)
CRP SERPL-MCNC: 16 MG/L — HIGH
EGFR: 99 ML/MIN/1.73M2 — SIGNIFICANT CHANGE UP
ERYTHROCYTE [SEDIMENTATION RATE] IN BLOOD: 65 MM/HR — HIGH (ref 1–15)
GLUCOSE BLDC GLUCOMTR-MCNC: 124 MG/DL — HIGH (ref 70–99)
GLUCOSE BLDC GLUCOMTR-MCNC: 145 MG/DL — HIGH (ref 70–99)
GLUCOSE BLDC GLUCOMTR-MCNC: 161 MG/DL — HIGH (ref 70–99)
GLUCOSE BLDC GLUCOMTR-MCNC: 76 MG/DL — SIGNIFICANT CHANGE UP (ref 70–99)
GLUCOSE BLDC GLUCOMTR-MCNC: 91 MG/DL — SIGNIFICANT CHANGE UP (ref 70–99)
GLUCOSE SERPL-MCNC: 176 MG/DL — HIGH (ref 70–99)
HCT VFR BLD CALC: 26.7 % — LOW (ref 39–50)
HGB BLD-MCNC: 8 G/DL — LOW (ref 13–17)
MAGNESIUM SERPL-MCNC: 2.2 MG/DL — SIGNIFICANT CHANGE UP (ref 1.6–2.6)
MCHC RBC-ENTMCNC: 23.9 PG — LOW (ref 27–34)
MCHC RBC-ENTMCNC: 30 GM/DL — LOW (ref 32–36)
MCV RBC AUTO: 79.7 FL — LOW (ref 80–100)
NRBC # BLD: 0 /100 WBCS — SIGNIFICANT CHANGE UP (ref 0–0)
NRBC # FLD: 0 K/UL — SIGNIFICANT CHANGE UP (ref 0–0)
PHOSPHATE SERPL-MCNC: 3.2 MG/DL — SIGNIFICANT CHANGE UP (ref 2.5–4.5)
PLATELET # BLD AUTO: 289 K/UL — SIGNIFICANT CHANGE UP (ref 150–400)
POTASSIUM SERPL-MCNC: 3.8 MMOL/L — SIGNIFICANT CHANGE UP (ref 3.5–5.3)
POTASSIUM SERPL-SCNC: 3.8 MMOL/L — SIGNIFICANT CHANGE UP (ref 3.5–5.3)
PROT SERPL-MCNC: 6.1 G/DL — SIGNIFICANT CHANGE UP (ref 6–8.3)
RBC # BLD: 3.35 M/UL — LOW (ref 4.2–5.8)
RBC # FLD: 14.3 % — SIGNIFICANT CHANGE UP (ref 10.3–14.5)
SODIUM SERPL-SCNC: 137 MMOL/L — SIGNIFICANT CHANGE UP (ref 135–145)
WBC # BLD: 4.26 K/UL — SIGNIFICANT CHANGE UP (ref 3.8–10.5)
WBC # FLD AUTO: 4.26 K/UL — SIGNIFICANT CHANGE UP (ref 3.8–10.5)

## 2022-10-01 PROCEDURE — 99233 SBSQ HOSP IP/OBS HIGH 50: CPT | Mod: GC

## 2022-10-01 PROCEDURE — 99232 SBSQ HOSP IP/OBS MODERATE 35: CPT | Mod: GC

## 2022-10-01 RX ORDER — INSULIN LISPRO 100/ML
14 VIAL (ML) SUBCUTANEOUS
Refills: 0 | Status: DISCONTINUED | OUTPATIENT
Start: 2022-10-01 | End: 2022-10-04

## 2022-10-01 RX ORDER — FOLIC ACID 0.8 MG
1 TABLET ORAL DAILY
Refills: 0 | Status: DISCONTINUED | OUTPATIENT
Start: 2022-10-01 | End: 2022-10-09

## 2022-10-01 RX ORDER — ACETAMINOPHEN 500 MG
650 TABLET ORAL ONCE
Refills: 0 | Status: COMPLETED | OUTPATIENT
Start: 2022-10-01 | End: 2022-10-01

## 2022-10-01 RX ORDER — FOLIC ACID 0.8 MG
1 TABLET ORAL
Qty: 0 | Refills: 0 | DISCHARGE
Start: 2022-10-01

## 2022-10-01 RX ORDER — DEXTROSE 50 % IN WATER 50 %
50 SYRINGE (ML) INTRAVENOUS ONCE
Refills: 0 | Status: DISCONTINUED | OUTPATIENT
Start: 2022-10-01 | End: 2022-10-01

## 2022-10-01 RX ADMIN — PANTOPRAZOLE SODIUM 40 MILLIGRAM(S): 20 TABLET, DELAYED RELEASE ORAL at 18:46

## 2022-10-01 RX ADMIN — MEMANTINE HYDROCHLORIDE 10 MILLIGRAM(S): 10 TABLET ORAL at 18:45

## 2022-10-01 RX ADMIN — Medication 650 MILLIGRAM(S): at 20:48

## 2022-10-01 RX ADMIN — Medication 81 MILLIGRAM(S): at 12:46

## 2022-10-01 RX ADMIN — Medication 2: at 09:34

## 2022-10-01 RX ADMIN — INSULIN GLARGINE 24 UNIT(S): 100 INJECTION, SOLUTION SUBCUTANEOUS at 23:06

## 2022-10-01 RX ADMIN — Medication 20 MILLIGRAM(S): at 21:49

## 2022-10-01 RX ADMIN — ENOXAPARIN SODIUM 40 MILLIGRAM(S): 100 INJECTION SUBCUTANEOUS at 09:34

## 2022-10-01 RX ADMIN — FINASTERIDE 5 MILLIGRAM(S): 5 TABLET, FILM COATED ORAL at 12:47

## 2022-10-01 RX ADMIN — PANTOPRAZOLE SODIUM 40 MILLIGRAM(S): 20 TABLET, DELAYED RELEASE ORAL at 06:20

## 2022-10-01 RX ADMIN — Medication 650 MILLIGRAM(S): at 20:18

## 2022-10-01 RX ADMIN — Medication 18 UNIT(S): at 09:33

## 2022-10-01 RX ADMIN — Medication 20 MILLIGRAM(S): at 06:20

## 2022-10-01 RX ADMIN — QUETIAPINE FUMARATE 50 MILLIGRAM(S): 200 TABLET, FILM COATED ORAL at 21:50

## 2022-10-01 RX ADMIN — MEMANTINE HYDROCHLORIDE 10 MILLIGRAM(S): 10 TABLET ORAL at 06:21

## 2022-10-01 RX ADMIN — Medication 1 MILLIGRAM(S): at 12:49

## 2022-10-01 RX ADMIN — ATORVASTATIN CALCIUM 40 MILLIGRAM(S): 80 TABLET, FILM COATED ORAL at 21:50

## 2022-10-01 RX ADMIN — Medication 3 MILLIGRAM(S): at 21:50

## 2022-10-01 RX ADMIN — Medication 1600 MILLIGRAM(S): at 18:47

## 2022-10-01 RX ADMIN — GABAPENTIN 100 MILLIGRAM(S): 400 CAPSULE ORAL at 06:21

## 2022-10-01 RX ADMIN — Medication 18 UNIT(S): at 12:51

## 2022-10-01 RX ADMIN — Medication 1600 MILLIGRAM(S): at 06:20

## 2022-10-01 RX ADMIN — SERTRALINE 100 MILLIGRAM(S): 25 TABLET, FILM COATED ORAL at 12:46

## 2022-10-01 RX ADMIN — Medication 20 MILLIGRAM(S): at 14:47

## 2022-10-01 RX ADMIN — GABAPENTIN 100 MILLIGRAM(S): 400 CAPSULE ORAL at 18:46

## 2022-10-01 RX ADMIN — TAMSULOSIN HYDROCHLORIDE 0.4 MILLIGRAM(S): 0.4 CAPSULE ORAL at 21:50

## 2022-10-01 NOTE — PROGRESS NOTE ADULT - SUBJECTIVE AND OBJECTIVE BOX
PROGRESS NOTE:   Authoted by Dr. Nai Saunders MD    Pager [phone] [facility], [id number] [facility]     Patient is a 72y old  Male who presents with a chief complaint of Persistent bloody diarrhea, generalized weakness (30 Sep 2022 08:59)    SUBJECTIVE / OVERNIGHT EVENTS: Spoke on 10/1 to pharmacy, per pharmacy staff, there is no Infliximab available in the hospital and the methotrexate needs to be given with infliximab. Order will take 1-2 days. Pt understands that pharmacy needs to obtain special medications before he can recieve them. For now the order will remain unverified by pharmacy till medication reaches facility per pharmacy staff.     Pt denies chest pain, palpitations, SOB, dizziness/lightheadedhess, syncope, He endorses slight abdominal pain diffusely, no rebound or guarding. Pt is well appearing. States that he had 2-3 BMs yesterday with more formed stools and one normal BM today. Denies melena/BRBPR, dysuria/hematuria, focal deficits/weakness.          MEDICATIONS  (STANDING):  aspirin enteric coated 81 milliGRAM(s) Oral daily  atorvastatin 40 milliGRAM(s) Oral at bedtime  dextrose 5%. 1000 milliLiter(s) (50 mL/Hr) IV Continuous <Continuous>  dextrose 5%. 1000 milliLiter(s) (100 mL/Hr) IV Continuous <Continuous>  dextrose 5%. 1000 milliLiter(s) (100 mL/Hr) IV Continuous <Continuous>  dextrose 50% Injectable 25 Gram(s) IV Push once  dextrose 50% Injectable 12.5 Gram(s) IV Push once  dextrose 50% Injectable 25 Gram(s) IV Push once  enoxaparin Injectable 40 milliGRAM(s) SubCutaneous every 24 hours  finasteride 5 milliGRAM(s) Oral daily  folic acid 1 milliGRAM(s) Oral daily  gabapentin 100 milliGRAM(s) Oral two times a day  glucagon  Injectable 1 milliGRAM(s) IntraMuscular once  glucagon  Injectable 1 milliGRAM(s) IntraMuscular once  inFLIXimab-dyyb (INFLECTRA) IVPB 1000 milliGRAM(s) IV Intermittent once  insulin glargine Injectable (LANTUS) 24 Unit(s) SubCutaneous at bedtime  insulin lispro (ADMELOG) corrective regimen sliding scale   SubCutaneous three times a day before meals  insulin lispro Injectable (ADMELOG) 18 Unit(s) SubCutaneous three times a day before meals  lactated ringers. 1000 milliLiter(s) (75 mL/Hr) IV Continuous <Continuous>  memantine 10 milliGRAM(s) Oral two times a day  mesalamine DR Capsule 1600 milliGRAM(s) Oral two times a day  methotrexate 10 milliGRAM(s) Oral every week  methylPREDNISolone sodium succinate Injectable 20 milliGRAM(s) IV Push every 8 hours  pantoprazole  Injectable 40 milliGRAM(s) IV Push every 12 hours  QUEtiapine 50 milliGRAM(s) Oral at bedtime  sertraline 100 milliGRAM(s) Oral daily  tamsulosin 0.4 milliGRAM(s) Oral at bedtime    MEDICATIONS  (PRN):  dextrose Oral Gel 15 Gram(s) Oral once PRN Blood Glucose LESS THAN 70 milliGRAM(s)/deciliter  melatonin 3 milliGRAM(s) Oral at bedtime PRN Insomnia      CAPILLARY BLOOD GLUCOSE      POCT Blood Glucose.: 161 mg/dL (01 Oct 2022 09:00)  POCT Blood Glucose.: 169 mg/dL (30 Sep 2022 22:02)  POCT Blood Glucose.: 184 mg/dL (30 Sep 2022 17:27)  POCT Blood Glucose.: 203 mg/dL (30 Sep 2022 11:57)  POCT Blood Glucose.: 190 mg/dL (30 Sep 2022 10:34)    I&O's Summary    30 Sep 2022 07:01  -  01 Oct 2022 07:00  --------------------------------------------------------  IN: 200 mL / OUT: 550 mL / NET: -350 mL        PHYSICAL EXAM:  Vital Signs Last 24 Hrs  T(C): 36.7 (01 Oct 2022 06:37), Max: 36.9 (30 Sep 2022 22:06)  T(F): 98.1 (01 Oct 2022 06:37), Max: 98.4 (30 Sep 2022 22:06)  HR: 81 (01 Oct 2022 06:37) (72 - 86)  BP: 117/64 (01 Oct 2022 06:37) (97/38 - 123/41)  BP(mean): --  RR: 18 (01 Oct 2022 06:37) (15 - 19)  SpO2: 98% (01 Oct 2022 06:37) (98% - 100%)    Parameters below as of 01 Oct 2022 06:37  Patient On (Oxygen Delivery Method): room air    General: Reclining in bed in no acute distress.  HEENT: Normocephalic, atraumatic. Extraocular movements grossly intact. No nasal discharge.  Neuro: Alert, somewhat oriented. No facial asymmetry or dysarthria. Moving all extremities.  CV: Regular rate and rhythm. S1/S2. +systolic murmur? Limbs warm, no distal edema.  Respiratory: Lung fields CTAB. No increased work of breathing, speaking comfortably.  Abdomen: Soft; somewhat distended; mildly tender in middle around umbilicus and RLQ. No rebound or guarding. No peritoneal signs. normoactive bowel sounds.  : Suprapubic region nontender.  Skin: No rashes or bruising of face, forearms, or calves bilaterally.  Psych: Mood and affect appropriate.      LABS:                        8.0    4.26  )-----------( 289      ( 01 Oct 2022 06:48 )             26.7     10-01    137  |  104  |  14  ----------------------------<  176<H>  3.8   |  25  |  0.65    Ca    8.0<L>      01 Oct 2022 06:48  Phos  3.2     10-01  Mg     2.20     10-01    TPro  6.1  /  Alb  2.9<L>  /  TBili  0.6  /  DBili  x   /  AST  13  /  ALT  8   /  AlkPhos  68  10-01    PT/INR - ( 30 Sep 2022 06:57 )   PT: 21.6 sec;   INR: 1.85 ratio    PTT - ( 30 Sep 2022 06:57 )  PTT:25.9 sec    Culture - Stool (collected 28 Sep 2022 16:46)  Source: .Stool Feces  Final Report (30 Sep 2022 18:20):    No enteric pathogens isolated.    (Stool culture examined for Salmonella,    Shigella, Campylobacter, Aeromonas, Plesiomonas,    Vibrio, E.coli O157 and Yersinia)    RADIOLOGY & ADDITIONAL TESTS:  No new imaging or tests    COORDINATION OF CARE:  Care Discussed with Consultants/Other Providers [Y/N]:  Prior or Outpatient Records Reviewed [Y/N]:

## 2022-10-01 NOTE — DISCHARGE NOTE PROVIDER - HOSPITAL COURSE
72M with PMH of UC vs Crohn disease (followed by Dr. 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   Stools were mostly bright red with blood or sometimes black, and they required his wife to clean him every 15 minutes.   Patient's IBD, previously refractory to Entyvio, was being managed with Remicade (last dose 2 weeks prior to admission), but he had recently been found to have low remicade level,   elevated antibody, and was trialing increased dosing.  On presentation, patient endorsed abdominal tenderness without rebound or guarding, primarily in the LLQ but sometimes RLQ. He denied fever or blood in the urine.   Hb was 9.0. CT found "Crohn's colitis involving the transverse and descending colon and appendix." Patient was admitted to medicine for further management and followed by GI.  Infectious work up was negative, including for C. diff .   Patient improved with solumedrol: ESR and CRP downtrended, pain improved, and daily stool count improved from 8 (watery with particles) to 2 (frozen yogurt consistency). No BRBPR.  Colonoscopy showed left sided colitis; patient was further treated with infliximab and methotrexate.   72M with PMH of UC vs Crohn disease (followed by Dr. 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   Stools were mostly bright red with blood or sometimes black, and they required his wife to clean him every 15 minutes.   Patient's IBD, previously refractory to Entyvio, was being managed with Remicade (last dose 2 weeks prior to admission), but he had recently been found to have low remicade level,   elevated antibody, and was trialing increased dosing.  On presentation, patient endorsed abdominal tenderness without rebound or guarding, primarily in the LLQ but sometimes RLQ. He denied fever or blood in the urine.   Hb was 9.0. CT found "Crohn's colitis involving the transverse and descending colon and appendix." Patient was admitted to medicine for further management and followed by GI.  Infectious work up was negative, including for C. diff .     Colonoscopy showed Inflammation was found from the anus to the splenic flexure. This was moderate in severity, worsened compared to previous examinations. Biopsied. Left-sided ulcerative colitis. Inflammation was found from the transverse colon to the cecum. This was severe, worsened compared to previous examinations. Biopsied. Bloody diarrhea secondary to severe inflammation due to his underlying UC, predominantly left sided disease.    Patient was further treated with infliximab infusion and methotrexate on 10/3/22.    Patient improved with solumedrol which was transitioned to oral prednisone taper. ESR and CRP downtrended, pain improved, and daily stool count improved . No BRBPR for at least 3 days prior to discharge.    GI cleared patient for discharge with prednisone taper, methotrexate 10mg weekly, and plan for infliximab infusion in 6 weeks. Follow up with Dr. Oro in 3 weeks.  Patient to go home with home PT and home nursing. Patient's insulin regimen lowered to Basal 24 units at bedtime and Bolus 8 units TIDAC based on requirements during hospital stay.  Patient will need follow up with Endocrine within 1 week to titrate his insulin regimen.  Patient hemodynamically stable for discharge. 72M with PMH of UC vs Crohn disease (followed by Dr. 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   Stools were mostly bright red with blood or sometimes black, and they required his wife to clean him every 15 minutes.   Patient's IBD, previously refractory to Entyvio, was being managed with Remicade (last dose 2 weeks prior to admission), but he had recently been found to have low remicade level,   elevated antibody, and was trialing increased dosing.  On presentation, patient endorsed abdominal tenderness without rebound or guarding, primarily in the LLQ but sometimes RLQ. He denied fever or blood in the urine.   Hb was 9.0. CT found "Crohn's colitis involving the transverse and descending colon and appendix." Patient was admitted to medicine for further management and followed by GI.  Infectious work up was negative, including for C. diff .     Colonoscopy showed Inflammation was found from the anus to the splenic flexure. This was moderate in severity, worsened compared to previous examinations. Biopsied. Left-sided ulcerative colitis. Inflammation was found from the transverse colon to the cecum. This was severe, worsened compared to previous examinations. Biopsied. Bloody diarrhea secondary to severe inflammation due to his underlying UC, predominantly left sided disease.    Patient was further treated with infliximab infusion and methotrexate on 10/3/22.    Patient improved with solumedrol which was transitioned to oral prednisone taper. ESR and CRP downtrended, pain improved, and daily stool count improved . No BRBPR for at least 3 days prior to discharge.    GI optimized patient for discharge with prednisone taper, methotrexate 10mg weekly, and plan for infliximab infusion in 6 weeks. Follow up with Dr. Oro in 3 weeks.  Patient to go home with home PT and home nursing. Patient's insulin regimen lowered to Basal 24 units at bedtime and Bolus 8 units TIDAC based on requirements during hospital stay.  Patient will need follow up with Endocrine within 1 week to titrate his insulin regimen.  Patient hemodynamically stable for discharge home.

## 2022-10-01 NOTE — DISCHARGE NOTE PROVIDER - NSDCMRMEDTOKEN_GEN_ALL_CORE_FT
aspirin 81 mg oral delayed release tablet: 1 tab(s) orally once a day  atorvastatin 40 mg oral tablet: 1 tab(s) orally once a day (at bedtime)  Centrum Silver oral tablet: 1 tab(s) orally once a day  finasteride 5 mg oral tablet: 1 tab(s) orally once a day  folic acid 1 mg oral tablet: 1 tab(s) orally once a day  gabapentin 100 mg oral capsule: 1 tab(s) orally 2 times a day  insulin aspart 100 units/mL injectable solution: 28 unit(s) injectable 3 times a day (before meals)  Januvia 50 mg oral tablet: 1 tab(s) orally once a day  memantine 10 mg oral tablet: 1 tab(s) orally 2 times a day  mesalamine 800 mg oral delayed release tablet: 2 tab(s) orally 2 times a day  QUEtiapine 25 mg oral tablet: 2 tab(s) orally once a day (at bedtime)  Remicade 100 mg intravenous injection:   sertraline 100 mg oral tablet: 1 tab(s) orally once a day  tamsulosin 0.4 mg oral capsule: 1 cap(s) orally 2 times a day  Tresiba 100 units/mL subcutaneous solution: 30 unit(s) subcutaneous once a day (at bedtime)  trimethoprim 100 mg oral tablet: 1 tab(s) orally once a day (at bedtime)  Vitamin D3 400 intl units oral tablet: 1 tab(s) orally once a day   aspirin 81 mg oral delayed release tablet: 1 tab(s) orally once a day  atorvastatin 40 mg oral tablet: 1 tab(s) orally once a day (at bedtime)  Centrum Silver oral tablet: 1 tab(s) orally once a day  finasteride 5 mg oral tablet: 1 tab(s) orally once a day  folic acid 1 mg oral tablet: 1 tab(s) orally once a day  gabapentin 100 mg oral capsule: 1 tab(s) orally 2 times a day  insulin aspart 100 units/mL injectable solution: 28 unit(s) injectable 3 times a day (before meals)  Januvia 50 mg oral tablet: 1 tab(s) orally once a day  memantine 10 mg oral tablet: 1 tab(s) orally 2 times a day  mesalamine 800 mg oral delayed release tablet: 2 tab(s) orally 2 times a day  predniSONE 20 mg oral tablet: 2 tab(s) orally once a day  QUEtiapine 25 mg oral tablet: 2 tab(s) orally once a day (at bedtime)  Remicade 100 mg intravenous injection:   sertraline 100 mg oral tablet: 1 tab(s) orally once a day  tamsulosin 0.4 mg oral capsule: 1 cap(s) orally 2 times a day  Tresiba 100 units/mL subcutaneous solution: 30 unit(s) subcutaneous once a day (at bedtime)  trimethoprim 100 mg oral tablet: 1 tab(s) orally once a day (at bedtime)  Vitamin D3 400 intl units oral tablet: 1 tab(s) orally once a day   aspirin 81 mg oral delayed release tablet: 1 tab(s) orally once a day  atorvastatin 40 mg oral tablet: 1 tab(s) orally once a day (at bedtime)  Centrum Silver oral tablet: 1 tab(s) orally once a day  finasteride 5 mg oral tablet: 1 tab(s) orally once a day  folic acid 1 mg oral tablet: 1 tab(s) orally once a day  gabapentin 100 mg oral capsule: 1 tab(s) orally 2 times a day  insulin lispro 100 units/mL injectable solution: 8 unit(s) injectable 3 times a day (before meals)  Januvia 50 mg oral tablet: 1 tab(s) orally once a day  memantine 10 mg oral tablet: 1 tab(s) orally 2 times a day  mesalamine 800 mg oral delayed release tablet: 2 tab(s) orally 2 times a day  methotrexate 2.5 mg oral tablet: 4 tab(s) orally every 7 days on Mondays, next dose is 10/10.  predniSONE 10 mg oral tablet: 3 tab(s) orally once a day from 10/12-10/18  predniSONE 10 mg oral tablet: 2 tab(s) orally once a day from 10/19 until you follow up with GI Dr. Oro  predniSONE 20 mg oral tablet: 2 tab(s) orally once a day on 10/10 and 10/11.  QUEtiapine 25 mg oral tablet: 2 tab(s) orally once a day (at bedtime)  Remicade 100 mg intravenous injection: intravenous every 6 weeks  sertraline 100 mg oral tablet: 1 tab(s) orally once a day  tamsulosin 0.4 mg oral capsule: 1 cap(s) orally 2 times a day  Tresiba 100 units/mL subcutaneous solution: 24 unit(s) subcutaneous once a day (at bedtime)  trimethoprim 100 mg oral tablet: 1 tab(s) orally once a day (at bedtime)  Vitamin D3 400 intl units oral tablet: 1 tab(s) orally once a day

## 2022-10-01 NOTE — DISCHARGE NOTE PROVIDER - NSDCCPCAREPLAN_GEN_ALL_CORE_FT
PRINCIPAL DISCHARGE DIAGNOSIS  Diagnosis: Bloody diarrhea  Assessment and Plan of Treatment: You came to the hospital with one month of bloody diarrhea. Blood tests, CT of the abdomen, and colonoscopy found that you were having a flare of your inflammatory bowel disease. Your diarrhea improved with steroids, Remicade, and methotrexate. Please follow up with Dr. Oro.       PRINCIPAL DISCHARGE DIAGNOSIS  Diagnosis: Bloody diarrhea  Assessment and Plan of Treatment: You came to the hospital with one month of bloody diarrhea. Blood tests, CT of the abdomen, and colonoscopy found that you were having a flare of your inflammatory bowel disease. Your diarrhea improved with steroids, Remicade, and methotrexate. Please follow up with Dr. Oro in 3 weeks of your discharge.  You will need to take Prednisone 40mg daily 10/10, 11.  Then prednisone 30mg daily 10/12-18, then prednisone 20mg daily 10/19 until you follow up with Dr. Oro.  Please continue to take methotrexate 10mg every Monday starting 10/10.  If you have worsening diarrhea or bloody stools, lightheadedness, fainting, chest tightness, shortness of breath, please seek medical care immediately.      SECONDARY DISCHARGE DIAGNOSES  Diagnosis: Type 2 diabetes mellitus  Assessment and Plan of Treatment: During your hospital course, you required less insulin than what you are taking at home.  Please take Tresiba 24 units at bedtime and short-acting insulin 8 units three times a day before each meal.  You will need to follow up with your endocrinologist to titrate your insulin regimen in 1 week after you go home.

## 2022-10-01 NOTE — DISCHARGE NOTE PROVIDER - NSDCFUADDAPPT_GEN_ALL_CORE_FT
Please make an follow up appointment with Dr. Oro in 3 weeks.    Please reschedule your endocrinology appointment to a sooner time, preferably in 1 week, to titrate your insulin regimen.

## 2022-10-01 NOTE — PROGRESS NOTE ADULT - PROBLEM SELECTOR PLAN 2
"/2cm L upper pole renal cyst, on comparison also had a small renal cyst in L kidney back in 2018, seems to be in about the same location and same size  - Likely chronic, would recommend outpatient follow up for further eval if needed  - UA without significant findings

## 2022-10-01 NOTE — PROGRESS NOTE ADULT - SUBJECTIVE AND OBJECTIVE BOX
Gastroenterology/Hepatology Progress Note      Interval Events:   NAEON, afebrile HD stable  Colonoscopy 9/30 showing left sided colitis  S/p IFX 10mg/kg on 9/30 with MTX  3 BMs/ 24, non bloody, more formed  Abd pain has improved. Denied n/v  and is tolerating PO diet. Denied fevers and chills.     Allergies:  No Known Allergies      Hospital Medications:  aspirin enteric coated 81 milliGRAM(s) Oral daily  atorvastatin 40 milliGRAM(s) Oral at bedtime  dextrose 5%. 1000 milliLiter(s) IV Continuous <Continuous>  dextrose 5%. 1000 milliLiter(s) IV Continuous <Continuous>  dextrose 50% Injectable 25 Gram(s) IV Push once  dextrose 50% Injectable 12.5 Gram(s) IV Push once  dextrose 50% Injectable 25 Gram(s) IV Push once  dextrose Oral Gel 15 Gram(s) Oral once PRN  enoxaparin Injectable 40 milliGRAM(s) SubCutaneous every 24 hours  finasteride 5 milliGRAM(s) Oral daily  gabapentin 100 milliGRAM(s) Oral two times a day  glucagon  Injectable 1 milliGRAM(s) IntraMuscular once  insulin glargine Injectable (LANTUS) 24 Unit(s) SubCutaneous at bedtime  insulin lispro (ADMELOG) corrective regimen sliding scale   SubCutaneous three times a day before meals  insulin lispro (ADMELOG) corrective regimen sliding scale   SubCutaneous at bedtime  insulin lispro Injectable (ADMELOG) 24 Unit(s) SubCutaneous three times a day before meals  lactated ringers. 1000 milliLiter(s) IV Continuous <Continuous>  melatonin 3 milliGRAM(s) Oral at bedtime PRN  memantine 10 milliGRAM(s) Oral two times a day  mesalamine DR Capsule 1600 milliGRAM(s) Oral two times a day  methylPREDNISolone sodium succinate Injectable 20 milliGRAM(s) IV Push every 8 hours  pantoprazole  Injectable 40 milliGRAM(s) IV Push every 12 hours  polyethylene glycol/electrolyte Solution. 4000 milliLiter(s) Oral once  QUEtiapine 50 milliGRAM(s) Oral at bedtime  sertraline 100 milliGRAM(s) Oral daily  tamsulosin 0.4 milliGRAM(s) Oral at bedtime      ROS: 14 point ROS negative unless otherwise state in subjective    PHYSICAL EXAM:   Vital Signs Last 24 Hrs  T(C): 36.7 (01 Oct 2022 06:37), Max: 36.9 (30 Sep 2022 22:06)  T(F): 98.1 (01 Oct 2022 06:37), Max: 98.4 (30 Sep 2022 22:06)  HR: 81 (01 Oct 2022 06:37) (72 - 86)  BP: 117/64 (01 Oct 2022 06:37) (97/38 - 121/63)  BP(mean): --  RR: 18 (01 Oct 2022 06:37) (15 - 18)  SpO2: 98% (01 Oct 2022 06:37) (98% - 100%)    Parameters below as of 01 Oct 2022 06:37  Patient On (Oxygen Delivery Method): room air      PHYSICAL EXAM:     GENERAL:  No acute distress, lying in bed.   HEENT:  NCAT, no scleral icterus   CHEST:  no respiratory distress  HEART:  Regular rate and rhythm  ABDOMEN:  Soft, mild tenderness in the LLQ quadrant, non-distended, no palpable masses  EXTREMITIES: No LE edema b/l.   NEURO:  Alert and oriented x 3.     LABS:                        8.0    4.26  )-----------( 289      ( 01 Oct 2022 06:48 )             26.7     10-01    137  |  104  |  14  ----------------------------<  176<H>  3.8   |  25  |  0.65    Ca    8.0<L>      01 Oct 2022 06:48  Phos  3.2     10-01  Mg     2.20     10-01    TPro  6.1  /  Alb  2.9<L>  /  TBili  0.6  /  DBili  x   /  AST  13  /  ALT  8   /  AlkPhos  68  10-01    LIVER FUNCTIONS - ( 01 Oct 2022 06:48 )  Alb: 2.9 g/dL / Pro: 6.1 g/dL / ALK PHOS: 68 U/L / ALT: 8 U/L / AST: 13 U/L / GGT: x           PT/INR - ( 30 Sep 2022 06:57 )   PT: 21.6 sec;   INR: 1.85 ratio         PTT - ( 30 Sep 2022 06:57 )  PTT:25.9 sec                  Imaging:    Ct abd and pelvis    FINDINGS:  LOWER CHEST: Within normal limits.    LIVER: Within normal limits.  BILE DUCTS: Normal caliber.  GALLBLADDER: Small gallstone.  SPLEEN: Within normal limits.  PANCREAS: Within normal limits.  ADRENALS: Within normal limits.  KIDNEYS/URETERS: 4 mm nonobstructive calculus lower pole right kidney. 2   cm cyst upper pole left kidney.    BLADDER: Within normal limits.  REPRODUCTIVE ORGANS: Prostate within normal limits.    BOWEL: No bowel obstruction. Mural thickening and associated mesenteric   hyperemia involving the colon from the hepatic flexure through the mid   descending colon. Submucosal fat deposition in the cecum and rectosigmoid   colon consistent with chronic information. Appendix mural thickening and   mucosal hyperenhancement in the mid appendix.  PERITONEUM: No ascites.  VESSELS: Atheromatous calcifications.  RETROPERITONEUM/LYMPH NODES: No lymphadenopathy.  ABDOMINAL WALL: Within normal limits.  BONES: Hemisacralization of the fifth lumbar vertebra.    IMPRESSION:  Crohn's colitis involving the transverse and descending colon and   appendix.    Colonoscopy in 7/2020    Impression:          - Inflammation was found from the anus to the descending colon secondary to                        left-sided colitis. The findings are unchanged compared to previous                        examinations. Biopsied.          - One 15 mm polyp in the rectum, benign appearing likely inflammatory,                        removed with a hot snare. Resected and retrieved.                       - Diverticulosis in the sigmoid colon.                       - Localized mild inflammation was found in the cecum.                       Clinical improvement with Entyvio BUT no endoscopic improvement.

## 2022-10-01 NOTE — DISCHARGE NOTE PROVIDER - NSDCFUSCHEDAPPT_GEN_ALL_CORE_FT
[facility]  Med Int [address]  Scheduled Appointment: 10/26/2022    [facility]  [facility] [address] [facility]  Scheduled Appointment: 12/22/2022     [facility] [address] [facility]  Scheduled Appointment: 10/20/2022    [facility]  [facility]  Med Int [address]  Scheduled Appointment: 10/26/2022    [facility]  [facility] [address] [facility]  Scheduled Appointment: 12/22/2022

## 2022-10-01 NOTE — PROGRESS NOTE ADULT - ASSESSMENT
72yoM with PMH of Crohn's disease, IDDM, neuropathy, HTN, HLD, BPH, CVA on ASA 81mg daily who presented with bloody diarrhea, concerning for CD flare.     Impression:   #IBD flare: Bloody diarrhea, concerning for CD flare -- unclear CD vs. UC as per Dr. Oro note. s/p poor prior response to Entyvio. On remicade for the past one year, last infusion 2 weeks.  On 6/30/22 pts remicade level was <0.4 and antibody was elevated at 308. Remicade dosage was increased since aug 2022. Last colonoscopy in 7/2020 showed inflammation from the anus to the descending colon (left sided colitis) and diverticulosis.   - GI PCR negative, c diff test pending.   - ESR and CRP elevated.   - On IV steroids (9/28 -   - IFX 10mg/kg on 9/30 + MTX    - will proceed with colonoscopy tomorrow.     Recommendations:   - Continue with IV Solumedrol 20mg Q8hrs.   - Monitor symptoms after IFX + MTX on 9/30  - follow up path from colonoscopy  - advance to low residue diet  - obtain CRP and ESR daily.   - f/u fecal protectin  - Place him on DVT PPx as IBD patients are high risk for thrombosis.     GI will continue to follow.     All recommendations are tentative until note is attested by an attending.       Thank you for involving us in the care of this patient, please reach out if any further questions.     Laron Dumas MD  Gastroenterology/Hepatology Fellow, PGY6    Available on [employer] Teams  [phone] ([facility])  [phone] ([facility])  Please contact on call fellow weekdays after 5pm-7am and weekends: [phone]

## 2022-10-01 NOTE — PROGRESS NOTE ADULT - ATTENDING COMMENTS
Agree with above. Crohns disease s/p Remicade infusion 10 mg/kg with methotrexate showing clinical improvement. Continue IV steroids. Advance diet as tolerated.

## 2022-10-01 NOTE — PROGRESS NOTE ADULT - PROBLEM SELECTOR PLAN 1
- elevated ESR, CRP  - infectious work up neg: Cdiff PCR, GI PCR,   - F/up fecal calprotectin, stool Cx, Stool O+p  - LR @75 for rehydration  - IBD flare management as per GI - appreciate recs     - solumedrol 20 q8  - pantoprazole per GI  - per colonoscopy Left sided UC, biopsies in lab. Methotrexate and infliximab pending per pharmacy staff  - Spoke on 10/1 to pharmacy, per pharmacy staff, there is no Infliximab available in the hospital and the methotrexate needs to be given with infliximab. Order will take 1-2 days. Pt understands that pharmacy needs to obtain special medications before he can recieve them. For now the order will remain unverified by pharmacy till medication reaches facility per pharmacy staff.

## 2022-10-01 NOTE — PROGRESS NOTE ADULT - SUBJECTIVE AND OBJECTIVE BOX
Pt is POD #1    Pt is A&O x3.  Pt denies n/v/dizziness.  Voiding without issues.  No apparent anesthesia complications.    Alexandra Mcpherson CRNA

## 2022-10-01 NOTE — PHYSICAL THERAPY INITIAL EVALUATION ADULT - ADDITIONAL COMMENTS
Pt states that he lives in a private house, +2 steps to enter, +1 flight to negotiate to bedroom, was independent with functional mobility and ADLs without use of an assistive device.    Pt left semi-supine, all lines intact, call bell in reach, bed alarm set, in NAD. RN aware.

## 2022-10-01 NOTE — DISCHARGE NOTE PROVIDER - DISCHARGE DIET
Consistent Carbohydrate Diabetic Diets DASH Diet/Consistent Carbohydrate Diabetic Diets/Other Diet Instructions/Easy to Chew Diet

## 2022-10-01 NOTE — PROGRESS NOTE ADULT - ASSESSMENT
72M with PMH of UC vs Crohn disease, IDDM, neuropathy, HTN, HLD, BPH, dementia 2/2 CVA on ASA 81mg daily, presenting for 1 mo of progressively worsening bloody diarrhea possibly secondary to IBD flare given response to steroids.

## 2022-10-01 NOTE — PHYSICAL THERAPY INITIAL EVALUATION ADULT - GENERAL OBSERVATIONS, REHAB EVAL
Pt received semi-supine in bed, all lines intact, comfortable and in NAD. Pt agreeable to participate in PT evaluation.

## 2022-10-01 NOTE — DISCHARGE NOTE PROVIDER - CARE PROVIDER_API CALL
Gatito Oro (MD)  Gastroenterology  [address] [place] [address], [address]  [place] [address]  Phone: [phone]  Fax: [phone]  Established Patient  Follow Up Time:

## 2022-10-01 NOTE — PHYSICAL THERAPY INITIAL EVALUATION ADULT - PERTINENT HX OF CURRENT PROBLEM, REHAB EVAL
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

## 2022-10-01 NOTE — PROGRESS NOTE ADULT - ATTENDING COMMENTS
Pt symptomatically improving. Appreciate ongoing GI recommendations. Await pharmacy for infliximab to continue therapy along with MTX. Continue to monitor symptoms.

## 2022-10-01 NOTE — PROGRESS NOTE ADULT - PROBLEM SELECTOR PLAN 3
on Lantus 30 and Admelog 28-30 at home  - Lantus 24 -> 19 preop   - Admelog 24 -> 15 preop  - sliding scale  - adjust insulin as needed post-procedure

## 2022-10-02 ENCOUNTER — RX RENEWAL (OUTPATIENT)
Age: 72
End: 2022-10-02

## 2022-10-02 DIAGNOSIS — K52.9 NONINFECTIVE GASTROENTERITIS AND COLITIS, UNSPECIFIED: ICD-10-CM

## 2022-10-02 LAB
ANION GAP SERPL CALC-SCNC: 9 MMOL/L — SIGNIFICANT CHANGE UP (ref 7–14)
BASOPHILS # BLD AUTO: 0.01 K/UL — SIGNIFICANT CHANGE UP (ref 0–0.2)
BASOPHILS NFR BLD AUTO: 0.2 % — SIGNIFICANT CHANGE UP (ref 0–2)
BUN SERPL-MCNC: 15 MG/DL — SIGNIFICANT CHANGE UP (ref 7–23)
CALCIUM SERPL-MCNC: 8 MG/DL — LOW (ref 8.4–10.5)
CHLORIDE SERPL-SCNC: 106 MMOL/L — SIGNIFICANT CHANGE UP (ref 98–107)
CO2 SERPL-SCNC: 26 MMOL/L — SIGNIFICANT CHANGE UP (ref 22–31)
CREAT SERPL-MCNC: 0.79 MG/DL — SIGNIFICANT CHANGE UP (ref 0.5–1.3)
CRP SERPL-MCNC: 10.9 MG/L — HIGH
EGFR: 93 ML/MIN/1.73M2 — SIGNIFICANT CHANGE UP
EOSINOPHIL # BLD AUTO: 0 K/UL — SIGNIFICANT CHANGE UP (ref 0–0.5)
EOSINOPHIL NFR BLD AUTO: 0 % — SIGNIFICANT CHANGE UP (ref 0–6)
ERYTHROCYTE [SEDIMENTATION RATE] IN BLOOD: 61 MM/HR — HIGH (ref 1–15)
GLUCOSE BLDC GLUCOMTR-MCNC: 126 MG/DL — HIGH (ref 70–99)
GLUCOSE BLDC GLUCOMTR-MCNC: 144 MG/DL — HIGH (ref 70–99)
GLUCOSE BLDC GLUCOMTR-MCNC: 167 MG/DL — HIGH (ref 70–99)
GLUCOSE BLDC GLUCOMTR-MCNC: 172 MG/DL — HIGH (ref 70–99)
GLUCOSE BLDC GLUCOMTR-MCNC: 71 MG/DL — SIGNIFICANT CHANGE UP (ref 70–99)
GLUCOSE BLDC GLUCOMTR-MCNC: 73 MG/DL — SIGNIFICANT CHANGE UP (ref 70–99)
GLUCOSE SERPL-MCNC: 160 MG/DL — HIGH (ref 70–99)
HCT VFR BLD CALC: 26.9 % — LOW (ref 39–50)
HGB BLD-MCNC: 7.9 G/DL — LOW (ref 13–17)
IANC: 3.17 K/UL — SIGNIFICANT CHANGE UP (ref 1.8–7.4)
IMM GRANULOCYTES NFR BLD AUTO: 0.8 % — SIGNIFICANT CHANGE UP (ref 0–0.9)
LYMPHOCYTES # BLD AUTO: 1.59 K/UL — SIGNIFICANT CHANGE UP (ref 1–3.3)
LYMPHOCYTES # BLD AUTO: 30.6 % — SIGNIFICANT CHANGE UP (ref 13–44)
MAGNESIUM SERPL-MCNC: 2.1 MG/DL — SIGNIFICANT CHANGE UP (ref 1.6–2.6)
MCHC RBC-ENTMCNC: 24.1 PG — LOW (ref 27–34)
MCHC RBC-ENTMCNC: 29.4 GM/DL — LOW (ref 32–36)
MCV RBC AUTO: 82 FL — SIGNIFICANT CHANGE UP (ref 80–100)
MONOCYTES # BLD AUTO: 0.39 K/UL — SIGNIFICANT CHANGE UP (ref 0–0.9)
MONOCYTES NFR BLD AUTO: 7.5 % — SIGNIFICANT CHANGE UP (ref 2–14)
NEUTROPHILS # BLD AUTO: 3.17 K/UL — SIGNIFICANT CHANGE UP (ref 1.8–7.4)
NEUTROPHILS NFR BLD AUTO: 60.9 % — SIGNIFICANT CHANGE UP (ref 43–77)
NRBC # BLD: 0 /100 WBCS — SIGNIFICANT CHANGE UP (ref 0–0)
NRBC # FLD: 0 K/UL — SIGNIFICANT CHANGE UP (ref 0–0)
PHOSPHATE SERPL-MCNC: 3.6 MG/DL — SIGNIFICANT CHANGE UP (ref 2.5–4.5)
PLATELET # BLD AUTO: 285 K/UL — SIGNIFICANT CHANGE UP (ref 150–400)
POTASSIUM SERPL-MCNC: 4.2 MMOL/L — SIGNIFICANT CHANGE UP (ref 3.5–5.3)
POTASSIUM SERPL-SCNC: 4.2 MMOL/L — SIGNIFICANT CHANGE UP (ref 3.5–5.3)
RBC # BLD: 3.28 M/UL — LOW (ref 4.2–5.8)
RBC # FLD: 14.5 % — SIGNIFICANT CHANGE UP (ref 10.3–14.5)
SODIUM SERPL-SCNC: 141 MMOL/L — SIGNIFICANT CHANGE UP (ref 135–145)
WBC # BLD: 5.2 K/UL — SIGNIFICANT CHANGE UP (ref 3.8–10.5)
WBC # FLD AUTO: 5.2 K/UL — SIGNIFICANT CHANGE UP (ref 3.8–10.5)

## 2022-10-02 PROCEDURE — 99232 SBSQ HOSP IP/OBS MODERATE 35: CPT | Mod: GC

## 2022-10-02 PROCEDURE — 99233 SBSQ HOSP IP/OBS HIGH 50: CPT | Mod: GC

## 2022-10-02 RX ORDER — SODIUM CHLORIDE 9 MG/ML
1000 INJECTION, SOLUTION INTRAVENOUS
Refills: 0 | Status: DISCONTINUED | OUTPATIENT
Start: 2022-10-02 | End: 2022-10-03

## 2022-10-02 RX ADMIN — QUETIAPINE FUMARATE 50 MILLIGRAM(S): 200 TABLET, FILM COATED ORAL at 22:23

## 2022-10-02 RX ADMIN — FINASTERIDE 5 MILLIGRAM(S): 5 TABLET, FILM COATED ORAL at 13:10

## 2022-10-02 RX ADMIN — Medication 1600 MILLIGRAM(S): at 05:42

## 2022-10-02 RX ADMIN — Medication 20 MILLIGRAM(S): at 22:23

## 2022-10-02 RX ADMIN — Medication 14 UNIT(S): at 18:55

## 2022-10-02 RX ADMIN — Medication 20 MILLIGRAM(S): at 05:43

## 2022-10-02 RX ADMIN — GABAPENTIN 100 MILLIGRAM(S): 400 CAPSULE ORAL at 18:54

## 2022-10-02 RX ADMIN — PANTOPRAZOLE SODIUM 40 MILLIGRAM(S): 20 TABLET, DELAYED RELEASE ORAL at 05:43

## 2022-10-02 RX ADMIN — Medication 14 UNIT(S): at 09:13

## 2022-10-02 RX ADMIN — Medication 3 MILLIGRAM(S): at 22:24

## 2022-10-02 RX ADMIN — SODIUM CHLORIDE 75 MILLILITER(S): 9 INJECTION, SOLUTION INTRAVENOUS at 20:24

## 2022-10-02 RX ADMIN — Medication 1600 MILLIGRAM(S): at 18:54

## 2022-10-02 RX ADMIN — Medication 14 UNIT(S): at 13:11

## 2022-10-02 RX ADMIN — ATORVASTATIN CALCIUM 40 MILLIGRAM(S): 80 TABLET, FILM COATED ORAL at 22:24

## 2022-10-02 RX ADMIN — Medication 2: at 09:12

## 2022-10-02 RX ADMIN — MEMANTINE HYDROCHLORIDE 10 MILLIGRAM(S): 10 TABLET ORAL at 18:55

## 2022-10-02 RX ADMIN — PANTOPRAZOLE SODIUM 40 MILLIGRAM(S): 20 TABLET, DELAYED RELEASE ORAL at 18:54

## 2022-10-02 RX ADMIN — MEMANTINE HYDROCHLORIDE 10 MILLIGRAM(S): 10 TABLET ORAL at 05:43

## 2022-10-02 RX ADMIN — ENOXAPARIN SODIUM 40 MILLIGRAM(S): 100 INJECTION SUBCUTANEOUS at 10:12

## 2022-10-02 RX ADMIN — Medication 20 MILLIGRAM(S): at 13:10

## 2022-10-02 RX ADMIN — Medication 1 MILLIGRAM(S): at 13:11

## 2022-10-02 RX ADMIN — TAMSULOSIN HYDROCHLORIDE 0.4 MILLIGRAM(S): 0.4 CAPSULE ORAL at 22:23

## 2022-10-02 RX ADMIN — GABAPENTIN 100 MILLIGRAM(S): 400 CAPSULE ORAL at 05:43

## 2022-10-02 RX ADMIN — SERTRALINE 100 MILLIGRAM(S): 25 TABLET, FILM COATED ORAL at 13:11

## 2022-10-02 RX ADMIN — Medication 2: at 18:55

## 2022-10-02 RX ADMIN — INSULIN GLARGINE 24 UNIT(S): 100 INJECTION, SOLUTION SUBCUTANEOUS at 23:09

## 2022-10-02 RX ADMIN — Medication 81 MILLIGRAM(S): at 13:10

## 2022-10-02 NOTE — PROGRESS NOTE ADULT - SUBJECTIVE AND OBJECTIVE BOX
Gastroenterology/Hepatology Progress Note      Interval Events:   NAEON, afebrile HD stable  7 BM / 24hr recorded  Colonoscopy 9/30 showing left sided colitis  S/p IFX 10mg/kg on 9/30 with MTX  Abd pain has improved. Denied n/v  and is tolerating PO diet. Denied fevers and chills.     Allergies:  No Known Allergies      Hospital Medications:  aspirin enteric coated 81 milliGRAM(s) Oral daily  atorvastatin 40 milliGRAM(s) Oral at bedtime  dextrose 5%. 1000 milliLiter(s) IV Continuous <Continuous>  dextrose 5%. 1000 milliLiter(s) IV Continuous <Continuous>  dextrose 50% Injectable 25 Gram(s) IV Push once  dextrose 50% Injectable 12.5 Gram(s) IV Push once  dextrose 50% Injectable 25 Gram(s) IV Push once  dextrose Oral Gel 15 Gram(s) Oral once PRN  enoxaparin Injectable 40 milliGRAM(s) SubCutaneous every 24 hours  finasteride 5 milliGRAM(s) Oral daily  gabapentin 100 milliGRAM(s) Oral two times a day  glucagon  Injectable 1 milliGRAM(s) IntraMuscular once  insulin glargine Injectable (LANTUS) 24 Unit(s) SubCutaneous at bedtime  insulin lispro (ADMELOG) corrective regimen sliding scale   SubCutaneous three times a day before meals  insulin lispro (ADMELOG) corrective regimen sliding scale   SubCutaneous at bedtime  insulin lispro Injectable (ADMELOG) 24 Unit(s) SubCutaneous three times a day before meals  lactated ringers. 1000 milliLiter(s) IV Continuous <Continuous>  melatonin 3 milliGRAM(s) Oral at bedtime PRN  memantine 10 milliGRAM(s) Oral two times a day  mesalamine DR Capsule 1600 milliGRAM(s) Oral two times a day  methylPREDNISolone sodium succinate Injectable 20 milliGRAM(s) IV Push every 8 hours  pantoprazole  Injectable 40 milliGRAM(s) IV Push every 12 hours  polyethylene glycol/electrolyte Solution. 4000 milliLiter(s) Oral once  QUEtiapine 50 milliGRAM(s) Oral at bedtime  sertraline 100 milliGRAM(s) Oral daily  tamsulosin 0.4 milliGRAM(s) Oral at bedtime      ROS: 14 point ROS negative unless otherwise state in subjective    PHYSICAL EXAM:   Vital Signs Last 24 Hrs  T(C): 36.8 (02 Oct 2022 05:52), Max: 36.9 (01 Oct 2022 22:59)  T(F): 98.2 (02 Oct 2022 05:52), Max: 98.4 (01 Oct 2022 22:59)  HR: 77 (02 Oct 2022 05:52) (77 - 88)  BP: 128/61 (02 Oct 2022 05:52) (101/60 - 128/61)  BP(mean): --  RR: 17 (02 Oct 2022 05:52) (17 - 18)  SpO2: 98% (02 Oct 2022 05:52) (95% - 99%)    Parameters below as of 02 Oct 2022 05:52  Patient On (Oxygen Delivery Method): room air      GENERAL:  No acute distress, lying in bed.   HEENT:  NCAT, no scleral icterus   CHEST:  no respiratory distress  HEART:  Regular rate and rhythm  ABDOMEN:  Soft, mild tenderness in the LLQ quadrant, non-distended, no palpable masses  EXTREMITIES: No LE edema b/l.   NEURO:  Alert        LABS:                        7.9    5.20  )-----------( 285      ( 02 Oct 2022 07:07 )             26.9     10-02    141  |  106  |  15  ----------------------------<  160<H>  4.2   |  26  |  0.79    Ca    8.0<L>      02 Oct 2022 07:07  Phos  3.6     10-02  Mg     2.10     10-02    TPro  6.1  /  Alb  2.9<L>  /  TBili  0.6  /  DBili  x   /  AST  13  /  ALT  8   /  AlkPhos  68  10-01    LIVER FUNCTIONS - ( 01 Oct 2022 06:48 )  Alb: 2.9 g/dL / Pro: 6.1 g/dL / ALK PHOS: 68 U/L / ALT: 8 U/L / AST: 13 U/L / GGT: x                             Imaging:    Ct abd and pelvis    FINDINGS:  LOWER CHEST: Within normal limits.    LIVER: Within normal limits.  BILE DUCTS: Normal caliber.  GALLBLADDER: Small gallstone.  SPLEEN: Within normal limits.  PANCREAS: Within normal limits.  ADRENALS: Within normal limits.  KIDNEYS/URETERS: 4 mm nonobstructive calculus lower pole right kidney. 2   cm cyst upper pole left kidney.    BLADDER: Within normal limits.  REPRODUCTIVE ORGANS: Prostate within normal limits.    BOWEL: No bowel obstruction. Mural thickening and associated mesenteric   hyperemia involving the colon from the hepatic flexure through the mid   descending colon. Submucosal fat deposition in the cecum and rectosigmoid   colon consistent with chronic information. Appendix mural thickening and   mucosal hyperenhancement in the mid appendix.  PERITONEUM: No ascites.  VESSELS: Atheromatous calcifications.  RETROPERITONEUM/LYMPH NODES: No lymphadenopathy.  ABDOMINAL WALL: Within normal limits.  BONES: Hemisacralization of the fifth lumbar vertebra.    IMPRESSION:  Crohn's colitis involving the transverse and descending colon and   appendix.    Colonoscopy in 7/2020    Impression:          - Inflammation was found from the anus to the descending colon secondary to                        left-sided colitis. The findings are unchanged compared to previous                        examinations. Biopsied.          - One 15 mm polyp in the rectum, benign appearing likely inflammatory,                        removed with a hot snare. Resected and retrieved.                       - Diverticulosis in the sigmoid colon.                       - Localized mild inflammation was found in the cecum.                       Clinical improvement with Entyvio BUT no endoscopic improvement.

## 2022-10-02 NOTE — PROGRESS NOTE ADULT - SUBJECTIVE AND OBJECTIVE BOX
Progress Note     == draft in progress ==  Britt Childress  PGY-1 Medicine  Teams | [id number]    Patient is a 72y old  Male who presents with a chief complaint of Persistent bloody diarrhea, generalized weakness (02 Oct 2022 10:37)          SUBJECTIVE / INTERVAL EVENTS  No acute events overnight. Patient seen and evaluated at bedside. No new complaints.       MEDICATIONS    Home Medications:  aspirin 81 mg oral delayed release tablet: 1 tab(s) orally once a day (28 Sep 2022 01:21)  atorvastatin 40 mg oral tablet: 1 tab(s) orally once a day (at bedtime) (28 Sep 2022 01:21)  Centrum Silver oral tablet: 1 tab(s) orally once a day (28 Sep 2022 01:21)  finasteride 5 mg oral tablet: 1 tab(s) orally once a day (28 Sep 2022 01:21)  folic acid 1 mg oral tablet: 1 tab(s) orally once a day (01 Oct 2022 11:05)  gabapentin 100 mg oral capsule: 1 tab(s) orally 2 times a day (28 Sep 2022 01:21)  insulin aspart 100 units/mL injectable solution: 28 unit(s) injectable 3 times a day (before meals) (28 Sep 2022 01:21)  Januvia 50 mg oral tablet: 1 tab(s) orally once a day (28 Sep 2022 01:21)  memantine 10 mg oral tablet: 1 tab(s) orally 2 times a day (28 Sep 2022 01:21)  mesalamine 800 mg oral delayed release tablet: 2 tab(s) orally 2 times a day (28 Sep 2022 01:21)  QUEtiapine 25 mg oral tablet: 2 tab(s) orally once a day (at bedtime) (28 Sep 2022 01:21)  Remicade 100 mg intravenous injection:  (28 Sep 2022 01:21)  sertraline 100 mg oral tablet: 1 tab(s) orally once a day (28 Sep 2022 01:21)  tamsulosin 0.4 mg oral capsule: 1 cap(s) orally 2 times a day (28 Sep 2022 01:21)  Tresiba 100 units/mL subcutaneous solution: 30 unit(s) subcutaneous once a day (at bedtime) (28 Sep 2022 01:21)  trimethoprim 100 mg oral tablet: 1 tab(s) orally once a day (at bedtime) (28 Sep 2022 01:21)  Vitamin D3 400 intl units oral tablet: 1 tab(s) orally once a day (28 Sep 2022 01:21)      MEDICATIONS  (STANDING):  aspirin enteric coated 81 milliGRAM(s) Oral daily  atorvastatin 40 milliGRAM(s) Oral at bedtime  dextrose 5%. 1000 milliLiter(s) (50 mL/Hr) IV Continuous <Continuous>  dextrose 5%. 1000 milliLiter(s) (100 mL/Hr) IV Continuous <Continuous>  dextrose 5%. 1000 milliLiter(s) (100 mL/Hr) IV Continuous <Continuous>  dextrose 50% Injectable 25 Gram(s) IV Push once  dextrose 50% Injectable 12.5 Gram(s) IV Push once  dextrose 50% Injectable 25 Gram(s) IV Push once  enoxaparin Injectable 40 milliGRAM(s) SubCutaneous every 24 hours  finasteride 5 milliGRAM(s) Oral daily  folic acid 1 milliGRAM(s) Oral daily  gabapentin 100 milliGRAM(s) Oral two times a day  glucagon  Injectable 1 milliGRAM(s) IntraMuscular once  glucagon  Injectable 1 milliGRAM(s) IntraMuscular once  inFLIXimab-dyyb (INFLECTRA) IVPB 1000 milliGRAM(s) IV Intermittent once  insulin glargine Injectable (LANTUS) 24 Unit(s) SubCutaneous at bedtime  insulin lispro (ADMELOG) corrective regimen sliding scale   SubCutaneous three times a day before meals  insulin lispro Injectable (ADMELOG) 14 Unit(s) SubCutaneous three times a day before meals  lactated ringers. 1000 milliLiter(s) (75 mL/Hr) IV Continuous <Continuous>  memantine 10 milliGRAM(s) Oral two times a day  mesalamine DR Capsule 1600 milliGRAM(s) Oral two times a day  methotrexate 10 milliGRAM(s) Oral every week  methylPREDNISolone sodium succinate Injectable 20 milliGRAM(s) IV Push every 8 hours  pantoprazole  Injectable 40 milliGRAM(s) IV Push every 12 hours  QUEtiapine 50 milliGRAM(s) Oral at bedtime  sertraline 100 milliGRAM(s) Oral daily  tamsulosin 0.4 milliGRAM(s) Oral at bedtime    MEDICATIONS  (PRN):  dextrose Oral Gel 15 Gram(s) Oral once PRN Blood Glucose LESS THAN 70 milliGRAM(s)/deciliter  melatonin 3 milliGRAM(s) Oral at bedtime PRN Insomnia         VITALS / I&O's  T(C): 36.8 (10-02-22 @ 05:52), Max: 36.9 (10-01-22 @ 22:59)  HR: 77 (10-02-22 @ 05:52) (77 - 88)  BP: 128/61 (10-02-22 @ 05:52) (101/60 - 128/61)  RR: 17 (10-02-22 @ 05:52) (17 - 18)  SpO2: 98% (10-02-22 @ 05:52) (95% - 99%)  I&O's Summary    01 Oct 2022 07:01  -  02 Oct 2022 07:00  --------------------------------------------------------  IN: 300 mL / OUT: 950 mL / NET: -650 mL           PHYSICAL EXAM           LABS                        7.9    5.20  )-----------( 285      ( 02 Oct 2022 07:07 )             26.9     10-02    141  |  106  |  15  ----------------------------<  160<H>  4.2   |  26  |  0.79    Ca    8.0<L>      02 Oct 2022 07:07  Phos  3.6     10-02  Mg     2.10     10-02    TPro  6.1  /  Alb  2.9<L>  /  TBili  0.6  /  DBili  x   /  AST  13  /  ALT  8   /  AlkPhos  68  10-01    CAPILLARY BLOOD GLUCOSE      POCT Blood Glucose.: 167 mg/dL (02 Oct 2022 08:56)  POCT Blood Glucose.: 124 mg/dL (01 Oct 2022 22:55)  POCT Blood Glucose.: 145 mg/dL (01 Oct 2022 20:54)  POCT Blood Glucose.: 76 mg/dL (01 Oct 2022 18:02)  POCT Blood Glucose.: 91 mg/dL (01 Oct 2022 12:27)      LIVER FUNCTIONS - ( 01 Oct 2022 06:48 )  Alb: 2.9 g/dL / Pro: 6.1 g/dL / ALK PHOS: 68 U/L / ALT: 8 U/L / AST: 13 U/L / GGT: x                         Progress Note     == draft in progress ==  Britt Childress  PGY-1 Medicine  [employer] | [id number]    Patient is a 72y old  Male who presents with a chief complaint of Persistent bloody diarrhea, generalized weakness (02 Oct 2022 10:37)          SUBJECTIVE / INTERVAL EVENTS  No acute events overnight. RN endorsed 2 BM in last day, light brown, half-melted frozen yogurt consistency. Recorded stool count for last day is 7.  Patient seen and evaluated at bedside. Patient doing okay today. Patient denied fever, nausea/vomiting, shortness of breath, new pain (headache, chest pain, abdominal pain, limb pain), new swelling, new numbness/tingling, dysuria or hematuria. He had made a BM in bed 15-20 min prior and did not know how to call the nurse. Call button pushed, nurse aware.       MEDICATIONS    Home Medications:  aspirin 81 mg oral delayed release tablet: 1 tab(s) orally once a day (28 Sep 2022 01:21)  atorvastatin 40 mg oral tablet: 1 tab(s) orally once a day (at bedtime) (28 Sep 2022 01:21)  Centrum Silver oral tablet: 1 tab(s) orally once a day (28 Sep 2022 01:21)  finasteride 5 mg oral tablet: 1 tab(s) orally once a day (28 Sep 2022 01:21)  folic acid 1 mg oral tablet: 1 tab(s) orally once a day (01 Oct 2022 11:05)  gabapentin 100 mg oral capsule: 1 tab(s) orally 2 times a day (28 Sep 2022 01:21)  insulin aspart 100 units/mL injectable solution: 28 unit(s) injectable 3 times a day (before meals) (28 Sep 2022 01:21)  Januvia 50 mg oral tablet: 1 tab(s) orally once a day (28 Sep 2022 01:21)  memantine 10 mg oral tablet: 1 tab(s) orally 2 times a day (28 Sep 2022 01:21)  mesalamine 800 mg oral delayed release tablet: 2 tab(s) orally 2 times a day (28 Sep 2022 01:21)  QUEtiapine 25 mg oral tablet: 2 tab(s) orally once a day (at bedtime) (28 Sep 2022 01:21)  Remicade 100 mg intravenous injection:  (28 Sep 2022 01:21)  sertraline 100 mg oral tablet: 1 tab(s) orally once a day (28 Sep 2022 01:21)  tamsulosin 0.4 mg oral capsule: 1 cap(s) orally 2 times a day (28 Sep 2022 01:21)  Tresiba 100 units/mL subcutaneous solution: 30 unit(s) subcutaneous once a day (at bedtime) (28 Sep 2022 01:21)  trimethoprim 100 mg oral tablet: 1 tab(s) orally once a day (at bedtime) (28 Sep 2022 01:21)  Vitamin D3 400 intl units oral tablet: 1 tab(s) orally once a day (28 Sep 2022 01:21)      MEDICATIONS  (STANDING):  aspirin enteric coated 81 milliGRAM(s) Oral daily  atorvastatin 40 milliGRAM(s) Oral at bedtime  dextrose 5%. 1000 milliLiter(s) (50 mL/Hr) IV Continuous <Continuous>  dextrose 5%. 1000 milliLiter(s) (100 mL/Hr) IV Continuous <Continuous>  dextrose 5%. 1000 milliLiter(s) (100 mL/Hr) IV Continuous <Continuous>  dextrose 50% Injectable 25 Gram(s) IV Push once  dextrose 50% Injectable 12.5 Gram(s) IV Push once  dextrose 50% Injectable 25 Gram(s) IV Push once  enoxaparin Injectable 40 milliGRAM(s) SubCutaneous every 24 hours  finasteride 5 milliGRAM(s) Oral daily  folic acid 1 milliGRAM(s) Oral daily  gabapentin 100 milliGRAM(s) Oral two times a day  glucagon  Injectable 1 milliGRAM(s) IntraMuscular once  glucagon  Injectable 1 milliGRAM(s) IntraMuscular once  inFLIXimab-dyyb (INFLECTRA) IVPB 1000 milliGRAM(s) IV Intermittent once  insulin glargine Injectable (LANTUS) 24 Unit(s) SubCutaneous at bedtime  insulin lispro (ADMELOG) corrective regimen sliding scale   SubCutaneous three times a day before meals  insulin lispro Injectable (ADMELOG) 14 Unit(s) SubCutaneous three times a day before meals  lactated ringers. 1000 milliLiter(s) (75 mL/Hr) IV Continuous <Continuous>  memantine 10 milliGRAM(s) Oral two times a day  mesalamine DR Capsule 1600 milliGRAM(s) Oral two times a day  methotrexate 10 milliGRAM(s) Oral every week  methylPREDNISolone sodium succinate Injectable 20 milliGRAM(s) IV Push every 8 hours  pantoprazole  Injectable 40 milliGRAM(s) IV Push every 12 hours  QUEtiapine 50 milliGRAM(s) Oral at bedtime  sertraline 100 milliGRAM(s) Oral daily  tamsulosin 0.4 milliGRAM(s) Oral at bedtime    MEDICATIONS  (PRN):  dextrose Oral Gel 15 Gram(s) Oral once PRN Blood Glucose LESS THAN 70 milliGRAM(s)/deciliter  melatonin 3 milliGRAM(s) Oral at bedtime PRN Insomnia         VITALS / I&O's  T(C): 36.8 (10-02-22 @ 05:52), Max: 36.9 (10-01-22 @ 22:59)  HR: 77 (10-02-22 @ 05:52) (77 - 88)  BP: 128/61 (10-02-22 @ 05:52) (101/60 - 128/61)  RR: 17 (10-02-22 @ 05:52) (17 - 18)  SpO2: 98% (10-02-22 @ 05:52) (95% - 99%)  I&O's Summary    01 Oct 2022 07:01  -  02 Oct 2022 07:00  --------------------------------------------------------  IN: 300 mL / OUT: 950 mL / NET: -650 mL           PHYSICAL EXAM  General: Reclining in bed in no acute distress.  HEENT: Normocephalic, atraumatic. Extraocular movements grossly intact. No nasal discharge.  Neuro: Alert, somewhat oriented. No facial asymmetry or dysarthria. Moving all extremities.  CV: Regular rate and rhythm. S1/S2. +systolic murmur? Limbs warm, no distal edema.  Respiratory: Anterior lung fields CTAB. No increased work of breathing, speaking comfortably.  Abdomen: Soft; somewhat distended; mildly tender in ?LLQ. No rebound or guarding.   : Suprapubic region nontender.  Skin: No rashes or bruising of face, forearms, or calves bilaterally.  Psych: Mood and affect appropriate.         LABS                        7.9    5.20  )-----------( 285      ( 02 Oct 2022 07:07 )             26.9     10-02    141  |  106  |  15  ----------------------------<  160<H>  4.2   |  26  |  0.79    Ca    8.0<L>      02 Oct 2022 07:07  Phos  3.6     10-02  Mg     2.10     10-02    TPro  6.1  /  Alb  2.9<L>  /  TBili  0.6  /  DBili  x   /  AST  13  /  ALT  8   /  AlkPhos  68  10-01    CAPILLARY BLOOD GLUCOSE      POCT Blood Glucose.: 167 mg/dL (02 Oct 2022 08:56)  POCT Blood Glucose.: 124 mg/dL (01 Oct 2022 22:55)  POCT Blood Glucose.: 145 mg/dL (01 Oct 2022 20:54)  POCT Blood Glucose.: 76 mg/dL (01 Oct 2022 18:02)  POCT Blood Glucose.: 91 mg/dL (01 Oct 2022 12:27)      LIVER FUNCTIONS - ( 01 Oct 2022 06:48 )  Alb: 2.9 g/dL / Pro: 6.1 g/dL / ALK PHOS: 68 U/L / ALT: 8 U/L / AST: 13 U/L / GGT: x                         Progress Note       Britt Childress  PGY-1 Medicine  [employer] | [id number]    Patient is a 72y old  Male who presents with a chief complaint of Persistent bloody diarrhea, generalized weakness (02 Oct 2022 10:37)          SUBJECTIVE / INTERVAL EVENTS  No acute events overnight. RN endorsed 2 BM in last day, light brown, half-melted frozen yogurt consistency. Recorded stool count for last day is 7.  Patient seen and evaluated at bedside. Patient doing okay today. Patient denied fever, nausea/vomiting, shortness of breath, new pain (headache, chest pain, abdominal pain, limb pain), new swelling, new numbness/tingling, dysuria or hematuria. He had made a BM in bed 15-20 min prior and did not know how to call the nurse. Call button pushed, nurse aware.       MEDICATIONS    Home Medications:  aspirin 81 mg oral delayed release tablet: 1 tab(s) orally once a day (28 Sep 2022 01:21)  atorvastatin 40 mg oral tablet: 1 tab(s) orally once a day (at bedtime) (28 Sep 2022 01:21)  Centrum Silver oral tablet: 1 tab(s) orally once a day (28 Sep 2022 01:21)  finasteride 5 mg oral tablet: 1 tab(s) orally once a day (28 Sep 2022 01:21)  folic acid 1 mg oral tablet: 1 tab(s) orally once a day (01 Oct 2022 11:05)  gabapentin 100 mg oral capsule: 1 tab(s) orally 2 times a day (28 Sep 2022 01:21)  insulin aspart 100 units/mL injectable solution: 28 unit(s) injectable 3 times a day (before meals) (28 Sep 2022 01:21)  Januvia 50 mg oral tablet: 1 tab(s) orally once a day (28 Sep 2022 01:21)  memantine 10 mg oral tablet: 1 tab(s) orally 2 times a day (28 Sep 2022 01:21)  mesalamine 800 mg oral delayed release tablet: 2 tab(s) orally 2 times a day (28 Sep 2022 01:21)  QUEtiapine 25 mg oral tablet: 2 tab(s) orally once a day (at bedtime) (28 Sep 2022 01:21)  Remicade 100 mg intravenous injection:  (28 Sep 2022 01:21)  sertraline 100 mg oral tablet: 1 tab(s) orally once a day (28 Sep 2022 01:21)  tamsulosin 0.4 mg oral capsule: 1 cap(s) orally 2 times a day (28 Sep 2022 01:21)  Tresiba 100 units/mL subcutaneous solution: 30 unit(s) subcutaneous once a day (at bedtime) (28 Sep 2022 01:21)  trimethoprim 100 mg oral tablet: 1 tab(s) orally once a day (at bedtime) (28 Sep 2022 01:21)  Vitamin D3 400 intl units oral tablet: 1 tab(s) orally once a day (28 Sep 2022 01:21)      MEDICATIONS  (STANDING):  aspirin enteric coated 81 milliGRAM(s) Oral daily  atorvastatin 40 milliGRAM(s) Oral at bedtime  dextrose 5%. 1000 milliLiter(s) (50 mL/Hr) IV Continuous <Continuous>  dextrose 5%. 1000 milliLiter(s) (100 mL/Hr) IV Continuous <Continuous>  dextrose 5%. 1000 milliLiter(s) (100 mL/Hr) IV Continuous <Continuous>  dextrose 50% Injectable 25 Gram(s) IV Push once  dextrose 50% Injectable 12.5 Gram(s) IV Push once  dextrose 50% Injectable 25 Gram(s) IV Push once  enoxaparin Injectable 40 milliGRAM(s) SubCutaneous every 24 hours  finasteride 5 milliGRAM(s) Oral daily  folic acid 1 milliGRAM(s) Oral daily  gabapentin 100 milliGRAM(s) Oral two times a day  glucagon  Injectable 1 milliGRAM(s) IntraMuscular once  glucagon  Injectable 1 milliGRAM(s) IntraMuscular once  inFLIXimab-dyyb (INFLECTRA) IVPB 1000 milliGRAM(s) IV Intermittent once  insulin glargine Injectable (LANTUS) 24 Unit(s) SubCutaneous at bedtime  insulin lispro (ADMELOG) corrective regimen sliding scale   SubCutaneous three times a day before meals  insulin lispro Injectable (ADMELOG) 14 Unit(s) SubCutaneous three times a day before meals  lactated ringers. 1000 milliLiter(s) (75 mL/Hr) IV Continuous <Continuous>  memantine 10 milliGRAM(s) Oral two times a day  mesalamine DR Capsule 1600 milliGRAM(s) Oral two times a day  methotrexate 10 milliGRAM(s) Oral every week  methylPREDNISolone sodium succinate Injectable 20 milliGRAM(s) IV Push every 8 hours  pantoprazole  Injectable 40 milliGRAM(s) IV Push every 12 hours  QUEtiapine 50 milliGRAM(s) Oral at bedtime  sertraline 100 milliGRAM(s) Oral daily  tamsulosin 0.4 milliGRAM(s) Oral at bedtime    MEDICATIONS  (PRN):  dextrose Oral Gel 15 Gram(s) Oral once PRN Blood Glucose LESS THAN 70 milliGRAM(s)/deciliter  melatonin 3 milliGRAM(s) Oral at bedtime PRN Insomnia         VITALS / I&O's  T(C): 36.8 (10-02-22 @ 05:52), Max: 36.9 (10-01-22 @ 22:59)  HR: 77 (10-02-22 @ 05:52) (77 - 88)  BP: 128/61 (10-02-22 @ 05:52) (101/60 - 128/61)  RR: 17 (10-02-22 @ 05:52) (17 - 18)  SpO2: 98% (10-02-22 @ 05:52) (95% - 99%)  I&O's Summary    01 Oct 2022 07:01  -  02 Oct 2022 07:00  --------------------------------------------------------  IN: 300 mL / OUT: 950 mL / NET: -650 mL           PHYSICAL EXAM  General: Reclining in bed in no acute distress.  HEENT: Normocephalic, atraumatic. Extraocular movements grossly intact. No nasal discharge.  Neuro: Alert, somewhat oriented. No facial asymmetry or dysarthria. Moving all extremities.  CV: Regular rate and rhythm. S1/S2. +systolic murmur? Limbs warm, no distal edema.  Respiratory: Anterior lung fields CTAB. No increased work of breathing, speaking comfortably.  Abdomen: Soft; somewhat distended; mild midline tenderness of lower abdomen. No rebound or guarding.   : Suprapubic region nontender.  Skin: No rashes or bruising of face, forearms, or calves bilaterally.  Psych: Mood and affect appropriate.         LABS                        7.9    5.20  )-----------( 285      ( 02 Oct 2022 07:07 )             26.9     10-02    141  |  106  |  15  ----------------------------<  160<H>  4.2   |  26  |  0.79    Ca    8.0<L>      02 Oct 2022 07:07  Phos  3.6     10-02  Mg     2.10     10-02    TPro  6.1  /  Alb  2.9<L>  /  TBili  0.6  /  DBili  x   /  AST  13  /  ALT  8   /  AlkPhos  68  10-01    CAPILLARY BLOOD GLUCOSE      POCT Blood Glucose.: 167 mg/dL (02 Oct 2022 08:56)  POCT Blood Glucose.: 124 mg/dL (01 Oct 2022 22:55)  POCT Blood Glucose.: 145 mg/dL (01 Oct 2022 20:54)  POCT Blood Glucose.: 76 mg/dL (01 Oct 2022 18:02)  POCT Blood Glucose.: 91 mg/dL (01 Oct 2022 12:27)      LIVER FUNCTIONS - ( 01 Oct 2022 06:48 )  Alb: 2.9 g/dL / Pro: 6.1 g/dL / ALK PHOS: 68 U/L / ALT: 8 U/L / AST: 13 U/L / GGT: x

## 2022-10-02 NOTE — PROGRESS NOTE ADULT - PROBLEM SELECTOR PLAN 3
on Lantus 30 and Admelog 28-30 at home  - Lantus 24 -> 19 preop   - Admelog 24 -> 15 preop  - sliding scale  - adjust insulin as needed post-procedure on Lantus 30 and Admelog 28-30 at home  - Lantus 24  - Admelog 14 tid qAC  - correctional scale tid qAC

## 2022-10-02 NOTE — PROGRESS NOTE ADULT - PROBLEM SELECTOR PLAN 7
DVT ppx w lovenox   NPO; to resume diet post-procedure  Dispo pending course DVT PPX: enoxaparin 40 q24h  Diet: DASH/TLC sodium/cholesterol restricted, consistent carb, low residue, easy to chew, lactose restricted  PT: anticipated d/c to rehab facility  Dispo: pending clinical course  Code status: full

## 2022-10-02 NOTE — PROGRESS NOTE ADULT - PROBLEM SELECTOR PLAN 1
- elevated ESR, CRP  - infectious work up neg: Cdiff PCR, GI PCR,   - F/up fecal calprotectin, stool Cx, Stool O+p  - LR @75 for rehydration  - IBD flare management as per GI - appreciate recs     - solumedrol 20 q8  - pantoprazole per GI  - per colonoscopy Left sided UC, biopsies in lab. Methotrexate and infliximab pending per pharmacy staff  - Spoke on 10/1 to pharmacy, per pharmacy staff, there is no Infliximab available in the hospital and the methotrexate needs to be given with infliximab. Order will take 1-2 days. Pt understands that pharmacy needs to obtain special medications before he can recieve them. For now the order will remain unverified by pharmacy till medication reaches facility per pharmacy staff. IBD flare with bloody diarrhea likely 2/2 UC vs Crohn  - elevated ESR, CRP, both downtrending  - infectious work up neg: Cdiff PCR, GI PCR, stool Cx, Stool O+p  - F/up fecal calprotectin,    - LR @75 for rehydration  - IBD flare management as per GI - appreciate recs     - solumedrol 20 q8  - pantoprazole per GI  - per colonoscopy Left sided UC, biopsies in lab. Methotrexate and infliximab pending per pharmacy staff  - Spoke on 10/1 to pharmacy, per pharmacy staff, there is no Infliximab available in the hospital and the methotrexate needs to be given with infliximab. Order will take 1-2 days. Pt understands that pharmacy needs to obtain special medications before he can recieve them. For now the order will remain unverified by pharmacy till medication reaches facility per pharmacy staff.

## 2022-10-02 NOTE — PROGRESS NOTE ADULT - ATTENDING COMMENTS
Pt improving clinically with less frequent stools, no blood at this time. Continue current regimen pending arrival of infliximab.

## 2022-10-02 NOTE — PROGRESS NOTE ADULT - ATTENDING COMMENTS
Agree with above. Denies abdominal pain, N/V and tolerating diet. No fevers. Continues with diarrhea. C diff pending. s/p REmicade with methotrexate. For colonoscopy with biopsies tomorrow.

## 2022-10-03 LAB
ALBUMIN SERPL ELPH-MCNC: 2.8 G/DL — LOW (ref 3.3–5)
ALP SERPL-CCNC: 69 U/L — SIGNIFICANT CHANGE UP (ref 40–120)
ALT FLD-CCNC: 12 U/L — SIGNIFICANT CHANGE UP (ref 4–41)
ANION GAP SERPL CALC-SCNC: 9 MMOL/L — SIGNIFICANT CHANGE UP (ref 7–14)
AST SERPL-CCNC: 12 U/L — SIGNIFICANT CHANGE UP (ref 4–40)
BILIRUB SERPL-MCNC: 0.5 MG/DL — SIGNIFICANT CHANGE UP (ref 0.2–1.2)
BUN SERPL-MCNC: 11 MG/DL — SIGNIFICANT CHANGE UP (ref 7–23)
CALCIUM SERPL-MCNC: 8.2 MG/DL — LOW (ref 8.4–10.5)
CHLORIDE SERPL-SCNC: 103 MMOL/L — SIGNIFICANT CHANGE UP (ref 98–107)
CO2 SERPL-SCNC: 26 MMOL/L — SIGNIFICANT CHANGE UP (ref 22–31)
CREAT SERPL-MCNC: 0.7 MG/DL — SIGNIFICANT CHANGE UP (ref 0.5–1.3)
CRP SERPL-MCNC: 5.2 MG/L — HIGH
EGFR: 97 ML/MIN/1.73M2 — SIGNIFICANT CHANGE UP
ERYTHROCYTE [SEDIMENTATION RATE] IN BLOOD: 53 MM/HR — HIGH (ref 1–15)
GLUCOSE BLDC GLUCOMTR-MCNC: 102 MG/DL — HIGH (ref 70–99)
GLUCOSE BLDC GLUCOMTR-MCNC: 141 MG/DL — HIGH (ref 70–99)
GLUCOSE BLDC GLUCOMTR-MCNC: 183 MG/DL — HIGH (ref 70–99)
GLUCOSE BLDC GLUCOMTR-MCNC: 39 MG/DL — CRITICAL LOW (ref 70–99)
GLUCOSE BLDC GLUCOMTR-MCNC: 79 MG/DL — SIGNIFICANT CHANGE UP (ref 70–99)
GLUCOSE BLDC GLUCOMTR-MCNC: 99 MG/DL — SIGNIFICANT CHANGE UP (ref 70–99)
GLUCOSE SERPL-MCNC: 173 MG/DL — HIGH (ref 70–99)
HCT VFR BLD CALC: 27.8 % — LOW (ref 39–50)
HCT VFR BLD CALC: 28.5 % — LOW (ref 39–50)
HCT VFR BLD CALC: 28.7 % — LOW (ref 39–50)
HGB BLD-MCNC: 8.1 G/DL — LOW (ref 13–17)
HGB BLD-MCNC: 8.5 G/DL — LOW (ref 13–17)
HGB BLD-MCNC: 8.6 G/DL — LOW (ref 13–17)
MAGNESIUM SERPL-MCNC: 2 MG/DL — SIGNIFICANT CHANGE UP (ref 1.6–2.6)
MCHC RBC-ENTMCNC: 23.7 PG — LOW (ref 27–34)
MCHC RBC-ENTMCNC: 23.8 PG — LOW (ref 27–34)
MCHC RBC-ENTMCNC: 24.2 PG — LOW (ref 27–34)
MCHC RBC-ENTMCNC: 29.1 GM/DL — LOW (ref 32–36)
MCHC RBC-ENTMCNC: 29.6 GM/DL — LOW (ref 32–36)
MCHC RBC-ENTMCNC: 30.2 GM/DL — LOW (ref 32–36)
MCV RBC AUTO: 80.1 FL — SIGNIFICANT CHANGE UP (ref 80–100)
MCV RBC AUTO: 80.2 FL — SIGNIFICANT CHANGE UP (ref 80–100)
MCV RBC AUTO: 81.8 FL — SIGNIFICANT CHANGE UP (ref 80–100)
NRBC # BLD: 0 /100 WBCS — SIGNIFICANT CHANGE UP (ref 0–0)
NRBC # FLD: 0 K/UL — SIGNIFICANT CHANGE UP (ref 0–0)
PHOSPHATE SERPL-MCNC: 3.7 MG/DL — SIGNIFICANT CHANGE UP (ref 2.5–4.5)
PLATELET # BLD AUTO: 295 K/UL — SIGNIFICANT CHANGE UP (ref 150–400)
PLATELET # BLD AUTO: 345 K/UL — SIGNIFICANT CHANGE UP (ref 150–400)
PLATELET # BLD AUTO: 349 K/UL — SIGNIFICANT CHANGE UP (ref 150–400)
POTASSIUM SERPL-MCNC: 4.2 MMOL/L — SIGNIFICANT CHANGE UP (ref 3.5–5.3)
POTASSIUM SERPL-SCNC: 4.2 MMOL/L — SIGNIFICANT CHANGE UP (ref 3.5–5.3)
PROT SERPL-MCNC: 6 G/DL — SIGNIFICANT CHANGE UP (ref 6–8.3)
RBC # BLD: 3.4 M/UL — LOW (ref 4.2–5.8)
RBC # BLD: 3.56 M/UL — LOW (ref 4.2–5.8)
RBC # BLD: 3.58 M/UL — LOW (ref 4.2–5.8)
RBC # FLD: 14.7 % — HIGH (ref 10.3–14.5)
RBC # FLD: 15.1 % — HIGH (ref 10.3–14.5)
RBC # FLD: 15.3 % — HIGH (ref 10.3–14.5)
SODIUM SERPL-SCNC: 138 MMOL/L — SIGNIFICANT CHANGE UP (ref 135–145)
SURGICAL PATHOLOGY STUDY: SIGNIFICANT CHANGE UP
WBC # BLD: 10.11 K/UL — SIGNIFICANT CHANGE UP (ref 3.8–10.5)
WBC # BLD: 5.11 K/UL — SIGNIFICANT CHANGE UP (ref 3.8–10.5)
WBC # BLD: 8.17 K/UL — SIGNIFICANT CHANGE UP (ref 3.8–10.5)
WBC # FLD AUTO: 10.11 K/UL — SIGNIFICANT CHANGE UP (ref 3.8–10.5)
WBC # FLD AUTO: 5.11 K/UL — SIGNIFICANT CHANGE UP (ref 3.8–10.5)
WBC # FLD AUTO: 8.17 K/UL — SIGNIFICANT CHANGE UP (ref 3.8–10.5)

## 2022-10-03 PROCEDURE — 99232 SBSQ HOSP IP/OBS MODERATE 35: CPT | Mod: GC

## 2022-10-03 PROCEDURE — 99233 SBSQ HOSP IP/OBS HIGH 50: CPT | Mod: GC

## 2022-10-03 RX ADMIN — SERTRALINE 100 MILLIGRAM(S): 25 TABLET, FILM COATED ORAL at 11:19

## 2022-10-03 RX ADMIN — Medication 14 UNIT(S): at 08:56

## 2022-10-03 RX ADMIN — Medication 81 MILLIGRAM(S): at 11:19

## 2022-10-03 RX ADMIN — GABAPENTIN 100 MILLIGRAM(S): 400 CAPSULE ORAL at 18:00

## 2022-10-03 RX ADMIN — MEMANTINE HYDROCHLORIDE 10 MILLIGRAM(S): 10 TABLET ORAL at 05:37

## 2022-10-03 RX ADMIN — Medication 20 MILLIGRAM(S): at 14:00

## 2022-10-03 RX ADMIN — ENOXAPARIN SODIUM 40 MILLIGRAM(S): 100 INJECTION SUBCUTANEOUS at 11:19

## 2022-10-03 RX ADMIN — Medication 14 UNIT(S): at 18:43

## 2022-10-03 RX ADMIN — PANTOPRAZOLE SODIUM 40 MILLIGRAM(S): 20 TABLET, DELAYED RELEASE ORAL at 18:00

## 2022-10-03 RX ADMIN — METHOTREXATE 10 MILLIGRAM(S): 2.5 TABLET ORAL at 16:09

## 2022-10-03 RX ADMIN — Medication 1600 MILLIGRAM(S): at 18:01

## 2022-10-03 RX ADMIN — MEMANTINE HYDROCHLORIDE 10 MILLIGRAM(S): 10 TABLET ORAL at 18:00

## 2022-10-03 RX ADMIN — Medication 1600 MILLIGRAM(S): at 05:36

## 2022-10-03 RX ADMIN — INFLIXIMAB-DYYB 125 MILLIGRAM(S): 120 INJECTION SUBCUTANEOUS at 16:08

## 2022-10-03 RX ADMIN — Medication 2: at 08:55

## 2022-10-03 RX ADMIN — Medication 14 UNIT(S): at 12:53

## 2022-10-03 RX ADMIN — Medication 20 MILLIGRAM(S): at 22:29

## 2022-10-03 RX ADMIN — QUETIAPINE FUMARATE 50 MILLIGRAM(S): 200 TABLET, FILM COATED ORAL at 22:29

## 2022-10-03 RX ADMIN — GABAPENTIN 100 MILLIGRAM(S): 400 CAPSULE ORAL at 05:36

## 2022-10-03 RX ADMIN — FINASTERIDE 5 MILLIGRAM(S): 5 TABLET, FILM COATED ORAL at 11:19

## 2022-10-03 RX ADMIN — Medication 3 MILLIGRAM(S): at 22:29

## 2022-10-03 RX ADMIN — PANTOPRAZOLE SODIUM 40 MILLIGRAM(S): 20 TABLET, DELAYED RELEASE ORAL at 05:35

## 2022-10-03 RX ADMIN — TAMSULOSIN HYDROCHLORIDE 0.4 MILLIGRAM(S): 0.4 CAPSULE ORAL at 22:29

## 2022-10-03 RX ADMIN — INSULIN GLARGINE 24 UNIT(S): 100 INJECTION, SOLUTION SUBCUTANEOUS at 22:29

## 2022-10-03 RX ADMIN — Medication 1 MILLIGRAM(S): at 11:20

## 2022-10-03 RX ADMIN — Medication 20 MILLIGRAM(S): at 05:36

## 2022-10-03 RX ADMIN — ATORVASTATIN CALCIUM 40 MILLIGRAM(S): 80 TABLET, FILM COATED ORAL at 22:29

## 2022-10-03 NOTE — PROGRESS NOTE ADULT - SUBJECTIVE AND OBJECTIVE BOX
Gastroenterology/Hepatology Progress Note      Interval Events:   Patient reported his symptoms have mildly improved but continues to have 5-7 BMs for the past 24 hours. Denied abd pain, fevers, chills, melena or hematochezia. Tolerating PO diet.     Patient was supposed to get Remicade infusion and MTX, however, pharmacy is out of stock on the infusion, so the medication was not given.     Allergies:  No Known Allergies      Hospital Medications:  aspirin enteric coated 81 milliGRAM(s) Oral daily  atorvastatin 40 milliGRAM(s) Oral at bedtime  dextrose 5%. 1000 milliLiter(s) IV Continuous <Continuous>  dextrose 5%. 1000 milliLiter(s) IV Continuous <Continuous>  dextrose 5%. 1000 milliLiter(s) IV Continuous <Continuous>  dextrose 50% Injectable 25 Gram(s) IV Push once  dextrose 50% Injectable 12.5 Gram(s) IV Push once  dextrose 50% Injectable 25 Gram(s) IV Push once  dextrose Oral Gel 15 Gram(s) Oral once PRN  enoxaparin Injectable 40 milliGRAM(s) SubCutaneous every 24 hours  finasteride 5 milliGRAM(s) Oral daily  folic acid 1 milliGRAM(s) Oral daily  gabapentin 100 milliGRAM(s) Oral two times a day  glucagon  Injectable 1 milliGRAM(s) IntraMuscular once  glucagon  Injectable 1 milliGRAM(s) IntraMuscular once  inFLIXimab-dyyb (INFLECTRA) IVPB 1000 milliGRAM(s) IV Intermittent once  insulin glargine Injectable (LANTUS) 24 Unit(s) SubCutaneous at bedtime  insulin lispro (ADMELOG) corrective regimen sliding scale   SubCutaneous three times a day before meals  insulin lispro Injectable (ADMELOG) 14 Unit(s) SubCutaneous three times a day before meals  lactated ringers. 1000 milliLiter(s) IV Continuous <Continuous>  melatonin 3 milliGRAM(s) Oral at bedtime PRN  memantine 10 milliGRAM(s) Oral two times a day  mesalamine DR Capsule 1600 milliGRAM(s) Oral two times a day  methotrexate 10 milliGRAM(s) Oral every week  methylPREDNISolone sodium succinate Injectable 20 milliGRAM(s) IV Push every 8 hours  pantoprazole  Injectable 40 milliGRAM(s) IV Push every 12 hours  QUEtiapine 50 milliGRAM(s) Oral at bedtime  sertraline 100 milliGRAM(s) Oral daily  tamsulosin 0.4 milliGRAM(s) Oral at bedtime      ROS: 14 point ROS negative unless otherwise state in subjective    PHYSICAL EXAM:   Vital Signs:  Vital Signs Last 24 Hrs  T(C): 36.4 (03 Oct 2022 05:47), Max: 36.8 (02 Oct 2022 15:27)  T(F): 97.6 (03 Oct 2022 05:47), Max: 98.3 (02 Oct 2022 23:21)  HR: 81 (03 Oct 2022 05:47) (76 - 86)  BP: 123/60 (03 Oct 2022 05:47) (122/60 - 129/60)  BP(mean): --  RR: 16 (03 Oct 2022 05:47) (16 - 17)  SpO2: 100% (03 Oct 2022 05:47) (97% - 100%)    Parameters below as of 03 Oct 2022 05:47  Patient On (Oxygen Delivery Method): room air      Daily     Daily     GENERAL:  No acute distress, well appearing, lying in bed.   HEENT:  NCAT, no scleral icterus  CHEST: no resp distress  ABDOMEN:  Soft, non-tender, non-distended, no masses  EXTREMITIES:  No  edema b/l  NEURO:  Alert and oriented x 3.     LABS:                        8.1    5.11  )-----------( 295      ( 03 Oct 2022 05:50 )             27.8     Mean Cell Volume: 81.8 fL (10-03-22 @ 05:50)    10-03    138  |  103  |  11  ----------------------------<  173<H>  4.2   |  26  |  0.70    Ca    8.2<L>      03 Oct 2022 05:50  Phos  3.7     10-03  Mg     2.00     10-03    TPro  6.0  /  Alb  2.8<L>  /  TBili  0.5  /  DBili  x   /  AST  12  /  ALT  12  /  AlkPhos  69  10-03    LIVER FUNCTIONS - ( 03 Oct 2022 05:50 )  Alb: 2.8 g/dL / Pro: 6.0 g/dL / ALK PHOS: 69 U/L / ALT: 12 U/L / AST: 12 U/L / GGT: x             Imaging:      Ct abd and pelvis    FINDINGS:  LOWER CHEST: Within normal limits.    LIVER: Within normal limits.  BILE DUCTS: Normal caliber.  GALLBLADDER: Small gallstone.  SPLEEN: Within normal limits.  PANCREAS: Within normal limits.  ADRENALS: Within normal limits.  KIDNEYS/URETERS: 4 mm nonobstructive calculus lower pole right kidney. 2   cm cyst upper pole left kidney.    BLADDER: Within normal limits.  REPRODUCTIVE ORGANS: Prostate within normal limits.    BOWEL: No bowel obstruction. Mural thickening and associated mesenteric   hyperemia involving the colon from the hepatic flexure through the mid   descending colon. Submucosal fat deposition in the cecum and rectosigmoid   colon consistent with chronic information. Appendix mural thickening and   mucosal hyperenhancement in the mid appendix.  PERITONEUM: No ascites.  VESSELS: Atheromatous calcifications.  RETROPERITONEUM/LYMPH NODES: No lymphadenopathy.  ABDOMINAL WALL: Within normal limits.  BONES: Hemisacralization of the fifth lumbar vertebra.    IMPRESSION:  Crohn's colitis involving the transverse and descending colon and   appendix.    Colonoscopy in 7/2020    Impression:          - Inflammation was found from the anus to the descending colon secondary to                        left-sided colitis. The findings are unchanged compared to previous                        examinations. Biopsied.          - One 15 mm polyp in the rectum, benign appearing likely inflammatory,                        removed with a hot snare. Resected and retrieved.                       - Diverticulosis in the sigmoid colon.                       - Localized mild inflammation was found in the cecum.                       Clinical improvement with Entyvio BUT no endoscopic improvement.     Gastroenterology/Hepatology Progress Note      Interval Events:   Patient reported his symptoms have mildly improved but continues to have 5-7 BMs for the past 24 hours. Denied abd pain, fevers, chills, melena or hematochezia. Tolerating PO diet.     Patient was supposed to get Remicade infusion and MTX, however, pharmacy is out of stock on the infusion, so the medication was not given.     Allergies:  No Known Allergies      Hospital Medications:  aspirin enteric coated 81 milliGRAM(s) Oral daily  atorvastatin 40 milliGRAM(s) Oral at bedtime  dextrose 5%. 1000 milliLiter(s) IV Continuous <Continuous>  dextrose 5%. 1000 milliLiter(s) IV Continuous <Continuous>  dextrose 5%. 1000 milliLiter(s) IV Continuous <Continuous>  dextrose 50% Injectable 25 Gram(s) IV Push once  dextrose 50% Injectable 12.5 Gram(s) IV Push once  dextrose 50% Injectable 25 Gram(s) IV Push once  dextrose Oral Gel 15 Gram(s) Oral once PRN  enoxaparin Injectable 40 milliGRAM(s) SubCutaneous every 24 hours  finasteride 5 milliGRAM(s) Oral daily  folic acid 1 milliGRAM(s) Oral daily  gabapentin 100 milliGRAM(s) Oral two times a day  glucagon  Injectable 1 milliGRAM(s) IntraMuscular once  glucagon  Injectable 1 milliGRAM(s) IntraMuscular once  inFLIXimab-dyyb (INFLECTRA) IVPB 1000 milliGRAM(s) IV Intermittent once  insulin glargine Injectable (LANTUS) 24 Unit(s) SubCutaneous at bedtime  insulin lispro (ADMELOG) corrective regimen sliding scale   SubCutaneous three times a day before meals  insulin lispro Injectable (ADMELOG) 14 Unit(s) SubCutaneous three times a day before meals  lactated ringers. 1000 milliLiter(s) IV Continuous <Continuous>  melatonin 3 milliGRAM(s) Oral at bedtime PRN  memantine 10 milliGRAM(s) Oral two times a day  mesalamine DR Capsule 1600 milliGRAM(s) Oral two times a day  methotrexate 10 milliGRAM(s) Oral every week  methylPREDNISolone sodium succinate Injectable 20 milliGRAM(s) IV Push every 8 hours  pantoprazole  Injectable 40 milliGRAM(s) IV Push every 12 hours  QUEtiapine 50 milliGRAM(s) Oral at bedtime  sertraline 100 milliGRAM(s) Oral daily  tamsulosin 0.4 milliGRAM(s) Oral at bedtime      ROS: 14 point ROS negative unless otherwise state in subjective    PHYSICAL EXAM:   Vital Signs:  Vital Signs Last 24 Hrs  T(C): 36.4 (03 Oct 2022 05:47), Max: 36.8 (02 Oct 2022 15:27)  T(F): 97.6 (03 Oct 2022 05:47), Max: 98.3 (02 Oct 2022 23:21)  HR: 81 (03 Oct 2022 05:47) (76 - 86)  BP: 123/60 (03 Oct 2022 05:47) (122/60 - 129/60)  BP(mean): --  RR: 16 (03 Oct 2022 05:47) (16 - 17)  SpO2: 100% (03 Oct 2022 05:47) (97% - 100%)    Parameters below as of 03 Oct 2022 05:47  Patient On (Oxygen Delivery Method): room air      Daily     Daily     GENERAL:  No acute distress, well appearing, lying in bed.   HEENT:  NCAT, no scleral icterus  CHEST: no resp distress  ABDOMEN:  Soft, non-tender, non-distended, no masses  EXTREMITIES:  No  edema b/l  NEURO:  Alert and oriented x 3.     LABS:                        8.1    5.11  )-----------( 295      ( 03 Oct 2022 05:50 )             27.8     Mean Cell Volume: 81.8 fL (10-03-22 @ 05:50)    10-03    138  |  103  |  11  ----------------------------<  173<H>  4.2   |  26  |  0.70    Ca    8.2<L>      03 Oct 2022 05:50  Phos  3.7     10-03  Mg     2.00     10-03    TPro  6.0  /  Alb  2.8<L>  /  TBili  0.5  /  DBili  x   /  AST  12  /  ALT  12  /  AlkPhos  69  10-03    LIVER FUNCTIONS - ( 03 Oct 2022 05:50 )  Alb: 2.8 g/dL / Pro: 6.0 g/dL / ALK PHOS: 69 U/L / ALT: 12 U/L / AST: 12 U/L / GGT: x             Imaging:      Ct abd and pelvis    FINDINGS:  LOWER CHEST: Within normal limits.    LIVER: Within normal limits.  BILE DUCTS: Normal caliber.  GALLBLADDER: Small gallstone.  SPLEEN: Within normal limits.  PANCREAS: Within normal limits.  ADRENALS: Within normal limits.  KIDNEYS/URETERS: 4 mm nonobstructive calculus lower pole right kidney. 2   cm cyst upper pole left kidney.    BLADDER: Within normal limits.  REPRODUCTIVE ORGANS: Prostate within normal limits.    BOWEL: No bowel obstruction. Mural thickening and associated mesenteric   hyperemia involving the colon from the hepatic flexure through the mid   descending colon. Submucosal fat deposition in the cecum and rectosigmoid   colon consistent with chronic information. Appendix mural thickening and   mucosal hyperenhancement in the mid appendix.  PERITONEUM: No ascites.  VESSELS: Atheromatous calcifications.  RETROPERITONEUM/LYMPH NODES: No lymphadenopathy.  ABDOMINAL WALL: Within normal limits.  BONES: Hemisacralization of the fifth lumbar vertebra.    IMPRESSION:  Crohn's colitis involving the transverse and descending colon and   appendix.    Colonoscopy in 7/2020    Impression:          - Inflammation was found from the anus to the descending colon secondary to                        left-sided colitis. The findings are unchanged compared to previous                        examinations. Biopsied.          - One 15 mm polyp in the rectum, benign appearing likely inflammatory,                        removed with a hot snare. Resected and retrieved.                       - Diverticulosis in the sigmoid colon.                       - Localized mild inflammation was found in the cecum.                       Clinical improvement with Entyvio BUT no endoscopic improvement.    Impression:          - Inflammation was found from the anus to the splenic                        flexure. This was moderate in severity, worsened                        compared to previous examinations. Biopsied.                       - Left-sided ulcerative colitis. Inflammation was found                        from the transverse colon to the cecum. This was severe,                        worsened compared to previous examinations. Biopsied.                       - Bloody diarrhea secondary to severe inflammation due                        to his underlying UC, predominantly left sided disease.     Gastroenterology/Hepatology Progress Note      Interval Events:   Patient reported his symptoms have mildly improved but continues to have 5-7 BMs for the past 24 hours. Denied abd pain, fevers, chills, melena or hematochezia. Tolerating PO diet.     Patient was supposed to get Remicade infusion and MTX, however, pharmacy is out of stock on the infusion, so the medication was not given.     Allergies:  No Known Allergies      Hospital Medications:  aspirin enteric coated 81 milliGRAM(s) Oral daily  atorvastatin 40 milliGRAM(s) Oral at bedtime  dextrose 5%. 1000 milliLiter(s) IV Continuous <Continuous>  dextrose 5%. 1000 milliLiter(s) IV Continuous <Continuous>  dextrose 5%. 1000 milliLiter(s) IV Continuous <Continuous>  dextrose 50% Injectable 25 Gram(s) IV Push once  dextrose 50% Injectable 12.5 Gram(s) IV Push once  dextrose 50% Injectable 25 Gram(s) IV Push once  dextrose Oral Gel 15 Gram(s) Oral once PRN  enoxaparin Injectable 40 milliGRAM(s) SubCutaneous every 24 hours  finasteride 5 milliGRAM(s) Oral daily  folic acid 1 milliGRAM(s) Oral daily  gabapentin 100 milliGRAM(s) Oral two times a day  glucagon  Injectable 1 milliGRAM(s) IntraMuscular once  glucagon  Injectable 1 milliGRAM(s) IntraMuscular once  inFLIXimab-dyyb (INFLECTRA) IVPB 1000 milliGRAM(s) IV Intermittent once  insulin glargine Injectable (LANTUS) 24 Unit(s) SubCutaneous at bedtime  insulin lispro (ADMELOG) corrective regimen sliding scale   SubCutaneous three times a day before meals  insulin lispro Injectable (ADMELOG) 14 Unit(s) SubCutaneous three times a day before meals  lactated ringers. 1000 milliLiter(s) IV Continuous <Continuous>  melatonin 3 milliGRAM(s) Oral at bedtime PRN  memantine 10 milliGRAM(s) Oral two times a day  mesalamine DR Capsule 1600 milliGRAM(s) Oral two times a day  methotrexate 10 milliGRAM(s) Oral every week  methylPREDNISolone sodium succinate Injectable 20 milliGRAM(s) IV Push every 8 hours  pantoprazole  Injectable 40 milliGRAM(s) IV Push every 12 hours  QUEtiapine 50 milliGRAM(s) Oral at bedtime  sertraline 100 milliGRAM(s) Oral daily  tamsulosin 0.4 milliGRAM(s) Oral at bedtime      ROS: 14 point ROS negative unless otherwise state in subjective    PHYSICAL EXAM:   Vital Signs:  Vital Signs Last 24 Hrs  T(C): 36.4 (03 Oct 2022 05:47), Max: 36.8 (02 Oct 2022 15:27)  T(F): 97.6 (03 Oct 2022 05:47), Max: 98.3 (02 Oct 2022 23:21)  HR: 81 (03 Oct 2022 05:47) (76 - 86)  BP: 123/60 (03 Oct 2022 05:47) (122/60 - 129/60)  BP(mean): --  RR: 16 (03 Oct 2022 05:47) (16 - 17)  SpO2: 100% (03 Oct 2022 05:47) (97% - 100%)    Parameters below as of 03 Oct 2022 05:47  Patient On (Oxygen Delivery Method): room air      Daily     Daily     GENERAL:  No acute distress, well appearing, lying in bed.   HEENT:  NCAT, no scleral icterus  CHEST: no resp distress  ABDOMEN:  Soft, non-tender, non-distended, no masses  EXTREMITIES:  No  edema b/l  NEURO/PSYCH: NO tremor, alert and oriented x 3.     LABS:                        8.1    5.11  )-----------( 295      ( 03 Oct 2022 05:50 )             27.8     Mean Cell Volume: 81.8 fL (10-03-22 @ 05:50)    10-03    138  |  103  |  11  ----------------------------<  173<H>  4.2   |  26  |  0.70    Ca    8.2<L>      03 Oct 2022 05:50  Phos  3.7     10-03  Mg     2.00     10-03    TPro  6.0  /  Alb  2.8<L>  /  TBili  0.5  /  DBili  x   /  AST  12  /  ALT  12  /  AlkPhos  69  10-03    LIVER FUNCTIONS - ( 03 Oct 2022 05:50 )  Alb: 2.8 g/dL / Pro: 6.0 g/dL / ALK PHOS: 69 U/L / ALT: 12 U/L / AST: 12 U/L / GGT: x             Imaging:      Ct abd and pelvis    FINDINGS:  LOWER CHEST: Within normal limits.    LIVER: Within normal limits.  BILE DUCTS: Normal caliber.  GALLBLADDER: Small gallstone.  SPLEEN: Within normal limits.  PANCREAS: Within normal limits.  ADRENALS: Within normal limits.  KIDNEYS/URETERS: 4 mm nonobstructive calculus lower pole right kidney. 2   cm cyst upper pole left kidney.    BLADDER: Within normal limits.  REPRODUCTIVE ORGANS: Prostate within normal limits.    BOWEL: No bowel obstruction. Mural thickening and associated mesenteric   hyperemia involving the colon from the hepatic flexure through the mid   descending colon. Submucosal fat deposition in the cecum and rectosigmoid   colon consistent with chronic information. Appendix mural thickening and   mucosal hyperenhancement in the mid appendix.  PERITONEUM: No ascites.  VESSELS: Atheromatous calcifications.  RETROPERITONEUM/LYMPH NODES: No lymphadenopathy.  ABDOMINAL WALL: Within normal limits.  BONES: Hemisacralization of the fifth lumbar vertebra.    IMPRESSION:  Crohn's colitis involving the transverse and descending colon and   appendix.    Colonoscopy in 7/2020    Impression:          - Inflammation was found from the anus to the descending colon secondary to                        left-sided colitis. The findings are unchanged compared to previous                        examinations. Biopsied.          - One 15 mm polyp in the rectum, benign appearing likely inflammatory,                        removed with a hot snare. Resected and retrieved.                       - Diverticulosis in the sigmoid colon.                       - Localized mild inflammation was found in the cecum.                       Clinical improvement with Entyvio BUT no endoscopic improvement.    Colonoscopy 9/30/22  Impression:          - Inflammation was found from the anus to the splenic                        flexure. This was moderate in severity, worsened                        compared to previous examinations. Biopsied.                       - Left-sided ulcerative colitis. Inflammation was found                        from the transverse colon to the cecum. This was severe,                        worsened compared to previous examinations. Biopsied.                       - Bloody diarrhea secondary to severe inflammation due                        to his underlying UC, predominantly left sided disease.

## 2022-10-03 NOTE — PROGRESS NOTE ADULT - ASSESSMENT
72yoM with PMH of Crohn's disease, IDDM, neuropathy, HTN, HLD, BPH, CVA on ASA 81mg daily who presented with bloody diarrhea, concerning for CD flare.     Impression:   #IBD flare: Bloody diarrhea, concerning for CD flare -- unclear CD vs. UC as per Dr. Oro note. s/p poor prior response to Entyvio. On remicade for the past one year, last infusion 2 weeks.  On 6/30/22 pts remicade level was <0.4 and antibody was elevated at 308. Remicade dosage was increased since aug 2022. Last colonoscopy in 7/2020 showed inflammation from the anus to the descending colon (left sided colitis) and diverticulosis. Repeat colonoscopy showed inflammation in the   - GI PCR negative, c diff test pending.   - ESR and CRP elevated.   - On IV steroids (9/28 -   - was supposed to get Remicade infusion last week but not available in formulary. Will re-order Remicade 10mg/kg with methotrexate today and follow up.       Recommendations:   - Continue with IV Solumedrol 20mg Q8hrs.   - Monitor symptoms after IFX + MTX on 9/30  - follow up path from colonoscopy  - advance to low residue diet  - obtain CRP and ESR daily.   - f/u fecal protectin  - Place him on DVT PPx as IBD patients are high risk for thrombosis.      72yoM with PMH of Crohn's disease, IDDM, neuropathy, HTN, HLD, BPH, CVA on ASA 81mg daily who presented with bloody diarrhea, concerning for CD flare.     Impression:   #IBD flare: Bloody diarrhea, concerning for CD flare -- unclear CD vs. UC as per Dr. Oro note. s/p poor prior response to Entyvio. On remicade for the past one year, last infusion 2 weeks.  On 6/30/22 pts remicade level was <0.4 and antibody was elevated at 308. Remicade dosage was increased since aug 2022. Last colonoscopy in 7/2020 showed inflammation from the anus to the descending colon (left sided colitis) and diverticulosis. Repeat colonoscopy showed inflammation in the transverse colon to the cecum. Pathology pending.   - GI PCR negative, c diff test pending.   - ESR and CRP elevated.   - On IV steroids (9/28 -   - was supposed to get Remicade infusion last week but not available in formulary. Will re-order Remicade 10mg/kg with methotrexate today and follow up.     Recommendations:   - Continue with IV Solumedrol 20mg Q8hrs.   - Will re-order the infliximab today with MTX, please let us know if the patient did not receive the medication.   - follow up path from colonoscopy  - continue with low residue diet  - obtain CRP and ESR daily.   - f/u fecal protectin  - Place him on DVT PPx as IBD patients are high risk for thrombosis.     GI will continue to follow.     All recommendations are tentative until note is attested by an attending.     Jorge Arboleda, PGY-4  Gastroenterology/Hepatology Fellow  Available on [employer] Teams  [phone] (Short Range Pager)  [phone] (Long Range Pager)    After 5pm, please contact the on-call GI fellow. [phone]     72yoM with PMH of Crohn's disease, IDDM, neuropathy, HTN, HLD, BPH, CVA on ASA 81mg daily who presented with bloody diarrhea, concerning for CD flare.     Impression:   #IBD flare: Bloody diarrhea, concerning for CD flare -- unclear CD vs. UC as per Dr. Oro note. s/p poor prior response to Entyvio. On remicade for the past one year, last infusion 2 weeks.  On 6/30/22 pts remicade level was <0.4 and antibody was elevated at 308. Remicade dosage was increased since aug 2022. Last colonoscopy in 7/2020 showed inflammation from the anus to the descending colon (left sided colitis) and diverticulosis. Repeat colonoscopy showed inflammation in the transverse colon to the cecum. Pathology pending.   - GI PCR negative, c diff test pending.   - ESR and CRP elevated.   - On IV steroids (9/28 -   - was supposed to get Remicade infusion last week but not available    Recommendations:   - Continue with IV Solumedrol 20mg Q8hrs.   - Will re-order the infliximab today, please let us know if the patient did not receive the medication  - Methotrexate weekly   - follow up path from colonoscopy  - continue with low residue diet  - obtain CRP and ESR daily.   - f/u fecal protectin  - Place him on DVT PPx as IBD patients are high risk for thrombosis.     GI will continue to follow.     All recommendations are tentative until note is attested by an attending.     Jorge Arboleda, PGY-4  Gastroenterology/Hepatology Fellow  Available on [employer] Teams  [phone] (Short Range Pager)  [phone] (Long Range Pager)    After 5pm, please contact the on-call GI fellow. [phone]

## 2022-10-03 NOTE — PROGRESS NOTE ADULT - ATTENDING COMMENTS
72M with IBD (suspected Crohn's colitis vs less likely UC, follows with Dr. Oro, on Remicade) admitted with bloody diarrhea c/f flare. Infectious workup, including C diff was negative. Patient underwent colonoscopy on 9/30 with active colitis, most notably in right colon. Given outpatient labs with elevated Remicade Abs and inadequate level (6/2022), his outpatient dose of Remicade was increased and he was planned for repeat dose on 9/30 in hospital. As Remicade (and biosimilars) were not available, dose administration was delayed until today.     --Monitor abdominal exam and stool output  --Plan for Remicade today  --Methotrexate weekly  --Continue Solumedrol 20mg q8h for now  --Monitor daily ESR and CRP  --Pending response to Remicade, will determine if needs a second inpatient dose; if no improvement, recommend consultation of colorectal surgery  --DVT prophylaxis    Dr. Oro was updated regarding inpatient plan.

## 2022-10-03 NOTE — PROGRESS NOTE ADULT - ATTENDING COMMENTS
72 year old man w/ past medical history of Crohn's disease, IDDM, neuropathy, HTN, HLD, BPH, CVA on ASA 81mg daily a/w likely Crohn's Disease flare. GI consulted for further management. GI PCR negative, c diff negative. Patient on steroids for flare, solumedrol 20mg q 8 hours. C-scope w/ active colitis, given outpatient labs w/ elevated Remicade abs and inadequate level his outpatient Remicade dose was increased and planned for repeat dose on 9/30. As Remicade and biosimilars were not available administration was delayed until 10/3. Remicade being given 10/3, methotrexate weekly. Per GI pending response to remicade will determine need for second inpatient dose. If w/ no improvement may need colorectal surgery consult.

## 2022-10-03 NOTE — PROGRESS NOTE ADULT - PROBLEM SELECTOR PLAN 3
on Lantus 30 and Admelog 28-30 at home  - Lantus 24  - Admelog 14 tid qAC  - correctional scale tid qAC

## 2022-10-03 NOTE — PROGRESS NOTE ADULT - PROBLEM SELECTOR PLAN 1
IBD flare with bloody diarrhea likely 2/2 UC vs Crohn  - elevated ESR, CRP, both downtrending  - infectious work up neg: Cdiff PCR, GI PCR, stool Cx, Stool O+p  - F/up fecal calprotectin,    - LR @75 for rehydration  - IBD flare management as per GI - appreciate recs     - solumedrol 20 q8  - pantoprazole per GI  - per colonoscopy Left sided UC, biopsies in lab. Methotrexate and infliximab pending per pharmacy staff  - Spoke on 10/1 to pharmacy, per pharmacy staff, there is no Infliximab available in the hospital and the methotrexate needs to be given with infliximab. Order will take 1-2 days. Pt understands that pharmacy needs to obtain special medications before he can recieve them. For now the order will remain unverified by pharmacy till medication reaches facility per pharmacy staff. IBD flare with bloody diarrhea likely 2/2 UC vs Crohn  - elevated ESR, CRP, both downtrending  - infectious work up neg: Cdiff PCR, GI PCR, stool Cx, Stool O+p  - F/up fecal calprotectin,    - LR @75 for rehydration  - IBD flare management as per GI - appreciate recs     - solumedrol 20 q8  - pantoprazole per GI  - per colonoscopy Left sided UC, biopsies in lab. Methotrexate and infliximab pending per pharmacy staff  - Spoke on 10/1 to pharmacy, per pharmacy staff, there is no Infliximab available in the hospital and the methotrexate needs to be given with infliximab. Order will take 1-2 days. Pt understands that pharmacy needs to obtain special medications before he can recieve them. For now the order will remain unverified by pharmacy till medication reaches facility per pharmacy staff

## 2022-10-03 NOTE — PROGRESS NOTE ADULT - SUBJECTIVE AND OBJECTIVE BOX
Progress Note     == draft in progress ==  Britt Childress  PGY-1 Medicine  Teams | [id number]    Patient is a 72y old  Male who presents with a chief complaint of Persistent bloody diarrhea, generalized weakness (02 Oct 2022 12:18)          SUBJECTIVE / INTERVAL EVENTS  Patient seen and evaluated at bedside. Patient doing okay today. Patient denied fever, nausea/vomiting, shortness of breath, new pain (headache, chest pain, abdominal pain, limb pain), new swelling, new numbness/tingling, dysuria or hematuria.        MEDICATIONS    Home Medications:  aspirin 81 mg oral delayed release tablet: 1 tab(s) orally once a day (28 Sep 2022 01:21)  atorvastatin 40 mg oral tablet: 1 tab(s) orally once a day (at bedtime) (28 Sep 2022 01:21)  Centrum Silver oral tablet: 1 tab(s) orally once a day (28 Sep 2022 01:21)  finasteride 5 mg oral tablet: 1 tab(s) orally once a day (28 Sep 2022 01:21)  folic acid 1 mg oral tablet: 1 tab(s) orally once a day (01 Oct 2022 11:05)  gabapentin 100 mg oral capsule: 1 tab(s) orally 2 times a day (28 Sep 2022 01:21)  insulin aspart 100 units/mL injectable solution: 28 unit(s) injectable 3 times a day (before meals) (28 Sep 2022 01:21)  Januvia 50 mg oral tablet: 1 tab(s) orally once a day (28 Sep 2022 01:21)  memantine 10 mg oral tablet: 1 tab(s) orally 2 times a day (28 Sep 2022 01:21)  mesalamine 800 mg oral delayed release tablet: 2 tab(s) orally 2 times a day (28 Sep 2022 01:21)  QUEtiapine 25 mg oral tablet: 2 tab(s) orally once a day (at bedtime) (28 Sep 2022 01:21)  Remicade 100 mg intravenous injection:  (28 Sep 2022 01:21)  sertraline 100 mg oral tablet: 1 tab(s) orally once a day (28 Sep 2022 01:21)  tamsulosin 0.4 mg oral capsule: 1 cap(s) orally 2 times a day (28 Sep 2022 01:21)  Tresiba 100 units/mL subcutaneous solution: 30 unit(s) subcutaneous once a day (at bedtime) (28 Sep 2022 01:21)  trimethoprim 100 mg oral tablet: 1 tab(s) orally once a day (at bedtime) (28 Sep 2022 01:21)  Vitamin D3 400 intl units oral tablet: 1 tab(s) orally once a day (28 Sep 2022 01:21)      MEDICATIONS  (STANDING):  aspirin enteric coated 81 milliGRAM(s) Oral daily  atorvastatin 40 milliGRAM(s) Oral at bedtime  dextrose 5%. 1000 milliLiter(s) (50 mL/Hr) IV Continuous <Continuous>  dextrose 5%. 1000 milliLiter(s) (100 mL/Hr) IV Continuous <Continuous>  dextrose 5%. 1000 milliLiter(s) (100 mL/Hr) IV Continuous <Continuous>  dextrose 50% Injectable 25 Gram(s) IV Push once  dextrose 50% Injectable 12.5 Gram(s) IV Push once  dextrose 50% Injectable 25 Gram(s) IV Push once  enoxaparin Injectable 40 milliGRAM(s) SubCutaneous every 24 hours  finasteride 5 milliGRAM(s) Oral daily  folic acid 1 milliGRAM(s) Oral daily  gabapentin 100 milliGRAM(s) Oral two times a day  glucagon  Injectable 1 milliGRAM(s) IntraMuscular once  glucagon  Injectable 1 milliGRAM(s) IntraMuscular once  inFLIXimab-dyyb (INFLECTRA) IVPB 1000 milliGRAM(s) IV Intermittent once  insulin glargine Injectable (LANTUS) 24 Unit(s) SubCutaneous at bedtime  insulin lispro (ADMELOG) corrective regimen sliding scale   SubCutaneous three times a day before meals  insulin lispro Injectable (ADMELOG) 14 Unit(s) SubCutaneous three times a day before meals  lactated ringers. 1000 milliLiter(s) (75 mL/Hr) IV Continuous <Continuous>  memantine 10 milliGRAM(s) Oral two times a day  mesalamine DR Capsule 1600 milliGRAM(s) Oral two times a day  methotrexate 10 milliGRAM(s) Oral every week  methylPREDNISolone sodium succinate Injectable 20 milliGRAM(s) IV Push every 8 hours  pantoprazole  Injectable 40 milliGRAM(s) IV Push every 12 hours  QUEtiapine 50 milliGRAM(s) Oral at bedtime  sertraline 100 milliGRAM(s) Oral daily  tamsulosin 0.4 milliGRAM(s) Oral at bedtime    MEDICATIONS  (PRN):  dextrose Oral Gel 15 Gram(s) Oral once PRN Blood Glucose LESS THAN 70 milliGRAM(s)/deciliter  melatonin 3 milliGRAM(s) Oral at bedtime PRN Insomnia         VITALS / I&O's  T(C): 36.4 (10-03-22 @ 05:47), Max: 36.8 (10-02-22 @ 15:27)  HR: 81 (10-03-22 @ 05:47) (76 - 86)  BP: 123/60 (10-03-22 @ 05:47) (122/60 - 129/60)  RR: 16 (10-03-22 @ 05:47) (16 - 17)  SpO2: 100% (10-03-22 @ 05:47) (97% - 100%)  I&O's Summary    02 Oct 2022 07:01  -  03 Oct 2022 07:00  --------------------------------------------------------  IN: 1650 mL / OUT: 1850 mL / NET: -200 mL           PHYSICAL EXAM           LABS                        8.1    5.11  )-----------( 295      ( 03 Oct 2022 05:50 )             27.8     10-03    138  |  103  |  11  ----------------------------<  173<H>  4.2   |  26  |  0.70    Ca    8.2<L>      03 Oct 2022 05:50  Phos  3.7     10-03  Mg     2.00     10-03    TPro  6.0  /  Alb  2.8<L>  /  TBili  0.5  /  DBili  x   /  AST  12  /  ALT  12  /  AlkPhos  69  10-03    CAPILLARY BLOOD GLUCOSE      POCT Blood Glucose.: 183 mg/dL (03 Oct 2022 08:48)  POCT Blood Glucose.: 126 mg/dL (02 Oct 2022 22:56)  POCT Blood Glucose.: 71 mg/dL (02 Oct 2022 22:16)  POCT Blood Glucose.: 73 mg/dL (02 Oct 2022 22:14)  POCT Blood Glucose.: 172 mg/dL (02 Oct 2022 18:04)  POCT Blood Glucose.: 144 mg/dL (02 Oct 2022 12:38)      LIVER FUNCTIONS - ( 03 Oct 2022 05:50 )  Alb: 2.8 g/dL / Pro: 6.0 g/dL / ALK PHOS: 69 U/L / ALT: 12 U/L / AST: 12 U/L / GGT: x                         Progress Note       Britt Childress  PGY-1 Medicine  Teams | [id number]    Patient is a 72y old  Male who presents with a chief complaint of Persistent bloody diarrhea, generalized weakness (02 Oct 2022 12:18)          SUBJECTIVE / INTERVAL EVENTS  Patient seen and evaluated at bedside. Patient doing okay today. Patient denied fever, nausea/vomiting, shortness of breath, chest pain, abdominal pain, limb pain, new swelling, new numbness/tingling, dysuria or hematuria. Per RN, patient had 5 BMs prior day, 1 nonbloody frozen yogurt consistency stool this AM.        MEDICATIONS    Home Medications:  aspirin 81 mg oral delayed release tablet: 1 tab(s) orally once a day (28 Sep 2022 01:21)  atorvastatin 40 mg oral tablet: 1 tab(s) orally once a day (at bedtime) (28 Sep 2022 01:21)  Centrum Silver oral tablet: 1 tab(s) orally once a day (28 Sep 2022 01:21)  finasteride 5 mg oral tablet: 1 tab(s) orally once a day (28 Sep 2022 01:21)  folic acid 1 mg oral tablet: 1 tab(s) orally once a day (01 Oct 2022 11:05)  gabapentin 100 mg oral capsule: 1 tab(s) orally 2 times a day (28 Sep 2022 01:21)  insulin aspart 100 units/mL injectable solution: 28 unit(s) injectable 3 times a day (before meals) (28 Sep 2022 01:21)  Januvia 50 mg oral tablet: 1 tab(s) orally once a day (28 Sep 2022 01:21)  memantine 10 mg oral tablet: 1 tab(s) orally 2 times a day (28 Sep 2022 01:21)  mesalamine 800 mg oral delayed release tablet: 2 tab(s) orally 2 times a day (28 Sep 2022 01:21)  QUEtiapine 25 mg oral tablet: 2 tab(s) orally once a day (at bedtime) (28 Sep 2022 01:21)  Remicade 100 mg intravenous injection:  (28 Sep 2022 01:21)  sertraline 100 mg oral tablet: 1 tab(s) orally once a day (28 Sep 2022 01:21)  tamsulosin 0.4 mg oral capsule: 1 cap(s) orally 2 times a day (28 Sep 2022 01:21)  Tresiba 100 units/mL subcutaneous solution: 30 unit(s) subcutaneous once a day (at bedtime) (28 Sep 2022 01:21)  trimethoprim 100 mg oral tablet: 1 tab(s) orally once a day (at bedtime) (28 Sep 2022 01:21)  Vitamin D3 400 intl units oral tablet: 1 tab(s) orally once a day (28 Sep 2022 01:21)      MEDICATIONS  (STANDING):  aspirin enteric coated 81 milliGRAM(s) Oral daily  atorvastatin 40 milliGRAM(s) Oral at bedtime  dextrose 5%. 1000 milliLiter(s) (50 mL/Hr) IV Continuous <Continuous>  dextrose 5%. 1000 milliLiter(s) (100 mL/Hr) IV Continuous <Continuous>  dextrose 5%. 1000 milliLiter(s) (100 mL/Hr) IV Continuous <Continuous>  dextrose 50% Injectable 25 Gram(s) IV Push once  dextrose 50% Injectable 12.5 Gram(s) IV Push once  dextrose 50% Injectable 25 Gram(s) IV Push once  enoxaparin Injectable 40 milliGRAM(s) SubCutaneous every 24 hours  finasteride 5 milliGRAM(s) Oral daily  folic acid 1 milliGRAM(s) Oral daily  gabapentin 100 milliGRAM(s) Oral two times a day  glucagon  Injectable 1 milliGRAM(s) IntraMuscular once  glucagon  Injectable 1 milliGRAM(s) IntraMuscular once  inFLIXimab-dyyb (INFLECTRA) IVPB 1000 milliGRAM(s) IV Intermittent once  insulin glargine Injectable (LANTUS) 24 Unit(s) SubCutaneous at bedtime  insulin lispro (ADMELOG) corrective regimen sliding scale   SubCutaneous three times a day before meals  insulin lispro Injectable (ADMELOG) 14 Unit(s) SubCutaneous three times a day before meals  lactated ringers. 1000 milliLiter(s) (75 mL/Hr) IV Continuous <Continuous>  memantine 10 milliGRAM(s) Oral two times a day  mesalamine DR Capsule 1600 milliGRAM(s) Oral two times a day  methotrexate 10 milliGRAM(s) Oral every week  methylPREDNISolone sodium succinate Injectable 20 milliGRAM(s) IV Push every 8 hours  pantoprazole  Injectable 40 milliGRAM(s) IV Push every 12 hours  QUEtiapine 50 milliGRAM(s) Oral at bedtime  sertraline 100 milliGRAM(s) Oral daily  tamsulosin 0.4 milliGRAM(s) Oral at bedtime    MEDICATIONS  (PRN):  dextrose Oral Gel 15 Gram(s) Oral once PRN Blood Glucose LESS THAN 70 milliGRAM(s)/deciliter  melatonin 3 milliGRAM(s) Oral at bedtime PRN Insomnia         VITALS / I&O's  T(C): 36.4 (10-03-22 @ 05:47), Max: 36.8 (10-02-22 @ 15:27)  HR: 81 (10-03-22 @ 05:47) (76 - 86)  BP: 123/60 (10-03-22 @ 05:47) (122/60 - 129/60)  RR: 16 (10-03-22 @ 05:47) (16 - 17)  SpO2: 100% (10-03-22 @ 05:47) (97% - 100%)  I&O's Summary    02 Oct 2022 07:01  -  03 Oct 2022 07:00  --------------------------------------------------------  IN: 1650 mL / OUT: 1850 mL / NET: -200 mL           PHYSICAL EXAM  General: Reclining in bed in no acute distress.  HEENT: Normocephalic, atraumatic. Extraocular movements grossly intact. No nasal discharge.  Neuro: Alert, somewhat oriented. No facial asymmetry or dysarthria. Moving all extremities.  CV: Regular rate and rhythm. S1/S2. +systolic murmur? Limbs warm, no distal edema.  Respiratory: Anterior lung fields CTAB. No increased work of breathing, speaking comfortably.  Abdomen: Soft; somewhat distended; nontender to palpation. No rebound or guarding.   : Suprapubic region nontender.  Skin: No rashes or bruising of face, forearms, or calves bilaterally.  Psych: Mood and affect appropriate.         LABS                        8.1    5.11  )-----------( 295      ( 03 Oct 2022 05:50 )             27.8     10-03    138  |  103  |  11  ----------------------------<  173<H>  4.2   |  26  |  0.70    Ca    8.2<L>      03 Oct 2022 05:50  Phos  3.7     10-03  Mg     2.00     10-03    TPro  6.0  /  Alb  2.8<L>  /  TBili  0.5  /  DBili  x   /  AST  12  /  ALT  12  /  AlkPhos  69  10-03    CAPILLARY BLOOD GLUCOSE      POCT Blood Glucose.: 183 mg/dL (03 Oct 2022 08:48)  POCT Blood Glucose.: 126 mg/dL (02 Oct 2022 22:56)  POCT Blood Glucose.: 71 mg/dL (02 Oct 2022 22:16)  POCT Blood Glucose.: 73 mg/dL (02 Oct 2022 22:14)  POCT Blood Glucose.: 172 mg/dL (02 Oct 2022 18:04)  POCT Blood Glucose.: 144 mg/dL (02 Oct 2022 12:38)      LIVER FUNCTIONS - ( 03 Oct 2022 05:50 )  Alb: 2.8 g/dL / Pro: 6.0 g/dL / ALK PHOS: 69 U/L / ALT: 12 U/L / AST: 12 U/L / GGT: x

## 2022-10-03 NOTE — PROGRESS NOTE ADULT - PROBLEM SELECTOR PLAN 7
DVT PPX: enoxaparin 40 q24h  Diet: DASH/TLC sodium/cholesterol restricted, consistent carb, low residue, easy to chew, lactose restricted  PT: anticipated d/c to rehab facility  Dispo: pending clinical course  Code status: full

## 2022-10-03 NOTE — PROGRESS NOTE ADULT - ASSESSMENT
72M with PMH of UC vs Crohn disease, IDDM, neuropathy, HTN, HLD, BPH, dementia 2/2 CVA on ASA 81mg daily, presenting for 1 mo of progressively worsening bloody diarrhea possibly secondary to IBD flare given response to steroids. 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

## 2022-10-04 LAB
CRP SERPL-MCNC: 3.2 MG/L — SIGNIFICANT CHANGE UP
ERYTHROCYTE [SEDIMENTATION RATE] IN BLOOD: 49 MM/HR — HIGH (ref 1–15)
GLUCOSE BLDC GLUCOMTR-MCNC: 128 MG/DL — HIGH (ref 70–99)
GLUCOSE BLDC GLUCOMTR-MCNC: 150 MG/DL — HIGH (ref 70–99)
GLUCOSE BLDC GLUCOMTR-MCNC: 157 MG/DL — HIGH (ref 70–99)
GLUCOSE BLDC GLUCOMTR-MCNC: 179 MG/DL — HIGH (ref 70–99)
HCT VFR BLD CALC: 27.4 % — LOW (ref 39–50)
HGB BLD-MCNC: 8.1 G/DL — LOW (ref 13–17)
MAGNESIUM SERPL-MCNC: 2.2 MG/DL — SIGNIFICANT CHANGE UP (ref 1.6–2.6)
MCHC RBC-ENTMCNC: 24 PG — LOW (ref 27–34)
MCHC RBC-ENTMCNC: 29.6 GM/DL — LOW (ref 32–36)
MCV RBC AUTO: 81.3 FL — SIGNIFICANT CHANGE UP (ref 80–100)
NRBC # BLD: 0 /100 WBCS — SIGNIFICANT CHANGE UP (ref 0–0)
NRBC # FLD: 0 K/UL — SIGNIFICANT CHANGE UP (ref 0–0)
PHOSPHATE SERPL-MCNC: 4.9 MG/DL — HIGH (ref 2.5–4.5)
PLATELET # BLD AUTO: 315 K/UL — SIGNIFICANT CHANGE UP (ref 150–400)
RBC # BLD: 3.37 M/UL — LOW (ref 4.2–5.8)
RBC # FLD: 15.7 % — HIGH (ref 10.3–14.5)
SARS-COV-2 RNA SPEC QL NAA+PROBE: SIGNIFICANT CHANGE UP
WBC # BLD: 5.01 K/UL — SIGNIFICANT CHANGE UP (ref 3.8–10.5)
WBC # FLD AUTO: 5.01 K/UL — SIGNIFICANT CHANGE UP (ref 3.8–10.5)

## 2022-10-04 PROCEDURE — 99232 SBSQ HOSP IP/OBS MODERATE 35: CPT | Mod: GC

## 2022-10-04 PROCEDURE — 99233 SBSQ HOSP IP/OBS HIGH 50: CPT | Mod: GC

## 2022-10-04 RX ORDER — INSULIN LISPRO 100/ML
11 VIAL (ML) SUBCUTANEOUS
Refills: 0 | Status: DISCONTINUED | OUTPATIENT
Start: 2022-10-04 | End: 2022-10-07

## 2022-10-04 RX ADMIN — SERTRALINE 100 MILLIGRAM(S): 25 TABLET, FILM COATED ORAL at 11:44

## 2022-10-04 RX ADMIN — Medication 1600 MILLIGRAM(S): at 17:23

## 2022-10-04 RX ADMIN — INSULIN GLARGINE 24 UNIT(S): 100 INJECTION, SOLUTION SUBCUTANEOUS at 22:47

## 2022-10-04 RX ADMIN — Medication 14 UNIT(S): at 12:23

## 2022-10-04 RX ADMIN — Medication 2: at 17:27

## 2022-10-04 RX ADMIN — ATORVASTATIN CALCIUM 40 MILLIGRAM(S): 80 TABLET, FILM COATED ORAL at 22:48

## 2022-10-04 RX ADMIN — PANTOPRAZOLE SODIUM 40 MILLIGRAM(S): 20 TABLET, DELAYED RELEASE ORAL at 17:22

## 2022-10-04 RX ADMIN — Medication 20 MILLIGRAM(S): at 14:11

## 2022-10-04 RX ADMIN — Medication 1 MILLIGRAM(S): at 11:44

## 2022-10-04 RX ADMIN — Medication 2: at 09:12

## 2022-10-04 RX ADMIN — PANTOPRAZOLE SODIUM 40 MILLIGRAM(S): 20 TABLET, DELAYED RELEASE ORAL at 05:59

## 2022-10-04 RX ADMIN — GABAPENTIN 100 MILLIGRAM(S): 400 CAPSULE ORAL at 17:22

## 2022-10-04 RX ADMIN — FINASTERIDE 5 MILLIGRAM(S): 5 TABLET, FILM COATED ORAL at 11:44

## 2022-10-04 RX ADMIN — MEMANTINE HYDROCHLORIDE 10 MILLIGRAM(S): 10 TABLET ORAL at 05:59

## 2022-10-04 RX ADMIN — ENOXAPARIN SODIUM 40 MILLIGRAM(S): 100 INJECTION SUBCUTANEOUS at 11:43

## 2022-10-04 RX ADMIN — GABAPENTIN 100 MILLIGRAM(S): 400 CAPSULE ORAL at 05:59

## 2022-10-04 RX ADMIN — MEMANTINE HYDROCHLORIDE 10 MILLIGRAM(S): 10 TABLET ORAL at 17:22

## 2022-10-04 RX ADMIN — Medication 11 UNIT(S): at 17:29

## 2022-10-04 RX ADMIN — Medication 1600 MILLIGRAM(S): at 05:59

## 2022-10-04 RX ADMIN — QUETIAPINE FUMARATE 50 MILLIGRAM(S): 200 TABLET, FILM COATED ORAL at 22:48

## 2022-10-04 RX ADMIN — Medication 20 MILLIGRAM(S): at 06:00

## 2022-10-04 RX ADMIN — TAMSULOSIN HYDROCHLORIDE 0.4 MILLIGRAM(S): 0.4 CAPSULE ORAL at 22:48

## 2022-10-04 RX ADMIN — Medication 81 MILLIGRAM(S): at 11:44

## 2022-10-04 RX ADMIN — Medication 14 UNIT(S): at 09:12

## 2022-10-04 RX ADMIN — Medication 20 MILLIGRAM(S): at 22:48

## 2022-10-04 NOTE — PROGRESS NOTE ADULT - ATTENDING COMMENTS
72M with IBD (suspected Crohn's colitis vs less likely UC, follows with Dr. Oro, on Remicade) admitted with bloody diarrhea c/f flare. Infectious workup, including C diff was negative. Patient underwent colonoscopy on 9/30 with active colitis, most notably in right colon. Given outpatient labs with elevated Remicade Abs and inadequate level (6/2022), his outpatient dose of Remicade was increased and he was planned for repeat dose on 9/30 in hospital. As Remicade (and biosimilars) were not available, dose administration was delayed until 10/3. Inflammatory markers inproving, though ongoing bloody diarrhea.    --Monitor abdominal exam and stool output  --Methotrexate weekly  --Continue Solumedrol 20mg q8h for now  --Monitor daily ESR and CRP  --Pending response to Remicade, will determine if needs a second inpatient dose; if no improvement, recommend consultation of colorectal surgery  --DVT prophylaxis    Additional recommendations as above

## 2022-10-04 NOTE — PROGRESS NOTE ADULT - SUBJECTIVE AND OBJECTIVE BOX
Progress Note     == draft in progress ==  Britt Childress  PGY-1 Medicine  Teams | [id number]    Patient is a 72y old  Male who presents with a chief complaint of Persistent bloody diarrhea, generalized weakness (04 Oct 2022 09:35)          SUBJECTIVE / INTERVAL EVENTS  Patient seen and evaluated at bedside. Patient doing okay today. Patient denied fever, nausea/vomiting, shortness of breath, new pain (headache, chest pain, abdominal pain, limb pain), new swelling, new numbness/tingling, dysuria or hematuria.        MEDICATIONS    Home Medications:  aspirin 81 mg oral delayed release tablet: 1 tab(s) orally once a day (28 Sep 2022 01:21)  atorvastatin 40 mg oral tablet: 1 tab(s) orally once a day (at bedtime) (28 Sep 2022 01:21)  Centrum Silver oral tablet: 1 tab(s) orally once a day (28 Sep 2022 01:21)  finasteride 5 mg oral tablet: 1 tab(s) orally once a day (28 Sep 2022 01:21)  folic acid 1 mg oral tablet: 1 tab(s) orally once a day (01 Oct 2022 11:05)  gabapentin 100 mg oral capsule: 1 tab(s) orally 2 times a day (28 Sep 2022 01:21)  insulin aspart 100 units/mL injectable solution: 28 unit(s) injectable 3 times a day (before meals) (28 Sep 2022 01:21)  Januvia 50 mg oral tablet: 1 tab(s) orally once a day (28 Sep 2022 01:21)  memantine 10 mg oral tablet: 1 tab(s) orally 2 times a day (28 Sep 2022 01:21)  mesalamine 800 mg oral delayed release tablet: 2 tab(s) orally 2 times a day (28 Sep 2022 01:21)  QUEtiapine 25 mg oral tablet: 2 tab(s) orally once a day (at bedtime) (28 Sep 2022 01:21)  Remicade 100 mg intravenous injection:  (28 Sep 2022 01:21)  sertraline 100 mg oral tablet: 1 tab(s) orally once a day (28 Sep 2022 01:21)  tamsulosin 0.4 mg oral capsule: 1 cap(s) orally 2 times a day (28 Sep 2022 01:21)  Tresiba 100 units/mL subcutaneous solution: 30 unit(s) subcutaneous once a day (at bedtime) (28 Sep 2022 01:21)  trimethoprim 100 mg oral tablet: 1 tab(s) orally once a day (at bedtime) (28 Sep 2022 01:21)  Vitamin D3 400 intl units oral tablet: 1 tab(s) orally once a day (28 Sep 2022 01:21)      MEDICATIONS  (STANDING):  aspirin enteric coated 81 milliGRAM(s) Oral daily  atorvastatin 40 milliGRAM(s) Oral at bedtime  dextrose 5%. 1000 milliLiter(s) (50 mL/Hr) IV Continuous <Continuous>  dextrose 5%. 1000 milliLiter(s) (100 mL/Hr) IV Continuous <Continuous>  dextrose 5%. 1000 milliLiter(s) (100 mL/Hr) IV Continuous <Continuous>  dextrose 50% Injectable 25 Gram(s) IV Push once  dextrose 50% Injectable 12.5 Gram(s) IV Push once  dextrose 50% Injectable 25 Gram(s) IV Push once  enoxaparin Injectable 40 milliGRAM(s) SubCutaneous every 24 hours  finasteride 5 milliGRAM(s) Oral daily  folic acid 1 milliGRAM(s) Oral daily  gabapentin 100 milliGRAM(s) Oral two times a day  glucagon  Injectable 1 milliGRAM(s) IntraMuscular once  glucagon  Injectable 1 milliGRAM(s) IntraMuscular once  insulin glargine Injectable (LANTUS) 24 Unit(s) SubCutaneous at bedtime  insulin lispro (ADMELOG) corrective regimen sliding scale   SubCutaneous three times a day before meals  insulin lispro Injectable (ADMELOG) 14 Unit(s) SubCutaneous three times a day before meals  lactated ringers. 1000 milliLiter(s) (75 mL/Hr) IV Continuous <Continuous>  memantine 10 milliGRAM(s) Oral two times a day  mesalamine DR Capsule 1600 milliGRAM(s) Oral two times a day  methotrexate 10 milliGRAM(s) Oral <User Schedule>  methylPREDNISolone sodium succinate Injectable 20 milliGRAM(s) IV Push every 8 hours  pantoprazole  Injectable 40 milliGRAM(s) IV Push every 12 hours  QUEtiapine 50 milliGRAM(s) Oral at bedtime  sertraline 100 milliGRAM(s) Oral daily  tamsulosin 0.4 milliGRAM(s) Oral at bedtime    MEDICATIONS  (PRN):  dextrose Oral Gel 15 Gram(s) Oral once PRN Blood Glucose LESS THAN 70 milliGRAM(s)/deciliter  melatonin 3 milliGRAM(s) Oral at bedtime PRN Insomnia         VITALS / I&O's  T(C): 37 (10-04-22 @ 06:00), Max: 37 (10-03-22 @ 17:00)  HR: 79 (10-04-22 @ 06:00) (74 - 88)  BP: 106/64 (10-04-22 @ 06:00) (106/64 - 126/56)  RR: 17 (10-04-22 @ 06:00) (16 - 17)  SpO2: 100% (10-04-22 @ 06:00) (96% - 100%)  I&O's Summary    03 Oct 2022 07:01  -  04 Oct 2022 07:00  --------------------------------------------------------  IN: 0 mL / OUT: 400 mL / NET: -400 mL           PHYSICAL EXAM           LABS                        8.1    5.01  )-----------( 315      ( 04 Oct 2022 06:00 )             27.4     10-03    138  |  103  |  11  ----------------------------<  173<H>  4.2   |  26  |  0.70    Ca    8.2<L>      03 Oct 2022 05:50  Phos  4.9     10-04  Mg     2.20     10-04    TPro  6.0  /  Alb  2.8<L>  /  TBili  0.5  /  DBili  x   /  AST  12  /  ALT  12  /  AlkPhos  69  10-03    CAPILLARY BLOOD GLUCOSE      POCT Blood Glucose.: 128 mg/dL (04 Oct 2022 12:10)  POCT Blood Glucose.: 157 mg/dL (04 Oct 2022 08:55)  POCT Blood Glucose.: 99 mg/dL (03 Oct 2022 22:24)  POCT Blood Glucose.: 39 mg/dL (03 Oct 2022 22:22)  POCT Blood Glucose.: 102 mg/dL (03 Oct 2022 18:43)  POCT Blood Glucose.: 79 mg/dL (03 Oct 2022 17:40)      LIVER FUNCTIONS - ( 03 Oct 2022 05:50 )  Alb: 2.8 g/dL / Pro: 6.0 g/dL / ALK PHOS: 69 U/L / ALT: 12 U/L / AST: 12 U/L / GGT: x                         Progress Note     Britt Childress  PGY-1 Medicine  [employer] | [id number]    Patient is a 72y old  Male who presents with a chief complaint of Persistent bloody diarrhea, generalized weakness (04 Oct 2022 09:35)          SUBJECTIVE / INTERVAL EVENTS  Patient seen and evaluated at bedside. Patient doing okay today. Patient denied fever, nausea/vomiting, shortness of breath, new pain (headache, chest pain, abdominal pain, limb pain), new swelling, new numbness/tingling, dysuria or hematuria.   Per RN, 1 pasty BM OVN, 1 pasty BM during day.       MEDICATIONS    Home Medications:  aspirin 81 mg oral delayed release tablet: 1 tab(s) orally once a day (28 Sep 2022 01:21)  atorvastatin 40 mg oral tablet: 1 tab(s) orally once a day (at bedtime) (28 Sep 2022 01:21)  Centrum Silver oral tablet: 1 tab(s) orally once a day (28 Sep 2022 01:21)  finasteride 5 mg oral tablet: 1 tab(s) orally once a day (28 Sep 2022 01:21)  folic acid 1 mg oral tablet: 1 tab(s) orally once a day (01 Oct 2022 11:05)  gabapentin 100 mg oral capsule: 1 tab(s) orally 2 times a day (28 Sep 2022 01:21)  insulin aspart 100 units/mL injectable solution: 28 unit(s) injectable 3 times a day (before meals) (28 Sep 2022 01:21)  Januvia 50 mg oral tablet: 1 tab(s) orally once a day (28 Sep 2022 01:21)  memantine 10 mg oral tablet: 1 tab(s) orally 2 times a day (28 Sep 2022 01:21)  mesalamine 800 mg oral delayed release tablet: 2 tab(s) orally 2 times a day (28 Sep 2022 01:21)  QUEtiapine 25 mg oral tablet: 2 tab(s) orally once a day (at bedtime) (28 Sep 2022 01:21)  Remicade 100 mg intravenous injection:  (28 Sep 2022 01:21)  sertraline 100 mg oral tablet: 1 tab(s) orally once a day (28 Sep 2022 01:21)  tamsulosin 0.4 mg oral capsule: 1 cap(s) orally 2 times a day (28 Sep 2022 01:21)  Tresiba 100 units/mL subcutaneous solution: 30 unit(s) subcutaneous once a day (at bedtime) (28 Sep 2022 01:21)  trimethoprim 100 mg oral tablet: 1 tab(s) orally once a day (at bedtime) (28 Sep 2022 01:21)  Vitamin D3 400 intl units oral tablet: 1 tab(s) orally once a day (28 Sep 2022 01:21)      MEDICATIONS  (STANDING):  aspirin enteric coated 81 milliGRAM(s) Oral daily  atorvastatin 40 milliGRAM(s) Oral at bedtime  dextrose 5%. 1000 milliLiter(s) (50 mL/Hr) IV Continuous <Continuous>  dextrose 5%. 1000 milliLiter(s) (100 mL/Hr) IV Continuous <Continuous>  dextrose 5%. 1000 milliLiter(s) (100 mL/Hr) IV Continuous <Continuous>  dextrose 50% Injectable 25 Gram(s) IV Push once  dextrose 50% Injectable 12.5 Gram(s) IV Push once  dextrose 50% Injectable 25 Gram(s) IV Push once  enoxaparin Injectable 40 milliGRAM(s) SubCutaneous every 24 hours  finasteride 5 milliGRAM(s) Oral daily  folic acid 1 milliGRAM(s) Oral daily  gabapentin 100 milliGRAM(s) Oral two times a day  glucagon  Injectable 1 milliGRAM(s) IntraMuscular once  glucagon  Injectable 1 milliGRAM(s) IntraMuscular once  insulin glargine Injectable (LANTUS) 24 Unit(s) SubCutaneous at bedtime  insulin lispro (ADMELOG) corrective regimen sliding scale   SubCutaneous three times a day before meals  insulin lispro Injectable (ADMELOG) 14 Unit(s) SubCutaneous three times a day before meals  lactated ringers. 1000 milliLiter(s) (75 mL/Hr) IV Continuous <Continuous>  memantine 10 milliGRAM(s) Oral two times a day  mesalamine DR Capsule 1600 milliGRAM(s) Oral two times a day  methotrexate 10 milliGRAM(s) Oral <User Schedule>  methylPREDNISolone sodium succinate Injectable 20 milliGRAM(s) IV Push every 8 hours  pantoprazole  Injectable 40 milliGRAM(s) IV Push every 12 hours  QUEtiapine 50 milliGRAM(s) Oral at bedtime  sertraline 100 milliGRAM(s) Oral daily  tamsulosin 0.4 milliGRAM(s) Oral at bedtime    MEDICATIONS  (PRN):  dextrose Oral Gel 15 Gram(s) Oral once PRN Blood Glucose LESS THAN 70 milliGRAM(s)/deciliter  melatonin 3 milliGRAM(s) Oral at bedtime PRN Insomnia         VITALS / I&O's  T(C): 37 (10-04-22 @ 06:00), Max: 37 (10-03-22 @ 17:00)  HR: 79 (10-04-22 @ 06:00) (74 - 88)  BP: 106/64 (10-04-22 @ 06:00) (106/64 - 126/56)  RR: 17 (10-04-22 @ 06:00) (16 - 17)  SpO2: 100% (10-04-22 @ 06:00) (96% - 100%)  I&O's Summary    03 Oct 2022 07:01  -  04 Oct 2022 07:00  --------------------------------------------------------  IN: 0 mL / OUT: 400 mL / NET: -400 mL           PHYSICAL EXAM  General: Reclining in bed in no acute distress.  HEENT: Normocephalic, atraumatic. Extraocular movements grossly intact. No nasal discharge.  Neuro: Alert, somewhat oriented. No facial asymmetry or dysarthria. Moving all extremities.  CV: Regular rate and rhythm. S1/S2. +systolic murmur? Limbs warm, no distal edema.  Respiratory: Anterior lung fields CTAB. No increased work of breathing, speaking comfortably.  Abdomen: Soft; somewhat distended; nontender to palpation. No rebound or guarding.   : Suprapubic region nontender.  Skin: No rashes or bruising of face, forearms, or calves bilaterally.  Psych: Mood and affect appropriate.         LABS                        8.1    5.01  )-----------( 315      ( 04 Oct 2022 06:00 )             27.4     10-03    138  |  103  |  11  ----------------------------<  173<H>  4.2   |  26  |  0.70    Ca    8.2<L>      03 Oct 2022 05:50  Phos  4.9     10-04  Mg     2.20     10-04    TPro  6.0  /  Alb  2.8<L>  /  TBili  0.5  /  DBili  x   /  AST  12  /  ALT  12  /  AlkPhos  69  10-03    CAPILLARY BLOOD GLUCOSE      POCT Blood Glucose.: 128 mg/dL (04 Oct 2022 12:10)  POCT Blood Glucose.: 157 mg/dL (04 Oct 2022 08:55)  POCT Blood Glucose.: 99 mg/dL (03 Oct 2022 22:24)  POCT Blood Glucose.: 39 mg/dL (03 Oct 2022 22:22)  POCT Blood Glucose.: 102 mg/dL (03 Oct 2022 18:43)  POCT Blood Glucose.: 79 mg/dL (03 Oct 2022 17:40)      LIVER FUNCTIONS - ( 03 Oct 2022 05:50 )  Alb: 2.8 g/dL / Pro: 6.0 g/dL / ALK PHOS: 69 U/L / ALT: 12 U/L / AST: 12 U/L / GGT: x                         Progress Note     Britt Childress  PGY-1 Medicine  [employer] | [id number]    Patient is a 72y old  Male who presents with a chief complaint of Persistent bloody diarrhea, generalized weakness (04 Oct 2022 09:35)          SUBJECTIVE / INTERVAL EVENTS  Patient seen and evaluated at bedside. Patient doing okay today. Patient denied fever, nausea/vomiting, shortness of breath, new pain (headache, chest pain, limb pain), new swelling, new numbness/tingling, dysuria or hematuria. Patient endorsed good control of abdominal pain, "7-8/10" and comfortable (consistent to prior, means 2-3/10).  Per RN, 1 pasty BM OVN, 1 pasty BM during day.       MEDICATIONS    Home Medications:  aspirin 81 mg oral delayed release tablet: 1 tab(s) orally once a day (28 Sep 2022 01:21)  atorvastatin 40 mg oral tablet: 1 tab(s) orally once a day (at bedtime) (28 Sep 2022 01:21)  Centrum Silver oral tablet: 1 tab(s) orally once a day (28 Sep 2022 01:21)  finasteride 5 mg oral tablet: 1 tab(s) orally once a day (28 Sep 2022 01:21)  folic acid 1 mg oral tablet: 1 tab(s) orally once a day (01 Oct 2022 11:05)  gabapentin 100 mg oral capsule: 1 tab(s) orally 2 times a day (28 Sep 2022 01:21)  insulin aspart 100 units/mL injectable solution: 28 unit(s) injectable 3 times a day (before meals) (28 Sep 2022 01:21)  Januvia 50 mg oral tablet: 1 tab(s) orally once a day (28 Sep 2022 01:21)  memantine 10 mg oral tablet: 1 tab(s) orally 2 times a day (28 Sep 2022 01:21)  mesalamine 800 mg oral delayed release tablet: 2 tab(s) orally 2 times a day (28 Sep 2022 01:21)  QUEtiapine 25 mg oral tablet: 2 tab(s) orally once a day (at bedtime) (28 Sep 2022 01:21)  Remicade 100 mg intravenous injection:  (28 Sep 2022 01:21)  sertraline 100 mg oral tablet: 1 tab(s) orally once a day (28 Sep 2022 01:21)  tamsulosin 0.4 mg oral capsule: 1 cap(s) orally 2 times a day (28 Sep 2022 01:21)  Tresiba 100 units/mL subcutaneous solution: 30 unit(s) subcutaneous once a day (at bedtime) (28 Sep 2022 01:21)  trimethoprim 100 mg oral tablet: 1 tab(s) orally once a day (at bedtime) (28 Sep 2022 01:21)  Vitamin D3 400 intl units oral tablet: 1 tab(s) orally once a day (28 Sep 2022 01:21)      MEDICATIONS  (STANDING):  aspirin enteric coated 81 milliGRAM(s) Oral daily  atorvastatin 40 milliGRAM(s) Oral at bedtime  dextrose 5%. 1000 milliLiter(s) (50 mL/Hr) IV Continuous <Continuous>  dextrose 5%. 1000 milliLiter(s) (100 mL/Hr) IV Continuous <Continuous>  dextrose 5%. 1000 milliLiter(s) (100 mL/Hr) IV Continuous <Continuous>  dextrose 50% Injectable 25 Gram(s) IV Push once  dextrose 50% Injectable 12.5 Gram(s) IV Push once  dextrose 50% Injectable 25 Gram(s) IV Push once  enoxaparin Injectable 40 milliGRAM(s) SubCutaneous every 24 hours  finasteride 5 milliGRAM(s) Oral daily  folic acid 1 milliGRAM(s) Oral daily  gabapentin 100 milliGRAM(s) Oral two times a day  glucagon  Injectable 1 milliGRAM(s) IntraMuscular once  glucagon  Injectable 1 milliGRAM(s) IntraMuscular once  insulin glargine Injectable (LANTUS) 24 Unit(s) SubCutaneous at bedtime  insulin lispro (ADMELOG) corrective regimen sliding scale   SubCutaneous three times a day before meals  insulin lispro Injectable (ADMELOG) 14 Unit(s) SubCutaneous three times a day before meals  lactated ringers. 1000 milliLiter(s) (75 mL/Hr) IV Continuous <Continuous>  memantine 10 milliGRAM(s) Oral two times a day  mesalamine DR Capsule 1600 milliGRAM(s) Oral two times a day  methotrexate 10 milliGRAM(s) Oral <User Schedule>  methylPREDNISolone sodium succinate Injectable 20 milliGRAM(s) IV Push every 8 hours  pantoprazole  Injectable 40 milliGRAM(s) IV Push every 12 hours  QUEtiapine 50 milliGRAM(s) Oral at bedtime  sertraline 100 milliGRAM(s) Oral daily  tamsulosin 0.4 milliGRAM(s) Oral at bedtime    MEDICATIONS  (PRN):  dextrose Oral Gel 15 Gram(s) Oral once PRN Blood Glucose LESS THAN 70 milliGRAM(s)/deciliter  melatonin 3 milliGRAM(s) Oral at bedtime PRN Insomnia         VITALS / I&O's  T(C): 37 (10-04-22 @ 06:00), Max: 37 (10-03-22 @ 17:00)  HR: 79 (10-04-22 @ 06:00) (74 - 88)  BP: 106/64 (10-04-22 @ 06:00) (106/64 - 126/56)  RR: 17 (10-04-22 @ 06:00) (16 - 17)  SpO2: 100% (10-04-22 @ 06:00) (96% - 100%)  I&O's Summary    03 Oct 2022 07:01  -  04 Oct 2022 07:00  --------------------------------------------------------  IN: 0 mL / OUT: 400 mL / NET: -400 mL           PHYSICAL EXAM  General: Reclining in bed in no acute distress.  HEENT: Normocephalic, atraumatic. Extraocular movements grossly intact. No nasal discharge.  Neuro: Alert, somewhat oriented. No facial asymmetry or dysarthria. Moving all extremities.  CV: Regular rate and rhythm. S1/S2. +systolic murmur? Limbs warm, no distal edema.  Respiratory: Anterior lung fields CTAB. No increased work of breathing, speaking comfortably.  Abdomen: Soft; somewhat distended; nontender to palpation. No rebound or guarding.   : Suprapubic region nontender.  Skin: No rashes or bruising of face, forearms, or calves bilaterally.  Psych: Mood and affect appropriate.         LABS                        8.1    5.01  )-----------( 315      ( 04 Oct 2022 06:00 )             27.4     10-03    138  |  103  |  11  ----------------------------<  173<H>  4.2   |  26  |  0.70    Ca    8.2<L>      03 Oct 2022 05:50  Phos  4.9     10-04  Mg     2.20     10-04    TPro  6.0  /  Alb  2.8<L>  /  TBili  0.5  /  DBili  x   /  AST  12  /  ALT  12  /  AlkPhos  69  10-03    CAPILLARY BLOOD GLUCOSE      POCT Blood Glucose.: 128 mg/dL (04 Oct 2022 12:10)  POCT Blood Glucose.: 157 mg/dL (04 Oct 2022 08:55)  POCT Blood Glucose.: 99 mg/dL (03 Oct 2022 22:24)  POCT Blood Glucose.: 39 mg/dL (03 Oct 2022 22:22)  POCT Blood Glucose.: 102 mg/dL (03 Oct 2022 18:43)  POCT Blood Glucose.: 79 mg/dL (03 Oct 2022 17:40)      LIVER FUNCTIONS - ( 03 Oct 2022 05:50 )  Alb: 2.8 g/dL / Pro: 6.0 g/dL / ALK PHOS: 69 U/L / ALT: 12 U/L / AST: 12 U/L / GGT: x

## 2022-10-04 NOTE — DIETITIAN INITIAL EVALUATION ADULT - OTHER INFO
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 GI following.     A&Ox2-3. Pt reports usual body weight is "close to 200 lbs" and denies any recent unintentional weight loss over the past year. Current admission weight 190 lbs (86.2 kg). Pt reports good appetite at baseline, states he eats 3 meals daily at home. Pt prefers fish and dislikes beef - will update preferences. Pt takes centrum silver at home. NKFA.     Pt reports good intake in house - states he is consuming 100% of meals. Denies any issues chewing or swallowing on soft and bite sized diet. No complaints of N/V/C. When asked if the patient is experiencing diarrhea, he responded with "that's what they're telling me." As per GI note, pt had 7 watery BMs in the past 24 hours. Educated patient on low fiber diet and gradual increase in fiber foods when diarrhea resolves. Pt unaware of what foods contain fiber - RD educated that whole grains, fruit with skin, and raw vegetables are high in fiber. Pt denied need for written material. No nutrition related questions at this time.

## 2022-10-04 NOTE — PROGRESS NOTE ADULT - PROBLEM SELECTOR PLAN 1
IBD flare with bloody diarrhea likely 2/2 UC vs Crohn  - elevated ESR, CRP, both downtrending  - infectious work up neg: Cdiff PCR, GI PCR, stool Cx, Stool O+p  - F/up fecal calprotectin,    - LR @75 for rehydration  - IBD flare management as per GI - appreciate recs     - solumedrol 20 q8  - pantoprazole per GI  - per colonoscopy Left sided UC, biopsies in lab. Methotrexate and infliximab pending per pharmacy staff  - Spoke on 10/1 to pharmacy, per pharmacy staff, there is no Infliximab available in the hospital and the methotrexate needs to be given with infliximab. Order will take 1-2 days. Pt understands that pharmacy needs to obtain special medications before he can recieve them. For now the order will remain unverified by pharmacy till medication reaches facility per pharmacy staff

## 2022-10-04 NOTE — DIETITIAN INITIAL EVALUATION ADULT - ADD RECOMMEND
1) Continue DASH/TLC, Consistent carbohydrate (with evening snack), low fiber diet. Modified texture as per SLP recommendations.   2) Recommend MVI daily  3) Monitor weights, labs, PO intake  4) RD to follow up per protocol

## 2022-10-04 NOTE — PROGRESS NOTE ADULT - PROBLEM SELECTOR PLAN 3
on Lantus 30 and Admelog 28-30 at home  - Lantus 24  - Admelog 14 tid qAC  - correctional scale tid qAC on Lantus 30 and Admelog 28-30 at home  - Lantus 24  - Admelog 11 tid qAC  - correctional scale tid qAC

## 2022-10-04 NOTE — PROGRESS NOTE ADULT - SUBJECTIVE AND OBJECTIVE BOX
Gastroenterology/Hepatology Progress Note      Interval Events:   Patient continues to have watery stools over the last 24 hours, had 7 BMs, two of them were bloody. No abd pain, fevers, chills. Tolerating diet well. Received infliximab and MTX on 10/3.     Allergies:  No Known Allergies      Hospital Medications:  aspirin enteric coated 81 milliGRAM(s) Oral daily  atorvastatin 40 milliGRAM(s) Oral at bedtime  dextrose 5%. 1000 milliLiter(s) IV Continuous <Continuous>  dextrose 5%. 1000 milliLiter(s) IV Continuous <Continuous>  dextrose 5%. 1000 milliLiter(s) IV Continuous <Continuous>  dextrose 50% Injectable 25 Gram(s) IV Push once  dextrose 50% Injectable 12.5 Gram(s) IV Push once  dextrose 50% Injectable 25 Gram(s) IV Push once  dextrose Oral Gel 15 Gram(s) Oral once PRN  enoxaparin Injectable 40 milliGRAM(s) SubCutaneous every 24 hours  finasteride 5 milliGRAM(s) Oral daily  folic acid 1 milliGRAM(s) Oral daily  gabapentin 100 milliGRAM(s) Oral two times a day  glucagon  Injectable 1 milliGRAM(s) IntraMuscular once  glucagon  Injectable 1 milliGRAM(s) IntraMuscular once  insulin glargine Injectable (LANTUS) 24 Unit(s) SubCutaneous at bedtime  insulin lispro (ADMELOG) corrective regimen sliding scale   SubCutaneous three times a day before meals  insulin lispro Injectable (ADMELOG) 14 Unit(s) SubCutaneous three times a day before meals  lactated ringers. 1000 milliLiter(s) IV Continuous <Continuous>  melatonin 3 milliGRAM(s) Oral at bedtime PRN  memantine 10 milliGRAM(s) Oral two times a day  mesalamine DR Capsule 1600 milliGRAM(s) Oral two times a day  methotrexate 10 milliGRAM(s) Oral <User Schedule>  methylPREDNISolone sodium succinate Injectable 20 milliGRAM(s) IV Push every 8 hours  pantoprazole  Injectable 40 milliGRAM(s) IV Push every 12 hours  QUEtiapine 50 milliGRAM(s) Oral at bedtime  sertraline 100 milliGRAM(s) Oral daily  tamsulosin 0.4 milliGRAM(s) Oral at bedtime      ROS: 14 point ROS negative unless otherwise state in subjective    PHYSICAL EXAM:   Vital Signs:  Vital Signs Last 24 Hrs  T(C): 37 (04 Oct 2022 06:00), Max: 37 (03 Oct 2022 17:00)  T(F): 98.6 (04 Oct 2022 06:00), Max: 98.6 (03 Oct 2022 17:00)  HR: 79 (04 Oct 2022 06:00) (74 - 88)  BP: 106/64 (04 Oct 2022 06:00) (106/64 - 126/56)  BP(mean): --  RR: 17 (04 Oct 2022 06:00) (16 - 17)  SpO2: 100% (04 Oct 2022 06:00) (96% - 100%)    Parameters below as of 04 Oct 2022 06:00  Patient On (Oxygen Delivery Method): room air      Daily     Daily     GENERAL:  No acute distress, well appearing, sleeping.   HEENT:  NCAT, no scleral icterus  CHEST: no resp distress  ABDOMEN:  Soft, non-tender, non-distended, no masses  EXTREMITIES:  No  edema b/l  NEURO/PSYCH: NO tremor, alert and oriented x 3.    LABS:                        8.1    5.01  )-----------( 315      ( 04 Oct 2022 06:00 )             27.4     Mean Cell Volume: 81.3 fL (10-04-22 @ 06:00)    10-03    138  |  103  |  11  ----------------------------<  173<H>  4.2   |  26  |  0.70    Ca    8.2<L>      03 Oct 2022 05:50  Phos  4.9     10-04  Mg     2.20     10-04    TPro  6.0  /  Alb  2.8<L>  /  TBili  0.5  /  DBili  x   /  AST  12  /  ALT  12  /  AlkPhos  69  10-03    LIVER FUNCTIONS - ( 03 Oct 2022 05:50 )  Alb: 2.8 g/dL / Pro: 6.0 g/dL / ALK PHOS: 69 U/L / ALT: 12 U/L / AST: 12 U/L / GGT: x             Imaging:        Ct abd and pelvis    FINDINGS:  LOWER CHEST: Within normal limits.    LIVER: Within normal limits.  BILE DUCTS: Normal caliber.  GALLBLADDER: Small gallstone.  SPLEEN: Within normal limits.  PANCREAS: Within normal limits.  ADRENALS: Within normal limits.  KIDNEYS/URETERS: 4 mm nonobstructive calculus lower pole right kidney. 2   cm cyst upper pole left kidney.    BLADDER: Within normal limits.  REPRODUCTIVE ORGANS: Prostate within normal limits.    BOWEL: No bowel obstruction. Mural thickening and associated mesenteric   hyperemia involving the colon from the hepatic flexure through the mid   descending colon. Submucosal fat deposition in the cecum and rectosigmoid   colon consistent with chronic information. Appendix mural thickening and   mucosal hyperenhancement in the mid appendix.  PERITONEUM: No ascites.  VESSELS: Atheromatous calcifications.  RETROPERITONEUM/LYMPH NODES: No lymphadenopathy.  ABDOMINAL WALL: Within normal limits.  BONES: Hemisacralization of the fifth lumbar vertebra.    IMPRESSION:  Crohn's colitis involving the transverse and descending colon and   appendix.    Colonoscopy in 7/2020    Impression:          - Inflammation was found from the anus to the descending colon secondary to                        left-sided colitis. The findings are unchanged compared to previous                        examinations. Biopsied.          - One 15 mm polyp in the rectum, benign appearing likely inflammatory,                        removed with a hot snare. Resected and retrieved.                       - Diverticulosis in the sigmoid colon.                       - Localized mild inflammation was found in the cecum.                       Clinical improvement with Entyvio BUT no endoscopic improvement.    Colonoscopy 9/30/22  Impression:          - Inflammation was found from the anus to the splenic                        flexure. This was moderate in severity, worsened                        compared to previous examinations. Biopsied.                       - Left-sided ulcerative colitis. Inflammation was found                        from the transverse colon to the cecum. This was severe,                        worsened compared to previous examinations. Biopsied.                       - Bloody diarrhea secondary to severe inflammation due                        to his underlying UC, predominantly left sided disease.       Gastroenterology/Hepatology Progress Note    Interval Events:   Patient continues to have watery stools over the last 24 hours, had 7 BMs, two of them were bloody. No abd pain, fevers, chills. Tolerating diet well. Received infliximab and MTX on 10/3.     Allergies:  No Known Allergies      Hospital Medications:  aspirin enteric coated 81 milliGRAM(s) Oral daily  atorvastatin 40 milliGRAM(s) Oral at bedtime  dextrose 5%. 1000 milliLiter(s) IV Continuous <Continuous>  dextrose 5%. 1000 milliLiter(s) IV Continuous <Continuous>  dextrose 5%. 1000 milliLiter(s) IV Continuous <Continuous>  dextrose 50% Injectable 25 Gram(s) IV Push once  dextrose 50% Injectable 12.5 Gram(s) IV Push once  dextrose 50% Injectable 25 Gram(s) IV Push once  dextrose Oral Gel 15 Gram(s) Oral once PRN  enoxaparin Injectable 40 milliGRAM(s) SubCutaneous every 24 hours  finasteride 5 milliGRAM(s) Oral daily  folic acid 1 milliGRAM(s) Oral daily  gabapentin 100 milliGRAM(s) Oral two times a day  glucagon  Injectable 1 milliGRAM(s) IntraMuscular once  glucagon  Injectable 1 milliGRAM(s) IntraMuscular once  insulin glargine Injectable (LANTUS) 24 Unit(s) SubCutaneous at bedtime  insulin lispro (ADMELOG) corrective regimen sliding scale   SubCutaneous three times a day before meals  insulin lispro Injectable (ADMELOG) 14 Unit(s) SubCutaneous three times a day before meals  lactated ringers. 1000 milliLiter(s) IV Continuous <Continuous>  melatonin 3 milliGRAM(s) Oral at bedtime PRN  memantine 10 milliGRAM(s) Oral two times a day  mesalamine DR Capsule 1600 milliGRAM(s) Oral two times a day  methotrexate 10 milliGRAM(s) Oral <User Schedule>  methylPREDNISolone sodium succinate Injectable 20 milliGRAM(s) IV Push every 8 hours  pantoprazole  Injectable 40 milliGRAM(s) IV Push every 12 hours  QUEtiapine 50 milliGRAM(s) Oral at bedtime  sertraline 100 milliGRAM(s) Oral daily  tamsulosin 0.4 milliGRAM(s) Oral at bedtime      ROS: 14 point ROS negative unless otherwise state in subjective    PHYSICAL EXAM:   Vital Signs:  Vital Signs Last 24 Hrs  T(C): 37 (04 Oct 2022 06:00), Max: 37 (03 Oct 2022 17:00)  T(F): 98.6 (04 Oct 2022 06:00), Max: 98.6 (03 Oct 2022 17:00)  HR: 79 (04 Oct 2022 06:00) (74 - 88)  BP: 106/64 (04 Oct 2022 06:00) (106/64 - 126/56)  BP(mean): --  RR: 17 (04 Oct 2022 06:00) (16 - 17)  SpO2: 100% (04 Oct 2022 06:00) (96% - 100%)    Parameters below as of 04 Oct 2022 06:00  Patient On (Oxygen Delivery Method): room air      Daily     Daily     GENERAL:  No acute distress, well appearing, sleeping.   HEENT:  NCAT, no scleral icterus  CHEST: no resp distress  ABDOMEN:  Soft, non-tender, non-distended, no masses  EXTREMITIES:  No  edema b/l  NEURO/PSYCH: NO tremor, alert and oriented x 3.    LABS:                        8.1    5.01  )-----------( 315      ( 04 Oct 2022 06:00 )             27.4     Mean Cell Volume: 81.3 fL (10-04-22 @ 06:00)    10-03    138  |  103  |  11  ----------------------------<  173<H>  4.2   |  26  |  0.70    Ca    8.2<L>      03 Oct 2022 05:50  Phos  4.9     10-04  Mg     2.20     10-04    TPro  6.0  /  Alb  2.8<L>  /  TBili  0.5  /  DBili  x   /  AST  12  /  ALT  12  /  AlkPhos  69  10-03    LIVER FUNCTIONS - ( 03 Oct 2022 05:50 )  Alb: 2.8 g/dL / Pro: 6.0 g/dL / ALK PHOS: 69 U/L / ALT: 12 U/L / AST: 12 U/L / GGT: x             Imaging:        Ct abd and pelvis    FINDINGS:  LOWER CHEST: Within normal limits.    LIVER: Within normal limits.  BILE DUCTS: Normal caliber.  GALLBLADDER: Small gallstone.  SPLEEN: Within normal limits.  PANCREAS: Within normal limits.  ADRENALS: Within normal limits.  KIDNEYS/URETERS: 4 mm nonobstructive calculus lower pole right kidney. 2   cm cyst upper pole left kidney.    BLADDER: Within normal limits.  REPRODUCTIVE ORGANS: Prostate within normal limits.    BOWEL: No bowel obstruction. Mural thickening and associated mesenteric   hyperemia involving the colon from the hepatic flexure through the mid   descending colon. Submucosal fat deposition in the cecum and rectosigmoid   colon consistent with chronic information. Appendix mural thickening and   mucosal hyperenhancement in the mid appendix.  PERITONEUM: No ascites.  VESSELS: Atheromatous calcifications.  RETROPERITONEUM/LYMPH NODES: No lymphadenopathy.  ABDOMINAL WALL: Within normal limits.  BONES: Hemisacralization of the fifth lumbar vertebra.    IMPRESSION:  Crohn's colitis involving the transverse and descending colon and   appendix.    Colonoscopy in 7/2020    Impression:          - Inflammation was found from the anus to the descending colon secondary to                        left-sided colitis. The findings are unchanged compared to previous                        examinations. Biopsied.          - One 15 mm polyp in the rectum, benign appearing likely inflammatory,                        removed with a hot snare. Resected and retrieved.                       - Diverticulosis in the sigmoid colon.                       - Localized mild inflammation was found in the cecum.                       Clinical improvement with Entyvio BUT no endoscopic improvement.    Colonoscopy 9/30/22  Impression:          - Inflammation was found from the anus to the splenic                        flexure. This was moderate in severity, worsened                        compared to previous examinations. Biopsied.                       - Left-sided ulcerative colitis. Inflammation was found                        from the transverse colon to the cecum. This was severe,                        worsened compared to previous examinations. Biopsied.                       - Bloody diarrhea secondary to severe inflammation due                        to his underlying UC, predominantly left sided disease.

## 2022-10-04 NOTE — PROGRESS NOTE ADULT - ASSESSMENT
72yoM with PMH of Crohn's disease, IDDM, neuropathy, HTN, HLD, BPH, CVA on ASA 81mg daily who presented with bloody diarrhea, concerning for CD flare.     Impression:   #IBD flare: Bloody diarrhea, concerning for CD flare -- unclear CD vs. UC as per Dr. Oro note. s/p poor prior response to Entyvio. On remicade for the past one year, last infusion 2 weeks.  On 6/30/22 pts remicade level was <0.4 and antibody was elevated at 308. Remicade dosage was increased since aug 2022. Last colonoscopy in 7/2020 showed inflammation from the anus to the descending colon (left sided colitis) and diverticulosis. Repeat colonoscopy showed inflammation in the transverse colon to the cecum. Pathology positive for chronic active colitis.   - GI PCR negative, c diff test negative.   - ESR and CRP downtrending  - On IV steroids (9/28 -   - Remicade 10mg/kg and MTX given on 10/3    Recommendations:   - Continue with IV Solumedrol 20mg Q8hrs.   - continue to monitor stool o/p.   - Methotrexate weekly   - continue with low residue diet  - obtain CRP and ESR daily.   - f/u fecal protectin  - Place him on DVT PPx as IBD patients are high risk for thrombosis.   - if the symptoms are persistent, will consider a second infusion of infliximab.     GI will continue to follow.     All recommendations are tentative until note is attested by an attending.     Jorge Arboleda, PGY-4  Gastroenterology/Hepatology Fellow  Available on [employer] Teams  [phone] (Short Range Pager)  [phone] (Long Range Pager)    After 5pm, please contact the on-call GI fellow. [phone]   72yoM with PMH of Crohn's disease, IDDM, neuropathy, HTN, HLD, BPH, CVA on ASA 81mg daily who presented with bloody diarrhea, concerning for CD flare.     Impression:   #IBD flare: Bloody diarrhea, concerning for CD flare -- unclear CD vs. UC as per Dr. Oro note. s/p poor prior response to Entyvio. On remicade for the past one year, last infusion 2 weeks.  On 6/30/22 pts remicade level was <0.4 and antibody was elevated at 308. Remicade dosage was increased since aug 2022. Last colonoscopy in 7/2020 showed inflammation from the anus to the descending colon (left sided colitis) and diverticulosis. Repeat colonoscopy showed inflammation in the transverse colon to the cecum. Pathology positive for chronic active colitis.   - GI PCR negative, c diff test negative.   - ESR and CRP downtrending  - On IV steroids (9/28 -   - Remicade 10mg/kg and MTX given on 10/3    Recommendations:   - Continue with IV Solumedrol 20mg Q8hrs.   - continue to monitor stool o/p.   - Methotrexate weekly   - continue with low residue diet  - obtain CRP and ESR daily.   - f/u fecal protectin  - Place him on DVT PPx as IBD patients are high risk for thrombosis.   - if the symptoms are persistent, will consider a second infusion of infliximab.   - Ultimately, if no clinical improvement with medical management, may require colorectal surgery involvement/assessment    GI will continue to follow.     All recommendations are tentative until note is attested by an attending.     Jorge Arboleda, PGY-4  Gastroenterology/Hepatology Fellow  Available on [employer] Teams  [phone] (Short Range Pager)  [phone] (Long Range Pager)    After 5pm, please contact the on-call GI fellow. [phone]

## 2022-10-04 NOTE — DIETITIAN INITIAL EVALUATION ADULT - PERTINENT MEDS FT
MEDICATIONS  (STANDING):  aspirin enteric coated 81 milliGRAM(s) Oral daily  atorvastatin 40 milliGRAM(s) Oral at bedtime  dextrose 5%. 1000 milliLiter(s) (50 mL/Hr) IV Continuous <Continuous>  dextrose 5%. 1000 milliLiter(s) (100 mL/Hr) IV Continuous <Continuous>  dextrose 5%. 1000 milliLiter(s) (100 mL/Hr) IV Continuous <Continuous>  dextrose 50% Injectable 25 Gram(s) IV Push once  dextrose 50% Injectable 12.5 Gram(s) IV Push once  dextrose 50% Injectable 25 Gram(s) IV Push once  enoxaparin Injectable 40 milliGRAM(s) SubCutaneous every 24 hours  finasteride 5 milliGRAM(s) Oral daily  folic acid 1 milliGRAM(s) Oral daily  gabapentin 100 milliGRAM(s) Oral two times a day  glucagon  Injectable 1 milliGRAM(s) IntraMuscular once  glucagon  Injectable 1 milliGRAM(s) IntraMuscular once  insulin glargine Injectable (LANTUS) 24 Unit(s) SubCutaneous at bedtime  insulin lispro (ADMELOG) corrective regimen sliding scale   SubCutaneous three times a day before meals  insulin lispro Injectable (ADMELOG) 11 Unit(s) SubCutaneous three times a day before meals  lactated ringers. 1000 milliLiter(s) (75 mL/Hr) IV Continuous <Continuous>  memantine 10 milliGRAM(s) Oral two times a day  mesalamine DR Capsule 1600 milliGRAM(s) Oral two times a day  methotrexate 10 milliGRAM(s) Oral <User Schedule>  methylPREDNISolone sodium succinate Injectable 20 milliGRAM(s) IV Push every 8 hours  pantoprazole  Injectable 40 milliGRAM(s) IV Push every 12 hours  QUEtiapine 50 milliGRAM(s) Oral at bedtime  sertraline 100 milliGRAM(s) Oral daily  tamsulosin 0.4 milliGRAM(s) Oral at bedtime    MEDICATIONS  (PRN):  dextrose Oral Gel 15 Gram(s) Oral once PRN Blood Glucose LESS THAN 70 milliGRAM(s)/deciliter  melatonin 3 milliGRAM(s) Oral at bedtime PRN Insomnia

## 2022-10-04 NOTE — PROGRESS NOTE ADULT - ATTENDING COMMENTS
72 year old man w/ past medical history of Crohn's disease, IDDM, neuropathy, HTN, HLD, BPH, CVA on ASA 81mg daily a/w likely Crohn's Disease flare. GI consulted for further management. GI PCR negative, c diff negative. Patient on steroids for flare, solumedrol 20mg q 8 hours. C-scope w/ active colitis, given outpatient labs w/ elevated Remicade abs and inadequate level his outpatient Remicade dose was increased and planned for repeat dose on 9/30. As Remicade and biosimilars were not available administration was delayed until 10/3. Remicade given 10/3, methotrexate weekly. Per GI pending response to remicade will determine need for second inpatient dose. If w/ no improvement may need colorectal surgery consult. Patient w/ tightly controlled FSG, down-titrating basal/bolus regimen to 24 units basal + 11 units pre-meal .

## 2022-10-04 NOTE — DIETITIAN INITIAL EVALUATION ADULT - PERTINENT LABORATORY DATA
10-04 Na n/a   Glu n/a   K+ n/a   Cr n/a   BUN n/a   Phos 4.9 mg/dL<H>  10-04 @ 12:10 POCT 128 mg/dL  10-04 @ 08:55 POCT 157 mg/dL  10-03 @ 22:24 POCT 99 mg/dL  10-03 @ 22:22 POCT 39 mg/dL  10-03 @ 18:43 POCT 102 mg/dL  10-03 @ 17:40 POCT 79 mg/dL  POCT Blood Glucose.: 128 mg/dL (10-04-22 @ 12:10)  A1C with Estimated Average Glucose Result: 5.4 % (09-28-22 @ 06:17)

## 2022-10-04 NOTE — PROGRESS NOTE ADULT - ASSESSMENT
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 72M with PMH of UC vs Crohn disease, IDDM, neuropathy, HTN, HLD, BPH, dementia 2/2 CVA on ASA 81mg daily, presenting for 1 mo of progressively worsening bloody diarrhea possibly secondary to IBD flare given colonoscopy findings and some clinical improvement with steroids and infliximab+methotrexate, BMs improved to pasty stools.

## 2022-10-04 NOTE — DIETITIAN INITIAL EVALUATION ADULT - NS FNS DIET ORDER
Diet, DASH/TLC:   Sodium & Cholesterol Restricted  Consistent Carbohydrate {Evening Snack} (CSTCHOSN)  Fiber/Residue Restricted (LOWFIBER)  Easy to Chew (EASYTOCHEW)  Lactose Restricted (Milk Sugar Intoler.) (09-30-22 @ 17:19) [Active]

## 2022-10-05 LAB
ALBUMIN SERPL ELPH-MCNC: 3.3 G/DL — SIGNIFICANT CHANGE UP (ref 3.3–5)
ALP SERPL-CCNC: 70 U/L — SIGNIFICANT CHANGE UP (ref 40–120)
ALT FLD-CCNC: 15 U/L — SIGNIFICANT CHANGE UP (ref 4–41)
ANION GAP SERPL CALC-SCNC: 9 MMOL/L — SIGNIFICANT CHANGE UP (ref 7–14)
AST SERPL-CCNC: 14 U/L — SIGNIFICANT CHANGE UP (ref 4–40)
BILIRUB SERPL-MCNC: 0.7 MG/DL — SIGNIFICANT CHANGE UP (ref 0.2–1.2)
BUN SERPL-MCNC: 19 MG/DL — SIGNIFICANT CHANGE UP (ref 7–23)
CALCIUM SERPL-MCNC: 8.4 MG/DL — SIGNIFICANT CHANGE UP (ref 8.4–10.5)
CALPROTECTIN STL-MCNT: 1688 UG/G — HIGH (ref 0–120)
CHLORIDE SERPL-SCNC: 99 MMOL/L — SIGNIFICANT CHANGE UP (ref 98–107)
CO2 SERPL-SCNC: 27 MMOL/L — SIGNIFICANT CHANGE UP (ref 22–31)
CREAT SERPL-MCNC: 0.79 MG/DL — SIGNIFICANT CHANGE UP (ref 0.5–1.3)
CRP SERPL-MCNC: <3 MG/L — SIGNIFICANT CHANGE UP
EGFR: 93 ML/MIN/1.73M2 — SIGNIFICANT CHANGE UP
GLUCOSE BLDC GLUCOMTR-MCNC: 117 MG/DL — HIGH (ref 70–99)
GLUCOSE BLDC GLUCOMTR-MCNC: 149 MG/DL — HIGH (ref 70–99)
GLUCOSE BLDC GLUCOMTR-MCNC: 163 MG/DL — HIGH (ref 70–99)
GLUCOSE BLDC GLUCOMTR-MCNC: 177 MG/DL — HIGH (ref 70–99)
GLUCOSE SERPL-MCNC: 170 MG/DL — HIGH (ref 70–99)
HCT VFR BLD CALC: 27.2 % — LOW (ref 39–50)
HGB BLD-MCNC: 8.2 G/DL — LOW (ref 13–17)
MAGNESIUM SERPL-MCNC: 2.2 MG/DL — SIGNIFICANT CHANGE UP (ref 1.6–2.6)
MCHC RBC-ENTMCNC: 24.4 PG — LOW (ref 27–34)
MCHC RBC-ENTMCNC: 30.1 GM/DL — LOW (ref 32–36)
MCV RBC AUTO: 81 FL — SIGNIFICANT CHANGE UP (ref 80–100)
NRBC # BLD: 0 /100 WBCS — SIGNIFICANT CHANGE UP (ref 0–0)
NRBC # FLD: 0 K/UL — SIGNIFICANT CHANGE UP (ref 0–0)
PHOSPHATE SERPL-MCNC: 4.1 MG/DL — SIGNIFICANT CHANGE UP (ref 2.5–4.5)
PLATELET # BLD AUTO: 313 K/UL — SIGNIFICANT CHANGE UP (ref 150–400)
POTASSIUM SERPL-MCNC: 4.5 MMOL/L — SIGNIFICANT CHANGE UP (ref 3.5–5.3)
POTASSIUM SERPL-SCNC: 4.5 MMOL/L — SIGNIFICANT CHANGE UP (ref 3.5–5.3)
PROT SERPL-MCNC: 6.3 G/DL — SIGNIFICANT CHANGE UP (ref 6–8.3)
RBC # BLD: 3.36 M/UL — LOW (ref 4.2–5.8)
RBC # FLD: 16.3 % — HIGH (ref 10.3–14.5)
SODIUM SERPL-SCNC: 135 MMOL/L — SIGNIFICANT CHANGE UP (ref 135–145)
WBC # BLD: 4.85 K/UL — SIGNIFICANT CHANGE UP (ref 3.8–10.5)
WBC # FLD AUTO: 4.85 K/UL — SIGNIFICANT CHANGE UP (ref 3.8–10.5)

## 2022-10-05 PROCEDURE — 99232 SBSQ HOSP IP/OBS MODERATE 35: CPT | Mod: GC

## 2022-10-05 RX ORDER — SODIUM CHLORIDE 9 MG/ML
1000 INJECTION INTRAMUSCULAR; INTRAVENOUS; SUBCUTANEOUS ONCE
Refills: 0 | Status: COMPLETED | OUTPATIENT
Start: 2022-10-05 | End: 2022-10-05

## 2022-10-05 RX ADMIN — SERTRALINE 100 MILLIGRAM(S): 25 TABLET, FILM COATED ORAL at 11:54

## 2022-10-05 RX ADMIN — GABAPENTIN 100 MILLIGRAM(S): 400 CAPSULE ORAL at 17:47

## 2022-10-05 RX ADMIN — INSULIN GLARGINE 24 UNIT(S): 100 INJECTION, SOLUTION SUBCUTANEOUS at 22:55

## 2022-10-05 RX ADMIN — Medication 1600 MILLIGRAM(S): at 16:52

## 2022-10-05 RX ADMIN — Medication 2: at 13:04

## 2022-10-05 RX ADMIN — Medication 1600 MILLIGRAM(S): at 06:19

## 2022-10-05 RX ADMIN — ENOXAPARIN SODIUM 40 MILLIGRAM(S): 100 INJECTION SUBCUTANEOUS at 10:03

## 2022-10-05 RX ADMIN — Medication 81 MILLIGRAM(S): at 11:54

## 2022-10-05 RX ADMIN — SODIUM CHLORIDE 1000 MILLILITER(S): 9 INJECTION INTRAMUSCULAR; INTRAVENOUS; SUBCUTANEOUS at 11:53

## 2022-10-05 RX ADMIN — Medication 2: at 09:17

## 2022-10-05 RX ADMIN — PANTOPRAZOLE SODIUM 40 MILLIGRAM(S): 20 TABLET, DELAYED RELEASE ORAL at 17:48

## 2022-10-05 RX ADMIN — Medication 20 MILLIGRAM(S): at 21:41

## 2022-10-05 RX ADMIN — PANTOPRAZOLE SODIUM 40 MILLIGRAM(S): 20 TABLET, DELAYED RELEASE ORAL at 06:20

## 2022-10-05 RX ADMIN — QUETIAPINE FUMARATE 50 MILLIGRAM(S): 200 TABLET, FILM COATED ORAL at 21:42

## 2022-10-05 RX ADMIN — GABAPENTIN 100 MILLIGRAM(S): 400 CAPSULE ORAL at 06:20

## 2022-10-05 RX ADMIN — Medication 20 MILLIGRAM(S): at 13:05

## 2022-10-05 RX ADMIN — Medication 1 MILLIGRAM(S): at 11:54

## 2022-10-05 RX ADMIN — Medication 20 MILLIGRAM(S): at 06:19

## 2022-10-05 RX ADMIN — Medication 11 UNIT(S): at 18:10

## 2022-10-05 RX ADMIN — FINASTERIDE 5 MILLIGRAM(S): 5 TABLET, FILM COATED ORAL at 11:54

## 2022-10-05 RX ADMIN — TAMSULOSIN HYDROCHLORIDE 0.4 MILLIGRAM(S): 0.4 CAPSULE ORAL at 21:42

## 2022-10-05 RX ADMIN — MEMANTINE HYDROCHLORIDE 10 MILLIGRAM(S): 10 TABLET ORAL at 06:20

## 2022-10-05 RX ADMIN — Medication 11 UNIT(S): at 13:05

## 2022-10-05 RX ADMIN — ATORVASTATIN CALCIUM 40 MILLIGRAM(S): 80 TABLET, FILM COATED ORAL at 21:42

## 2022-10-05 RX ADMIN — MEMANTINE HYDROCHLORIDE 10 MILLIGRAM(S): 10 TABLET ORAL at 17:47

## 2022-10-05 RX ADMIN — Medication 11 UNIT(S): at 09:18

## 2022-10-05 NOTE — PROGRESS NOTE ADULT - PROBLEM SELECTOR PLAN 3
on Lantus 30 and Admelog 28-30 at home  - Lantus 24  - Admelog 11 tid qAC  - correctional scale tid qAC

## 2022-10-05 NOTE — PROGRESS NOTE ADULT - SUBJECTIVE AND OBJECTIVE BOX
Gastroenterology/Hepatology Progress Note      Interval Events:   No acute events overnight. Patient had total of 3 BMs which were soft, no blood or watery stool, spoke to the nurse as well. Patient has no abd pain, nausea or vomiting. Tolerating po diet well.     Allergies:  No Known Allergies      Hospital Medications:  aspirin enteric coated 81 milliGRAM(s) Oral daily  atorvastatin 40 milliGRAM(s) Oral at bedtime  dextrose 5%. 1000 milliLiter(s) IV Continuous <Continuous>  dextrose 5%. 1000 milliLiter(s) IV Continuous <Continuous>  dextrose 5%. 1000 milliLiter(s) IV Continuous <Continuous>  dextrose 50% Injectable 25 Gram(s) IV Push once  dextrose 50% Injectable 12.5 Gram(s) IV Push once  dextrose 50% Injectable 25 Gram(s) IV Push once  dextrose Oral Gel 15 Gram(s) Oral once PRN  enoxaparin Injectable 40 milliGRAM(s) SubCutaneous every 24 hours  finasteride 5 milliGRAM(s) Oral daily  folic acid 1 milliGRAM(s) Oral daily  gabapentin 100 milliGRAM(s) Oral two times a day  glucagon  Injectable 1 milliGRAM(s) IntraMuscular once  glucagon  Injectable 1 milliGRAM(s) IntraMuscular once  insulin glargine Injectable (LANTUS) 24 Unit(s) SubCutaneous at bedtime  insulin lispro (ADMELOG) corrective regimen sliding scale   SubCutaneous three times a day before meals  insulin lispro Injectable (ADMELOG) 11 Unit(s) SubCutaneous three times a day before meals  melatonin 3 milliGRAM(s) Oral at bedtime PRN  memantine 10 milliGRAM(s) Oral two times a day  mesalamine DR Capsule 1600 milliGRAM(s) Oral two times a day  methotrexate 10 milliGRAM(s) Oral <User Schedule>  methylPREDNISolone sodium succinate Injectable 20 milliGRAM(s) IV Push every 8 hours  pantoprazole  Injectable 40 milliGRAM(s) IV Push every 12 hours  QUEtiapine 50 milliGRAM(s) Oral at bedtime  sertraline 100 milliGRAM(s) Oral daily  tamsulosin 0.4 milliGRAM(s) Oral at bedtime      ROS: 14 point ROS negative unless otherwise state in subjective    PHYSICAL EXAM:   Vital Signs:  Vital Signs Last 24 Hrs  T(C): 36.9 (05 Oct 2022 14:55), Max: 36.9 (05 Oct 2022 14:55)  T(F): 98.4 (05 Oct 2022 14:55), Max: 98.4 (05 Oct 2022 14:55)  HR: 77 (05 Oct 2022 14:55) (71 - 81)  BP: 115/50 (05 Oct 2022 14:55) (112/42 - 118/51)  BP(mean): --  RR: 17 (05 Oct 2022 14:55) (16 - 17)  SpO2: 98% (05 Oct 2022 14:55) (98% - 100%)    Parameters below as of 05 Oct 2022 14:55  Patient On (Oxygen Delivery Method): room air      Daily     Daily     GENERAL:  No acute distress, well appearing, eating breakfast.    HEENT:  NCAT, no scleral icterus  CHEST: no resp distress  ABDOMEN:  Soft, non-tender, non-distended, no masses  EXTREMITIES:  No  edema b/l  NEURO/PSYCH: NO tremor, alert and oriented x 3.      LABS:                        8.2    4.85  )-----------( 313      ( 05 Oct 2022 05:55 )             27.2     Mean Cell Volume: 81.0 fL (10-05-22 @ 05:55)    10-05    135  |  99  |  19  ----------------------------<  170<H>  4.5   |  27  |  0.79    Ca    8.4      05 Oct 2022 05:55  Phos  4.1     10-05  Mg     2.20     10-05    TPro  6.3  /  Alb  3.3  /  TBili  0.7  /  DBili  x   /  AST  14  /  ALT  15  /  AlkPhos  70  10-05    LIVER FUNCTIONS - ( 05 Oct 2022 05:55 )  Alb: 3.3 g/dL / Pro: 6.3 g/dL / ALK PHOS: 70 U/L / ALT: 15 U/L / AST: 14 U/L / GGT: x             Imaging:      Ct abd and pelvis    FINDINGS:  LOWER CHEST: Within normal limits.    LIVER: Within normal limits.  BILE DUCTS: Normal caliber.  GALLBLADDER: Small gallstone.  SPLEEN: Within normal limits.  PANCREAS: Within normal limits.  ADRENALS: Within normal limits.  KIDNEYS/URETERS: 4 mm nonobstructive calculus lower pole right kidney. 2   cm cyst upper pole left kidney.    BLADDER: Within normal limits.  REPRODUCTIVE ORGANS: Prostate within normal limits.    BOWEL: No bowel obstruction. Mural thickening and associated mesenteric   hyperemia involving the colon from the hepatic flexure through the mid   descending colon. Submucosal fat deposition in the cecum and rectosigmoid   colon consistent with chronic information. Appendix mural thickening and   mucosal hyperenhancement in the mid appendix.  PERITONEUM: No ascites.  VESSELS: Atheromatous calcifications.  RETROPERITONEUM/LYMPH NODES: No lymphadenopathy.  ABDOMINAL WALL: Within normal limits.  BONES: Hemisacralization of the fifth lumbar vertebra.    IMPRESSION:  Crohn's colitis involving the transverse and descending colon and   appendix.    Colonoscopy in 7/2020    Impression:          - Inflammation was found from the anus to the descending colon secondary to                        left-sided colitis. The findings are unchanged compared to previous                        examinations. Biopsied.          - One 15 mm polyp in the rectum, benign appearing likely inflammatory,                        removed with a hot snare. Resected and retrieved.                       - Diverticulosis in the sigmoid colon.                       - Localized mild inflammation was found in the cecum.                       Clinical improvement with Entyvio BUT no endoscopic improvement.    Colonoscopy 9/30/22  Impression:          - Inflammation was found from the anus to the splenic                        flexure. This was moderate in severity, worsened                        compared to previous examinations. Biopsied.                       - Left-sided ulcerative colitis. Inflammation was found                        from the transverse colon to the cecum. This was severe,                        worsened compared to previous examinations. Biopsied.                       - Bloody diarrhea secondary to severe inflammation due                        to his underlying UC, predominantly left sided disease.    Surgical pathology    _  1. Ascending colon, biopsy:  - Chronic active colitis. Negative for granuloma and dysplasia.    2. Transverse colon, biopsy:  - Chronic active colitis. Negative for granuloma and dysplasia.    3. Descending colon, biopsy:  - Chronic active colitis. Negative for granuloma and dysplasia.    4. Sigmoid colon, biopsy:  - Chronic active colitis. Negative for granuloma and dysplasia.

## 2022-10-05 NOTE — PROGRESS NOTE ADULT - SUBJECTIVE AND OBJECTIVE BOX
Progress Note     == draft in progress ==  Britt Childress  PGY-1 Medicine  Teams | [id number]    Patient is a 72y old  Male who presents with a chief complaint of Diarrhea     (04 Oct 2022 16:31)          SUBJECTIVE / INTERVAL EVENTS  Patient seen and evaluated at bedside. Patient doing okay today. Patient denied fever, nausea/vomiting, shortness of breath, new pain (headache, chest pain, abdominal pain, limb pain), new swelling, new numbness/tingling, dysuria or hematuria.        MEDICATIONS    Home Medications:  aspirin 81 mg oral delayed release tablet: 1 tab(s) orally once a day (28 Sep 2022 01:21)  atorvastatin 40 mg oral tablet: 1 tab(s) orally once a day (at bedtime) (28 Sep 2022 01:21)  Centrum Silver oral tablet: 1 tab(s) orally once a day (28 Sep 2022 01:21)  finasteride 5 mg oral tablet: 1 tab(s) orally once a day (28 Sep 2022 01:21)  folic acid 1 mg oral tablet: 1 tab(s) orally once a day (01 Oct 2022 11:05)  gabapentin 100 mg oral capsule: 1 tab(s) orally 2 times a day (28 Sep 2022 01:21)  insulin aspart 100 units/mL injectable solution: 28 unit(s) injectable 3 times a day (before meals) (28 Sep 2022 01:21)  Januvia 50 mg oral tablet: 1 tab(s) orally once a day (28 Sep 2022 01:21)  memantine 10 mg oral tablet: 1 tab(s) orally 2 times a day (28 Sep 2022 01:21)  mesalamine 800 mg oral delayed release tablet: 2 tab(s) orally 2 times a day (28 Sep 2022 01:21)  QUEtiapine 25 mg oral tablet: 2 tab(s) orally once a day (at bedtime) (28 Sep 2022 01:21)  Remicade 100 mg intravenous injection:  (28 Sep 2022 01:21)  sertraline 100 mg oral tablet: 1 tab(s) orally once a day (28 Sep 2022 01:21)  tamsulosin 0.4 mg oral capsule: 1 cap(s) orally 2 times a day (28 Sep 2022 01:21)  Tresiba 100 units/mL subcutaneous solution: 30 unit(s) subcutaneous once a day (at bedtime) (28 Sep 2022 01:21)  trimethoprim 100 mg oral tablet: 1 tab(s) orally once a day (at bedtime) (28 Sep 2022 01:21)  Vitamin D3 400 intl units oral tablet: 1 tab(s) orally once a day (28 Sep 2022 01:21)      MEDICATIONS  (STANDING):  aspirin enteric coated 81 milliGRAM(s) Oral daily  atorvastatin 40 milliGRAM(s) Oral at bedtime  dextrose 5%. 1000 milliLiter(s) (50 mL/Hr) IV Continuous <Continuous>  dextrose 5%. 1000 milliLiter(s) (100 mL/Hr) IV Continuous <Continuous>  dextrose 5%. 1000 milliLiter(s) (100 mL/Hr) IV Continuous <Continuous>  dextrose 50% Injectable 25 Gram(s) IV Push once  dextrose 50% Injectable 12.5 Gram(s) IV Push once  dextrose 50% Injectable 25 Gram(s) IV Push once  enoxaparin Injectable 40 milliGRAM(s) SubCutaneous every 24 hours  finasteride 5 milliGRAM(s) Oral daily  folic acid 1 milliGRAM(s) Oral daily  gabapentin 100 milliGRAM(s) Oral two times a day  glucagon  Injectable 1 milliGRAM(s) IntraMuscular once  glucagon  Injectable 1 milliGRAM(s) IntraMuscular once  insulin glargine Injectable (LANTUS) 24 Unit(s) SubCutaneous at bedtime  insulin lispro (ADMELOG) corrective regimen sliding scale   SubCutaneous three times a day before meals  insulin lispro Injectable (ADMELOG) 11 Unit(s) SubCutaneous three times a day before meals  lactated ringers. 1000 milliLiter(s) (75 mL/Hr) IV Continuous <Continuous>  memantine 10 milliGRAM(s) Oral two times a day  mesalamine DR Capsule 1600 milliGRAM(s) Oral two times a day  methotrexate 10 milliGRAM(s) Oral <User Schedule>  methylPREDNISolone sodium succinate Injectable 20 milliGRAM(s) IV Push every 8 hours  pantoprazole  Injectable 40 milliGRAM(s) IV Push every 12 hours  QUEtiapine 50 milliGRAM(s) Oral at bedtime  sertraline 100 milliGRAM(s) Oral daily  tamsulosin 0.4 milliGRAM(s) Oral at bedtime    MEDICATIONS  (PRN):  dextrose Oral Gel 15 Gram(s) Oral once PRN Blood Glucose LESS THAN 70 milliGRAM(s)/deciliter  melatonin 3 milliGRAM(s) Oral at bedtime PRN Insomnia         VITALS / I&O's  T(C): 36.7 (10-05-22 @ 06:11), Max: 36.9 (10-04-22 @ 14:13)  HR: 71 (10-05-22 @ 06:11) (71 - 85)  BP: 118/51 (10-05-22 @ 06:11) (112/42 - 137/50)  RR: 16 (10-05-22 @ 06:11) (16 - 17)  SpO2: 100% (10-05-22 @ 06:11) (100% - 100%)  I&O's Summary         PHYSICAL EXAM           LABS                        8.2    4.85  )-----------( 313      ( 05 Oct 2022 05:55 )             27.2     10-05    135  |  99  |  19  ----------------------------<  170<H>  4.5   |  27  |  0.79    Ca    8.4      05 Oct 2022 05:55  Phos  4.1     10-05  Mg     2.20     10-05    TPro  6.3  /  Alb  3.3  /  TBili  0.7  /  DBili  x   /  AST  14  /  ALT  15  /  AlkPhos  70  10-05    CAPILLARY BLOOD GLUCOSE      POCT Blood Glucose.: 150 mg/dL (04 Oct 2022 22:00)  POCT Blood Glucose.: 179 mg/dL (04 Oct 2022 17:27)  POCT Blood Glucose.: 128 mg/dL (04 Oct 2022 12:10)  POCT Blood Glucose.: 157 mg/dL (04 Oct 2022 08:55)      LIVER FUNCTIONS - ( 05 Oct 2022 05:55 )  Alb: 3.3 g/dL / Pro: 6.3 g/dL / ALK PHOS: 70 U/L / ALT: 15 U/L / AST: 14 U/L / GGT: x                         Progress Note       Britt Childress  PGY-1 Medicine  Teams | [id number]    Patient is a 72y old  Male who presents with a chief complaint of Diarrhea     (04 Oct 2022 16:31)          SUBJECTIVE / INTERVAL EVENTS  - No acute events overnight. Per RN, 0 BM OVN.  - Patient denied fever, nausea/vomiting, shortness of breath, new pain (headache, chest pain, abdominal pain, limb pain), new swelling, new numbness/tingling, dysuria or hematuria.  - Patient endorsed feeling better; abdominal pain 1-2/10 (says "9" or "8" but confirms very comfortable).  - Patient asked for TV to be turned on (obliged).       MEDICATIONS    Home Medications:  aspirin 81 mg oral delayed release tablet: 1 tab(s) orally once a day (28 Sep 2022 01:21)  atorvastatin 40 mg oral tablet: 1 tab(s) orally once a day (at bedtime) (28 Sep 2022 01:21)  Centrum Silver oral tablet: 1 tab(s) orally once a day (28 Sep 2022 01:21)  finasteride 5 mg oral tablet: 1 tab(s) orally once a day (28 Sep 2022 01:21)  folic acid 1 mg oral tablet: 1 tab(s) orally once a day (01 Oct 2022 11:05)  gabapentin 100 mg oral capsule: 1 tab(s) orally 2 times a day (28 Sep 2022 01:21)  insulin aspart 100 units/mL injectable solution: 28 unit(s) injectable 3 times a day (before meals) (28 Sep 2022 01:21)  Januvia 50 mg oral tablet: 1 tab(s) orally once a day (28 Sep 2022 01:21)  memantine 10 mg oral tablet: 1 tab(s) orally 2 times a day (28 Sep 2022 01:21)  mesalamine 800 mg oral delayed release tablet: 2 tab(s) orally 2 times a day (28 Sep 2022 01:21)  QUEtiapine 25 mg oral tablet: 2 tab(s) orally once a day (at bedtime) (28 Sep 2022 01:21)  Remicade 100 mg intravenous injection:  (28 Sep 2022 01:21)  sertraline 100 mg oral tablet: 1 tab(s) orally once a day (28 Sep 2022 01:21)  tamsulosin 0.4 mg oral capsule: 1 cap(s) orally 2 times a day (28 Sep 2022 01:21)  Tresiba 100 units/mL subcutaneous solution: 30 unit(s) subcutaneous once a day (at bedtime) (28 Sep 2022 01:21)  trimethoprim 100 mg oral tablet: 1 tab(s) orally once a day (at bedtime) (28 Sep 2022 01:21)  Vitamin D3 400 intl units oral tablet: 1 tab(s) orally once a day (28 Sep 2022 01:21)      MEDICATIONS  (STANDING):  aspirin enteric coated 81 milliGRAM(s) Oral daily  atorvastatin 40 milliGRAM(s) Oral at bedtime  dextrose 5%. 1000 milliLiter(s) (50 mL/Hr) IV Continuous <Continuous>  dextrose 5%. 1000 milliLiter(s) (100 mL/Hr) IV Continuous <Continuous>  dextrose 5%. 1000 milliLiter(s) (100 mL/Hr) IV Continuous <Continuous>  dextrose 50% Injectable 25 Gram(s) IV Push once  dextrose 50% Injectable 12.5 Gram(s) IV Push once  dextrose 50% Injectable 25 Gram(s) IV Push once  enoxaparin Injectable 40 milliGRAM(s) SubCutaneous every 24 hours  finasteride 5 milliGRAM(s) Oral daily  folic acid 1 milliGRAM(s) Oral daily  gabapentin 100 milliGRAM(s) Oral two times a day  glucagon  Injectable 1 milliGRAM(s) IntraMuscular once  glucagon  Injectable 1 milliGRAM(s) IntraMuscular once  insulin glargine Injectable (LANTUS) 24 Unit(s) SubCutaneous at bedtime  insulin lispro (ADMELOG) corrective regimen sliding scale   SubCutaneous three times a day before meals  insulin lispro Injectable (ADMELOG) 11 Unit(s) SubCutaneous three times a day before meals  lactated ringers. 1000 milliLiter(s) (75 mL/Hr) IV Continuous <Continuous>  memantine 10 milliGRAM(s) Oral two times a day  mesalamine DR Capsule 1600 milliGRAM(s) Oral two times a day  methotrexate 10 milliGRAM(s) Oral <User Schedule>  methylPREDNISolone sodium succinate Injectable 20 milliGRAM(s) IV Push every 8 hours  pantoprazole  Injectable 40 milliGRAM(s) IV Push every 12 hours  QUEtiapine 50 milliGRAM(s) Oral at bedtime  sertraline 100 milliGRAM(s) Oral daily  tamsulosin 0.4 milliGRAM(s) Oral at bedtime    MEDICATIONS  (PRN):  dextrose Oral Gel 15 Gram(s) Oral once PRN Blood Glucose LESS THAN 70 milliGRAM(s)/deciliter  melatonin 3 milliGRAM(s) Oral at bedtime PRN Insomnia         VITALS / I&O's  T(C): 36.7 (10-05-22 @ 06:11), Max: 36.9 (10-04-22 @ 14:13)  HR: 71 (10-05-22 @ 06:11) (71 - 85)  BP: 118/51 (10-05-22 @ 06:11) (112/42 - 137/50)  RR: 16 (10-05-22 @ 06:11) (16 - 17)  SpO2: 100% (10-05-22 @ 06:11) (100% - 100%)  I&O's Summary         PHYSICAL EXAM  General: Reclining in bed in no acute distress.  HEENT: Normocephalic, atraumatic. Extraocular movements grossly intact. No nasal discharge.  Neuro: Alert, somewhat oriented. No facial asymmetry or dysarthria. Moving all extremities.  CV: Regular rate and rhythm. S1/S2. +systolic murmur? Limbs warm, no distal edema.  Respiratory: Anterior lung fields CTAB. No increased work of breathing, speaking comfortably.  Abdomen: Soft; somewhat distended; nontender to palpation. No guarding.   : Suprapubic region nontender.  Skin: No rashes or bruising of face, forearms, or calves bilaterally.  Psych: Mood and affect appropriate.           LABS                        8.2    4.85  )-----------( 313      ( 05 Oct 2022 05:55 )             27.2     10-05    135  |  99  |  19  ----------------------------<  170<H>  4.5   |  27  |  0.79    Ca    8.4      05 Oct 2022 05:55  Phos  4.1     10-05  Mg     2.20     10-05    TPro  6.3  /  Alb  3.3  /  TBili  0.7  /  DBili  x   /  AST  14  /  ALT  15  /  AlkPhos  70  10-05    CAPILLARY BLOOD GLUCOSE      POCT Blood Glucose.: 150 mg/dL (04 Oct 2022 22:00)  POCT Blood Glucose.: 179 mg/dL (04 Oct 2022 17:27)  POCT Blood Glucose.: 128 mg/dL (04 Oct 2022 12:10)  POCT Blood Glucose.: 157 mg/dL (04 Oct 2022 08:55)      LIVER FUNCTIONS - ( 05 Oct 2022 05:55 )  Alb: 3.3 g/dL / Pro: 6.3 g/dL / ALK PHOS: 70 U/L / ALT: 15 U/L / AST: 14 U/L / GGT: x

## 2022-10-05 NOTE — PROGRESS NOTE ADULT - ASSESSMENT
72yoM with PMH of Crohn's disease, IDDM, neuropathy, HTN, HLD, BPH, CVA on ASA 81mg daily who presented with bloody diarrhea, concerning for CD flare.     Impression:   #IBD flare: Bloody diarrhea, concerning for CD flare -- unclear CD vs. UC as per Dr. Oro note. s/p poor prior response to Entyvio. On remicade for the past one year, last infusion 2 weeks.  On 6/30/22 pts remicade level was <0.4 and antibody was elevated at 308. Remicade dosage was increased since aug 2022. Last colonoscopy in 7/2020 showed inflammation from the anus to the descending colon (left sided colitis) and diverticulosis. Repeat colonoscopy showed inflammation in the transverse colon to the cecum. Pathology positive for chronic active colitis.   - GI PCR negative, c diff test negative.   - ESR and CRP downtrending  - On IV steroids (9/28 -   - Remicade 10mg/kg and MTX given on 10/3  - clinically responding to medication.     Recommendations:   - Discontinue IV steroids today.   - Will transition to PO steroids tomorrow on 10/6.  - continue to monitor stool o/p.   - Methotrexate weekly   - continue with low residue diet  - obtain CRP and ESR daily.   - f/u fecal protectin  - Place him on DVT PPx as IBD patients are high risk for thrombosis.   - if the symptoms are persistent, will consider a second infusion of infliximab.   - Ultimately, if no clinical improvement with medical management, may require colorectal surgery involvement/assessment    GI will continue to follow.     All recommendations are tentative until note is attested by an attending.     Jorge Arboleda, PGY-4  Gastroenterology/Hepatology Fellow  Available on [employer] Teams  [phone] (Short Range Pager)  [phone] (Long Range Pager)    After 5pm, please contact the on-call GI fellow. [phone]   72yoM with PMH of Crohn's disease, IDDM, neuropathy, HTN, HLD, BPH, CVA on ASA 81mg daily who presented with bloody diarrhea, concerning for CD flare.     Impression:   #IBD flare: Bloody diarrhea, concerning for CD flare -- unclear CD vs. UC as per Dr. Oro note. s/p poor prior response to Entyvio. On remicade for the past one year, last infusion 2 weeks.  On 6/30/22 pts remicade level was <0.4 and antibody was elevated at 308. Remicade dosage was increased since aug 2022. Last colonoscopy in 7/2020 showed inflammation from the anus to the descending colon (left sided colitis) and diverticulosis. Repeat colonoscopy showed inflammation in the transverse colon to the cecum. Pathology positive for chronic active colitis.   - GI PCR negative, c diff test negative.   - ESR and CRP downtrending  - On IV steroids (9/28 -   - Remicade 10mg/kg and MTX given on 10/3  - clinically responding to medication.     Recommendations:   - Discontinue IV steroids this evening.   - transition to PO steroids tomorrow on 10/6.  - continue to monitor stool o/p.   - Methotrexate weekly   - continue with low residue diet  - obtain CRP and ESR daily.   - f/u fecal protectin  - Place him on DVT PPx as IBD patients are high risk for thrombosis.   - if the symptoms are persistent, will consider a second infusion of infliximab.   - Ultimately, if no clinical improvement with medical management, may require colorectal surgery involvement/assessment    GI will continue to follow.     All recommendations are tentative until note is attested by an attending.     Jorge Arboleda, PGY-4  Gastroenterology/Hepatology Fellow  Available on [employer] Teams  [phone] (Short Range Pager)  [phone] (Long Range Pager)    After 5pm, please contact the on-call GI fellow. [phone]

## 2022-10-05 NOTE — PROGRESS NOTE ADULT - ATTENDING COMMENTS
72 year old man w/ past medical history of Crohn's disease, IDDM, neuropathy, HTN, HLD, BPH, CVA on ASA 81mg daily a/w likely Crohn's Disease flare. GI consulted for further management. GI PCR negative, c diff negative. Colonoscopy this admission showing inflammation transverse colon to the cecum. Pathology positive for chronic active colitis. On IV steroids, s/p Remicade 10/3 and methotrexate 10/3. Patient clinically improving discontinuing IV steroids and transitioning to po. Discharge pending further flare management.

## 2022-10-05 NOTE — PROGRESS NOTE ADULT - ATTENDING COMMENTS
72M with IBD (suspected Crohn's colitis vs less likely UC, follows with Dr. Oro, on Remicade) admitted with bloody diarrhea c/f flare. Infectious workup, including C diff was negative. Patient underwent colonoscopy on 9/30 with active colitis, most notably in right colon. Given outpatient labs with elevated Remicade Abs and inadequate level (6/2022), his outpatient dose of Remicade was increased and he was planned for repeat dose on 9/30 in hospital. As Remicade (and biosimilars) were not available, dose administration was delayed until 10/3.     Inflammatory markers improving and now with clinical response as well.     --Transition to PO steroids tomorrow  --Monitor abdominal exam and stool output  --Methotrexate weekly  --Monitor daily ESR and CRP  --Pending response to Remicade, will determine if needs a second inpatient dose; given improvement today, will hold off on additional dose  --DVT prophylaxis  --Will need close outpatient follow up with Dr. Oro once stable for discharge    Additional recommendations as above

## 2022-10-05 NOTE — PROGRESS NOTE ADULT - ASSESSMENT
72M with PMH of UC vs Crohn disease, IDDM, neuropathy, HTN, HLD, BPH, dementia 2/2 CVA on ASA 81mg daily, presenting for 1 mo of progressively worsening bloody diarrhea possibly secondary to IBD flare given colonoscopy findings and some clinical improvement with steroids and infliximab+methotrexate, BM consistency and frequency improving.

## 2022-10-05 NOTE — PROGRESS NOTE ADULT - PROBLEM SELECTOR PLAN 1
Bill IBD flare with bloody diarrhea likely 2/2 UC vs Crohn  - elevated ESR, CRP, both downtrending  - infectious work up neg: Cdiff PCR, GI PCR, stool Cx, Stool O+p  - F/up fecal calprotectin,    - LR @75 for rehydration  - IBD flare management as per GI - appreciate recs     - solumedrol 20 q8  - pantoprazole per GI

## 2022-10-06 LAB
ALBUMIN SERPL ELPH-MCNC: 3 G/DL — LOW (ref 3.3–5)
ALP SERPL-CCNC: 67 U/L — SIGNIFICANT CHANGE UP (ref 40–120)
ALT FLD-CCNC: 15 U/L — SIGNIFICANT CHANGE UP (ref 4–41)
ANION GAP SERPL CALC-SCNC: 10 MMOL/L — SIGNIFICANT CHANGE UP (ref 7–14)
AST SERPL-CCNC: 17 U/L — SIGNIFICANT CHANGE UP (ref 4–40)
BILIRUB SERPL-MCNC: 0.6 MG/DL — SIGNIFICANT CHANGE UP (ref 0.2–1.2)
BUN SERPL-MCNC: 15 MG/DL — SIGNIFICANT CHANGE UP (ref 7–23)
CALCIUM SERPL-MCNC: 8.2 MG/DL — LOW (ref 8.4–10.5)
CHLORIDE SERPL-SCNC: 100 MMOL/L — SIGNIFICANT CHANGE UP (ref 98–107)
CO2 SERPL-SCNC: 26 MMOL/L — SIGNIFICANT CHANGE UP (ref 22–31)
CREAT SERPL-MCNC: 0.85 MG/DL — SIGNIFICANT CHANGE UP (ref 0.5–1.3)
CRP SERPL-MCNC: <3 MG/L — SIGNIFICANT CHANGE UP
EGFR: 91 ML/MIN/1.73M2 — SIGNIFICANT CHANGE UP
ERYTHROCYTE [SEDIMENTATION RATE] IN BLOOD: 27 MM/HR — HIGH (ref 1–15)
GLUCOSE BLDC GLUCOMTR-MCNC: 115 MG/DL — HIGH (ref 70–99)
GLUCOSE BLDC GLUCOMTR-MCNC: 182 MG/DL — HIGH (ref 70–99)
GLUCOSE BLDC GLUCOMTR-MCNC: 197 MG/DL — HIGH (ref 70–99)
GLUCOSE BLDC GLUCOMTR-MCNC: 197 MG/DL — HIGH (ref 70–99)
GLUCOSE BLDC GLUCOMTR-MCNC: 42 MG/DL — CRITICAL LOW (ref 70–99)
GLUCOSE BLDC GLUCOMTR-MCNC: 63 MG/DL — LOW (ref 70–99)
GLUCOSE BLDC GLUCOMTR-MCNC: 64 MG/DL — LOW (ref 70–99)
GLUCOSE SERPL-MCNC: 89 MG/DL — SIGNIFICANT CHANGE UP (ref 70–99)
HCT VFR BLD CALC: 27.8 % — LOW (ref 39–50)
HGB BLD-MCNC: 8.3 G/DL — LOW (ref 13–17)
MAGNESIUM SERPL-MCNC: 2.2 MG/DL — SIGNIFICANT CHANGE UP (ref 1.6–2.6)
MCHC RBC-ENTMCNC: 24.6 PG — LOW (ref 27–34)
MCHC RBC-ENTMCNC: 29.9 GM/DL — LOW (ref 32–36)
MCV RBC AUTO: 82.5 FL — SIGNIFICANT CHANGE UP (ref 80–100)
NRBC # BLD: 0 /100 WBCS — SIGNIFICANT CHANGE UP (ref 0–0)
NRBC # FLD: 0 K/UL — SIGNIFICANT CHANGE UP (ref 0–0)
PHOSPHATE SERPL-MCNC: 3.5 MG/DL — SIGNIFICANT CHANGE UP (ref 2.5–4.5)
PLATELET # BLD AUTO: 332 K/UL — SIGNIFICANT CHANGE UP (ref 150–400)
POTASSIUM SERPL-MCNC: 3.8 MMOL/L — SIGNIFICANT CHANGE UP (ref 3.5–5.3)
POTASSIUM SERPL-SCNC: 3.8 MMOL/L — SIGNIFICANT CHANGE UP (ref 3.5–5.3)
PROT SERPL-MCNC: 6.1 G/DL — SIGNIFICANT CHANGE UP (ref 6–8.3)
RBC # BLD: 3.37 M/UL — LOW (ref 4.2–5.8)
RBC # FLD: 16.7 % — HIGH (ref 10.3–14.5)
SODIUM SERPL-SCNC: 136 MMOL/L — SIGNIFICANT CHANGE UP (ref 135–145)
WBC # BLD: 7.51 K/UL — SIGNIFICANT CHANGE UP (ref 3.8–10.5)
WBC # FLD AUTO: 7.51 K/UL — SIGNIFICANT CHANGE UP (ref 3.8–10.5)

## 2022-10-06 PROCEDURE — 99232 SBSQ HOSP IP/OBS MODERATE 35: CPT | Mod: GC

## 2022-10-06 RX ADMIN — Medication 2: at 13:37

## 2022-10-06 RX ADMIN — GABAPENTIN 100 MILLIGRAM(S): 400 CAPSULE ORAL at 07:07

## 2022-10-06 RX ADMIN — Medication 11 UNIT(S): at 18:25

## 2022-10-06 RX ADMIN — SERTRALINE 100 MILLIGRAM(S): 25 TABLET, FILM COATED ORAL at 12:35

## 2022-10-06 RX ADMIN — Medication 1600 MILLIGRAM(S): at 07:06

## 2022-10-06 RX ADMIN — ENOXAPARIN SODIUM 40 MILLIGRAM(S): 100 INJECTION SUBCUTANEOUS at 09:35

## 2022-10-06 RX ADMIN — Medication 81 MILLIGRAM(S): at 12:36

## 2022-10-06 RX ADMIN — Medication 11 UNIT(S): at 13:37

## 2022-10-06 RX ADMIN — Medication 75 MILLIGRAM(S): at 07:06

## 2022-10-06 RX ADMIN — Medication 3 MILLIGRAM(S): at 22:35

## 2022-10-06 RX ADMIN — MEMANTINE HYDROCHLORIDE 10 MILLIGRAM(S): 10 TABLET ORAL at 18:23

## 2022-10-06 RX ADMIN — Medication 11 UNIT(S): at 09:34

## 2022-10-06 RX ADMIN — QUETIAPINE FUMARATE 50 MILLIGRAM(S): 200 TABLET, FILM COATED ORAL at 22:35

## 2022-10-06 RX ADMIN — Medication 2: at 18:25

## 2022-10-06 RX ADMIN — Medication 1 MILLIGRAM(S): at 12:35

## 2022-10-06 RX ADMIN — TAMSULOSIN HYDROCHLORIDE 0.4 MILLIGRAM(S): 0.4 CAPSULE ORAL at 22:35

## 2022-10-06 RX ADMIN — MEMANTINE HYDROCHLORIDE 10 MILLIGRAM(S): 10 TABLET ORAL at 07:07

## 2022-10-06 RX ADMIN — PANTOPRAZOLE SODIUM 40 MILLIGRAM(S): 20 TABLET, DELAYED RELEASE ORAL at 07:07

## 2022-10-06 RX ADMIN — ATORVASTATIN CALCIUM 40 MILLIGRAM(S): 80 TABLET, FILM COATED ORAL at 22:35

## 2022-10-06 RX ADMIN — GABAPENTIN 100 MILLIGRAM(S): 400 CAPSULE ORAL at 18:23

## 2022-10-06 RX ADMIN — FINASTERIDE 5 MILLIGRAM(S): 5 TABLET, FILM COATED ORAL at 12:35

## 2022-10-06 RX ADMIN — PANTOPRAZOLE SODIUM 40 MILLIGRAM(S): 20 TABLET, DELAYED RELEASE ORAL at 18:24

## 2022-10-06 RX ADMIN — Medication 1600 MILLIGRAM(S): at 18:24

## 2022-10-06 NOTE — PROGRESS NOTE ADULT - SUBJECTIVE AND OBJECTIVE BOX
Progress Note     == draft in progress ==  Britt Childress  PGY-1 Medicine  Teams | [id number]    Patient is a 72y old  Male who presents with a chief complaint of Persistent bloody diarrhea, generalized weakness (05 Oct 2022 15:50)          SUBJECTIVE / INTERVAL EVENTS  - No acute events overnight.  - Patient denied fever, nausea/vomiting, shortness of breath, new pain (headache, chest pain, abdominal pain, limb pain), new swelling, new numbness/tingling, dysuria or hematuria.   - Patient endorsed          MEDICATIONS    Home Medications:  aspirin 81 mg oral delayed release tablet: 1 tab(s) orally once a day (28 Sep 2022 01:21)  atorvastatin 40 mg oral tablet: 1 tab(s) orally once a day (at bedtime) (28 Sep 2022 01:21)  Centrum Silver oral tablet: 1 tab(s) orally once a day (28 Sep 2022 01:21)  finasteride 5 mg oral tablet: 1 tab(s) orally once a day (28 Sep 2022 01:21)  folic acid 1 mg oral tablet: 1 tab(s) orally once a day (01 Oct 2022 11:05)  gabapentin 100 mg oral capsule: 1 tab(s) orally 2 times a day (28 Sep 2022 01:21)  insulin aspart 100 units/mL injectable solution: 28 unit(s) injectable 3 times a day (before meals) (28 Sep 2022 01:21)  Januvia 50 mg oral tablet: 1 tab(s) orally once a day (28 Sep 2022 01:21)  memantine 10 mg oral tablet: 1 tab(s) orally 2 times a day (28 Sep 2022 01:21)  mesalamine 800 mg oral delayed release tablet: 2 tab(s) orally 2 times a day (28 Sep 2022 01:21)  QUEtiapine 25 mg oral tablet: 2 tab(s) orally once a day (at bedtime) (28 Sep 2022 01:21)  Remicade 100 mg intravenous injection:  (28 Sep 2022 01:21)  sertraline 100 mg oral tablet: 1 tab(s) orally once a day (28 Sep 2022 01:21)  tamsulosin 0.4 mg oral capsule: 1 cap(s) orally 2 times a day (28 Sep 2022 01:21)  Tresiba 100 units/mL subcutaneous solution: 30 unit(s) subcutaneous once a day (at bedtime) (28 Sep 2022 01:21)  trimethoprim 100 mg oral tablet: 1 tab(s) orally once a day (at bedtime) (28 Sep 2022 01:21)  Vitamin D3 400 intl units oral tablet: 1 tab(s) orally once a day (28 Sep 2022 01:21)      MEDICATIONS  (STANDING):  aspirin enteric coated 81 milliGRAM(s) Oral daily  atorvastatin 40 milliGRAM(s) Oral at bedtime  dextrose 5%. 1000 milliLiter(s) (100 mL/Hr) IV Continuous <Continuous>  dextrose 5%. 1000 milliLiter(s) (50 mL/Hr) IV Continuous <Continuous>  dextrose 5%. 1000 milliLiter(s) (100 mL/Hr) IV Continuous <Continuous>  dextrose 50% Injectable 25 Gram(s) IV Push once  dextrose 50% Injectable 12.5 Gram(s) IV Push once  dextrose 50% Injectable 25 Gram(s) IV Push once  enoxaparin Injectable 40 milliGRAM(s) SubCutaneous every 24 hours  finasteride 5 milliGRAM(s) Oral daily  folic acid 1 milliGRAM(s) Oral daily  gabapentin 100 milliGRAM(s) Oral two times a day  glucagon  Injectable 1 milliGRAM(s) IntraMuscular once  glucagon  Injectable 1 milliGRAM(s) IntraMuscular once  insulin glargine Injectable (LANTUS) 24 Unit(s) SubCutaneous at bedtime  insulin lispro (ADMELOG) corrective regimen sliding scale   SubCutaneous three times a day before meals  insulin lispro Injectable (ADMELOG) 11 Unit(s) SubCutaneous three times a day before meals  memantine 10 milliGRAM(s) Oral two times a day  mesalamine DR Capsule 1600 milliGRAM(s) Oral two times a day  methotrexate 10 milliGRAM(s) Oral <User Schedule>  pantoprazole  Injectable 40 milliGRAM(s) IV Push every 12 hours  predniSONE   Tablet 75 milliGRAM(s) Oral daily  QUEtiapine 50 milliGRAM(s) Oral at bedtime  sertraline 100 milliGRAM(s) Oral daily  tamsulosin 0.4 milliGRAM(s) Oral at bedtime    MEDICATIONS  (PRN):  dextrose Oral Gel 15 Gram(s) Oral once PRN Blood Glucose LESS THAN 70 milliGRAM(s)/deciliter  melatonin 3 milliGRAM(s) Oral at bedtime PRN Insomnia         VITALS / I&O's  T(C): 36.6 (10-06-22 @ 07:07), Max: 36.9 (10-05-22 @ 14:55)  HR: 69 (10-06-22 @ 07:07) (69 - 77)  BP: 119/60 (10-06-22 @ 07:07) (115/50 - 121/52)  RR: 18 (10-06-22 @ 07:07) (17 - 18)  SpO2: 100% (10-06-22 @ 07:07) (98% - 100%)  I&O's Summary    05 Oct 2022 07:01  -  06 Oct 2022 07:00  --------------------------------------------------------  IN: 800 mL / OUT: 1451 mL / NET: -651 mL           PHYSICAL EXAM           LABS                        8.3    7.51  )-----------( 332      ( 06 Oct 2022 06:26 )             27.8     10-06    136  |  100  |  15  ----------------------------<  89  3.8   |  26  |  0.85    Ca    8.2<L>      06 Oct 2022 06:26  Phos  3.5     10-06  Mg     2.20     10-06    TPro  6.1  /  Alb  3.0<L>  /  TBili  0.6  /  DBili  x   /  AST  17  /  ALT  15  /  AlkPhos  67  10-06    CAPILLARY BLOOD GLUCOSE      POCT Blood Glucose.: 117 mg/dL (05 Oct 2022 22:40)  POCT Blood Glucose.: 149 mg/dL (05 Oct 2022 17:55)  POCT Blood Glucose.: 163 mg/dL (05 Oct 2022 12:22)  POCT Blood Glucose.: 177 mg/dL (05 Oct 2022 09:10)      LIVER FUNCTIONS - ( 06 Oct 2022 06:26 )  Alb: 3.0 g/dL / Pro: 6.1 g/dL / ALK PHOS: 67 U/L / ALT: 15 U/L / AST: 17 U/L / GGT: x                         Progress Note     Britt Childress  PGY-1 Medicine  Te[employer] | [id number]    Patient is a 72y old  Male who presents with a chief complaint of Persistent bloody diarrhea, generalized weakness (05 Oct 2022 15:50)          SUBJECTIVE / INTERVAL EVENTS  - No acute events overnight. Per RN, 1 BM OVN.  - Patient denied fever/chills, nausea/vomiting, shortness of breath, chest pain, limb pain, new swelling, new numbness/tingling, dysuria or hematuria.  - Patient endorsed no abdominal pain.  - Daughter at bedside requesting PT. Updated daughter.       MEDICATIONS    Home Medications:  aspirin 81 mg oral delayed release tablet: 1 tab(s) orally once a day (28 Sep 2022 01:21)  atorvastatin 40 mg oral tablet: 1 tab(s) orally once a day (at bedtime) (28 Sep 2022 01:21)  Centrum Silver oral tablet: 1 tab(s) orally once a day (28 Sep 2022 01:21)  finasteride 5 mg oral tablet: 1 tab(s) orally once a day (28 Sep 2022 01:21)  folic acid 1 mg oral tablet: 1 tab(s) orally once a day (01 Oct 2022 11:05)  gabapentin 100 mg oral capsule: 1 tab(s) orally 2 times a day (28 Sep 2022 01:21)  insulin aspart 100 units/mL injectable solution: 28 unit(s) injectable 3 times a day (before meals) (28 Sep 2022 01:21)  Januvia 50 mg oral tablet: 1 tab(s) orally once a day (28 Sep 2022 01:21)  memantine 10 mg oral tablet: 1 tab(s) orally 2 times a day (28 Sep 2022 01:21)  mesalamine 800 mg oral delayed release tablet: 2 tab(s) orally 2 times a day (28 Sep 2022 01:21)  QUEtiapine 25 mg oral tablet: 2 tab(s) orally once a day (at bedtime) (28 Sep 2022 01:21)  Remicade 100 mg intravenous injection:  (28 Sep 2022 01:21)  sertraline 100 mg oral tablet: 1 tab(s) orally once a day (28 Sep 2022 01:21)  tamsulosin 0.4 mg oral capsule: 1 cap(s) orally 2 times a day (28 Sep 2022 01:21)  Tresiba 100 units/mL subcutaneous solution: 30 unit(s) subcutaneous once a day (at bedtime) (28 Sep 2022 01:21)  trimethoprim 100 mg oral tablet: 1 tab(s) orally once a day (at bedtime) (28 Sep 2022 01:21)  Vitamin D3 400 intl units oral tablet: 1 tab(s) orally once a day (28 Sep 2022 01:21)      MEDICATIONS  (STANDING):  aspirin enteric coated 81 milliGRAM(s) Oral daily  atorvastatin 40 milliGRAM(s) Oral at bedtime  dextrose 5%. 1000 milliLiter(s) (100 mL/Hr) IV Continuous <Continuous>  dextrose 5%. 1000 milliLiter(s) (50 mL/Hr) IV Continuous <Continuous>  dextrose 5%. 1000 milliLiter(s) (100 mL/Hr) IV Continuous <Continuous>  dextrose 50% Injectable 25 Gram(s) IV Push once  dextrose 50% Injectable 12.5 Gram(s) IV Push once  dextrose 50% Injectable 25 Gram(s) IV Push once  enoxaparin Injectable 40 milliGRAM(s) SubCutaneous every 24 hours  finasteride 5 milliGRAM(s) Oral daily  folic acid 1 milliGRAM(s) Oral daily  gabapentin 100 milliGRAM(s) Oral two times a day  glucagon  Injectable 1 milliGRAM(s) IntraMuscular once  glucagon  Injectable 1 milliGRAM(s) IntraMuscular once  insulin glargine Injectable (LANTUS) 24 Unit(s) SubCutaneous at bedtime  insulin lispro (ADMELOG) corrective regimen sliding scale   SubCutaneous three times a day before meals  insulin lispro Injectable (ADMELOG) 11 Unit(s) SubCutaneous three times a day before meals  memantine 10 milliGRAM(s) Oral two times a day  mesalamine DR Capsule 1600 milliGRAM(s) Oral two times a day  methotrexate 10 milliGRAM(s) Oral <User Schedule>  pantoprazole  Injectable 40 milliGRAM(s) IV Push every 12 hours  predniSONE   Tablet 75 milliGRAM(s) Oral daily  QUEtiapine 50 milliGRAM(s) Oral at bedtime  sertraline 100 milliGRAM(s) Oral daily  tamsulosin 0.4 milliGRAM(s) Oral at bedtime    MEDICATIONS  (PRN):  dextrose Oral Gel 15 Gram(s) Oral once PRN Blood Glucose LESS THAN 70 milliGRAM(s)/deciliter  melatonin 3 milliGRAM(s) Oral at bedtime PRN Insomnia         VITALS / I&O's  T(C): 36.6 (10-06-22 @ 07:07), Max: 36.9 (10-05-22 @ 14:55)  HR: 69 (10-06-22 @ 07:07) (69 - 77)  BP: 119/60 (10-06-22 @ 07:07) (115/50 - 121/52)  RR: 18 (10-06-22 @ 07:07) (17 - 18)  SpO2: 100% (10-06-22 @ 07:07) (98% - 100%)  I&O's Summary    05 Oct 2022 07:01  -  06 Oct 2022 07:00  --------------------------------------------------------  IN: 800 mL / OUT: 1451 mL / NET: -651 mL           PHYSICAL EXAM  General: Reclining in bed in no acute distress.  HEENT: Normocephalic, atraumatic. Extraocular movements grossly intact. No nasal discharge.  Neuro: Alert, somewhat oriented. No facial asymmetry or dysarthria. Moving all extremities.  CV: Regular rate and rhythm. S1/S2. +systolic murmur? Limbs warm, no distal edema.  Respiratory: Anterior lung fields CTAB. No increased work of breathing, speaking comfortably.  Abdomen: Soft; somewhat distended; nontender to palpation. No guarding.   : Suprapubic region nontender.  Skin: No rashes or bruising of face, forearms, or calves bilaterally.  Psych: Mood and affect appropriate.           LABS                        8.3    7.51  )-----------( 332      ( 06 Oct 2022 06:26 )             27.8     10-06    136  |  100  |  15  ----------------------------<  89  3.8   |  26  |  0.85    Ca    8.2<L>      06 Oct 2022 06:26  Phos  3.5     10-06  Mg     2.20     10-06    TPro  6.1  /  Alb  3.0<L>  /  TBili  0.6  /  DBili  x   /  AST  17  /  ALT  15  /  AlkPhos  67  10-06    CAPILLARY BLOOD GLUCOSE      POCT Blood Glucose.: 117 mg/dL (05 Oct 2022 22:40)  POCT Blood Glucose.: 149 mg/dL (05 Oct 2022 17:55)  POCT Blood Glucose.: 163 mg/dL (05 Oct 2022 12:22)  POCT Blood Glucose.: 177 mg/dL (05 Oct 2022 09:10)      LIVER FUNCTIONS - ( 06 Oct 2022 06:26 )  Alb: 3.0 g/dL / Pro: 6.1 g/dL / ALK PHOS: 67 U/L / ALT: 15 U/L / AST: 17 U/L / GGT: x                         Progress Note     Britt Childress  PGY-1 Medicine  [employer] | [id number]    Patient is a 72y old  Male who presents with a chief complaint of Persistent bloody diarrhea, generalized weakness (05 Oct 2022 15:50)       SUBJECTIVE / INTERVAL EVENTS  - No acute events overnight. Per RN, 1 BM OVN.  - Patient denied fever/chills, nausea/vomiting, shortness of breath, chest pain, limb pain, new swelling, new numbness/tingling, dysuria or hematuria.  - Patient endorsed no abdominal pain.  - Daughter at bedside requesting PT. Updated daughter.    MEDICATIONS    Home Medications:  aspirin 81 mg oral delayed release tablet: 1 tab(s) orally once a day (28 Sep 2022 01:21)  atorvastatin 40 mg oral tablet: 1 tab(s) orally once a day (at bedtime) (28 Sep 2022 01:21)  Centrum Silver oral tablet: 1 tab(s) orally once a day (28 Sep 2022 01:21)  finasteride 5 mg oral tablet: 1 tab(s) orally once a day (28 Sep 2022 01:21)  folic acid 1 mg oral tablet: 1 tab(s) orally once a day (01 Oct 2022 11:05)  gabapentin 100 mg oral capsule: 1 tab(s) orally 2 times a day (28 Sep 2022 01:21)  insulin aspart 100 units/mL injectable solution: 28 unit(s) injectable 3 times a day (before meals) (28 Sep 2022 01:21)  Januvia 50 mg oral tablet: 1 tab(s) orally once a day (28 Sep 2022 01:21)  memantine 10 mg oral tablet: 1 tab(s) orally 2 times a day (28 Sep 2022 01:21)  mesalamine 800 mg oral delayed release tablet: 2 tab(s) orally 2 times a day (28 Sep 2022 01:21)  QUEtiapine 25 mg oral tablet: 2 tab(s) orally once a day (at bedtime) (28 Sep 2022 01:21)  Remicade 100 mg intravenous injection:  (28 Sep 2022 01:21)  sertraline 100 mg oral tablet: 1 tab(s) orally once a day (28 Sep 2022 01:21)  tamsulosin 0.4 mg oral capsule: 1 cap(s) orally 2 times a day (28 Sep 2022 01:21)  Tresiba 100 units/mL subcutaneous solution: 30 unit(s) subcutaneous once a day (at bedtime) (28 Sep 2022 01:21)  trimethoprim 100 mg oral tablet: 1 tab(s) orally once a day (at bedtime) (28 Sep 2022 01:21)  Vitamin D3 400 intl units oral tablet: 1 tab(s) orally once a day (28 Sep 2022 01:21)      MEDICATIONS  (STANDING):  aspirin enteric coated 81 milliGRAM(s) Oral daily  atorvastatin 40 milliGRAM(s) Oral at bedtime  dextrose 5%. 1000 milliLiter(s) (100 mL/Hr) IV Continuous <Continuous>  dextrose 5%. 1000 milliLiter(s) (50 mL/Hr) IV Continuous <Continuous>  dextrose 5%. 1000 milliLiter(s) (100 mL/Hr) IV Continuous <Continuous>  dextrose 50% Injectable 25 Gram(s) IV Push once  dextrose 50% Injectable 12.5 Gram(s) IV Push once  dextrose 50% Injectable 25 Gram(s) IV Push once  enoxaparin Injectable 40 milliGRAM(s) SubCutaneous every 24 hours  finasteride 5 milliGRAM(s) Oral daily  folic acid 1 milliGRAM(s) Oral daily  gabapentin 100 milliGRAM(s) Oral two times a day  glucagon  Injectable 1 milliGRAM(s) IntraMuscular once  glucagon  Injectable 1 milliGRAM(s) IntraMuscular once  insulin glargine Injectable (LANTUS) 24 Unit(s) SubCutaneous at bedtime  insulin lispro (ADMELOG) corrective regimen sliding scale   SubCutaneous three times a day before meals  insulin lispro Injectable (ADMELOG) 11 Unit(s) SubCutaneous three times a day before meals  memantine 10 milliGRAM(s) Oral two times a day  mesalamine DR Capsule 1600 milliGRAM(s) Oral two times a day  methotrexate 10 milliGRAM(s) Oral <User Schedule>  pantoprazole  Injectable 40 milliGRAM(s) IV Push every 12 hours  predniSONE   Tablet 75 milliGRAM(s) Oral daily  QUEtiapine 50 milliGRAM(s) Oral at bedtime  sertraline 100 milliGRAM(s) Oral daily  tamsulosin 0.4 milliGRAM(s) Oral at bedtime    MEDICATIONS  (PRN):  dextrose Oral Gel 15 Gram(s) Oral once PRN Blood Glucose LESS THAN 70 milliGRAM(s)/deciliter  melatonin 3 milliGRAM(s) Oral at bedtime PRN Insomnia      VITALS / I&O's  T(C): 36.6 (10-06-22 @ 07:07), Max: 36.9 (10-05-22 @ 14:55)  HR: 69 (10-06-22 @ 07:07) (69 - 77)  BP: 119/60 (10-06-22 @ 07:07) (115/50 - 121/52)  RR: 18 (10-06-22 @ 07:07) (17 - 18)  SpO2: 100% (10-06-22 @ 07:07) (98% - 100%)  I&O's Summary    05 Oct 2022 07:01  -  06 Oct 2022 07:00  --------------------------------------------------------  IN: 800 mL / OUT: 1451 mL / NET: -651 mL      PHYSICAL EXAM  General: Reclining in bed in no acute distress.  HEENT: Normocephalic, atraumatic. Extraocular movements grossly intact. No nasal discharge.  Neuro: Alert, somewhat oriented. No facial asymmetry or dysarthria. Moving all extremities.  CV: Regular rate and rhythm. S1/S2. +systolic murmur? Limbs warm, no distal edema.  Respiratory: Anterior lung fields CTAB. No increased work of breathing, speaking comfortably.  Abdomen: Soft; somewhat distended; nontender to palpation. No guarding.   : Suprapubic region nontender.  Skin: No rashes or bruising of face, forearms, or calves bilaterally.  Psych: Mood and affect appropriate.    LABS                        8.3    7.51  )-----------( 332      ( 06 Oct 2022 06:26 )             27.8     10-06    136  |  100  |  15  ----------------------------<  89  3.8   |  26  |  0.85    Ca    8.2<L>      06 Oct 2022 06:26  Phos  3.5     10-06  Mg     2.20     10-06    TPro  6.1  /  Alb  3.0<L>  /  TBili  0.6  /  DBili  x   /  AST  17  /  ALT  15  /  AlkPhos  67  10-06    CAPILLARY BLOOD GLUCOSE      POCT Blood Glucose.: 117 mg/dL (05 Oct 2022 22:40)  POCT Blood Glucose.: 149 mg/dL (05 Oct 2022 17:55)  POCT Blood Glucose.: 163 mg/dL (05 Oct 2022 12:22)  POCT Blood Glucose.: 177 mg/dL (05 Oct 2022 09:10)      LIVER FUNCTIONS - ( 06 Oct 2022 06:26 )  Alb: 3.0 g/dL / Pro: 6.1 g/dL / ALK PHOS: 67 U/L / ALT: 15 U/L / AST: 17 U/L / GGT: x

## 2022-10-06 NOTE — PROGRESS NOTE ADULT - ASSESSMENT
72M with PMH of UC vs Crohn disease, IDDM, neuropathy, HTN, HLD, BPH, dementia 2/2 CVA on ASA 81mg daily, presenting for 1 mo of progressively worsening bloody diarrhea possibly secondary to IBD flare given colonoscopy findings and some clinical improvement with steroids and infliximab+methotrexate, BM consistency and frequency improving. 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 De-escalated to po steroids, per GI. Discharge pending further management of IBD flare.

## 2022-10-06 NOTE — PROVIDER CONTACT NOTE (OTHER) - REASON
pt diastolic bp low 115/50
Patient with bloody bowel movement
pt diastolic bp low
Patient with bloody bowel movement
Pre-Meal Insulin
Pt Fs 182 after treatment for hypoglycemia
BG 71 at bedtime, rechecked after PO intake: 126

## 2022-10-06 NOTE — PROGRESS NOTE ADULT - PROBLEM SELECTOR PLAN 7
DVT PPX: enoxaparin 40 q24h  Diet: DASH/TLC sodium/cholesterol restricted, consistent carb, low residue, easy to chew, lactose restricted  PT: anticipated d/c to rehab facility  Dispo: pending clinical course  Code status: full DVT PPX: enoxaparin 40 q24h  Diet: DASH/TLC sodium/cholesterol restricted, consistent carb, low residue, easy to chew, lactose restricted  PT: anticipated d/c home w/ home PT   Dispo: pending further management of IBD flare  Code status: full

## 2022-10-06 NOTE — PROGRESS NOTE ADULT - ATTENDING COMMENTS
72 year old man w/ past medical history of Crohn's disease, IDDM, neuropathy, HTN, HLD, BPH, CVA on ASA 81mg daily a/w likely Crohn's Disease flare. GI consulted for further management. GI PCR negative, c diff negative. Patient on steroids for flare, was on solumedrol 20mg q 8 hours now de-escalated to pred 40mg w/ plan for taper. C-scope w/ active colitis, given outpatient labs w/ elevated Remicade abs and inadequate level his outpatient Remicade dose was increased and given 10/3. Receiving methotrexate weekly.Anticipate discharge 24-48 hours if remains clinically improved.

## 2022-10-06 NOTE — PROVIDER CONTACT NOTE (OTHER) - BACKGROUND
admitted with diarrhea, IBD
Pt has history of Type 2 Diabetes.
pt admitted for diarrhea
Pt admitted for diarrhea.
pt admitted for diarrhea
admitted with diarrhea, IBD
pt admitted for diarrhea

## 2022-10-06 NOTE — PROVIDER CONTACT NOTE (OTHER) - NAME OF MD/NP/PA/DO NOTIFIED:
sara martinez md
Dr Simmons
MD Britt Childress
Md Childress
Dr RUDOLPH Méndez
Kelsey Méndez
Dr. Méndez Kelsey

## 2022-10-06 NOTE — PROGRESS NOTE ADULT - ATTENDING COMMENTS
72M with IBD (suspected Crohn's colitis vs less likely UC, follows with Dr. Oro, on Remicade) admitted with bloody diarrhea c/f flare. Infectious workup, including C diff was negative. Patient underwent colonoscopy on 9/30 with active colitis, most notably in right colon. Given outpatient labs with elevated Remicade Abs and inadequate level (6/2022), his outpatient dose of Remicade was increased. Patient received inpatient dose of Remicade again on 10/3. Now with ongoing clinical improvement and transitioned to PO steroids.     --Continue PO steroids, 40mg daily is adequate  --Monitor abdominal exam and stool output  --Methotrexate weekly  --Monitor daily ESR and CRP  --DVT prophylaxis  --Will need close outpatient follow up with Dr. Oro once stable for discharge, anticipate in next 24-48h from GI standpoint if remains clinically stable    Additional recommendations as above 72M with IBD (suspected Crohn's colitis vs less likely UC, follows with Dr. Oro, on Remicade) admitted with bloody diarrhea c/f flare. Infectious workup, including C diff was negative. Patient underwent colonoscopy on 9/30 with active colitis, most notably in right colon. Given outpatient labs with elevated Remicade Abs and inadequate level (6/2022), his outpatient dose of Remicade was increased. Patient received inpatient dose of Remicade again on 10/3. Now with ongoing clinical improvement and transitioned to PO steroids.     --Continue PO steroids, 40mg daily is adequate with plans to decrease to 30mg daily in 1 week and then to 20mg daily until see in GI office  --Monitor abdominal exam and stool output  --Methotrexate weekly, daily folic acid  --Monitor daily ESR and CRP  --DVT prophylaxis  --Will need close outpatient follow up with Dr. Oro once stable for discharge, anticipate in next 24-48h from GI standpoint if remains clinically stable    Additional recommendations as above

## 2022-10-06 NOTE — PROGRESS NOTE ADULT - ASSESSMENT
72yoM with PMH of Crohn's disease, IDDM, neuropathy, HTN, HLD, BPH, CVA on ASA 81mg daily who presented with bloody diarrhea, concerning for CD flare.     Impression:   #IBD flare: Bloody diarrhea, concerning for CD flare -- unclear CD vs. UC as per Dr. Oro note. s/p poor prior response to Entyvio. On remicade for the past one year, last infusion 2 weeks.  On 6/30/22 pts remicade level was <0.4 and antibody was elevated at 308. Remicade dosage was increased since aug 2022. Last colonoscopy in 7/2020 showed inflammation from the anus to the descending colon (left sided colitis) and diverticulosis. Repeat colonoscopy showed inflammation in the transverse colon to the cecum. Pathology positive for chronic active colitis.   - GI PCR negative, c diff test negative.   - ESR and CRP downtrending  - On IV steroids (9/28), transitioned to PO prednisone on 10/5, please change the dose to prednisone 40mg daily.   - Remicade 10mg/kg and MTX given on 10/3  - clinically responding to medication.     Recommendations:   - Please change the steroid dose: prednisone 40mg daily.   - continue to monitor stool o/p.   - Methotrexate weekly   - continue with low residue diet  - obtain CRP and ESR daily.   - f/u fecal protectin  - Place him on DVT PPx as IBD patients are high risk for thrombosis.   - clinical symptoms improving.   - will reach out to Dr. Oro for follow up appt as o/p within 1-2 weeks.     GI will continue to follow.     All recommendations are tentative until note is attested by an attending.     Jorge Arboleda, PGY-4  Gastroenterology/Hepatology Fellow  Available on Microsoft Teams  [phone] (Short Range Pager)  [phone] (Long Range Pager)    After 5pm, please contact the on-call GI fellow. [phone]   72yoM with PMH of Crohn's disease, IDDM, neuropathy, HTN, HLD, BPH, CVA on ASA 81mg daily who presented with bloody diarrhea, concerning for CD flare.     Impression:   #IBD flare: Bloody diarrhea, concerning for CD flare -- unclear CD vs. UC as per Dr. Oro note. s/p poor prior response to Entyvio. On remicade for the past one year, last infusion 2 weeks.  On 6/30/22 pts remicade level was <0.4 and antibody was elevated at 308. Remicade dosage was increased since aug 2022. Last colonoscopy in 7/2020 showed inflammation from the anus to the descending colon (left sided colitis) and diverticulosis. Repeat colonoscopy showed inflammation in the transverse colon to the cecum. Pathology positive for chronic active colitis.   - GI PCR negative, c diff test negative.   - ESR and CRP downtrending  - On IV steroids (9/28), transitioned to PO prednisone on 10/5, please change the dose to prednisone 40mg daily.   - Remicade 10mg/kg and MTX given on 10/3  - clinically responding to medication.     Recommendations:   - Please change dose to prednisone 40mg daily.   - continue to monitor stool o/p.   - Methotrexate weekly   - continue with low residue diet  - obtain CRP and ESR daily.   - f/u fecal protectin  - Place him on DVT PPx as IBD patients are high risk for thrombosis.   - clinical symptoms improving.   - will reach out to Dr. Oro for follow up appt as o/p, ideally within 2 weeks of discharge at [address] [place] [address] ([phone])    GI will continue to follow.     All recommendations are tentative until note is attested by an attending.     Jorge Arboleda, PGY-4  Gastroenterology/Hepatology Fellow  Available on Microsoft Teams  [phone] (Short Range Pager)  [phone] (Long Range Pager)    After 5pm, please contact the on-call GI fellow. [phone]   72yoM with PMH of Crohn's disease, IDDM, neuropathy, HTN, HLD, BPH, CVA on ASA 81mg daily who presented with bloody diarrhea, concerning for CD flare.     Impression:   #IBD flare: Bloody diarrhea, concerning for CD flare -- unclear CD vs. UC as per Dr. Oro note. s/p poor prior response to Entyvio. On remicade for the past one year, last infusion 2 weeks.  On 6/30/22 pts remicade level was <0.4 and antibody was elevated at 308. Remicade dosage was increased since aug 2022. Last colonoscopy in 7/2020 showed inflammation from the anus to the descending colon (left sided colitis) and diverticulosis. Repeat colonoscopy showed inflammation in the transverse colon to the cecum. Pathology positive for chronic active colitis.   - GI PCR negative, c diff test negative.   - ESR and CRP downtrending  - On IV steroids (9/28), transitioned to PO prednisone on 10/5, please change the dose to prednisone 40mg daily.   - Remicade 10mg/kg and MTX given on 10/3  - clinically responding to medication.     Recommendations:   - Please change dose to prednisone 40mg daily.   - continue to monitor stool o/p.   - Methotrexate weekly   - continue with low residue diet  - obtain CRP and ESR daily.   - f/u fecal protectin  - Place him on DVT PPx as IBD patients are high risk for thrombosis.   - clinical symptoms improving.   - will reach out to Dr. Oro for follow up appt as o/p, ideally within 2-3 weeks of discharge at [address] [place] [address] ([phone])    GI will continue to follow.     All recommendations are tentative until note is attested by an attending.     Jorge Arboleda, PGY-4  Gastroenterology/Hepatology Fellow  Available on Microsoft Teams  [phone] (Short Range Pager)  [phone] (Long Range Pager)    After 5pm, please contact the on-call GI fellow. [phone]

## 2022-10-06 NOTE — PROVIDER CONTACT NOTE (OTHER) - ACTION/TREATMENT ORDERED:
CBC drawn
provider aware, no new orders/interventions at this time
Hold Bedtime Lantus as per MD
MD made aware. Give bedtime Lantus 24 Units. Continue to monitor.
Vital signs monitored.  Meds started per GI orders.  CBC drawn
MD henry.
provider aware, no new orders/interventions at this time  will continue to monitor.

## 2022-10-06 NOTE — PROVIDER CONTACT NOTE (OTHER) - DATE AND TIME:
05-Oct-2022 15:04
03-Oct-2022 22:46
06-Oct-2022 22:45
03-Oct-2022 00:16
05-Oct-2022 23:30
02-Oct-2022 23:08
29-Sep-2022 13:51

## 2022-10-06 NOTE — PROVIDER CONTACT NOTE (OTHER) - SITUATION
Pre-meal insulin ordered for 24 units. Pt has poor PO intake and changed to clear liquid diet. MD contacted regarding concern for large insulin dosage.
Pt BG 73 (taken at 22:14) and BG 71 (taken at 22:16) d/t Pt had poor PO intake for dinner.   Pt's wife provided home foods for pt. BG rechecked 126 (taken at 22:56).
Pt Fs 182 after treatment for hypoglycemia
Patient with multiple bowel movements with 1 being a large bloody bowel movement
pt diastolic bp low 115/50
pt diastolic bp running low, diastolic of 47 - repeat showed diastolic of 42
Patient had a bloody bowel movement

## 2022-10-06 NOTE — PROVIDER CONTACT NOTE (OTHER) - ASSESSMENT
pt felt tired and sleepy
Pt is asymptomatic, denies any discomfort, no s/s hypoglycemia noted.
pt denies any discomfort, otherwise stable
Pt Fs 182 after treatment for hypoglycemia
Meal assistance provided. Pt ate 50% of clear liquid diet. (Broth and jell-o)
pt denies any discomfort, otherwise stable

## 2022-10-06 NOTE — PROGRESS NOTE ADULT - PROBLEM SELECTOR PLAN 1
IBD flare with bloody diarrhea likely 2/2 UC vs Crohn  - elevated ESR, CRP, both downtrending  - infectious work up neg: Cdiff PCR, GI PCR, stool Cx, Stool O+p  - F/up fecal calprotectin,    - LR @75 for rehydration  - IBD flare management as per GI - appreciate recs     - solumedrol 20 q8  - pantoprazole per GI IBD flare with bloody diarrhea likely 2/2 UC vs Crohn  - elevated ESR, CRP, both downtrending  - infectious work up neg: Cdiff PCR, GI PCR, stool Cx, Stool O+p  - fecal calprotectin 1688  - LR @75 for rehydration  - IBD flare management as per GI - appreciate recs  - prednisone 75 po qD   - pantoprazole per GI IBD flare with bloody diarrhea likely 2/2 UC vs Crohn  - elevated ESR, CRP, both downtrending  - infectious work up neg: Cdiff PCR, GI PCR, stool Cx, Stool O+p  - fecal calprotectin 1688  - IBD flare management as per GI - appreciate recs  - de-escalate to po steroids, 40mg daily is adequate with plans to decrease to 30mg daily in 1 week and then to 20mg daily until see in GI office

## 2022-10-06 NOTE — PROGRESS NOTE ADULT - SUBJECTIVE AND OBJECTIVE BOX
Gastroenterology/Hepatology Progress Note    Interval Events:     Patient's symptoms has been improved significantly. As per the nurse had 2 formed BMs over the last 24 hours with no blood. Patient has been po steroids since yesterday. Patient has no abd pain, nausea, vomiting, fevers and chills.     Allergies:  No Known Allergies      Hospital Medications:  aspirin enteric coated 81 milliGRAM(s) Oral daily  atorvastatin 40 milliGRAM(s) Oral at bedtime  dextrose 5%. 1000 milliLiter(s) IV Continuous <Continuous>  dextrose 5%. 1000 milliLiter(s) IV Continuous <Continuous>  dextrose 5%. 1000 milliLiter(s) IV Continuous <Continuous>  dextrose 50% Injectable 25 Gram(s) IV Push once  dextrose 50% Injectable 12.5 Gram(s) IV Push once  dextrose 50% Injectable 25 Gram(s) IV Push once  dextrose Oral Gel 15 Gram(s) Oral once PRN  enoxaparin Injectable 40 milliGRAM(s) SubCutaneous every 24 hours  finasteride 5 milliGRAM(s) Oral daily  folic acid 1 milliGRAM(s) Oral daily  gabapentin 100 milliGRAM(s) Oral two times a day  glucagon  Injectable 1 milliGRAM(s) IntraMuscular once  glucagon  Injectable 1 milliGRAM(s) IntraMuscular once  insulin glargine Injectable (LANTUS) 24 Unit(s) SubCutaneous at bedtime  insulin lispro (ADMELOG) corrective regimen sliding scale   SubCutaneous three times a day before meals  insulin lispro Injectable (ADMELOG) 11 Unit(s) SubCutaneous three times a day before meals  melatonin 3 milliGRAM(s) Oral at bedtime PRN  memantine 10 milliGRAM(s) Oral two times a day  mesalamine DR Capsule 1600 milliGRAM(s) Oral two times a day  methotrexate 10 milliGRAM(s) Oral <User Schedule>  pantoprazole  Injectable 40 milliGRAM(s) IV Push every 12 hours  predniSONE   Tablet 75 milliGRAM(s) Oral daily  QUEtiapine 50 milliGRAM(s) Oral at bedtime  sertraline 100 milliGRAM(s) Oral daily  tamsulosin 0.4 milliGRAM(s) Oral at bedtime      ROS: 14 point ROS negative unless otherwise state in subjective    PHYSICAL EXAM:   Vital Signs:  Vital Signs Last 24 Hrs  T(C): 36.6 (06 Oct 2022 07:07), Max: 36.9 (05 Oct 2022 14:55)  T(F): 97.9 (06 Oct 2022 07:07), Max: 98.4 (05 Oct 2022 14:55)  HR: 69 (06 Oct 2022 07:07) (69 - 77)  BP: 119/60 (06 Oct 2022 07:07) (115/50 - 121/52)  BP(mean): --  RR: 18 (06 Oct 2022 07:07) (17 - 18)  SpO2: 100% (06 Oct 2022 07:07) (98% - 100%)    Parameters below as of 06 Oct 2022 07:07  Patient On (Oxygen Delivery Method): room air      Daily     Daily     GENERAL:  No acute distress, well appearing, eating breakfast.    HEENT:  NCAT, no scleral icterus  CHEST: no resp distress  ABDOMEN:  Soft, non-tender, non-distended, no masses  EXTREMITIES:  No  edema b/l  NEURO/PSYCH: NO tremor, alert and oriented x 3.    LABS:                        8.3    7.51  )-----------( 332      ( 06 Oct 2022 06:26 )             27.8     Mean Cell Volume: 82.5 fL (10-06-22 @ 06:26)    10-06    136  |  100  |  15  ----------------------------<  89  3.8   |  26  |  0.85    Ca    8.2<L>      06 Oct 2022 06:26  Phos  3.5     10-06  Mg     2.20     10-06    TPro  6.1  /  Alb  3.0<L>  /  TBili  0.6  /  DBili  x   /  AST  17  /  ALT  15  /  AlkPhos  67  10-06    LIVER FUNCTIONS - ( 06 Oct 2022 06:26 )  Alb: 3.0 g/dL / Pro: 6.1 g/dL / ALK PHOS: 67 U/L / ALT: 15 U/L / AST: 17 U/L / GGT: x                     Imaging:          Ct abd and pelvis    FINDINGS:  LOWER CHEST: Within normal limits.    LIVER: Within normal limits.  BILE DUCTS: Normal caliber.  GALLBLADDER: Small gallstone.  SPLEEN: Within normal limits.  PANCREAS: Within normal limits.  ADRENALS: Within normal limits.  KIDNEYS/URETERS: 4 mm nonobstructive calculus lower pole right kidney. 2   cm cyst upper pole left kidney.    BLADDER: Within normal limits.  REPRODUCTIVE ORGANS: Prostate within normal limits.    BOWEL: No bowel obstruction. Mural thickening and associated mesenteric   hyperemia involving the colon from the hepatic flexure through the mid   descending colon. Submucosal fat deposition in the cecum and rectosigmoid   colon consistent with chronic information. Appendix mural thickening and   mucosal hyperenhancement in the mid appendix.  PERITONEUM: No ascites.  VESSELS: Atheromatous calcifications.  RETROPERITONEUM/LYMPH NODES: No lymphadenopathy.  ABDOMINAL WALL: Within normal limits.  BONES: Hemisacralization of the fifth lumbar vertebra.    IMPRESSION:  Crohn's colitis involving the transverse and descending colon and   appendix.    Colonoscopy in 7/2020    Impression:          - Inflammation was found from the anus to the descending colon secondary to                        left-sided colitis. The findings are unchanged compared to previous                        examinations. Biopsied.          - One 15 mm polyp in the rectum, benign appearing likely inflammatory,                        removed with a hot snare. Resected and retrieved.                       - Diverticulosis in the sigmoid colon.                       - Localized mild inflammation was found in the cecum.                       Clinical improvement with Entyvio BUT no endoscopic improvement.    Colonoscopy 9/30/22  Impression:          - Inflammation was found from the anus to the splenic                        flexure. This was moderate in severity, worsened                        compared to previous examinations. Biopsied.                       - Left-sided ulcerative colitis. Inflammation was found                        from the transverse colon to the cecum. This was severe,                        worsened compared to previous examinations. Biopsied.                       - Bloody diarrhea secondary to severe inflammation due                        to his underlying UC, predominantly left sided disease.    Surgical pathology    _  1. Ascending colon, biopsy:  - Chronic active colitis. Negative for granuloma and dysplasia.    2. Transverse colon, biopsy:  - Chronic active colitis. Negative for granuloma and dysplasia.    3. Descending colon, biopsy:  - Chronic active colitis. Negative for granuloma and dysplasia.    4. Sigmoid colon, biopsy:  - Chronic active colitis. Negative for granuloma and dysplasia.     Gastroenterology/Hepatology Progress Note    Interval Events:     Patient's symptoms has been improved significantly. As per the nurse had 2 formed BMs over the last 24 hours with no blood. Patient has been po steroids since yesterday. Patient has no abd pain, nausea, vomiting, fevers and chills.     Allergies:  No Known Allergies      Hospital Medications:  aspirin enteric coated 81 milliGRAM(s) Oral daily  atorvastatin 40 milliGRAM(s) Oral at bedtime  dextrose 5%. 1000 milliLiter(s) IV Continuous <Continuous>  dextrose 5%. 1000 milliLiter(s) IV Continuous <Continuous>  dextrose 5%. 1000 milliLiter(s) IV Continuous <Continuous>  dextrose 50% Injectable 25 Gram(s) IV Push once  dextrose 50% Injectable 12.5 Gram(s) IV Push once  dextrose 50% Injectable 25 Gram(s) IV Push once  dextrose Oral Gel 15 Gram(s) Oral once PRN  enoxaparin Injectable 40 milliGRAM(s) SubCutaneous every 24 hours  finasteride 5 milliGRAM(s) Oral daily  folic acid 1 milliGRAM(s) Oral daily  gabapentin 100 milliGRAM(s) Oral two times a day  glucagon  Injectable 1 milliGRAM(s) IntraMuscular once  glucagon  Injectable 1 milliGRAM(s) IntraMuscular once  insulin glargine Injectable (LANTUS) 24 Unit(s) SubCutaneous at bedtime  insulin lispro (ADMELOG) corrective regimen sliding scale   SubCutaneous three times a day before meals  insulin lispro Injectable (ADMELOG) 11 Unit(s) SubCutaneous three times a day before meals  melatonin 3 milliGRAM(s) Oral at bedtime PRN  memantine 10 milliGRAM(s) Oral two times a day  mesalamine DR Capsule 1600 milliGRAM(s) Oral two times a day  methotrexate 10 milliGRAM(s) Oral <User Schedule>  pantoprazole  Injectable 40 milliGRAM(s) IV Push every 12 hours  predniSONE   Tablet 75 milliGRAM(s) Oral daily  QUEtiapine 50 milliGRAM(s) Oral at bedtime  sertraline 100 milliGRAM(s) Oral daily  tamsulosin 0.4 milliGRAM(s) Oral at bedtime      ROS: 14 point ROS negative unless otherwise state in subjective    PHYSICAL EXAM:   Vital Signs:  Vital Signs Last 24 Hrs  T(C): 36.6 (06 Oct 2022 07:07), Max: 36.9 (05 Oct 2022 14:55)  T(F): 97.9 (06 Oct 2022 07:07), Max: 98.4 (05 Oct 2022 14:55)  HR: 69 (06 Oct 2022 07:07) (69 - 77)  BP: 119/60 (06 Oct 2022 07:07) (115/50 - 121/52)  BP(mean): --  RR: 18 (06 Oct 2022 07:07) (17 - 18)  SpO2: 100% (06 Oct 2022 07:07) (98% - 100%)    Parameters below as of 06 Oct 2022 07:07  Patient On (Oxygen Delivery Method): room air      Daily     Daily     GENERAL:  No acute distress, well appearing, eating breakfast.    HEENT:  NCAT, no scleral icterus  CHEST: no resp distress  ABDOMEN:  Soft, non-tender, non-distended, no masses  EXTREMITIES:  No  edema b/l  NEURO/PSYCH: No tremor, alert and oriented x 3.    LABS:                        8.3    7.51  )-----------( 332      ( 06 Oct 2022 06:26 )             27.8     Mean Cell Volume: 82.5 fL (10-06-22 @ 06:26)    10-06    136  |  100  |  15  ----------------------------<  89  3.8   |  26  |  0.85    Ca    8.2<L>      06 Oct 2022 06:26  Phos  3.5     10-06  Mg     2.20     10-06    TPro  6.1  /  Alb  3.0<L>  /  TBili  0.6  /  DBili  x   /  AST  17  /  ALT  15  /  AlkPhos  67  10-06    LIVER FUNCTIONS - ( 06 Oct 2022 06:26 )  Alb: 3.0 g/dL / Pro: 6.1 g/dL / ALK PHOS: 67 U/L / ALT: 15 U/L / AST: 17 U/L / GGT: x                     Imaging:          Ct abd and pelvis    FINDINGS:  LOWER CHEST: Within normal limits.    LIVER: Within normal limits.  BILE DUCTS: Normal caliber.  GALLBLADDER: Small gallstone.  SPLEEN: Within normal limits.  PANCREAS: Within normal limits.  ADRENALS: Within normal limits.  KIDNEYS/URETERS: 4 mm nonobstructive calculus lower pole right kidney. 2   cm cyst upper pole left kidney.    BLADDER: Within normal limits.  REPRODUCTIVE ORGANS: Prostate within normal limits.    BOWEL: No bowel obstruction. Mural thickening and associated mesenteric   hyperemia involving the colon from the hepatic flexure through the mid   descending colon. Submucosal fat deposition in the cecum and rectosigmoid   colon consistent with chronic information. Appendix mural thickening and   mucosal hyperenhancement in the mid appendix.  PERITONEUM: No ascites.  VESSELS: Atheromatous calcifications.  RETROPERITONEUM/LYMPH NODES: No lymphadenopathy.  ABDOMINAL WALL: Within normal limits.  BONES: Hemisacralization of the fifth lumbar vertebra.    IMPRESSION:  Crohn's colitis involving the transverse and descending colon and   appendix.    Colonoscopy in 7/2020    Impression:          - Inflammation was found from the anus to the descending colon secondary to                        left-sided colitis. The findings are unchanged compared to previous                        examinations. Biopsied.          - One 15 mm polyp in the rectum, benign appearing likely inflammatory,                        removed with a hot snare. Resected and retrieved.                       - Diverticulosis in the sigmoid colon.                       - Localized mild inflammation was found in the cecum.                       Clinical improvement with Entyvio BUT no endoscopic improvement.    Colonoscopy 9/30/22  Impression:          - Inflammation was found from the anus to the splenic                        flexure. This was moderate in severity, worsened                        compared to previous examinations. Biopsied.                       - Left-sided ulcerative colitis. Inflammation was found                        from the transverse colon to the cecum. This was severe,                        worsened compared to previous examinations. Biopsied.                       - Bloody diarrhea secondary to severe inflammation due                        to his underlying UC, predominantly left sided disease.    Surgical pathology    _  1. Ascending colon, biopsy:  - Chronic active colitis. Negative for granuloma and dysplasia.    2. Transverse colon, biopsy:  - Chronic active colitis. Negative for granuloma and dysplasia.    3. Descending colon, biopsy:  - Chronic active colitis. Negative for granuloma and dysplasia.    4. Sigmoid colon, biopsy:  - Chronic active colitis. Negative for granuloma and dysplasia.

## 2022-10-06 NOTE — PROVIDER CONTACT NOTE (OTHER) - RECOMMENDATIONS
Hold pre-meal insulin for lunch time and have dinner pre-meal adjusted. Nurse Diabetes Educator contacted. MD notified of suggestions and concerns for large insulin dosage.
draw CBC
notify provider
MD notified. Pt has bedtime Lantus dosing 24 Units. Lower Lantus dosing.
notify provider
notify provider, Hold Bedtime Lantus

## 2022-10-06 NOTE — PROVIDER CONTACT NOTE (HYPOGLYCEMIA EVENT) - NS PROVIDER CONTACT BACKGROUND-HYPO
Age: 72y    Gender: Male    POCT Blood Glucose:  182 mg/dL (10-06-22 @ 22:30)  42 mg/dL (10-06-22 @ 22:07)  64 mg/dL (10-06-22 @ 22:01)  63 mg/dL (10-06-22 @ 21:59)  197 mg/dL (10-06-22 @ 17:46)  197 mg/dL (10-06-22 @ 13:25)  115 mg/dL (10-06-22 @ 09:14)      eMAR:atorvastatin   40 milliGRAM(s) Oral (10-06-22 @ 22:35)    finasteride   5 milliGRAM(s) Oral (10-06-22 @ 12:35)    insulin glargine Injectable (LANTUS)   24 Unit(s) SubCutaneous (10-05-22 @ 22:55)    insulin lispro (ADMELOG) corrective regimen sliding scale   2 Unit(s) SubCutaneous (10-06-22 @ 18:25)   2 Unit(s) SubCutaneous (10-06-22 @ 13:37)    insulin lispro Injectable (ADMELOG)   11 Unit(s) SubCutaneous (10-06-22 @ 18:25)   11 Unit(s) SubCutaneous (10-06-22 @ 13:37)   11 Unit(s) SubCutaneous (10-06-22 @ 09:34)    predniSONE   Tablet   75 milliGRAM(s) Oral (10-06-22 @ 07:06)

## 2022-10-07 DIAGNOSIS — K50.90 CROHN'S DISEASE, UNSPECIFIED, WITHOUT COMPLICATIONS: ICD-10-CM

## 2022-10-07 LAB
GLUCOSE BLDC GLUCOMTR-MCNC: 117 MG/DL — HIGH (ref 70–99)
GLUCOSE BLDC GLUCOMTR-MCNC: 203 MG/DL — HIGH (ref 70–99)
GLUCOSE BLDC GLUCOMTR-MCNC: 205 MG/DL — HIGH (ref 70–99)
GLUCOSE BLDC GLUCOMTR-MCNC: 219 MG/DL — HIGH (ref 70–99)

## 2022-10-07 PROCEDURE — 99232 SBSQ HOSP IP/OBS MODERATE 35: CPT | Mod: GC

## 2022-10-07 PROCEDURE — 99233 SBSQ HOSP IP/OBS HIGH 50: CPT | Mod: GC

## 2022-10-07 RX ORDER — INSULIN LISPRO 100/ML
7 VIAL (ML) SUBCUTANEOUS
Refills: 0 | Status: DISCONTINUED | OUTPATIENT
Start: 2022-10-07 | End: 2022-10-09

## 2022-10-07 RX ADMIN — QUETIAPINE FUMARATE 50 MILLIGRAM(S): 200 TABLET, FILM COATED ORAL at 22:21

## 2022-10-07 RX ADMIN — INSULIN GLARGINE 24 UNIT(S): 100 INJECTION, SOLUTION SUBCUTANEOUS at 22:21

## 2022-10-07 RX ADMIN — MEMANTINE HYDROCHLORIDE 10 MILLIGRAM(S): 10 TABLET ORAL at 06:17

## 2022-10-07 RX ADMIN — Medication 81 MILLIGRAM(S): at 12:45

## 2022-10-07 RX ADMIN — Medication 40 MILLIGRAM(S): at 06:17

## 2022-10-07 RX ADMIN — GABAPENTIN 100 MILLIGRAM(S): 400 CAPSULE ORAL at 06:17

## 2022-10-07 RX ADMIN — SERTRALINE 100 MILLIGRAM(S): 25 TABLET, FILM COATED ORAL at 12:46

## 2022-10-07 RX ADMIN — Medication 1600 MILLIGRAM(S): at 06:17

## 2022-10-07 RX ADMIN — Medication 7 UNIT(S): at 18:49

## 2022-10-07 RX ADMIN — Medication 1600 MILLIGRAM(S): at 18:59

## 2022-10-07 RX ADMIN — Medication 1 MILLIGRAM(S): at 12:46

## 2022-10-07 RX ADMIN — Medication 4: at 18:48

## 2022-10-07 RX ADMIN — TAMSULOSIN HYDROCHLORIDE 0.4 MILLIGRAM(S): 0.4 CAPSULE ORAL at 22:21

## 2022-10-07 RX ADMIN — PANTOPRAZOLE SODIUM 40 MILLIGRAM(S): 20 TABLET, DELAYED RELEASE ORAL at 06:20

## 2022-10-07 RX ADMIN — ENOXAPARIN SODIUM 40 MILLIGRAM(S): 100 INJECTION SUBCUTANEOUS at 10:09

## 2022-10-07 RX ADMIN — FINASTERIDE 5 MILLIGRAM(S): 5 TABLET, FILM COATED ORAL at 12:46

## 2022-10-07 RX ADMIN — MEMANTINE HYDROCHLORIDE 10 MILLIGRAM(S): 10 TABLET ORAL at 19:00

## 2022-10-07 RX ADMIN — PANTOPRAZOLE SODIUM 40 MILLIGRAM(S): 20 TABLET, DELAYED RELEASE ORAL at 18:35

## 2022-10-07 RX ADMIN — ATORVASTATIN CALCIUM 40 MILLIGRAM(S): 80 TABLET, FILM COATED ORAL at 22:21

## 2022-10-07 RX ADMIN — GABAPENTIN 100 MILLIGRAM(S): 400 CAPSULE ORAL at 19:00

## 2022-10-07 RX ADMIN — Medication 11 UNIT(S): at 09:16

## 2022-10-07 RX ADMIN — Medication 4: at 09:15

## 2022-10-07 RX ADMIN — Medication 4: at 13:14

## 2022-10-07 NOTE — PROGRESS NOTE ADULT - ATTENDING COMMENTS
Seen and examined by me this afternoon, wife at bedside  Per patient no BM today, tolerating PO  GI recs noted-apparently patient is having frequent BM's, will f/up output closely as pt is not fully reliable  C/w prednisone for now and PPI  Hypoglycemia noted yesterday, AM FS is ok, will c/w current dose of lantus (24U) and decrease lispro to 7U TID  Plan for HPT on discharge not [facility]  D/w wife at bedside  Rest as above

## 2022-10-07 NOTE — PROGRESS NOTE ADULT - SUBJECTIVE AND OBJECTIVE BOX
Progress Note     == draft in progress ==  Britt Childress  PGY-1 Medicine  Teams | [id number]    Patient is a 72y old  Male who presents with a chief complaint of Persistent bloody diarrhea, generalized weakness (06 Oct 2022 13:25)          SUBJECTIVE / INTERVAL EVENTS  - No acute events overnight.  - Patient denied fever, nausea/vomiting, shortness of breath, new pain (headache, chest pain, abdominal pain, limb pain), new swelling, new numbness/tingling, dysuria or hematuria.   - Patient endorsed          MEDICATIONS    Home Medications:  aspirin 81 mg oral delayed release tablet: 1 tab(s) orally once a day (28 Sep 2022 01:21)  atorvastatin 40 mg oral tablet: 1 tab(s) orally once a day (at bedtime) (28 Sep 2022 01:21)  Centrum Silver oral tablet: 1 tab(s) orally once a day (28 Sep 2022 01:21)  finasteride 5 mg oral tablet: 1 tab(s) orally once a day (28 Sep 2022 01:21)  folic acid 1 mg oral tablet: 1 tab(s) orally once a day (01 Oct 2022 11:05)  gabapentin 100 mg oral capsule: 1 tab(s) orally 2 times a day (28 Sep 2022 01:21)  insulin aspart 100 units/mL injectable solution: 28 unit(s) injectable 3 times a day (before meals) (28 Sep 2022 01:21)  Januvia 50 mg oral tablet: 1 tab(s) orally once a day (28 Sep 2022 01:21)  memantine 10 mg oral tablet: 1 tab(s) orally 2 times a day (28 Sep 2022 01:21)  mesalamine 800 mg oral delayed release tablet: 2 tab(s) orally 2 times a day (28 Sep 2022 01:21)  QUEtiapine 25 mg oral tablet: 2 tab(s) orally once a day (at bedtime) (28 Sep 2022 01:21)  Remicade 100 mg intravenous injection:  (28 Sep 2022 01:21)  sertraline 100 mg oral tablet: 1 tab(s) orally once a day (28 Sep 2022 01:21)  tamsulosin 0.4 mg oral capsule: 1 cap(s) orally 2 times a day (28 Sep 2022 01:21)  Tresiba 100 units/mL subcutaneous solution: 30 unit(s) subcutaneous once a day (at bedtime) (28 Sep 2022 01:21)  trimethoprim 100 mg oral tablet: 1 tab(s) orally once a day (at bedtime) (28 Sep 2022 01:21)  Vitamin D3 400 intl units oral tablet: 1 tab(s) orally once a day (28 Sep 2022 01:21)      MEDICATIONS  (STANDING):  aspirin enteric coated 81 milliGRAM(s) Oral daily  atorvastatin 40 milliGRAM(s) Oral at bedtime  dextrose 5%. 1000 milliLiter(s) (100 mL/Hr) IV Continuous <Continuous>  dextrose 5%. 1000 milliLiter(s) (50 mL/Hr) IV Continuous <Continuous>  dextrose 5%. 1000 milliLiter(s) (100 mL/Hr) IV Continuous <Continuous>  dextrose 50% Injectable 25 Gram(s) IV Push once  dextrose 50% Injectable 12.5 Gram(s) IV Push once  dextrose 50% Injectable 25 Gram(s) IV Push once  enoxaparin Injectable 40 milliGRAM(s) SubCutaneous every 24 hours  finasteride 5 milliGRAM(s) Oral daily  folic acid 1 milliGRAM(s) Oral daily  gabapentin 100 milliGRAM(s) Oral two times a day  glucagon  Injectable 1 milliGRAM(s) IntraMuscular once  glucagon  Injectable 1 milliGRAM(s) IntraMuscular once  insulin glargine Injectable (LANTUS) 24 Unit(s) SubCutaneous at bedtime  insulin lispro (ADMELOG) corrective regimen sliding scale   SubCutaneous three times a day before meals  insulin lispro Injectable (ADMELOG) 11 Unit(s) SubCutaneous three times a day before meals  memantine 10 milliGRAM(s) Oral two times a day  mesalamine DR Capsule 1600 milliGRAM(s) Oral two times a day  methotrexate 10 milliGRAM(s) Oral <User Schedule>  pantoprazole  Injectable 40 milliGRAM(s) IV Push every 12 hours  predniSONE   Tablet 40 milliGRAM(s) Oral daily  QUEtiapine 50 milliGRAM(s) Oral at bedtime  sertraline 100 milliGRAM(s) Oral daily  tamsulosin 0.4 milliGRAM(s) Oral at bedtime    MEDICATIONS  (PRN):  dextrose Oral Gel 15 Gram(s) Oral once PRN Blood Glucose LESS THAN 70 milliGRAM(s)/deciliter  melatonin 3 milliGRAM(s) Oral at bedtime PRN Insomnia         VITALS / I&O's  T(C): 36.8 (10-07-22 @ 06:17), Max: 36.8 (10-07-22 @ 06:17)  HR: 75 (10-07-22 @ 06:17) (75 - 81)  BP: 117/61 (10-07-22 @ 06:17) (117/61 - 123/62)  RR: 17 (10-07-22 @ 06:17) (17 - 18)  SpO2: 100% (10-07-22 @ 06:17) (98% - 100%)  I&O's Summary    06 Oct 2022 07:01  -  07 Oct 2022 07:00  --------------------------------------------------------  IN: 730 mL / OUT: 1450 mL / NET: -720 mL           PHYSICAL EXAM           LABS                        8.3    7.51  )-----------( 332      ( 06 Oct 2022 06:26 )             27.8     10-06    136  |  100  |  15  ----------------------------<  89  3.8   |  26  |  0.85    Ca    8.2<L>      06 Oct 2022 06:26  Phos  3.5     10-06  Mg     2.20     10-06    TPro  6.1  /  Alb  3.0<L>  /  TBili  0.6  /  DBili  x   /  AST  17  /  ALT  15  /  AlkPhos  67  10-06    CAPILLARY BLOOD GLUCOSE      POCT Blood Glucose.: 219 mg/dL (07 Oct 2022 09:05)  POCT Blood Glucose.: 182 mg/dL (06 Oct 2022 22:30)  POCT Blood Glucose.: 42 mg/dL (06 Oct 2022 22:07)  POCT Blood Glucose.: 64 mg/dL (06 Oct 2022 22:01)  POCT Blood Glucose.: 63 mg/dL (06 Oct 2022 21:59)  POCT Blood Glucose.: 197 mg/dL (06 Oct 2022 17:46)  POCT Blood Glucose.: 197 mg/dL (06 Oct 2022 13:25)      LIVER FUNCTIONS - ( 06 Oct 2022 06:26 )  Alb: 3.0 g/dL / Pro: 6.1 g/dL / ALK PHOS: 67 U/L / ALT: 15 U/L / AST: 17 U/L / GGT: x                         Progress Note     Britt Childress  PGY-1 Medicine  Te[employer] | [id number]    Patient is a 72y old  Male who presents with a chief complaint of Persistent bloody diarrhea, generalized weakness (06 Oct 2022 13:25)          SUBJECTIVE / INTERVAL EVENTS  - Overnight, had hypoglycemia to 42, thought to be due to poor po intake; improved to 182.  - Patient denied fever/chills, nausea/vomiting, shortness of breath, new pain (of chest, abdomen, limbs), new swelling, new numbness/tingling, dysuria or hematuria.   - Patient endorsed 0 abdominal pain.  - RN endorsed few soft formed BMs during her shift; other shift had one concentrated period with several BMs, also soft formed.         MEDICATIONS    Home Medications:  aspirin 81 mg oral delayed release tablet: 1 tab(s) orally once a day (28 Sep 2022 01:21)  atorvastatin 40 mg oral tablet: 1 tab(s) orally once a day (at bedtime) (28 Sep 2022 01:21)  Centrum Silver oral tablet: 1 tab(s) orally once a day (28 Sep 2022 01:21)  finasteride 5 mg oral tablet: 1 tab(s) orally once a day (28 Sep 2022 01:21)  folic acid 1 mg oral tablet: 1 tab(s) orally once a day (01 Oct 2022 11:05)  gabapentin 100 mg oral capsule: 1 tab(s) orally 2 times a day (28 Sep 2022 01:21)  insulin aspart 100 units/mL injectable solution: 28 unit(s) injectable 3 times a day (before meals) (28 Sep 2022 01:21)  Januvia 50 mg oral tablet: 1 tab(s) orally once a day (28 Sep 2022 01:21)  memantine 10 mg oral tablet: 1 tab(s) orally 2 times a day (28 Sep 2022 01:21)  mesalamine 800 mg oral delayed release tablet: 2 tab(s) orally 2 times a day (28 Sep 2022 01:21)  QUEtiapine 25 mg oral tablet: 2 tab(s) orally once a day (at bedtime) (28 Sep 2022 01:21)  Remicade 100 mg intravenous injection:  (28 Sep 2022 01:21)  sertraline 100 mg oral tablet: 1 tab(s) orally once a day (28 Sep 2022 01:21)  tamsulosin 0.4 mg oral capsule: 1 cap(s) orally 2 times a day (28 Sep 2022 01:21)  Tresiba 100 units/mL subcutaneous solution: 30 unit(s) subcutaneous once a day (at bedtime) (28 Sep 2022 01:21)  trimethoprim 100 mg oral tablet: 1 tab(s) orally once a day (at bedtime) (28 Sep 2022 01:21)  Vitamin D3 400 intl units oral tablet: 1 tab(s) orally once a day (28 Sep 2022 01:21)      MEDICATIONS  (STANDING):  aspirin enteric coated 81 milliGRAM(s) Oral daily  atorvastatin 40 milliGRAM(s) Oral at bedtime  dextrose 5%. 1000 milliLiter(s) (100 mL/Hr) IV Continuous <Continuous>  dextrose 5%. 1000 milliLiter(s) (50 mL/Hr) IV Continuous <Continuous>  dextrose 5%. 1000 milliLiter(s) (100 mL/Hr) IV Continuous <Continuous>  dextrose 50% Injectable 25 Gram(s) IV Push once  dextrose 50% Injectable 12.5 Gram(s) IV Push once  dextrose 50% Injectable 25 Gram(s) IV Push once  enoxaparin Injectable 40 milliGRAM(s) SubCutaneous every 24 hours  finasteride 5 milliGRAM(s) Oral daily  folic acid 1 milliGRAM(s) Oral daily  gabapentin 100 milliGRAM(s) Oral two times a day  glucagon  Injectable 1 milliGRAM(s) IntraMuscular once  glucagon  Injectable 1 milliGRAM(s) IntraMuscular once  insulin glargine Injectable (LANTUS) 24 Unit(s) SubCutaneous at bedtime  insulin lispro (ADMELOG) corrective regimen sliding scale   SubCutaneous three times a day before meals  insulin lispro Injectable (ADMELOG) 11 Unit(s) SubCutaneous three times a day before meals  memantine 10 milliGRAM(s) Oral two times a day  mesalamine DR Capsule 1600 milliGRAM(s) Oral two times a day  methotrexate 10 milliGRAM(s) Oral <User Schedule>  pantoprazole  Injectable 40 milliGRAM(s) IV Push every 12 hours  predniSONE   Tablet 40 milliGRAM(s) Oral daily  QUEtiapine 50 milliGRAM(s) Oral at bedtime  sertraline 100 milliGRAM(s) Oral daily  tamsulosin 0.4 milliGRAM(s) Oral at bedtime    MEDICATIONS  (PRN):  dextrose Oral Gel 15 Gram(s) Oral once PRN Blood Glucose LESS THAN 70 milliGRAM(s)/deciliter  melatonin 3 milliGRAM(s) Oral at bedtime PRN Insomnia         VITALS / I&O's  T(C): 36.8 (10-07-22 @ 06:17), Max: 36.8 (10-07-22 @ 06:17)  HR: 75 (10-07-22 @ 06:17) (75 - 81)  BP: 117/61 (10-07-22 @ 06:17) (117/61 - 123/62)  RR: 17 (10-07-22 @ 06:17) (17 - 18)  SpO2: 100% (10-07-22 @ 06:17) (98% - 100%)  I&O's Summary    06 Oct 2022 07:01  -  07 Oct 2022 07:00  --------------------------------------------------------  IN: 730 mL / OUT: 1450 mL / NET: -720 mL           PHYSICAL EXAM    General: Reclining in bed in no acute distress.  HEENT: Normocephalic, atraumatic. Extraocular movements grossly intact. No nasal discharge.  Neuro: Alert, somewhat oriented. No facial asymmetry or dysarthria. Moving all extremities.  CV: Regular rate and rhythm. S1/S2. +systolic murmur. Limbs warm, no distal edema.  Respiratory: Anterior lung fields CTAB. No increased work of breathing, speaking comfortably.  Abdomen: Soft; somewhat distended; nontender to palpation. No guarding.   : Suprapubic region nontender.  Skin: No rashes or bruising of face, forearms, or calves bilaterally.  Psych: Mood and affect appropriate.         LABS                        8.3    7.51  )-----------( 332      ( 06 Oct 2022 06:26 )             27.8     10-06    136  |  100  |  15  ----------------------------<  89  3.8   |  26  |  0.85    Ca    8.2<L>      06 Oct 2022 06:26  Phos  3.5     10-06  Mg     2.20     10-06    TPro  6.1  /  Alb  3.0<L>  /  TBili  0.6  /  DBili  x   /  AST  17  /  ALT  15  /  AlkPhos  67  10-06    CAPILLARY BLOOD GLUCOSE      POCT Blood Glucose.: 219 mg/dL (07 Oct 2022 09:05)  POCT Blood Glucose.: 182 mg/dL (06 Oct 2022 22:30)  POCT Blood Glucose.: 42 mg/dL (06 Oct 2022 22:07)  POCT Blood Glucose.: 64 mg/dL (06 Oct 2022 22:01)  POCT Blood Glucose.: 63 mg/dL (06 Oct 2022 21:59)  POCT Blood Glucose.: 197 mg/dL (06 Oct 2022 17:46)  POCT Blood Glucose.: 197 mg/dL (06 Oct 2022 13:25)      LIVER FUNCTIONS - ( 06 Oct 2022 06:26 )  Alb: 3.0 g/dL / Pro: 6.1 g/dL / ALK PHOS: 67 U/L / ALT: 15 U/L / AST: 17 U/L / GGT: x

## 2022-10-07 NOTE — PROGRESS NOTE ADULT - PROBLEM SELECTOR PLAN 1
IBD flare with bloody diarrhea likely 2/2 UC vs Crohn  - elevated ESR, CRP, both downtrending  - infectious work up neg: Cdiff PCR, GI PCR, stool Cx, Stool O+p  - fecal calprotectin 1688  - IBD flare management as per GI - appreciate recs  - de-escalate to po steroids, 40mg daily is adequate with plans to decrease to 30mg daily in 1 week and then to 20mg daily until see in GI office IBD flare with bloody diarrhea likely 2/2 UC vs Crohn  - elevated ESR, CRP, both downtrending  - infectious work up neg: Cdiff PCR, GI PCR, stool Cx, Stool O+p  - fecal calprotectin 1688  - IBD flare management as per GI - appreciate recs  - de-escalate to po steroids, 40mg daily; to decrease to 30mg daily 10/8 IBD flare with bloody diarrhea likely 2/2 UC vs Crohn  - elevated ESR, CRP, both downtrending  - infectious work up neg: Cdiff PCR, GI PCR, stool Cx, Stool O+p  - fecal calprotectin 1688  - IBD flare management as per GI - appreciate recs  - de-escalate to po steroids, 40mg daily

## 2022-10-07 NOTE — PROGRESS NOTE ADULT - SUBJECTIVE AND OBJECTIVE BOX
Gastroenterology/Hepatology Progress Note      Interval Events:     No acute events overnight. As per the nurses, patient had 9 BMs over the past 24 hours. Non bloody BMs. Patient had no abd pain, nausea or vomiting. Tolerating po diet well.     Allergies:  No Known Allergies      Hospital Medications:  aspirin enteric coated 81 milliGRAM(s) Oral daily  atorvastatin 40 milliGRAM(s) Oral at bedtime  dextrose 5%. 1000 milliLiter(s) IV Continuous <Continuous>  dextrose 5%. 1000 milliLiter(s) IV Continuous <Continuous>  dextrose 5%. 1000 milliLiter(s) IV Continuous <Continuous>  dextrose 50% Injectable 25 Gram(s) IV Push once  dextrose 50% Injectable 12.5 Gram(s) IV Push once  dextrose 50% Injectable 25 Gram(s) IV Push once  dextrose Oral Gel 15 Gram(s) Oral once PRN  enoxaparin Injectable 40 milliGRAM(s) SubCutaneous every 24 hours  finasteride 5 milliGRAM(s) Oral daily  folic acid 1 milliGRAM(s) Oral daily  gabapentin 100 milliGRAM(s) Oral two times a day  glucagon  Injectable 1 milliGRAM(s) IntraMuscular once  glucagon  Injectable 1 milliGRAM(s) IntraMuscular once  insulin glargine Injectable (LANTUS) 24 Unit(s) SubCutaneous at bedtime  insulin lispro (ADMELOG) corrective regimen sliding scale   SubCutaneous three times a day before meals  insulin lispro Injectable (ADMELOG) 11 Unit(s) SubCutaneous three times a day before meals  melatonin 3 milliGRAM(s) Oral at bedtime PRN  memantine 10 milliGRAM(s) Oral two times a day  mesalamine DR Capsule 1600 milliGRAM(s) Oral two times a day  methotrexate 10 milliGRAM(s) Oral <User Schedule>  pantoprazole  Injectable 40 milliGRAM(s) IV Push every 12 hours  predniSONE   Tablet 40 milliGRAM(s) Oral daily  QUEtiapine 50 milliGRAM(s) Oral at bedtime  sertraline 100 milliGRAM(s) Oral daily  tamsulosin 0.4 milliGRAM(s) Oral at bedtime      ROS: 14 point ROS negative unless otherwise state in subjective    PHYSICAL EXAM:   Vital Signs:  Vital Signs Last 24 Hrs  T(C): 36.8 (07 Oct 2022 06:17), Max: 36.8 (07 Oct 2022 06:17)  T(F): 98.2 (07 Oct 2022 06:17), Max: 98.2 (07 Oct 2022 06:17)  HR: 75 (07 Oct 2022 06:17) (75 - 81)  BP: 117/61 (07 Oct 2022 06:17) (117/61 - 123/62)  BP(mean): --  RR: 17 (07 Oct 2022 06:17) (17 - 18)  SpO2: 100% (07 Oct 2022 06:17) (98% - 100%)    Parameters below as of 07 Oct 2022 06:17  Patient On (Oxygen Delivery Method): room air      Daily     Daily     GENERAL:  No acute distress, well appearing, sleeping in bed.    HEENT:  NCAT, no scleral icterus  CHEST: no resp distress  ABDOMEN:  Soft, non-tender, non-distended, no masses  EXTREMITIES:  No  edema b/l  NEURO/PSYCH: No tremor, alert and oriented x 3.    LABS:                        8.3    7.51  )-----------( 332      ( 06 Oct 2022 06:26 )             27.8       10-06    136  |  100  |  15  ----------------------------<  89  3.8   |  26  |  0.85    Ca    8.2<L>      06 Oct 2022 06:26  Phos  3.5     10-06  Mg     2.20     10-06    TPro  6.1  /  Alb  3.0<L>  /  TBili  0.6  /  DBili  x   /  AST  17  /  ALT  15  /  AlkPhos  67  10-06    LIVER FUNCTIONS - ( 06 Oct 2022 06:26 )  Alb: 3.0 g/dL / Pro: 6.1 g/dL / ALK PHOS: 67 U/L / ALT: 15 U/L / AST: 17 U/L / GGT: x               Imaging:        Ct abd and pelvis    FINDINGS:  LOWER CHEST: Within normal limits.    LIVER: Within normal limits.  BILE DUCTS: Normal caliber.  GALLBLADDER: Small gallstone.  SPLEEN: Within normal limits.  PANCREAS: Within normal limits.  ADRENALS: Within normal limits.  KIDNEYS/URETERS: 4 mm nonobstructive calculus lower pole right kidney. 2   cm cyst upper pole left kidney.    BLADDER: Within normal limits.  REPRODUCTIVE ORGANS: Prostate within normal limits.    BOWEL: No bowel obstruction. Mural thickening and associated mesenteric   hyperemia involving the colon from the hepatic flexure through the mid   descending colon. Submucosal fat deposition in the cecum and rectosigmoid   colon consistent with chronic information. Appendix mural thickening and   mucosal hyperenhancement in the mid appendix.  PERITONEUM: No ascites.  VESSELS: Atheromatous calcifications.  RETROPERITONEUM/LYMPH NODES: No lymphadenopathy.  ABDOMINAL WALL: Within normal limits.  BONES: Hemisacralization of the fifth lumbar vertebra.    IMPRESSION:  Crohn's colitis involving the transverse and descending colon and   appendix.    Colonoscopy in 7/2020    Impression:          - Inflammation was found from the anus to the descending colon secondary to                        left-sided colitis. The findings are unchanged compared to previous                        examinations. Biopsied.          - One 15 mm polyp in the rectum, benign appearing likely inflammatory,                        removed with a hot snare. Resected and retrieved.                       - Diverticulosis in the sigmoid colon.                       - Localized mild inflammation was found in the cecum.                       Clinical improvement with Entyvio BUT no endoscopic improvement.    Colonoscopy 9/30/22  Impression:          - Inflammation was found from the anus to the splenic                        flexure. This was moderate in severity, worsened                        compared to previous examinations. Biopsied.                       - Left-sided ulcerative colitis. Inflammation was found                        from the transverse colon to the cecum. This was severe,                        worsened compared to previous examinations. Biopsied.                       - Bloody diarrhea secondary to severe inflammation due                        to his underlying UC, predominantly left sided disease.    Surgical pathology    _  1. Ascending colon, biopsy:  - Chronic active colitis. Negative for granuloma and dysplasia.    2. Transverse colon, biopsy:  - Chronic active colitis. Negative for granuloma and dysplasia.    3. Descending colon, biopsy:  - Chronic active colitis. Negative for granuloma and dysplasia.    4. Sigmoid colon, biopsy:  - Chronic active colitis. Negative for granuloma and dysplasia.

## 2022-10-07 NOTE — PROGRESS NOTE ADULT - ASSESSMENT
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 De-escalated to po steroids, per GI. Discharge pending further management of IBD flare.  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 De-escalated to po steroids, per GI.

## 2022-10-07 NOTE — PROGRESS NOTE ADULT - PROBLEM SELECTOR PLAN 3
on Lantus 30 and Admelog 28-30 at home  - Lantus 24  - Admelog 11 tid qAC  - correctional scale tid qAC on Lantus 30 and Admelog 28-30 at home  - Lantus 24  - Admelog 7 tid qAC  - correctional scale tid qAC

## 2022-10-07 NOTE — PROGRESS NOTE ADULT - ATTENDING COMMENTS
72M with IBD (suspected Crohn's colitis vs less likely UC, follows with Dr. Oro, on Remicade) admitted with bloody diarrhea c/f flare. Infectious workup, including C diff was negative. Patient underwent colonoscopy on 9/30 with active colitis, most notably in right colon. Given outpatient labs with elevated Remicade Abs and inadequate level (6/2022), his outpatient dose of Remicade was increased. Patient received inpatient dose of Remicade on 10/3.     Clinically improved after Remicade and with steroids, but today with reported increase in stool frequency.     --Closely monitor stool output and clinical symptoms; if worsening, may need to administer second inpatient dose of Remicade  --Continue PO steroids with prednisone 40mg daily with plans to discharge on 40mg daily with instructions to decrease to 30mg daily in 1 week and then to 20mg daily until see in GI office  --Monitor abdominal exam and stool output  --Methotrexate weekly, daily folic acid  --Monitor daily ESR and CRP  --DVT prophylaxis  --Will need close outpatient follow up with Dr. Oro once stable for discharge, anticipate in next 24-48h from GI standpoint if remains clinically stable    Additional recommendations as above.

## 2022-10-07 NOTE — PROGRESS NOTE ADULT - ASSESSMENT
72yoM with PMH of Crohn's disease, IDDM, neuropathy, HTN, HLD, BPH, CVA on ASA 81mg daily who presented with bloody diarrhea, concerning for CD flare.     Impression:   #IBD flare: Bloody diarrhea, concerning for CD flare -- unclear CD vs. UC as per Dr. Oro note. s/p poor prior response to Entyvio. On remicade for the past one year, last infusion 2 weeks.  On 6/30/22 pts remicade level was <0.4 and antibody was elevated at 308. Remicade dosage was increased since aug 2022. Last colonoscopy in 7/2020 showed inflammation from the anus to the descending colon (left sided colitis) and diverticulosis. Repeat colonoscopy showed inflammation in the transverse colon to the cecum. Pathology positive for chronic active colitis.   - GI PCR negative, c diff test negative.   - ESR and CRP downtrending   - On IV steroids (9/28), transitioned to PO prednisone on 10/5, on prednisone 40mg daily.   - Remicade 10mg/kg and MTX given on 10/3  - Had increased frequency of BMs over the last 24 hours, concerned that the patient might not be responding to po steroids, will need to continue to monitor his clinical symptoms.     Recommendations:   - Please change dose to prednisone 40mg daily.   - continue to monitor stool o/p.   - Methotrexate weekly   - continue with low residue diet  - obtain CRP and ESR daily.   - f/u fecal protectin  - Place him on DVT PPx as IBD patients are high risk for thrombosis.   - If his clinical symptoms worsen, will consider giving another infusion of remicade.   - will reach out to Dr. Oro for follow up appt as o/p, ideally within 2-3 weeks of discharge at [address] [place] [address] ([phone])    GI will continue to follow.     All recommendations are tentative until note is attested by an attending.     Jorge Arboleda, PGY-4  Gastroenterology/Hepatology Fellow  Available on [employer] Teams  [phone] (Short Range Pager)  [phone] (Long Range Pager)    After 5pm, please contact the on-call GI fellow. [phone]   72yoM with PMH of Crohn's disease, IDDM, neuropathy, HTN, HLD, BPH, CVA on ASA 81mg daily who presented with bloody diarrhea, concerning for CD flare.     Impression:   #IBD flare: Bloody diarrhea, concerning for CD flare -- unclear CD vs. UC as per Dr. Oro note. s/p poor prior response to Entyvio. On remicade for the past one year, last infusion 2 weeks.  On 6/30/22 pts remicade level was <0.4 and antibody was elevated at 308. Remicade dosage was increased since aug 2022. Last colonoscopy in 7/2020 showed inflammation from the anus to the descending colon (left sided colitis) and diverticulosis. Repeat colonoscopy showed inflammation in the transverse colon to the cecum. Pathology positive for chronic active colitis.   - GI PCR negative, c diff test negative.   - ESR and CRP downtrending   - On IV steroids (9/28), transitioned to PO prednisone on 10/5, on prednisone 40mg daily.   - Remicade 10mg/kg and MTX given on 10/3    Recommendations:   - Prednisone 40mg daily.   - Had increased frequency of BMs over the last 24 hours, concerned that the patient might not be responding to po steroids, will need to continue to monitor his clinical symptoms. If his clinical symptoms worsen, will consider giving another infusion of remicade.   - continue to monitor stool o/p.   - Methotrexate weekly, daily folic acid  - continue with low residue diet  - obtain CRP and ESR daily.   - f/u fecal protectin  - Place him on DVT PPx as IBD patients are high risk for thrombosis.     - will reach out to Dr. Oro for follow up appt as o/p, ideally within 2-3 weeks of discharge at [address] [place] [address] ([phone])    GI will continue to follow.     All recommendations are tentative until note is attested by an attending.     Jorge Arboleda, PGY-4  Gastroenterology/Hepatology Fellow  Available on Microsoft Teams  [phone] (Short Range Pager)  [phone] (Long Range Pager)    After 5pm, please contact the on-call GI fellow. [phone]

## 2022-10-07 NOTE — PROGRESS NOTE ADULT - PROBLEM SELECTOR PLAN 7
DVT PPX: enoxaparin 40 q24h  Diet: DASH/TLC sodium/cholesterol restricted, consistent carb, low residue, easy to chew, lactose restricted  PT: anticipated d/c home w/ home PT   Dispo: pending further management of IBD flare  Code status: full

## 2022-10-08 LAB
ALBUMIN SERPL ELPH-MCNC: 3.1 G/DL — LOW (ref 3.3–5)
ALP SERPL-CCNC: 77 U/L — SIGNIFICANT CHANGE UP (ref 40–120)
ALT FLD-CCNC: 16 U/L — SIGNIFICANT CHANGE UP (ref 4–41)
ANION GAP SERPL CALC-SCNC: 12 MMOL/L — SIGNIFICANT CHANGE UP (ref 7–14)
AST SERPL-CCNC: 16 U/L — SIGNIFICANT CHANGE UP (ref 4–40)
BILIRUB SERPL-MCNC: 0.5 MG/DL — SIGNIFICANT CHANGE UP (ref 0.2–1.2)
BUN SERPL-MCNC: 15 MG/DL — SIGNIFICANT CHANGE UP (ref 7–23)
CALCIUM SERPL-MCNC: 8.2 MG/DL — LOW (ref 8.4–10.5)
CHLORIDE SERPL-SCNC: 99 MMOL/L — SIGNIFICANT CHANGE UP (ref 98–107)
CO2 SERPL-SCNC: 25 MMOL/L — SIGNIFICANT CHANGE UP (ref 22–31)
CREAT SERPL-MCNC: 0.89 MG/DL — SIGNIFICANT CHANGE UP (ref 0.5–1.3)
CRP SERPL-MCNC: <3 MG/L — SIGNIFICANT CHANGE UP
EGFR: 90 ML/MIN/1.73M2 — SIGNIFICANT CHANGE UP
ERYTHROCYTE [SEDIMENTATION RATE] IN BLOOD: 34 MM/HR — HIGH (ref 1–15)
GLUCOSE BLDC GLUCOMTR-MCNC: 152 MG/DL — HIGH (ref 70–99)
GLUCOSE BLDC GLUCOMTR-MCNC: 180 MG/DL — HIGH (ref 70–99)
GLUCOSE BLDC GLUCOMTR-MCNC: 203 MG/DL — HIGH (ref 70–99)
GLUCOSE BLDC GLUCOMTR-MCNC: 275 MG/DL — HIGH (ref 70–99)
GLUCOSE SERPL-MCNC: 101 MG/DL — HIGH (ref 70–99)
HCT VFR BLD CALC: 28.8 % — LOW (ref 39–50)
HGB BLD-MCNC: 8.6 G/DL — LOW (ref 13–17)
MAGNESIUM SERPL-MCNC: 2.1 MG/DL — SIGNIFICANT CHANGE UP (ref 1.6–2.6)
MCHC RBC-ENTMCNC: 24.9 PG — LOW (ref 27–34)
MCHC RBC-ENTMCNC: 29.9 GM/DL — LOW (ref 32–36)
MCV RBC AUTO: 83.2 FL — SIGNIFICANT CHANGE UP (ref 80–100)
NRBC # BLD: 0 /100 WBCS — SIGNIFICANT CHANGE UP (ref 0–0)
NRBC # FLD: 0 K/UL — SIGNIFICANT CHANGE UP (ref 0–0)
PHOSPHATE SERPL-MCNC: 4 MG/DL — SIGNIFICANT CHANGE UP (ref 2.5–4.5)
PLATELET # BLD AUTO: 319 K/UL — SIGNIFICANT CHANGE UP (ref 150–400)
POTASSIUM SERPL-MCNC: 3.9 MMOL/L — SIGNIFICANT CHANGE UP (ref 3.5–5.3)
POTASSIUM SERPL-SCNC: 3.9 MMOL/L — SIGNIFICANT CHANGE UP (ref 3.5–5.3)
PROT SERPL-MCNC: 6 G/DL — SIGNIFICANT CHANGE UP (ref 6–8.3)
RBC # BLD: 3.46 M/UL — LOW (ref 4.2–5.8)
RBC # FLD: 17.7 % — HIGH (ref 10.3–14.5)
SODIUM SERPL-SCNC: 136 MMOL/L — SIGNIFICANT CHANGE UP (ref 135–145)
WBC # BLD: 8.02 K/UL — SIGNIFICANT CHANGE UP (ref 3.8–10.5)
WBC # FLD AUTO: 8.02 K/UL — SIGNIFICANT CHANGE UP (ref 3.8–10.5)

## 2022-10-08 PROCEDURE — 99232 SBSQ HOSP IP/OBS MODERATE 35: CPT | Mod: GC

## 2022-10-08 PROCEDURE — 99233 SBSQ HOSP IP/OBS HIGH 50: CPT | Mod: GC

## 2022-10-08 RX ADMIN — Medication 1600 MILLIGRAM(S): at 18:21

## 2022-10-08 RX ADMIN — Medication 1 MILLIGRAM(S): at 12:47

## 2022-10-08 RX ADMIN — MEMANTINE HYDROCHLORIDE 10 MILLIGRAM(S): 10 TABLET ORAL at 18:22

## 2022-10-08 RX ADMIN — TAMSULOSIN HYDROCHLORIDE 0.4 MILLIGRAM(S): 0.4 CAPSULE ORAL at 22:08

## 2022-10-08 RX ADMIN — GABAPENTIN 100 MILLIGRAM(S): 400 CAPSULE ORAL at 06:24

## 2022-10-08 RX ADMIN — GABAPENTIN 100 MILLIGRAM(S): 400 CAPSULE ORAL at 18:23

## 2022-10-08 RX ADMIN — INSULIN GLARGINE 24 UNIT(S): 100 INJECTION, SOLUTION SUBCUTANEOUS at 22:08

## 2022-10-08 RX ADMIN — PANTOPRAZOLE SODIUM 40 MILLIGRAM(S): 20 TABLET, DELAYED RELEASE ORAL at 18:21

## 2022-10-08 RX ADMIN — Medication 6: at 18:20

## 2022-10-08 RX ADMIN — Medication 7 UNIT(S): at 12:46

## 2022-10-08 RX ADMIN — PANTOPRAZOLE SODIUM 40 MILLIGRAM(S): 20 TABLET, DELAYED RELEASE ORAL at 06:25

## 2022-10-08 RX ADMIN — Medication 2: at 09:13

## 2022-10-08 RX ADMIN — Medication 2: at 12:45

## 2022-10-08 RX ADMIN — Medication 3 MILLIGRAM(S): at 22:08

## 2022-10-08 RX ADMIN — Medication 81 MILLIGRAM(S): at 12:47

## 2022-10-08 RX ADMIN — MEMANTINE HYDROCHLORIDE 10 MILLIGRAM(S): 10 TABLET ORAL at 06:25

## 2022-10-08 RX ADMIN — SERTRALINE 100 MILLIGRAM(S): 25 TABLET, FILM COATED ORAL at 12:47

## 2022-10-08 RX ADMIN — Medication 40 MILLIGRAM(S): at 06:25

## 2022-10-08 RX ADMIN — FINASTERIDE 5 MILLIGRAM(S): 5 TABLET, FILM COATED ORAL at 12:47

## 2022-10-08 RX ADMIN — ENOXAPARIN SODIUM 40 MILLIGRAM(S): 100 INJECTION SUBCUTANEOUS at 12:47

## 2022-10-08 RX ADMIN — Medication 1600 MILLIGRAM(S): at 06:24

## 2022-10-08 RX ADMIN — Medication 7 UNIT(S): at 18:21

## 2022-10-08 RX ADMIN — ATORVASTATIN CALCIUM 40 MILLIGRAM(S): 80 TABLET, FILM COATED ORAL at 22:08

## 2022-10-08 RX ADMIN — QUETIAPINE FUMARATE 50 MILLIGRAM(S): 200 TABLET, FILM COATED ORAL at 22:08

## 2022-10-08 RX ADMIN — Medication 7 UNIT(S): at 09:14

## 2022-10-08 NOTE — PROGRESS NOTE ADULT - ATTENDING COMMENTS
Seen and examined by me this afternoon, doing well, wants to go home  Apparently only one BM yesterday, tolerating PO  ESR/CRP overall trending down, apprec GI recs  Seems to be clinically improving  FS's improved after decreasing lantus dose, c/w current regimen for now (24U lantus, 7U TID lispro)  Likely plan for discharge home tomorrow with HPT  Rest as above

## 2022-10-08 NOTE — PROGRESS NOTE ADULT - ASSESSMENT
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 De-escalated to po steroids, per GI.

## 2022-10-08 NOTE — PROGRESS NOTE ADULT - PROBLEM SELECTOR PLAN 3
on Lantus 30 and Admelog 28-30 at home  - Lantus 24  - Admelog 7 tid qAC  - correctional scale tid qAC

## 2022-10-08 NOTE — PROGRESS NOTE ADULT - SUBJECTIVE AND OBJECTIVE BOX
Gastroenterology/Hepatology Progress Note      Interval Events:     Patient has no acute complaints. Has been tolerating diet with no abdominal pain, nausea or vomiting. As per the nurse, he had 2 BM which were formed with no blood.     Allergies:  No Known Allergies      Hospital Medications:  aspirin enteric coated 81 milliGRAM(s) Oral daily  atorvastatin 40 milliGRAM(s) Oral at bedtime  dextrose 5%. 1000 milliLiter(s) IV Continuous <Continuous>  dextrose 5%. 1000 milliLiter(s) IV Continuous <Continuous>  dextrose 5%. 1000 milliLiter(s) IV Continuous <Continuous>  dextrose 50% Injectable 25 Gram(s) IV Push once  dextrose 50% Injectable 12.5 Gram(s) IV Push once  dextrose 50% Injectable 25 Gram(s) IV Push once  dextrose Oral Gel 15 Gram(s) Oral once PRN  enoxaparin Injectable 40 milliGRAM(s) SubCutaneous every 24 hours  finasteride 5 milliGRAM(s) Oral daily  folic acid 1 milliGRAM(s) Oral daily  gabapentin 100 milliGRAM(s) Oral two times a day  glucagon  Injectable 1 milliGRAM(s) IntraMuscular once  glucagon  Injectable 1 milliGRAM(s) IntraMuscular once  insulin glargine Injectable (LANTUS) 24 Unit(s) SubCutaneous at bedtime  insulin lispro (ADMELOG) corrective regimen sliding scale   SubCutaneous three times a day before meals  insulin lispro Injectable (ADMELOG) 7 Unit(s) SubCutaneous three times a day before meals  melatonin 3 milliGRAM(s) Oral at bedtime PRN  memantine 10 milliGRAM(s) Oral two times a day  mesalamine DR Capsule 1600 milliGRAM(s) Oral two times a day  methotrexate 10 milliGRAM(s) Oral <User Schedule>  pantoprazole  Injectable 40 milliGRAM(s) IV Push every 12 hours  predniSONE   Tablet 40 milliGRAM(s) Oral daily  QUEtiapine 50 milliGRAM(s) Oral at bedtime  sertraline 100 milliGRAM(s) Oral daily  tamsulosin 0.4 milliGRAM(s) Oral at bedtime      ROS: 14 point ROS negative unless otherwise state in subjective    PHYSICAL EXAM:   Vital Signs:  Vital Signs Last 24 Hrs  T(C): 36.8 (08 Oct 2022 06:25), Max: 36.8 (08 Oct 2022 06:25)  T(F): 98.2 (08 Oct 2022 06:25), Max: 98.2 (08 Oct 2022 06:25)  HR: 80 (08 Oct 2022 06:25) (76 - 81)  BP: 122/58 (08 Oct 2022 06:25) (117/58 - 122/58)  BP(mean): --  RR: 18 (08 Oct 2022 06:25) (18 - 18)  SpO2: 100% (08 Oct 2022 06:25) (98% - 100%)    Parameters below as of 08 Oct 2022 06:25  Patient On (Oxygen Delivery Method): room air      Daily     Daily     GENERAL:  No acute distress, well appearing, sleeping in bed.    HEENT:  NCAT, no scleral icterus  CHEST: no resp distress  ABDOMEN:  Soft, non-tender, non-distended, no masses  EXTREMITIES:  No  edema b/l  NEURO/PSYCH: No tremor, alert and oriented x 3    LABS:                        8.6    8.02  )-----------( 319      ( 08 Oct 2022 07:18 )             28.8     Mean Cell Volume: 83.2 fL (10-08-22 @ 07:18)    10-08    136  |  99  |  15  ----------------------------<  101<H>  3.9   |  25  |  0.89    Ca    8.2<L>      08 Oct 2022 07:18  Phos  4.0     10-08  Mg     2.10     10-08    TPro  6.0  /  Alb  3.1<L>  /  TBili  0.5  /  DBili  x   /  AST  16  /  ALT  16  /  AlkPhos  77  10-08    LIVER FUNCTIONS - ( 08 Oct 2022 07:18 )  Alb: 3.1 g/dL / Pro: 6.0 g/dL / ALK PHOS: 77 U/L / ALT: 16 U/L / AST: 16 U/L / GGT: x               Imaging:      Ct abd and pelvis    FINDINGS:  LOWER CHEST: Within normal limits.    LIVER: Within normal limits.  BILE DUCTS: Normal caliber.  GALLBLADDER: Small gallstone.  SPLEEN: Within normal limits.  PANCREAS: Within normal limits.  ADRENALS: Within normal limits.  KIDNEYS/URETERS: 4 mm nonobstructive calculus lower pole right kidney. 2   cm cyst upper pole left kidney.    BLADDER: Within normal limits.  REPRODUCTIVE ORGANS: Prostate within normal limits.    BOWEL: No bowel obstruction. Mural thickening and associated mesenteric   hyperemia involving the colon from the hepatic flexure through the mid   descending colon. Submucosal fat deposition in the cecum and rectosigmoid   colon consistent with chronic information. Appendix mural thickening and   mucosal hyperenhancement in the mid appendix.  PERITONEUM: No ascites.  VESSELS: Atheromatous calcifications.  RETROPERITONEUM/LYMPH NODES: No lymphadenopathy.  ABDOMINAL WALL: Within normal limits.  BONES: Hemisacralization of the fifth lumbar vertebra.    IMPRESSION:  Crohn's colitis involving the transverse and descending colon and   appendix.    Colonoscopy in 7/2020    Impression:          - Inflammation was found from the anus to the descending colon secondary to                        left-sided colitis. The findings are unchanged compared to previous                        examinations. Biopsied.          - One 15 mm polyp in the rectum, benign appearing likely inflammatory,                        removed with a hot snare. Resected and retrieved.                       - Diverticulosis in the sigmoid colon.                       - Localized mild inflammation was found in the cecum.                       Clinical improvement with Entyvio BUT no endoscopic improvement.    Colonoscopy 9/30/22  Impression:          - Inflammation was found from the anus to the splenic                        flexure. This was moderate in severity, worsened                        compared to previous examinations. Biopsied.                       - Left-sided ulcerative colitis. Inflammation was found                        from the transverse colon to the cecum. This was severe,                        worsened compared to previous examinations. Biopsied.                       - Bloody diarrhea secondary to severe inflammation due                        to his underlying UC, predominantly left sided disease.    Surgical pathology    _  1. Ascending colon, biopsy:  - Chronic active colitis. Negative for granuloma and dysplasia.    2. Transverse colon, biopsy:  - Chronic active colitis. Negative for granuloma and dysplasia.    3. Descending colon, biopsy:  - Chronic active colitis. Negative for granuloma and dysplasia.    4. Sigmoid colon, biopsy:  - Chronic active colitis. Negative for granuloma and dysplasia.

## 2022-10-08 NOTE — PROGRESS NOTE ADULT - ASSESSMENT
72yoM with PMH of Crohn's disease, IDDM, neuropathy, HTN, HLD, BPH, CVA on ASA 81mg daily who presented with bloody diarrhea, concerning for CD flare.     Impression:   #IBD flare: Bloody diarrhea, concerning for CD flare -- unclear CD vs. UC as per Dr. Oro note. s/p poor prior response to Entyvio. On remicade for the past one year, last infusion 2 weeks.  On 6/30/22 pts remicade level was <0.4 and antibody was elevated at 308. Remicade dosage was increased since aug 2022. Last colonoscopy in 7/2020 showed inflammation from the anus to the descending colon (left sided colitis) and diverticulosis. Repeat colonoscopy showed inflammation in the transverse colon to the cecum. Pathology positive for chronic active colitis.   - GI PCR negative, c diff test negative.   - ESR and CRP downtrending   - On IV steroids (9/28), transitioned to PO prednisone on 10/5, on prednisone 40mg daily. Symptoms are improving.   - Remicade 10mg/kg and MTX given on 10/3    Recommendations:   - Prednisone 40mg daily.  - Clinical symptoms improving.   - continue to monitor stool o/p.   - Methotrexate weekly, daily folic acid  - continue with low residue diet  - obtain CRP and ESR daily.   - f/u fecal protectin  - Place him on DVT PPx as IBD patients are high risk for thrombosis.   - reached out to Dr. Oro for follow up appt as o/p, ideally within 2-3 weeks of discharge at [address] [place] [address] ([phone])    GI will continue to follow.     All recommendations are tentative until note is attested by an attending.     Jorge Arboleda, PGY-4  Gastroenterology/Hepatology Fellow  Available on Microsoft Teams  [phone] (Short Range Pager)  [phone] (Long Range Pager)    After 5pm, please contact the on-call GI fellow. [phone]

## 2022-10-08 NOTE — PROGRESS NOTE ADULT - PROBLEM SELECTOR PLAN 1
IBD flare with bloody diarrhea likely 2/2 UC vs Crohn  - elevated ESR, CRP, both downtrending  - infectious work up neg: Cdiff PCR, GI PCR, stool Cx, Stool O+p  - fecal calprotectin 1688  - IBD flare management as per GI - appreciate recs  - de-escalate to po steroids, 40mg daily; to decrease to 30mg daily 10/8 IBD flare with bloody diarrhea likely 2/2 UC vs Crohn  - elevated ESR, CRP, both downtrending  - infectious work up neg: Cdiff PCR, GI PCR, stool Cx, Stool O+p  - fecal calprotectin 1688  - IBD flare management as per GI - appreciate recs  - de-escalate to po steroids, 40mg daily

## 2022-10-08 NOTE — PROGRESS NOTE ADULT - ATTENDING COMMENTS
Ulcerative colitis with medium antibodies to Remicade, status post dose of Remicade 9/30, on by mouth prednisone at this time, methotrexate introduced weekly for antibody formation, next dose due 10/10  1.  Taper to by mouth prednisone to 30 mg daily on 10/10  2.  Continue with methotrexate weekly

## 2022-10-08 NOTE — PROGRESS NOTE ADULT - SUBJECTIVE AND OBJECTIVE BOX
Progress Note     == draft in progress ==  Britt Childress  PGY-1 Medicine  Teams | [id number]    Patient is a 72y old  Male who presents with a chief complaint of Persistent bloody diarrhea, generalized weakness (07 Oct 2022 12:32)          SUBJECTIVE / INTERVAL EVENTS  - No acute events overnight.  - Patient denied fever, nausea/vomiting, shortness of breath, new pain (of chest, abdomen, limbs), new swelling, new numbness/tingling, dysuria or hematuria.   - Patient endorsed          MEDICATIONS    Home Medications:  aspirin 81 mg oral delayed release tablet: 1 tab(s) orally once a day (28 Sep 2022 01:21)  atorvastatin 40 mg oral tablet: 1 tab(s) orally once a day (at bedtime) (28 Sep 2022 01:21)  Centrum Silver oral tablet: 1 tab(s) orally once a day (28 Sep 2022 01:21)  finasteride 5 mg oral tablet: 1 tab(s) orally once a day (28 Sep 2022 01:21)  folic acid 1 mg oral tablet: 1 tab(s) orally once a day (01 Oct 2022 11:05)  gabapentin 100 mg oral capsule: 1 tab(s) orally 2 times a day (28 Sep 2022 01:21)  insulin aspart 100 units/mL injectable solution: 28 unit(s) injectable 3 times a day (before meals) (28 Sep 2022 01:21)  Januvia 50 mg oral tablet: 1 tab(s) orally once a day (28 Sep 2022 01:21)  memantine 10 mg oral tablet: 1 tab(s) orally 2 times a day (28 Sep 2022 01:21)  mesalamine 800 mg oral delayed release tablet: 2 tab(s) orally 2 times a day (28 Sep 2022 01:21)  predniSONE 20 mg oral tablet: 2 tab(s) orally once a day (07 Oct 2022 15:35)  QUEtiapine 25 mg oral tablet: 2 tab(s) orally once a day (at bedtime) (28 Sep 2022 01:21)  Remicade 100 mg intravenous injection:  (28 Sep 2022 01:21)  sertraline 100 mg oral tablet: 1 tab(s) orally once a day (28 Sep 2022 01:21)  tamsulosin 0.4 mg oral capsule: 1 cap(s) orally 2 times a day (28 Sep 2022 01:21)  Tresiba 100 units/mL subcutaneous solution: 30 unit(s) subcutaneous once a day (at bedtime) (28 Sep 2022 01:21)  trimethoprim 100 mg oral tablet: 1 tab(s) orally once a day (at bedtime) (28 Sep 2022 01:21)  Vitamin D3 400 intl units oral tablet: 1 tab(s) orally once a day (28 Sep 2022 01:21)      MEDICATIONS  (STANDING):  aspirin enteric coated 81 milliGRAM(s) Oral daily  atorvastatin 40 milliGRAM(s) Oral at bedtime  dextrose 5%. 1000 milliLiter(s) (100 mL/Hr) IV Continuous <Continuous>  dextrose 5%. 1000 milliLiter(s) (50 mL/Hr) IV Continuous <Continuous>  dextrose 5%. 1000 milliLiter(s) (100 mL/Hr) IV Continuous <Continuous>  dextrose 50% Injectable 25 Gram(s) IV Push once  dextrose 50% Injectable 12.5 Gram(s) IV Push once  dextrose 50% Injectable 25 Gram(s) IV Push once  enoxaparin Injectable 40 milliGRAM(s) SubCutaneous every 24 hours  finasteride 5 milliGRAM(s) Oral daily  folic acid 1 milliGRAM(s) Oral daily  gabapentin 100 milliGRAM(s) Oral two times a day  glucagon  Injectable 1 milliGRAM(s) IntraMuscular once  glucagon  Injectable 1 milliGRAM(s) IntraMuscular once  insulin glargine Injectable (LANTUS) 24 Unit(s) SubCutaneous at bedtime  insulin lispro (ADMELOG) corrective regimen sliding scale   SubCutaneous three times a day before meals  insulin lispro Injectable (ADMELOG) 7 Unit(s) SubCutaneous three times a day before meals  memantine 10 milliGRAM(s) Oral two times a day  mesalamine DR Capsule 1600 milliGRAM(s) Oral two times a day  methotrexate 10 milliGRAM(s) Oral <User Schedule>  pantoprazole  Injectable 40 milliGRAM(s) IV Push every 12 hours  predniSONE   Tablet 40 milliGRAM(s) Oral daily  QUEtiapine 50 milliGRAM(s) Oral at bedtime  sertraline 100 milliGRAM(s) Oral daily  tamsulosin 0.4 milliGRAM(s) Oral at bedtime    MEDICATIONS  (PRN):  dextrose Oral Gel 15 Gram(s) Oral once PRN Blood Glucose LESS THAN 70 milliGRAM(s)/deciliter  melatonin 3 milliGRAM(s) Oral at bedtime PRN Insomnia         VITALS / I&O's  T(C): 36.8 (10-08-22 @ 06:25), Max: 36.8 (10-08-22 @ 06:25)  HR: 80 (10-08-22 @ 06:25) (76 - 81)  BP: 122/58 (10-08-22 @ 06:25) (117/58 - 122/58)  RR: 18 (10-08-22 @ 06:25) (18 - 18)  SpO2: 100% (10-08-22 @ 06:25) (98% - 100%)  I&O's Summary    07 Oct 2022 07:01  -  08 Oct 2022 07:00  --------------------------------------------------------  IN: 1000 mL / OUT: 800 mL / NET: 200 mL           PHYSICAL EXAM           LABS          CAPILLARY BLOOD GLUCOSE      POCT Blood Glucose.: 117 mg/dL (07 Oct 2022 22:08)  POCT Blood Glucose.: 203 mg/dL (07 Oct 2022 18:05)  POCT Blood Glucose.: 205 mg/dL (07 Oct 2022 13:01)  POCT Blood Glucose.: 219 mg/dL (07 Oct 2022 09:05)                      Progress Note       Britt Childress  PGY-1 Medicine  Teams | [id number]    Patient is a 72y old  Male who presents with a chief complaint of Persistent bloody diarrhea, generalized weakness (07 Oct 2022 12:32)          SUBJECTIVE / INTERVAL EVENTS  - No acute events overnight. 1 large soft BM logged.  - Patient denied fever, nausea/vomiting, shortness of breath, new pain (of chest, abdomen, limbs), new swelling, new numbness/tingling, dysuria or hematuria.   - Patient endorsed wanting to go home, 0 pain.          MEDICATIONS    Home Medications:  aspirin 81 mg oral delayed release tablet: 1 tab(s) orally once a day (28 Sep 2022 01:21)  atorvastatin 40 mg oral tablet: 1 tab(s) orally once a day (at bedtime) (28 Sep 2022 01:21)  Centrum Silver oral tablet: 1 tab(s) orally once a day (28 Sep 2022 01:21)  finasteride 5 mg oral tablet: 1 tab(s) orally once a day (28 Sep 2022 01:21)  folic acid 1 mg oral tablet: 1 tab(s) orally once a day (01 Oct 2022 11:05)  gabapentin 100 mg oral capsule: 1 tab(s) orally 2 times a day (28 Sep 2022 01:21)  insulin aspart 100 units/mL injectable solution: 28 unit(s) injectable 3 times a day (before meals) (28 Sep 2022 01:21)  Januvia 50 mg oral tablet: 1 tab(s) orally once a day (28 Sep 2022 01:21)  memantine 10 mg oral tablet: 1 tab(s) orally 2 times a day (28 Sep 2022 01:21)  mesalamine 800 mg oral delayed release tablet: 2 tab(s) orally 2 times a day (28 Sep 2022 01:21)  predniSONE 20 mg oral tablet: 2 tab(s) orally once a day (07 Oct 2022 15:35)  QUEtiapine 25 mg oral tablet: 2 tab(s) orally once a day (at bedtime) (28 Sep 2022 01:21)  Remicade 100 mg intravenous injection:  (28 Sep 2022 01:21)  sertraline 100 mg oral tablet: 1 tab(s) orally once a day (28 Sep 2022 01:21)  tamsulosin 0.4 mg oral capsule: 1 cap(s) orally 2 times a day (28 Sep 2022 01:21)  Tresiba 100 units/mL subcutaneous solution: 30 unit(s) subcutaneous once a day (at bedtime) (28 Sep 2022 01:21)  trimethoprim 100 mg oral tablet: 1 tab(s) orally once a day (at bedtime) (28 Sep 2022 01:21)  Vitamin D3 400 intl units oral tablet: 1 tab(s) orally once a day (28 Sep 2022 01:21)      MEDICATIONS  (STANDING):  aspirin enteric coated 81 milliGRAM(s) Oral daily  atorvastatin 40 milliGRAM(s) Oral at bedtime  dextrose 5%. 1000 milliLiter(s) (100 mL/Hr) IV Continuous <Continuous>  dextrose 5%. 1000 milliLiter(s) (50 mL/Hr) IV Continuous <Continuous>  dextrose 5%. 1000 milliLiter(s) (100 mL/Hr) IV Continuous <Continuous>  dextrose 50% Injectable 25 Gram(s) IV Push once  dextrose 50% Injectable 12.5 Gram(s) IV Push once  dextrose 50% Injectable 25 Gram(s) IV Push once  enoxaparin Injectable 40 milliGRAM(s) SubCutaneous every 24 hours  finasteride 5 milliGRAM(s) Oral daily  folic acid 1 milliGRAM(s) Oral daily  gabapentin 100 milliGRAM(s) Oral two times a day  glucagon  Injectable 1 milliGRAM(s) IntraMuscular once  glucagon  Injectable 1 milliGRAM(s) IntraMuscular once  insulin glargine Injectable (LANTUS) 24 Unit(s) SubCutaneous at bedtime  insulin lispro (ADMELOG) corrective regimen sliding scale   SubCutaneous three times a day before meals  insulin lispro Injectable (ADMELOG) 7 Unit(s) SubCutaneous three times a day before meals  memantine 10 milliGRAM(s) Oral two times a day  mesalamine DR Capsule 1600 milliGRAM(s) Oral two times a day  methotrexate 10 milliGRAM(s) Oral <User Schedule>  pantoprazole  Injectable 40 milliGRAM(s) IV Push every 12 hours  predniSONE   Tablet 40 milliGRAM(s) Oral daily  QUEtiapine 50 milliGRAM(s) Oral at bedtime  sertraline 100 milliGRAM(s) Oral daily  tamsulosin 0.4 milliGRAM(s) Oral at bedtime    MEDICATIONS  (PRN):  dextrose Oral Gel 15 Gram(s) Oral once PRN Blood Glucose LESS THAN 70 milliGRAM(s)/deciliter  melatonin 3 milliGRAM(s) Oral at bedtime PRN Insomnia         VITALS / I&O's  T(C): 36.8 (10-08-22 @ 06:25), Max: 36.8 (10-08-22 @ 06:25)  HR: 80 (10-08-22 @ 06:25) (76 - 81)  BP: 122/58 (10-08-22 @ 06:25) (117/58 - 122/58)  RR: 18 (10-08-22 @ 06:25) (18 - 18)  SpO2: 100% (10-08-22 @ 06:25) (98% - 100%)  I&O's Summary    07 Oct 2022 07:01  -  08 Oct 2022 07:00  --------------------------------------------------------  IN: 1000 mL / OUT: 800 mL / NET: 200 mL           PHYSICAL EXAM  General: Reclining in bed in no acute distress.  HEENT: Normocephalic, atraumatic. Extraocular movements grossly intact. No nasal discharge.  Neuro: Alert, somewhat oriented. No facial asymmetry or dysarthria. Moving all extremities.  CV: Regular rate and rhythm. S1/S2. +systolic murmur. Limbs warm, no distal edema.  Respiratory: Anterior lung fields CTAB. No increased work of breathing, speaking comfortably.  Abdomen: Soft; somewhat distended; nontender to palpation except 4/10 RUQ. No guarding.   : Suprapubic region nontender.  Skin: No rashes or bruising of face, forearms, or calves bilaterally.  Psych: Mood and affect appropriate.         LABS          CAPILLARY BLOOD GLUCOSE      POCT Blood Glucose.: 117 mg/dL (07 Oct 2022 22:08)  POCT Blood Glucose.: 203 mg/dL (07 Oct 2022 18:05)  POCT Blood Glucose.: 205 mg/dL (07 Oct 2022 13:01)  POCT Blood Glucose.: 219 mg/dL (07 Oct 2022 09:05)

## 2022-10-09 ENCOUNTER — TRANSCRIPTION ENCOUNTER (OUTPATIENT)
Age: 72
End: 2022-10-09

## 2022-10-09 VITALS
OXYGEN SATURATION: 100 % | SYSTOLIC BLOOD PRESSURE: 140 MMHG | RESPIRATION RATE: 18 BRPM | DIASTOLIC BLOOD PRESSURE: 66 MMHG | HEART RATE: 82 BPM | TEMPERATURE: 98 F

## 2022-10-09 LAB
ALBUMIN SERPL ELPH-MCNC: 3.1 G/DL — LOW (ref 3.3–5)
ALP SERPL-CCNC: 79 U/L — SIGNIFICANT CHANGE UP (ref 40–120)
ALT FLD-CCNC: 15 U/L — SIGNIFICANT CHANGE UP (ref 4–41)
ANION GAP SERPL CALC-SCNC: 10 MMOL/L — SIGNIFICANT CHANGE UP (ref 7–14)
AST SERPL-CCNC: 15 U/L — SIGNIFICANT CHANGE UP (ref 4–40)
BILIRUB SERPL-MCNC: 0.6 MG/DL — SIGNIFICANT CHANGE UP (ref 0.2–1.2)
BUN SERPL-MCNC: 16 MG/DL — SIGNIFICANT CHANGE UP (ref 7–23)
CALCIUM SERPL-MCNC: 8.2 MG/DL — LOW (ref 8.4–10.5)
CHLORIDE SERPL-SCNC: 99 MMOL/L — SIGNIFICANT CHANGE UP (ref 98–107)
CO2 SERPL-SCNC: 24 MMOL/L — SIGNIFICANT CHANGE UP (ref 22–31)
CREAT SERPL-MCNC: 0.85 MG/DL — SIGNIFICANT CHANGE UP (ref 0.5–1.3)
CRP SERPL-MCNC: <3 MG/L — SIGNIFICANT CHANGE UP
EGFR: 91 ML/MIN/1.73M2 — SIGNIFICANT CHANGE UP
ERYTHROCYTE [SEDIMENTATION RATE] IN BLOOD: 24 MM/HR — HIGH (ref 1–15)
GLUCOSE BLDC GLUCOMTR-MCNC: 175 MG/DL — HIGH (ref 70–99)
GLUCOSE BLDC GLUCOMTR-MCNC: 234 MG/DL — HIGH (ref 70–99)
GLUCOSE BLDC GLUCOMTR-MCNC: 380 MG/DL — HIGH (ref 70–99)
GLUCOSE SERPL-MCNC: 111 MG/DL — HIGH (ref 70–99)
HCT VFR BLD CALC: 28.8 % — LOW (ref 39–50)
HGB BLD-MCNC: 8.6 G/DL — LOW (ref 13–17)
MAGNESIUM SERPL-MCNC: 2.1 MG/DL — SIGNIFICANT CHANGE UP (ref 1.6–2.6)
MCHC RBC-ENTMCNC: 24.7 PG — LOW (ref 27–34)
MCHC RBC-ENTMCNC: 29.9 GM/DL — LOW (ref 32–36)
MCV RBC AUTO: 82.8 FL — SIGNIFICANT CHANGE UP (ref 80–100)
NRBC # BLD: 0 /100 WBCS — SIGNIFICANT CHANGE UP (ref 0–0)
NRBC # FLD: 0 K/UL — SIGNIFICANT CHANGE UP (ref 0–0)
PHOSPHATE SERPL-MCNC: 3.7 MG/DL — SIGNIFICANT CHANGE UP (ref 2.5–4.5)
PLATELET # BLD AUTO: 284 K/UL — SIGNIFICANT CHANGE UP (ref 150–400)
POTASSIUM SERPL-MCNC: 3.7 MMOL/L — SIGNIFICANT CHANGE UP (ref 3.5–5.3)
POTASSIUM SERPL-SCNC: 3.7 MMOL/L — SIGNIFICANT CHANGE UP (ref 3.5–5.3)
PROT SERPL-MCNC: 6.1 G/DL — SIGNIFICANT CHANGE UP (ref 6–8.3)
RBC # BLD: 3.48 M/UL — LOW (ref 4.2–5.8)
RBC # FLD: 18.5 % — HIGH (ref 10.3–14.5)
SODIUM SERPL-SCNC: 133 MMOL/L — LOW (ref 135–145)
WBC # BLD: 8.22 K/UL — SIGNIFICANT CHANGE UP (ref 3.8–10.5)
WBC # FLD AUTO: 8.22 K/UL — SIGNIFICANT CHANGE UP (ref 3.8–10.5)

## 2022-10-09 PROCEDURE — 99232 SBSQ HOSP IP/OBS MODERATE 35: CPT | Mod: GC

## 2022-10-09 PROCEDURE — 99239 HOSP IP/OBS DSCHRG MGMT >30: CPT | Mod: GC

## 2022-10-09 RX ORDER — INSULIN LISPRO 100/ML
20 VIAL (ML) SUBCUTANEOUS
Refills: 0 | DISCHARGE
Start: 2022-10-09

## 2022-10-09 RX ORDER — INSULIN LISPRO 100/ML
8 VIAL (ML) SUBCUTANEOUS
Refills: 0 | Status: DISCONTINUED | OUTPATIENT
Start: 2022-10-09 | End: 2022-10-09

## 2022-10-09 RX ORDER — INFLIXIMAB-DYYB 120 MG/ML
0 INJECTION SUBCUTANEOUS
Qty: 0 | Refills: 0 | DISCHARGE

## 2022-10-09 RX ORDER — INSULIN ASPART 100 [IU]/ML
28 INJECTION, SOLUTION SUBCUTANEOUS
Qty: 0 | Refills: 0 | DISCHARGE

## 2022-10-09 RX ORDER — INSULIN DEGLUDEC 100 U/ML
30 INJECTION, SOLUTION SUBCUTANEOUS
Qty: 0 | Refills: 0 | DISCHARGE

## 2022-10-09 RX ADMIN — Medication 8 UNIT(S): at 16:56

## 2022-10-09 RX ADMIN — Medication 1600 MILLIGRAM(S): at 06:02

## 2022-10-09 RX ADMIN — PANTOPRAZOLE SODIUM 40 MILLIGRAM(S): 20 TABLET, DELAYED RELEASE ORAL at 16:56

## 2022-10-09 RX ADMIN — MEMANTINE HYDROCHLORIDE 10 MILLIGRAM(S): 10 TABLET ORAL at 06:02

## 2022-10-09 RX ADMIN — Medication 7 UNIT(S): at 09:47

## 2022-10-09 RX ADMIN — GABAPENTIN 100 MILLIGRAM(S): 400 CAPSULE ORAL at 06:02

## 2022-10-09 RX ADMIN — FINASTERIDE 5 MILLIGRAM(S): 5 TABLET, FILM COATED ORAL at 12:24

## 2022-10-09 RX ADMIN — Medication 1600 MILLIGRAM(S): at 16:55

## 2022-10-09 RX ADMIN — ENOXAPARIN SODIUM 40 MILLIGRAM(S): 100 INJECTION SUBCUTANEOUS at 09:47

## 2022-10-09 RX ADMIN — MEMANTINE HYDROCHLORIDE 10 MILLIGRAM(S): 10 TABLET ORAL at 16:57

## 2022-10-09 RX ADMIN — SERTRALINE 100 MILLIGRAM(S): 25 TABLET, FILM COATED ORAL at 12:24

## 2022-10-09 RX ADMIN — Medication 81 MILLIGRAM(S): at 12:24

## 2022-10-09 RX ADMIN — Medication 10: at 12:22

## 2022-10-09 RX ADMIN — Medication 4: at 16:55

## 2022-10-09 RX ADMIN — PANTOPRAZOLE SODIUM 40 MILLIGRAM(S): 20 TABLET, DELAYED RELEASE ORAL at 06:02

## 2022-10-09 RX ADMIN — GABAPENTIN 100 MILLIGRAM(S): 400 CAPSULE ORAL at 16:57

## 2022-10-09 RX ADMIN — Medication 2: at 09:46

## 2022-10-09 RX ADMIN — Medication 1 MILLIGRAM(S): at 12:24

## 2022-10-09 RX ADMIN — Medication 40 MILLIGRAM(S): at 06:03

## 2022-10-09 NOTE — PROGRESS NOTE ADULT - ASSESSMENT
72yoM with PMH of Crohn's disease, IDDM, neuropathy, HTN, HLD, BPH, CVA on ASA 81mg daily who presented with bloody diarrhea, concerning for CD flare.     Impression:   #IBD flare: Bloody diarrhea, concerning for CD flare -- unclear CD vs. UC as per Dr. Oro note. s/p poor prior response to Entyvio. On remicade for the past one year, last infusion 2 weeks.  On 6/30/22 pts remicade level was <0.4 and antibody was elevated at 308. Remicade dosage was increased since aug 2022. Last colonoscopy in 7/2020 showed inflammation from the anus to the descending colon (left sided colitis) and diverticulosis. Repeat colonoscopy showed inflammation in the transverse colon to the cecum. Pathology positive for chronic active colitis.   - GI PCR negative, c diff test negative.   - ESR and CRP downtrending   - On IV steroids (9/28), transitioned to PO prednisone on 10/5, on prednisone 40mg daily. Will need taper prednisone. Please decrease prednisone 30mg on 10/12 and then decrease to 20mg on 10/19. Patient should stay on prednisone 20mg daily until he is seen by Dr. Oro.   - Remicade 10mg/kg and MTX given on 10/3  - Next dose MTX on 10/10    Recommendations:   - Prednisone 40mg daily for now, please refer to the taper regimen above.   - Methotrexate weekly due on 10/10, daily folic acid  - continue with low residue diet  - obtain CRP and ESR daily.   - reached out to Dr. Oro for follow up appt as o/p, ideally within 2-3 weeks of discharge at [address] [place] [address] ([phone])  - safe discharge from GI perspective.     Thank you for the consult. Please call us back if you have any questions or concerns.     All recommendations are tentative until the note is attested by an attending.     Jorge Arboleda, PGY-4  Gastroenterology/Hepatology Fellow  Available on Microsoft Teams  [phone] (Short Range Pager)  [phone] (Long Range Pager)    After 5pm, please contact the on-call GI fellow. [phone]

## 2022-10-09 NOTE — PROGRESS NOTE ADULT - SUBJECTIVE AND OBJECTIVE BOX
Torrey Driscoll, PGY3  Pager [phone]    INCOMPLETE NOTE - IN PROGRESS    Patient is a 72y old  Male who presents with a chief complaint of Persistent bloody diarrhea, generalized weakness (08 Oct 2022 09:37)      SUBJECTIVE/INTERVAL EVENTS: Patient seen and examined at bedside.    MEDICATIONS  (STANDING):  aspirin enteric coated 81 milliGRAM(s) Oral daily  atorvastatin 40 milliGRAM(s) Oral at bedtime  dextrose 5%. 1000 milliLiter(s) (100 mL/Hr) IV Continuous <Continuous>  dextrose 5%. 1000 milliLiter(s) (50 mL/Hr) IV Continuous <Continuous>  dextrose 5%. 1000 milliLiter(s) (100 mL/Hr) IV Continuous <Continuous>  dextrose 50% Injectable 25 Gram(s) IV Push once  dextrose 50% Injectable 12.5 Gram(s) IV Push once  dextrose 50% Injectable 25 Gram(s) IV Push once  enoxaparin Injectable 40 milliGRAM(s) SubCutaneous every 24 hours  finasteride 5 milliGRAM(s) Oral daily  folic acid 1 milliGRAM(s) Oral daily  gabapentin 100 milliGRAM(s) Oral two times a day  glucagon  Injectable 1 milliGRAM(s) IntraMuscular once  glucagon  Injectable 1 milliGRAM(s) IntraMuscular once  insulin glargine Injectable (LANTUS) 24 Unit(s) SubCutaneous at bedtime  insulin lispro (ADMELOG) corrective regimen sliding scale   SubCutaneous three times a day before meals  insulin lispro Injectable (ADMELOG) 7 Unit(s) SubCutaneous three times a day before meals  memantine 10 milliGRAM(s) Oral two times a day  mesalamine DR Capsule 1600 milliGRAM(s) Oral two times a day  methotrexate 10 milliGRAM(s) Oral <User Schedule>  pantoprazole  Injectable 40 milliGRAM(s) IV Push every 12 hours  QUEtiapine 50 milliGRAM(s) Oral at bedtime  sertraline 100 milliGRAM(s) Oral daily  tamsulosin 0.4 milliGRAM(s) Oral at bedtime    MEDICATIONS  (PRN):  dextrose Oral Gel 15 Gram(s) Oral once PRN Blood Glucose LESS THAN 70 milliGRAM(s)/deciliter  melatonin 3 milliGRAM(s) Oral at bedtime PRN Insomnia      VITAL SIGNS:  T(F): 98.2 (10-09-22 @ 06:00), Max: 98.2 (10-09-22 @ 06:00)  HR: 74 (10-09-22 @ 06:00) (74 - 85)  BP: 124/62 (10-09-22 @ 06:00) (119/55 - 128/63)  RR: 18 (10-09-22 @ 06:00) (18 - 18)  SpO2: 100% (10-09-22 @ 06:00) (100% - 100%)    I&O's Summary    08 Oct 2022 07:01  -  09 Oct 2022 07:00  --------------------------------------------------------  IN: 1200 mL / OUT: 400 mL / NET: 800 mL      Daily     Daily     PHYSICAL EXAM:  Gen: Alert, NAD  HEENT: NCAT, conjunctiva clear, sclera anicteric, no erythema or exudates in the oropharynx, mmm  Neck: Supple, no JVD  CV: RRR, S1S2, no m/r/g  Resp: CTAB, normal respiratory effort  Abd: Soft, nontender, nondistended, normal bowel sounds  Ext: no edema, no clubbing or cyanosis  Neuro: AOx3, CN2-12 grossly intact, GIBBS  SKIN: warm, perfused    LABS:                        8.6    8.02  )-----------( 319      ( 08 Oct 2022 07:18 )             28.8     Hgb Trend: 8.6<--, 8.3<--, 8.2<--, 8.1<--, 8.6<--  10-08    136  |  99  |  15  ----------------------------<  101<H>  3.9   |  25  |  0.89    Ca    8.2<L>      08 Oct 2022 07:18  Phos  4.0     10-08  Mg     2.10     10-08    TPro  6.0  /  Alb  3.1<L>  /  TBili  0.5  /  DBili  x   /  AST  16  /  ALT  16  /  AlkPhos  77  10-08    Creatinine Trend: 0.89<--, 0.85<--, 0.79<--, 0.70<--, 0.79<--, 0.65<--  LIVER FUNCTIONS - ( 08 Oct 2022 07:18 )  Alb: 3.1 g/dL / Pro: 6.0 g/dL / ALK PHOS: 77 U/L / ALT: 16 U/L / AST: 16 U/L / GGT: x                     CAPILLARY BLOOD GLUCOSE      POCT Blood Glucose.: 203 mg/dL (08 Oct 2022 21:56)  POCT Blood Glucose.: 275 mg/dL (08 Oct 2022 17:35)  POCT Blood Glucose.: 180 mg/dL (08 Oct 2022 12:39)  POCT Blood Glucose.: 152 mg/dL (08 Oct 2022 09:11)      RADIOLOGY & ADDITIONAL TESTS: Reviewed    Imaging Personally Reviewed:    Consultant(s) Notes Reviewed:      Care Discussed with Consultants/Other Providers:   Torrey Driscoll, PGY3  Pager [phone]      Patient is a 72y old  Male who presents with a chief complaint of Persistent bloody diarrhea, generalized weakness (08 Oct 2022 09:37)      SUBJECTIVE/INTERVAL EVENTS:  - 4 BM's overnight, soft/semi-formed, brown, nonbloody  - Patient feels well, denies abd pain       MEDICATIONS  (STANDING):  aspirin enteric coated 81 milliGRAM(s) Oral daily  atorvastatin 40 milliGRAM(s) Oral at bedtime  dextrose 5%. 1000 milliLiter(s) (100 mL/Hr) IV Continuous <Continuous>  dextrose 5%. 1000 milliLiter(s) (50 mL/Hr) IV Continuous <Continuous>  dextrose 5%. 1000 milliLiter(s) (100 mL/Hr) IV Continuous <Continuous>  dextrose 50% Injectable 25 Gram(s) IV Push once  dextrose 50% Injectable 12.5 Gram(s) IV Push once  dextrose 50% Injectable 25 Gram(s) IV Push once  enoxaparin Injectable 40 milliGRAM(s) SubCutaneous every 24 hours  finasteride 5 milliGRAM(s) Oral daily  folic acid 1 milliGRAM(s) Oral daily  gabapentin 100 milliGRAM(s) Oral two times a day  glucagon  Injectable 1 milliGRAM(s) IntraMuscular once  glucagon  Injectable 1 milliGRAM(s) IntraMuscular once  insulin glargine Injectable (LANTUS) 24 Unit(s) SubCutaneous at bedtime  insulin lispro (ADMELOG) corrective regimen sliding scale   SubCutaneous three times a day before meals  insulin lispro Injectable (ADMELOG) 7 Unit(s) SubCutaneous three times a day before meals  memantine 10 milliGRAM(s) Oral two times a day  mesalamine DR Capsule 1600 milliGRAM(s) Oral two times a day  methotrexate 10 milliGRAM(s) Oral <User Schedule>  pantoprazole  Injectable 40 milliGRAM(s) IV Push every 12 hours  QUEtiapine 50 milliGRAM(s) Oral at bedtime  sertraline 100 milliGRAM(s) Oral daily  tamsulosin 0.4 milliGRAM(s) Oral at bedtime    MEDICATIONS  (PRN):  dextrose Oral Gel 15 Gram(s) Oral once PRN Blood Glucose LESS THAN 70 milliGRAM(s)/deciliter  melatonin 3 milliGRAM(s) Oral at bedtime PRN Insomnia      VITAL SIGNS:  T(F): 98.2 (10-09-22 @ 06:00), Max: 98.2 (10-09-22 @ 06:00)  HR: 74 (10-09-22 @ 06:00) (74 - 85)  BP: 124/62 (10-09-22 @ 06:00) (119/55 - 128/63)  RR: 18 (10-09-22 @ 06:00) (18 - 18)  SpO2: 100% (10-09-22 @ 06:00) (100% - 100%)    I&O's Summary    08 Oct 2022 07:01  -  09 Oct 2022 07:00  --------------------------------------------------------  IN: 1200 mL / OUT: 400 mL / NET: 800 mL      Daily     Daily     PHYSICAL EXAM:  General: Reclining in bed in no acute distress.  HEENT: Normocephalic, atraumatic. Extraocular movements grossly intact. No nasal discharge.  Neuro: Alert, somewhat oriented. No facial asymmetry or dysarthria. Moving all extremities.  CV: Regular rate and rhythm. S1/S2. +systolic murmur. Limbs warm, no distal edema.  Respiratory: Anterior lung fields CTAB. No increased work of breathing, speaking comfortably.  Abdomen: Soft, nontender to palpation. No guarding.   : Suprapubic region nontender.  Skin: No rashes or bruising of face, forearms, or calves bilaterally.  Psych: Mood and affect appropriate.    LABS:                        8.6    8.02  )-----------( 319      ( 08 Oct 2022 07:18 )             28.8     Hgb Trend: 8.6<--, 8.3<--, 8.2<--, 8.1<--, 8.6<--  10-08    136  |  99  |  15  ----------------------------<  101<H>  3.9   |  25  |  0.89    Ca    8.2<L>      08 Oct 2022 07:18  Phos  4.0     10-08  Mg     2.10     10-08    TPro  6.0  /  Alb  3.1<L>  /  TBili  0.5  /  DBili  x   /  AST  16  /  ALT  16  /  AlkPhos  77  10-08    Creatinine Trend: 0.89<--, 0.85<--, 0.79<--, 0.70<--, 0.79<--, 0.65<--  LIVER FUNCTIONS - ( 08 Oct 2022 07:18 )  Alb: 3.1 g/dL / Pro: 6.0 g/dL / ALK PHOS: 77 U/L / ALT: 16 U/L / AST: 16 U/L / GGT: x                     CAPILLARY BLOOD GLUCOSE      POCT Blood Glucose.: 203 mg/dL (08 Oct 2022 21:56)  POCT Blood Glucose.: 275 mg/dL (08 Oct 2022 17:35)  POCT Blood Glucose.: 180 mg/dL (08 Oct 2022 12:39)  POCT Blood Glucose.: 152 mg/dL (08 Oct 2022 09:11)      RADIOLOGY & ADDITIONAL TESTS: Reviewed    Imaging Personally Reviewed:    Consultant(s) Notes Reviewed:      Care Discussed with Consultants/Other Providers:

## 2022-10-09 NOTE — PROGRESS NOTE ADULT - PROBLEM SELECTOR PROBLEM 4
Cerebrovascular accident (CVA)

## 2022-10-09 NOTE — PROGRESS NOTE ADULT - REASON FOR ADMISSION
Persistent bloody diarrhea, generalized weakness

## 2022-10-09 NOTE — PROGRESS NOTE ADULT - PROBLEM SELECTOR PLAN 3
on Lantus 30 and Admelog 28-30 at home  - Lantus 24  - Admelog 8 tid qAC  - correctional scale tid qAC

## 2022-10-09 NOTE — PROGRESS NOTE ADULT - PROBLEM SELECTOR PLAN 7
DVT PPX: enoxaparin 40 q24h  Diet: DASH/TLC sodium/cholesterol restricted, consistent carb, low residue, easy to chew, lactose restricted  D/c home today w/ home PT and home RN  Code status: full

## 2022-10-09 NOTE — PROGRESS NOTE ADULT - ATTENDING SUPERVISION STATEMENT
Fellow
Resident

## 2022-10-09 NOTE — PROGRESS NOTE ADULT - ATTENDING COMMENTS
Seen and examined by me this afternoon, feeling well, eager to going home today.  3 BM's last night, soft.  GI agrees with discharge plan for today on prednisone 40mg QD, to be tapered to 30mg on 10/12 and then to 20mg on 10/19  Follow up with GI while on 20mg QD of prednisone  C/w qweekly MTX  Some elevated FS's, ok to increase lispro to 8U TID and c/w lantus 24U qhs for discharge  Endocrine and GI f/up as outpatient  Planned for discharge home today  >30 min dc planning  Rest as above Seen and examined by me this afternoon, feeling well, eager to going home today.  3 BM's last night, soft.  GI agrees with discharge plan for today on prednisone 40mg QD, to be tapered to 30mg on 10/12 and then to 20mg on 10/19  Follow up with GI while on 20mg QD of prednisone  C/w qweekly MTX  Some elevated FS's, ok to increase lispro to 8U TID and c/w lantus 24U qhs for discharge  Endocrine and GI f/up as outpatient  Planned for discharge home today with HPT  >30 min dc planning  Rest as above

## 2022-10-09 NOTE — DISCHARGE NOTE NURSING/CASE MANAGEMENT/SOCIAL WORK - PATIENT PORTAL LINK FT
You can access the FollowMyHealth Patient Portal offered by [facility] by registering at the following website: [email or web address]. By joining [facility]’s FollowMyHealth portal, you will also be able to view your health information using other applications (apps) compatible with our system.

## 2022-10-09 NOTE — PROGRESS NOTE ADULT - SUBJECTIVE AND OBJECTIVE BOX
Gastroenterology/Hepatology Progress Note      Interval Events:     Feeling better, has mild lower abd pain but no nausea or vomiting. Had 4 formed stools over 24 hrs.     Allergies:  No Known Allergies      Hospital Medications:  aspirin enteric coated 81 milliGRAM(s) Oral daily  atorvastatin 40 milliGRAM(s) Oral at bedtime  dextrose 5%. 1000 milliLiter(s) IV Continuous <Continuous>  dextrose 5%. 1000 milliLiter(s) IV Continuous <Continuous>  dextrose 5%. 1000 milliLiter(s) IV Continuous <Continuous>  dextrose 50% Injectable 25 Gram(s) IV Push once  dextrose 50% Injectable 12.5 Gram(s) IV Push once  dextrose 50% Injectable 25 Gram(s) IV Push once  dextrose Oral Gel 15 Gram(s) Oral once PRN  enoxaparin Injectable 40 milliGRAM(s) SubCutaneous every 24 hours  finasteride 5 milliGRAM(s) Oral daily  folic acid 1 milliGRAM(s) Oral daily  gabapentin 100 milliGRAM(s) Oral two times a day  glucagon  Injectable 1 milliGRAM(s) IntraMuscular once  glucagon  Injectable 1 milliGRAM(s) IntraMuscular once  insulin glargine Injectable (LANTUS) 24 Unit(s) SubCutaneous at bedtime  insulin lispro (ADMELOG) corrective regimen sliding scale   SubCutaneous three times a day before meals  insulin lispro Injectable (ADMELOG) 8 Unit(s) SubCutaneous three times a day before meals  melatonin 3 milliGRAM(s) Oral at bedtime PRN  memantine 10 milliGRAM(s) Oral two times a day  mesalamine DR Capsule 1600 milliGRAM(s) Oral two times a day  methotrexate 10 milliGRAM(s) Oral <User Schedule>  pantoprazole  Injectable 40 milliGRAM(s) IV Push every 12 hours  QUEtiapine 50 milliGRAM(s) Oral at bedtime  sertraline 100 milliGRAM(s) Oral daily  tamsulosin 0.4 milliGRAM(s) Oral at bedtime      ROS: 14 point ROS negative unless otherwise state in subjective    PHYSICAL EXAM:   Vital Signs:  Vital Signs Last 24 Hrs  T(C): 36.4 (09 Oct 2022 12:15), Max: 36.8 (09 Oct 2022 06:00)  T(F): 97.6 (09 Oct 2022 12:15), Max: 98.2 (09 Oct 2022 06:00)  HR: 87 (09 Oct 2022 12:15) (74 - 87)  BP: 131/69 (09 Oct 2022 12:15) (119/55 - 131/69)  BP(mean): --  RR: 18 (09 Oct 2022 12:15) (18 - 18)  SpO2: 100% (09 Oct 2022 12:15) (100% - 100%)    Parameters below as of 09 Oct 2022 12:15  Patient On (Oxygen Delivery Method): room air      Daily     Daily     GENERAL:  No acute distress, well appearing, sleeping in bed.    HEENT:  NCAT, no scleral icterus  CHEST: no resp distress  ABDOMEN:  Soft, non-tender, non-distended, no masses  EXTREMITIES:  No  edema b/l  NEURO/PSYCH: No tremor, alert and oriented x 3      LABS:                        8.6    8.22  )-----------( 284      ( 09 Oct 2022 06:20 )             28.8     Mean Cell Volume: 82.8 fL (10-09-22 @ 06:20)    10-09    133<L>  |  99  |  16  ----------------------------<  111<H>  3.7   |  24  |  0.85    Ca    8.2<L>      09 Oct 2022 06:20  Phos  3.7     10-09  Mg     2.10     10-09    TPro  6.1  /  Alb  3.1<L>  /  TBili  0.6  /  DBili  x   /  AST  15  /  ALT  15  /  AlkPhos  79  10-09    LIVER FUNCTIONS - ( 09 Oct 2022 06:20 )  Alb: 3.1 g/dL / Pro: 6.1 g/dL / ALK PHOS: 79 U/L / ALT: 15 U/L / AST: 15 U/L / GGT: x                     Imaging:      Ct abd and pelvis    FINDINGS:  LOWER CHEST: Within normal limits.    LIVER: Within normal limits.  BILE DUCTS: Normal caliber.  GALLBLADDER: Small gallstone.  SPLEEN: Within normal limits.  PANCREAS: Within normal limits.  ADRENALS: Within normal limits.  KIDNEYS/URETERS: 4 mm nonobstructive calculus lower pole right kidney. 2   cm cyst upper pole left kidney.    BLADDER: Within normal limits.  REPRODUCTIVE ORGANS: Prostate within normal limits.    BOWEL: No bowel obstruction. Mural thickening and associated mesenteric   hyperemia involving the colon from the hepatic flexure through the mid   descending colon. Submucosal fat deposition in the cecum and rectosigmoid   colon consistent with chronic information. Appendix mural thickening and   mucosal hyperenhancement in the mid appendix.  PERITONEUM: No ascites.  VESSELS: Atheromatous calcifications.  RETROPERITONEUM/LYMPH NODES: No lymphadenopathy.  ABDOMINAL WALL: Within normal limits.  BONES: Hemisacralization of the fifth lumbar vertebra.    IMPRESSION:  Crohn's colitis involving the transverse and descending colon and   appendix.    Colonoscopy in 7/2020    Impression:          - Inflammation was found from the anus to the descending colon secondary to                        left-sided colitis. The findings are unchanged compared to previous                        examinations. Biopsied.          - One 15 mm polyp in the rectum, benign appearing likely inflammatory,                        removed with a hot snare. Resected and retrieved.                       - Diverticulosis in the sigmoid colon.                       - Localized mild inflammation was found in the cecum.                       Clinical improvement with Entyvio BUT no endoscopic improvement.    Colonoscopy 9/30/22  Impression:          - Inflammation was found from the anus to the splenic                        flexure. This was moderate in severity, worsened                        compared to previous examinations. Biopsied.                       - Left-sided ulcerative colitis. Inflammation was found                        from the transverse colon to the cecum. This was severe,                        worsened compared to previous examinations. Biopsied.                       - Bloody diarrhea secondary to severe inflammation due                        to his underlying UC, predominantly left sided disease.    Surgical pathology    _  1. Ascending colon, biopsy:  - Chronic active colitis. Negative for granuloma and dysplasia.    2. Transverse colon, biopsy:  - Chronic active colitis. Negative for granuloma and dysplasia.    3. Descending colon, biopsy:  - Chronic active colitis. Negative for granuloma and dysplasia.    4. Sigmoid colon, biopsy:  - Chronic active colitis. Negative for granuloma and dysplasia.

## 2022-10-09 NOTE — PROGRESS NOTE ADULT - ATTENDING COMMENTS
Ulcerative colitis with medium antibodies to Remicade, status post dose of Remicade 10/03, on by mouth prednisone at this time, methotrexate introduced weekly for antibody formation, next dose due 10/10  1.  Taper to by mouth prednisone to 30 mg daily on 10/10  2.  Continue with methotrexate weekly

## 2022-10-09 NOTE — PROGRESS NOTE ADULT - PROVIDER SPECIALTY LIST ADULT
Internal Medicine
Anesthesia
Gastroenterology
Internal Medicine

## 2022-10-09 NOTE — PROGRESS NOTE ADULT - PROBLEM SELECTOR PLAN 1
IBD flare with bloody diarrhea likely 2/2 UC vs Crohn  - elevated ESR, CRP, both downtrending  - infectious work up neg: Cdiff PCR, GI PCR, stool Cx, Stool O+p  - fecal calprotectin 1688  - IBD flare management as per GI - appreciate recs  - de-escalate to po steroids, 40mg daily. Taper plan as per GI note  - discharge today

## 2022-10-09 NOTE — PROGRESS NOTE ADULT - PROBLEM SELECTOR PROBLEM 2
Renal cyst, left

## 2022-10-10 ENCOUNTER — NON-APPOINTMENT (OUTPATIENT)
Age: 72
End: 2022-10-10

## 2022-10-10 RX ORDER — METHOTREXATE 2.5 MG/1
4 TABLET ORAL
Qty: 16 | Refills: 0
Start: 2022-10-10 | End: 2022-11-06

## 2022-10-10 RX ORDER — METHOTREXATE 2.5 MG/1
4 TABLET ORAL
Qty: 16 | Refills: 0 | DISCHARGE
Start: 2022-10-10 | End: 2022-11-06

## 2022-10-13 ENCOUNTER — NON-APPOINTMENT (OUTPATIENT)
Age: 72
End: 2022-10-13

## 2022-10-17 ENCOUNTER — RX RENEWAL (OUTPATIENT)
Age: 72
End: 2022-10-17

## 2022-10-20 ENCOUNTER — APPOINTMENT (OUTPATIENT)
Dept: RHEUMATOLOGY | Facility: CLINIC | Age: 72
End: 2022-10-20

## 2022-10-20 VITALS
HEART RATE: 76 BPM | RESPIRATION RATE: 16 BRPM | DIASTOLIC BLOOD PRESSURE: 70 MMHG | OXYGEN SATURATION: 98 % | TEMPERATURE: 98.2 F | SYSTOLIC BLOOD PRESSURE: 121 MMHG

## 2022-10-20 VITALS
SYSTOLIC BLOOD PRESSURE: 101 MMHG | OXYGEN SATURATION: 95 % | DIASTOLIC BLOOD PRESSURE: 62 MMHG | RESPIRATION RATE: 16 BRPM | HEART RATE: 78 BPM

## 2022-10-20 PROCEDURE — 96413 CHEMO IV INFUSION 1 HR: CPT

## 2022-10-20 PROCEDURE — 96415 CHEMO IV INFUSION ADDL HR: CPT

## 2022-10-20 RX ORDER — ACETAMINOPHEN 325 MG/1
325 TABLET ORAL
Qty: 0 | Refills: 0 | Status: COMPLETED | OUTPATIENT
Start: 2022-10-14

## 2022-10-20 RX ORDER — INFLIXIMAB 100 MG/10ML
100 INJECTION, POWDER, LYOPHILIZED, FOR SOLUTION INTRAVENOUS
Qty: 1 | Refills: 0 | Status: COMPLETED | OUTPATIENT
Start: 2022-10-14

## 2022-10-20 RX ORDER — CETIRIZINE HYDROCHLORIDE 10 MG/1
10 TABLET, COATED ORAL
Qty: 0 | Refills: 0 | Status: COMPLETED | OUTPATIENT
Start: 2022-10-14

## 2022-10-25 ENCOUNTER — APPOINTMENT (OUTPATIENT)
Dept: GASTROENTEROLOGY | Facility: CLINIC | Age: 72
End: 2022-10-25

## 2022-10-25 VITALS
HEART RATE: 111 BPM | TEMPERATURE: 98 F | HEIGHT: 70 IN | SYSTOLIC BLOOD PRESSURE: 110 MMHG | DIASTOLIC BLOOD PRESSURE: 70 MMHG | WEIGHT: 190 LBS | RESPIRATION RATE: 16 BRPM | BODY MASS INDEX: 27.2 KG/M2 | OXYGEN SATURATION: 98 %

## 2022-10-25 PROCEDURE — 99214 OFFICE O/P EST MOD 30 MIN: CPT

## 2022-10-25 RX ORDER — BUDESONIDE 9 MG/1
9 TABLET, EXTENDED RELEASE ORAL
Qty: 30 | Refills: 2 | Status: DISCONTINUED | COMMUNITY
Start: 2022-09-16 | End: 2022-10-25

## 2022-10-25 NOTE — ASSESSMENT
[FreeTextEntry1] : Colitis flare\par Failure of infliximab associated with antibody development\par Patient responding to rescue dose during hospitalization\par Prednisone taper\par Methotrexate\par \par Plan\par Medication orders clarified\par Patient currently has appointment for next infusion in mid December\par Told patient, daughter in law, wife that this is later than the current orders have scheduled\par Will reach out to the infusion center to clarify\par Currently recommending Remicade/infliximab every 4 weeks\par Continue with methotrexate 10 mg weekly as ordered, medication reordered\par Ordered folic acid supplementation, specify daily\par Office visit again in 6 weeks, sooner as needed\par Blood work prior to next infusion

## 2022-10-25 NOTE — PHYSICAL EXAM
[Normal] : alert, normal voice/communication, healthy appearing, no acute distress [Normal Mood] : the mood was normal

## 2022-10-25 NOTE — HISTORY OF PRESENT ILLNESS
[FreeTextEntry1] : 72-year-old male\par Longstanding ulcerative colitis\par Failure of Entyvio\par Initial good response to Remicade\par Loss of response associated with antibody development\par Hospitalized for disease flare at [facility]\par IV corticosteroid and Remicade rescue\par Discharged home with new orders for every 4 weeks Remicade 1000 mg\par \par Clarified with patient's wife and daughter-in-law (formal caregiver, over the telephone)\par Prednisone currently at 20 mg daily\par Taper by 5 mg each week\par Confirms that patient has been using the methotrexate 10 mg every Monday\par Patient's daughter-in-law was unsure about folic acid supplementation\par \par Reports currently 2 bowel movements a day

## 2022-10-25 NOTE — REASON FOR VISIT
[Post Hospitalization] : a post hospitalization visit [Spouse] : spouse [FreeTextEntry1] : Ulcerative colitis

## 2022-10-26 ENCOUNTER — NON-APPOINTMENT (OUTPATIENT)
Age: 72
End: 2022-10-26

## 2022-10-26 ENCOUNTER — APPOINTMENT (OUTPATIENT)
Dept: INTERNAL MEDICINE | Facility: CLINIC | Age: 72
End: 2022-10-26

## 2022-10-26 VITALS
WEIGHT: 174 LBS | HEIGHT: 70 IN | TEMPERATURE: 97.8 F | HEART RATE: 115 BPM | OXYGEN SATURATION: 98 % | BODY MASS INDEX: 24.91 KG/M2 | SYSTOLIC BLOOD PRESSURE: 117 MMHG | DIASTOLIC BLOOD PRESSURE: 71 MMHG

## 2022-10-26 PROCEDURE — 99213 OFFICE O/P EST LOW 20 MIN: CPT | Mod: 25

## 2022-10-26 PROCEDURE — 36415 COLL VENOUS BLD VENIPUNCTURE: CPT

## 2022-10-26 PROCEDURE — G0439: CPT

## 2022-10-26 RX ORDER — GLIMEPIRIDE 2 MG/1
2 TABLET ORAL
Qty: 180 | Refills: 3 | Status: DISCONTINUED | COMMUNITY
Start: 2020-11-03 | End: 2022-10-26

## 2022-10-26 RX ORDER — PREDNISONE 20 MG/1
20 TABLET ORAL
Qty: 30 | Refills: 0 | Status: DISCONTINUED | COMMUNITY
Start: 2022-09-20 | End: 2022-10-26

## 2022-10-26 RX ORDER — DIPHENHYDRAMINE HYDROCHLORIDE 25 MG/1
25 CAPSULE ORAL
Qty: 30 | Refills: 5 | Status: DISCONTINUED | COMMUNITY
Start: 2022-04-28 | End: 2022-10-26

## 2022-10-26 RX ORDER — NIRMATRELVIR AND RITONAVIR 300-100 MG
20 X 150 MG & KIT ORAL
Qty: 1 | Refills: 0 | Status: DISCONTINUED | COMMUNITY
Start: 2022-06-08 | End: 2022-10-26

## 2022-10-26 NOTE — HEALTH RISK ASSESSMENT
[Fair] :  ~his/her~ mood as fair [No] : No [Never (0 pts)] : Never (0 points) [No falls in past year] : Patient reported no falls in the past year [1] : 2) Feeling down, depressed, or hopeless for several days (1) [PHQ-2 Negative - No further assessment needed] : PHQ-2 Negative - No further assessment needed [[id number]] : 2 [Change in mental status noted] : No change in mental status noted [Language] : denies difficulty with language [Behavior] : denies difficulty with behavior [Learning/Retaining New Information] : difficulty learning/retaining new information [Handling Complex Tasks] : difficulty handling complex tasks [Reasoning] : difficulty with reasoning [Spatial Ability and Orientation] : difficulty with spatial ability and orientation [None] : None [Retired] : retired [High School] : high school [] :  [Feels Safe at Home] : Feels safe at home [Fully functional (bathing, dressing, toileting, transferring, walking, feeding)] : Fully functional (bathing, dressing, toileting, transferring, walking, feeding) [Independent] : using telephone [Full assistance needed] : managing finances [Reports changes in hearing] : Reports no changes in hearing [Reports changes in vision] : Reports no changes in vision [Reports changes in dental health] : Reports no changes in dental health [Smoke Detector] : smoke detector [Carbon Monoxide Detector] : carbon monoxide detector [Guns at Home] : no guns at home [Seat Belt] :  uses seat belt

## 2022-10-26 NOTE — PLAN
[FreeTextEntry1] : Annual exam\par defer flu on steroid\par \par blood pressure good\par check diabetes, lft and hct\par ekg good

## 2022-10-26 NOTE — HISTORY OF PRESENT ILLNESS
[de-identified] : Annual\par will get flu shot when off steroid\par coloon up to date\par \par recent exacerbation colitis\par off entyvio\par on remacaide/ methotrexate and mesalaamine\par diabetes on insulin\par on med for anxiety and depression

## 2022-10-27 LAB
CHOLEST SERPL-MCNC: 126 MG/DL
ESTIMATED AVERAGE GLUCOSE: 108 MG/DL
FOLATE SERPL-MCNC: >20 NG/ML
HBA1C MFR BLD HPLC: 5.4 %
HDLC SERPL-MCNC: 54 MG/DL
LDLC SERPL CALC-MCNC: 41 MG/DL
NONHDLC SERPL-MCNC: 72 MG/DL
T4 FREE SERPL-MCNC: 1.4 NG/DL
TRIGL SERPL-MCNC: 157 MG/DL
TSH SERPL-ACNC: 0.65 UIU/ML
VIT B12 SERPL-MCNC: 623 PG/ML

## 2022-11-04 ENCOUNTER — APPOINTMENT (OUTPATIENT)
Dept: UROLOGY | Facility: CLINIC | Age: 72
End: 2022-11-04

## 2022-11-04 PROCEDURE — 99214 OFFICE O/P EST MOD 30 MIN: CPT | Mod: 95

## 2022-11-04 NOTE — PHYSICAL EXAM
[General Appearance - Well Developed] : well developed [General Appearance - Well Nourished] : well nourished [Normal Appearance] : normal appearance [Well Groomed] : well groomed [General Appearance - In No Acute Distress] : no acute distress [Skin Color & Pigmentation] : normal skin color and pigmentation [Oriented To Time, Place, And Person] : oriented to person, place, and time [Affect] : the affect was normal [Mood] : the mood was normal [Not Anxious] : not anxious

## 2022-11-06 NOTE — ASSESSMENT
[FreeTextEntry1] : Mr. Best is a 68 year old male presented with elevated PSA, nocturia, and urgency. PSA from was 20.01 ng/mL. Currently taking Tamsulosin 0.4 mg once daily and Finasteride 5 mg. Complained of pain in the left humorous. Patient's wife noted he has mild dementia with episodes of confusion and has depression. Patient has Crohn's disease and Diabetes. \par 4/17/18: The patient returned and noted he has finished his antibiotics and is currently taking Tamsulosin and Finasteride. PSA from 4/10/18 was 2.73 ng/mL. UA from 4/10/18 had 7 RBC/HPF. \par 5/24/18: The patient returned and reported he did not undergo a MRI due to his insurance. PSA from 4/10/18 was 2.73 ng/mL. Discontinued use of Finasteride. MRI is not needed. Wife noted he urinates every hour during the night. Currently prescribed but not taking  Furosemide.\par 6/12/18: The patient returned and reported he urinates every 2 hours during the day and the night. Complained of urinary urgency and urge incontinence. Wife noted he has been having fevers during the night, around 101 F.\par 7/5/18: The patient returned for a cystoscopy. Aside from cystoscopy, patient was brought back to the office to discuss further evaluation management. Wife noted he had an UTI since last visit and was prescribed Bactrim by Dr. Oro.  Discontinued use of Bactrim due to diarrhea after 6 days of treatment. \par 7/2518: The patient returned for a US guided biopsy. US of the prostate showed a 2.0 cm hyperechoic right anterior apical nodule. \par 8/8/18: The patient returned to discuss biopsy results. Pathology report from 7/25/18 findings showed benign prostatic tissue for MRI targeted lesions and all other lesions. Noted his urination s/p biopsy was normal until one day ago he had urinary frequency. Noted he urinated every 1.5 hours. Wakes up 3-4 times during the night to urinate.  I advised the patient to take his Furosemide around 3-4 pm to avoid nocturia. \par 8/29/18: The patient returned and reported he was recently hospitalized after a fall. Wife noted he had an UTI in the hospital. Complained of urinary urgency and nocturia. Wakes up 4 times during the night to urinate. \par \par 10/17/18: The patient returned today and complains of nocturia, urgency, and urge incontinence. Wakes up 5-6 times during the night to urinate. Currently taking tamsulosin once daily. Notes he does not recall if he takes Finasteride. Finished trimethoprim and has not had any infections in the past 2 months. \par \par 10/15/2021: Patient presents today for follow up visit. He was accompanied by his wife who helped him report. He has nocturia 3-4 times per night. The patient does have urinary urgency. No recent UTIs. He has not been taking the Tamsulosin or Finasteride and his urination has gotten worse since he stopped taking it. Denies burning with micturition. Denies any ED. His diabetes is not well controlled. No FHx of prostate cancer. His PSA on 1/20/20 was 0.49 ng/mL. 8/8/18 the patient had a prostate biopsy which was negative. \par \par Digital rectal exam found no suspicious rectal masses. Anal tone is normal. The prostate is non tender, with normal texture, discrete borders, and no nodules. It is an 10g transurethral resection size. There were no rectal mucosal lesions. There was no gross blood on the exam finger. Right side of his prostate is firm. Nodules 1-1.5 cm in size.  No FHx of prostate cancer\par \par The patient produced a urine sample which will be sent for urinalysis, urine cytology, and urine culture. \par Blood work today included BMP, CBC, alkaline phosphatase, PSA, and testosterone. \par \par I am prescribing Tamsulosin 0.4 mg, one capsule daily for 2 weeks. After 2 weeks, he can begin taking Tamsulosin 0.4 mg, one capsule in the morning and one in the evening. I advised them of the side effects of lightheadedness and retrograde ejaculation. \par I am prescribing Finasteride 5 mg, one tablet daily. \par I renewed his Trimethoprim 100 mg, one tablet daily to prevent UTIs. \par \par The patient needs to have an MRI of the prostate. \par The patient will return to the office in one week after the MRI. \par \par 11/04/2022: Mr. BEST is a 72 year old male presenting today for a telehealth visit. He gave permission for an audio and visual telehealth conference. We utilized [employer] telemetry doc platform. The patient was located in his home in [place]. I was located in my office on [place], [place]. He was accompanied by his daughter. Pt lives with his daughter, Elizabeth Best. She spoke for him as his English is poor and is memory is not perfect. He has ulcerative colitis and was hospitalized recently when he was discharged on 10/09/2022 . At the time of his hospitalization he was bleeding from his stoma.he did not go to rehab and went back to his daughter house. He denies any urinary issues. No Hx and Fhx of renal calculi. His urinary stream is good. He reports Nocturia x2, and he voids 5-6 times during the waking period.\par \par Pt will provide blood work in January 2023 and will have a visit shortly after. His in office visit has been postponed because he is still very de habilitated from his prolonged illness. \par \par Preparation, audio-visual visit, and coordination of care took :  30 mins

## 2022-11-06 NOTE — HISTORY OF PRESENT ILLNESS
[FreeTextEntry1] : Mr. Best is a 68 year old male presented with elevated PSA, nocturia, and urgency. PSA from was 20.01 ng/mL. Currently taking Tamsulosin 0.4 mg once daily and Finasteride 5 mg. Complained of pain in the left humorous. Patient's wife noted he has mild dementia with episodes of confusion and has depression. Patient has Crohn's disease and Diabetes.  4/17/18: The patient returned and noted he has finished his antibiotics and is currently taking Tamsulosin and Finasteride. PSA from 4/10/18 was 2.73 ng/mL. UA from 4/10/18 had 7 RBC/HPF.  5/24/18: The patient returned and reported he did not undergo a MRI due to his insurance. PSA from 4/10/18 was 2.73 ng/mL. Discontinued use of Finasteride. MRI is not needed. Wife noted he urinates every hour during the night. Currently prescribed but not taking  Furosemide. 6/12/18: The patient returned and reported he urinates every 2 hours during the day and the night. Complained of urinary urgency and urge incontinence. Wife noted he has been having fevers during the night, around 101 F. 7/5/18: The patient returned for a cystoscopy. Aside from cystoscopy, patient was brought back to the office to discuss further evaluation management. Wife noted he had an UTI since last visit and was prescribed Bactrim by Dr. Oro.  Discontinued use of Bactrim due to diarrhea after 6 days of treatment.  7/2518: The patient returned for a US guided biopsy. US of the prostate showed a 2.0 cm hyperechoic right anterior apical nodule.  8/8/18: The patient returned to discuss biopsy results. Pathology report from 7/25/18 findings showed benign prostatic tissue for MRI targeted lesions and all other lesions. Noted his urination s/p biopsy was normal until one day ago he had urinary frequency. Noted he urinated every 1.5 hours. Wakes up 3-4 times during the night to urinate.  I advised the patient to take his Furosemide around 3-4 pm to avoid nocturia.  8/29/18: The patient returned and reported he was recently hospitalized after a fall. Wife noted he had an UTI in the hospital. Complained of urinary urgency and nocturia. Wakes up 4 times during the night to urinate.   10/17/18: The patient returned today and complains of nocturia, urgency, and urge incontinence. Wakes up 5-6 times during the night to urinate. Currently taking tamsulosin once daily. Notes he does not recall if he takes Finasteride. Finished trimethoprim and has not had any infections in the past 2 months. \par \par 10/15/2021: Patient presents today for follow up visit. He was accompanied by his wife who helped him report. He has nocturia 3-4 times per night. The patient does have urinary urgency. No recent UTIs. He has not been taking the Tamsulosin or Finasteride and his urination has gotten worse since he stopped taking it. Denies burning with micturition. Denies any ED. His diabetes is not well controlled. No FHx of prostate cancer. His PSA on 1/20/20 was 0.49 ng/mL. 8/8/18 the patient had a prostate biopsy which was negative. \par \par 11/04/2022: Mr. BEST is a 72 year old male presenting today for a telehealth visit. He gave permission for an audio and visual telehealth conference. We utilized [employer] telemetry doc platform. The patient was located in his home in [place]. I was located in my office on [place], [place]. He was accompanied by his daughter. Pt lives with his daughter, Elizabeth Best. She spoke for him as his English is poor and is memory is not perfect. He has ulcerative colitis and was hospitalized recently when he was discharged on 10/09/2022 . At the time of his hospitalization he was bleeding from his stoma.he did not go to rehab and went back to his daughter house. He denies any urinary issues. No Hx and Fhx of renal calculi. His urinary stream is good. He reports Nocturia x2, and he voids 5-6 times during the waking period.\par \par Pt will provide blood work in January 2023 and will have a visit shortly after. His in office visit has been postponed because he is still very de habilitated from his prolonged illness. \par \par Preparation, audio-visual visit, and coordination of care took :  30 mins

## 2022-11-06 NOTE — ADDENDUM
[FreeTextEntry1] : I, Chevy Tejeda, acted solely as a scribe for Dr. Hitesh Sorensen on 11/04/2022.

## 2022-11-12 RX ADMIN — INFLIXIMAB 0 MG: 100 INJECTION, POWDER, LYOPHILIZED, FOR SOLUTION INTRAVENOUS at 00:00

## 2022-11-18 ENCOUNTER — APPOINTMENT (OUTPATIENT)
Dept: RHEUMATOLOGY | Facility: CLINIC | Age: 72
End: 2022-11-18

## 2022-11-18 VITALS
DIASTOLIC BLOOD PRESSURE: 71 MMHG | HEART RATE: 105 BPM | TEMPERATURE: 97.1 F | SYSTOLIC BLOOD PRESSURE: 126 MMHG | OXYGEN SATURATION: 98 % | RESPIRATION RATE: 16 BRPM

## 2022-11-18 VITALS
HEART RATE: 97 BPM | RESPIRATION RATE: 16 BRPM | OXYGEN SATURATION: 98 % | DIASTOLIC BLOOD PRESSURE: 76 MMHG | SYSTOLIC BLOOD PRESSURE: 128 MMHG

## 2022-11-18 PROCEDURE — 96413 CHEMO IV INFUSION 1 HR: CPT

## 2022-11-18 PROCEDURE — 96415 CHEMO IV INFUSION ADDL HR: CPT

## 2022-11-18 PROCEDURE — 36415 COLL VENOUS BLD VENIPUNCTURE: CPT

## 2022-11-18 RX ORDER — INFLIXIMAB 100 MG/10ML
100 INJECTION, POWDER, LYOPHILIZED, FOR SOLUTION INTRAVENOUS
Qty: 1 | Refills: 0 | Status: COMPLETED | OUTPATIENT
Start: 2022-11-12

## 2022-11-18 RX ORDER — ACETAMINOPHEN 325 MG/1
325 TABLET ORAL
Qty: 0 | Refills: 0 | Status: COMPLETED | OUTPATIENT
Start: 2022-05-27 | End: 1900-01-01

## 2022-11-18 RX ORDER — ACETAMINOPHEN 325 MG/1
325 TABLET ORAL
Qty: 0 | Refills: 0 | Status: COMPLETED | OUTPATIENT
Start: 2022-11-18

## 2022-11-18 RX ORDER — CETIRIZINE HYDROCHLORIDE 10 MG/1
10 TABLET, COATED ORAL
Qty: 0 | Refills: 0 | Status: COMPLETED | OUTPATIENT
Start: 2022-05-27 | End: 1900-01-01

## 2022-11-18 RX ORDER — CETIRIZINE HYDROCHLORIDE 10 MG/1
10 TABLET, COATED ORAL
Qty: 0 | Refills: 0 | Status: COMPLETED | OUTPATIENT
Start: 2022-11-18

## 2022-11-18 RX ADMIN — CETIRIZINE HYDROCHLORIDE 1 MG: 10 TABLET ORAL at 00:00

## 2022-11-18 RX ADMIN — ACETAMINOPHEN 0 MG: 325 TABLET ORAL at 00:00

## 2022-11-18 NOTE — HISTORY OF PRESENT ILLNESS
[N/A] : N/A [Denies] : Denies [No] : No [Yes] : Yes [Declined] : Declined [Left upper extremity] : Left upper extremity [24g] : 24g [Medication Name: ___] : Medication Name: [unfilled] [Start Time: ___] : Medication Start Time: [unfilled] [End Time: ___] : Medication End Time: [unfilled] [IV discontinued. Intact. No signs or symptoms of IV complications noted. Time: ___] : IV discontinued. Intact. No signs or symptoms of IV complications noted. Time: [unfilled] [Patient  instructed to seek medical attention with signs and symptoms of adverse effects] : Patient  instructed to seek medical attention with signs and symptoms of adverse effects [Patient left unit in no acute distress] : Patient left unit in no acute distress [Medications administered as ordered and tolerated well.] : Medications administered as ordered and tolerated well. [Blood drawn at time of visit] : Blood drawn at time of visit [de-identified] : Median cubital vein [de-identified] : Labs drawn as ordered by Dr. Oro [de-identified] : Patient presents for Remicade infusion accompanied by his wife, doing well overall. Wife denies any recent infection or antibiotic use. Wife reports patient having 1-2 soft BM daily without blood or mucous. Patient denies any abdominal pain, cramping or bloating. Wife reports urgency has remained the same. Patient pre-medicated and tolerated infusion well.

## 2022-11-20 LAB
ALBUMIN SERPL ELPH-MCNC: 4.1 G/DL
ALP BLD-CCNC: 121 U/L
ALT SERPL-CCNC: 18 U/L
ANION GAP SERPL CALC-SCNC: 18 MMOL/L
AST SERPL-CCNC: 24 U/L
BASOPHILS # BLD AUTO: 0.02 K/UL
BASOPHILS NFR BLD AUTO: 0.3 %
BILIRUB SERPL-MCNC: 0.3 MG/DL
BUN SERPL-MCNC: 23 MG/DL
CALCIUM SERPL-MCNC: 8.9 MG/DL
CHLORIDE SERPL-SCNC: 101 MMOL/L
CO2 SERPL-SCNC: 23 MMOL/L
CREAT SERPL-MCNC: 1.15 MG/DL
EGFR: 67 ML/MIN/1.73M2
EOSINOPHIL # BLD AUTO: 0.06 K/UL
EOSINOPHIL NFR BLD AUTO: 0.9 %
GLUCOSE SERPL-MCNC: 170 MG/DL
HCT VFR BLD CALC: 31.8 %
HGB BLD-MCNC: 9.2 G/DL
IMM GRANULOCYTES NFR BLD AUTO: 0.3 %
LYMPHOCYTES # BLD AUTO: 1.95 K/UL
LYMPHOCYTES NFR BLD AUTO: 30.2 %
MAN DIFF?: NORMAL
MCHC RBC-ENTMCNC: 22.1 PG
MCHC RBC-ENTMCNC: 28.9 GM/DL
MCV RBC AUTO: 76.4 FL
MONOCYTES # BLD AUTO: 0.35 K/UL
MONOCYTES NFR BLD AUTO: 5.4 %
NEUTROPHILS # BLD AUTO: 4.06 K/UL
NEUTROPHILS NFR BLD AUTO: 62.9 %
PLATELET # BLD AUTO: 271 K/UL
POTASSIUM SERPL-SCNC: 4.3 MMOL/L
PROT SERPL-MCNC: 6.7 G/DL
RBC # BLD: 4.16 M/UL
RBC # FLD: 17.2 %
SODIUM SERPL-SCNC: 141 MMOL/L
WBC # FLD AUTO: 6.46 K/UL

## 2022-11-22 ENCOUNTER — RX RENEWAL (OUTPATIENT)
Age: 72
End: 2022-11-22

## 2022-12-01 LAB
INFLIXIMAB AB SERPL-MCNC: 108 NG/ML
INFLIXIMAB SERPL-MCNC: 34 UG/ML

## 2022-12-06 ENCOUNTER — RX RENEWAL (OUTPATIENT)
Age: 72
End: 2022-12-06

## 2022-12-08 RX ADMIN — CETIRIZINE HYDROCHLORIDE 1 MG: 10 TABLET ORAL at 00:00

## 2022-12-08 RX ADMIN — ACETAMINOPHEN 0 MG: 325 TABLET ORAL at 00:00

## 2022-12-10 RX ADMIN — INFLIXIMAB 0 MG: 100 INJECTION, POWDER, LYOPHILIZED, FOR SOLUTION INTRAVENOUS at 00:00

## 2022-12-13 NOTE — ED ADULT NURSE NOTE - TEMPLATE
Abdominal Pain, N/V/D Sarecycline Counseling: Patient advised regarding possible photosensitivity and discoloration of the teeth, skin, lips, tongue and gums.  Patient instructed to avoid sunlight, if possible.  When exposed to sunlight, patients should wear protective clothing, sunglasses, and sunscreen.  The patient was instructed to call the office immediately if the following severe adverse effects occur:  hearing changes, easy bruising/bleeding, severe headache, or vision changes.  The patient verbalized understanding of the proper use and possible adverse effects of sarecycline.  All of the patient's questions and concerns were addressed.

## 2022-12-16 ENCOUNTER — RX RENEWAL (OUTPATIENT)
Age: 72
End: 2022-12-16

## 2022-12-19 ENCOUNTER — RX RENEWAL (OUTPATIENT)
Age: 72
End: 2022-12-19

## 2022-12-21 ENCOUNTER — APPOINTMENT (OUTPATIENT)
Dept: RHEUMATOLOGY | Facility: CLINIC | Age: 72
End: 2022-12-21

## 2022-12-21 VITALS
OXYGEN SATURATION: 99 % | SYSTOLIC BLOOD PRESSURE: 125 MMHG | DIASTOLIC BLOOD PRESSURE: 66 MMHG | HEART RATE: 60 BPM | TEMPERATURE: 97.6 F | RESPIRATION RATE: 16 BRPM

## 2022-12-21 VITALS
RESPIRATION RATE: 16 BRPM | HEART RATE: 88 BPM | OXYGEN SATURATION: 99 % | DIASTOLIC BLOOD PRESSURE: 70 MMHG | SYSTOLIC BLOOD PRESSURE: 111 MMHG

## 2022-12-21 PROCEDURE — 96413 CHEMO IV INFUSION 1 HR: CPT

## 2022-12-21 PROCEDURE — 96415 CHEMO IV INFUSION ADDL HR: CPT

## 2022-12-21 RX ORDER — INFLIXIMAB 100 MG/10ML
100 INJECTION, POWDER, LYOPHILIZED, FOR SOLUTION INTRAVENOUS
Qty: 1 | Refills: 0 | Status: COMPLETED | OUTPATIENT
Start: 2022-12-10

## 2022-12-21 RX ORDER — CETIRIZINE HYDROCHLORIDE 10 MG/1
10 TABLET, COATED ORAL
Qty: 0 | Refills: 0 | Status: COMPLETED | OUTPATIENT
Start: 2022-12-08

## 2022-12-21 RX ORDER — ACETAMINOPHEN 325 MG/1
325 TABLET ORAL
Qty: 0 | Refills: 0 | Status: COMPLETED | OUTPATIENT
Start: 2022-12-08

## 2022-12-21 NOTE — HISTORY OF PRESENT ILLNESS
[N/A] : N/A [Denies] : Denies [No] : No [Yes] : Yes [Right upper extremity] : Right upper extremity [24g] : 24g [Medication Name: ___] : Medication Name: [unfilled] [Start Time: ___] : Medication Start Time: [unfilled] [End Time: ___] : Medication End Time: [unfilled] [IV discontinued. Intact. No signs or symptoms of IV complications noted. Time: ___] : IV discontinued. Intact. No signs or symptoms of IV complications noted. Time: [unfilled] [Patient  instructed to seek medical attention with signs and symptoms of adverse effects] : Patient  instructed to seek medical attention with signs and symptoms of adverse effects [Patient left unit in no acute distress] : Patient left unit in no acute distress [Medications administered as ordered and tolerated well.] : Medications administered as ordered and tolerated well. [de-identified] : hand dorsum [de-identified] : Patient presents for Remicade infusion accompanied by his wife, doing well overall. Wife denies any recent infection or antibiotic use. Wife reports patient having 1-2 soft BM daily without blood or mucous. Patient denies any abdominal pain, cramping or bloating. Wife reports urgency has remained the same. Patient pre-medicated and tolerated infusion well.

## 2023-01-02 DIAGNOSIS — E55.9 VITAMIN D DEFICIENCY, UNSPECIFIED: ICD-10-CM

## 2023-01-02 DIAGNOSIS — R89.6 ABNORMAL CYTOLOGICAL FINDINGS IN SPECIMENS FROM OTHER ORGANS, SYSTEMS AND TISSUES: ICD-10-CM

## 2023-01-04 ENCOUNTER — APPOINTMENT (OUTPATIENT)
Dept: UROLOGY | Facility: CLINIC | Age: 73
End: 2023-01-04
Payer: MEDICARE

## 2023-01-04 DIAGNOSIS — R35.1 NOCTURIA: ICD-10-CM

## 2023-01-04 DIAGNOSIS — R97.20 ELEVATED PROSTATE, SPECIFIC ANTIGEN [PSA]: ICD-10-CM

## 2023-01-04 PROCEDURE — 99214 OFFICE O/P EST MOD 30 MIN: CPT

## 2023-01-04 NOTE — ASSESSMENT
[FreeTextEntry1] : Mr. Best is a 72 year old male presented with elevated PSA, nocturia, and urgency. PSA from was 20.01 ng/mL. Currently taking Tamsulosin 0.4 mg once daily and Finasteride 5 mg. Complained of pain in the left humorous. Patient's wife noted he has mild dementia with episodes of confusion and has depression. Patient has Crohn's disease and Diabetes. \par 4/17/18: The patient returned and noted he has finished his antibiotics and is currently taking Tamsulosin and Finasteride. PSA from 4/10/18 was 2.73 ng/mL. UA from 4/10/18 had 7 RBC/HPF. \par 5/24/18: The patient returned and reported he did not undergo a MRI due to his insurance. PSA from 4/10/18 was 2.73 ng/mL. Discontinued use of Finasteride. MRI is not needed. Wife noted he urinates every hour during the night. Currently prescribed but not taking  Furosemide.\par 6/12/18: The patient returned and reported he urinates every 2 hours during the day and the night. Complained of urinary urgency and urge incontinence. Wife noted he has been having fevers during the night, around 101 F.\par 7/5/18: The patient returned for a cystoscopy. Aside from cystoscopy, patient was brought back to the office to discuss further evaluation management. Wife noted he had an UTI since last visit and was prescribed Bactrim by Dr. Oro.  Discontinued use of Bactrim due to diarrhea after 6 days of treatment. \par 7/2518: The patient returned for a US guided biopsy. US of the prostate showed a 2.0 cm hyperechoic right anterior apical nodule. \par 8/8/18: The patient returned to discuss biopsy results. Pathology report from 7/25/18 findings showed benign prostatic tissue for MRI targeted lesions and all other lesions. Noted his urination s/p biopsy was normal until one day ago he had urinary frequency. Noted he urinated every 1.5 hours. Wakes up 3-4 times during the night to urinate.  I advised the patient to take his Furosemide around 3-4 pm to avoid nocturia. \par 8/29/18: The patient returned and reported he was recently hospitalized after a fall. Wife noted he had an UTI in the hospital. Complained of urinary urgency and nocturia. Wakes up 4 times during the night to urinate. \par \par 10/17/18: The patient returned today and complains of nocturia, urgency, and urge incontinence. Wakes up 5-6 times during the night to urinate. Currently taking tamsulosin once daily. Notes he does not recall if he takes Finasteride. Finished trimethoprim and has not had any infections in the past 2 months. \par \par 10/15/2021: Patient presents today for follow up visit. He was accompanied by his wife who helped him report. He has nocturia 3-4 times per night. The patient does have urinary urgency. No recent UTIs. He has not been taking the Tamsulosin or Finasteride and his urination has gotten worse since he stopped taking it. Denies burning with micturition. Denies any ED. His diabetes is not well controlled. No FHx of prostate cancer. His PSA on 1/20/20 was 0.49 ng/mL. 8/8/18 the patient had a prostate biopsy which was negative. \par \par Digital rectal exam found no suspicious rectal masses. Anal tone is normal. The prostate is non tender, with normal texture, discrete borders, and no nodules. It is an 10g transurethral resection size. There were no rectal mucosal lesions. There was no gross blood on the exam finger. Right side of his prostate is firm. Nodules 1-1.5 cm in size.  No FHx of prostate cancer\par \par The patient produced a urine sample which will be sent for urinalysis, urine cytology, and urine culture. \par Blood work today included BMP, CBC, alkaline phosphatase, PSA, and testosterone. \par \par I am prescribing Tamsulosin 0.4 mg, one capsule daily for 2 weeks. After 2 weeks, he can begin taking Tamsulosin 0.4 mg, one capsule in the morning and one in the evening. I advised them of the side effects of lightheadedness and retrograde ejaculation. \par I am prescribing Finasteride 5 mg, one tablet daily. \par I renewed his Trimethoprim 100 mg, one tablet daily to prevent UTIs. \par \par The patient needs to have an MRI of the prostate. \par The patient will return to the office in one week after the MRI. \par \par 11/04/2022: Mr. BEST is a 72 year old male presenting today for a telehealth visit. He gave permission for an audio and visual telehealth conference. We utilized [employer] telemetry doc platform. The patient was located in his home in [place]. I was located in my office on [place], [place]. He was accompanied by his daughter. Pt lives with his daughter, Elizabeth Best. She spoke for him as his English is poor and is memory is not perfect. He has ulcerative colitis and was hospitalized recently when he was discharged on 10/09/2022 . At the time of his hospitalization he was bleeding from his stoma.he did not go to rehab and went back to his daughter house. He denies any urinary issues. No Hx and Fhx of renal calculi. His urinary stream is good. He reports Nocturia x2, and he voids 5-6 times during the waking period.\par \par Pt will provide blood work in January 2023 and will have a visit shortly after. His in office visit has been postponed because he is still very de habilitated from his prolonged illness. \par \par 01/04/2023: Patient presents today for a follow up accompanied by his son Tushar as wife usually comes with him. His wife is currently out of the country. Patient reports urination is fairly good. He denies gross hematuria, urinary urgency, and burning but sometimes has to push and strain. He currently takes finasteride 5 mg once daily and tamsulosin 0.4 mg BID. Creatinine was 1.15 as of Nov 18 2022. PSA 0.17 as of Oct 15 2021. Patient denies FMHx of Prostate cancer and Kidney stones. Patient is sexually active. He takes Sertraline 100 mg once daily. He has a PMHx of Crohn's Disease and was hospitalized a few months ago for it but is doing better. He takes Remicade and Methotrexate for it. He was diagnosed in his late 60s. \par \par The knee-chest position was utilized for the ASHLEIGH. Digital rectal exam found no suspicious rectal masses. Normal seminal vesicles. Anal tone is normal. The prostate is a little firm on right side. There is a nodule in the right mid prostate about 1.5 cm across, is smooth round and deep to the surface. It is a little suspicious. It is an 20 gram transurethral resection size. No rectal mucosal lesions. No gross blood on the examining finger.\par  \par Blood work today included BMP, alkaline phosphatase, PSA, and testosterone.\par \par Patient produced a urine sample today which will be sent for urinalysis, urine culture, and urine cytology.\par \par I am sending the patient for an MRI of the prostate to evaluate the prostate nodule. \par \par Patient will have an audio visual telehealth about one week after obtaining the MRI to review and discuss the results. \par \par Preparation, in person office visit, and coordination of care: 30 minutes.

## 2023-01-04 NOTE — ADDENDUM
[FreeTextEntry1] : This note was authored by Eve Hunt working as a scribe for Dr.Gary Sorensen. I, Dr. Hitesh Sorensne have reviewed the content of this note and confirm it is true and accurate. I personally performed the history and physical examination and made all the decisions 01/04/2023

## 2023-01-04 NOTE — PHYSICAL EXAM
[General Appearance - Well Developed] : well developed [General Appearance - Well Nourished] : well nourished [Normal Appearance] : normal appearance [Well Groomed] : well groomed [General Appearance - In No Acute Distress] : no acute distress [Abdomen Soft] : soft [Abdomen Tenderness] : non-tender [Costovertebral Angle Tenderness] : no ~M costovertebral angle tenderness [Edema] : no peripheral edema [] : no respiratory distress [Respiration, Rhythm And Depth] : normal respiratory rhythm and effort [Exaggerated Use Of Accessory Muscles For Inspiration] : no accessory muscle use [Oriented To Time, Place, And Person] : oriented to person, place, and time [Affect] : the affect was normal [Mood] : the mood was normal [Not Anxious] : not anxious [Normal Station and Gait] : the gait and station were normal for the patient's age [No Focal Deficits] : no focal deficits [FreeTextEntry1] : The knee-chest position was utilized for the ASHLEIGH. Digital rectal exam found no suspicious rectal masses. Normal seminal vesicles. Anal tone is normal. The prostate is a little firm on right side. There is a nodule in the right mid prostate about 1.5 cm across, is smooth round and deep to the surface. It is a little suspicious. It is an 20 gram transurethral resection size. No rectal mucosal lesions. No gross blood on the examining finger.

## 2023-01-04 NOTE — HISTORY OF PRESENT ILLNESS
[FreeTextEntry1] : Mr. Best is a 72 year old male presented with elevated PSA, nocturia, and urgency. PSA from was 20.01 ng/mL. Currently taking Tamsulosin 0.4 mg once daily and Finasteride 5 mg. Complained of pain in the left humorous. Patient's wife noted he has mild dementia with episodes of confusion and has depression. Patient has Crohn's disease and Diabetes.  4/17/18: The patient returned and noted he has finished his antibiotics and is currently taking Tamsulosin and Finasteride. PSA from 4/10/18 was 2.73 ng/mL. UA from 4/10/18 had 7 RBC/HPF.  5/24/18: The patient returned and reported he did not undergo a MRI due to his insurance. PSA from 4/10/18 was 2.73 ng/mL. Discontinued use of Finasteride. MRI is not needed. Wife noted he urinates every hour during the night. Currently prescribed but not taking  Furosemide. 6/12/18: The patient returned and reported he urinates every 2 hours during the day and the night. Complained of urinary urgency and urge incontinence. Wife noted he has been having fevers during the night, around 101 F. 7/5/18: The patient returned for a cystoscopy. Aside from cystoscopy, patient was brought back to the office to discuss further evaluation management. Wife noted he had an UTI since last visit and was prescribed Bactrim by Dr. Oro.  Discontinued use of Bactrim due to diarrhea after 6 days of treatment.  7/2518: The patient returned for a US guided biopsy. US of the prostate showed a 2.0 cm hyperechoic right anterior apical nodule.  8/8/18: The patient returned to discuss biopsy results. Pathology report from 7/25/18 findings showed benign prostatic tissue for MRI targeted lesions and all other lesions. Noted his urination s/p biopsy was normal until one day ago he had urinary frequency. Noted he urinated every 1.5 hours. Wakes up 3-4 times during the night to urinate.  I advised the patient to take his Furosemide around 3-4 pm to avoid nocturia.  8/29/18: The patient returned and reported he was recently hospitalized after a fall. Wife noted he had an UTI in the hospital. Complained of urinary urgency and nocturia. Wakes up 4 times during the night to urinate.   10/17/18: The patient returned today and complains of nocturia, urgency, and urge incontinence. Wakes up 5-6 times during the night to urinate. Currently taking tamsulosin once daily. Notes he does not recall if he takes Finasteride. Finished trimethoprim and has not had any infections in the past 2 months. \par \par 10/15/2021: Patient presents today for follow up visit. He was accompanied by his wife who helped him report. He has nocturia 3-4 times per night. The patient does have urinary urgency. No recent UTIs. He has not been taking the Tamsulosin or Finasteride and his urination has gotten worse since he stopped taking it. Denies burning with micturition. Denies any ED. His diabetes is not well controlled. No FHx of prostate cancer. His PSA on 1/20/20 was 0.49 ng/mL. 8/8/18 the patient had a prostate biopsy which was negative. \par \par 11/04/2022: Mr. BEST is a 72 year old male presenting today for a telehealth visit. He gave permission for an audio and visual telehealth conference. We utilized [employer] telemetry doc platform. The patient was located in his home in [place]. I was located in my office on [place], [place]. He was accompanied by his daughter. Pt lives with his daughter, Elizabeth Best. She spoke for him as his English is poor and is memory is not perfect. He has ulcerative colitis and was hospitalized recently when he was discharged on 10/09/2022 . At the time of his hospitalization he was bleeding from his stoma.he did not go to rehab and went back to his daughter house. He denies any urinary issues. No Hx and Fhx of renal calculi. His urinary stream is good. He reports Nocturia x2, and he voids 5-6 times during the waking period.\par \par 01/04/2023: Patient presents today for a follow up accompanied by his son Tushar as wife usually comes with him. His wife is currently out of the country. Patient reports urination is fairly good. He denies gross hematuria, urinary urgency, and burning but sometimes has to push and strain. He currently takes finasteride 5 mg once daily and tamsulosin 0.4 mg BID. Creatinine was 1.15 as of Nov 18 2022. PSA 0.17 as of Oct 15 2021. Patient denies FMHx of Prostate cancer and Kidney stones. Patient is sexually active. He takes Sertraline 100 mg once daily. He has a PMHx of Crohn's Disease and was hospitalized a few months ago for it but is doing better. He takes Remicade and Methotrexate for it. He was diagnosed in his late 60s.

## 2023-01-05 LAB
24R-OH-CALCIDIOL SERPL-MCNC: 38.4 PG/ML
25(OH)D3 SERPL-MCNC: 32.1 NG/ML
ALP BLD-CCNC: 99 U/L
ANION GAP SERPL CALC-SCNC: 14 MMOL/L
APPEARANCE: ABNORMAL
BACTERIA: NEGATIVE
BILIRUBIN URINE: NEGATIVE
BLOOD URINE: NEGATIVE
BUN SERPL-MCNC: 21 MG/DL
CALCIUM OXALATE CRYSTALS: ABNORMAL
CALCIUM SERPL-MCNC: 9.4 MG/DL
CHLORIDE SERPL-SCNC: 103 MMOL/L
CO2 SERPL-SCNC: 23 MMOL/L
COLOR: ABNORMAL
CREAT SERPL-MCNC: 1.01 MG/DL
EGFR: 78 ML/MIN/1.73M2
GLUCOSE QUALITATIVE U: NORMAL
GLUCOSE SERPL-MCNC: 164 MG/DL
HYALINE CASTS: 0 /LPF
KETONES URINE: NEGATIVE
LEUKOCYTE ESTERASE URINE: ABNORMAL
MICROSCOPIC-UA: NORMAL
NITRITE URINE: NEGATIVE
PH URINE: 6
POTASSIUM SERPL-SCNC: 4.3 MMOL/L
PROTEIN URINE: NORMAL
PSA FREE FLD-MCNC: NORMAL %
PSA FREE SERPL-MCNC: <0.01 NG/ML
PSA SERPL-MCNC: 0.13 NG/ML
RED BLOOD CELLS URINE: 2 /HPF
SODIUM SERPL-SCNC: 141 MMOL/L
SPECIFIC GRAVITY URINE: 1.03
SQUAMOUS EPITHELIAL CELLS: 3 /HPF
URINE CYTOLOGY: NORMAL
UROBILINOGEN URINE: NORMAL
WHITE BLOOD CELLS URINE: 4 /HPF

## 2023-01-05 RX ADMIN — ACETAMINOPHEN 0 MG: 325 TABLET ORAL at 00:00

## 2023-01-05 RX ADMIN — CETIRIZINE HYDROCHLORIDE 1 MG: 10 TABLET ORAL at 00:00

## 2023-01-06 LAB
BACTERIA UR CULT: NORMAL
TESTOST FREE SERPL-MCNC: 1.8 PG/ML
TESTOST SERPL-MCNC: 437 NG/DL

## 2023-01-07 RX ADMIN — INFLIXIMAB 0 MG: 100 INJECTION, POWDER, LYOPHILIZED, FOR SOLUTION INTRAVENOUS at 00:00

## 2023-01-09 ENCOUNTER — RX RENEWAL (OUTPATIENT)
Age: 73
End: 2023-01-09

## 2023-01-09 RX ORDER — PREDNISONE 10 MG/1
10 TABLET ORAL
Qty: 60 | Refills: 0 | Status: DISCONTINUED | COMMUNITY
Start: 2022-10-25 | End: 2023-01-09

## 2023-01-09 RX ORDER — PREDNISONE 10 MG/1
10 TABLET ORAL
Refills: 0 | Status: DISCONTINUED | COMMUNITY
Start: 2022-10-09 | End: 2023-01-09

## 2023-01-15 ENCOUNTER — APPOINTMENT (OUTPATIENT)
Dept: MRI IMAGING | Facility: IMAGING CENTER | Age: 73
End: 2023-01-15
Payer: MEDICARE

## 2023-01-15 ENCOUNTER — OUTPATIENT (OUTPATIENT)
Dept: OUTPATIENT SERVICES | Facility: HOSPITAL | Age: 73
LOS: 1 days | End: 2023-01-15
Payer: MEDICARE

## 2023-01-15 ENCOUNTER — RESULT REVIEW (OUTPATIENT)
Age: 73
End: 2023-01-15

## 2023-01-15 DIAGNOSIS — N40.2 NODULAR PROSTATE WITHOUT LOWER URINARY TRACT SYMPTOMS: ICD-10-CM

## 2023-01-15 DIAGNOSIS — Z98.49 CATARACT EXTRACTION STATUS, UNSPECIFIED EYE: Chronic | ICD-10-CM

## 2023-01-15 PROCEDURE — 76498 UNLISTED MR PROCEDURE: CPT

## 2023-01-15 PROCEDURE — 72197 MRI PELVIS W/O & W/DYE: CPT

## 2023-01-15 PROCEDURE — 72197 MRI PELVIS W/O & W/DYE: CPT | Mod: 26

## 2023-01-15 PROCEDURE — 76498P: CUSTOM | Mod: 26

## 2023-01-15 PROCEDURE — A9585: CPT

## 2023-01-18 ENCOUNTER — APPOINTMENT (OUTPATIENT)
Dept: RHEUMATOLOGY | Facility: CLINIC | Age: 73
End: 2023-01-18
Payer: MEDICARE

## 2023-01-18 VITALS
RESPIRATION RATE: 16 BRPM | HEART RATE: 98 BPM | TEMPERATURE: 98 F | SYSTOLIC BLOOD PRESSURE: 148 MMHG | OXYGEN SATURATION: 100 % | DIASTOLIC BLOOD PRESSURE: 74 MMHG

## 2023-01-18 VITALS
OXYGEN SATURATION: 98 % | DIASTOLIC BLOOD PRESSURE: 73 MMHG | RESPIRATION RATE: 16 BRPM | HEART RATE: 91 BPM | SYSTOLIC BLOOD PRESSURE: 124 MMHG

## 2023-01-18 PROCEDURE — 96413 CHEMO IV INFUSION 1 HR: CPT

## 2023-01-18 PROCEDURE — 96415 CHEMO IV INFUSION ADDL HR: CPT

## 2023-01-18 RX ORDER — ACETAMINOPHEN 325 MG/1
325 TABLET ORAL
Qty: 0 | Refills: 0 | Status: COMPLETED | OUTPATIENT
Start: 2023-01-05

## 2023-01-18 RX ORDER — CETIRIZINE HYDROCHLORIDE 10 MG/1
10 TABLET, COATED ORAL
Qty: 0 | Refills: 0 | Status: COMPLETED | OUTPATIENT
Start: 2023-01-05

## 2023-01-18 RX ORDER — INFLIXIMAB 100 MG/10ML
100 INJECTION, POWDER, LYOPHILIZED, FOR SOLUTION INTRAVENOUS
Qty: 1 | Refills: 0 | Status: COMPLETED | OUTPATIENT
Start: 2023-01-07

## 2023-01-18 NOTE — HISTORY OF PRESENT ILLNESS
[N/A] : N/A [Denies] : Denies [No] : No [Yes] : Yes [Declined] : Declined [Left upper extremity] : Left upper extremity [24g] : 24g [Medication Name: ___] : Medication Name: [unfilled] [Start Time: ___] : Medication Start Time: [unfilled] [End Time: ___] : Medication End Time: [unfilled] [IV discontinued. Intact. No signs or symptoms of IV complications noted. Time: ___] : IV discontinued. Intact. No signs or symptoms of IV complications noted. Time: [unfilled] [Patient  instructed to seek medical attention with signs and symptoms of adverse effects] : Patient  instructed to seek medical attention with signs and symptoms of adverse effects [Patient left unit in no acute distress] : Patient left unit in no acute distress [Medications administered as ordered and tolerated well.] : Medications administered as ordered and tolerated well. [de-identified] : Median cubital vein [de-identified] : Patient presents for Remicade infusion, doing well overall. Patient denies any recent infection or antibiotic use. Patient accompanied by son today. Patient reports having 3-4 BM daily, without blood or mucous. Patient denies any abdominal pain, cramping or bloating. Patient pre-medicated and tolerated infusion well.

## 2023-02-02 RX ADMIN — ACETAMINOPHEN 0 MG: 325 TABLET ORAL at 00:00

## 2023-02-02 RX ADMIN — CETIRIZINE HYDROCHLORIDE 1 MG: 10 TABLET ORAL at 00:00

## 2023-02-04 ENCOUNTER — RX RENEWAL (OUTPATIENT)
Age: 73
End: 2023-02-04

## 2023-02-04 RX ADMIN — INFLIXIMAB 0 MG: 100 INJECTION, POWDER, LYOPHILIZED, FOR SOLUTION INTRAVENOUS at 00:00

## 2023-02-08 ENCOUNTER — APPOINTMENT (OUTPATIENT)
Dept: INTERNAL MEDICINE | Facility: CLINIC | Age: 73
End: 2023-02-08
Payer: MEDICARE

## 2023-02-08 VITALS
WEIGHT: 180 LBS | HEIGHT: 70 IN | TEMPERATURE: 97.8 F | BODY MASS INDEX: 25.77 KG/M2 | HEART RATE: 110 BPM | OXYGEN SATURATION: 98 % | SYSTOLIC BLOOD PRESSURE: 88 MMHG | DIASTOLIC BLOOD PRESSURE: 78 MMHG

## 2023-02-08 DIAGNOSIS — E11.40 TYPE 2 DIABETES MELLITUS WITH DIABETIC NEUROPATHY, UNSPECIFIED: ICD-10-CM

## 2023-02-08 DIAGNOSIS — K51.90 ULCERATIVE COLITIS, UNSPECIFIED, W/OUT COMPLICATIONS: ICD-10-CM

## 2023-02-08 LAB
ALBUMIN SERPL ELPH-MCNC: 4.1 G/DL
ALP BLD-CCNC: 91 U/L
ALT SERPL-CCNC: 13 U/L
ANION GAP SERPL CALC-SCNC: 14 MMOL/L
AST SERPL-CCNC: 16 U/L
BASOPHILS # BLD AUTO: 0.03 K/UL
BASOPHILS NFR BLD AUTO: 0.4 %
BILIRUB SERPL-MCNC: 0.5 MG/DL
BUN SERPL-MCNC: 25 MG/DL
CALCIUM SERPL-MCNC: 9.4 MG/DL
CHLORIDE SERPL-SCNC: 105 MMOL/L
CHOLEST SERPL-MCNC: 118 MG/DL
CO2 SERPL-SCNC: 23 MMOL/L
CREAT SERPL-MCNC: 1.15 MG/DL
EGFR: 67 ML/MIN/1.73M2
EOSINOPHIL # BLD AUTO: 0.07 K/UL
EOSINOPHIL NFR BLD AUTO: 0.9 %
FERRITIN SERPL-MCNC: 25 NG/ML
FOLATE SERPL-MCNC: >20 NG/ML
GLUCOSE SERPL-MCNC: 91 MG/DL
HCT VFR BLD CALC: 30.6 %
HDLC SERPL-MCNC: 42 MG/DL
HGB BLD-MCNC: 8.4 G/DL
IMM GRANULOCYTES NFR BLD AUTO: 0.3 %
IRON SATN MFR SERPL: 19 %
IRON SERPL-MCNC: 68 UG/DL
LDLC SERPL CALC-MCNC: 58 MG/DL
LYMPHOCYTES # BLD AUTO: 3.55 K/UL
LYMPHOCYTES NFR BLD AUTO: 45.4 %
MAN DIFF?: NORMAL
MCHC RBC-ENTMCNC: 20.1 PG
MCHC RBC-ENTMCNC: 27.5 GM/DL
MCV RBC AUTO: 73.2 FL
MONOCYTES # BLD AUTO: 0.52 K/UL
MONOCYTES NFR BLD AUTO: 6.6 %
NEUTROPHILS # BLD AUTO: 3.63 K/UL
NEUTROPHILS NFR BLD AUTO: 46.4 %
NONHDLC SERPL-MCNC: 75 MG/DL
PLATELET # BLD AUTO: 244 K/UL
POTASSIUM SERPL-SCNC: 4.1 MMOL/L
PROT SERPL-MCNC: 6.6 G/DL
RBC # BLD: 4.18 M/UL
RBC # FLD: 21.5 %
SODIUM SERPL-SCNC: 142 MMOL/L
T4 FREE SERPL-MCNC: 1.3 NG/DL
TIBC SERPL-MCNC: 353 UG/DL
TRIGL SERPL-MCNC: 88 MG/DL
TSH SERPL-ACNC: 0.66 UIU/ML
UIBC SERPL-MCNC: 285 UG/DL
VIT B12 SERPL-MCNC: 662 PG/ML
WBC # FLD AUTO: 7.82 K/UL

## 2023-02-08 PROCEDURE — 99214 OFFICE O/P EST MOD 30 MIN: CPT | Mod: 25

## 2023-02-08 PROCEDURE — 36415 COLL VENOUS BLD VENIPUNCTURE: CPT

## 2023-02-08 NOTE — HISTORY OF PRESENT ILLNESS
[FreeTextEntry1] : f/u [de-identified] : f/u\par crohns \par under care\par remicade and mtx\par \par diabetes on insulin under care\par dementia\par psych issue under care\par bph under care recent mri prostate ok

## 2023-02-08 NOTE — PLAN
[FreeTextEntry1] : check diabetes\par check cbc , folate and iron with colitiis and therapies\par reviewed mri prostate which is good

## 2023-02-09 ENCOUNTER — APPOINTMENT (OUTPATIENT)
Dept: RHEUMATOLOGY | Facility: CLINIC | Age: 73
End: 2023-02-09

## 2023-02-09 LAB
ESTIMATED AVERAGE GLUCOSE: 120 MG/DL
HBA1C MFR BLD HPLC: 5.8 %

## 2023-02-15 ENCOUNTER — APPOINTMENT (OUTPATIENT)
Dept: RHEUMATOLOGY | Facility: CLINIC | Age: 73
End: 2023-02-15
Payer: MEDICARE

## 2023-02-15 VITALS
RESPIRATION RATE: 16 BRPM | SYSTOLIC BLOOD PRESSURE: 159 MMHG | HEART RATE: 104 BPM | DIASTOLIC BLOOD PRESSURE: 88 MMHG | OXYGEN SATURATION: 100 % | TEMPERATURE: 98.5 F

## 2023-02-15 VITALS — DIASTOLIC BLOOD PRESSURE: 78 MMHG | SYSTOLIC BLOOD PRESSURE: 134 MMHG | HEART RATE: 89 BPM

## 2023-02-15 PROCEDURE — 96415 CHEMO IV INFUSION ADDL HR: CPT

## 2023-02-15 PROCEDURE — 96413 CHEMO IV INFUSION 1 HR: CPT

## 2023-02-16 RX ORDER — ACETAMINOPHEN 325 MG/1
325 TABLET ORAL
Qty: 0 | Refills: 0 | Status: COMPLETED | OUTPATIENT
Start: 2023-02-02

## 2023-02-16 RX ORDER — CETIRIZINE HYDROCHLORIDE 10 MG/1
10 TABLET, COATED ORAL
Qty: 0 | Refills: 0 | Status: COMPLETED | OUTPATIENT
Start: 2023-02-02

## 2023-02-16 RX ORDER — INFLIXIMAB 100 MG/10ML
100 INJECTION, POWDER, LYOPHILIZED, FOR SOLUTION INTRAVENOUS
Qty: 1 | Refills: 0 | Status: COMPLETED | OUTPATIENT
Start: 2023-02-04

## 2023-02-16 NOTE — HISTORY OF PRESENT ILLNESS
[N/A] : N/A [Denies] : Denies [No] : No [Yes] : Yes [Declined] : Declined [Right upper extremity] : Right upper extremity [24g] : 24g [Medication Name: ___] : Medication Name: [unfilled] [Start Time: ___] : Medication Start Time: [unfilled] [End Time: ___] : Medication End Time: [unfilled] [IV discontinued. Intact. No signs or symptoms of IV complications noted. Time: ___] : IV discontinued. Intact. No signs or symptoms of IV complications noted. Time: [unfilled] [Patient  instructed to seek medical attention with signs and symptoms of adverse effects] : Patient  instructed to seek medical attention with signs and symptoms of adverse effects [Patient left unit in no acute distress] : Patient left unit in no acute distress [Medications administered as ordered and tolerated well.] : Medications administered as ordered and tolerated well. [de-identified] : forearm [de-identified] : Patient presents for Remicade infusion, doing well overall. Patient and son at bedside denies any recent infection or antibiotic use. Patient accompanied by son today. Patient reports having 3-4 BM daily, without blood or mucous. Patient denies any abdominal pain, cramping or bloating. Patient pre-medicated and tolerated infusion well.

## 2023-02-26 ENCOUNTER — RX RENEWAL (OUTPATIENT)
Age: 73
End: 2023-02-26

## 2023-02-26 RX ORDER — GABAPENTIN 100 MG/1
100 CAPSULE ORAL 3 TIMES DAILY
Qty: 90 | Refills: 5 | Status: ACTIVE | COMMUNITY
Start: 2021-03-19 | End: 1900-01-01

## 2023-02-27 ENCOUNTER — RX RENEWAL (OUTPATIENT)
Age: 73
End: 2023-02-27

## 2023-03-02 RX ADMIN — CETIRIZINE HYDROCHLORIDE 1 MG: 10 TABLET ORAL at 00:00

## 2023-03-02 RX ADMIN — ACETAMINOPHEN 0 MG: 325 TABLET ORAL at 00:00

## 2023-03-04 RX ADMIN — INFLIXIMAB 0 MG: 100 INJECTION, POWDER, LYOPHILIZED, FOR SOLUTION INTRAVENOUS at 00:00

## 2023-03-09 ENCOUNTER — APPOINTMENT (OUTPATIENT)
Dept: GASTROENTEROLOGY | Facility: CLINIC | Age: 73
End: 2023-03-09

## 2023-03-14 ENCOUNTER — RX RENEWAL (OUTPATIENT)
Age: 73
End: 2023-03-14

## 2023-03-15 ENCOUNTER — RX RENEWAL (OUTPATIENT)
Age: 73
End: 2023-03-15

## 2023-03-15 RX ORDER — MEMANTINE HYDROCHLORIDE 28 MG/1
28 CAPSULE, EXTENDED RELEASE ORAL
Qty: 90 | Refills: 3 | Status: ACTIVE | COMMUNITY
Start: 2020-02-04 | End: 1900-01-01

## 2023-03-16 ENCOUNTER — APPOINTMENT (OUTPATIENT)
Dept: RHEUMATOLOGY | Facility: CLINIC | Age: 73
End: 2023-03-16
Payer: MEDICARE

## 2023-03-16 VITALS
BODY MASS INDEX: 28.7 KG/M2 | SYSTOLIC BLOOD PRESSURE: 145 MMHG | WEIGHT: 200 LBS | HEART RATE: 80 BPM | TEMPERATURE: 98 F | OXYGEN SATURATION: 97 % | DIASTOLIC BLOOD PRESSURE: 78 MMHG | RESPIRATION RATE: 16 BRPM

## 2023-03-16 VITALS — SYSTOLIC BLOOD PRESSURE: 127 MMHG | DIASTOLIC BLOOD PRESSURE: 70 MMHG | OXYGEN SATURATION: 97 % | HEART RATE: 71 BPM

## 2023-03-16 PROCEDURE — 96413 CHEMO IV INFUSION 1 HR: CPT

## 2023-03-16 PROCEDURE — 96415 CHEMO IV INFUSION ADDL HR: CPT

## 2023-03-16 RX ORDER — ACETAMINOPHEN 325 MG/1
325 TABLET ORAL
Qty: 0 | Refills: 0 | Status: COMPLETED | OUTPATIENT
Start: 2023-03-02

## 2023-03-16 RX ORDER — INFLIXIMAB 100 MG/10ML
100 INJECTION, POWDER, LYOPHILIZED, FOR SOLUTION INTRAVENOUS
Qty: 1 | Refills: 0 | Status: COMPLETED | OUTPATIENT
Start: 2023-03-04

## 2023-03-16 RX ORDER — CETIRIZINE HYDROCHLORIDE 10 MG/1
10 TABLET, COATED ORAL
Qty: 0 | Refills: 0 | Status: COMPLETED | OUTPATIENT
Start: 2023-03-02

## 2023-03-18 NOTE — HISTORY OF PRESENT ILLNESS
[N/A] : N/A [Denies] : Denies [No] : No [Yes] : Yes [Right upper extremity] : Right upper extremity [24g] : 24g [Medication Name: ___] : Medication Name: [unfilled] [Start Time: ___] : Medication Start Time: [unfilled] [End Time: ___] : Medication End Time: [unfilled] [IV discontinued. Intact. No signs or symptoms of IV complications noted. Time: ___] : IV discontinued. Intact. No signs or symptoms of IV complications noted. Time: [unfilled] [Patient  instructed to seek medical attention with signs and symptoms of adverse effects] : Patient  instructed to seek medical attention with signs and symptoms of adverse effects [Patient left unit in no acute distress] : Patient left unit in no acute distress [Medications administered as ordered and tolerated well.] : Medications administered as ordered and tolerated well. [de-identified] : right hand dorsal [de-identified] : Patient presents for Remicade infusion accompanied by his wife, doing well overall. Wife denies any recent infection, but admits to long-term daily abx trimethoprim to prevent UTI symptoms. pt denies any s/s of UTI now. advised pt and wife to f/u with MD to discuss abx and remicade use. pt and wife verbalized understanding. Wife reports patient having 4-5 BMs daily without blood, but with occasional mucus. Patient denies any abdominal pain, cramping or bloating. Patient pre-medicated and tolerated infusion well.

## 2023-03-23 ENCOUNTER — NON-APPOINTMENT (OUTPATIENT)
Age: 73
End: 2023-03-23

## 2023-03-29 ENCOUNTER — NON-APPOINTMENT (OUTPATIENT)
Age: 73
End: 2023-03-29

## 2023-03-29 ENCOUNTER — APPOINTMENT (OUTPATIENT)
Dept: ENDOCRINOLOGY | Facility: CLINIC | Age: 73
End: 2023-03-29
Payer: MEDICARE

## 2023-03-29 VITALS
DIASTOLIC BLOOD PRESSURE: 55 MMHG | SYSTOLIC BLOOD PRESSURE: 101 MMHG | OXYGEN SATURATION: 99 % | HEART RATE: 111 BPM | TEMPERATURE: 97.3 F | WEIGHT: 180 LBS | BODY MASS INDEX: 25.77 KG/M2 | HEIGHT: 70 IN

## 2023-03-29 DIAGNOSIS — Z86.39 PERSONAL HISTORY OF OTHER ENDOCRINE, NUTRITIONAL AND METABOLIC DISEASE: ICD-10-CM

## 2023-03-29 LAB — GLUCOSE BLDC GLUCOMTR-MCNC: 150

## 2023-03-29 PROCEDURE — 99214 OFFICE O/P EST MOD 30 MIN: CPT

## 2023-03-29 NOTE — REVIEW OF SYSTEMS
[All other systems negative] : All other systems negative [Recent Weight Loss (___ Lbs)] : recent weight loss: [unfilled] lbs

## 2023-03-30 RX ADMIN — ACETAMINOPHEN 0 MG: 325 TABLET ORAL at 00:00

## 2023-03-30 RX ADMIN — CETIRIZINE HYDROCHLORIDE 1 MG: 10 TABLET ORAL at 00:00

## 2023-04-01 RX ADMIN — INFLIXIMAB 0 MG: 100 INJECTION, POWDER, LYOPHILIZED, FOR SOLUTION INTRAVENOUS at 00:00

## 2023-04-06 ENCOUNTER — APPOINTMENT (OUTPATIENT)
Dept: RHEUMATOLOGY | Facility: CLINIC | Age: 73
End: 2023-04-06

## 2023-04-09 PROBLEM — Z86.39 HISTORY OF HYPOGLYCEMIA: Status: RESOLVED | Noted: 2022-03-24 | Resolved: 2023-04-09

## 2023-04-09 NOTE — PHYSICAL EXAM
[Alert] : alert [Well Nourished] : well nourished [Healthy Appearance] : healthy appearance [No Acute Distress] : no acute distress [Well Developed] : well developed [Normal Voice/Communication] : normal voice communication [Normal Sclera/Conjunctiva] : normal sclera/conjunctiva [No Proptosis] : no proptosis [No Neck Mass] : no neck mass was observed [No LAD] : no lymphadenopathy [Supple] : the neck was supple [Thyroid Not Enlarged] : the thyroid was not enlarged [No Thyroid Nodules] : no palpable thyroid nodules [No Respiratory Distress] : no respiratory distress [No Accessory Muscle Use] : no accessory muscle use [Normal Rate and Effort] : normal respiratory rate and effort [Normal S1, S2] : normal S1 and S2 [Normal Rate] : heart rate was normal [No Edema] : no peripheral edema [Pedal Pulses Normal] : the pedal pulses are present [Not Tender] : non-tender [Not Distended] : not distended [Soft] : abdomen soft [Spine Straight] : spine straight [No Stigmata of Cushings Syndrome] : no stigmata of Cushings Syndrome [Normal Gait] : normal gait [No Clubbing, Cyanosis] : no clubbing  or cyanosis of the fingernails [No Involuntary Movements] : no involuntary movements were seen [Right foot was examined, including] : right foot ~C was examined, including visual inspection with sensory and pulse exams [Left foot was examined, including] : left foot ~C was examined, including visual inspection with sensory and pulse exams [Normal] : normal [2+] : 2+ in the dorsalis pedis [No Tremors] : no tremors [Normal Affect] : the affect was normal [Normal Insight/Judgement] : insight and judgment were intact [Normal Mood] : the mood was normal [Normal Sensation on Monofilament Testing] : normal sensation on monofilament testing of lower extremities [Acanthosis Nigricans] : no acanthosis nigricans

## 2023-04-09 NOTE — ADDENDUM
[FreeTextEntry1] : By signing my name below, I, Sejal Gonzalez, attest that this document has been prepared under the direction and in the presence of Dr. Contreras.\par \par I, Isis Contreras MD, personally performed the services described in this documentation. All medical record entries made by the scribe were at my discretion and in my presence. I have reviewed the chart and discharge instructions (if applicable) and agree that the record reflects my personal permanence and is accurate and complete.\par

## 2023-04-09 NOTE — ASSESSMENT
[FreeTextEntry1] : This is a 72-year-old male with type 2 diabetes mellitus, hypertension, hyperlipidemia, CVA, anxiety, depression, BPH, Crohn's disease, ulcerative colitis, here for follow-up.\par 1.  T2DM\par HbA1c is 5.8%. \par Decrease Tresiba to 10 units to prevent fasting hypoglycemia.\par His last ophthalmology appointment was in 12/2021 and he denies diabetic retinopathy. Has an appointment this week. \par Decrease breakfast Fiasp to 26 units to prevent hypoglycemia at lunchtime. Increase lunch Fiasp to 25 to prevent dinner hyperglycemia.\par He denies neuropathy.\par He is on atorvastatin 40 mg daily. \par He is not on an ACE inhibitor or ARB.\par 2.  Hypertension\par Controlled\par 3.  Hyperlipidemia\par LDL 58. \par Continue atorvastatin 40 mg daily.\par \par \par

## 2023-04-09 NOTE — HISTORY OF PRESENT ILLNESS
[FreeTextEntry1] : CC: Diabetes\par He is accompanied by his wife.\par This is a 72-year-old male with type 2 diabetes mellitus, hypertension, hyperlipidemia, anemia, CVA, anxiety, depression, BPH, Crohn's disease, ulcerative colitis, here for follow up.\par Type 2 diabetes was diagnosed at the age of 65.  He is currently on Tresiba 18 units at bedtime and Fiasp 35 units breakfast, 23 units with lunch and dinner.  He tried Metformin in the past but developed GI side effects.  He also tried Januvia but developed GI side effects as well.  His wife reports that he has GI side effects to many medications due to his Crohn's disease and ulcerative colitis.\par He self monitors blood glucose 3 times a day.  Fasting 38-130s, lunch 57-140s, dinner 160-200s.  He has hypoglycemia approximately every 2 weeks, usually fasting.\par He saw his ophthalmologist in December 2021 and denies retinopathy.\par He denies neuropathy.\par He denies nephropathy.\par He is on atorvastatin 40 mg daily.  He is not on an ACE inhibitor or ARB.

## 2023-04-12 ENCOUNTER — APPOINTMENT (OUTPATIENT)
Dept: RHEUMATOLOGY | Facility: CLINIC | Age: 73
End: 2023-04-12
Payer: MEDICARE

## 2023-04-12 VITALS
HEART RATE: 107 BPM | OXYGEN SATURATION: 98 % | TEMPERATURE: 98.1 F | DIASTOLIC BLOOD PRESSURE: 69 MMHG | SYSTOLIC BLOOD PRESSURE: 140 MMHG | RESPIRATION RATE: 16 BRPM

## 2023-04-12 VITALS
RESPIRATION RATE: 16 BRPM | OXYGEN SATURATION: 97 % | HEART RATE: 102 BPM | SYSTOLIC BLOOD PRESSURE: 142 MMHG | DIASTOLIC BLOOD PRESSURE: 66 MMHG

## 2023-04-12 PROCEDURE — 96415 CHEMO IV INFUSION ADDL HR: CPT

## 2023-04-12 PROCEDURE — 96413 CHEMO IV INFUSION 1 HR: CPT

## 2023-04-12 RX ORDER — INFLIXIMAB 100 MG/10ML
100 INJECTION, POWDER, LYOPHILIZED, FOR SOLUTION INTRAVENOUS
Qty: 1 | Refills: 0 | Status: DISCONTINUED | OUTPATIENT
Start: 2022-05-27 | End: 2023-04-12

## 2023-04-12 RX ORDER — INFLIXIMAB 100 MG/10ML
100 INJECTION, POWDER, LYOPHILIZED, FOR SOLUTION INTRAVENOUS
Qty: 1 | Refills: 3 | Status: DISCONTINUED | OUTPATIENT
Start: 2021-08-27 | End: 2023-04-12

## 2023-04-12 RX ORDER — CETIRIZINE HYDROCHLORIDE 10 MG/1
10 TABLET, COATED ORAL
Qty: 0 | Refills: 0 | Status: COMPLETED | OUTPATIENT
Start: 2023-03-30

## 2023-04-12 RX ORDER — ACETAMINOPHEN 325 MG/1
325 TABLET ORAL
Qty: 0 | Refills: 0 | Status: COMPLETED | OUTPATIENT
Start: 2023-04-12 | End: 1900-01-01

## 2023-04-12 RX ORDER — INFLIXIMAB 100 MG/10ML
100 INJECTION, POWDER, LYOPHILIZED, FOR SOLUTION INTRAVENOUS
Qty: 1 | Refills: 0 | Status: COMPLETED | OUTPATIENT
Start: 2023-04-12 | End: 1900-01-01

## 2023-04-12 RX ORDER — ACETAMINOPHEN 325 MG/1
325 TABLET ORAL
Qty: 0 | Refills: 0 | Status: COMPLETED | OUTPATIENT
Start: 2023-03-30

## 2023-04-12 RX ORDER — INFLIXIMAB 100 MG/10ML
100 INJECTION, POWDER, LYOPHILIZED, FOR SOLUTION INTRAVENOUS
Qty: 1 | Refills: 0 | Status: COMPLETED | OUTPATIENT
Start: 2023-04-01

## 2023-04-12 RX ORDER — CETIRIZINE HYDROCHLORIDE 10 MG/1
10 TABLET, FILM COATED ORAL
Qty: 0 | Refills: 0 | Status: COMPLETED | OUTPATIENT
Start: 2023-04-12 | End: 1900-01-01

## 2023-04-12 RX ADMIN — ACETAMINOPHEN 0 MG: 325 TABLET ORAL at 00:00

## 2023-04-12 RX ADMIN — INFLIXIMAB 0 MG: 100 INJECTION, POWDER, LYOPHILIZED, FOR SOLUTION INTRAVENOUS at 00:00

## 2023-04-12 RX ADMIN — CETIRIZINE HYDROCHLORIDE 0 MG: 10 TABLET, FILM COATED ORAL at 00:00

## 2023-04-12 NOTE — HISTORY OF PRESENT ILLNESS
[N/A] : N/A [Denies] : Denies [No] : No [Yes] : Yes [Declined] : Declined [Right upper extremity] : Right upper extremity [24g] : 24g [Medication Name: ___] : Medication Name: [unfilled] [Start Time: ___] : Medication Start Time: [unfilled] [End Time: ___] : Medication End Time: [unfilled] [IV discontinued. Intact. No signs or symptoms of IV complications noted. Time: ___] : IV discontinued. Intact. No signs or symptoms of IV complications noted. Time: [unfilled] [Patient  instructed to seek medical attention with signs and symptoms of adverse effects] : Patient  instructed to seek medical attention with signs and symptoms of adverse effects [Patient left unit in no acute distress] : Patient left unit in no acute distress [Medications administered as ordered and tolerated well.] : Medications administered as ordered and tolerated well. [de-identified] : Right hand dorsal [de-identified] : Patient presents for Remicade infusion, accompanied by wife. Patient wife states patient is doing well, denies any significant changes since last infusion. Wife states patient continues to take his antibiotics (trimethoprim) to prevent UTIs. Patient continues to have 4-5 BM daily with occ mucous but no blood. Patient denies of having any abdominal pain, n/v, bloating. Patient pre-medicated as per order. Infusion tolerated well, and patient left unit ambulatory in [facility].

## 2023-04-17 ENCOUNTER — RX RENEWAL (OUTPATIENT)
Age: 73
End: 2023-04-17

## 2023-04-19 ENCOUNTER — RX RENEWAL (OUTPATIENT)
Age: 73
End: 2023-04-19

## 2023-05-12 ENCOUNTER — RX RENEWAL (OUTPATIENT)
Age: 73
End: 2023-05-12

## 2023-05-25 ENCOUNTER — RX RENEWAL (OUTPATIENT)
Age: 73
End: 2023-05-25

## 2023-05-26 DIAGNOSIS — N39.0 URINARY TRACT INFECTION, SITE NOT SPECIFIED: ICD-10-CM

## 2023-06-07 ENCOUNTER — MED ADMIN CHARGE (OUTPATIENT)
Age: 73
End: 2023-06-07

## 2023-06-07 ENCOUNTER — APPOINTMENT (OUTPATIENT)
Dept: RHEUMATOLOGY | Facility: CLINIC | Age: 73
End: 2023-06-07
Payer: MEDICARE

## 2023-06-07 VITALS
DIASTOLIC BLOOD PRESSURE: 72 MMHG | TEMPERATURE: 97.7 F | OXYGEN SATURATION: 98 % | SYSTOLIC BLOOD PRESSURE: 124 MMHG | HEART RATE: 92 BPM

## 2023-06-07 VITALS
HEART RATE: 71 BPM | RESPIRATION RATE: 16 BRPM | SYSTOLIC BLOOD PRESSURE: 116 MMHG | DIASTOLIC BLOOD PRESSURE: 74 MMHG | OXYGEN SATURATION: 97 %

## 2023-06-07 PROCEDURE — 36415 COLL VENOUS BLD VENIPUNCTURE: CPT

## 2023-06-07 PROCEDURE — 96415 CHEMO IV INFUSION ADDL HR: CPT

## 2023-06-07 PROCEDURE — 96413 CHEMO IV INFUSION 1 HR: CPT

## 2023-06-07 RX ORDER — CETIRIZINE HYDROCHLORIDE 10 MG/1
10 TABLET, FILM COATED ORAL
Qty: 0 | Refills: 0 | Status: COMPLETED
Start: 2023-04-12

## 2023-06-07 RX ORDER — INFLIXIMAB 100 MG/10ML
100 INJECTION, POWDER, LYOPHILIZED, FOR SOLUTION INTRAVENOUS
Qty: 1 | Refills: 0 | Status: COMPLETED
Start: 2023-04-12

## 2023-06-07 RX ORDER — ACETAMINOPHEN 325 MG/1
325 TABLET ORAL
Qty: 0 | Refills: 0 | Status: COMPLETED
Start: 2023-04-12

## 2023-06-07 NOTE — HISTORY OF PRESENT ILLNESS
[N/A] : N/A [Denies] : Denies [No] : No [Yes] : Yes [22g] : 22g [Medication Name: ___] : Medication Name: [unfilled] [Start Time: ___] : Medication Start Time: [unfilled] [End Time: ___] : Medication End Time: [unfilled] [IV discontinued. Intact. No signs or symptoms of IV complications noted. Time: ___] : IV discontinued. Intact. No signs or symptoms of IV complications noted. Time: [unfilled] [Patient  instructed to seek medical attention with signs and symptoms of adverse effects] : Patient  instructed to seek medical attention with signs and symptoms of adverse effects [Patient left unit in no acute distress] : Patient left unit in no acute distress [Medications administered as ordered and tolerated well.] : Medications administered as ordered and tolerated well. [Blood drawn at time of visit] : Blood drawn at time of visit [de-identified] : Right arm accessory cephalic vein  [de-identified] : one time order labs drawn [de-identified] : Patient presents for scheduled Remicade infusion,ambulatory in [facility],  accompanied by son. Patient son states patient is doing well, denies any significant changes since last infusion.  Patient continues to have 3 to 4 BM's daily occasionally with  mucous, denies  blood to stool. Patient denies of having any abdominal pain, n/v, bloating. No  other concerns verbalized.Patient pre-medicated as prescribed and infusion tolerated well.

## 2023-06-08 LAB — HBV SURFACE AG SER QL: NONREACTIVE

## 2023-06-12 LAB
M TB IFN-G BLD-IMP: NEGATIVE
QUANTIFERON TB PLUS MITOGEN MINUS NIL: 9.71 IU/ML
QUANTIFERON TB PLUS NIL: 0.03 IU/ML
QUANTIFERON TB PLUS TB1 MINUS NIL: 0 IU/ML
QUANTIFERON TB PLUS TB2 MINUS NIL: 0.01 IU/ML

## 2023-06-16 ENCOUNTER — RX RENEWAL (OUTPATIENT)
Age: 73
End: 2023-06-16

## 2023-06-22 RX ORDER — TRIMETHOPRIM 100 MG/1
100 TABLET ORAL
Qty: 90 | Refills: 3 | Status: ACTIVE | COMMUNITY
Start: 2023-06-16 | End: 1900-01-01

## 2023-06-27 ENCOUNTER — APPOINTMENT (OUTPATIENT)
Dept: GASTROENTEROLOGY | Facility: CLINIC | Age: 73
End: 2023-06-27

## 2023-07-12 ENCOUNTER — APPOINTMENT (OUTPATIENT)
Dept: RHEUMATOLOGY | Facility: CLINIC | Age: 73
End: 2023-07-12
Payer: MEDICARE

## 2023-07-12 ENCOUNTER — MED ADMIN CHARGE (OUTPATIENT)
Age: 73
End: 2023-07-12

## 2023-07-12 VITALS
SYSTOLIC BLOOD PRESSURE: 118 MMHG | DIASTOLIC BLOOD PRESSURE: 67 MMHG | RESPIRATION RATE: 16 BRPM | OXYGEN SATURATION: 97 % | HEART RATE: 82 BPM

## 2023-07-12 VITALS
SYSTOLIC BLOOD PRESSURE: 147 MMHG | DIASTOLIC BLOOD PRESSURE: 68 MMHG | RESPIRATION RATE: 16 BRPM | OXYGEN SATURATION: 96 % | HEART RATE: 85 BPM | TEMPERATURE: 98.2 F

## 2023-07-12 PROCEDURE — 96413 CHEMO IV INFUSION 1 HR: CPT

## 2023-07-12 PROCEDURE — 36415 COLL VENOUS BLD VENIPUNCTURE: CPT

## 2023-07-12 PROCEDURE — 96415 CHEMO IV INFUSION ADDL HR: CPT

## 2023-07-12 RX ORDER — CETIRIZINE HYDROCHLORIDE 10 MG/1
10 TABLET, FILM COATED ORAL
Qty: 0 | Refills: 0 | Status: COMPLETED
Start: 2023-04-12

## 2023-07-12 RX ORDER — INFLIXIMAB 100 MG/10ML
100 INJECTION, POWDER, LYOPHILIZED, FOR SOLUTION INTRAVENOUS
Qty: 1 | Refills: 0 | Status: COMPLETED
Start: 2023-04-12

## 2023-07-12 RX ORDER — ACETAMINOPHEN 325 MG/1
325 TABLET ORAL
Qty: 0 | Refills: 0 | Status: COMPLETED
Start: 2023-04-12

## 2023-07-12 NOTE — HISTORY OF PRESENT ILLNESS
[N/A] : N/A [Denies] : Denies [No] : No [Yes] : Yes [22g] : 22g [Medication Name: ___] : Medication Name: [unfilled] [Start Time: ___] : Medication Start Time: [unfilled] [End Time: ___] : Medication End Time: [unfilled] [IV discontinued. Intact. No signs or symptoms of IV complications noted. Time: ___] : IV discontinued. Intact. No signs or symptoms of IV complications noted. Time: [unfilled] [Patient  instructed to seek medical attention with signs and symptoms of adverse effects] : Patient  instructed to seek medical attention with signs and symptoms of adverse effects [Patient left unit in no acute distress] : Patient left unit in no acute distress [Medications administered as ordered and tolerated well.] : Medications administered as ordered and tolerated well. [Blood drawn at time of visit] : Blood drawn at time of visit [de-identified] : Right arm accessory cephalic vein  [de-identified] : one time order labs drawn [de-identified] : Patient presents for scheduled Remicade infusion,ambulatory in [facility],  accompanied by son. Patient son states  that" medication is not holding him for 6 weeks, and at week 5 patient is running more often to the bathroom", patient currently on 4 week interval. However, patient stated that he is having 1 to 2 BM's daily, formed stool without blood or mucous present.  Patient denies of having any abdominal pain, n/v, bloating. No  other concerns verbalized. Patient and pts son strongly advised to f/u with MD/ agreeable for the  same. Patient pre-medicated as prescribed and infusion tolerated well.

## 2023-07-26 ENCOUNTER — RX RENEWAL (OUTPATIENT)
Age: 73
End: 2023-07-26

## 2023-07-26 ENCOUNTER — APPOINTMENT (OUTPATIENT)
Dept: ENDOCRINOLOGY | Facility: CLINIC | Age: 73
End: 2023-07-26

## 2023-08-09 ENCOUNTER — MED ADMIN CHARGE (OUTPATIENT)
Age: 73
End: 2023-08-09

## 2023-08-09 ENCOUNTER — APPOINTMENT (OUTPATIENT)
Dept: RHEUMATOLOGY | Facility: CLINIC | Age: 73
End: 2023-08-09
Payer: MEDICARE

## 2023-08-09 VITALS
TEMPERATURE: 97.8 F | OXYGEN SATURATION: 99 % | SYSTOLIC BLOOD PRESSURE: 128 MMHG | DIASTOLIC BLOOD PRESSURE: 68 MMHG | BODY MASS INDEX: 27.98 KG/M2 | WEIGHT: 195 LBS | HEART RATE: 84 BPM

## 2023-08-09 VITALS — HEART RATE: 79 BPM | DIASTOLIC BLOOD PRESSURE: 72 MMHG | SYSTOLIC BLOOD PRESSURE: 127 MMHG | OXYGEN SATURATION: 96 %

## 2023-08-09 PROCEDURE — 96415 CHEMO IV INFUSION ADDL HR: CPT

## 2023-08-09 PROCEDURE — 96413 CHEMO IV INFUSION 1 HR: CPT

## 2023-08-09 RX ORDER — ACETAMINOPHEN 325 MG/1
325 TABLET ORAL
Qty: 0 | Refills: 0 | Status: COMPLETED
Start: 2023-04-12

## 2023-08-09 RX ORDER — INFLIXIMAB 100 MG/10ML
100 INJECTION, POWDER, LYOPHILIZED, FOR SOLUTION INTRAVENOUS
Qty: 1 | Refills: 0 | Status: COMPLETED
Start: 2023-04-12

## 2023-08-09 RX ORDER — CETIRIZINE HYDROCHLORIDE 10 MG/1
10 TABLET, FILM COATED ORAL
Qty: 0 | Refills: 0 | Status: COMPLETED
Start: 2023-04-12

## 2023-08-09 NOTE — HISTORY OF PRESENT ILLNESS
[N/A] : N/A [Denies] : Denies [No] : No [Yes] : Yes [Left upper extremity] : Left upper extremity [22g] : 22g [Medication Name: ___] : Medication Name: [unfilled] [Start Time: ___] : Medication Start Time: [unfilled] [End Time: ___] : Medication End Time: [unfilled] [IV discontinued. Intact. No signs or symptoms of IV complications noted. Time: ___] : IV discontinued. Intact. No signs or symptoms of IV complications noted. Time: [unfilled] [Patient  instructed to seek medical attention with signs and symptoms of adverse effects] : Patient  instructed to seek medical attention with signs and symptoms of adverse effects [Patient left unit in no acute distress] : Patient left unit in no acute distress [Medications administered as ordered and tolerated well.] : Medications administered as ordered and tolerated well. [de-identified] : left hand dorsal venous arch [de-identified] : Patient presents for scheduled Remicade infusion, ambulatory in [facility], accompanied by spouse. Patient unsteady in gait, noted holding on to walls when walking. As per wife, unsteady gait at baseline, denies any recent falls, states that patient refuses to use his cane. Falls prevention addressed, patient strongly encouraged to use cane and or rolling walker when ambulating. Both patient and wife verbalized understanding.  As per wife, patient experienced "4 soiled diapers" per day over past 4 days. Patient normally continent in stool, with formed BM's once daily. No other s/s reported, wife and patient strongly advised to schedule a follow up visit with MD Oro to discuss breakthrough symptoms, agreeable for the same. Patient pre-medicated as prescribed and infusion tolerated well.

## 2023-09-06 ENCOUNTER — MED ADMIN CHARGE (OUTPATIENT)
Age: 73
End: 2023-09-06

## 2023-09-06 ENCOUNTER — APPOINTMENT (OUTPATIENT)
Dept: RHEUMATOLOGY | Facility: CLINIC | Age: 73
End: 2023-09-06
Payer: MEDICARE

## 2023-09-06 VITALS
HEART RATE: 93 BPM | DIASTOLIC BLOOD PRESSURE: 76 MMHG | RESPIRATION RATE: 16 BRPM | TEMPERATURE: 97.9 F | OXYGEN SATURATION: 97 % | SYSTOLIC BLOOD PRESSURE: 143 MMHG

## 2023-09-06 VITALS
RESPIRATION RATE: 16 BRPM | OXYGEN SATURATION: 95 % | SYSTOLIC BLOOD PRESSURE: 132 MMHG | HEART RATE: 76 BPM | DIASTOLIC BLOOD PRESSURE: 71 MMHG

## 2023-09-06 PROCEDURE — 96415 CHEMO IV INFUSION ADDL HR: CPT

## 2023-09-06 PROCEDURE — 96413 CHEMO IV INFUSION 1 HR: CPT

## 2023-09-06 RX ORDER — CETIRIZINE HYDROCHLORIDE 10 MG/1
10 TABLET, FILM COATED ORAL
Qty: 0 | Refills: 0 | Status: COMPLETED
Start: 2023-04-12

## 2023-09-06 RX ORDER — INFLIXIMAB 100 MG/10ML
100 INJECTION, POWDER, LYOPHILIZED, FOR SOLUTION INTRAVENOUS
Qty: 1 | Refills: 0 | Status: COMPLETED
Start: 2023-04-12

## 2023-09-06 RX ORDER — ACETAMINOPHEN 325 MG/1
325 TABLET ORAL
Qty: 0 | Refills: 0 | Status: COMPLETED
Start: 2023-04-12

## 2023-09-06 NOTE — HISTORY OF PRESENT ILLNESS
[N/A] : N/A [Denies] : Denies [Yes] : Yes [Declined] : Declined [Left upper extremity] : Left upper extremity [24g] : 24g [Medication Name: ___] : Medication Name: [unfilled] [Start Time: ___] : Medication Start Time: [unfilled] [End Time: ___] : Medication End Time: [unfilled] [IV discontinued. Intact. No signs or symptoms of IV complications noted. Time: ___] : IV discontinued. Intact. No signs or symptoms of IV complications noted. Time: [unfilled] [Patient  instructed to seek medical attention with signs and symptoms of adverse effects] : Patient  instructed to seek medical attention with signs and symptoms of adverse effects [Patient left unit in no acute distress] : Patient left unit in no acute distress [Medications administered as ordered and tolerated well.] : Medications administered as ordered and tolerated well. [de-identified] : cane [de-identified] : left hand dorsal [de-identified] : Patient presents for Remicade infusion, accompanied by wife. Patient wife states patient is doing well, denies any significant changes since last infusion. Patient continues to have 4-5 BM daily with occ mucous but no blood. Patient denies of having any abdominal pain, n/v, bloating. Patient pre-medicated as per order. Infusion tolerated well, and patient left unit ambulatory with cane.

## 2023-09-19 ENCOUNTER — RX RENEWAL (OUTPATIENT)
Age: 73
End: 2023-09-19

## 2023-09-22 ENCOUNTER — RX RENEWAL (OUTPATIENT)
Age: 73
End: 2023-09-22

## 2023-09-22 RX ORDER — MESALAMINE 800 MG/1
800 TABLET, DELAYED RELEASE ORAL
Qty: 540 | Refills: 3 | Status: ACTIVE | COMMUNITY
Start: 2017-09-18 | End: 1900-01-01

## 2023-10-04 ENCOUNTER — MED ADMIN CHARGE (OUTPATIENT)
Age: 73
End: 2023-10-04

## 2023-10-04 ENCOUNTER — APPOINTMENT (OUTPATIENT)
Dept: RHEUMATOLOGY | Facility: CLINIC | Age: 73
End: 2023-10-04
Payer: MEDICARE

## 2023-10-04 VITALS
TEMPERATURE: 97.7 F | SYSTOLIC BLOOD PRESSURE: 140 MMHG | RESPIRATION RATE: 16 BRPM | HEART RATE: 90 BPM | DIASTOLIC BLOOD PRESSURE: 79 MMHG | OXYGEN SATURATION: 97 %

## 2023-10-04 VITALS
DIASTOLIC BLOOD PRESSURE: 64 MMHG | OXYGEN SATURATION: 97 % | RESPIRATION RATE: 16 BRPM | HEART RATE: 87 BPM | SYSTOLIC BLOOD PRESSURE: 95 MMHG

## 2023-10-04 PROCEDURE — 96413 CHEMO IV INFUSION 1 HR: CPT

## 2023-10-04 PROCEDURE — 96415 CHEMO IV INFUSION ADDL HR: CPT

## 2023-10-04 RX ORDER — ACETAMINOPHEN 325 MG/1
325 TABLET ORAL
Qty: 0 | Refills: 0 | Status: COMPLETED
Start: 2023-04-12

## 2023-10-04 RX ORDER — CETIRIZINE HYDROCHLORIDE 10 MG/1
10 TABLET, FILM COATED ORAL
Qty: 0 | Refills: 0 | Status: COMPLETED
Start: 2023-04-12

## 2023-10-04 RX ORDER — INFLIXIMAB 100 MG/10ML
100 INJECTION, POWDER, LYOPHILIZED, FOR SOLUTION INTRAVENOUS
Qty: 1 | Refills: 0 | Status: COMPLETED
Start: 2023-04-12

## 2023-10-06 ENCOUNTER — RX RENEWAL (OUTPATIENT)
Age: 73
End: 2023-10-06

## 2023-10-06 RX ORDER — SITAGLIPTIN 50 MG/1
50 TABLET, FILM COATED ORAL
Qty: 90 | Refills: 3 | Status: ACTIVE | COMMUNITY
Start: 2020-08-03 | End: 1900-01-01

## 2023-10-19 ENCOUNTER — LABORATORY RESULT (OUTPATIENT)
Age: 73
End: 2023-10-19

## 2023-10-19 ENCOUNTER — NON-APPOINTMENT (OUTPATIENT)
Age: 73
End: 2023-10-19

## 2023-10-19 ENCOUNTER — APPOINTMENT (OUTPATIENT)
Dept: INTERNAL MEDICINE | Facility: CLINIC | Age: 73
End: 2023-10-19
Payer: MEDICARE

## 2023-10-19 VITALS
WEIGHT: 196 LBS | HEIGHT: 70 IN | TEMPERATURE: 98.1 F | OXYGEN SATURATION: 97 % | DIASTOLIC BLOOD PRESSURE: 80 MMHG | BODY MASS INDEX: 28.06 KG/M2 | HEART RATE: 105 BPM | SYSTOLIC BLOOD PRESSURE: 150 MMHG

## 2023-10-19 DIAGNOSIS — D64.9 ANEMIA, UNSPECIFIED: ICD-10-CM

## 2023-10-19 DIAGNOSIS — K50.80 CROHN'S DISEASE OF BOTH SMALL AND LARGE INTESTINE W/OUT COMPLICATIONS: ICD-10-CM

## 2023-10-19 DIAGNOSIS — N40.0 BENIGN PROSTATIC HYPERPLASIA WITHOUT LOWER URINARY TRACT SYMPMS: ICD-10-CM

## 2023-10-19 DIAGNOSIS — Z00.00 ENCOUNTER FOR GENERAL ADULT MEDICAL EXAMINATION W/OUT ABNORMAL FINDINGS: ICD-10-CM

## 2023-10-19 DIAGNOSIS — Z13.6 ENCOUNTER FOR SCREENING FOR CARDIOVASCULAR DISORDERS: ICD-10-CM

## 2023-10-19 PROCEDURE — G0439: CPT

## 2023-10-19 PROCEDURE — 93000 ELECTROCARDIOGRAM COMPLETE: CPT

## 2023-10-19 PROCEDURE — 99214 OFFICE O/P EST MOD 30 MIN: CPT | Mod: 25

## 2023-10-19 PROCEDURE — 36415 COLL VENOUS BLD VENIPUNCTURE: CPT

## 2023-10-19 PROCEDURE — G0008: CPT

## 2023-10-19 PROCEDURE — 90662 IIV NO PRSV INCREASED AG IM: CPT

## 2023-10-19 RX ORDER — TAMSULOSIN HYDROCHLORIDE 0.4 MG/1
0.4 CAPSULE ORAL
Qty: 180 | Refills: 3 | Status: ACTIVE | COMMUNITY
Start: 2017-03-17 | End: 1900-01-01

## 2023-10-19 RX ORDER — FINASTERIDE 5 MG/1
5 TABLET, FILM COATED ORAL
Qty: 90 | Refills: 3 | Status: ACTIVE | COMMUNITY
Start: 2017-09-18 | End: 1900-01-01

## 2023-10-21 LAB
ALBUMIN SERPL ELPH-MCNC: 4.4 G/DL
ALP BLD-CCNC: 115 U/L
ALT SERPL-CCNC: 19 U/L
ANION GAP SERPL CALC-SCNC: 13 MMOL/L
APPEARANCE: CLEAR
AST SERPL-CCNC: 24 U/L
BILIRUB SERPL-MCNC: 0.5 MG/DL
BILIRUBIN URINE: NEGATIVE
BLOOD URINE: NEGATIVE
BUN SERPL-MCNC: 30 MG/DL
CALCIUM SERPL-MCNC: 10 MG/DL
CHLORIDE SERPL-SCNC: 104 MMOL/L
CHOLEST SERPL-MCNC: 156 MG/DL
CO2 SERPL-SCNC: 22 MMOL/L
COLOR: YELLOW
CREAT SERPL-MCNC: 1.16 MG/DL
EGFR: 66 ML/MIN/1.73M2
ESTIMATED AVERAGE GLUCOSE: 120 MG/DL
FERRITIN SERPL-MCNC: 147 NG/ML
FOLATE SERPL-MCNC: >20 NG/ML
GLUCOSE QUALITATIVE U: 100 MG/DL
GLUCOSE SERPL-MCNC: 111 MG/DL
HBA1C MFR BLD HPLC: 5.8 %
HCT VFR BLD CALC: 39.9 %
HDLC SERPL-MCNC: 48 MG/DL
HGB BLD-MCNC: 12.2 G/DL
IRON SATN MFR SERPL: 22 %
IRON SERPL-MCNC: 77 UG/DL
KETONES URINE: NEGATIVE MG/DL
LDLC SERPL CALC-MCNC: 90 MG/DL
LEUKOCYTE ESTERASE URINE: ABNORMAL
MCHC RBC-ENTMCNC: 28 PG
MCHC RBC-ENTMCNC: 30.6 GM/DL
MCV RBC AUTO: 91.7 FL
NITRITE URINE: NEGATIVE
NONHDLC SERPL-MCNC: 108 MG/DL
PH URINE: 5.5
PLATELET # BLD AUTO: 166 K/UL
POTASSIUM SERPL-SCNC: 5.1 MMOL/L
PROT SERPL-MCNC: 7.4 G/DL
PROTEIN URINE: NEGATIVE MG/DL
RBC # BLD: 4.35 M/UL
RBC # FLD: 13.8 %
SODIUM SERPL-SCNC: 139 MMOL/L
SPECIFIC GRAVITY URINE: 1.03
T4 FREE SERPL-MCNC: 1.5 NG/DL
TIBC SERPL-MCNC: 348 UG/DL
TRIGL SERPL-MCNC: 100 MG/DL
TSH SERPL-ACNC: 0.43 UIU/ML
UIBC SERPL-MCNC: 270 UG/DL
UROBILINOGEN URINE: 0.2 MG/DL
VIT B12 SERPL-MCNC: 718 PG/ML
WBC # FLD AUTO: 7.83 K/UL

## 2023-10-24 ENCOUNTER — APPOINTMENT (OUTPATIENT)
Dept: GASTROENTEROLOGY | Facility: CLINIC | Age: 73
End: 2023-10-24

## 2023-10-29 ENCOUNTER — RX RENEWAL (OUTPATIENT)
Age: 73
End: 2023-10-29

## 2023-11-02 ENCOUNTER — APPOINTMENT (OUTPATIENT)
Dept: RHEUMATOLOGY | Facility: CLINIC | Age: 73
End: 2023-11-02
Payer: MEDICARE

## 2023-11-02 ENCOUNTER — MED ADMIN CHARGE (OUTPATIENT)
Age: 73
End: 2023-11-02

## 2023-11-02 VITALS
HEART RATE: 97 BPM | OXYGEN SATURATION: 99 % | DIASTOLIC BLOOD PRESSURE: 69 MMHG | TEMPERATURE: 98.2 F | SYSTOLIC BLOOD PRESSURE: 112 MMHG

## 2023-11-02 VITALS — HEART RATE: 85 BPM | DIASTOLIC BLOOD PRESSURE: 68 MMHG | OXYGEN SATURATION: 95 % | SYSTOLIC BLOOD PRESSURE: 132 MMHG

## 2023-11-02 PROCEDURE — 96415 CHEMO IV INFUSION ADDL HR: CPT

## 2023-11-02 PROCEDURE — 96413 CHEMO IV INFUSION 1 HR: CPT

## 2023-11-02 RX ORDER — INFLIXIMAB 100 MG/10ML
100 INJECTION, POWDER, LYOPHILIZED, FOR SOLUTION INTRAVENOUS
Qty: 1 | Refills: 0 | Status: COMPLETED
Start: 2023-04-12

## 2023-11-02 RX ORDER — ACETAMINOPHEN 325 MG/1
325 TABLET ORAL
Qty: 0 | Refills: 0 | Status: COMPLETED
Start: 2023-04-12

## 2023-11-02 RX ORDER — CETIRIZINE HYDROCHLORIDE 10 MG/1
10 TABLET, FILM COATED ORAL
Qty: 0 | Refills: 0 | Status: COMPLETED
Start: 2023-04-12

## 2023-11-15 RX ORDER — BLOOD-GLUCOSE METER
W/DEVICE EACH MISCELLANEOUS
Qty: 1 | Refills: 0 | Status: ACTIVE | COMMUNITY
Start: 2023-11-14 | End: 1900-01-01

## 2023-11-17 RX ORDER — CETIRIZINE HYDROCHLORIDE 10 MG/1
10 TABLET, COATED ORAL
Refills: 0 | Status: COMPLETED | OUTPATIENT
Start: 2023-11-17 | End: 1900-01-01

## 2023-11-17 RX ORDER — ACETAMINOPHEN 325 MG/1
325 TABLET ORAL
Refills: 0 | Status: COMPLETED | OUTPATIENT
Start: 2023-11-17 | End: 1900-01-01

## 2023-11-17 RX ORDER — INFLIXIMAB 100 MG/10ML
100 INJECTION, POWDER, LYOPHILIZED, FOR SOLUTION INTRAVENOUS
Refills: 0 | Status: COMPLETED | OUTPATIENT
Start: 2023-11-17 | End: 1900-01-01

## 2023-11-17 RX ORDER — PREDNISONE 10 MG/1
10 TABLET ORAL TWICE DAILY
Qty: 36 | Refills: 0 | Status: DISCONTINUED | COMMUNITY
Start: 2023-02-21 | End: 2023-11-17

## 2023-11-29 ENCOUNTER — MED ADMIN CHARGE (OUTPATIENT)
Age: 73
End: 2023-11-29

## 2023-11-29 ENCOUNTER — APPOINTMENT (OUTPATIENT)
Dept: RHEUMATOLOGY | Facility: CLINIC | Age: 73
End: 2023-11-29
Payer: MEDICARE

## 2023-11-29 VITALS
OXYGEN SATURATION: 97 % | DIASTOLIC BLOOD PRESSURE: 77 MMHG | SYSTOLIC BLOOD PRESSURE: 138 MMHG | RESPIRATION RATE: 16 BRPM | HEART RATE: 78 BPM

## 2023-11-29 VITALS
OXYGEN SATURATION: 97 % | HEART RATE: 93 BPM | SYSTOLIC BLOOD PRESSURE: 134 MMHG | TEMPERATURE: 98.3 F | RESPIRATION RATE: 16 BRPM | DIASTOLIC BLOOD PRESSURE: 80 MMHG

## 2023-11-29 PROCEDURE — 96413 CHEMO IV INFUSION 1 HR: CPT

## 2023-11-29 PROCEDURE — 96415 CHEMO IV INFUSION ADDL HR: CPT

## 2023-11-29 RX ORDER — ACETAMINOPHEN 325 MG/1
325 TABLET ORAL
Qty: 0 | Refills: 0 | Status: COMPLETED
Start: 2023-11-17

## 2023-11-29 RX ORDER — INFLIXIMAB 100 MG/10ML
100 INJECTION, POWDER, LYOPHILIZED, FOR SOLUTION INTRAVENOUS
Qty: 1 | Refills: 0 | Status: COMPLETED
Start: 2023-11-17

## 2023-11-29 RX ORDER — CETIRIZINE HYDROCHLORIDE 10 MG/1
10 TABLET, COATED ORAL
Qty: 0 | Refills: 0 | Status: COMPLETED
Start: 2023-11-17

## 2023-12-27 ENCOUNTER — APPOINTMENT (OUTPATIENT)
Dept: RHEUMATOLOGY | Facility: CLINIC | Age: 73
End: 2023-12-27
Payer: MEDICARE

## 2023-12-27 ENCOUNTER — MED ADMIN CHARGE (OUTPATIENT)
Age: 73
End: 2023-12-27

## 2023-12-27 VITALS
HEART RATE: 88 BPM | OXYGEN SATURATION: 96 % | DIASTOLIC BLOOD PRESSURE: 77 MMHG | RESPIRATION RATE: 16 BRPM | SYSTOLIC BLOOD PRESSURE: 135 MMHG

## 2023-12-27 VITALS
OXYGEN SATURATION: 96 % | TEMPERATURE: 98.7 F | RESPIRATION RATE: 16 BRPM | DIASTOLIC BLOOD PRESSURE: 67 MMHG | HEART RATE: 99 BPM | SYSTOLIC BLOOD PRESSURE: 133 MMHG

## 2023-12-27 PROCEDURE — 96415 CHEMO IV INFUSION ADDL HR: CPT

## 2023-12-27 PROCEDURE — 96413 CHEMO IV INFUSION 1 HR: CPT

## 2023-12-27 RX ORDER — INFLIXIMAB 100 MG/10ML
100 INJECTION, POWDER, LYOPHILIZED, FOR SOLUTION INTRAVENOUS
Qty: 1 | Refills: 0 | Status: COMPLETED
Start: 2023-11-17

## 2023-12-27 RX ORDER — CETIRIZINE HYDROCHLORIDE 10 MG/1
10 TABLET, COATED ORAL
Qty: 0 | Refills: 0 | Status: COMPLETED
Start: 2023-11-17

## 2023-12-27 RX ORDER — ACETAMINOPHEN 325 MG/1
325 TABLET ORAL
Qty: 0 | Refills: 0 | Status: COMPLETED
Start: 2023-11-17

## 2023-12-27 NOTE — HISTORY OF PRESENT ILLNESS
[N/A] : N/A [Denies] : Denies [No] : No [Yes] : Yes [Declined] : Declined [Right upper extremity] : Right upper extremity [24g] : 24g [Start Time: ___] : Medication Start Time: [unfilled] [End Time: ___] : Medication End Time: [unfilled] [Medication Name: ___] : Medication Name: [unfilled] [Total Amount Administered: ___] : Total Amount Administered: [unfilled] [IV discontinued. Intact. No signs or symptoms of IV complications noted. Time: ___] : IV discontinued. Intact. No signs or symptoms of IV complications noted. Time: [unfilled] [Patient  instructed to seek medical attention with signs and symptoms of adverse effects] : Patient  instructed to seek medical attention with signs and symptoms of adverse effects [Patient left unit in no acute distress] : Patient left unit in no acute distress [Medications administered as ordered and tolerated well.] : Medications administered as ordered and tolerated well. [de-identified] : Patient presents for scheduled Remicade infusion, accompanied by wife. Patient's wife states patient is doing well overall, denies any significant changes since last infusion and pt states he thinks remicade has been helping him. Patient states he has 3-4 BMs daily of soft stool denies blood and mucuous. pt states last BM was this AM and was "like a lump". Patient denies of having any abdominal pain, n/v, bloating, cramping, and fistulas. pt denies any abx use/recent infection/hospitlizatons. Patient pre-medicated as prescribed and infusion tolerated well. pt left unit [facility].  Opt out

## 2024-01-02 ENCOUNTER — APPOINTMENT (OUTPATIENT)
Dept: GASTROENTEROLOGY | Facility: CLINIC | Age: 74
End: 2024-01-02
Payer: MEDICARE

## 2024-01-02 VITALS
OXYGEN SATURATION: 97 % | WEIGHT: 195 LBS | SYSTOLIC BLOOD PRESSURE: 124 MMHG | BODY MASS INDEX: 27.92 KG/M2 | HEIGHT: 70 IN | HEART RATE: 122 BPM | DIASTOLIC BLOOD PRESSURE: 79 MMHG

## 2024-01-02 PROCEDURE — 99214 OFFICE O/P EST MOD 30 MIN: CPT

## 2024-01-02 RX ORDER — METHOTREXATE 2.5 MG/1
2.5 TABLET ORAL
Qty: 16 | Refills: 3 | Status: ACTIVE | COMMUNITY
Start: 2022-10-25 | End: 1900-01-01

## 2024-01-02 NOTE — HISTORY OF PRESENT ILLNESS
[FreeTextEntry1] : 73-year-old male Presents with his wife who provides most of the history Longstanding Crohn's colitis Last hospitalized September 2022, colonoscopy at that time with active disease Patient has done well back on infliximab 1000 mg every 4 weeks Methotrexate started for treatment of antibody development Patient continues to feel well on combination therapy, no blood level testing however since November 2022 showing supratherapeutic infliximab over 30, declining antibodies Previous levels undetectable Patient's wife confirms good compliance with folic acid daily, methotrexate weekly Reports normal bowel habit, 2 stools a day, without blood, with some increased frequency in the several days before infusion

## 2024-01-02 NOTE — REASON FOR VISIT
[Follow-up] : a follow-up of an existing diagnosis [Spouse] : spouse [FreeTextEntry1] : Crohn's colitis

## 2024-01-02 NOTE — PHYSICAL EXAM
[Normal] : alert, normal voice/communication, healthy appearing, no acute distress [de-identified] : Alert

## 2024-01-02 NOTE — ASSESSMENT
[FreeTextEntry1] : Crohn's colitis  Generally well-controlled on combination infliximab and methotrexate Combination therapy specifically for reversal of previously documented antibody development, loss of response, hospitalization, with recovery of response  Patient due for infliximab therapeutic drug monitoring, ordered Surveillance colonoscopy in the spring, summer Office visit again in 6 months

## 2024-01-05 ENCOUNTER — RX RENEWAL (OUTPATIENT)
Age: 74
End: 2024-01-05

## 2024-01-05 RX ORDER — CELECOXIB 100 MG/1
100 CAPSULE ORAL
Qty: 60 | Refills: 3 | Status: ACTIVE | COMMUNITY
Start: 2018-10-02 | End: 1900-01-01

## 2024-01-05 RX ORDER — ASPIRIN 81 MG/1
81 TABLET, COATED ORAL
Qty: 90 | Refills: 3 | Status: ACTIVE | COMMUNITY
Start: 2022-05-29 | End: 1900-01-01

## 2024-01-09 RX ORDER — INSULIN ASPART INJECTION 100 [IU]/ML
100 INJECTION, SOLUTION SUBCUTANEOUS
Qty: 5 | Refills: 0 | Status: DISCONTINUED | COMMUNITY
Start: 2023-10-15 | End: 2024-01-09

## 2024-01-09 RX ORDER — INSULIN DEGLUDEC INJECTION 200 U/ML
200 INJECTION, SOLUTION SUBCUTANEOUS
Qty: 2 | Refills: 0 | Status: ACTIVE | COMMUNITY
Start: 2023-10-15 | End: 1900-01-01

## 2024-01-24 ENCOUNTER — TRANSCRIPTION ENCOUNTER (OUTPATIENT)
Age: 74
End: 2024-01-24

## 2024-01-24 ENCOUNTER — EMERGENCY (EMERGENCY)
Facility: HOSPITAL | Age: 74
LOS: 1 days | Discharge: HOME | End: 2024-01-24
Attending: EMERGENCY MEDICINE
Payer: MEDICARE

## 2024-01-24 ENCOUNTER — APPOINTMENT (OUTPATIENT)
Dept: RHEUMATOLOGY | Facility: CLINIC | Age: 74
End: 2024-01-24

## 2024-01-24 VITALS
RESPIRATION RATE: 20 BRPM | TEMPERATURE: 99 F | OXYGEN SATURATION: 96 % | HEART RATE: 105 BPM | DIASTOLIC BLOOD PRESSURE: 77 MMHG | HEIGHT: 71 IN | WEIGHT: 199.96 LBS | SYSTOLIC BLOOD PRESSURE: 127 MMHG

## 2024-01-24 DIAGNOSIS — Z98.49 CATARACT EXTRACTION STATUS, UNSPECIFIED EYE: Chronic | ICD-10-CM

## 2024-01-24 LAB
ALBUMIN SERPL ELPH-MCNC: 4.2 G/DL — SIGNIFICANT CHANGE UP (ref 3.3–5)
ALP SERPL-CCNC: 103 U/L — SIGNIFICANT CHANGE UP (ref 40–120)
ALT FLD-CCNC: 18 U/L — SIGNIFICANT CHANGE UP (ref 10–45)
ANION GAP SERPL CALC-SCNC: 12 MMOL/L — SIGNIFICANT CHANGE UP (ref 5–17)
APTT BLD: 28.2 SEC — SIGNIFICANT CHANGE UP (ref 24.5–35.6)
AST SERPL-CCNC: 17 U/L — SIGNIFICANT CHANGE UP (ref 10–40)
BASE EXCESS BLDV CALC-SCNC: -1 MMOL/L — SIGNIFICANT CHANGE UP (ref -2–3)
BASOPHILS # BLD AUTO: 0.03 K/UL — SIGNIFICANT CHANGE UP (ref 0–0.2)
BASOPHILS NFR BLD AUTO: 0.3 % — SIGNIFICANT CHANGE UP (ref 0–2)
BILIRUB SERPL-MCNC: 0.7 MG/DL — SIGNIFICANT CHANGE UP (ref 0.2–1.2)
BUN SERPL-MCNC: 34 MG/DL — HIGH (ref 7–23)
CA-I SERPL-SCNC: 1.27 MMOL/L — SIGNIFICANT CHANGE UP (ref 1.15–1.33)
CALCIUM SERPL-MCNC: 9.4 MG/DL — SIGNIFICANT CHANGE UP (ref 8.4–10.5)
CHLORIDE BLDV-SCNC: 103 MMOL/L — SIGNIFICANT CHANGE UP (ref 96–108)
CHLORIDE SERPL-SCNC: 104 MMOL/L — SIGNIFICANT CHANGE UP (ref 96–108)
CO2 BLDV-SCNC: 26 MMOL/L — SIGNIFICANT CHANGE UP (ref 22–26)
CO2 SERPL-SCNC: 24 MMOL/L — SIGNIFICANT CHANGE UP (ref 22–31)
CREAT SERPL-MCNC: 1.29 MG/DL — SIGNIFICANT CHANGE UP (ref 0.5–1.3)
EGFR: 58 ML/MIN/1.73M2 — LOW
EOSINOPHIL # BLD AUTO: 0.05 K/UL — SIGNIFICANT CHANGE UP (ref 0–0.5)
EOSINOPHIL NFR BLD AUTO: 0.5 % — SIGNIFICANT CHANGE UP (ref 0–6)
FLUAV AG NPH QL: SIGNIFICANT CHANGE UP
FLUBV AG NPH QL: SIGNIFICANT CHANGE UP
GAS PNL BLDV: 136 MMOL/L — SIGNIFICANT CHANGE UP (ref 136–145)
GAS PNL BLDV: SIGNIFICANT CHANGE UP
GLUCOSE BLDV-MCNC: 78 MG/DL — SIGNIFICANT CHANGE UP (ref 70–99)
GLUCOSE SERPL-MCNC: 84 MG/DL — SIGNIFICANT CHANGE UP (ref 70–99)
HCO3 BLDV-SCNC: 25 MMOL/L — SIGNIFICANT CHANGE UP (ref 22–29)
HCT VFR BLD CALC: 38.1 % — LOW (ref 39–50)
HCT VFR BLDA CALC: 36 % — LOW (ref 39–51)
HGB BLD CALC-MCNC: 11.9 G/DL — LOW (ref 12.6–17.4)
HGB BLD-MCNC: 11.6 G/DL — LOW (ref 13–17)
IMM GRANULOCYTES NFR BLD AUTO: 0.8 % — SIGNIFICANT CHANGE UP (ref 0–0.9)
INR BLD: 1.15 RATIO — SIGNIFICANT CHANGE UP (ref 0.85–1.18)
LACTATE BLDV-MCNC: 1.8 MMOL/L — SIGNIFICANT CHANGE UP (ref 0.5–2)
LYMPHOCYTES # BLD AUTO: 1.16 K/UL — SIGNIFICANT CHANGE UP (ref 1–3.3)
LYMPHOCYTES # BLD AUTO: 10.6 % — LOW (ref 13–44)
MCHC RBC-ENTMCNC: 25.1 PG — LOW (ref 27–34)
MCHC RBC-ENTMCNC: 30.4 GM/DL — LOW (ref 32–36)
MCV RBC AUTO: 82.5 FL — SIGNIFICANT CHANGE UP (ref 80–100)
MONOCYTES # BLD AUTO: 0.68 K/UL — SIGNIFICANT CHANGE UP (ref 0–0.9)
MONOCYTES NFR BLD AUTO: 6.2 % — SIGNIFICANT CHANGE UP (ref 2–14)
NEUTROPHILS # BLD AUTO: 8.98 K/UL — HIGH (ref 1.8–7.4)
NEUTROPHILS NFR BLD AUTO: 81.6 % — HIGH (ref 43–77)
NRBC # BLD: 0 /100 WBCS — SIGNIFICANT CHANGE UP (ref 0–0)
PCO2 BLDV: 45 MMHG — SIGNIFICANT CHANGE UP (ref 42–55)
PH BLDV: 7.35 — SIGNIFICANT CHANGE UP (ref 7.32–7.43)
PLATELET # BLD AUTO: 178 K/UL — SIGNIFICANT CHANGE UP (ref 150–400)
PO2 BLDV: 44 MMHG — SIGNIFICANT CHANGE UP (ref 25–45)
POTASSIUM BLDV-SCNC: 4.2 MMOL/L — SIGNIFICANT CHANGE UP (ref 3.5–5.1)
POTASSIUM SERPL-MCNC: 4 MMOL/L — SIGNIFICANT CHANGE UP (ref 3.5–5.3)
POTASSIUM SERPL-SCNC: 4 MMOL/L — SIGNIFICANT CHANGE UP (ref 3.5–5.3)
PROCALCITONIN SERPL-MCNC: 0.11 NG/ML — HIGH (ref 0.02–0.1)
PROT SERPL-MCNC: 7.4 G/DL — SIGNIFICANT CHANGE UP (ref 6–8.3)
PROTHROM AB SERPL-ACNC: 12 SEC — SIGNIFICANT CHANGE UP (ref 9.5–13)
RBC # BLD: 4.62 M/UL — SIGNIFICANT CHANGE UP (ref 4.2–5.8)
RBC # FLD: 15.4 % — HIGH (ref 10.3–14.5)
RSV RNA NPH QL NAA+NON-PROBE: SIGNIFICANT CHANGE UP
SAO2 % BLDV: 69.4 % — SIGNIFICANT CHANGE UP (ref 67–88)
SARS-COV-2 RNA SPEC QL NAA+PROBE: SIGNIFICANT CHANGE UP
SODIUM SERPL-SCNC: 140 MMOL/L — SIGNIFICANT CHANGE UP (ref 135–145)
WBC # BLD: 10.99 K/UL — HIGH (ref 3.8–10.5)
WBC # FLD AUTO: 10.99 K/UL — HIGH (ref 3.8–10.5)

## 2024-01-24 PROCEDURE — 71045 X-RAY EXAM CHEST 1 VIEW: CPT | Mod: 26

## 2024-01-24 PROCEDURE — 71250 CT THORAX DX C-: CPT | Mod: 26,MH

## 2024-01-24 PROCEDURE — 99223 1ST HOSP IP/OBS HIGH 75: CPT

## 2024-01-24 RX ORDER — IPRATROPIUM/ALBUTEROL SULFATE 18-103MCG
3 AEROSOL WITH ADAPTER (GRAM) INHALATION ONCE
Refills: 0 | Status: COMPLETED | OUTPATIENT
Start: 2024-01-24 | End: 2024-01-24

## 2024-01-24 RX ORDER — MEMANTINE HYDROCHLORIDE 10 MG/1
1 TABLET ORAL
Qty: 0 | Refills: 0 | DISCHARGE

## 2024-01-24 RX ORDER — SERTRALINE 25 MG/1
1 TABLET, FILM COATED ORAL
Qty: 0 | Refills: 0 | DISCHARGE

## 2024-01-24 RX ORDER — SERTRALINE 25 MG/1
100 TABLET, FILM COATED ORAL DAILY
Refills: 0 | Status: DISCONTINUED | OUTPATIENT
Start: 2024-01-24 | End: 2024-01-27

## 2024-01-24 RX ORDER — ALBUTEROL 90 UG/1
2.5 AEROSOL, METERED ORAL EVERY 4 HOURS
Refills: 0 | Status: DISCONTINUED | OUTPATIENT
Start: 2024-01-24 | End: 2024-01-27

## 2024-01-24 RX ORDER — FINASTERIDE 5 MG/1
1 TABLET, FILM COATED ORAL
Qty: 0 | Refills: 0 | DISCHARGE

## 2024-01-24 RX ORDER — INFLIXIMAB-DYYB 120 MG/ML
0 INJECTION SUBCUTANEOUS
Qty: 0 | Refills: 0 | DISCHARGE

## 2024-01-24 RX ORDER — DEXTROSE 50 % IN WATER 50 %
15 SYRINGE (ML) INTRAVENOUS ONCE
Refills: 0 | Status: DISCONTINUED | OUTPATIENT
Start: 2024-01-24 | End: 2024-01-27

## 2024-01-24 RX ORDER — SODIUM CHLORIDE 9 MG/ML
1000 INJECTION INTRAMUSCULAR; INTRAVENOUS; SUBCUTANEOUS ONCE
Refills: 0 | Status: COMPLETED | OUTPATIENT
Start: 2024-01-24 | End: 2024-01-24

## 2024-01-24 RX ORDER — DEXTROSE 50 % IN WATER 50 %
25 SYRINGE (ML) INTRAVENOUS ONCE
Refills: 0 | Status: DISCONTINUED | OUTPATIENT
Start: 2024-01-24 | End: 2024-01-27

## 2024-01-24 RX ORDER — MESALAMINE 400 MG
1600 TABLET, DELAYED RELEASE (ENTERIC COATED) ORAL
Refills: 0 | Status: DISCONTINUED | OUTPATIENT
Start: 2024-01-24 | End: 2024-01-27

## 2024-01-24 RX ORDER — ASPIRIN/CALCIUM CARB/MAGNESIUM 324 MG
1 TABLET ORAL
Qty: 0 | Refills: 0 | DISCHARGE

## 2024-01-24 RX ORDER — GABAPENTIN 400 MG/1
1 CAPSULE ORAL
Qty: 0 | Refills: 0 | DISCHARGE

## 2024-01-24 RX ORDER — INSULIN LISPRO 100/ML
VIAL (ML) SUBCUTANEOUS
Refills: 0 | Status: DISCONTINUED | OUTPATIENT
Start: 2024-01-24 | End: 2024-01-27

## 2024-01-24 RX ORDER — MEMANTINE HYDROCHLORIDE 10 MG/1
1 TABLET ORAL
Refills: 0 | DISCHARGE

## 2024-01-24 RX ORDER — MESALAMINE 400 MG
2 TABLET, DELAYED RELEASE (ENTERIC COATED) ORAL
Qty: 0 | Refills: 0 | DISCHARGE

## 2024-01-24 RX ORDER — TRIMETHOPRIM HYDROCHLORIDE 50 MG/5ML
1 SOLUTION ORAL
Qty: 0 | Refills: 0 | DISCHARGE

## 2024-01-24 RX ORDER — QUETIAPINE FUMARATE 200 MG/1
50 TABLET, FILM COATED ORAL AT BEDTIME
Refills: 0 | Status: DISCONTINUED | OUTPATIENT
Start: 2024-01-24 | End: 2024-01-27

## 2024-01-24 RX ORDER — DEXTROSE 50 % IN WATER 50 %
12.5 SYRINGE (ML) INTRAVENOUS ONCE
Refills: 0 | Status: DISCONTINUED | OUTPATIENT
Start: 2024-01-24 | End: 2024-01-27

## 2024-01-24 RX ORDER — MEMANTINE HYDROCHLORIDE 10 MG/1
10 TABLET ORAL
Refills: 0 | Status: DISCONTINUED | OUTPATIENT
Start: 2024-01-24 | End: 2024-01-27

## 2024-01-24 RX ORDER — SODIUM CHLORIDE 9 MG/ML
1000 INJECTION, SOLUTION INTRAVENOUS
Refills: 0 | Status: DISCONTINUED | OUTPATIENT
Start: 2024-01-24 | End: 2024-01-27

## 2024-01-24 RX ORDER — SITAGLIPTIN 50 MG/1
1 TABLET, FILM COATED ORAL
Qty: 0 | Refills: 0 | DISCHARGE

## 2024-01-24 RX ORDER — GLUCAGON INJECTION, SOLUTION 0.5 MG/.1ML
1 INJECTION, SOLUTION SUBCUTANEOUS ONCE
Refills: 0 | Status: DISCONTINUED | OUTPATIENT
Start: 2024-01-24 | End: 2024-01-27

## 2024-01-24 RX ORDER — CHOLECALCIFEROL (VITAMIN D3) 125 MCG
1 CAPSULE ORAL
Qty: 0 | Refills: 0 | DISCHARGE

## 2024-01-24 RX ORDER — ATORVASTATIN CALCIUM 80 MG/1
40 TABLET, FILM COATED ORAL AT BEDTIME
Refills: 0 | Status: DISCONTINUED | OUTPATIENT
Start: 2024-01-24 | End: 2024-01-27

## 2024-01-24 RX ORDER — TAMSULOSIN HYDROCHLORIDE 0.4 MG/1
0.4 CAPSULE ORAL AT BEDTIME
Refills: 0 | Status: DISCONTINUED | OUTPATIENT
Start: 2024-01-24 | End: 2024-01-27

## 2024-01-24 RX ORDER — FINASTERIDE 5 MG/1
5 TABLET, FILM COATED ORAL DAILY
Refills: 0 | Status: DISCONTINUED | OUTPATIENT
Start: 2024-01-24 | End: 2024-01-27

## 2024-01-24 RX ORDER — TAMSULOSIN HYDROCHLORIDE 0.4 MG/1
1 CAPSULE ORAL
Qty: 0 | Refills: 0 | DISCHARGE

## 2024-01-24 RX ORDER — INSULIN DEGLUDEC 100 U/ML
24 INJECTION, SOLUTION SUBCUTANEOUS
Qty: 0 | Refills: 0 | DISCHARGE

## 2024-01-24 RX ORDER — MULTIVIT-MIN/FERROUS GLUCONATE 9 MG/15 ML
1 LIQUID (ML) ORAL
Qty: 0 | Refills: 0 | DISCHARGE

## 2024-01-24 RX ADMIN — Medication 6: at 18:16

## 2024-01-24 RX ADMIN — Medication 3 MILLILITER(S): at 10:57

## 2024-01-24 RX ADMIN — TAMSULOSIN HYDROCHLORIDE 0.4 MILLIGRAM(S): 0.4 CAPSULE ORAL at 22:07

## 2024-01-24 RX ADMIN — Medication 3 MILLILITER(S): at 11:10

## 2024-01-24 RX ADMIN — Medication 1600 MILLIGRAM(S): at 18:21

## 2024-01-24 RX ADMIN — SODIUM CHLORIDE 500 MILLILITER(S): 9 INJECTION INTRAMUSCULAR; INTRAVENOUS; SUBCUTANEOUS at 20:02

## 2024-01-24 RX ADMIN — SERTRALINE 100 MILLIGRAM(S): 25 TABLET, FILM COATED ORAL at 22:07

## 2024-01-24 RX ADMIN — Medication 125 MILLIGRAM(S): at 10:57

## 2024-01-24 RX ADMIN — MEMANTINE HYDROCHLORIDE 10 MILLIGRAM(S): 10 TABLET ORAL at 18:20

## 2024-01-24 RX ADMIN — ATORVASTATIN CALCIUM 40 MILLIGRAM(S): 80 TABLET, FILM COATED ORAL at 22:07

## 2024-01-24 RX ADMIN — Medication 1 TABLET(S): at 22:07

## 2024-01-24 RX ADMIN — Medication 3 MILLILITER(S): at 11:30

## 2024-01-24 RX ADMIN — QUETIAPINE FUMARATE 50 MILLIGRAM(S): 200 TABLET, FILM COATED ORAL at 22:07

## 2024-01-24 NOTE — ED ADULT NURSE NOTE - OBJECTIVE STATEMENT
72 yo male with pmh IBD, DM, HLD, CVA, dementia presents to ED c/o sob, cough, congestion, and wheezing x 1 week. Pt denies cp, palpitations, fevers, h/a, weakness, dizziness, n/v/d. Pt a&ox2 (disoriented to time-baseline per wife at bedside), breathing spontaneous with +wheezing, moving all extremities and following commands, skin warm dry and appropriate color. Pt safety measures in place and comfort provided.

## 2024-01-24 NOTE — ED PROVIDER NOTE - OBJECTIVE STATEMENT
73-year-old male history of IBD on Remicade, diabetes, hyperlipidemia, CVA presents to the emergency department for the evaluation of nonproductive cough and wheezing with shortness of breath over the last 1 week.  Positive nasal congestion.  No chest pain.  Chills without objective fever.  No recent travel.  No lower extremity edema.  Also reports left lower back pain with coughing.  Otherwise no trauma.  Patient went in to get an infusion today and was advised to come to the emergency department.  Patient is a former smoker with no diagnosis of COPD in the past

## 2024-01-24 NOTE — ED ADULT NURSE REASSESSMENT NOTE - NS ED NURSE REASSESS COMMENT FT1
Report received from SHERITA Ramsey. Pt A&Ox2 self and situation, IV intact and patent, VSS, pt denies pain/discomfort, bed locked and in lowest position, call bell within reach and pt instructed on use, safety and comfort measures maintained.

## 2024-01-24 NOTE — ED CDU PROVIDER INITIAL DAY NOTE - OBJECTIVE STATEMENT
73-year-old male history of IBD on Remicade, diabetes (on oral med), hyperlipidemia, CVA presents to the emergency department for the evaluation of nonproductive cough and wheezing with shortness of breath over the last 1 week.  Positive nasal congestion.  No chest pain.  Chills without objective fever.  No recent travel.  No lower extremity edema.  Also reports left lower back pain with coughing.  Otherwise no trauma.  Patient went in to get an infusion today and was advised to come to the emergency department.  Patient is a former smoker with no diagnosis of COPD in the past

## 2024-01-24 NOTE — ED CDU PROVIDER INITIAL DAY NOTE - CLINICAL SUMMARY MEDICAL DECISION MAKING FREE TEXT BOX
Purnima Saenz MD - Attending Physician: Pt here with diff breathing, wheezing in setting of likely viral syndrome. XR without pna. Is on remicade, thus higher risk given immunosuppression. To CDU for continued monitoring, repeat nebs, and further care

## 2024-01-24 NOTE — ED ADULT TRIAGE NOTE - CHIEF COMPLAINT QUOTE
"cough and wheezing" and back pain x 1 week  was seen at GI office for infusion and was told to come to ED for evaluation

## 2024-01-24 NOTE — ED ADULT NURSE REASSESSMENT NOTE - NS ED NURSE REASSESS COMMENT FT1
Accepted by CDU PA. Pending isolation room in observation area. VS as documented. Bed locked and lowered. Comfort and safety measures maintained.

## 2024-01-24 NOTE — ED PROVIDER NOTE - CLINICAL SUMMARY MEDICAL DECISION MAKING FREE TEXT BOX
Purnima Saenz MD - Attending Physician: Pt here with diff breathing, cough in the setting of likely viral illness. No hypoxia, mild tachypnea, +wheezing. Labs, XR, nebs.
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
## 2024-01-24 NOTE — ED ADULT NURSE NOTE - NSFALLRISKINTERV_ED_ALL_ED
Assistance OOB with selected safe patient handling equipment if applicable/Assistance with ambulation/Communicate fall risk and risk factors to all staff, patient, and family/Monitor gait and stability/Monitor for mental status changes and reorient to person, place, and time, as needed/Move patient closer to nursing station/within visual sight of ED staff/Provide visual cue: yellow wristband, yellow gown, etc/Reinforce activity limits and safety measures with patient and family/Toileting schedule using arm’s reach rule for commode and bathroom/Use of alarms - bed, stretcher, chair and/or video monitoring/Call bell, personal items and telephone in reach/Instruct patient to call for assistance before getting out of bed/chair/stretcher/Non-slip footwear applied when patient is off stretcher/[place] to call system/Physically safe environment - no spills, clutter or unnecessary equipment/Purposeful Proactive Rounding/Room/bathroom lighting operational, light cord in reach
no

## 2024-01-25 VITALS
OXYGEN SATURATION: 96 % | RESPIRATION RATE: 20 BRPM | HEART RATE: 104 BPM | TEMPERATURE: 98 F | SYSTOLIC BLOOD PRESSURE: 134 MMHG | DIASTOLIC BLOOD PRESSURE: 60 MMHG

## 2024-01-25 LAB
ALBUMIN SERPL ELPH-MCNC: 3.5 G/DL — SIGNIFICANT CHANGE UP (ref 3.3–5)
ALP SERPL-CCNC: 83 U/L — SIGNIFICANT CHANGE UP (ref 40–120)
ALT FLD-CCNC: 15 U/L — SIGNIFICANT CHANGE UP (ref 10–45)
ANION GAP SERPL CALC-SCNC: 12 MMOL/L — SIGNIFICANT CHANGE UP (ref 5–17)
AST SERPL-CCNC: 22 U/L — SIGNIFICANT CHANGE UP (ref 10–40)
BASOPHILS # BLD AUTO: 0.01 K/UL — SIGNIFICANT CHANGE UP (ref 0–0.2)
BASOPHILS NFR BLD AUTO: 0.1 % — SIGNIFICANT CHANGE UP (ref 0–2)
BILIRUB SERPL-MCNC: 0.4 MG/DL — SIGNIFICANT CHANGE UP (ref 0.2–1.2)
BUN SERPL-MCNC: 34 MG/DL — HIGH (ref 7–23)
CALCIUM SERPL-MCNC: 8.5 MG/DL — SIGNIFICANT CHANGE UP (ref 8.4–10.5)
CHLORIDE SERPL-SCNC: 107 MMOL/L — SIGNIFICANT CHANGE UP (ref 96–108)
CO2 SERPL-SCNC: 20 MMOL/L — LOW (ref 22–31)
CREAT SERPL-MCNC: 1.08 MG/DL — SIGNIFICANT CHANGE UP (ref 0.5–1.3)
EGFR: 72 ML/MIN/1.73M2 — SIGNIFICANT CHANGE UP
EOSINOPHIL # BLD AUTO: 0 K/UL — SIGNIFICANT CHANGE UP (ref 0–0.5)
EOSINOPHIL NFR BLD AUTO: 0 % — SIGNIFICANT CHANGE UP (ref 0–6)
GLUCOSE BLDC GLUCOMTR-MCNC: 186 MG/DL — HIGH (ref 70–99)
GLUCOSE SERPL-MCNC: 202 MG/DL — HIGH (ref 70–99)
HCT VFR BLD CALC: 31.2 % — LOW (ref 39–50)
HGB BLD-MCNC: 9.8 G/DL — LOW (ref 13–17)
IMM GRANULOCYTES NFR BLD AUTO: 0.8 % — SIGNIFICANT CHANGE UP (ref 0–0.9)
LYMPHOCYTES # BLD AUTO: 2.11 K/UL — SIGNIFICANT CHANGE UP (ref 1–3.3)
LYMPHOCYTES # BLD AUTO: 22.6 % — SIGNIFICANT CHANGE UP (ref 13–44)
MCHC RBC-ENTMCNC: 25.5 PG — LOW (ref 27–34)
MCHC RBC-ENTMCNC: 31.4 GM/DL — LOW (ref 32–36)
MCV RBC AUTO: 81.3 FL — SIGNIFICANT CHANGE UP (ref 80–100)
MONOCYTES # BLD AUTO: 0.78 K/UL — SIGNIFICANT CHANGE UP (ref 0–0.9)
MONOCYTES NFR BLD AUTO: 8.4 % — SIGNIFICANT CHANGE UP (ref 2–14)
NEUTROPHILS # BLD AUTO: 6.35 K/UL — SIGNIFICANT CHANGE UP (ref 1.8–7.4)
NEUTROPHILS NFR BLD AUTO: 68.1 % — SIGNIFICANT CHANGE UP (ref 43–77)
NRBC # BLD: 0 /100 WBCS — SIGNIFICANT CHANGE UP (ref 0–0)
PLATELET # BLD AUTO: 186 K/UL — SIGNIFICANT CHANGE UP (ref 150–400)
POTASSIUM SERPL-MCNC: 4.3 MMOL/L — SIGNIFICANT CHANGE UP (ref 3.5–5.3)
POTASSIUM SERPL-SCNC: 4.3 MMOL/L — SIGNIFICANT CHANGE UP (ref 3.5–5.3)
PROT SERPL-MCNC: 6.3 G/DL — SIGNIFICANT CHANGE UP (ref 6–8.3)
RBC # BLD: 3.84 M/UL — LOW (ref 4.2–5.8)
RBC # FLD: 15.3 % — HIGH (ref 10.3–14.5)
SODIUM SERPL-SCNC: 139 MMOL/L — SIGNIFICANT CHANGE UP (ref 135–145)
WBC # BLD: 9.32 K/UL — SIGNIFICANT CHANGE UP (ref 3.8–10.5)
WBC # FLD AUTO: 9.32 K/UL — SIGNIFICANT CHANGE UP (ref 3.8–10.5)

## 2024-01-25 PROCEDURE — 83605 ASSAY OF LACTIC ACID: CPT

## 2024-01-25 PROCEDURE — 87040 BLOOD CULTURE FOR BACTERIA: CPT

## 2024-01-25 PROCEDURE — 85730 THROMBOPLASTIN TIME PARTIAL: CPT

## 2024-01-25 PROCEDURE — 85018 HEMOGLOBIN: CPT

## 2024-01-25 PROCEDURE — 71250 CT THORAX DX C-: CPT | Mod: MH

## 2024-01-25 PROCEDURE — 71045 X-RAY EXAM CHEST 1 VIEW: CPT

## 2024-01-25 PROCEDURE — 93005 ELECTROCARDIOGRAM TRACING: CPT

## 2024-01-25 PROCEDURE — 87637 SARSCOV2&INF A&B&RSV AMP PRB: CPT

## 2024-01-25 PROCEDURE — 82803 BLOOD GASES ANY COMBINATION: CPT

## 2024-01-25 PROCEDURE — 80053 COMPREHEN METABOLIC PANEL: CPT

## 2024-01-25 PROCEDURE — 85025 COMPLETE CBC W/AUTO DIFF WBC: CPT

## 2024-01-25 PROCEDURE — 83036 HEMOGLOBIN GLYCOSYLATED A1C: CPT

## 2024-01-25 PROCEDURE — G0378: CPT

## 2024-01-25 PROCEDURE — 85610 PROTHROMBIN TIME: CPT

## 2024-01-25 PROCEDURE — 82962 GLUCOSE BLOOD TEST: CPT

## 2024-01-25 PROCEDURE — 82330 ASSAY OF CALCIUM: CPT

## 2024-01-25 PROCEDURE — 82947 ASSAY GLUCOSE BLOOD QUANT: CPT

## 2024-01-25 PROCEDURE — 82435 ASSAY OF BLOOD CHLORIDE: CPT

## 2024-01-25 PROCEDURE — 85014 HEMATOCRIT: CPT

## 2024-01-25 PROCEDURE — 94640 AIRWAY INHALATION TREATMENT: CPT

## 2024-01-25 PROCEDURE — 84145 PROCALCITONIN (PCT): CPT

## 2024-01-25 PROCEDURE — 99285 EMERGENCY DEPT VISIT HI MDM: CPT | Mod: 25

## 2024-01-25 PROCEDURE — 84295 ASSAY OF SERUM SODIUM: CPT

## 2024-01-25 PROCEDURE — 99238 HOSP IP/OBS DSCHRG MGMT 30/<: CPT

## 2024-01-25 PROCEDURE — 96374 THER/PROPH/DIAG INJ IV PUSH: CPT

## 2024-01-25 PROCEDURE — 84132 ASSAY OF SERUM POTASSIUM: CPT

## 2024-01-25 RX ORDER — ALBUTEROL 90 UG/1
2 AEROSOL, METERED ORAL EVERY 4 HOURS
Refills: 0 | Status: COMPLETED | OUTPATIENT
Start: 2024-01-25 | End: 2024-12-23

## 2024-01-25 RX ORDER — ALBUTEROL 90 UG/1
2 AEROSOL, METERED ORAL EVERY 4 HOURS
Refills: 0 | Status: DISCONTINUED | OUTPATIENT
Start: 2024-01-25 | End: 2024-01-27

## 2024-01-25 RX ORDER — INSULIN LISPRO 100/ML
1 VIAL (ML) SUBCUTANEOUS ONCE
Refills: 0 | Status: COMPLETED | OUTPATIENT
Start: 2024-01-25 | End: 2024-01-25

## 2024-01-25 RX ORDER — INSULIN GLARGINE 100 [IU]/ML
8 INJECTION, SOLUTION SUBCUTANEOUS ONCE
Refills: 0 | Status: COMPLETED | OUTPATIENT
Start: 2024-01-25 | End: 2024-01-25

## 2024-01-25 RX ORDER — INSULIN LISPRO 100/ML
VIAL (ML) SUBCUTANEOUS AT BEDTIME
Refills: 0 | Status: DISCONTINUED | OUTPATIENT
Start: 2024-01-25 | End: 2024-01-27

## 2024-01-25 RX ADMIN — INSULIN GLARGINE 8 UNIT(S): 100 INJECTION, SOLUTION SUBCUTANEOUS at 00:25

## 2024-01-25 RX ADMIN — Medication 1 UNIT(S): at 00:25

## 2024-01-25 RX ADMIN — ALBUTEROL 2 PUFF(S): 90 AEROSOL, METERED ORAL at 11:18

## 2024-01-25 RX ADMIN — Medication 1: at 09:12

## 2024-01-25 RX ADMIN — MEMANTINE HYDROCHLORIDE 10 MILLIGRAM(S): 10 TABLET ORAL at 06:54

## 2024-01-25 RX ADMIN — Medication 1600 MILLIGRAM(S): at 06:54

## 2024-01-25 NOTE — ED ADULT NURSE REASSESSMENT NOTE - NS ED NURSE REASSESS COMMENT FT1
Pt received from SHERITA Cast. Pt oriented to CDU & plan of care was discussed. Pt A&Ox3. Stand by assist. Pt in CDU for frequent vital signs, blood glucose monitoring, steroids, continues pulse ox . Pt denies any chills, fever, chest pain, SOB, dizziness or palpitations at this time. Pt on a cardiac monitor in NSR, HR in 100's. V/S stable, pt afebrile, IV in place, patent and free of signs of infiltration. Pt resting in bed. Safety & comfort measures maintained. Bed alarm on. Urinal near bedside. Call bell in reach. Care continues.

## 2024-01-25 NOTE — ED CDU PROVIDER DISPOSITION NOTE - NSFOLLOWUPINSTRUCTIONS_ED_ALL_ED_FT
Hydrate.     **Steroids      We recommend you follow up with your primary care provider within the next 2-3 days, please bring all of your results with you.     Please return to the Emergency Department with new, worsening, or concerning symptoms, such as:  -Shortness of breath or trouble breathing  -Pressure, pain, tightness in chest  -Facial drooping, arm weakness, or speech difficulty   -Head injury or loss of consciousness   -Nonstop bleeding or an open wound     *More detailed information regarding your visit and discharge can be found by reviewing this packet Stay well-hydrated.  Continue current home medications.  Use albuterol inhaler 2 puffs every 4-6 hours as needed for shortness of breath/wheezing.  Please follow-up with your primary care provider in 2 to 3 days.  Bring copy of your results with you to your appointment.  Return to the ER for worsening shortness of breath, fevers, chest pain or any other concerning symptoms.

## 2024-01-25 NOTE — ED CDU PROVIDER SUBSEQUENT DAY NOTE - PROGRESS NOTE DETAILS
Patient seen at bedside in NAD.  VSS.  Patient resting comfortably without complaints. Patient reports that he is feeling much better s/p albuterol nebulizers and steroids.  No wheezing on exam.  O2 sat upper 90s on room air.  Hemoglobin downtrending to 9.8.  Chart reviewed and baseline appears to be at 8.  Initial hemoglobin of 11.6 on presentation likely representing hemoconcentration in setting of dehydration.  Given resolution of wheezing and significant increase in blood glucose with steroids, will hold steroids for now and have patient follow-up closely with her PMD.  Albuterol inhaler and spacer provided.  Patient educated on proper use.  Will DC home with close outpatient follow-up and strict return precautions. -Chandana Summers PA-C

## 2024-01-25 NOTE — ED CDU PROVIDER SUBSEQUENT DAY NOTE - HISTORY
I had spoken with patient's wife at bedside, Katja. State that patient has dementia and has memory issues. I asked about patient's diabetic medications and she stated that he takes insulin, although unsure of name. I spoke with pharmacy for [facility], which shows Tresiba 24 units at bedtime. Per Earlier I had spoken with patient's wife at bedside, Katja. State that patient has dementia and has memory issues. I asked about patient's diabetic medications and she stated that he takes insulin, although unsure of name. I spoke with pharmacy for [facility], which shows Tresiba 24 units at bedtime. Per note from 1/24 on HIE shows 10 units at bedtime. Patient's glucose decreased to 257 after IVF. Patient had received 6 units around 6PM sliding scale with meal. Will continue to monitor. - Odalis Padilla PA-C

## 2024-01-25 NOTE — ED CDU PROVIDER DISPOSITION NOTE - PATIENT PORTAL LINK FT
You can access the FollowMyHealth Patient Portal offered by [facility] by registering at the following website: [email or web address]. By joining [facility]’s FollowMyHealth portal, you will also be able to view your health information using other applications (apps) compatible with our system.

## 2024-01-25 NOTE — ED ADULT NURSE REASSESSMENT NOTE - NS ED NURSE REASSESS COMMENT FT1
Pt received from RN _. Pt oriented to CDU & plan of care was discussed. Pt A&Ox3. Stand by assist. Pt in CDU for frequent vital signs, blood glucose monitoring, steroids, continues pulse ox . Pt denies any chest pain, SOB, dizziness or palpitations at this time. V/S stable, pt afebrile,  IV in place, patent and free of signs of infiltration. Pt resting in bed. Safety & comfort measures maintained. Bed alarm on. Urinal near bedside. Call bell in reach. Care continues.

## 2024-01-25 NOTE — ED CDU PROVIDER DISPOSITION NOTE - CLINICAL COURSE
73-year-old male history of IBD on Remicade, diabetes (on oral med), hyperlipidemia, CVA presents to the emergency department for the evaluation of nonproductive cough and wheezing with shortness of breath over the last 1 week.  Positive nasal congestion.  No chest pain.  Chills without objective fever.  No recent travel.  No lower extremity edema.  Also reports left lower back pain with coughing.  Otherwise no trauma.  Patient went in to get an infusion today and was advised to come to the emergency department.  Patient is a former smoker with no diagnosis of COPD in the past.  ED course: Afebrile. Tachycardic. CT showed "  Small airways impaction in both lungs which can be seen with bland mucous   plugs, or an infectious bronchiolitis." COVID/Flu negative. Given Solumedrol and Duonebs. Plan for continued nebs and monitoring in CDU. 73-year-old male history of IBD on Remicade, diabetes (on oral med), hyperlipidemia, CVA presents to the emergency department for the evaluation of nonproductive cough and wheezing with shortness of breath over the last 1 week.  Positive nasal congestion.  No chest pain.  Chills without objective fever.  No recent travel.  No lower extremity edema.  Also reports left lower back pain with coughing.  Otherwise no trauma.  Patient went in to get an infusion today and was advised to come to the emergency department.  Patient is a former smoker with no diagnosis of COPD in the past.  ED course: Afebrile. Tachycardic. CT showed "  Small airways impaction in both lungs which can be seen with bland mucous   plugs, or an infectious bronchiolitis." COVID/Flu negative. Given Solumedrol and Duonebs. Plan for continued nebs and monitoring in CDU.  In the CDU, Patient reports that he is feeling much better s/p albuterol nebulizers and steroids.  No wheezing on exam.  O2 sat upper 90s on room air.  Hemoglobin downtrending to 9.8.  Chart reviewed and baseline appears to be at 8.  Initial hemoglobin of 11.6 on presentation likely representing hemoconcentration in setting of dehydration.  Given resolution of wheezing and significant increase in blood glucose with steroids, will hold steroids for now and have patient follow-up closely with her PMD.  Albuterol inhaler and spacer provided.  Patient educated on proper use.  Will DC home with close outpatient follow-up and strict return precautions.

## 2024-01-25 NOTE — ED CDU PROVIDER DISPOSITION NOTE - ATTENDING CONTRIBUTION TO CARE
LYSSA: I , Dr Zandra Martinez have seen and evaluated the patient. Results of labs, tests, imaging have been reviewed. The patient reports improvement in symptoms. Lungs CTA BL. No inc WOB. Satting 90s on RA. Opting to hold steroids due to hx of DM and spike in insulin w steroids received in ED. The patient is stable for discharge home and will follow up with their primary physician.

## 2024-01-29 LAB
CULTURE RESULTS: SIGNIFICANT CHANGE UP
CULTURE RESULTS: SIGNIFICANT CHANGE UP
SPECIMEN SOURCE: SIGNIFICANT CHANGE UP
SPECIMEN SOURCE: SIGNIFICANT CHANGE UP

## 2024-02-07 ENCOUNTER — APPOINTMENT (OUTPATIENT)
Dept: INTERNAL MEDICINE | Facility: CLINIC | Age: 74
End: 2024-02-07
Payer: MEDICARE

## 2024-02-07 VITALS
WEIGHT: 194 LBS | TEMPERATURE: 98.1 F | DIASTOLIC BLOOD PRESSURE: 89 MMHG | HEIGHT: 70 IN | OXYGEN SATURATION: 94 % | SYSTOLIC BLOOD PRESSURE: 160 MMHG | BODY MASS INDEX: 27.77 KG/M2 | HEART RATE: 101 BPM

## 2024-02-07 DIAGNOSIS — F32.89 OTHER SPECIFIED DEPRESSIVE EPISODES: ICD-10-CM

## 2024-02-07 DIAGNOSIS — I10 ESSENTIAL (PRIMARY) HYPERTENSION: ICD-10-CM

## 2024-02-07 DIAGNOSIS — R06.2 WHEEZING: ICD-10-CM

## 2024-02-07 PROCEDURE — 99495 TRANSJ CARE MGMT MOD F2F 14D: CPT

## 2024-02-07 RX ORDER — FLUTICASONE FUROATE AND VILANTEROL TRIFENATATE 200; 25 UG/1; UG/1
200-25 POWDER RESPIRATORY (INHALATION)
Qty: 60 | Refills: 1 | Status: ACTIVE | COMMUNITY
Start: 2024-02-07 | End: 1900-01-01

## 2024-02-07 RX ORDER — ALBUTEROL SULFATE 2.5 MG/3ML
(2.5 MG/3ML) SOLUTION RESPIRATORY (INHALATION) TWICE DAILY
Qty: 2 | Refills: 5 | Status: ACTIVE | COMMUNITY
Start: 2024-02-07 | End: 1900-01-01

## 2024-02-07 NOTE — PLAN
[FreeTextEntry1] : wheezing/ viral  breo 200 once daily nebulizer albuterol and budesomide bid need to discuss with psych worsening of depression need pt and home care

## 2024-02-07 NOTE — HISTORY OF PRESENT ILLNESS
[Post-hospitalization from ___ Hospital] : Post-hospitalization from [unfilled] Hospital [Admitted on: ___] : The patient was admitted on [unfilled] [Discharged on ___] : discharged on [unfilled] [Discharge Summary] : discharge summary [Discharge Med List] : discharge medication list [FreeTextEntry2] : Cough with wheeze ex smoker 2 day hospital with neb discharged on albuterol apathetic stays ion bed weak

## 2024-02-07 NOTE — PHYSICAL EXAM
[Normal] : normal rate, regular rhythm, normal S1 and S2 and no murmur heard [de-identified] : wheezing good air movement [92840 - Moderate Complexity requires multiple possible diagnoses and/or the management options, moderate complexity of the medical data (tests, etc.) to be reviewed, and moderate risk of significant complications, morbidity, and/or mortality as well as co] : Moderate Complexity
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
## 2024-02-08 ENCOUNTER — APPOINTMENT (OUTPATIENT)
Dept: ENDOCRINOLOGY | Facility: CLINIC | Age: 74
End: 2024-02-08
Payer: MEDICARE

## 2024-02-08 VITALS
HEIGHT: 67 IN | OXYGEN SATURATION: 97 % | WEIGHT: 192 LBS | HEART RATE: 99 BPM | TEMPERATURE: 98.2 F | SYSTOLIC BLOOD PRESSURE: 101 MMHG | DIASTOLIC BLOOD PRESSURE: 60 MMHG | BODY MASS INDEX: 30.13 KG/M2

## 2024-02-08 PROCEDURE — 99213 OFFICE O/P EST LOW 20 MIN: CPT | Mod: 25

## 2024-02-08 PROCEDURE — 36415 COLL VENOUS BLD VENIPUNCTURE: CPT

## 2024-02-08 NOTE — ED ADULT NURSE NOTE - NS_SISCREENINGSR_GEN_ALL_ED
Negative Maternal/Fetal Alert  24 - Materal Von Willebrand disease Type 2A.  MDM being held today.  See minutes for details. Notify peds. -NADEGE Grewal  24 - ***See detailed minutes for recommendations regarding medications and products***   considerations - male fetus, avoid vacuum delivery and circumcision until von Willebrand testing is performed.   See detailed minutes for all considerations. -Anna Kelly RNC

## 2024-02-09 LAB
ALBUMIN SERPL ELPH-MCNC: 4.3 G/DL
ALP BLD-CCNC: 98 U/L
ALT SERPL-CCNC: 16 U/L
ANION GAP SERPL CALC-SCNC: 14 MMOL/L
AST SERPL-CCNC: 20 U/L
BASOPHILS # BLD AUTO: 0.05 K/UL
BASOPHILS NFR BLD AUTO: 0.4 %
BILIRUB SERPL-MCNC: 0.6 MG/DL
BUN SERPL-MCNC: 21 MG/DL
CALCIUM SERPL-MCNC: 9.2 MG/DL
CHLORIDE SERPL-SCNC: 106 MMOL/L
CHOLEST SERPL-MCNC: 134 MG/DL
CO2 SERPL-SCNC: 21 MMOL/L
CREAT SERPL-MCNC: 1.25 MG/DL
EGFR: 60 ML/MIN/1.73M2
EOSINOPHIL # BLD AUTO: 0.11 K/UL
EOSINOPHIL NFR BLD AUTO: 0.9 %
ESTIMATED AVERAGE GLUCOSE: 123 MG/DL
GLUCOSE SERPL-MCNC: 42 MG/DL
HBA1C MFR BLD HPLC: 5.9 %
HCT VFR BLD CALC: 38.6 %
HDLC SERPL-MCNC: 40 MG/DL
HGB BLD-MCNC: 11.4 G/DL
IMM GRANULOCYTES NFR BLD AUTO: 0.6 %
LDLC SERPL CALC-MCNC: 69 MG/DL
LYMPHOCYTES # BLD AUTO: 3.3 K/UL
LYMPHOCYTES NFR BLD AUTO: 27.6 %
MAN DIFF?: NORMAL
MCHC RBC-ENTMCNC: 24.7 PG
MCHC RBC-ENTMCNC: 29.5 GM/DL
MCV RBC AUTO: 83.5 FL
MONOCYTES # BLD AUTO: 0.48 K/UL
MONOCYTES NFR BLD AUTO: 4 %
NEUTROPHILS # BLD AUTO: 7.95 K/UL
NEUTROPHILS NFR BLD AUTO: 66.5 %
NONHDLC SERPL-MCNC: 94 MG/DL
PLATELET # BLD AUTO: 267 K/UL
POTASSIUM SERPL-SCNC: 4.1 MMOL/L
PROT SERPL-MCNC: 7 G/DL
RBC # BLD: 4.62 M/UL
RBC # FLD: 15.9 %
SODIUM SERPL-SCNC: 141 MMOL/L
TRIGL SERPL-MCNC: 145 MG/DL
WBC # FLD AUTO: 11.96 K/UL

## 2024-02-13 NOTE — H&P ADULT - NEGATIVE GENERAL GENITOURINARY SYMPTOMS
Samples Given: La Roche Posay Lipikar facial cleanser & CeraVe foaming cleanser Detail Level: Zone no flank pain R/no flank pain L/no dysuria/no hematuria/no incontinence

## 2024-02-13 NOTE — HISTORY OF PRESENT ILLNESS
[FreeTextEntry1] : CC: Diabetes He is accompanied by his aid.  This is a 73-year-old male with type 2 diabetes mellitus, hypertension, hyperlipidemia, anemia, CVA, anxiety, depression, BPH, Crohn's disease, ulcerative colitis, here for follow up. Type 2 diabetes was diagnosed at the age of 65. He is currently on Tresiba 10 units at bedtime and Fiasp 16-26 units before each meal. He tried Metformin in the past but developed GI side effects. He also tried Januvia but developed GI side effects as well. His wife reports that he has GI side effects too many medications due to his Crohn's disease and ulcerative colitis. He self-monitors blood glucose 3 times a day with levels from 69-200s throughout the day with no pattern. He has hypoglycemia a few times per month.  He saw his ophthalmologist in April 2023 and denies retinopathy. He denies neuropathy. He denies nephropathy. He is on atorvastatin 40 mg daily. He is not on an ACE inhibitor or ARB.

## 2024-02-13 NOTE — REVIEW OF SYSTEMS
[Recent Weight Loss (___ Lbs)] : recent weight loss: [unfilled] lbs [All other systems negative] : All other systems negative [Abdominal Pain] : abdominal pain

## 2024-02-13 NOTE — ADDENDUM
[FreeTextEntry1] :  By signing my name below, I, Lilo Herrera, attest that this document has been prepared under the direction and in the presence of Dr. Contreras.  I, Isis Contreras MD, personally performed the services described in this documentation. All medical record entries made by the scribe were at my discretion and in my presence. I have reviewed the chart and discharge instructions (if applicable) and agree that the record reflects my personal permanence and is accurate and complete.

## 2024-02-13 NOTE — ASSESSMENT
[FreeTextEntry1] : This is a 73-year-old male with type 2 diabetes mellitus, hypertension, hyperlipidemia, CVA, anxiety, depression, BPH, Crohn's disease, ulcerative colitis, here for follow-up. 1.  T2DM Check HbA1c. Declines personal and professional CGM and will continue current regimen for now due to no known pattern of hyperglycemia or hypoglycemia.  His last ophthalmology appointment was in 4/2023 and he denies diabetic retinopathy.  He denies neuropathy. He is on atorvastatin 40 mg daily.  He is not on an ACE inhibitor or ARB. Check lipid panel.  2.  Hypertension Controlled 3.  Hyperlipidemia Continue atorvastatin 40 mg daily.

## 2024-02-13 NOTE — PHYSICAL EXAM
[Alert] : alert [Well Nourished] : well nourished [Healthy Appearance] : healthy appearance [No Acute Distress] : no acute distress [Well Developed] : well developed [Normal Voice/Communication] : normal voice communication [Normal Sclera/Conjunctiva] : normal sclera/conjunctiva [No Proptosis] : no proptosis [No Neck Mass] : no neck mass was observed [No LAD] : no lymphadenopathy [Supple] : the neck was supple [Thyroid Not Enlarged] : the thyroid was not enlarged [No Thyroid Nodules] : no palpable thyroid nodules [No Respiratory Distress] : no respiratory distress [No Accessory Muscle Use] : no accessory muscle use [Normal Rate and Effort] : normal respiratory rate and effort [Normal S1, S2] : normal S1 and S2 [Normal Rate] : heart rate was normal [No Edema] : no peripheral edema [Pedal Pulses Normal] : the pedal pulses are present [Not Tender] : non-tender [Not Distended] : not distended [Soft] : abdomen soft [Spine Straight] : spine straight [No Stigmata of Cushings Syndrome] : no stigmata of Cushings Syndrome [Normal Gait] : normal gait [No Clubbing, Cyanosis] : no clubbing  or cyanosis of the fingernails [No Involuntary Movements] : no involuntary movements were seen [Right foot was examined, including] : right foot ~C was examined, including visual inspection with sensory and pulse exams [Left foot was examined, including] : left foot ~C was examined, including visual inspection with sensory and pulse exams [2+] : 2+ in the dorsalis pedis [No Tremors] : no tremors [Normal Sensation on Monofilament Testing] : normal sensation on monofilament testing of lower extremities [Normal Affect] : the affect was normal [Normal Insight/Judgement] : insight and judgment were intact [Normal Mood] : the mood was normal [Normal] : normal [Full ROM] : with full range of motion [Obese] : obese [Acanthosis Nigricans] : no acanthosis nigricans [Diminished Throughout Both Feet] : normal tactile sensation with monofilament testing throughout both feet

## 2024-02-15 ENCOUNTER — RX CHANGE (OUTPATIENT)
Age: 74
End: 2024-02-15

## 2024-02-15 RX ORDER — BUDESONIDE 0.5 MG/2ML
0.5 INHALANT ORAL
Qty: 360 | Refills: 1 | Status: ACTIVE | COMMUNITY
Start: 1900-01-01 | End: 1900-01-01

## 2024-02-15 RX ORDER — BUDESONIDE 0.5 MG/2ML
0.5 INHALANT ORAL TWICE DAILY
Qty: 1 | Refills: 5 | Status: DISCONTINUED | COMMUNITY
Start: 2024-02-07 | End: 2024-02-15

## 2024-02-21 ENCOUNTER — MED ADMIN CHARGE (OUTPATIENT)
Age: 74
End: 2024-02-21

## 2024-02-21 ENCOUNTER — APPOINTMENT (OUTPATIENT)
Dept: RHEUMATOLOGY | Facility: CLINIC | Age: 74
End: 2024-02-21
Payer: MEDICARE

## 2024-02-21 VITALS
RESPIRATION RATE: 16 BRPM | OXYGEN SATURATION: 96 % | DIASTOLIC BLOOD PRESSURE: 79 MMHG | HEART RATE: 92 BPM | TEMPERATURE: 98.2 F | SYSTOLIC BLOOD PRESSURE: 127 MMHG

## 2024-02-21 VITALS
RESPIRATION RATE: 16 BRPM | DIASTOLIC BLOOD PRESSURE: 76 MMHG | HEART RATE: 90 BPM | OXYGEN SATURATION: 96 % | SYSTOLIC BLOOD PRESSURE: 131 MMHG

## 2024-02-21 PROCEDURE — 36415 COLL VENOUS BLD VENIPUNCTURE: CPT

## 2024-02-21 PROCEDURE — 96415 CHEMO IV INFUSION ADDL HR: CPT

## 2024-02-21 PROCEDURE — 96413 CHEMO IV INFUSION 1 HR: CPT

## 2024-02-21 RX ORDER — CETIRIZINE HYDROCHLORIDE 10 MG/1
10 TABLET, COATED ORAL
Qty: 0 | Refills: 0 | Status: COMPLETED
Start: 2023-11-17

## 2024-02-21 RX ORDER — INFLIXIMAB 100 MG/10ML
100 INJECTION, POWDER, LYOPHILIZED, FOR SOLUTION INTRAVENOUS
Qty: 1 | Refills: 0 | Status: COMPLETED
Start: 2023-11-17

## 2024-02-21 RX ORDER — ACETAMINOPHEN 325 MG/1
325 TABLET ORAL
Qty: 0 | Refills: 0 | Status: COMPLETED
Start: 2023-11-17

## 2024-02-21 NOTE — HISTORY OF PRESENT ILLNESS
[N/A] : N/A [Denies] : Denies [Yes] : Yes [24g] : 24g [Start Time: ___] : Medication Start Time: [unfilled] [End Time: ___] : Medication End Time: [unfilled] [Medication Name: ___] : Medication Name: [unfilled] [Total Amount Administered: ___] : Total Amount Administered: [unfilled] [IV discontinued. Intact. No signs or symptoms of IV complications noted. Time: ___] : IV discontinued. Intact. No signs or symptoms of IV complications noted. Time: [unfilled] [Patient  instructed to seek medical attention with signs and symptoms of adverse effects] : Patient  instructed to seek medical attention with signs and symptoms of adverse effects [Patient left unit in no acute distress] : Patient left unit in no acute distress [Medications administered as ordered and tolerated well.] : Medications administered as ordered and tolerated well. [Blood drawn at time of visit] : Blood drawn at time of visit [de-identified] : patient with balance issues [de-identified] : left hand dorsal venous arch vein [de-identified] : labs drawn as prescribed. [de-identified] : Patient presents for scheduled Remicade infusion, accompanied by son in NAD. Patient missed his last infusion d/t chest congestion, went to ER and d/c home with nebulizer and inhaler regiment. Today, patient denies SOB  and no congestion or s/s of infection. Patient's son states that patient is doing well overall, denies any significant changes since last infusion. Patient states he has 3-4 BMs daily of soft stool denies blood and mucous. Patient denies of having any abdominal pain, n/v, bloating, cramping, and fistulas. Patient in the process of getting w/c and walker for balance issues/ denies falls. No other concerns verbalized. Patient pre-medicated as prescribed and infusion tolerated well. pt left unit NAD, accopmanied by son.

## 2024-02-22 LAB
ALBUMIN SERPL ELPH-MCNC: 4.1 G/DL
ALP BLD-CCNC: 83 U/L
ALT SERPL-CCNC: 17 U/L
ANION GAP SERPL CALC-SCNC: 17 MMOL/L
AST SERPL-CCNC: 32 U/L
BILIRUB SERPL-MCNC: 0.6 MG/DL
BUN SERPL-MCNC: 21 MG/DL
CALCIUM SERPL-MCNC: 8.8 MG/DL
CHLORIDE SERPL-SCNC: 101 MMOL/L
CO2 SERPL-SCNC: 21 MMOL/L
CREAT SERPL-MCNC: 1.23 MG/DL
EGFR: 61 ML/MIN/1.73M2
HCT VFR BLD CALC: 35 %
HGB BLD-MCNC: 10.6 G/DL
MCHC RBC-ENTMCNC: 24.5 PG
MCHC RBC-ENTMCNC: 30.3 GM/DL
MCV RBC AUTO: 80.8 FL
PLATELET # BLD AUTO: 213 K/UL
POTASSIUM SERPL-SCNC: 4.1 MMOL/L
PROT SERPL-MCNC: 6.6 G/DL
RBC # BLD: 4.33 M/UL
RBC # FLD: 14.7 %
SODIUM SERPL-SCNC: 139 MMOL/L
WBC # FLD AUTO: 6.37 K/UL

## 2024-02-28 LAB
INFLIXIMAB AB SERPL-MCNC: <22 NG/ML
INFLIXIMAB SERPL-MCNC: 18 UG/ML

## 2024-02-29 ENCOUNTER — APPOINTMENT (OUTPATIENT)
Dept: GASTROENTEROLOGY | Facility: CLINIC | Age: 74
End: 2024-02-29
Payer: MEDICARE

## 2024-02-29 VITALS
SYSTOLIC BLOOD PRESSURE: 124 MMHG | OXYGEN SATURATION: 95 % | HEART RATE: 115 BPM | WEIGHT: 196 LBS | DIASTOLIC BLOOD PRESSURE: 79 MMHG | HEIGHT: 67 IN | BODY MASS INDEX: 30.76 KG/M2

## 2024-02-29 PROCEDURE — G2211 COMPLEX E/M VISIT ADD ON: CPT

## 2024-02-29 PROCEDURE — 99213 OFFICE O/P EST LOW 20 MIN: CPT

## 2024-02-29 RX ORDER — FOLIC ACID 1 MG/1
1 TABLET ORAL DAILY
Qty: 90 | Refills: 3 | Status: ACTIVE | COMMUNITY
Start: 2022-10-25 | End: 1900-01-01

## 2024-02-29 NOTE — HISTORY OF PRESENT ILLNESS
[FreeTextEntry1] : 73-year-old male History provided by patient's wife Patient with cognitive impairment Chronic colitis, well-controlled on infliximab along with methotrexate Dose escalation in combination immunomodulator for low drug levels and elevated antibodies to infliximab Now recovered Last trough acceptable, absent antibodies  Patient's wife reports that he has approximately 3 bowel movements a day without blood No complaints of urgency, no accidents reported  Last colonoscopy October 2022 during hospitalization

## 2024-02-29 NOTE — PHYSICAL EXAM
[Normal] : oriented to person, place, and time [de-identified] : Confused but appropriate, pleasant affect

## 2024-02-29 NOTE — ASSESSMENT
[FreeTextEntry1] : Well-controlled colitis Recovery of infliximab trough Continue COVID therapy with infliximab and methotrexate folic acid Office visit again in 6 months

## 2024-03-05 DIAGNOSIS — U07.1 COVID-19: ICD-10-CM

## 2024-03-05 RX ORDER — INSULIN DEGLUDEC INJECTION 200 U/ML
200 INJECTION, SOLUTION SUBCUTANEOUS
Qty: 1 | Refills: 2 | Status: COMPLETED | COMMUNITY
Start: 2020-12-31 | End: 2024-03-05

## 2024-03-05 RX ORDER — DIPHENHYDRAMINE HCL 25 MG/1
25 TABLET, FILM COATED ORAL
Qty: 30 | Refills: 5 | Status: COMPLETED | COMMUNITY
Start: 2022-01-31 | End: 2024-03-05

## 2024-03-05 RX ORDER — METHOTREXATE 2.5 MG/1
2.5 TABLET ORAL
Qty: 16 | Refills: 5 | Status: COMPLETED | COMMUNITY
Start: 2022-10-09 | End: 2024-03-05

## 2024-03-05 RX ORDER — CAMPHOR 0.45 %
25 GEL (GRAM) TOPICAL
Qty: 30 | Refills: 5 | Status: COMPLETED | COMMUNITY
Start: 2020-09-14 | End: 2024-03-05

## 2024-03-05 RX ORDER — QUETIAPINE FUMARATE 25 MG/1
25 TABLET ORAL AT BEDTIME
Refills: 0 | Status: ACTIVE | COMMUNITY

## 2024-03-25 ENCOUNTER — MED ADMIN CHARGE (OUTPATIENT)
Age: 74
End: 2024-03-25

## 2024-03-25 ENCOUNTER — APPOINTMENT (OUTPATIENT)
Dept: RHEUMATOLOGY | Facility: CLINIC | Age: 74
End: 2024-03-25
Payer: MEDICARE

## 2024-03-25 VITALS
OXYGEN SATURATION: 96 % | HEART RATE: 95 BPM | RESPIRATION RATE: 16 BRPM | SYSTOLIC BLOOD PRESSURE: 131 MMHG | DIASTOLIC BLOOD PRESSURE: 74 MMHG

## 2024-03-25 VITALS
OXYGEN SATURATION: 96 % | DIASTOLIC BLOOD PRESSURE: 80 MMHG | SYSTOLIC BLOOD PRESSURE: 143 MMHG | TEMPERATURE: 96.7 F | RESPIRATION RATE: 16 BRPM | HEART RATE: 107 BPM

## 2024-03-25 PROCEDURE — 96415 CHEMO IV INFUSION ADDL HR: CPT

## 2024-03-25 PROCEDURE — 96413 CHEMO IV INFUSION 1 HR: CPT

## 2024-03-25 RX ORDER — INFLIXIMAB 100 MG/10ML
100 INJECTION, POWDER, LYOPHILIZED, FOR SOLUTION INTRAVENOUS
Qty: 1 | Refills: 0 | Status: COMPLETED
Start: 2023-11-17

## 2024-03-25 RX ORDER — CETIRIZINE HYDROCHLORIDE 10 MG/1
10 TABLET, COATED ORAL
Qty: 0 | Refills: 0 | Status: COMPLETED
Start: 2023-11-17

## 2024-03-25 RX ORDER — ACETAMINOPHEN 325 MG/1
325 TABLET ORAL
Qty: 0 | Refills: 0 | Status: COMPLETED
Start: 2023-11-17

## 2024-03-25 NOTE — HISTORY OF PRESENT ILLNESS
[N/A] : N/A [Denies] : Denies [Yes] : Yes [Declined] : Declined [Right upper extremity] : Right upper extremity [24g] : 24g [Start Time: ___] : Medication Start Time: [unfilled] [End Time: ___] : Medication End Time: [unfilled] [Medication Name: ___] : Medication Name: [unfilled] [IV discontinued. Intact. No signs or symptoms of IV complications noted. Time: ___] : IV discontinued. Intact. No signs or symptoms of IV complications noted. Time: [unfilled] [Total Amount Administered: ___] : Total Amount Administered: [unfilled] [Patient  instructed to seek medical attention with signs and symptoms of adverse effects] : Patient  instructed to seek medical attention with signs and symptoms of adverse effects [Patient left unit in no acute distress] : Patient left unit in no acute distress [Medications administered as ordered and tolerated well.] : Medications administered as ordered and tolerated well. [de-identified] : patient with balance issues [de-identified] : right hand  [de-identified] : Patient presents for scheduled Remicade infusion, accompanied by family member in NAD. Today, patient denies SOB  and no congestion or s/s of infection, hospitalizations or ABX use. Patient's family member states that patient is doing well overall,  that he has 6-7 BMs daily of soft stool, denies blood but states there is occasional mucous. Patient denies having any abdominal pain, n/v, bloating, cramping, and fistulas. No other concerns verbalized. Patient pre-medicated as prescribed and infusion tolerated well. Pt left unit upon completion of infusion accompanied by family member.

## 2024-04-05 ENCOUNTER — APPOINTMENT (OUTPATIENT)
Dept: INTERNAL MEDICINE | Facility: CLINIC | Age: 74
End: 2024-04-05
Payer: MEDICARE

## 2024-04-05 VITALS
SYSTOLIC BLOOD PRESSURE: 103 MMHG | DIASTOLIC BLOOD PRESSURE: 65 MMHG | BODY MASS INDEX: 30.76 KG/M2 | WEIGHT: 196 LBS | OXYGEN SATURATION: 98 % | HEIGHT: 67 IN | HEART RATE: 98 BPM

## 2024-04-05 VITALS
OXYGEN SATURATION: 98 % | WEIGHT: 196 LBS | HEART RATE: 107 BPM | BODY MASS INDEX: 30.76 KG/M2 | SYSTOLIC BLOOD PRESSURE: 103 MMHG | DIASTOLIC BLOOD PRESSURE: 65 MMHG | HEIGHT: 67 IN | TEMPERATURE: 98.1 F

## 2024-04-05 DIAGNOSIS — J45.909 UNSPECIFIED ASTHMA, UNCOMPLICATED: ICD-10-CM

## 2024-04-05 DIAGNOSIS — F41.8 OTHER SPECIFIED ANXIETY DISORDERS: ICD-10-CM

## 2024-04-05 DIAGNOSIS — E78.00 PURE HYPERCHOLESTEROLEMIA, UNSPECIFIED: ICD-10-CM

## 2024-04-05 DIAGNOSIS — F03.90 UNSPECIFIED DEMENTIA W/OUT BEHAVIORAL DISTURBANCE: ICD-10-CM

## 2024-04-05 PROCEDURE — 99214 OFFICE O/P EST MOD 30 MIN: CPT

## 2024-04-05 PROCEDURE — G2211 COMPLEX E/M VISIT ADD ON: CPT

## 2024-04-05 RX ORDER — ALBUTEROL SULFATE 90 UG/1
108 (90 BASE) INHALANT RESPIRATORY (INHALATION)
Qty: 1 | Refills: 2 | Status: ACTIVE | COMMUNITY
Start: 2024-04-05 | End: 1900-01-01

## 2024-04-05 RX ORDER — MOLNUPIRAVIR 200 MG/1
200 CAPSULE ORAL TWICE DAILY
Qty: 40 | Refills: 0 | Status: DISCONTINUED | COMMUNITY
Start: 2024-03-05 | End: 2024-04-05

## 2024-04-05 NOTE — ASSESSMENT
[FreeTextEntry1] : Asthma on breo add albuterol 2 puff 4 x daily as needed crohn doing well diabetes doig well neuro/psyc stable labs and meds reviewed

## 2024-04-05 NOTE — HISTORY OF PRESENT ILLNESS
[FreeTextEntry1] : f/u [de-identified] : crohns under care doing well remicaide/MTX cognitive and psych isssue under care diabetic onoral med and insulin last a1c excellent breathing/asthma on breo neeed addl med not using albuterol? no cp recent covid did well on alternative to paxlovid

## 2024-04-11 ENCOUNTER — APPOINTMENT (OUTPATIENT)
Dept: ENDOCRINOLOGY | Facility: CLINIC | Age: 74
End: 2024-04-11
Payer: MEDICARE

## 2024-04-11 VITALS
SYSTOLIC BLOOD PRESSURE: 109 MMHG | BODY MASS INDEX: 31.39 KG/M2 | DIASTOLIC BLOOD PRESSURE: 67 MMHG | WEIGHT: 200 LBS | HEIGHT: 67 IN | TEMPERATURE: 97.7 F | HEART RATE: 105 BPM | OXYGEN SATURATION: 98 %

## 2024-04-11 DIAGNOSIS — E78.5 HYPERLIPIDEMIA, UNSPECIFIED: ICD-10-CM

## 2024-04-11 DIAGNOSIS — E16.2 HYPOGLYCEMIA, UNSPECIFIED: ICD-10-CM

## 2024-04-11 DIAGNOSIS — E11.9 TYPE 2 DIABETES MELLITUS W/OUT COMPLICATIONS: ICD-10-CM

## 2024-04-11 LAB — GLUCOSE BLDC GLUCOMTR-MCNC: 87

## 2024-04-11 PROCEDURE — 36415 COLL VENOUS BLD VENIPUNCTURE: CPT

## 2024-04-11 PROCEDURE — 99214 OFFICE O/P EST MOD 30 MIN: CPT | Mod: 25

## 2024-04-11 NOTE — PHYSICAL EXAM
[Alert] : alert [Well Nourished] : well nourished [Healthy Appearance] : healthy appearance [Obese] : obese [No Acute Distress] : no acute distress [Well Developed] : well developed [Normal Voice/Communication] : normal voice communication [Normal Sclera/Conjunctiva] : normal sclera/conjunctiva [No Proptosis] : no proptosis [No Neck Mass] : no neck mass was observed [No LAD] : no lymphadenopathy [Supple] : the neck was supple [Thyroid Not Enlarged] : the thyroid was not enlarged [No Thyroid Nodules] : no palpable thyroid nodules [No Respiratory Distress] : no respiratory distress [No Accessory Muscle Use] : no accessory muscle use [Right foot was examined, including] : right foot ~C was examined, including visual inspection with sensory and pulse exams [Left foot was examined, including] : left foot ~C was examined, including visual inspection with sensory and pulse exams [Normal] : normal [Full ROM] : with full range of motion [2+] : 2+ in the dorsalis pedis [Diminished Throughout Both Feet] : normal tactile sensation with monofilament testing throughout both feet [Normal Affect] : the affect was normal [Normal Insight/Judgement] : insight and judgment were intact [Normal Mood] : the mood was normal [Acanthosis Nigricans] : no acanthosis nigricans

## 2024-04-11 NOTE — ASSESSMENT
[FreeTextEntry1] : This is a 73-year-old male with type 2 diabetes mellitus, hypertension, hyperlipidemia, CVA, anxiety, depression, BPH, Crohn's disease, ulcerative colitis, here for follow-up. 1.  T2DM Check HbA1c. Decrease Fiasp to 20 units with breakfast to prevent hypoglycemia at lunch.  Declines personal and professional CGM and will continue current regimen for now due to no known pattern of hyperglycemia or hypoglycemia.  His last ophthalmology appointment was in January 2024, and he denies diabetic retinopathy.  He denies neuropathy. He is on atorvastatin 40 mg daily.  He is not on an ACE inhibitor or ARB. Check lipid panel.  2.  Hypertension Controlled 3.  Hyperlipidemia Continue atorvastatin 40 mg daily.

## 2024-04-11 NOTE — HISTORY OF PRESENT ILLNESS
[FreeTextEntry1] : He is accompanied by his wife.  CC: Diabetes This is a 73-year-old male with type 2 diabetes mellitus, hypertension, hyperlipidemia, anemia, CVA, anxiety, depression, BPH, Crohn's disease, ulcerative colitis, here for follow up. Type 2 diabetes was diagnosed at the age of 65.  He is currently on Tresiba 20 units at bedtime and Fiasp 26 before breakfast, 24 with lunch, and 15- 20 with dinner. He tried Metformin in the past but developed GI side effects. He also tried Januvia but developed GI side effects as well. His wife reports that he has GI side effects too many medications due to his Crohn's disease and ulcerative colitis. He self-monitors blood glucose 3 times a day. Glucose levels low to mid 100s fasting, lunch 40s to mid 100s lunch, mid 100s to 200s dinner. He has hypoglycemia a few times per month.  He saw his ophthalmologist in January 2024 and denies retinopathy. He denies neuropathy. He denies nephropathy. He is on atorvastatin 40 mg daily. He is not on an ACE inhibitor or ARB.

## 2024-04-18 LAB
ALBUMIN SERPL ELPH-MCNC: 4.3 G/DL
ALP BLD-CCNC: 97 U/L
ALT SERPL-CCNC: 13 U/L
ANION GAP SERPL CALC-SCNC: 13 MMOL/L
AST SERPL-CCNC: 19 U/L
BASOPHILS # BLD AUTO: 0.03 K/UL
BASOPHILS NFR BLD AUTO: 0.3 %
BILIRUB SERPL-MCNC: 0.5 MG/DL
BUN SERPL-MCNC: 22 MG/DL
CALCIUM SERPL-MCNC: 9.2 MG/DL
CHLORIDE SERPL-SCNC: 106 MMOL/L
CHOLEST SERPL-MCNC: 139 MG/DL
CO2 SERPL-SCNC: 25 MMOL/L
CREAT SERPL-MCNC: 1.37 MG/DL
EGFR: 54 ML/MIN/1.73M2
EOSINOPHIL # BLD AUTO: 0.08 K/UL
EOSINOPHIL NFR BLD AUTO: 0.9 %
ESTIMATED AVERAGE GLUCOSE: 105 MG/DL
GLUCOSE SERPL-MCNC: 86 MG/DL
HBA1C MFR BLD HPLC: 5.3 %
HCT VFR BLD CALC: 34.6 %
HDLC SERPL-MCNC: 47 MG/DL
HGB BLD-MCNC: 10.3 G/DL
IMM GRANULOCYTES NFR BLD AUTO: 0.4 %
LDLC SERPL CALC-MCNC: 68 MG/DL
LYMPHOCYTES # BLD AUTO: 1.53 K/UL
LYMPHOCYTES NFR BLD AUTO: 16.7 %
MAN DIFF?: NORMAL
MCHC RBC-ENTMCNC: 23.5 PG
MCHC RBC-ENTMCNC: 29.8 GM/DL
MCV RBC AUTO: 79 FL
MONOCYTES # BLD AUTO: 0.64 K/UL
MONOCYTES NFR BLD AUTO: 7 %
NEUTROPHILS # BLD AUTO: 6.85 K/UL
NEUTROPHILS NFR BLD AUTO: 74.7 %
NONHDLC SERPL-MCNC: 92 MG/DL
PLATELET # BLD AUTO: 223 K/UL
POTASSIUM SERPL-SCNC: 4 MMOL/L
PROT SERPL-MCNC: 6.8 G/DL
RBC # BLD: 4.38 M/UL
RBC # FLD: 16.1 %
SODIUM SERPL-SCNC: 143 MMOL/L
TRIGL SERPL-MCNC: 138 MG/DL
TSH SERPL-ACNC: 0.77 UIU/ML
WBC # FLD AUTO: 9.17 K/UL

## 2024-04-26 ENCOUNTER — APPOINTMENT (OUTPATIENT)
Dept: RHEUMATOLOGY | Facility: CLINIC | Age: 74
End: 2024-04-26
Payer: MEDICARE

## 2024-04-26 ENCOUNTER — MED ADMIN CHARGE (OUTPATIENT)
Age: 74
End: 2024-04-26

## 2024-04-26 VITALS — DIASTOLIC BLOOD PRESSURE: 68 MMHG | OXYGEN SATURATION: 96 % | SYSTOLIC BLOOD PRESSURE: 116 MMHG | HEART RATE: 86 BPM

## 2024-04-26 VITALS
SYSTOLIC BLOOD PRESSURE: 139 MMHG | DIASTOLIC BLOOD PRESSURE: 73 MMHG | TEMPERATURE: 97.4 F | HEART RATE: 95 BPM | RESPIRATION RATE: 16 BRPM | OXYGEN SATURATION: 98 %

## 2024-04-26 PROCEDURE — 96415 CHEMO IV INFUSION ADDL HR: CPT

## 2024-04-26 PROCEDURE — 96413 CHEMO IV INFUSION 1 HR: CPT

## 2024-04-26 RX ORDER — ACETAMINOPHEN 325 MG/1
325 TABLET ORAL
Qty: 0 | Refills: 0 | Status: COMPLETED
Start: 2023-11-17

## 2024-04-26 RX ORDER — INFLIXIMAB 100 MG/10ML
100 INJECTION, POWDER, LYOPHILIZED, FOR SOLUTION INTRAVENOUS
Qty: 1 | Refills: 0 | Status: COMPLETED
Start: 2023-11-17

## 2024-04-26 RX ORDER — CETIRIZINE HYDROCHLORIDE 10 MG/1
10 TABLET, COATED ORAL
Qty: 0 | Refills: 0 | Status: COMPLETED
Start: 2023-11-17

## 2024-04-26 NOTE — HISTORY OF PRESENT ILLNESS
[N/A] : N/A [Denies] : Denies [Yes] : Yes [Declined] : Declined [de-identified] : patient with balance issues [Left upper extremity] : Left upper extremity [24g] : 24g [Start Time: ___] : Medication Start Time: [unfilled] [End Time: ___] : Medication End Time: [unfilled] [Medication Name: ___] : Medication Name: [unfilled] [Total Amount Administered: ___] : Total Amount Administered: [unfilled] [IV discontinued. Intact. No signs or symptoms of IV complications noted. Time: ___] : IV discontinued. Intact. No signs or symptoms of IV complications noted. Time: [unfilled] [Patient  instructed to seek medical attention with signs and symptoms of adverse effects] : Patient  instructed to seek medical attention with signs and symptoms of adverse effects [Patient left unit in no acute distress] : Patient left unit in no acute distress [Medications administered as ordered and tolerated well.] : Medications administered as ordered and tolerated well. [de-identified] : Left hand  [de-identified] : Patient presents for scheduled Remicade infusion, accompanied by family member in NAD. Today, patient denies recent infection, hospitalizations or ABX use. Patient denies any fever, chest pain or SOB. Patient reports he is doing well overall. Patient reports 1-2 formed BMs daily, denies blood or mucus. Patient denies having any abdominal pain, cramping, n/v, or bloating. Patient denies any urgency or frequency. No other symptoms or concerns verbalized. Patient pre-medicated as prescribed and infusion tolerated well. Pt left unit, NAD accompanied by family member.

## 2024-05-02 RX ORDER — INSULIN ASPART INJECTION 100 [IU]/ML
100 INJECTION, SOLUTION SUBCUTANEOUS
Qty: 5 | Refills: 2 | Status: ACTIVE | COMMUNITY
Start: 2023-08-28 | End: 1900-01-01

## 2024-05-29 ENCOUNTER — APPOINTMENT (OUTPATIENT)
Dept: RHEUMATOLOGY | Facility: CLINIC | Age: 74
End: 2024-05-29
Payer: MEDICARE

## 2024-05-29 ENCOUNTER — MED ADMIN CHARGE (OUTPATIENT)
Age: 74
End: 2024-05-29

## 2024-05-29 VITALS
DIASTOLIC BLOOD PRESSURE: 71 MMHG | OXYGEN SATURATION: 96 % | SYSTOLIC BLOOD PRESSURE: 127 MMHG | RESPIRATION RATE: 16 BRPM | HEART RATE: 86 BPM

## 2024-05-29 VITALS
HEART RATE: 94 BPM | SYSTOLIC BLOOD PRESSURE: 155 MMHG | TEMPERATURE: 98.2 F | RESPIRATION RATE: 16 BRPM | DIASTOLIC BLOOD PRESSURE: 70 MMHG | OXYGEN SATURATION: 97 %

## 2024-05-29 DIAGNOSIS — K50.90 CROHN'S DISEASE, UNSPECIFIED, W/OUT COMPLICATIONS: ICD-10-CM

## 2024-05-29 PROCEDURE — 96415 CHEMO IV INFUSION ADDL HR: CPT

## 2024-05-29 PROCEDURE — 36415 COLL VENOUS BLD VENIPUNCTURE: CPT

## 2024-05-29 PROCEDURE — 96413 CHEMO IV INFUSION 1 HR: CPT

## 2024-05-29 RX ORDER — INFLIXIMAB 100 MG/10ML
100 INJECTION, POWDER, LYOPHILIZED, FOR SOLUTION INTRAVENOUS
Refills: 0 | Status: ACTIVE | OUTPATIENT
Start: 2024-05-29 | End: 1900-01-01

## 2024-05-29 RX ORDER — INFLIXIMAB 100 MG/10ML
100 INJECTION, POWDER, LYOPHILIZED, FOR SOLUTION INTRAVENOUS
Qty: 1 | Refills: 0 | Status: COMPLETED
Start: 2023-11-17

## 2024-05-29 RX ORDER — CETIRIZINE HYDROCHLORIDE 10 MG/1
10 TABLET, COATED ORAL
Qty: 0 | Refills: 0 | Status: COMPLETED
Start: 2023-11-17

## 2024-05-29 RX ORDER — ACETAMINOPHEN 325 MG/1
325 TABLET ORAL
Qty: 0 | Refills: 0 | Status: COMPLETED
Start: 2023-11-17

## 2024-05-29 RX ORDER — ACETAMINOPHEN 325 MG/1
325 TABLET, FILM COATED ORAL
Refills: 0 | Status: ACTIVE | OUTPATIENT
Start: 2024-05-29 | End: 1900-01-01

## 2024-05-29 RX ORDER — CETIRIZINE HYDROCHLORIDE 10 MG/1
10 TABLET, FILM COATED ORAL
Refills: 0 | Status: ACTIVE | OUTPATIENT
Start: 2024-05-29 | End: 1900-01-01

## 2024-05-29 RX ADMIN — ACETAMINOPHEN 0 MG: 325 TABLET, FILM COATED ORAL at 00:00

## 2024-05-29 RX ADMIN — CETIRIZINE HYDROCHLORIDE 0 MG: 10 TABLET, FILM COATED ORAL at 00:00

## 2024-05-29 RX ADMIN — INFLIXIMAB 0 MG: 100 INJECTION, POWDER, LYOPHILIZED, FOR SOLUTION INTRAVENOUS at 00:00

## 2024-05-29 NOTE — HISTORY OF PRESENT ILLNESS
[N/A] : N/A [Denies] : Denies [Yes] : Yes [Declined] : Declined [Left upper extremity] : Left upper extremity [24g] : 24g [Start Time: ___] : Medication Start Time: [unfilled] [End Time: ___] : Medication End Time: [unfilled] [Medication Name: ___] : Medication Name: [unfilled] [Total Amount Administered: ___] : Total Amount Administered: [unfilled] [IV discontinued. Intact. No signs or symptoms of IV complications noted. Time: ___] : IV discontinued. Intact. No signs or symptoms of IV complications noted. Time: [unfilled] [Patient  instructed to seek medical attention with signs and symptoms of adverse effects] : Patient  instructed to seek medical attention with signs and symptoms of adverse effects [Patient left unit in no acute distress] : Patient left unit in no acute distress [Medications administered as ordered and tolerated well.] : Medications administered as ordered and tolerated well. [Blood drawn at time of visit] : Blood drawn at time of visit [de-identified] : patient with balance issues [de-identified] : median cubital vein  [de-identified] : ISIDORONF/HepB drawn  [de-identified] : Patient presents for scheduled Remicade infusion, accompanied by family member in NAD. Today, patient denies recent infection, hospitalizations or antibiotic use. Patient reports 4-5 formed BMs daily, denies blood or mucus. Patient denies having any abdominal pain, cramping, n/v, or bloating. Patient denies any urgency or frequency. No other symptoms or concerns verbalized. Patient pre-medicated as prescribed and infusion tolerated well.

## 2024-05-30 LAB — HBV SURFACE AG SER QL: NONREACTIVE

## 2024-05-30 RX ORDER — BLOOD SUGAR DIAGNOSTIC
STRIP MISCELLANEOUS
Qty: 4 | Refills: 3 | Status: ACTIVE | COMMUNITY
Start: 2021-02-17 | End: 2025-05-24

## 2024-05-31 ENCOUNTER — RX RENEWAL (OUTPATIENT)
Age: 74
End: 2024-05-31

## 2024-06-01 LAB
M TB IFN-G BLD-IMP: NEGATIVE
QUANTIFERON TB PLUS MITOGEN MINUS NIL: 3.28 IU/ML
QUANTIFERON TB PLUS NIL: 0.05 IU/ML
QUANTIFERON TB PLUS TB1 MINUS NIL: 0 IU/ML
QUANTIFERON TB PLUS TB2 MINUS NIL: 0 IU/ML

## 2024-06-05 ENCOUNTER — APPOINTMENT (OUTPATIENT)
Dept: ENDOCRINOLOGY | Facility: CLINIC | Age: 74
End: 2024-06-05

## 2024-06-25 NOTE — CONSULT NOTE ADULT - ASSESSMENT
CHIEF COMPLAINT  Follow-up for back pain.    Subjective   Marycarmen Gauthier is a 39 y.o. female  who presents to the office for follow-up of procedure.  She completed a Lumbar Medial Branch Blockade at  Bilateral L5-S1 on  6/14/2024 performed by Dr. Kerr for management of back pain. Patient reports 100% relief from the procedure the day of the procedure.     Today her pain is 2/10VAS in severity. She states that her pain remains is her lumbar spine and is axial in nature. She denies any lower extremity radicular pain.     Procedure list:  3/27/2024-bilateral SI joint injection-75% temporary relief with ONGOING 60% relief     Past pain medications: Flexeril 5 mg TID PRN     Current pain medications: OTC Tylenol and Ibuprofen (no relief), Voltaren gel (no relief), Lidocaine patches (helpful).      Past therapies:  Physical Therapy: Yes, no relief  Chiropractor: Years ago, not for this current pain  Massage Therapy: No, she is getting a massage today  TENS: Has utilized in the past, has not used for this.   Neck or back surgery: None  Past pain management: Yes, unsure of which clinic (this was years ago)     Previous Injections:   HAMLET--no relief    Back Pain  This is a chronic problem. The current episode started more than 1 month ago. The problem occurs constantly. The problem has been worse (Unstable) since onset. The pain is present in the sacro-iliac and lumbar spine. The quality of the pain is described as aching (dull). The pain does not radiate. The pain is at a severity of 2/10. The symptoms are aggravated by position. Associated symptoms include numbness (bilateral arms) and weakness (bilateral arms). Pertinent negatives include no dysuria. She has tried heat, ice, muscle relaxant, NSAIDs and analgesics (PT) for the symptoms.      PEG Assessment   What number best describes your pain on average in the past week?6  What number best describes how, during the past week, pain has interfered with your enjoyment of  "life?8  What number best describes how, during the past week, pain has interfered with your general activity?  6    The following portions of the patient's history were reviewed and updated as appropriate: allergies, current medications, past family history, past medical history, past social history, past surgical history, and problem list.    Review of Systems   Gastrointestinal:  Negative for constipation and diarrhea.   Genitourinary:  Negative for difficulty urinating and dysuria.   Musculoskeletal:  Positive for back pain.   Neurological:  Positive for weakness (bilateral arms) and numbness (bilateral arms).   Psychiatric/Behavioral:  Positive for sleep disturbance. Negative for suicidal ideas. The patient is nervous/anxious.      --  The aforementioned information the Chief Complaint section and above subjective data including any HPI data, and also the Review of Systems data, has been personally reviewed and affirmed.  --     Vitals:    06/25/24 0755   BP: 124/82   Pulse: 96   Resp: 18   Temp: 97.5 °F (36.4 °C)   SpO2: 99%   Weight: 91.3 kg (201 lb 3.2 oz)   Height: 152.4 cm (60\")   PainSc:   2   PainLoc: Back     Objective   Physical Exam  Vitals and nursing note reviewed.   Constitutional:       Appearance: Normal appearance. She is well-developed.   Eyes:      General: Lids are normal.   Cardiovascular:      Rate and Rhythm: Normal rate.   Pulmonary:      Effort: Pulmonary effort is normal.   Musculoskeletal:      Lumbar back: Tenderness and bony tenderness present. Decreased range of motion.      Comments:   +Lumbar facet loading   Skin:         Neurological:      Mental Status: She is alert and oriented to person, place, and time.   Psychiatric:         Speech: Speech normal.         Behavior: Behavior normal.         Judgment: Judgment normal.       Assessment & Plan   Diagnoses and all orders for this visit:    1. Lumbar facet arthropathy (Primary)    2. Sacroiliac joint dysfunction of both sides    3. " Chronic bilateral low back pain without sciatica        Marycarmen Gauthier reports a pain score of 2.  Given her pain assessment as noted, treatment options were discussed and the following options were decided upon as a follow-up plan to address the patient's pain: continuation of current treatment plan for pain.    Diagnostic Facet Joint Procedure:   MBB     The confirmatory diagnostic facet joint procedure is considered medically reasonable and necessary for the diagnosis and treatment of chronic pain for this patient due to the patient meeting all of the following criteria:    - 1. Moderate to severe chronic neck or low back pain, predominantly axial, that causes functional deficit measured on pain or disability scale.  - 2. Pain present for minimum of 3 months with documented failure to respond to noninvasive conservative management (as tolerated)  - 3. Absence of untreated radiculopathy or neurogenic claudication (except for radiculopathy caused by facet joint synovial cyst)  - 4. There is no non-facet pathology per clinical assessment or radiology studies that could explain the source of the patient’s pain, including but not limited to fracture, tumor, infection, or significant deformity.    The patient has also shown a consistent positive response of at least 80% relief of primary (index) pain (with the duration of relief being consistent with the agent used).    --- Repeat Bilateral L5-S1 MBB (valium)  Reviewed the procedure at length with the patient.  Included in the review was expectations, complications, risk and benefits.The procedure was described in detail and the risks, benefits and alternatives were discussed with the patient (including but not limited to: bleeding, infection, nerve damage, worsening of pain, inability to perform injection, paralysis, seizures, coma, no pain relief and death) who agreed to proceed.  Discussed the potential for sedation if warranted/wanted.  The procedure will plan to  68-year-old male, with long-standing history of inflammatory bowel disease (Ulcerative colitis), CVA without residual deficits, HTN, HLD, and DM2 presented to the emergency room with chief complain of passing out at home. Patient complains of >10BMs a day and thus GI was consulted.     IMPRESSION  - Diarrhea, could be 2/2 infection vs UC flare vs recent antibiotic use   - Syncope, pending workup  - Normocytic anemia, could be 2/2 blood loss with bowel movements  - History of Ulcerative colitis: diagnosed in 2011, previously on 6-mercaptopurine but discontinued due to infectious complications; recently on mesalamine 4.8 g daily with steroid taper; being evaluated for Entyvio; last colonoscopy 5/2018 with colitis from the anal rectum to 25 cm consistent with left-sided ulcerative colitis and no dysplasia, follows with Dr Oro     RECOMMENDATIONS    - trend CBC, CMP, INR  - please send infectious workup: C diff, GI stool PCR, stool cultures, ova and parasites  - continue mesalamine home dose  - please send iron studies, vitamin b12, folate levels   - pending TTE  - once infectious workup is negative, will discuss with Dr Oro re: starting biologics as inpatient vs outpatient  - patient will follow with Dr Gatito Oro on discharge as outpatient be performed at [facility] with fluoroscopic guidance(unless ultrasound is indicated) and could potentially have steroids and contrast dye used. Questions were answered and in a way the patient could understand.  Patient verbalized understanding and wishes to proceed.  This intervention will be ordered.  Discussed with patient that all procedures are part of a multimodal plan of care and include either formal PT or a home exercise program.  Patient has no evidence of coagulopathy or current infection.    --- Of note the right superior injection with localized redness with a ulcerated center. Ms. Gauthier states that this has been itching and she has been scratching it. On exam there is no edema, drainage, warmth, or spreading redness. Ms. Gauthier was advised to cover this with a dry bandage to keep from scratching and to keep her pant line from rubbing the area. Reviewed that she needs to keep a close eye on this and to notify our office immediately if for any drainage, increase in size of the irritated area, swelling, or pain at the site. Reviewed that if this is not healed it is possible her second MBB with be postponed, she states understanding. I will also notify Dr. Kerr of this.     --- Follow-up after procedure     Dictated utilizing Dragon dictation.     68-year-old male, with long-standing history of inflammatory bowel disease (Ulcerative colitis), CVA without residual deficits, depression (on medications), HTN, HLD, and DM2 presented to the emergency room with chief complain of passing out at home. Patient complains of >10BMs a day and thus GI was consulted.     IMPRESSION  - Diarrhea, could be 2/2 infection vs UC flare vs recent antibiotic use   - Syncope, pending workup: CT head negative blood glucose level 115, pending TTE  - Normocytic anemia, could be 2/2 blood loss with bowel movements  - History of Ulcerative colitis: diagnosed in 2011, previously on 6-mercaptopurine but discontinued due to infectious complications; recently on mesalamine 4.8 g daily with steroid taper; being evaluated for Entyvio; last colonoscopy 5/2018 with colitis from the anal rectum to 25 cm consistent with left-sided ulcerative colitis and no dysplasia, follows with Dr Oro     RECOMMENDATIONS    - trend CBC, CMP, INR  - please send infectious workup: C diff, GI stool PCR, stool cultures, ova and parasites  - continue mesalamine home dose  - please send iron studies, vitamin b12, folate levels   - pending TTE  - once infectious workup is negative, will discuss with Dr Oro re: starting biologics as inpatient vs outpatient  - patient will follow with Dr Gatito Oro on discharge as outpatient 68-year-old male, with long-standing history of inflammatory bowel disease (Ulcerative colitis), CVA without residual deficits, depression (on medications), HTN, HLD, and DM2 presented to the emergency room with chief complain of passing out at home. Patient complains of >10BMs a day and thus GI was consulted.     IMPRESSION  - Diarrhea, could be 2/2 infection vs UC flare vs recent antibiotic use   - Syncope, pending workup: CT head negative blood glucose level 115, pending TTE  - Normocytic anemia, could be 2/2 blood loss with bowel movements (7/2018 with Hb 11.6)  - History of Ulcerative colitis: diagnosed in 2011, previously on 6-mercaptopurine but discontinued due to infectious complications; recently on mesalamine 4.8 g daily with steroid taper; being evaluated for Entyvio; last colonoscopy 5/2018 with colitis from the anal rectum to 25 cm consistent with left-sided ulcerative colitis and no dysplasia, follows with Dr Oro     RECOMMENDATIONS    - trend CBC, CMP, INR  - please send infectious workup: C diff, GI stool PCR, stool cultures, ova and parasites  - continue mesalamine home dose  - please send iron studies, vitamin b12, folate levels   - pending TTE  - once infectious workup is negative, will discuss with Dr Oro re: starting biologics as inpatient vs outpatient  - patient will follow with Dr Gatito Oro on discharge as outpatient 68-year-old male, with long-standing history of inflammatory bowel disease (Ulcerative colitis), CVA without residual deficits, depression (on medications), HTN, HLD, and DM2 presented to the emergency room with chief complain of passing out at home. Patient complains of >10BMs a day and thus GI was consulted.     IMPRESSION  - Diarrhea, could be 2/2 infection vs UC flare vs recent antibiotic use   - Syncope, unclear cause - no obvious dehydration or blood loss; pending workup: CT head negative blood glucose level 115, pending TTE  - Normocytic anemia, could be 2/2 blood loss with bowel movements (7/2018 with Hb 11.6)  - History of Ulcerative colitis: diagnosed in 2011, previously on 6-mercaptopurine but discontinued due to infectious complications; recently on mesalamine 4.8 g daily with steroid taper; being evaluated for Entyvio; last colonoscopy 5/2018 with colitis from the anal rectum to 25 cm consistent with left-sided ulcerative colitis and no dysplasia, follows with Dr Oro     RECOMMENDATIONS    - trend CBC, CMP, INR  - please send infectious workup: C diff, GI stool PCR  - continue mesalamine home dose  - please send iron studies, vitamin b12, folate levels   - pending further cardiologic evaluation  - once infectious workup is negative, will discuss with Dr Oro re: starting biologics as inpatient vs outpatient  - patient will follow with Dr Gatito Oro on discharge as outpatient

## 2024-06-26 ENCOUNTER — APPOINTMENT (OUTPATIENT)
Dept: RHEUMATOLOGY | Facility: CLINIC | Age: 74
End: 2024-06-26
Payer: MEDICARE

## 2024-06-26 ENCOUNTER — MED ADMIN CHARGE (OUTPATIENT)
Age: 74
End: 2024-06-26

## 2024-06-26 VITALS
TEMPERATURE: 97.7 F | HEART RATE: 87 BPM | DIASTOLIC BLOOD PRESSURE: 65 MMHG | RESPIRATION RATE: 16 BRPM | SYSTOLIC BLOOD PRESSURE: 105 MMHG | OXYGEN SATURATION: 96 %

## 2024-06-26 VITALS
HEART RATE: 82 BPM | RESPIRATION RATE: 16 BRPM | DIASTOLIC BLOOD PRESSURE: 70 MMHG | OXYGEN SATURATION: 98 % | SYSTOLIC BLOOD PRESSURE: 133 MMHG

## 2024-06-26 PROCEDURE — 96413 CHEMO IV INFUSION 1 HR: CPT

## 2024-06-26 PROCEDURE — 96415 CHEMO IV INFUSION ADDL HR: CPT

## 2024-06-26 RX ORDER — INFLIXIMAB 100 MG/10ML
100 INJECTION, POWDER, LYOPHILIZED, FOR SOLUTION INTRAVENOUS
Qty: 1 | Refills: 0 | Status: COMPLETED
Start: 2024-05-29

## 2024-06-26 RX ORDER — CETIRIZINE HYDROCHLORIDE 10 MG/1
10 TABLET, FILM COATED ORAL
Qty: 0 | Refills: 0 | Status: COMPLETED
Start: 2024-05-29

## 2024-06-26 RX ORDER — ACETAMINOPHEN 325 MG/1
325 TABLET, FILM COATED ORAL
Qty: 0 | Refills: 0 | Status: COMPLETED
Start: 2024-05-29

## 2024-07-24 ENCOUNTER — APPOINTMENT (OUTPATIENT)
Dept: RHEUMATOLOGY | Facility: CLINIC | Age: 74
End: 2024-07-24
Payer: MEDICARE

## 2024-07-24 ENCOUNTER — MED ADMIN CHARGE (OUTPATIENT)
Age: 74
End: 2024-07-24

## 2024-07-24 VITALS
DIASTOLIC BLOOD PRESSURE: 73 MMHG | TEMPERATURE: 98.3 F | RESPIRATION RATE: 16 BRPM | HEART RATE: 80 BPM | OXYGEN SATURATION: 98 % | SYSTOLIC BLOOD PRESSURE: 137 MMHG

## 2024-07-24 VITALS
RESPIRATION RATE: 16 BRPM | SYSTOLIC BLOOD PRESSURE: 120 MMHG | DIASTOLIC BLOOD PRESSURE: 65 MMHG | OXYGEN SATURATION: 97 % | HEART RATE: 76 BPM

## 2024-07-24 PROCEDURE — 96413 CHEMO IV INFUSION 1 HR: CPT

## 2024-07-24 PROCEDURE — 96415 CHEMO IV INFUSION ADDL HR: CPT

## 2024-07-24 RX ORDER — CETIRIZINE HYDROCHLORIDE 10 MG/1
10 TABLET, FILM COATED ORAL
Qty: 0 | Refills: 0 | Status: COMPLETED
Start: 2024-05-29

## 2024-07-24 RX ORDER — INFLIXIMAB 100 MG/10ML
100 INJECTION, POWDER, LYOPHILIZED, FOR SOLUTION INTRAVENOUS
Qty: 1 | Refills: 0 | Status: COMPLETED
Start: 2024-05-29

## 2024-07-24 RX ORDER — ACETAMINOPHEN 325 MG/1
325 TABLET, FILM COATED ORAL
Qty: 0 | Refills: 0 | Status: COMPLETED
Start: 2024-05-29

## 2024-07-24 NOTE — HISTORY OF PRESENT ILLNESS
[N/A] : N/A [Denies] : Denies [Yes] : Yes [Informed consent documented in EHR.] : Informed consent documented in EHR. [24g] : 24g [Start Time: ___] : Medication Start Time: [unfilled] [End Time: ___] : Medication End Time: [unfilled] [Medication Name: ___] : Medication Name: [unfilled] [Total Amount Administered: ___] : Total Amount Administered: [unfilled] [IV discontinued. Intact. No signs or symptoms of IV complications noted. Time: ___] : IV discontinued. Intact. No signs or symptoms of IV complications noted. Time: [unfilled] [Patient  instructed to seek medical attention with signs and symptoms of adverse effects] : Patient  instructed to seek medical attention with signs and symptoms of adverse effects [Patient left unit in no acute distress] : Patient left unit in no acute distress [Medications administered as ordered and tolerated well.] : Medications administered as ordered and tolerated well. [de-identified] : patient with balance issues [de-identified] : left hand dorsal vein  [de-identified] : no labs [de-identified] : Patient presents for scheduled Remicade infusion, ambulatory in [facility], accompanied by spouse. Patient reports an average2 BM's daily formed stool without blood or mucous present. However, week before  his infusions patient reports having up to 7 or 8 soft BMs daily with intermittent urgency. Patient denies having any abdominal pain, cramping, n/v, or bloating. No other symptoms or concerns verbalized. Patient pre-medicated as prescribed and infusion tolerated well.

## 2024-08-12 NOTE — H&P ADULT - HISTORY OF PRESENT ILLNESS
More alert, took off EKG leads and attempting to remove PIVs  Placed ace wrap to both PIVs and placed soft mittens to bilateral hands,   Daughter at bedside assisting in keeping patient from removing wires  Fall precautions in place  Monitors in place   67 yo M presents s/p syncopal episode last night. Wife heard a noise ~1am and found pt on the floor, pt's son had arrived before her and noticed pt was slightly confused, didn't know what happened. Pt recalls going to the bathroom to urinate and walking into the hallway before he suddenly "knocked out". Pt states he didn't know what happened, felt lost, wife reports pt fell backwards, hitting the back of his head on a closed door, which opened after hit. Per wife pt c/o feeling dizzy before falling but did not c/o CP, palp's or SOB. No  or GI incontinence was observed. Pt has had 1 episode of LOC ~6 months ago while on a plane, was hospitalized in [facility] where he was found to have an infection. Currently pt is on his last day of levofloxacin for a UTI.   Pt admits to decreased appetite for the last 3 months and reports a 21 lb weight loss over the last 1 month due to being scared of eating due to his diarrhea/ fecal incontinence at times which he attributes to Crohn's disease. Pt describes BM's as small ad loose and notices the toilet paper is pinkish in color after wiping but no gross hematochezia or melena. Pt had his last colonoscopy this year and last EGD 1 year ago which was reportedly negative for ulcers. 68M with history of Crohn's disease, CVA without residual deficits, HTN, HLD, and DM2 presents s/p syncopal episode last night. Wife heard a noise ~1am and found pt on the floor, pt's son had arrived before her and noticed pt was slightly confused, didn't know what happened. Pt recalls going to the bathroom to urinate and walking into the hallway before he suddenly "knocked out". Pt states he didn't know what happened, felt lost, wife reports pt fell backwards, hitting the back of his head on a closed door, which opened after hit. Per wife pt c/o feeling dizzy before falling but did not c/o CP, palp's or SOB. No  or GI incontinence was observed. Pt has had 1 episode of LOC ~6 months ago while on a plane, was hospitalized in [facility] where he was found to have an infection. Currently pt is on his last day of levofloxacin for a UTI.     Pt admits to decreased appetite for the last 3 months and reports a 21 lb weight loss over the last 2 months due to being scared of eating due to his diarrhea/ fecal incontinence at times which he attributes to Crohn's disease. Pt describes BM's as small and loose and notices the toilet paper is pinkish in color after wiping but no gross hematochezia or melena or BRBPR. Pt had his last colonoscopy this year and last EGD 1 year ago which was reportedly negative for ulcers.       On arrival to the ED, his vitals were T 98.7, P 86, /57, R 16, O2 sat 98% RA. 68M with history of Crohn's disease, CVA without residual deficits, HTN, HLD, and DM2 presents s/p syncopal episode last night. Wife heard a noise ~1am and found pt on the floor, pt's son had arrived before her and noticed pt was slightly confused, didn't know what happened. Pt recalls going to the bathroom to urinate and walking into the hallway before he suddenly "knocked out". Pt states he didn't know what happened, felt lost, wife reports pt fell backwards, hitting the back of his head on a closed door, which opened after hit. Per wife pt c/o feeling dizzy before falling but did not c/o CP, palp's or SOB. No  or GI incontinence was observed. Pt has had 1 episode of LOC ~6 months ago while on a plane, was hospitalized in [facility] where he was found to have an infection. Currently pt is on his last day of levofloxacin for a UTI.     Pt admits to decreased appetite for the last 3 months and reports a 21 lb weight loss over the last 2 months due to being scared of eating due to his diarrhea/ fecal incontinence at times which he attributes to Crohn's disease. Pt describes BM's as small and loose and notices the toilet paper is pinkish in color after wiping but no gross hematochezia or melena or BRBPR. Pt had his last colonoscopy this year and last EGD 1 year ago which was reportedly negative for ulcers.       On arrival to the ED, his vitals were T 98.7, P 86, /57, R 16, O2 sat 98% RA. His lab work here showed him to be anemic to 9.9 (baseline Hgb is ~11-12) with occult positive stools. He was given 1L NS. He was admitted to medicine on telemetry.

## 2024-08-21 ENCOUNTER — APPOINTMENT (OUTPATIENT)
Dept: RHEUMATOLOGY | Facility: CLINIC | Age: 74
End: 2024-08-21
Payer: MEDICARE

## 2024-08-21 ENCOUNTER — MED ADMIN CHARGE (OUTPATIENT)
Age: 74
End: 2024-08-21

## 2024-08-21 VITALS
OXYGEN SATURATION: 98 % | RESPIRATION RATE: 16 BRPM | HEART RATE: 79 BPM | DIASTOLIC BLOOD PRESSURE: 75 MMHG | SYSTOLIC BLOOD PRESSURE: 154 MMHG

## 2024-08-21 VITALS
OXYGEN SATURATION: 98 % | BODY MASS INDEX: 30.54 KG/M2 | WEIGHT: 195 LBS | SYSTOLIC BLOOD PRESSURE: 136 MMHG | RESPIRATION RATE: 16 BRPM | TEMPERATURE: 98.1 F | HEART RATE: 78 BPM | DIASTOLIC BLOOD PRESSURE: 75 MMHG

## 2024-08-21 PROCEDURE — 96415 CHEMO IV INFUSION ADDL HR: CPT

## 2024-08-21 PROCEDURE — 96413 CHEMO IV INFUSION 1 HR: CPT

## 2024-08-21 RX ORDER — ACETAMINOPHEN 325 MG/1
325 TABLET ORAL
Qty: 0 | Refills: 0 | Status: COMPLETED
Start: 2024-05-29

## 2024-08-21 RX ORDER — INFLIXIMAB 100 MG/10ML
100 INJECTION, POWDER, LYOPHILIZED, FOR SOLUTION INTRAVENOUS
Qty: 1 | Refills: 0 | Status: COMPLETED
Start: 2024-05-29

## 2024-08-21 RX ORDER — CETIRIZINE HYDROCHLORIDE 10 MG/1
10 TABLET, FILM COATED ORAL
Qty: 0 | Refills: 0 | Status: COMPLETED
Start: 2024-05-29

## 2024-08-21 NOTE — HISTORY OF PRESENT ILLNESS
[N/A] : N/A [Denies] : Denies [Yes] : Yes [Informed consent documented in EHR.] : Informed consent documented in EHR. [24g] : 24g [Start Time: ___] : Medication Start Time: [unfilled] [End Time: ___] : Medication End Time: [unfilled] [Medication Name: ___] : Medication Name: [unfilled] [Total Amount Administered: ___] : Total Amount Administered: [unfilled] [IV discontinued. Intact. No signs or symptoms of IV complications noted. Time: ___] : IV discontinued. Intact. No signs or symptoms of IV complications noted. Time: [unfilled] [Patient  instructed to seek medical attention with signs and symptoms of adverse effects] : Patient  instructed to seek medical attention with signs and symptoms of adverse effects [Patient left unit in no acute distress] : Patient left unit in no acute distress [Medications administered as ordered and tolerated well.] : Medications administered as ordered and tolerated well. [de-identified] : patient with balance issues [de-identified] : left hand dorsal vein  [de-identified] : no labs [de-identified] : Patient presents for scheduled Remicade infusion, ambulatory in [facility], accompanied by spouse. Patient reports an average2 to 3 BM's daily formed stool without blood or mucous present, with intermittent urgency. Patient denies having any abdominal pain, cramping, n/v, or bloating. No other symptoms or concerns verbalized. Patient pre-medicated as prescribed and infusion tolerated well.

## 2024-08-28 NOTE — ED CDU PROVIDER INITIAL DAY NOTE - MUSCULOSKELETAL [+], MLM
Detail Level: Detailed Quality 226: Preventive Care And Screening: Tobacco Use: Screening And Cessation Intervention: Patient screened for tobacco use, is a smoker AND received Cessation Counseling within measurement period or in the six months prior to the measurement period BACK PAIN

## 2024-08-29 ENCOUNTER — APPOINTMENT (OUTPATIENT)
Dept: GASTROENTEROLOGY | Facility: CLINIC | Age: 74
End: 2024-08-29

## 2024-09-04 ENCOUNTER — APPOINTMENT (OUTPATIENT)
Dept: ENDOCRINOLOGY | Facility: CLINIC | Age: 74
End: 2024-09-04

## 2024-09-04 VITALS
HEART RATE: 102 BPM | TEMPERATURE: 98 F | DIASTOLIC BLOOD PRESSURE: 70 MMHG | HEIGHT: 67 IN | WEIGHT: 193 LBS | OXYGEN SATURATION: 97 % | SYSTOLIC BLOOD PRESSURE: 159 MMHG | BODY MASS INDEX: 30.29 KG/M2

## 2024-09-04 PROCEDURE — 99213 OFFICE O/P EST LOW 20 MIN: CPT

## 2024-09-04 PROCEDURE — 36415 COLL VENOUS BLD VENIPUNCTURE: CPT

## 2024-09-04 PROCEDURE — 82962 GLUCOSE BLOOD TEST: CPT

## 2024-09-04 PROCEDURE — 99214 OFFICE O/P EST MOD 30 MIN: CPT

## 2024-09-04 NOTE — HISTORY OF PRESENT ILLNESS
[FreeTextEntry1] : He is accompanied by his son.   CC: Diabetes This is a 74-year-old male with type 2 diabetes mellitus, hypertension, hyperlipidemia, anemia, CVA, anxiety, depression, BPH, Crohn's disease, ulcerative colitis, here for follow up. Type 2 diabetes was diagnosed at the age of 65.  He is currently on Tresiba 20 units approximately at 7 pm, Fiasp 20 before breakfast, 24 with lunch, not currently taking Fiasp before dinner.  He tried Metformin in the past but developed GI side effects. He also tried Januvia but developed GI side effects as well. His wife reports that he has GI side effects too many medications due to his Crohn's disease and ulcerative colitis. He self-monitors blood glucose 3 times a day. Glucose levels mid 100s, lunch 40s-low 100s, mid 100s to 200s. He has hypoglycemia a few times per month, usually at lunchtime.  He saw his ophthalmologist in January 2024 and denies retinopathy. He denies neuropathy. He denies nephropathy. He is on atorvastatin 40 mg daily. He is not on an ACE inhibitor or ARB.

## 2024-09-04 NOTE — ASSESSMENT
[FreeTextEntry1] : This is a 74-year-old male with type 2 diabetes mellitus, hypertension, hyperlipidemia, CVA, anxiety, depression, BPH, Crohn's disease, ulcerative colitis, here for follow-up. 1.  T2DM Check HbA1c. Decrease Fiasp to 15 units at breakfast to prevent hypoglycemia at lunch. He is not currently taking Fiasp with dinner (was taking Tresiba before dinner instead). Will start Fiasp 10 units before dinner.  His last ophthalmology appointment was in January 2024, and he denies diabetic retinopathy.  Provided with glucose logs and advised to send logs to office in 1 week.  He denies neuropathy. He is on atorvastatin 40 mg daily.  He is not on an ACE inhibitor or ARB. Check lipid panel.  2.  Hypertension Controlled 3.  Hyperlipidemia Continue atorvastatin 40 mg daily. Check lipid panel.

## 2024-09-04 NOTE — PHYSICAL EXAM
[Alert] : alert [Well Nourished] : well nourished [Healthy Appearance] : healthy appearance [Obese] : obese [No Acute Distress] : no acute distress [Well Developed] : well developed [Normal Voice/Communication] : normal voice communication [Normal Sclera/Conjunctiva] : normal sclera/conjunctiva [No Proptosis] : no proptosis [No Neck Mass] : no neck mass was observed [No LAD] : no lymphadenopathy [Supple] : the neck was supple [Thyroid Not Enlarged] : the thyroid was not enlarged [No Thyroid Nodules] : no palpable thyroid nodules [No Respiratory Distress] : no respiratory distress [No Accessory Muscle Use] : no accessory muscle use [Right foot was examined, including] : right foot ~C was examined, including visual inspection with sensory and pulse exams [Left foot was examined, including] : left foot ~C was examined, including visual inspection with sensory and pulse exams [Normal] : normal [2+] : 2+ in the dorsalis pedis [Normal Sensation on Monofilament Testing] : normal sensation on monofilament testing of lower extremities [Normal Affect] : the affect was normal [Normal Insight/Judgement] : insight and judgment were intact [Normal Mood] : the mood was normal [Acanthosis Nigricans] : no acanthosis nigricans [Diminished Throughout Both Feet] : normal tactile sensation with monofilament testing throughout both feet

## 2024-09-11 LAB
ALBUMIN SERPL ELPH-MCNC: 4.1 G/DL
ALP BLD-CCNC: 102 U/L
ALT SERPL-CCNC: 14 U/L
ANION GAP SERPL CALC-SCNC: 11 MMOL/L
AST SERPL-CCNC: 19 U/L
BASOPHILS # BLD AUTO: 0.01 K/UL
BASOPHILS NFR BLD AUTO: 0.2 %
BILIRUB SERPL-MCNC: 0.4 MG/DL
BUN SERPL-MCNC: 20 MG/DL
CALCIUM SERPL-MCNC: 8.7 MG/DL
CHLORIDE SERPL-SCNC: 104 MMOL/L
CHOLEST SERPL-MCNC: 136 MG/DL
CO2 SERPL-SCNC: 26 MMOL/L
CREAT SERPL-MCNC: 1.2 MG/DL
CREAT SPEC-SCNC: 174 MG/DL
EGFR: 63 ML/MIN/1.73M2
EOSINOPHIL # BLD AUTO: 0.02 K/UL
EOSINOPHIL NFR BLD AUTO: 0.4 %
ESTIMATED AVERAGE GLUCOSE: 128 MG/DL
GLUCOSE BLDC GLUCOMTR-MCNC: 190
GLUCOSE SERPL-MCNC: 221 MG/DL
HBA1C MFR BLD HPLC: 6.1 %
HCT VFR BLD CALC: 35.2 %
HDLC SERPL-MCNC: 38 MG/DL
HGB BLD-MCNC: 10.3 G/DL
IMM GRANULOCYTES NFR BLD AUTO: 0.4 %
LDLC SERPL CALC-MCNC: 47 MG/DL
LYMPHOCYTES # BLD AUTO: 0.89 K/UL
LYMPHOCYTES NFR BLD AUTO: 16 %
MAN DIFF?: NORMAL
MCHC RBC-ENTMCNC: 24.1 PG
MCHC RBC-ENTMCNC: 29.3 GM/DL
MCV RBC AUTO: 82.4 FL
MICROALBUMIN 24H UR DL<=1MG/L-MCNC: 2.6 MG/DL
MICROALBUMIN/CREAT 24H UR-RTO: 15 MG/G
MONOCYTES # BLD AUTO: 0.19 K/UL
MONOCYTES NFR BLD AUTO: 3.4 %
NEUTROPHILS # BLD AUTO: 4.44 K/UL
NEUTROPHILS NFR BLD AUTO: 79.6 %
NONHDLC SERPL-MCNC: 98 MG/DL
PLATELET # BLD AUTO: 189 K/UL
POTASSIUM SERPL-SCNC: 4.7 MMOL/L
PROT SERPL-MCNC: 6.4 G/DL
RBC # BLD: 4.27 M/UL
RBC # FLD: 16.8 %
SODIUM SERPL-SCNC: 141 MMOL/L
TRIGL SERPL-MCNC: 340 MG/DL
WBC # FLD AUTO: 5.57 K/UL

## 2024-09-11 RX ORDER — INSULIN ASPART 100 [IU]/ML
100 INJECTION, SOLUTION INTRAVENOUS; SUBCUTANEOUS
Qty: 3 | Refills: 2 | Status: ACTIVE | COMMUNITY
Start: 2024-09-11 | End: 1900-01-01

## 2024-09-12 ENCOUNTER — RX RENEWAL (OUTPATIENT)
Age: 74
End: 2024-09-12

## 2024-09-18 ENCOUNTER — MED ADMIN CHARGE (OUTPATIENT)
Age: 74
End: 2024-09-18

## 2024-09-18 ENCOUNTER — APPOINTMENT (OUTPATIENT)
Dept: RHEUMATOLOGY | Facility: CLINIC | Age: 74
End: 2024-09-18
Payer: MEDICARE

## 2024-09-18 VITALS
RESPIRATION RATE: 16 BRPM | OXYGEN SATURATION: 97 % | DIASTOLIC BLOOD PRESSURE: 71 MMHG | SYSTOLIC BLOOD PRESSURE: 128 MMHG | HEART RATE: 81 BPM

## 2024-09-18 VITALS
RESPIRATION RATE: 16 BRPM | DIASTOLIC BLOOD PRESSURE: 79 MMHG | OXYGEN SATURATION: 97 % | HEART RATE: 93 BPM | TEMPERATURE: 97.9 F | SYSTOLIC BLOOD PRESSURE: 144 MMHG

## 2024-09-18 PROCEDURE — 96413 CHEMO IV INFUSION 1 HR: CPT

## 2024-09-18 PROCEDURE — 96415 CHEMO IV INFUSION ADDL HR: CPT

## 2024-09-18 RX ORDER — INFLIXIMAB 100 MG/10ML
100 INJECTION, POWDER, LYOPHILIZED, FOR SOLUTION INTRAVENOUS
Qty: 1 | Refills: 0 | Status: COMPLETED
Start: 2024-05-29

## 2024-09-18 RX ORDER — CETIRIZINE HYDROCHLORIDE 10 MG/1
10 TABLET, FILM COATED ORAL
Qty: 0 | Refills: 0 | Status: COMPLETED
Start: 2024-05-29

## 2024-09-18 RX ORDER — ACETAMINOPHEN 325 MG/1
325 TABLET ORAL
Qty: 0 | Refills: 0 | Status: COMPLETED
Start: 2024-05-29

## 2024-09-18 NOTE — HISTORY OF PRESENT ILLNESS
[N/A] : N/A [Denies] : Denies [Yes] : Yes [Declined] : Declined [Left upper extremity] : Left upper extremity [24g] : 24g [Start Time: ___] : Medication Start Time: [unfilled] [End Time: ___] : Medication End Time: [unfilled] [Medication Name: ___] : Medication Name: [unfilled] [Total Amount Administered: ___] : Total Amount Administered: [unfilled] [IV discontinued. Intact. No signs or symptoms of IV complications noted. Time: ___] : IV discontinued. Intact. No signs or symptoms of IV complications noted. Time: [unfilled] [Patient  instructed to seek medical attention with signs and symptoms of adverse effects] : Patient  instructed to seek medical attention with signs and symptoms of adverse effects [Patient left unit in no acute distress] : Patient left unit in no acute distress [Medications administered as ordered and tolerated well.] : Medications administered as ordered and tolerated well. [de-identified] : cane [de-identified] : left hand dorsal [de-identified] : Patient presents for Remicade infusion, accompanied by wife. Patient's wife states patient is doing well, denies any significant changes since last infusion. Patient continues to have 5-6 soft/loose BM daily with no blood. Patient denies of having any abdominal pain, n/v, bloating. Patient pre-medicated as per order. Infusion tolerated well, and patient left unit ambulatory.

## 2024-09-30 ENCOUNTER — APPOINTMENT (OUTPATIENT)
Dept: INTERNAL MEDICINE | Facility: CLINIC | Age: 74
End: 2024-09-30
Payer: MEDICARE

## 2024-09-30 VITALS
BODY MASS INDEX: 29.82 KG/M2 | OXYGEN SATURATION: 98 % | TEMPERATURE: 98.4 F | SYSTOLIC BLOOD PRESSURE: 108 MMHG | DIASTOLIC BLOOD PRESSURE: 67 MMHG | HEART RATE: 110 BPM | WEIGHT: 190 LBS | HEIGHT: 67 IN

## 2024-09-30 DIAGNOSIS — K50.80 CROHN'S DISEASE OF BOTH SMALL AND LARGE INTESTINE W/OUT COMPLICATIONS: ICD-10-CM

## 2024-09-30 DIAGNOSIS — F41.8 OTHER SPECIFIED ANXIETY DISORDERS: ICD-10-CM

## 2024-09-30 DIAGNOSIS — E11.9 TYPE 2 DIABETES MELLITUS W/OUT COMPLICATIONS: ICD-10-CM

## 2024-09-30 DIAGNOSIS — N40.0 BENIGN PROSTATIC HYPERPLASIA WITHOUT LOWER URINARY TRACT SYMPMS: ICD-10-CM

## 2024-09-30 DIAGNOSIS — E11.40 TYPE 2 DIABETES MELLITUS WITH DIABETIC NEUROPATHY, UNSPECIFIED: ICD-10-CM

## 2024-09-30 DIAGNOSIS — R26.89 OTHER ABNORMALITIES OF GAIT AND MOBILITY: ICD-10-CM

## 2024-09-30 DIAGNOSIS — F03.90 UNSPECIFIED DEMENTIA W/OUT BEHAVIORAL DISTURBANCE: ICD-10-CM

## 2024-09-30 DIAGNOSIS — E78.00 PURE HYPERCHOLESTEROLEMIA, UNSPECIFIED: ICD-10-CM

## 2024-09-30 PROCEDURE — 99214 OFFICE O/P EST MOD 30 MIN: CPT | Mod: 25

## 2024-09-30 PROCEDURE — G0008: CPT

## 2024-09-30 PROCEDURE — 90662 IIV NO PRSV INCREASED AG IM: CPT

## 2024-09-30 NOTE — PHYSICAL EXAM
[Normal] : soft, non-tender, non-distended, no masses palpated, no HSM and normal bowel sounds [de-identified] : clear

## 2024-09-30 NOTE — HISTORY OF PRESENT ILLNESS
[FreeTextEntry1] : f/u [de-identified] : diabetes on insulin last a1c 6.1 psych issue under care Crohns on remacaide and mtx good bowels  for 1 week has had prostate nodule in past and kidney stone seen by urology frequent urination get sob with mild exertion patient and wife not very good at expalaing issue

## 2024-09-30 NOTE — PLAN
[FreeTextEntry1] : Flu shot given labs reviewed meds reviewed clinically looks stable with clear lung and no acute complaint frequent urination need f/u with urologist

## 2024-10-16 ENCOUNTER — MED ADMIN CHARGE (OUTPATIENT)
Age: 74
End: 2024-10-16

## 2024-10-16 ENCOUNTER — APPOINTMENT (OUTPATIENT)
Dept: RHEUMATOLOGY | Facility: CLINIC | Age: 74
End: 2024-10-16
Payer: MEDICARE

## 2024-10-16 VITALS
OXYGEN SATURATION: 96 % | TEMPERATURE: 98.6 F | SYSTOLIC BLOOD PRESSURE: 102 MMHG | HEART RATE: 90 BPM | RESPIRATION RATE: 16 BRPM | DIASTOLIC BLOOD PRESSURE: 64 MMHG

## 2024-10-16 VITALS — OXYGEN SATURATION: 95 % | SYSTOLIC BLOOD PRESSURE: 97 MMHG | HEART RATE: 95 BPM | DIASTOLIC BLOOD PRESSURE: 59 MMHG

## 2024-10-16 PROCEDURE — 96415 CHEMO IV INFUSION ADDL HR: CPT

## 2024-10-16 PROCEDURE — 96413 CHEMO IV INFUSION 1 HR: CPT

## 2024-10-16 RX ORDER — ACETAMINOPHEN 325 MG/1
325 TABLET ORAL
Qty: 0 | Refills: 0 | Status: COMPLETED
Start: 2024-05-29

## 2024-10-16 RX ORDER — INFLIXIMAB 100 MG/10ML
100 INJECTION, POWDER, LYOPHILIZED, FOR SOLUTION INTRAVENOUS
Qty: 1 | Refills: 0 | Status: COMPLETED
Start: 2024-05-29

## 2024-10-16 RX ORDER — CETIRIZINE HYDROCHLORIDE 10 MG/1
10 TABLET, FILM COATED ORAL
Qty: 0 | Refills: 0 | Status: COMPLETED
Start: 2024-05-29

## 2024-11-13 ENCOUNTER — APPOINTMENT (OUTPATIENT)
Dept: RHEUMATOLOGY | Facility: CLINIC | Age: 74
End: 2024-11-13
Payer: MEDICARE

## 2024-11-13 ENCOUNTER — MED ADMIN CHARGE (OUTPATIENT)
Age: 74
End: 2024-11-13

## 2024-11-13 VITALS
DIASTOLIC BLOOD PRESSURE: 75 MMHG | HEART RATE: 88 BPM | RESPIRATION RATE: 16 BRPM | OXYGEN SATURATION: 97 % | SYSTOLIC BLOOD PRESSURE: 136 MMHG

## 2024-11-13 VITALS
SYSTOLIC BLOOD PRESSURE: 138 MMHG | RESPIRATION RATE: 16 BRPM | OXYGEN SATURATION: 96 % | DIASTOLIC BLOOD PRESSURE: 81 MMHG | HEART RATE: 88 BPM | TEMPERATURE: 98.4 F

## 2024-11-13 PROCEDURE — 96413 CHEMO IV INFUSION 1 HR: CPT

## 2024-11-13 PROCEDURE — 96415 CHEMO IV INFUSION ADDL HR: CPT

## 2024-11-13 RX ORDER — ACETAMINOPHEN 325 MG/1
325 TABLET ORAL
Qty: 0 | Refills: 0 | Status: COMPLETED
Start: 2024-05-29

## 2024-11-13 RX ORDER — INFLIXIMAB 100 MG/10ML
100 INJECTION, POWDER, LYOPHILIZED, FOR SOLUTION INTRAVENOUS
Qty: 1 | Refills: 0 | Status: COMPLETED
Start: 2024-05-29

## 2024-11-13 RX ORDER — CETIRIZINE HYDROCHLORIDE 10 MG/1
10 TABLET, FILM COATED ORAL
Qty: 0 | Refills: 0 | Status: COMPLETED
Start: 2024-05-29

## 2024-11-22 DIAGNOSIS — K50.90 CROHN'S DISEASE, UNSPECIFIED, W/OUT COMPLICATIONS: ICD-10-CM

## 2024-11-24 ENCOUNTER — INPATIENT (INPATIENT)
Facility: HOSPITAL | Age: 74
LOS: 8 days | Discharge: NURSING, REHAB OR LONG-TERM CARE | End: 2024-12-03
Attending: INTERNAL MEDICINE | Admitting: INTERNAL MEDICINE
Payer: MEDICARE

## 2024-11-24 VITALS
OXYGEN SATURATION: 96 % | DIASTOLIC BLOOD PRESSURE: 62 MMHG | RESPIRATION RATE: 16 BRPM | HEIGHT: 71 IN | TEMPERATURE: 98 F | HEART RATE: 114 BPM | SYSTOLIC BLOOD PRESSURE: 98 MMHG | WEIGHT: 199.96 LBS

## 2024-11-24 DIAGNOSIS — R65.10 SYSTEMIC INFLAMMATORY RESPONSE SYNDROME (SIRS) OF NON-INFECTIOUS ORIGIN WITHOUT ACUTE ORGAN DYSFUNCTION: ICD-10-CM

## 2024-11-24 DIAGNOSIS — R01.1 CARDIAC MURMUR, UNSPECIFIED: ICD-10-CM

## 2024-11-24 DIAGNOSIS — N17.9 ACUTE KIDNEY FAILURE, UNSPECIFIED: ICD-10-CM

## 2024-11-24 DIAGNOSIS — Z29.9 ENCOUNTER FOR PROPHYLACTIC MEASURES, UNSPECIFIED: ICD-10-CM

## 2024-11-24 DIAGNOSIS — E11.9 TYPE 2 DIABETES MELLITUS WITHOUT COMPLICATIONS: ICD-10-CM

## 2024-11-24 DIAGNOSIS — R94.31 ABNORMAL ELECTROCARDIOGRAM [ECG] [EKG]: ICD-10-CM

## 2024-11-24 DIAGNOSIS — K52.9 NONINFECTIVE GASTROENTERITIS AND COLITIS, UNSPECIFIED: ICD-10-CM

## 2024-11-24 DIAGNOSIS — R19.7 DIARRHEA, UNSPECIFIED: ICD-10-CM

## 2024-11-24 DIAGNOSIS — Z87.438 PERSONAL HISTORY OF OTHER DISEASES OF MALE GENITAL ORGANS: ICD-10-CM

## 2024-11-24 DIAGNOSIS — N28.1 CYST OF KIDNEY, ACQUIRED: ICD-10-CM

## 2024-11-24 DIAGNOSIS — E87.29 OTHER ACIDOSIS: ICD-10-CM

## 2024-11-24 DIAGNOSIS — I10 ESSENTIAL (PRIMARY) HYPERTENSION: ICD-10-CM

## 2024-11-24 DIAGNOSIS — E78.5 HYPERLIPIDEMIA, UNSPECIFIED: ICD-10-CM

## 2024-11-24 DIAGNOSIS — I25.10 ATHEROSCLEROTIC HEART DISEASE OF NATIVE CORONARY ARTERY WITHOUT ANGINA PECTORIS: ICD-10-CM

## 2024-11-24 DIAGNOSIS — Z98.49 CATARACT EXTRACTION STATUS, UNSPECIFIED EYE: Chronic | ICD-10-CM
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
## 2024-11-24 LAB
ADD ON TEST-SPECIMEN IN LAB: SIGNIFICANT CHANGE UP
ALBUMIN SERPL ELPH-MCNC: 2.8 G/DL — LOW (ref 3.3–5)
ALP SERPL-CCNC: 60 U/L — SIGNIFICANT CHANGE UP (ref 40–120)
ALT FLD-CCNC: 31 U/L — SIGNIFICANT CHANGE UP (ref 4–41)
ANION GAP SERPL CALC-SCNC: 18 MMOL/L — HIGH (ref 7–14)
ANION GAP SERPL CALC-SCNC: 18 MMOL/L — HIGH (ref 7–14)
ANISOCYTOSIS BLD QL: SLIGHT — SIGNIFICANT CHANGE UP
APPEARANCE UR: CLEAR — SIGNIFICANT CHANGE UP
APTT BLD: 27.3 SEC — SIGNIFICANT CHANGE UP (ref 24.5–35.6)
AST SERPL-CCNC: 134 U/L — HIGH (ref 4–40)
BACTERIA # UR AUTO: ABNORMAL /HPF
BASOPHILS # BLD AUTO: 0 K/UL — SIGNIFICANT CHANGE UP (ref 0–0.2)
BASOPHILS NFR BLD AUTO: 0 % — SIGNIFICANT CHANGE UP (ref 0–2)
BILIRUB SERPL-MCNC: 0.9 MG/DL — SIGNIFICANT CHANGE UP (ref 0.2–1.2)
BILIRUB UR-MCNC: NEGATIVE — SIGNIFICANT CHANGE UP
BUN SERPL-MCNC: 57 MG/DL — HIGH (ref 7–23)
BUN SERPL-MCNC: 63 MG/DL — HIGH (ref 7–23)
BURR CELLS BLD QL SMEAR: PRESENT — SIGNIFICANT CHANGE UP
CALCIUM SERPL-MCNC: 7 MG/DL — LOW (ref 8.4–10.5)
CALCIUM SERPL-MCNC: 7.6 MG/DL — LOW (ref 8.4–10.5)
CAST: 3 /LPF — SIGNIFICANT CHANGE UP (ref 0–4)
CHLORIDE SERPL-SCNC: 94 MMOL/L — LOW (ref 98–107)
CHLORIDE SERPL-SCNC: 97 MMOL/L — LOW (ref 98–107)
CK SERPL-CCNC: 1586 U/L — HIGH (ref 30–200)
CO2 SERPL-SCNC: 16 MMOL/L — LOW (ref 22–31)
CO2 SERPL-SCNC: 17 MMOL/L — LOW (ref 22–31)
COLOR SPEC: YELLOW — SIGNIFICANT CHANGE UP
CREAT SERPL-MCNC: 1.27 MG/DL — SIGNIFICANT CHANGE UP (ref 0.5–1.3)
CREAT SERPL-MCNC: 1.45 MG/DL — HIGH (ref 0.5–1.3)
DIFF PNL FLD: ABNORMAL
EGFR: 50 ML/MIN/1.73M2 — LOW
EGFR: 59 ML/MIN/1.73M2 — LOW
ELLIPTOCYTES BLD QL SMEAR: SIGNIFICANT CHANGE UP
EOSINOPHIL # BLD AUTO: 0 K/UL — SIGNIFICANT CHANGE UP (ref 0–0.5)
EOSINOPHIL NFR BLD AUTO: 0 % — SIGNIFICANT CHANGE UP (ref 0–6)
FLUAV AG NPH QL: SIGNIFICANT CHANGE UP
FLUBV AG NPH QL: SIGNIFICANT CHANGE UP
GAS PNL BLDV: SIGNIFICANT CHANGE UP
GAS PNL BLDV: SIGNIFICANT CHANGE UP
GIANT PLATELETS BLD QL SMEAR: PRESENT — SIGNIFICANT CHANGE UP
GLUCOSE BLDC GLUCOMTR-MCNC: 142 MG/DL — HIGH (ref 70–99)
GLUCOSE BLDC GLUCOMTR-MCNC: 153 MG/DL — HIGH (ref 70–99)
GLUCOSE BLDC GLUCOMTR-MCNC: 164 MG/DL — HIGH (ref 70–99)
GLUCOSE BLDC GLUCOMTR-MCNC: 191 MG/DL — HIGH (ref 70–99)
GLUCOSE SERPL-MCNC: 161 MG/DL — HIGH (ref 70–99)
GLUCOSE SERPL-MCNC: 194 MG/DL — HIGH (ref 70–99)
GLUCOSE UR QL: NEGATIVE MG/DL — SIGNIFICANT CHANGE UP
HCT VFR BLD CALC: 33.3 % — LOW (ref 39–50)
HGB BLD-MCNC: 10.3 G/DL — LOW (ref 13–17)
IANC: 2.47 K/UL — SIGNIFICANT CHANGE UP (ref 1.8–7.4)
INR BLD: 1.12 RATIO — SIGNIFICANT CHANGE UP (ref 0.85–1.16)
KETONES UR-MCNC: 15 MG/DL
LEUKOCYTE ESTERASE UR-ACNC: NEGATIVE — SIGNIFICANT CHANGE UP
LYMPHOCYTES # BLD AUTO: 0.33 K/UL — LOW (ref 1–3.3)
LYMPHOCYTES # BLD AUTO: 8.1 % — LOW (ref 13–44)
MCHC RBC-ENTMCNC: 23.7 PG — LOW (ref 27–34)
MCHC RBC-ENTMCNC: 30.9 G/DL — LOW (ref 32–36)
MCV RBC AUTO: 76.7 FL — LOW (ref 80–100)
METAMYELOCYTES # FLD: 3.6 % — HIGH (ref 0–1)
MICROCYTES BLD QL: SLIGHT — SIGNIFICANT CHANGE UP
MONOCYTES # BLD AUTO: 0.95 K/UL — HIGH (ref 0–0.9)
MONOCYTES NFR BLD AUTO: 23.4 % — HIGH (ref 2–14)
MYELOCYTES NFR BLD: 3.6 % — HIGH (ref 0–0)
NEUTROPHILS # BLD AUTO: 2.39 K/UL — SIGNIFICANT CHANGE UP (ref 1.8–7.4)
NEUTROPHILS NFR BLD AUTO: 25.2 % — LOW (ref 43–77)
NEUTS BAND # BLD: 33.4 % — CRITICAL HIGH (ref 0–6)
NITRITE UR-MCNC: NEGATIVE — SIGNIFICANT CHANGE UP
NRBC # BLD: 1 /100 WBCS — HIGH (ref 0–0)
PH UR: 6 — SIGNIFICANT CHANGE UP (ref 5–8)
PLAT MORPH BLD: NORMAL — SIGNIFICANT CHANGE UP
PLATELET # BLD AUTO: 172 K/UL — SIGNIFICANT CHANGE UP (ref 150–400)
PLATELET COUNT - ESTIMATE: NORMAL — SIGNIFICANT CHANGE UP
POIKILOCYTOSIS BLD QL AUTO: SIGNIFICANT CHANGE UP
POLYCHROMASIA BLD QL SMEAR: SLIGHT — SIGNIFICANT CHANGE UP
POTASSIUM SERPL-MCNC: 4 MMOL/L — SIGNIFICANT CHANGE UP (ref 3.5–5.3)
POTASSIUM SERPL-MCNC: 5.6 MMOL/L — HIGH (ref 3.5–5.3)
POTASSIUM SERPL-SCNC: 4 MMOL/L — SIGNIFICANT CHANGE UP (ref 3.5–5.3)
POTASSIUM SERPL-SCNC: 5.6 MMOL/L — HIGH (ref 3.5–5.3)
PROT SERPL-MCNC: 6 G/DL — SIGNIFICANT CHANGE UP (ref 6–8.3)
PROT UR-MCNC: 30 MG/DL
PROTHROM AB SERPL-ACNC: 13.3 SEC — SIGNIFICANT CHANGE UP (ref 9.9–13.4)
RBC # BLD: 4.34 M/UL — SIGNIFICANT CHANGE UP (ref 4.2–5.8)
RBC # FLD: 18.3 % — HIGH (ref 10.3–14.5)
RBC BLD AUTO: NORMAL — SIGNIFICANT CHANGE UP
RBC CASTS # UR COMP ASSIST: 2 /HPF — SIGNIFICANT CHANGE UP (ref 0–4)
REVIEW: SIGNIFICANT CHANGE UP
RSV RNA NPH QL NAA+NON-PROBE: SIGNIFICANT CHANGE UP
SARS-COV-2 RNA SPEC QL NAA+PROBE: SIGNIFICANT CHANGE UP
SMUDGE CELLS # BLD: PRESENT — SIGNIFICANT CHANGE UP
SODIUM SERPL-SCNC: 129 MMOL/L — LOW (ref 135–145)
SODIUM SERPL-SCNC: 131 MMOL/L — LOW (ref 135–145)
SP GR SPEC: 1.05 — HIGH (ref 1–1.03)
SQUAMOUS # UR AUTO: 5 /HPF — SIGNIFICANT CHANGE UP (ref 0–5)
UROBILINOGEN FLD QL: 0.2 MG/DL — SIGNIFICANT CHANGE UP (ref 0.2–1)
VARIANT LYMPHS # BLD: 2.7 % — SIGNIFICANT CHANGE UP (ref 0–6)
WBC # BLD: 4.08 K/UL — SIGNIFICANT CHANGE UP (ref 3.8–10.5)
WBC # FLD AUTO: 4.08 K/UL — SIGNIFICANT CHANGE UP (ref 3.8–10.5)
WBC UR QL: 6 /HPF — HIGH (ref 0–5)

## 2024-11-24 PROCEDURE — 74177 CT ABD & PELVIS W/CONTRAST: CPT | Mod: 26,MC

## 2024-11-24 PROCEDURE — 99223 1ST HOSP IP/OBS HIGH 75: CPT

## 2024-11-24 PROCEDURE — 71045 X-RAY EXAM CHEST 1 VIEW: CPT | Mod: 26

## 2024-11-24 PROCEDURE — 72125 CT NECK SPINE W/O DYE: CPT | Mod: 26,MC

## 2024-11-24 PROCEDURE — 70450 CT HEAD/BRAIN W/O DYE: CPT | Mod: 26,MC

## 2024-11-24 PROCEDURE — 99285 EMERGENCY DEPT VISIT HI MDM: CPT

## 2024-11-24 RX ORDER — CEFEPIME 2 G/1
2000 INJECTION, POWDER, FOR SOLUTION INTRAVENOUS EVERY 8 HOURS
Refills: 0 | Status: DISCONTINUED | OUTPATIENT
Start: 2024-11-25 | End: 2024-11-26

## 2024-11-24 RX ORDER — CEFEPIME 2 G/1
2000 INJECTION, POWDER, FOR SOLUTION INTRAVENOUS ONCE
Refills: 0 | Status: COMPLETED | OUTPATIENT
Start: 2024-11-24 | End: 2024-11-24

## 2024-11-24 RX ORDER — TAMSULOSIN HYDROCHLORIDE 0.4 MG/1
0.4 CAPSULE ORAL AT BEDTIME
Refills: 0 | Status: DISCONTINUED | OUTPATIENT
Start: 2024-11-24 | End: 2024-12-03

## 2024-11-24 RX ORDER — PIPERACILLIN SODIUM AND TAZOBACTAM SODIUM 4; .5 G/20ML; G/20ML
3.38 INJECTION, POWDER, LYOPHILIZED, FOR SOLUTION INTRAVENOUS ONCE
Refills: 0 | Status: COMPLETED | OUTPATIENT
Start: 2024-11-24 | End: 2024-11-24

## 2024-11-24 RX ORDER — MESALAMINE 375 MG/1
1600 CAPSULE, EXTENDED RELEASE ORAL EVERY 12 HOURS
Refills: 0 | Status: DISCONTINUED | OUTPATIENT
Start: 2024-11-24 | End: 2024-11-24

## 2024-11-24 RX ORDER — SODIUM CHLORIDE 9 MG/ML
1000 INJECTION, SOLUTION INTRAMUSCULAR; INTRAVENOUS; SUBCUTANEOUS
Refills: 0 | Status: DISCONTINUED | OUTPATIENT
Start: 2024-11-24 | End: 2024-11-25

## 2024-11-24 RX ORDER — CHOLECALCIFEROL (VITAMIN D3) 10MCG/0.25
400 DROPS ORAL DAILY
Refills: 0 | Status: DISCONTINUED | OUTPATIENT
Start: 2024-11-24 | End: 2024-12-03

## 2024-11-24 RX ORDER — SODIUM CHLORIDE 9 MG/ML
1000 INJECTION, SOLUTION INTRAMUSCULAR; INTRAVENOUS; SUBCUTANEOUS ONCE
Refills: 0 | Status: COMPLETED | OUTPATIENT
Start: 2024-11-24 | End: 2024-11-24

## 2024-11-24 RX ORDER — MEMANTINE HYDROCHLORIDE 14 MG/1
1 CAPSULE, EXTENDED RELEASE ORAL
Refills: 0 | DISCHARGE

## 2024-11-24 RX ORDER — HEPARIN SODIUM,PORCINE 1000/ML
5000 VIAL (ML) INJECTION EVERY 12 HOURS
Refills: 0 | Status: DISCONTINUED | OUTPATIENT
Start: 2024-11-24 | End: 2024-12-03

## 2024-11-24 RX ORDER — MEMANTINE HYDROCHLORIDE 14 MG/1
10 CAPSULE, EXTENDED RELEASE ORAL
Refills: 0 | Status: DISCONTINUED | OUTPATIENT
Start: 2024-11-24 | End: 2024-12-03

## 2024-11-24 RX ORDER — SERTRALINE HYDROCHLORIDE 100 MG/1
100 TABLET, FILM COATED ORAL DAILY
Refills: 0 | Status: DISCONTINUED | OUTPATIENT
Start: 2024-11-24 | End: 2024-12-03

## 2024-11-24 RX ORDER — VANCOMYCIN HCL 900 MCG/MG
1250 POWDER (GRAM) MISCELLANEOUS EVERY 12 HOURS
Refills: 0 | Status: DISCONTINUED | OUTPATIENT
Start: 2024-11-25 | End: 2024-11-26

## 2024-11-24 RX ORDER — CLOPIDOGREL 75 MG/1
75 TABLET, FILM COATED ORAL DAILY
Refills: 0 | Status: DISCONTINUED | OUTPATIENT
Start: 2024-11-24 | End: 2024-11-25

## 2024-11-24 RX ORDER — GLUCAGON INJECTION, SOLUTION 0.5 MG/.1ML
1 INJECTION, SOLUTION SUBCUTANEOUS ONCE
Refills: 0 | Status: DISCONTINUED | OUTPATIENT
Start: 2024-11-24 | End: 2024-12-03

## 2024-11-24 RX ORDER — 0.9 % SODIUM CHLORIDE 0.9 %
1000 INTRAVENOUS SOLUTION INTRAVENOUS
Refills: 0 | Status: DISCONTINUED | OUTPATIENT
Start: 2024-11-24 | End: 2024-12-03

## 2024-11-24 RX ORDER — METHOTREXATE 2.5 MG/1
10 TABLET ORAL
Refills: 0 | Status: DISCONTINUED | OUTPATIENT
Start: 2024-11-24 | End: 2024-11-24

## 2024-11-24 RX ORDER — INSULIN GLARGINE 100 [IU]/ML
20 INJECTION, SOLUTION SUBCUTANEOUS AT BEDTIME
Refills: 0 | Status: DISCONTINUED | OUTPATIENT
Start: 2024-11-24 | End: 2024-12-01

## 2024-11-24 RX ORDER — GABAPENTIN 300 MG/1
100 CAPSULE ORAL
Refills: 0 | Status: DISCONTINUED | OUTPATIENT
Start: 2024-11-24 | End: 2024-11-25

## 2024-11-24 RX ORDER — CEFEPIME 2 G/1
INJECTION, POWDER, FOR SOLUTION INTRAVENOUS
Refills: 0 | Status: DISCONTINUED | OUTPATIENT
Start: 2024-11-24 | End: 2024-11-26

## 2024-11-24 RX ORDER — VANCOMYCIN HCL 900 MCG/MG
1000 POWDER (GRAM) MISCELLANEOUS ONCE
Refills: 0 | Status: COMPLETED | OUTPATIENT
Start: 2024-11-24 | End: 2024-11-24

## 2024-11-24 RX ORDER — GLUCOSAMINE SULFATE DIPOT CHLR 500 MG
1 CAPSULE ORAL DAILY
Refills: 0 | Status: DISCONTINUED | OUTPATIENT
Start: 2024-11-24 | End: 2024-12-03

## 2024-11-24 RX ADMIN — Medication 100 MILLIGRAM(S): at 22:12

## 2024-11-24 RX ADMIN — TAMSULOSIN HYDROCHLORIDE 0.4 MILLIGRAM(S): 0.4 CAPSULE ORAL at 22:11

## 2024-11-24 RX ADMIN — CEFEPIME 100 MILLIGRAM(S): 2 INJECTION, POWDER, FOR SOLUTION INTRAVENOUS at 20:33

## 2024-11-24 RX ADMIN — INSULIN GLARGINE 20 UNIT(S): 100 INJECTION, SOLUTION SUBCUTANEOUS at 22:12

## 2024-11-24 RX ADMIN — Medication 166.67 MILLIGRAM(S): at 23:38

## 2024-11-24 RX ADMIN — SODIUM CHLORIDE 1000 MILLILITER(S): 9 INJECTION, SOLUTION INTRAMUSCULAR; INTRAVENOUS; SUBCUTANEOUS at 08:42

## 2024-11-24 RX ADMIN — Medication 80 MILLIGRAM(S): at 22:11

## 2024-11-24 RX ADMIN — SODIUM CHLORIDE 1000 MILLILITER(S): 9 INJECTION, SOLUTION INTRAMUSCULAR; INTRAVENOUS; SUBCUTANEOUS at 10:13

## 2024-11-24 RX ADMIN — Medication 250 MILLIGRAM(S): at 12:27

## 2024-11-24 RX ADMIN — SODIUM CHLORIDE 80 MILLILITER(S): 9 INJECTION, SOLUTION INTRAMUSCULAR; INTRAVENOUS; SUBCUTANEOUS at 23:38

## 2024-11-24 RX ADMIN — PIPERACILLIN SODIUM AND TAZOBACTAM SODIUM 200 GRAM(S): 4; .5 INJECTION, POWDER, LYOPHILIZED, FOR SOLUTION INTRAVENOUS at 11:10

## 2024-11-24 NOTE — H&P ADULT - PROBLEM SELECTOR PLAN 10
- c/w memantine 10mg - c/w 0.4mg tamsoulosin qd  - c/w finasteride 5mg qd - baseline AOx2 (did not remember date), CTH w/ no acute changes   - c/w home meds: memantine 10mg, Sertraline 100mg QD, Quetiapine 50mg QHS  - frequent reorientation and monitor sxns

## 2024-11-24 NOTE — H&P ADULT - PROBLEM SELECTOR PLAN 4
- Pt reports watery diarrhea (>5 times per day) iso of immunosuppression may be 2/2 to C diff infection vs IBD flare    PLAN:  - C. diff PCR  - Stool studies  - Stool O & P   - GI consult   - fecal calprotectin - Cr 1.45 on admission, baseline 1.08  - likely prerenal iso of dehydration, diarrhea, poor PO intake  - improved s/p 2 L 0.9% NaCl    PLAN:   - continue to trend BMP  - avoid nephrotoxins  - renally dose medication - Pt with pH 7.27, HCO3 17, pCO2 28 consistent with high anion gap metabolic and respiratory compensation  - likely iso of starvation ketosis 2/2 to poor PO intake. Lower suspicion for DKA but will monitor closely given no use of Tresiba for 48 hours    PLAN:  - beta- hydroxybutyrate level   - will encourage PO intake  - Will restart insulin glargine

## 2024-11-24 NOTE — H&P ADULT - NSHPLABSRESULTS_GEN_ALL_CORE
.  LABS:                         10.3   4.08  )-----------( 172      ( 24 Nov 2024 08:31 )             33.3     11-24    131[L]  |  97[L]  |  57[H]  ----------------------------<  161[H]  4.0   |  16[L]  |  1.27    Ca    7.0[L]      24 Nov 2024 11:17    TPro  6.0  /  Alb  2.8[L]  /  TBili  0.9  /  DBili  x   /  AST  134[H]  /  ALT  31  /  AlkPhos  60  11-24    PT/INR - ( 24 Nov 2024 08:31 )   PT: 13.3 sec;   INR: 1.12 ratio         PTT - ( 24 Nov 2024 08:31 )  PTT:27.3 sec  Urinalysis Basic - ( 24 Nov 2024 11:17 )    Color: x / Appearance: x / SG: x / pH: x  Gluc: 161 mg/dL / Ketone: x  / Bili: x / Urobili: x   Blood: x / Protein: x / Nitrite: x   Leuk Esterase: x / RBC: x / WBC x   Sq Epi: x / Non Sq Epi: x / Bacteria: x    CT BRAIN:    VENTRICLES AND SULCI: Diffuse parenchymal volume loss.  INTRA-AXIAL: No mass effect, acute hemorrhage, or midline shift.  There   are periventricular and subcortical white matter hypodensities,   consistent with microvascular type changes. Chronic region of   encephalomalacia and gliosis in the left temporoparietal lobe.  EXTRA-AXIAL: No mass or fluid collection. Basal cisterns are normal in   appearance.    VISUALIZED SINUSES:  Focal right ethmoid sinus mucosal opacity. Mild   bilateral maxillary sinus mucosal thickening  TYMPANOMASTOID CAVITIES:  Clear.  VISUALIZED ORBITS: Bilateral lens replacement.  CALVARIUM: Intact.    MISCELLANEOUS: Diffuse atherosclerotic calcifications distal internal   carotid and vertebral arteries. No      CT CERVICAL SPINE:    VERTEBRAE:  Normal in height. No acute fracture. Multilevel degenerative   changes including marginal osteophytes.  ALIGNMENT: Slight reversal of the normal cervical lordosis.  INTERVERTEBRAL DISC SPACES: Multilevel intervertebral disc height loss   with disc osteophyte complex formation, facet joint arthropathy and   uncovertebral joint arthropathy. Multilevel spinal canal stenosis,   greatest at the C4/C5 level where it is moderate. Multilevel severe   neuroforaminal stenosis.    VISUALIZED LUNGS: Clear.    MISCELLANEOUS:  Question partially visualized right thyroid lobe   hypodense nodule (3:74). Bilateral carotid bifurcation calcifications.      IMPRESSION:    CT HEAD:    No acute intracranial hemorrhage, mass effect, or midline shift.    Left temporal parietal lobe cystic encephalomalacia/gliosis.    CT CERVICAL SPINE:    No acute fracture or traumatic subluxation.    Multi-level degenerative changes.      CT abdomen pelvis    IMPRESSION:    No acute fracture or evidence of traumatic visceral injury in the abdomen   and pelvis.    No evidence of appendicitis. No bowel obstruction or evidence of acute   inflammatory bowel disease.

## 2024-11-24 NOTE — H&P ADULT - ATTENDING COMMENTS
75 y/o M with PMH  IBD, on methotrexate, remicade (q 6 weeks), HTN, HLD, T2DM, mild cognitive impairment (AOx2 baseline), hx of prior CVA, presenting with poor PO intake, fatigue, 2 unwitnessed falls, slurred speech since 11/21, and nonbloody diarrhea for the past 5 days.     Note edited as appropriate above.   Patient seen and examined by me. Physical exam as noted above. Will note that pt with audible systolic murmur. Slurred speech, b/l symmetric LE weakness (from deconditioning at baseline), otherwise no neuro deficits. Baseline activity status is patient is able to stand/walk only few distances, otherwise for long distances uses wheelchair.     Plan as detailed above, but will summarize as below:   1. SIRs. Possible source urine, follow urine culture. Follow blood cx. C/w empiric coverage for now.   2. Fall (11/23) and slurred speech (since 11/21 per wife, and still present). New CVA? Not seen on CTH, follow neuro recs. Continuing home ASA 81mg QD. Home Atorvastatin increased from 40mg QD to 80mg QD. Plavix 75mg QD started until further neuro recs. Follow neuro recs r/e MRI brain.    3. Systolic murmur. Follow TTE.   4. Acidosis d/t underlying infection. Abx and IVF.   5. Diarrhea. Infection vs. UC flare. Follow stool studies, r/o C diff, follow GI recs r/e continuing immunosuppressives and steroids.   6. DM. As noted above.   7. Renal cyst. Kidney US r/e simple vs. complex cyst. Outpt urology f/u.   8. Prolonged QTc 471 on EKG.   9. Baseline weakness in lower legs. Unable to ambulate long distances and now w/ more pronounced weakness leading to falls. PT eval.     Home meds verified by list provided by wife. Current med rec updated.   Code: Family and patient want full code  DVT ppx: Heparin subq q12h

## 2024-11-24 NOTE — ED ADULT NURSE REASSESSMENT NOTE - NS ED NURSE REASSESS COMMENT FT1
Pt resting comfortably in bed, baseline mentation, breathing even and unlabored. Pt receiving medications per orders, vitals as charted, sinus tach on monitor. Pt denies any pain, SOB; endorses weakness but reports improvement from earlier this morning. Repeat labs drawn and sent, bed in lowest, side rails up, call bell in reach. Awaiting admission to floor.

## 2024-11-24 NOTE — ED PROVIDER NOTE - PROGRESS NOTE DETAILS
New CRISTINA, likely i/s/o dehydration, also elevated CK. Slightly elevated K but hemolyzed, EKG shows prolonged QTc at 471 which patient has had prior. Also noted to have bandemia to 33%. Will give broad spectrum abx, CXR also added. CT with encephalomalacia, no acute findings. Patient with improvement in mental status and LE strength after fluids. TBA.

## 2024-11-24 NOTE — H&P ADULT - NSHPPHYSICALEXAM_GEN_ALL_CORE
T(C): 37.1 (11-24-24 @ 12:52), Max: 37.1 (11-24-24 @ 12:52)  HR: 103 (11-24-24 @ 12:52) (103 - 114)  BP: 120/59 (11-24-24 @ 12:52) (98/62 - 120/59)  RR: 18 (11-24-24 @ 12:52) (16 - 18)  SpO2: 100% (11-24-24 @ 12:52) (96% - 100%)    CONSTITUTIONAL: Well groomed, no apparent distress  EYES: PERRLA and symmetric, EOMI, No conjunctival or scleral injection, non-icteric  ENMT: Oral mucosa with moist membranes. Normal dentition; no pharyngeal injection or exudates             NECK: Supple, symmetric and without tracheal deviation   RESP: No respiratory distress, no use of accessory muscles; CTA b/l, no WRR  CV: RRR, +S1S2, no MRG; no JVD; no peripheral edema  GI: Soft, NT, ND, no rebound, no guarding; no palpable masses; no hepatosplenomegaly; no hernia palpated  LYMPH: No cervical LAD or tenderness; no axillary LAD or tenderness; no inguinal LAD or tenderness  MSK: Normal gait; No digital clubbing or cyanosis; examination of the (head/neck/spine/ribs/pelvis, RUE, LUE, RLE, LLE) without misalignment,            Normal ROM without pain, no spinal tenderness, normal muscle strength/tone  SKIN: No rashes or ulcers noted; no subcutaneous nodules or induration palpable  NEURO: CN II-XII intact; normal reflexes in upper and lower extremities, sensation intact in upper and lower extremities b/l to light touch   PSYCH: Appropriate insight/judgment; A+O x 3, mood and affect appropriate, recent/remote memory intact T(C): 37.1 (11-24-24 @ 12:52), Max: 37.1 (11-24-24 @ 12:52)  HR: 103 (11-24-24 @ 12:52) (103 - 114)  BP: 120/59 (11-24-24 @ 12:52) (98/62 - 120/59)  RR: 18 (11-24-24 @ 12:52) (16 - 18)  SpO2: 100% (11-24-24 @ 12:52) (96% - 100%)    CONSTITUTIONAL: Well groomed, no apparent distress  EYES: PERRLA and symmetric, EOMI, No conjunctival or scleral injection, non-icteric  ENMT: Oral mucosa with dry mucous membranes. Normal dentition; no pharyngeal injection or exudates             NECK: Supple, symmetric and without tracheal deviation   RESP: No respiratory distress, no use of accessory muscles; CTA b/l, no WRR  CV: RRR, +S1S2, no MRG; no JVD; no peripheral edema  GI: Soft, NT, distended, no rebound, no guarding; no palpable masses; no hepatosplenomegaly; no hernia palpated  LYMPH: No cervical LAD or tenderness; no axillary LAD or tenderness; no inguinal LAD or tenderness  MSK: Normal gait; No digital clubbing or cyanosis; examination of the (head/neck/spine/ribs/pelvis, RUE, LUE, RLE, LLE) without misalignment,            Normal ROM without pain, no spinal tenderness, normal muscle strength/tone  SKIN: No rashes or ulcers noted; no subcutaneous nodules or induration palpable  NEURO: CN II-XII intact; normal reflexes in upper and lower extremities, sensation intact in upper and lower extremities b/l to light touch   PSYCH: Appropriate insight/judgment; A+O x 2, mood and affect appropriate, T(C): 37.1 (11-24-24 @ 12:52), Max: 37.1 (11-24-24 @ 12:52)  HR: 103 (11-24-24 @ 12:52) (103 - 114)  BP: 120/59 (11-24-24 @ 12:52) (98/62 - 120/59)  RR: 18 (11-24-24 @ 12:52) (16 - 18)  SpO2: 100% (11-24-24 @ 12:52) (96% - 100%)    CONSTITUTIONAL: No apparent distress, overweight   EYES: PERRLA and symmetric, EOMI, No conjunctival or scleral injection, non-icteric  ENMT: Oral mucosa with dry mucous membranes. Normal dentition; no pharyngeal injection or exudates  NECK: Supple, symmetric and without tracheal deviation   RESP: No respiratory distress, no use of accessory muscles; CTA b/l, no WRR  CV: RRR, systolic murmur; no JVD; no peripheral edema  GI: Soft, NT, distended, no rebound, no guarding  MSK: No digital clubbing or cyanosis; examination of the (head/neck/spine/ribs/pelvis, RUE, LUE, RLE, LLE) without misalignment,   SKIN: No rashes or ulcers noted  NEURO: slurred speech, otherwise no CN deficits. Weakness in LE b/l that is symmetric but no decreased strength in UE/LE.   PSYCH:  A+O x 2, mood and affect appropriate,

## 2024-11-24 NOTE — H&P ADULT - PROBLEM SELECTOR PROBLEM 7
Prophylactic measure HLD (hyperlipidemia) Type 2 diabetes mellitus Inflammatory bowel diseases (IBD)

## 2024-11-24 NOTE — PATIENT PROFILE ADULT - NSPROGENPREVTRANSF_GEN_A_NUR
patient is A&O times 2. Confused unable to answer the question./no history of blood product transfusion

## 2024-11-24 NOTE — H&P ADULT - PROBLEM SELECTOR PLAN 1
- Patient meets SIRS criteria given tachycardia and elevated WBC (33% bandemia)  -  Labs significant for bandemia of 33%, lactate 2.4, and anion gap metabolic acidosis likely iso of IBD flare vs infectious gastroenteritis vs prednisone use  - WBC not elevated likely iso immunosuppression medications   - s/p  1g Vanc and 3.375g Zosyn, and 2L 0.9% NaCl    PLAN:  - management of diarrhea as below - Patient meets SIRS criteria given tachycardia and elevated WBC (33% bandemia)  -  Labs significant for bandemia of 33%, lactate 2.4, and anion gap metabolic acidosis likely iso of IBD flare vs infectious gastroenteritis vs prednisone use  - WBC not elevated likely iso immunosuppression medications   - s/p  1g Vanc and 3.375g Zosyn, and 2L 0.9% NaCl    PLAN:  - cefepime + vanc  - management of diarrhea as below - Patient meets SIRS criteria given tachycardia and elevated WBC (33% bandemia)  -  Labs significant for bandemia of 33%, lactate 2.4, and anion gap metabolic acidosis likely iso of IBD flare vs infectious gastroenteritis vs prednisone use  - WBC not elevated likely iso immunosuppression medications   - s/p  1g Vanc and 3.375g Zosyn, and 2L 0.9% NaCl    PLAN:  - cefepime + vanc (Start 11/24)  - UA + reflex culture  - f/u Blood culture  - f/u urine culture  - management of diarrhea as below - Patient meets SIRS criteria given tachycardia and elevated WBC (33% bandemia)  -  Labs significant for bandemia of 33%, lactate 2.4, and anion gap metabolic acidosis likely iso of IBD flare vs infectious gastroenteritis vs prednisone use  - WBC not elevated likely iso immunosuppression medications   - Source possibly urine, follow urine culture (pt on Trimethoprim QD for ppx, currently held)  - s/p 1g Vanc and 3.375g Zosyn, and 2L 0.9% NaCl    PLAN:  - cefepime + vanc (Start 11/24)  - UA + reflex culture  - f/u Blood culture  - f/u urine culture  - management of diarrhea as below

## 2024-11-24 NOTE — ED ADULT TRIAGE NOTE - CHIEF COMPLAINT QUOTE
Pt here for unwitnessed fall in the bathroom this morning. EMS called by wife because she was unable to get him off the floor, unsure if he hit head. Pt has been falling and complaining of generalized weakness x 3 days as per wife. Pt denies any complaints at this time. Pt covered in feces. History of dementia, DM2, HTN. Pt here for unwitnessed fall in the bathroom this morning. EMS called by wife because she was unable to get him off the floor, unsure if he hit head. Denies AC use. Pt has been falling and complaining of generalized weakness x 3 days as per wife. Pt denies any complaints at this time. Pt covered in feces. History of dementia, DM2, HTN.

## 2024-11-24 NOTE — H&P ADULT - PROBLEM SELECTOR PLAN 6
- on home Tresiba 30 U at bedtime, and 28-30 U FLASP premeals TID    PLAN:  - 24U Lantus  - 23 U Admelog  -KELLI - Pt with longstanding history of IBD and recent increase in diarrhea this week  - On remicade q 6 weeks, methotrexate 2.5 mg ( 4 tabs on monday mornings), prednisone 10mg BID, mesalamine 800mg   - CT abdomen negative for colitis, bowel obstruction, or active IBD flare    PLAN:   -hold methotrexate 2.5 mg ( 4 tabs on monday mornings), prednisone 10mg BID, and mesalamine 800mg iso infection  - management of diarrhea as above - Pt with longstanding history of IBD (UC) and recent increase in diarrhea this week  - On remicade q 6 weeks, methotrexate 2.5 mg ( 4 tabs on monday mornings), prednisone 10mg BID, mesalamine 800mg   - CT abdomen negative for colitis, bowel obstruction, or active IBD flare    PLAN:   -hold methotrexate 2.5 mg ( 4 tabs on monday mornings), prednisone 10mg BID, and mesalamine 800mg iso infection   - management of diarrhea as above  - GI consult - Pt reports watery diarrhea (>5 times per day) iso of immunosuppression may be 2/2 to C diff infection vs IBD flare    PLAN:  - C. diff PCR (pt immunosuppressed and on chronic trimethoprim for UTI ppx)  - Stool studies  - Stool O & P   - GI consult   - fecal calprotectin  - s/p 2L NS bolus in ED, START NS 80 cc/hr x 18 hours

## 2024-11-24 NOTE — H&P ADULT - ASSESSMENT
75 y/o M with PMH  IBD, on methotrexate, remicade (q 6 weeks), HTN, HLD, T2DM, cognitive impairment presenting with poor PO intake, fatigue, 2 unwitnessed falls and nonbloody diarrhea for the past 5 days.  73 y/o M with PMH  IBD, on methotrexate, remicade (q 6 weeks), HTN, HLD, T2DM, mild cognitive impairment (AOx2 baseline), hx of prior CVA, presenting with poor PO intake, fatigue, 2 unwitnessed falls, slurred speech since 11/21, and nonbloody diarrhea for the past 5 days.

## 2024-11-24 NOTE — H&P ADULT - PROBLEM SELECTOR PLAN 11
-Diet: DASH  - DVT: heparin 5000 BID  - Dispo: pending hospital course - c/w memantine 10mg - known L renal cyst from 2022, CT 11/24 showing R renal cyst as well.   - Follow kidney US r/e cysts

## 2024-11-24 NOTE — PATIENT PROFILE ADULT - FALL HARM RISK - HARM RISK INTERVENTIONS
Assistance with ambulation/Assistance OOB with selected safe patient handling equipment/Communicate Risk of Fall with Harm to all staff/Monitor for mental status changes/Move patient closer to nurses' station/Reinforce activity limits and safety measures with patient and family/Reorient to person, place and time as needed/Review medications for side effects contributing to fall risk/Sit up slowly, dangle for a short time, stand at bedside before walking/Tailored Fall Risk Interventions/Toileting schedule using arm’s reach rule for commode and bathroom/Visual Cue: Yellow wristband and red socks/Bed in lowest position, wheels locked, appropriate side rails in place/Call bell, personal items and telephone in reach/Instruct patient to call for assistance before getting out of bed or chair/Non-slip footwear when patient is out of bed/[place] to call system/Physically safe environment - no spills, clutter or unnecessary equipment/Purposeful Proactive Rounding/Room/bathroom lighting operational, light cord in reach

## 2024-11-24 NOTE — ED ADULT NURSE NOTE - NSFALLRISKINTERV_ED_ALL_ED
Assistance OOB with selected safe patient handling equipment if applicable/Assistance with ambulation/Communicate fall risk and risk factors to all staff, patient, and family/Monitor gait and stability/Provide visual cue: yellow wristband, yellow gown, etc/Reinforce activity limits and safety measures with patient and family/Call bell, personal items and telephone in reach/Instruct patient to call for assistance before getting out of bed/chair/stretcher/Non-slip footwear applied when patient is off stretcher/[place] to call system/Physically safe environment - no spills, clutter or unnecessary equipment/Purposeful Proactive Rounding/Room/bathroom lighting operational, light cord in reach

## 2024-11-24 NOTE — H&P ADULT - PROBLEM SELECTOR PLAN 3
- Pt with pH 7.27, HCO3 17, pCO2 28 consistent with high anion gap metabolic and respiratory compensation  - likely iso of starvation ketosis 2/2 to poor PO intake vs DKA given no use of Tresiba for 48 hours    PLAN:  - beta- hydroxybutyrate level   - will encourage PO intake  - Will restart insulin glargine - Systolic murmur auscultated during physical exam; per wife no known hx of cardiac murmur.   - TTE as noted above.   - Follow blood cx.    # CAD:   - aspirin 81 mg and atorvastatin 80mg QD (increased from home statin 40mg QD d/t slurred speech)  # Prolonged QTc  - QT interval 471 on EKG. Avoid QT prolonging meds and monitor.

## 2024-11-24 NOTE — H&P ADULT - PROBLEM SELECTOR PLAN 12
-Diet: DASH  - DVT: heparin 5000 BID  - Dispo: pending hospital course - Diet: Full liquid (low carb/low fiber), advance as tolerated   - DVT: heparin 5000 BID  - Dispo: pending hospital course.

## 2024-11-24 NOTE — ED ADULT NURSE REASSESSMENT NOTE - NS ED NURSE REASSESS COMMENT FT1
Pt received from night RN, in bed resting comfortably breathing even and unlabored. Pt baseline mental status, vitals as documented, NSR on monitor, denies pain at this time. Family member at bedside, bed in lowest position, side rails up, call bell within reach. Labs drawn and sent, 20G IV placed to the L AC. Fluids administered per orders. Awaiting CT. Pt received from night RN, in bed resting comfortably breathing even and unlabored. Pt baseline mental status, vitals as documented, NSR on monitor. Denies pain at this time, continues to endorse weakness. Family member at bedside, bed in lowest position, side rails up, call bell within reach. Labs drawn and sent, 20G IV placed to the L AC. Fluids administered per orders. Awaiting CT.

## 2024-11-24 NOTE — ED ADULT NURSE NOTE - OBJECTIVE STATEMENT
Pt received, alert and oriented x2 (name and place). Able to move all extremities and follow commands. Abdomen is large, soft. Per wife at the bedside, pt had multiple falls last night (x2), unable to get him off the floor this morning. Unknown head strike, unknown LOC. Pt denies pain. Per wife pt has been having multiple soft stools daily and poor appetite the last 4 days. Hx HTN, DM, dementia. Breathing is equal and unlabored on room air. Placed on tele, sinus tachycardia. Provider at the bedside for eval.

## 2024-11-24 NOTE — H&P ADULT - PROBLEM SELECTOR PLAN 5
- Pt with longstanding history of IBD and recent increase in diarrhea this week  - On remicade q 6 weeks, methotrexate 2.5 mg ( 4 tabs on monday mornings), prednisone 10mg BID, mesalamine 800mg   - CT abdomen negative for colitis, bowel obstruction, or active IBD flare    PLAN:   - c/w methotrexate 2.5 mg ( 4 tabs on monday mornings), prednisone 10mg BID, and mesalamine 800mg   - management of diarrhea as above - Pt reports watery diarrhea (>5 times per day) iso of immunosuppression may be 2/2 to C diff infection vs IBD flare    PLAN:  - C. diff PCR  - Stool studies  - Stool O & P   - GI consult   - fecal calprotectin - Pt reports watery diarrhea (>5 times per day) iso of immunosuppression may be 2/2 to C diff infection vs IBD flare    PLAN:  - C. diff PCR  - Stool studies  - Stool O & P   - GI consult   - fecal calprotectin  - START NS 80 cc/hr x 18 hours - Cr 1.45 on admission, baseline 1.08  - likely prerenal iso of dehydration, diarrhea, poor PO intake  - improved s/p 2 L 0.9% NaCl    PLAN:   - continue to trend BMP  - avoid nephrotoxins  - renally dose medication

## 2024-11-24 NOTE — ED PROVIDER NOTE - ATTENDING CONTRIBUTION TO CARE
74-year-old male history IBD (unclear if crohns vs UC), on methotrexate, remicade, HTN, T2DM, cognitive impairment getting worse per caregiver, presents with poor PO intake, fatigue for the past 5 days. Patient has also had increased urinartion and nonbloody diarrhea. Pt here after utwitnessed fall, appears dehydrated, has bandemia, wll eval f or sepsis and give broad spectrum abx, admit

## 2024-11-24 NOTE — H&P ADULT - PROBLEM SELECTOR PLAN 9
- c/w 0.4mg tamsoulosin qd  - c/w finasteride 5mg qd - aspirin 81 mg - c/w 0.4mg tamsoulosin bid  - c/w finasteride 5mg qd    # On chronic Trimethoprim 100mg QD for UTI ppx. Currently held d/t above abx.

## 2024-11-24 NOTE — ED PROVIDER NOTE - CLINICAL SUMMARY MEDICAL DECISION MAKING FREE TEXT BOX
74-year-old male history IBD (unclear if chrons vs UC), on methotrexate, remicade, HTN, T2DM, presents with poor PO intake, fatigue for the past 5 days. Patient has also had increased urinartion and nonbloody diarrhea. Had two unwitnessed falls today, Unsure if hit his head but denies prodromal symptoms. Patient was on the ground fo approx 30-50 minutes and found in his own feces. Wife has an aide during the day to ehlp her take care of him but has difficulty at night. Rectal temp 99.2, initally tachycardic and soft BP but improving. AAOx2, lungs CTAB, abd distended (at baseline), RLQ ttp, 4/5 strength in RLE, no midline ttp, no joint ttp, no sacral ulcers. Possible IBD flare, infectious etiology, ?stroke given RLE weakness, severe dehydration. Labs, imaging, fluids, reassess.

## 2024-11-24 NOTE — H&P ADULT - PROBLEM SELECTOR PLAN 2
- Pt with 2 unwitnessed falls likely iso of dehydration and poor PO intake  - cannot r/o cardiac syncope  - CT head negative for ischemia, hemorrhage, or midline shift    PLAN:  - TTE  - Telemetry   - Orthostatics - Pt with 2 unwitnessed falls likely iso of dehydration and poor PO intake  - Pt recalls falling, reports feeling weak and that his legs gave out  - Wife reports slurred speech since thursday 11/21, patient out of the window for tPA   - CT head negative for ischemia, hemorrhage, or midline shift  - ddx orthostatic vs neurogenic vs cardiac syncope      PLAN:  - TTE  - Telemetry   - Orthostatics  -  f/u Neuro c/s in AM  - will consider MRI brain per neuro recs # Hx of last CVA  # New CVA?   - Pt with 2 unwitnessed falls likely iso of dehydration and poor PO intake. Pt recalls falling, reports feeling weak and that his legs gave out. He denies LOC, blurry vision or dizziness.   - Wife reports onset of slurred speech since Thursday 11/21, patient out of the window for tPA   - CT head negative for ischemia, hemorrhage, or midline shift  - ddx orthostatic vs neurogenic vs cardiac syncope      PLAN:  - TTE  - Telemetry   - Orthostatics  -  f/u Neuro c/s in AM. Pt already on ASA/statin, will start on Plavix 75mg QD until further neuro recs.   - will consider MRI brain per neuro recs

## 2024-11-24 NOTE — H&P ADULT - HISTORY OF PRESENT ILLNESS
4-year-old male history IBD (unclear if crohns vs UC), on methotrexate, remicade, HTN, HLD, T2DM, cognitive impairment getting worse per caregiver, presents with poor PO intake, fatigue for the past 5 days. Patient has also had increased urinartion and nonbloody diarrhea. Pt here after utwitnessed fall, appears dehydrated, has bandemia, wll eval f or sepsis and give broad spectrum abx, admit.    In the ED, T 73 y/o M with PMH  IBD (unclear if crohns vs UC), on methotrexate, remicade (q 6 weeks), HTN, HLD, T2DM, cognitive impairment getting worse per caregiver, presents with poor PO intake, fatigue,  for the past 5 days. Patient has also had increased urination and nonbloody diarrhea. Per caregiver, pt had 2 unwitnessed falls since yesterday, prompting presentation to the ED.     In the ED, T 97.7F, , BP 98/62, RR 16, SpO22 96% RA. Labs significant for bandemia of 33%, lactate 2.4, and anion gap metabolic acidosis, Patient received CT head, which was negative for ischemia, hemorrhage or midline shift, CT AB negative for ischemia, bowel obstruction, or acute inflammatory bowel disease. Patient admitted to medicine for further management.  75 y/o M with PMH  IBD (unclear if crohns vs UC), on methotrexate, remicade (q 6 weeks), HTN, HLD, T2DM, cognitive impairment getting worse per caregiver, presents with poor PO intake, fatigue,  for the past 5 days. Patient has also had increased urination and nonbloody diarrhea. Per caregiver, pt had 2 unwitnessed falls since yesterday, prompting presentation to the ED.     In the ED, T 97.7F, , BP 98/62, RR 16, SpO2 96% RA. Labs significant for bandemia of 33%, lactate 2.4, and anion gap metabolic acidosis, Patient received CT head, which was negative for ischemia, hemorrhage or midline shift, CT AB negative for ischemia, bowel obstruction, or acute inflammatory bowel disease. Patient was started on 1g Vanc and 3.375g Zosyn, and 2L 0.9% NaCl. Patient admitted to medicine for further management.  75 y/o M with PMH  IBD (unclear if crohns vs UC), on methotrexate, remicade (q 6 weeks), HTN, HLD, T2DM, cognitive impairment getting worse per caregiver, presents with poor PO intake, fatigue,  for the past 5 days. Patient has also had increased urination and nonbloody diarrhea. Per caregiver, pt had 2 unwitnessed falls since yesterday, prompting presentation to the ED. Pt denies headache, chest pain, SOB, nausea, vomiting, recent weight changes, sick contacts, or recent travel.     In the ED, T 97.7F, , BP 98/62, RR 16, SpO2 96% RA. Labs significant for bandemia of 33%, lactate 2.4, and anion gap metabolic acidosis, Patient received CT head, which was negative for ischemia, hemorrhage or midline shift, CT AB negative for ischemia, bowel obstruction, or acute inflammatory bowel disease. Patient was started on 1g Vanc and 3.375g Zosyn, and 2L 0.9% NaCl. Patient admitted to medicine for further management.  75 y/o M with PMH  IBD (most likely UC), on methotrexate, remicade (q 6 weeks), HTN, HLD, T2DM, cognitive impairment (AOx2 at baseline) getting worse per wife, presents with poor PO intake, fatigue,  for the past 5 days. Patient has also had increased urination and nonbloody diarrhea. Per wife, pt had 2 unwitnessed falls since yesterday, prompting presentation to the ED. Also around same time, pt has been having slurred speech since 11/21/24 (per wife, speech clear before this). Pt denies headache, chest pain, SOB, nausea, vomiting, recent weight changes, sick contacts, or recent travel.     In the ED, T 97.7F, , BP 98/62, RR 16, SpO2 96% RA. Labs significant for bandemia of 33%, lactate 2.4, and anion gap metabolic acidosis, Patient received CT head, which was negative for ischemia, hemorrhage or midline shift, CT AB negative for ischemia, bowel obstruction, or acute inflammatory bowel disease. Patient was started on 1g Vanc and 3.375g Zosyn, and 2L 0.9% NaCl. Patient admitted to medicine for further management.

## 2024-11-24 NOTE — ED ADULT NURSE NOTE - CHIEF COMPLAINT QUOTE
Pt here for unwitnessed fall in the bathroom this morning. EMS called by wife because she was unable to get him off the floor, unsure if he hit head. Denies AC use. Pt has been falling and complaining of generalized weakness x 3 days as per wife. Pt denies any complaints at this time. Pt covered in feces. History of dementia, DM2, HTN.

## 2024-11-24 NOTE — H&P ADULT - NSHPREVIEWOFSYSTEMS_GEN_ALL_CORE
REVIEW OF SYSTEMS:    CONSTITUTIONAL:  No weakness, fevers, or chills  EYES/ENT:  No visual changes, vertigo, or throat pain   NECK:  No pain or stiffness  RESPIRATORY:  No SOB, cough, wheezing, or hemoptysis  CARDIOVASCULAR:  No chest pain or palpitations  GASTROINTESTINAL:  No abdominal pain, nausea, vomiting, or hematemesis; No diarrhea or constipation; No melena or hematochezia.  GENITOURINARY:  No dysuria, change in frequency, or hematuria  NEUROLOGICAL:  No numbness or weakness  SKIN:  No itching or rashes REVIEW OF SYSTEMS:    CONSTITUTIONAL:  No weakness, fevers, or chills  EYES/ENT:  No visual changes, vertigo, or throat pain   NECK:  No pain or stiffness  RESPIRATORY:  No SOB, cough, wheezing, or hemoptysis  CARDIOVASCULAR:  No chest pain or palpitations  GASTROINTESTINAL:  No abdominal pain, nausea, vomiting, or hematemesis; +diarrhea. No constipation; No melena or hematochezia.  GENITOURINARY:  No dysuria, change in frequency, or hematuria  NEUROLOGICAL:  No numbness or weakness  SKIN:  No itching or rashes REVIEW OF SYSTEMS:    CONSTITUTIONAL:  + weakness, no fevers/chills  EYES/ENT:  No visual changes, vertigo, or throat pain   NECK:  No pain or stiffness  RESPIRATORY:  No SOB, cough, wheezing, or hemoptysis  CARDIOVASCULAR:  No chest pain or palpitations  GASTROINTESTINAL:  No abdominal pain, nausea, vomiting, or hematemesis; +diarrhea. No constipation; No melena or hematochezia.  GENITOURINARY:  No dysuria, + increase in frequency, or hematuria  NEUROLOGICAL:  No numbness or weakness  SKIN:  No itching or rashes

## 2024-11-24 NOTE — H&P ADULT - PROBLEM SELECTOR PLAN 8
- aspirin 81 mg - atorvastatin 40mg qd - on home Januvia 50mg QD, Tresiba 30 U at bedtime, and 28-30 U premeals TID  - on gabapentin for neuropathy   PLAN:  - 20U Lantus  - 20 U Admelog  - KELLI  - Consistent carb diet, low fiber , full liquid. Adjust insulin regimen as appropriate.

## 2024-11-24 NOTE — H&P ADULT - PROBLEM SELECTOR PLAN 7
- atorvastatin 40mg qd - on home Tresiba 30 U at bedtime, and 28-30 U FLASP premeals TID    PLAN:  - 24U Lantus  - 23 U Admelog  -KELLI - on home Tresiba 30 U at bedtime, and 28-30 U premeals TID  - on gabapentin for neuropathy   PLAN:  - 20U Lantus  - 20 U Admelog  - KELLI  - Consistent carb diet, low fiber , full liquid - Pt with longstanding history of IBD (UC) and recent increase in diarrhea this week  - Most likely UC, biopsy results from 12/202.   - On remicade q 6 weeks (last infusion 11/20), methotrexate 2.5 mg ( 4 tabs on monday mornings), prednisone 10mg BID, mesalamine 800mg   - CT abdomen negative for colitis, bowel obstruction, or active IBD flare    PLAN:   - hold methotrexate 2.5 mg ( 4 tabs on monday mornings), prednisone 10mg BID, and mesalamine 800mg iso infection, until further GI recs and infectious w/u    - management of diarrhea as above  - GI consult

## 2024-11-24 NOTE — ED PROVIDER NOTE - PHYSICAL EXAMINATION
GEN: NAD. A&Ox2.  HEENT: normocephalic, atraumatic, EOMI, PERRL, no scleral icterus, dry MM  CARDIAC: tachycardic, regular rhythm, S1, S2, no murmur. Radial pulses and DP pulses present and symmetric b/l  PULM: CTA B/L no wheeze, rhonchi, rales.   ABD: soft, slight RLQ ttp, distended (baseline)  MSK: Moving all extremities, no edema. 4/5 strength in RLE, otherwise 5/5 strength and full ROM in all extremities, no midline ttp  NEURO: no focal neurological deficits, CN 2-12 grossly intact  SKIN: warm, dry, no rash.

## 2024-11-25 LAB
ADD ON TEST-SPECIMEN IN LAB: SIGNIFICANT CHANGE UP
ADD ON TEST-SPECIMEN IN LAB: SIGNIFICANT CHANGE UP
ALBUMIN SERPL ELPH-MCNC: 2.2 G/DL — LOW (ref 3.3–5)
ALP SERPL-CCNC: 53 U/L — SIGNIFICANT CHANGE UP (ref 40–120)
ALT FLD-CCNC: 25 U/L — SIGNIFICANT CHANGE UP (ref 4–41)
ANION GAP SERPL CALC-SCNC: 11 MMOL/L — SIGNIFICANT CHANGE UP (ref 7–14)
ANION GAP SERPL CALC-SCNC: 14 MMOL/L — SIGNIFICANT CHANGE UP (ref 7–14)
AST SERPL-CCNC: 63 U/L — HIGH (ref 4–40)
B-OH-BUTYR SERPL-SCNC: 2.3 MMOL/L — HIGH (ref 0–0.4)
BASOPHILS # BLD AUTO: 0.06 K/UL — SIGNIFICANT CHANGE UP (ref 0–0.2)
BASOPHILS NFR BLD AUTO: 1.4 % — SIGNIFICANT CHANGE UP (ref 0–2)
BILIRUB SERPL-MCNC: 0.6 MG/DL — SIGNIFICANT CHANGE UP (ref 0.2–1.2)
BUN SERPL-MCNC: 32 MG/DL — HIGH (ref 7–23)
BUN SERPL-MCNC: 42 MG/DL — HIGH (ref 7–23)
C DIFF GDH STL QL: NEGATIVE — SIGNIFICANT CHANGE UP
C DIFF GDH STL QL: SIGNIFICANT CHANGE UP
CALCIUM SERPL-MCNC: 7 MG/DL — LOW (ref 8.4–10.5)
CALCIUM SERPL-MCNC: 7 MG/DL — LOW (ref 8.4–10.5)
CHLORIDE SERPL-SCNC: 106 MMOL/L — SIGNIFICANT CHANGE UP (ref 98–107)
CHLORIDE SERPL-SCNC: 106 MMOL/L — SIGNIFICANT CHANGE UP (ref 98–107)
CO2 SERPL-SCNC: 13 MMOL/L — LOW (ref 22–31)
CO2 SERPL-SCNC: 17 MMOL/L — LOW (ref 22–31)
CREAT SERPL-MCNC: 0.94 MG/DL — SIGNIFICANT CHANGE UP (ref 0.5–1.3)
CREAT SERPL-MCNC: 0.97 MG/DL — SIGNIFICANT CHANGE UP (ref 0.5–1.3)
CRP SERPL-MCNC: 144.5 MG/L — HIGH
EGFR: 81 ML/MIN/1.73M2 — SIGNIFICANT CHANGE UP
EGFR: 84 ML/MIN/1.73M2 — SIGNIFICANT CHANGE UP
EOSINOPHIL # BLD AUTO: 0.06 K/UL — SIGNIFICANT CHANGE UP (ref 0–0.5)
EOSINOPHIL NFR BLD AUTO: 1.4 % — SIGNIFICANT CHANGE UP (ref 0–6)
ERYTHROCYTE [SEDIMENTATION RATE] IN BLOOD: 38 MM/HR — HIGH (ref 1–15)
GLUCOSE BLDC GLUCOMTR-MCNC: 153 MG/DL — HIGH (ref 70–99)
GLUCOSE BLDC GLUCOMTR-MCNC: 163 MG/DL — HIGH (ref 70–99)
GLUCOSE SERPL-MCNC: 157 MG/DL — HIGH (ref 70–99)
GLUCOSE SERPL-MCNC: 96 MG/DL — SIGNIFICANT CHANGE UP (ref 70–99)
HCT VFR BLD CALC: 29.4 % — LOW (ref 39–50)
HGB BLD-MCNC: 9 G/DL — LOW (ref 13–17)
IANC: 2.08 K/UL — SIGNIFICANT CHANGE UP (ref 1.8–7.4)
IMM GRANULOCYTES NFR BLD AUTO: 7.3 % — HIGH (ref 0–0.9)
LYMPHOCYTES # BLD AUTO: 1.15 K/UL — SIGNIFICANT CHANGE UP (ref 1–3.3)
LYMPHOCYTES # BLD AUTO: 26.1 % — SIGNIFICANT CHANGE UP (ref 13–44)
MAGNESIUM SERPL-MCNC: 2.2 MG/DL — SIGNIFICANT CHANGE UP (ref 1.6–2.6)
MAGNESIUM SERPL-MCNC: 2.4 MG/DL — SIGNIFICANT CHANGE UP (ref 1.6–2.6)
MCHC RBC-ENTMCNC: 23.9 PG — LOW (ref 27–34)
MCHC RBC-ENTMCNC: 30.6 G/DL — LOW (ref 32–36)
MCV RBC AUTO: 78.2 FL — LOW (ref 80–100)
MONOCYTES # BLD AUTO: 0.74 K/UL — SIGNIFICANT CHANGE UP (ref 0–0.9)
MONOCYTES NFR BLD AUTO: 16.8 % — HIGH (ref 2–14)
MRSA PCR RESULT.: SIGNIFICANT CHANGE UP
NEUTROPHILS # BLD AUTO: 2.08 K/UL — SIGNIFICANT CHANGE UP (ref 1.8–7.4)
NEUTROPHILS NFR BLD AUTO: 47 % — SIGNIFICANT CHANGE UP (ref 43–77)
NRBC # BLD: 0 /100 WBCS — SIGNIFICANT CHANGE UP (ref 0–0)
NRBC # FLD: 0.02 K/UL — HIGH (ref 0–0)
PHOSPHATE SERPL-MCNC: 1.7 MG/DL — LOW (ref 2.5–4.5)
PHOSPHATE SERPL-MCNC: 2.5 MG/DL — SIGNIFICANT CHANGE UP (ref 2.5–4.5)
PLATELET # BLD AUTO: 163 K/UL — SIGNIFICANT CHANGE UP (ref 150–400)
POTASSIUM SERPL-MCNC: 3.2 MMOL/L — LOW (ref 3.5–5.3)
POTASSIUM SERPL-MCNC: 3.5 MMOL/L — SIGNIFICANT CHANGE UP (ref 3.5–5.3)
POTASSIUM SERPL-SCNC: 3.2 MMOL/L — LOW (ref 3.5–5.3)
POTASSIUM SERPL-SCNC: 3.5 MMOL/L — SIGNIFICANT CHANGE UP (ref 3.5–5.3)
PROT SERPL-MCNC: 4.9 G/DL — LOW (ref 6–8.3)
RBC # BLD: 3.76 M/UL — LOW (ref 4.2–5.8)
RBC # FLD: 18 % — HIGH (ref 10.3–14.5)
S AUREUS DNA NOSE QL NAA+PROBE: SIGNIFICANT CHANGE UP
SODIUM SERPL-SCNC: 133 MMOL/L — LOW (ref 135–145)
SODIUM SERPL-SCNC: 134 MMOL/L — LOW (ref 135–145)
WBC # BLD: 4.41 K/UL — SIGNIFICANT CHANGE UP (ref 3.8–10.5)
WBC # FLD AUTO: 4.41 K/UL — SIGNIFICANT CHANGE UP (ref 3.8–10.5)

## 2024-11-25 PROCEDURE — 99232 SBSQ HOSP IP/OBS MODERATE 35: CPT

## 2024-11-25 PROCEDURE — 76770 US EXAM ABDO BACK WALL COMP: CPT | Mod: 26

## 2024-11-25 PROCEDURE — 99222 1ST HOSP IP/OBS MODERATE 55: CPT | Mod: GC

## 2024-11-25 PROCEDURE — 93010 ELECTROCARDIOGRAM REPORT: CPT

## 2024-11-25 RX ORDER — CHLORHEXIDINE GLUCONATE 1.2 MG/ML
1 RINSE ORAL DAILY
Refills: 0 | Status: DISCONTINUED | OUTPATIENT
Start: 2024-11-25 | End: 2024-12-03

## 2024-11-25 RX ORDER — SODIUM BICARBONATE 84 MG/ML
0.17 INJECTION, SOLUTION INTRAVENOUS
Qty: 150 | Refills: 0 | Status: DISCONTINUED | OUTPATIENT
Start: 2024-11-25 | End: 2024-12-02

## 2024-11-25 RX ADMIN — Medication 10 UNIT(S): at 18:04

## 2024-11-25 RX ADMIN — Medication 50 MILLIGRAM(S): at 22:47

## 2024-11-25 RX ADMIN — Medication 5000 UNIT(S): at 18:04

## 2024-11-25 RX ADMIN — MEMANTINE HYDROCHLORIDE 10 MILLIGRAM(S): 14 CAPSULE, EXTENDED RELEASE ORAL at 18:04

## 2024-11-25 RX ADMIN — Medication 20 UNIT(S): at 09:10

## 2024-11-25 RX ADMIN — Medication 81 MILLIGRAM(S): at 13:01

## 2024-11-25 RX ADMIN — SODIUM CHLORIDE 80 MILLILITER(S): 9 INJECTION, SOLUTION INTRAMUSCULAR; INTRAVENOUS; SUBCUTANEOUS at 12:59

## 2024-11-25 RX ADMIN — MEMANTINE HYDROCHLORIDE 10 MILLIGRAM(S): 14 CAPSULE, EXTENDED RELEASE ORAL at 05:04

## 2024-11-25 RX ADMIN — TAMSULOSIN HYDROCHLORIDE 0.4 MILLIGRAM(S): 0.4 CAPSULE ORAL at 22:47

## 2024-11-25 RX ADMIN — Medication 1: at 09:11

## 2024-11-25 RX ADMIN — SERTRALINE HYDROCHLORIDE 100 MILLIGRAM(S): 100 TABLET, FILM COATED ORAL at 13:01

## 2024-11-25 RX ADMIN — Medication 12.5 GRAM(S): at 12:53

## 2024-11-25 RX ADMIN — Medication 1 MILLIGRAM(S): at 13:00

## 2024-11-25 RX ADMIN — Medication 5000 UNIT(S): at 05:04

## 2024-11-25 RX ADMIN — Medication 166.67 MILLIGRAM(S): at 12:59

## 2024-11-25 RX ADMIN — Medication 400 UNIT(S): at 13:00

## 2024-11-25 RX ADMIN — CEFEPIME 100 MILLIGRAM(S): 2 INJECTION, POWDER, FOR SOLUTION INTRAVENOUS at 05:04

## 2024-11-25 RX ADMIN — Medication 5 MILLIGRAM(S): at 13:01

## 2024-11-25 RX ADMIN — CHLORHEXIDINE GLUCONATE 1 APPLICATION(S): 1.2 RINSE ORAL at 13:05

## 2024-11-25 RX ADMIN — GABAPENTIN 100 MILLIGRAM(S): 300 CAPSULE ORAL at 05:25

## 2024-11-25 RX ADMIN — Medication 80 MILLIGRAM(S): at 22:47

## 2024-11-25 RX ADMIN — SODIUM BICARBONATE 100 MEQ/KG/HR: 84 INJECTION, SOLUTION INTRAVENOUS at 15:45

## 2024-11-25 RX ADMIN — INSULIN GLARGINE 20 UNIT(S): 100 INJECTION, SOLUTION SUBCUTANEOUS at 22:47

## 2024-11-25 RX ADMIN — CEFEPIME 100 MILLIGRAM(S): 2 INJECTION, POWDER, FOR SOLUTION INTRAVENOUS at 22:47

## 2024-11-25 RX ADMIN — CEFEPIME 100 MILLIGRAM(S): 2 INJECTION, POWDER, FOR SOLUTION INTRAVENOUS at 15:45

## 2024-11-25 NOTE — PROGRESS NOTE ADULT - PROBLEM SELECTOR PLAN 2
# Hx of last CVA  # New CVA?   - Pt with 2 unwitnessed falls likely iso of dehydration and poor PO intake. Pt recalls falling, reports feeling weak and that his legs gave out. He denies LOC, blurry vision or dizziness.   - Wife reports onset of slurred speech since Thursday 11/21, patient out of the window for tPA   - CT head negative for ischemia, hemorrhage, or midline shift  - ddx orthostatic vs neurogenic vs cardiac syncope      PLAN:  - TTE  - Telemetry   - Orthostatics  -  f/u Neuro c/s in AM. Pt already on ASA/statin, will start on Plavix 75mg QD until further neuro recs.   - will consider MRI brain per neuro recs - Pt reports watery diarrhea (>5 times per day) iso of immunosuppression may be 2/2 to C diff infection vs IBD flare  - s/p 2L NS bolus in ED, START NS 80 cc/hr x 18 hours  PLAN:  - C. diff PCR (pt immunosuppressed and on chronic trimethoprim for UTI ppx)  - Stool studies  - Stool O & P   - GI consulted, likely iso colitis. will trend ESR/CRP daily, f/u CMV PCR, quant, hepatitis panel   - GI will consider inpatient colonoscopy once enterocolitis improves  - fecal calprotectin

## 2024-11-25 NOTE — PROGRESS NOTE ADULT - SUBJECTIVE AND OBJECTIVE BOX
PROGRESS NOTE:     Patient is a 74y old  Male who presents with a chief complaint of     SUBJECTIVE / OVERNIGHT EVENTS:    OVERNIGHT: No acute overnight events.      Patient was examined at bedside and feels well this morning. Denies fever, chills, chest pain, SOB, nausea, vomiting. ROS otherwise negative and pt is amenable to current treatment plan.      REVIEW OF SYSTEMS:    CONSTITUTIONAL:  No weakness, fevers, or chills  EYES/ENT:  No visual changes, vertigo, or throat pain   NECK:  No pain or stiffness  RESPIRATORY:  No SOB, cough, wheezing, or hemoptysis  CARDIOVASCULAR:  No chest pain or palpitations  GASTROINTESTINAL:  No abdominal pain, nausea, vomiting, or hematemesis; No diarrhea or constipation; No melena or hematochezia.  GENITOURINARY:  No dysuria, change in frequency, or hematuria  NEUROLOGICAL:  No numbness or weakness  SKIN:  No itching or rashes      MEDICATIONS  (STANDING):  aspirin enteric coated 81 milliGRAM(s) Oral daily  atorvastatin 80 milliGRAM(s) Oral at bedtime  cefepime   IVPB 2000 milliGRAM(s) IV Intermittent every 8 hours  cefepime   IVPB      chlorhexidine 2% Cloths 1 Application(s) Topical daily  cholecalciferol 400 Unit(s) Oral daily  clopidogrel Tablet 75 milliGRAM(s) Oral daily  dextrose 5%. 1000 milliLiter(s) (100 mL/Hr) IV Continuous <Continuous>  dextrose 5%. 1000 milliLiter(s) (50 mL/Hr) IV Continuous <Continuous>  dextrose 50% Injectable 25 Gram(s) IV Push once  dextrose 50% Injectable 12.5 Gram(s) IV Push once  dextrose 50% Injectable 25 Gram(s) IV Push once  dextrose Oral Gel 15 Gram(s) Oral once  finasteride 5 milliGRAM(s) Oral daily  folic acid 1 milliGRAM(s) Oral daily  gabapentin 100 milliGRAM(s) Oral two times a day  glucagon  Injectable 1 milliGRAM(s) IntraMuscular once  heparin   Injectable 5000 Unit(s) SubCutaneous every 12 hours  insulin glargine Injectable (LANTUS) 20 Unit(s) SubCutaneous at bedtime  insulin lispro (ADMELOG) corrective regimen sliding scale   SubCutaneous three times a day before meals  insulin lispro (ADMELOG) corrective regimen sliding scale   SubCutaneous at bedtime  insulin lispro Injectable (ADMELOG) 20 Unit(s) SubCutaneous three times a day before meals  memantine 10 milliGRAM(s) Oral two times a day  QUEtiapine 100 milliGRAM(s) Oral at bedtime  sertraline 100 milliGRAM(s) Oral daily  sodium chloride 0.9%. 1000 milliLiter(s) (80 mL/Hr) IV Continuous <Continuous>  tamsulosin 0.4 milliGRAM(s) Oral at bedtime  vancomycin  IVPB 1250 milliGRAM(s) IV Intermittent every 12 hours    MEDICATIONS  (PRN):      CAPILLARY BLOOD GLUCOSE      POCT Blood Glucose.: 164 mg/dL (24 Nov 2024 21:36)  POCT Blood Glucose.: 142 mg/dL (24 Nov 2024 20:12)  POCT Blood Glucose.: 153 mg/dL (24 Nov 2024 16:40)  POCT Blood Glucose.: 191 mg/dL (24 Nov 2024 12:55)  POCT Blood Glucose.: 208 mg/dL (24 Nov 2024 07:21)    I&O's Summary      PHYSICAL EXAM:  Vital Signs Last 24 Hrs  T(C): 36.3 (25 Nov 2024 05:13), Max: 37.1 (24 Nov 2024 12:52)  T(F): 97.4 (25 Nov 2024 05:13), Max: 98.8 (24 Nov 2024 21:56)  HR: 112 (25 Nov 2024 05:13) (98 - 112)  BP: 135/54 (25 Nov 2024 05:13) (107/52 - 137/49)  BP(mean): --  RR: 17 (25 Nov 2024 05:13) (16 - 19)  SpO2: 100% (25 Nov 2024 05:13) (98% - 100%)    Parameters below as of 25 Nov 2024 05:13  Patient On (Oxygen Delivery Method): room air        CONSTITUTIONAL: NAD; well-developed  HEENT: PERRL, clear conjunctiva  RESPIRATORY: Normal respiratory effort; lungs are clear to auscultation bilaterally; No crackles/rhonchi/wheezing  CARDIOVASCULAR: Regular rate and rhythm, normal S1 and S2, no murmur/rub/gallop; No lower extremity edema; Peripheral pulses are 2+ bilaterally  ABDOMEN: Nontender to palpation, normoactive bowel sounds, no rebound/guarding; No hepatosplenomegaly  MUSCULOSKELETAL: No clubbing or cyanosis of digits; no joint swelling or tenderness to palpation  EXTREMITY: Lower extremities non-tender to palpation; non-erythematous B/L  NEURO: A&Ox3; no focal deficits   PSYCH: Normal mood; affect appropriate    LABS:                        10.3   4.08  )-----------( 172      ( 24 Nov 2024 08:31 )             33.3     11-24    131[L]  |  97[L]  |  57[H]  ----------------------------<  161[H]  4.0   |  16[L]  |  1.27    Ca    7.0[L]      24 Nov 2024 11:17    TPro  6.0  /  Alb  2.8[L]  /  TBili  0.9  /  DBili  x   /  AST  134[H]  /  ALT  31  /  AlkPhos  60  11-24    PT/INR - ( 24 Nov 2024 08:31 )   PT: 13.3 sec;   INR: 1.12 ratio         PTT - ( 24 Nov 2024 08:31 )  PTT:27.3 sec      Urinalysis Basic - ( 24 Nov 2024 11:17 )    Color: x / Appearance: x / SG: x / pH: x  Gluc: 161 mg/dL / Ketone: x  / Bili: x / Urobili: x   Blood: x / Protein: x / Nitrite: x   Leuk Esterase: x / RBC: x / WBC x   Sq Epi: x / Non Sq Epi: x / Bacteria: x        Urinalysis with Rflx Culture (collected 24 Nov 2024 10:15)        RADIOLOGY & ADDITIONAL TESTS:    N/A PROGRESS NOTE:     Patient is a 74y old  Male who presents with a chief complaint of     SUBJECTIVE / OVERNIGHT EVENTS:    OVERNIGHT: No acute overnight events. Patient tachycardiac overnight.      Patient was examined at bedside and states that he feels better this morning. Continues to have watery diarrhea. He is Aox2. Denies fever, chills, chest pain, SOB, nausea, vomiting. ROS otherwise negative and pt is amenable to current treatment plan.      REVIEW OF SYSTEMS:    CONSTITUTIONAL:  No weakness, fevers, or chills  EYES/ENT:  No visual changes, vertigo, or throat pain   NECK:  No pain or stiffness  RESPIRATORY:  No SOB, cough, wheezing, or hemoptysis  CARDIOVASCULAR:  No chest pain or palpitations  GASTROINTESTINAL:  +abdominal pain, No nausea, vomiting, or hematemesis; +diarrhea. No constipation; No melena or hematochezia.  GENITOURINARY:  No dysuria, change in frequency, or hematuria  NEUROLOGICAL:  No numbness or weakness  SKIN:  No itching or rashes      MEDICATIONS  (STANDING):  aspirin enteric coated 81 milliGRAM(s) Oral daily  atorvastatin 80 milliGRAM(s) Oral at bedtime  cefepime   IVPB 2000 milliGRAM(s) IV Intermittent every 8 hours  cefepime   IVPB      chlorhexidine 2% Cloths 1 Application(s) Topical daily  cholecalciferol 400 Unit(s) Oral daily  clopidogrel Tablet 75 milliGRAM(s) Oral daily  dextrose 5%. 1000 milliLiter(s) (100 mL/Hr) IV Continuous <Continuous>  dextrose 5%. 1000 milliLiter(s) (50 mL/Hr) IV Continuous <Continuous>  dextrose 50% Injectable 25 Gram(s) IV Push once  dextrose 50% Injectable 12.5 Gram(s) IV Push once  dextrose 50% Injectable 25 Gram(s) IV Push once  dextrose Oral Gel 15 Gram(s) Oral once  finasteride 5 milliGRAM(s) Oral daily  folic acid 1 milliGRAM(s) Oral daily  gabapentin 100 milliGRAM(s) Oral two times a day  glucagon  Injectable 1 milliGRAM(s) IntraMuscular once  heparin   Injectable 5000 Unit(s) SubCutaneous every 12 hours  insulin glargine Injectable (LANTUS) 20 Unit(s) SubCutaneous at bedtime  insulin lispro (ADMELOG) corrective regimen sliding scale   SubCutaneous three times a day before meals  insulin lispro (ADMELOG) corrective regimen sliding scale   SubCutaneous at bedtime  insulin lispro Injectable (ADMELOG) 20 Unit(s) SubCutaneous three times a day before meals  memantine 10 milliGRAM(s) Oral two times a day  QUEtiapine 100 milliGRAM(s) Oral at bedtime  sertraline 100 milliGRAM(s) Oral daily  sodium chloride 0.9%. 1000 milliLiter(s) (80 mL/Hr) IV Continuous <Continuous>  tamsulosin 0.4 milliGRAM(s) Oral at bedtime  vancomycin  IVPB 1250 milliGRAM(s) IV Intermittent every 12 hours    MEDICATIONS  (PRN):      CAPILLARY BLOOD GLUCOSE      POCT Blood Glucose.: 164 mg/dL (24 Nov 2024 21:36)  POCT Blood Glucose.: 142 mg/dL (24 Nov 2024 20:12)  POCT Blood Glucose.: 153 mg/dL (24 Nov 2024 16:40)  POCT Blood Glucose.: 191 mg/dL (24 Nov 2024 12:55)  POCT Blood Glucose.: 208 mg/dL (24 Nov 2024 07:21)    I&O's Summary      PHYSICAL EXAM:  Vital Signs Last 24 Hrs  T(C): 36.3 (25 Nov 2024 05:13), Max: 37.1 (24 Nov 2024 12:52)  T(F): 97.4 (25 Nov 2024 05:13), Max: 98.8 (24 Nov 2024 21:56)  HR: 112 (25 Nov 2024 05:13) (98 - 112)  BP: 135/54 (25 Nov 2024 05:13) (107/52 - 137/49)  BP(mean): --  RR: 17 (25 Nov 2024 05:13) (16 - 19)  SpO2: 100% (25 Nov 2024 05:13) (98% - 100%)    Parameters below as of 25 Nov 2024 05:13  Patient On (Oxygen Delivery Method): room air        CONSTITUTIONAL: NAD; well-developed  HEENT: PERRL, clear conjunctiva  RESPIRATORY: Normal respiratory effort; lungs are clear to auscultation bilaterally; No crackles/rhonchi/wheezing  CARDIOVASCULAR: Regular rate and rhythm, normal S1 and S2, + systolic murmur. No lower extremity edema; Peripheral pulses are 2+ bilaterally  ABDOMEN: Nontender to palpation, normoactive bowel sounds, no rebound/guarding; No hepatosplenomegaly  MUSCULOSKELETAL: No clubbing or cyanosis of digits; no joint swelling or tenderness to palpation  EXTREMITY: Lower extremities non-tender to palpation; non-erythematous B/L  NEURO: A&Ox2; no focal deficits   PSYCH: Normal mood; affect appropriate    LABS:                        10.3   4.08  )-----------( 172      ( 24 Nov 2024 08:31 )             33.3     11-24    131[L]  |  97[L]  |  57[H]  ----------------------------<  161[H]  4.0   |  16[L]  |  1.27    Ca    7.0[L]      24 Nov 2024 11:17    TPro  6.0  /  Alb  2.8[L]  /  TBili  0.9  /  DBili  x   /  AST  134[H]  /  ALT  31  /  AlkPhos  60  11-24    PT/INR - ( 24 Nov 2024 08:31 )   PT: 13.3 sec;   INR: 1.12 ratio         PTT - ( 24 Nov 2024 08:31 )  PTT:27.3 sec      Urinalysis Basic - ( 24 Nov 2024 11:17 )    Color: x / Appearance: x / SG: x / pH: x  Gluc: 161 mg/dL / Ketone: x  / Bili: x / Urobili: x   Blood: x / Protein: x / Nitrite: x   Leuk Esterase: x / RBC: x / WBC x   Sq Epi: x / Non Sq Epi: x / Bacteria: x        Urinalysis with Rflx Culture (collected 24 Nov 2024 10:15)        RADIOLOGY & ADDITIONAL TESTS:    N/A PROGRESS NOTE:     Patient is a 74y old  Male who presents with a chief complaint of diarrhea    SUBJECTIVE / OVERNIGHT EVENTS:    OVERNIGHT: No acute overnight events. Patient tachycardiac overnight.      Patient was examined at bedside and states that he feels better this morning. Continues to have watery diarrhea. He is Aox2. Denies fever, chills, chest pain, SOB, nausea, vomiting. ROS otherwise negative and pt is amenable to current treatment plan.      REVIEW OF SYSTEMS:    CONSTITUTIONAL:  No weakness, fevers, or chills  EYES/ENT:  No visual changes, vertigo, or throat pain   NECK:  No pain or stiffness  RESPIRATORY:  No SOB, cough, wheezing, or hemoptysis  CARDIOVASCULAR:  No chest pain or palpitations  GASTROINTESTINAL:  +abdominal pain, No nausea, vomiting, or hematemesis; +diarrhea. No constipation; No melena or hematochezia.  GENITOURINARY:  No dysuria, change in frequency, or hematuria  NEUROLOGICAL:  No numbness or weakness  SKIN:  No itching or rashes      MEDICATIONS  (STANDING):  aspirin enteric coated 81 milliGRAM(s) Oral daily  atorvastatin 80 milliGRAM(s) Oral at bedtime  cefepime   IVPB 2000 milliGRAM(s) IV Intermittent every 8 hours  cefepime   IVPB      chlorhexidine 2% Cloths 1 Application(s) Topical daily  cholecalciferol 400 Unit(s) Oral daily  clopidogrel Tablet 75 milliGRAM(s) Oral daily  dextrose 5%. 1000 milliLiter(s) (100 mL/Hr) IV Continuous <Continuous>  dextrose 5%. 1000 milliLiter(s) (50 mL/Hr) IV Continuous <Continuous>  dextrose 50% Injectable 25 Gram(s) IV Push once  dextrose 50% Injectable 12.5 Gram(s) IV Push once  dextrose 50% Injectable 25 Gram(s) IV Push once  dextrose Oral Gel 15 Gram(s) Oral once  finasteride 5 milliGRAM(s) Oral daily  folic acid 1 milliGRAM(s) Oral daily  gabapentin 100 milliGRAM(s) Oral two times a day  glucagon  Injectable 1 milliGRAM(s) IntraMuscular once  heparin   Injectable 5000 Unit(s) SubCutaneous every 12 hours  insulin glargine Injectable (LANTUS) 20 Unit(s) SubCutaneous at bedtime  insulin lispro (ADMELOG) corrective regimen sliding scale   SubCutaneous three times a day before meals  insulin lispro (ADMELOG) corrective regimen sliding scale   SubCutaneous at bedtime  insulin lispro Injectable (ADMELOG) 20 Unit(s) SubCutaneous three times a day before meals  memantine 10 milliGRAM(s) Oral two times a day  QUEtiapine 100 milliGRAM(s) Oral at bedtime  sertraline 100 milliGRAM(s) Oral daily  sodium chloride 0.9%. 1000 milliLiter(s) (80 mL/Hr) IV Continuous <Continuous>  tamsulosin 0.4 milliGRAM(s) Oral at bedtime  vancomycin  IVPB 1250 milliGRAM(s) IV Intermittent every 12 hours    MEDICATIONS  (PRN):      CAPILLARY BLOOD GLUCOSE      POCT Blood Glucose.: 164 mg/dL (24 Nov 2024 21:36)  POCT Blood Glucose.: 142 mg/dL (24 Nov 2024 20:12)  POCT Blood Glucose.: 153 mg/dL (24 Nov 2024 16:40)  POCT Blood Glucose.: 191 mg/dL (24 Nov 2024 12:55)  POCT Blood Glucose.: 208 mg/dL (24 Nov 2024 07:21)    I&O's Summary      PHYSICAL EXAM:  Vital Signs Last 24 Hrs  T(C): 36.3 (25 Nov 2024 05:13), Max: 37.1 (24 Nov 2024 12:52)  T(F): 97.4 (25 Nov 2024 05:13), Max: 98.8 (24 Nov 2024 21:56)  HR: 112 (25 Nov 2024 05:13) (98 - 112)  BP: 135/54 (25 Nov 2024 05:13) (107/52 - 137/49)  BP(mean): --  RR: 17 (25 Nov 2024 05:13) (16 - 19)  SpO2: 100% (25 Nov 2024 05:13) (98% - 100%)    Parameters below as of 25 Nov 2024 05:13  Patient On (Oxygen Delivery Method): room air        CONSTITUTIONAL: NAD; well-developed  HEENT: PERRL, clear conjunctiva  RESPIRATORY: Normal respiratory effort; lungs are clear to auscultation bilaterally; No crackles/rhonchi/wheezing  CARDIOVASCULAR: Regular rate and rhythm, normal S1 and S2, + systolic murmur. No lower extremity edema; Peripheral pulses are 2+ bilaterally  ABDOMEN: Nontender to palpation, normoactive bowel sounds, no rebound/guarding; No hepatosplenomegaly  MUSCULOSKELETAL: No clubbing or cyanosis of digits; no joint swelling or tenderness to palpation  EXTREMITY: Lower extremities non-tender to palpation; non-erythematous B/L  NEURO: A&Ox2; no focal deficits   PSYCH: Normal mood; affect appropriate    LABS:                        10.3   4.08  )-----------( 172      ( 24 Nov 2024 08:31 )             33.3     11-24    131[L]  |  97[L]  |  57[H]  ----------------------------<  161[H]  4.0   |  16[L]  |  1.27    Ca    7.0[L]      24 Nov 2024 11:17    TPro  6.0  /  Alb  2.8[L]  /  TBili  0.9  /  DBili  x   /  AST  134[H]  /  ALT  31  /  AlkPhos  60  11-24    PT/INR - ( 24 Nov 2024 08:31 )   PT: 13.3 sec;   INR: 1.12 ratio         PTT - ( 24 Nov 2024 08:31 )  PTT:27.3 sec      Urinalysis Basic - ( 24 Nov 2024 11:17 )    Color: x / Appearance: x / SG: x / pH: x  Gluc: 161 mg/dL / Ketone: x  / Bili: x / Urobili: x   Blood: x / Protein: x / Nitrite: x   Leuk Esterase: x / RBC: x / WBC x   Sq Epi: x / Non Sq Epi: x / Bacteria: x        Urinalysis with Rflx Culture (collected 24 Nov 2024 10:15)        RADIOLOGY & ADDITIONAL TESTS:    N/A

## 2024-11-25 NOTE — CONSULT NOTE ADULT - ATTENDING COMMENTS
Agree with above.    I saw and examined the patient on 11-25-24. I agree with the findings and plan of care as documented in the fellow/resident/medical student/physician assistant/nurse practitioner.    Today we are treating the patient for:   increase in diarrhea    ***General note with plan / recommendation:   Continue home prednisone and mesalamine, hold MTX. Follow up C diff and stool studies. Obtain ESR, CRP, Stool roscoe pro.    On CT no inflammation seen.  If not improving may need CF with biopsy to rule out low grade colitis.       Billing:  I have reviewed records from CT, labs    Other:  - Thank you for involving us in the care of this patient. If you have any questions regarding the plan of care please do not hesitate to call us back.    Contact:     Daytime        Available on [employer] Teams        [phone] ([facility] Short Range Pager)        [phone] ([facility] Long Range Pager)     On Weekends/Holidays (All day) and Weekdays after 5 PM to 8AM:        For non-urgent consults, please email [email or web address]        For emergent consults, please contact on call GI team.  See Amion schedule ([facility]), LocalCustomer paging system ([facility]), or call hospital  ([facility])

## 2024-11-25 NOTE — PROGRESS NOTE ADULT - PROBLEM SELECTOR PLAN 3
- Systolic murmur auscultated during physical exam; per wife no known hx of cardiac murmur.   - TTE as noted above.   - Follow blood cx.    # CAD:   - aspirin 81 mg and atorvastatin 80mg QD (increased from home statin 40mg QD d/t slurred speech)  # Prolonged QTc  - QT interval 471 on EKG. Avoid QT prolonging meds and monitor. # Hx of last CVA  # New CVA?   - Pt with 2 unwitnessed falls likely iso of dehydration and poor PO intake. Pt recalls falling, reports feeling weak and that his legs gave out. He denies LOC, blurry vision or dizziness.   - Wife reports onset of slurred speech since Thursday 11/21, patient out of the window for tPA   - CT head negative for ischemia, hemorrhage, or midline shift  - ddx orthostatic vs neurogenic vs cardiac syncope      PLAN:  - TTE  - Telemetry   - Orthostatics  -  f/u Neuro c/s in AM. Pt already on ASA/statin, will start on Plavix 75mg QD until further neuro recs.   - will consider MRI brain per neuro recs # Hx of last CVA  # New CVA?   - Pt with 2 unwitnessed falls likely iso of dehydration and poor PO intake. Pt recalls falling, reports feeling weak and that his legs gave out. He denies LOC, blurry vision or dizziness.   - Wife reports onset of slurred speech since Thursday 11/21, patient out of the window for tPA   - CT head negative for ischemia, hemorrhage, or midline shift  - ddx orthostatic vs neurogenic vs cardiac syncope    PLAN:  - TTE  - Telemetry   - Orthostatics

## 2024-11-25 NOTE — PROGRESS NOTE ADULT - PROBLEM SELECTOR PLAN 6
- Pt reports watery diarrhea (>5 times per day) iso of immunosuppression may be 2/2 to C diff infection vs IBD flare    PLAN:  - C. diff PCR (pt immunosuppressed and on chronic trimethoprim for UTI ppx)  - Stool studies  - Stool O & P   - GI consult   - fecal calprotectin  - s/p 2L NS bolus in ED, START NS 80 cc/hr x 18 hours - Cr 1.45 on admission, baseline 1.08  - likely prerenal iso of dehydration, diarrhea, poor PO intake  - improved s/p 2 L 0.9% NaCl    PLAN:   - continue to trend BMP  - avoid nephrotoxins  - renally dose medication

## 2024-11-25 NOTE — CONSULT NOTE ADULT - SUBJECTIVE AND OBJECTIVE BOX
[facility]    Patient is a 74y old  Male who presents with a chief complaint of   HPI:  Mr Darren Best is a 75 y/o male with a PMHx of IBD (most likely Crohn's), HTN, HLD, T2DM, and cognitive impairment who presents with poor PO intake, fatigue, increased urination, and loose nonbloody, mucous stools for the past 5 days with 2 falls yesterday prompting visit to the ED  Also around same time, pt has been having slurred speech since 11/21/24 (per wife, speech clear before this).     In the ED, T 97.7F, , BP 98/62, RR 16, SpO2 96% RA. Labs significant for bandemia of 33%, lactate 2.4, and anion gap metabolic acidosis, Patient received CT head, which was negative for ischemia, hemorrhage or midline shift, CT AB negative for ischemia, bowel obstruction, or acute inflammatory bowel disease. Patient was started on 1g Vanc and 3.375g Zosyn, and 2L 0.9% NaCl. Patient admitted to medicine for further management.              PMH/PSH:  PAST MEDICAL & SURGICAL HISTORY:  HTN (hypertension)  Not taking any meds, noncompliant      Herniated disc      H/O: CVA  2013 | residual speech deficits      HLD (hyperlipidemia)      Diabetes type 2, controlled      Crohn disease  diagnosed 2012      Alcohol abuse      Depression      Dementia      Anemia      No Past Surgical History      History of cataract surgery  b/l        FH:  FAMILY HISTORY:  Family history of MI (myocardial infarction)    Family history of hypertension (Father)  father    Family history of Alzheimer's disease    Family history of cerebrovascular accident (CVA) in father (Father, Sibling)  father, brother        MEDS:  MEDICATIONS  (STANDING):  aspirin enteric coated 81 milliGRAM(s) Oral daily  atorvastatin 80 milliGRAM(s) Oral at bedtime  cefepime   IVPB 2000 milliGRAM(s) IV Intermittent every 8 hours  cefepime   IVPB      chlorhexidine 2% Cloths 1 Application(s) Topical daily  cholecalciferol 400 Unit(s) Oral daily  clopidogrel Tablet 75 milliGRAM(s) Oral daily  dextrose 5%. 1000 milliLiter(s) (100 mL/Hr) IV Continuous <Continuous>  dextrose 5%. 1000 milliLiter(s) (50 mL/Hr) IV Continuous <Continuous>  dextrose 50% Injectable 25 Gram(s) IV Push once  dextrose 50% Injectable 12.5 Gram(s) IV Push once  dextrose 50% Injectable 25 Gram(s) IV Push once  dextrose Oral Gel 15 Gram(s) Oral once  finasteride 5 milliGRAM(s) Oral daily  folic acid 1 milliGRAM(s) Oral daily  glucagon  Injectable 1 milliGRAM(s) IntraMuscular once  heparin   Injectable 5000 Unit(s) SubCutaneous every 12 hours  insulin glargine Injectable (LANTUS) 20 Unit(s) SubCutaneous at bedtime  insulin lispro (ADMELOG) corrective regimen sliding scale   SubCutaneous three times a day before meals  insulin lispro (ADMELOG) corrective regimen sliding scale   SubCutaneous at bedtime  insulin lispro Injectable (ADMELOG) 20 Unit(s) SubCutaneous three times a day before meals  memantine 10 milliGRAM(s) Oral two times a day  QUEtiapine 50 milliGRAM(s) Oral at bedtime  sertraline 100 milliGRAM(s) Oral daily  sodium chloride 0.9%. 1000 milliLiter(s) (80 mL/Hr) IV Continuous <Continuous>  tamsulosin 0.4 milliGRAM(s) Oral at bedtime  vancomycin  IVPB 1250 milliGRAM(s) IV Intermittent every 12 hours    MEDICATIONS  (PRN):    Allergies    No Known Allergies    Intolerances            CONSTITUTIONAL:  No weight loss, fever, chills, weakness or fatigue.  HEENT:  Eyes:  No visual loss, blurred vision, double vision or yellow sclerae. Ears, Nose, Throat:  No hearing loss, sneezing, congestion, runny nose or sore throat.  SKIN:  No rash or itching.  CARDIOVASCULAR:  No chest pain, chest pressure or chest discomfort. No palpitations or edema.  RESPIRATORY:  No shortness of breath, cough or sputum.  GASTROINTESTINAL:  SEE HPI  GENITOURINARY:  No dysuria, hematuria, urinary frequency  NEUROLOGICAL:  No headache, dizziness, syncope, paralysis, ataxia, numbness or tingling in the extremities. No change in bowel or bladder control.  MUSCULOSKELETAL:  No muscle, back pain, joint pain or stiffness.  HEMATOLOGIC:  No anemia, bleeding or bruising.  LYMPHATICS:  No enlarged nodes. No history of splenectomy.  PSYCHIATRIC:  No history of depression or anxiety.  ENDOCRINOLOGIC:  No reports of sweating, cold or heat intolerance. No polyuria or polydipsia.      ______________________________________________________________________  PHYSICAL EXAM:  T(C): 36.3 (11-25-24 @ 05:13), Max: 37.1 (11-24-24 @ 12:52)  HR: 112 (11-25-24 @ 05:13)  BP: 135/54 (11-25-24 @ 05:13)  RR: 17 (11-25-24 @ 05:13)  SpO2: 100% (11-25-24 @ 05:13)  Wt(kg): --      GEN: NAD, normocephalic  CVS: S1S2+  CHEST: clear to auscultation  ABD: soft , nontender, nondistended, bowel sounds present  EXTR: no cyanosis, no clubbing, no edema  NEURO: Awake and alert; oriented .....  SKIN:  warm;  non icteric    ______________________________________________________________________  LABS:                        9.0    x     )-----------( 163      ( 25 Nov 2024 07:20 )             29.4     11-25    133[L]  |  106  |  42[H]  ----------------------------<  157[H]  3.5   |  13[L]  |  0.97    Ca    7.0[L]      25 Nov 2024 07:20  Phos  2.5     11-25  Mg     2.40     11-25    TPro  4.9[L]  /  Alb  2.2[L]  /  TBili  0.6  /  DBili  x   /  AST  63[H]  /  ALT  25  /  AlkPhos  53  11-25    LIVER FUNCTIONS - ( 25 Nov 2024 07:20 )  Alb: 2.2 g/dL / Pro: 4.9 g/dL / ALK PHOS: 53 U/L / ALT: 25 U/L / AST: 63 U/L / GGT: x           PT/INR - ( 24 Nov 2024 08:31 )   PT: 13.3 sec;   INR: 1.12 ratio         PTT - ( 24 Nov 2024 08:31 )  PTT:27.3 sec  ____________________________________________    IMAGING:    ______________________________________________________________________      [facility]    Patient is a 74y old  Male who presents with a chief complaint of   HPI:    Mr Darren Best is a 73 y/o male with a PMHx of IBD (most likely Crohn's), HTN, HLD, T2DM, and cognitive impairment who presents with poor PO intake, fatigue, increased urination, and loose nonbloody, mucous stools for the past 5 days with 2 falls yesterday prompting visit to the ED.  Also around same time, pt has been having slurred speech since 11/21/24 (per wife, speech clear before this).  Called Dr Oro's office on 11/22 with poor oral intake and loose, mucous diarrhea.  Notably, wife has also been ill.     In the ED, T 97.7F, , BP 98/62, RR 16, SpO2 96% RA. Labs significant for bandemia of 33%, lactate 2.4, and anion gap metabolic acidosis, Patient received CT head, which was negative for ischemia, hemorrhage or midline shift, CT AB negative for ischemia, bowel obstruction, or acute inflammatory bowel disease. Patient was started on 1g Vanc and 3.375g Zosyn, and 2L 0.9% NaCl. Patient admitted to medicine for further management.              PMH/PSH:  PAST MEDICAL & SURGICAL HISTORY:  HTN (hypertension)  Not taking any meds, noncompliant      Herniated disc      H/O: CVA  2013 | residual speech deficits      HLD (hyperlipidemia)      Diabetes type 2, controlled      Crohn disease  diagnosed 2012      Alcohol abuse      Depression      Dementia      Anemia      No Past Surgical History      History of cataract surgery  b/l        FH:  FAMILY HISTORY:  Family history of MI (myocardial infarction)    Family history of hypertension (Father)  father    Family history of Alzheimer's disease    Family history of cerebrovascular accident (CVA) in father (Father, Sibling)  father, brother        MEDS:  MEDICATIONS  (STANDING):  aspirin enteric coated 81 milliGRAM(s) Oral daily  atorvastatin 80 milliGRAM(s) Oral at bedtime  cefepime   IVPB 2000 milliGRAM(s) IV Intermittent every 8 hours  cefepime   IVPB      chlorhexidine 2% Cloths 1 Application(s) Topical daily  cholecalciferol 400 Unit(s) Oral daily  clopidogrel Tablet 75 milliGRAM(s) Oral daily  dextrose 5%. 1000 milliLiter(s) (100 mL/Hr) IV Continuous <Continuous>  dextrose 5%. 1000 milliLiter(s) (50 mL/Hr) IV Continuous <Continuous>  dextrose 50% Injectable 25 Gram(s) IV Push once  dextrose 50% Injectable 12.5 Gram(s) IV Push once  dextrose 50% Injectable 25 Gram(s) IV Push once  dextrose Oral Gel 15 Gram(s) Oral once  finasteride 5 milliGRAM(s) Oral daily  folic acid 1 milliGRAM(s) Oral daily  glucagon  Injectable 1 milliGRAM(s) IntraMuscular once  heparin   Injectable 5000 Unit(s) SubCutaneous every 12 hours  insulin glargine Injectable (LANTUS) 20 Unit(s) SubCutaneous at bedtime  insulin lispro (ADMELOG) corrective regimen sliding scale   SubCutaneous three times a day before meals  insulin lispro (ADMELOG) corrective regimen sliding scale   SubCutaneous at bedtime  insulin lispro Injectable (ADMELOG) 20 Unit(s) SubCutaneous three times a day before meals  memantine 10 milliGRAM(s) Oral two times a day  QUEtiapine 50 milliGRAM(s) Oral at bedtime  sertraline 100 milliGRAM(s) Oral daily  sodium chloride 0.9%. 1000 milliLiter(s) (80 mL/Hr) IV Continuous <Continuous>  tamsulosin 0.4 milliGRAM(s) Oral at bedtime  vancomycin  IVPB 1250 milliGRAM(s) IV Intermittent every 12 hours    MEDICATIONS  (PRN):    Allergies    No Known Allergies    Intolerances            CONSTITUTIONAL:  No weight loss, fever, chills, weakness or fatigue.  HEENT:  Eyes:  No visual loss, blurred vision, double vision or yellow sclerae. Ears, Nose, Throat:  No hearing loss, sneezing, congestion, runny nose or sore throat.  SKIN:  No rash or itching.  CARDIOVASCULAR:  No chest pain, chest pressure or chest discomfort. No palpitations or edema.  RESPIRATORY:  No shortness of breath, cough or sputum.  GASTROINTESTINAL:  SEE HPI  GENITOURINARY:  No dysuria, hematuria, urinary frequency  NEUROLOGICAL:  No headache, dizziness, syncope, paralysis, ataxia, numbness or tingling in the extremities. No change in bowel or bladder control.  MUSCULOSKELETAL:  No muscle, back pain, joint pain or stiffness.  HEMATOLOGIC:  No anemia, bleeding or bruising.  LYMPHATICS:  No enlarged nodes. No history of splenectomy.  PSYCHIATRIC:  No history of depression or anxiety.  ENDOCRINOLOGIC:  No reports of sweating, cold or heat intolerance. No polyuria or polydipsia.      ______________________________________________________________________  PHYSICAL EXAM:  T(C): 36.3 (11-25-24 @ 05:13), Max: 37.1 (11-24-24 @ 12:52)  HR: 112 (11-25-24 @ 05:13)  BP: 135/54 (11-25-24 @ 05:13)  RR: 17 (11-25-24 @ 05:13)  SpO2: 100% (11-25-24 @ 05:13)  Wt(kg): --      GEN: NAD, normocephalic  CVS: S1S2+  CHEST: clear to auscultation  ABD: soft , nontender, nondistended, bowel sounds present  EXTR: no cyanosis, no clubbing, no edema  NEURO: Awake and alert; oriented .....  SKIN:  warm;  non icteric    ______________________________________________________________________  LABS:                        9.0    x     )-----------( 163      ( 25 Nov 2024 07:20 )             29.4     11-25    133[L]  |  106  |  42[H]  ----------------------------<  157[H]  3.5   |  13[L]  |  0.97    Ca    7.0[L]      25 Nov 2024 07:20  Phos  2.5     11-25  Mg     2.40     11-25    TPro  4.9[L]  /  Alb  2.2[L]  /  TBili  0.6  /  DBili  x   /  AST  63[H]  /  ALT  25  /  AlkPhos  53  11-25    LIVER FUNCTIONS - ( 25 Nov 2024 07:20 )  Alb: 2.2 g/dL / Pro: 4.9 g/dL / ALK PHOS: 53 U/L / ALT: 25 U/L / AST: 63 U/L / GGT: x           PT/INR - ( 24 Nov 2024 08:31 )   PT: 13.3 sec;   INR: 1.12 ratio         PTT - ( 24 Nov 2024 08:31 )  PTT:27.3 sec  ____________________________________________    IMAGING:    ______________________________________________________________________      [facility]    Patient is a 74y old  Male who presents with a chief complaint of   HPI:    Mr Darren Best is a 73 y/o male with a PMHx of IBD (most likely Crohn's), HTN, HLD, T2DM, and cognitive impairment who presents with poor PO intake, fatigue, increased urination, and loose nonbloody, mucous stools for the past 5 days with 2 falls yesterday prompting visit to the ED.  Also around same time, pt has been having slurred speech since 11/21/24 (per wife, speech clear before this).  Called Dr Oro's office on 11/22 with poor oral intake and loose, mucous diarrhea.  Notably, wife has also been ill.     In the ED, T 97.7F, , BP 98/62, RR 16, SpO2 96% RA. Labs significant for bandemia of 33%, lactate 2.4, and anion gap metabolic acidosis, Patient received CT head, which was negative for ischemia, hemorrhage or midline shift, CT AB negative for ischemia, bowel obstruction, or acute inflammatory bowel disease. Patient was started on 1g Vanc and 3.375g Zosyn, and 2L 0.9% NaCl. Patient admitted to medicine for further management.      This morning patient reports he has been having diarrhea but it has been improving        PMH/PSH:  PAST MEDICAL & SURGICAL HISTORY:  HTN (hypertension)  Not taking any meds, noncompliant      Herniated disc      H/O: CVA  2013 | residual speech deficits      HLD (hyperlipidemia)      Diabetes type 2, controlled      Crohn disease  diagnosed 2012      Alcohol abuse      Depression      Dementia      Anemia      No Past Surgical History      History of cataract surgery  b/l        FH:  FAMILY HISTORY:  Family history of MI (myocardial infarction)    Family history of hypertension (Father)  father    Family history of Alzheimer's disease    Family history of cerebrovascular accident (CVA) in father (Father, Sibling)  father, brother        MEDS:  MEDICATIONS  (STANDING):  aspirin enteric coated 81 milliGRAM(s) Oral daily  atorvastatin 80 milliGRAM(s) Oral at bedtime  cefepime   IVPB 2000 milliGRAM(s) IV Intermittent every 8 hours  cefepime   IVPB      chlorhexidine 2% Cloths 1 Application(s) Topical daily  cholecalciferol 400 Unit(s) Oral daily  clopidogrel Tablet 75 milliGRAM(s) Oral daily  dextrose 5%. 1000 milliLiter(s) (100 mL/Hr) IV Continuous <Continuous>  dextrose 5%. 1000 milliLiter(s) (50 mL/Hr) IV Continuous <Continuous>  dextrose 50% Injectable 25 Gram(s) IV Push once  dextrose 50% Injectable 12.5 Gram(s) IV Push once  dextrose 50% Injectable 25 Gram(s) IV Push once  dextrose Oral Gel 15 Gram(s) Oral once  finasteride 5 milliGRAM(s) Oral daily  folic acid 1 milliGRAM(s) Oral daily  glucagon  Injectable 1 milliGRAM(s) IntraMuscular once  heparin   Injectable 5000 Unit(s) SubCutaneous every 12 hours  insulin glargine Injectable (LANTUS) 20 Unit(s) SubCutaneous at bedtime  insulin lispro (ADMELOG) corrective regimen sliding scale   SubCutaneous three times a day before meals  insulin lispro (ADMELOG) corrective regimen sliding scale   SubCutaneous at bedtime  insulin lispro Injectable (ADMELOG) 20 Unit(s) SubCutaneous three times a day before meals  memantine 10 milliGRAM(s) Oral two times a day  QUEtiapine 50 milliGRAM(s) Oral at bedtime  sertraline 100 milliGRAM(s) Oral daily  sodium chloride 0.9%. 1000 milliLiter(s) (80 mL/Hr) IV Continuous <Continuous>  tamsulosin 0.4 milliGRAM(s) Oral at bedtime  vancomycin  IVPB 1250 milliGRAM(s) IV Intermittent every 12 hours    MEDICATIONS  (PRN):    Allergies    No Known Allergies    Intolerances          ROS  As per HPI      ______________________________________________________________________  PHYSICAL EXAM:  T(C): 36.3 (11-25-24 @ 05:13), Max: 37.1 (11-24-24 @ 12:52)  HR: 112 (11-25-24 @ 05:13)  BP: 135/54 (11-25-24 @ 05:13)  RR: 17 (11-25-24 @ 05:13)  SpO2: 100% (11-25-24 @ 05:13)  Wt(kg): --      GEN: NAD, normocephalic  CVS: RRR no m/r/g  CHEST: clear to auscultation, no w/r/r  ABD: soft , mildly TTP, nondistended, bowel sounds present  EXTR: no cyanosis, no clubbing, no edema  NEURO: Awake and alert;  SKIN:  warm;  non icteric, no rashes or lesions noted    ______________________________________________________________________  LABS:                        9.0    x     )-----------( 163      ( 25 Nov 2024 07:20 )             29.4     11-25    133[L]  |  106  |  42[H]  ----------------------------<  157[H]  3.5   |  13[L]  |  0.97    Ca    7.0[L]      25 Nov 2024 07:20  Phos  2.5     11-25  Mg     2.40     11-25    TPro  4.9[L]  /  Alb  2.2[L]  /  TBili  0.6  /  DBili  x   /  AST  63[H]  /  ALT  25  /  AlkPhos  53  11-25    LIVER FUNCTIONS - ( 25 Nov 2024 07:20 )  Alb: 2.2 g/dL / Pro: 4.9 g/dL / ALK PHOS: 53 U/L / ALT: 25 U/L / AST: 63 U/L / GGT: x           PT/INR - ( 24 Nov 2024 08:31 )   PT: 13.3 sec;   INR: 1.12 ratio         PTT - ( 24 Nov 2024 08:31 )  PTT:27.3 sec  ____________________________________________    IMAGING:    ______________________________________________________________________

## 2024-11-25 NOTE — PROGRESS NOTE ADULT - PROBLEM SELECTOR PLAN 5
- Cr 1.45 on admission, baseline 1.08  - likely prerenal iso of dehydration, diarrhea, poor PO intake  - improved s/p 2 L 0.9% NaCl    PLAN:   - continue to trend BMP  - avoid nephrotoxins  - renally dose medication - Pt with pH 7.27, HCO3 17, pCO2 28 consistent with high anion gap metabolic and respiratory compensation  - likely iso of starvation ketosis 2/2 to poor PO intake. Lower suspicion for DKA but will monitor closely given no use of Tresiba for 48 hours  - BHB elevated  PLAN:  - will encourage PO intake  - Will restart insulin glargine - Pt with pH 7.27, HCO3 17, pCO2 28 consistent with high anion gap metabolic and respiratory compensation  - likely iso of starvation ketosis 2/2 to poor PO intake. Lower suspicion for DKA but will monitor closely given no use of Tresiba for 48 hours  - BHB elevated  PLAN:  - will encourage PO intake  - Will restart insulin glargine  - Start bicarb gtt 150 mEq @ 150cc/hr x 10 hours

## 2024-11-25 NOTE — PROGRESS NOTE ADULT - PROBLEM SELECTOR PLAN 10
- baseline AOx2 (did not remember date), CTH w/ no acute changes   - c/w home meds: memantine 10mg, Sertraline 100mg QD, Quetiapine 50mg QHS  - frequent reorientation and monitor sxns

## 2024-11-25 NOTE — PHYSICAL THERAPY INITIAL EVALUATION ADULT - PATIENT PROFILE REVIEW, REHAB EVAL
ok for PT as tolerated per SHERITA Frazier/yes activity order- ambulate as tolerated ,ok for PT as tolerated per RN Karin/yes

## 2024-11-25 NOTE — PROGRESS NOTE ADULT - PROBLEM SELECTOR PLAN 9
- c/w 0.4mg tamsoulosin bid  - c/w finasteride 5mg qd    # On chronic Trimethoprim 100mg QD for UTI ppx. Currently held d/t above abx. - c/w 0.4mg tamsoulosin bid  - c/w finasteride 5mg qd

## 2024-11-25 NOTE — PROGRESS NOTE ADULT - PROBLEM SELECTOR PLAN 11
- known L renal cyst from 2022, CT 11/24 showing R renal cyst as well.   - Follow kidney US r/e cysts

## 2024-11-25 NOTE — PHYSICAL THERAPY INITIAL EVALUATION ADULT - PERTINENT HX OF CURRENT PROBLEM, REHAB EVAL
This is a 75 y/o M with PMH  IBD, T2DM, mild cognitive impairment (AOx2 baseline), hx of prior CVA, presenting with poor PO intake, fatigue, 2 unwitnessed falls, slurred speech since 11/21, and nonbloody diarrhea for the past 5 days.

## 2024-11-25 NOTE — PROGRESS NOTE ADULT - PROBLEM SELECTOR PLAN 4
- Pt with pH 7.27, HCO3 17, pCO2 28 consistent with high anion gap metabolic and respiratory compensation  - likely iso of starvation ketosis 2/2 to poor PO intake. Lower suspicion for DKA but will monitor closely given no use of Tresiba for 48 hours    PLAN:  - beta- hydroxybutyrate level   - will encourage PO intake  - Will restart insulin glargine - Systolic murmur auscultated during physical exam; per wife no known hx of cardiac murmur.   - TTE as noted above.   - Follow blood cx.    # CAD:   - aspirin 81 mg and atorvastatin 80mg QD (increased from home statin 40mg QD d/t slurred speech)  # Prolonged QTc  - QT interval 471 on EKG. Avoid QT prolonging meds and monitor.

## 2024-11-25 NOTE — PROGRESS NOTE ADULT - PROBLEM SELECTOR PLAN 1
- Patient meets SIRS criteria given tachycardia and elevated WBC (33% bandemia)  -  Labs significant for bandemia of 33%, lactate 2.4, and anion gap metabolic acidosis likely iso of IBD flare vs infectious gastroenteritis vs prednisone use  - WBC not elevated likely iso immunosuppression medications   - Source possibly urine, follow urine culture (pt on Trimethoprim QD for ppx, currently held)  - s/p 1g Vanc and 3.375g Zosyn, and 2L 0.9% NaCl    PLAN:  - cefepime + vanc (Start 11/24)  - UA + reflex culture  - f/u Blood culture  - f/u urine culture  - management of diarrhea as below

## 2024-11-25 NOTE — PROGRESS NOTE ADULT - ASSESSMENT
73 y/o M with PMH  IBD, on methotrexate, remicade (q 6 weeks), HTN, HLD, T2DM, mild cognitive impairment (AOx2 baseline), hx of prior CVA, presenting with poor PO intake, fatigue, 2 unwitnessed falls, slurred speech since 11/21, and nonbloody diarrhea for the past 5 days.

## 2024-11-25 NOTE — PROGRESS NOTE ADULT - PROBLEM SELECTOR PLAN 7
- Pt with longstanding history of IBD (UC) and recent increase in diarrhea this week  - Most likely UC, biopsy results from 12/202.   - On remicade q 6 weeks (last infusion 11/20), methotrexate 2.5 mg ( 4 tabs on monday mornings), prednisone 10mg BID, mesalamine 800mg   - CT abdomen negative for colitis, bowel obstruction, or active IBD flare    PLAN:   - hold methotrexate 2.5 mg ( 4 tabs on monday mornings), prednisone 10mg BID, and mesalamine 800mg iso infection, until further GI recs and infectious w/u    - management of diarrhea as above  - GI consult - Pt with longstanding history of IBD (UC) and recent increase in diarrhea this week  - Most likely UC, biopsy results from 12/202.   - On remicade q 6 weeks (last infusion 11/20), methotrexate 2.5 mg ( 4 tabs on monday mornings), prednisone 10mg BID, mesalamine 800mg   - CT abdomen negative for colitis, bowel obstruction, or active IBD flare    PLAN:   - will hold methotrexate 2.5 mg ( 4 tabs on monday mornings); c/w prednisone 10mg BID, and mesalamine 800mg per GI recommendations  - management of diarrhea as above  - GI consult - Pt with longstanding history of IBD (UC) and recent increase in diarrhea this week  - Most likely UC, biopsy results from 12/202.   - On remicade q 6 weeks (last infusion 11/20), methotrexate 2.5 mg ( 4 tabs on monday mornings), prednisone 10mg BID, mesalamine 800mg   - CT abdomen negative for colitis, bowel obstruction, or active IBD flare    PLAN:   - will hold methotrexate 2.5 mg ( 4 tabs on monday mornings); c/w prednisone 10mg BID, and mesalamine 800mg per GI recommendations  - management of diarrhea as above

## 2024-11-25 NOTE — PROGRESS NOTE ADULT - PROBLEM SELECTOR PLAN 12
- Diet: Full liquid (low carb/low fiber), advance as tolerated   - DVT: heparin 5000 BID  - Dispo: pending hospital course. - Diet: Full liquid (low carb/low fiber), advance as tolerated   - DVT: heparin 5000 BID  - Dispo: [facility]

## 2024-11-25 NOTE — CONSULT NOTE ADULT - ASSESSMENT
Patient follows with Dr. Oro.  Was diagnosed with Crohn's Disease several years ago and had poor response to Entyvio, so was switched to Remicaide since around 2021.  Currently on mesalamine 1600mg BID, methotrexate 10mg on Mondays, prednisone 10mg BID, and infliximab every 6 weeks.  Last seen by Dr Oro on February 29, 2024.  Called his office on 11/22 with poor oral intake and loose, mucous diarrhea.  Notably, wife has also been ill.      Last admitted for IBD flare with hematochezia in 9/2022-10/2022.  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  Treated with solumedrol, infliximab, and methotrexate and transitioned to prednisone taper Mr Darren Best is a 75 y/o male with a PMHx of IBD (most likely Crohn's), HTN, HLD, T2DM, and cognitive impairment who presents with poor PO intake, fatigue, increased urination, and loose nonbloody, mucous stools for the past 5 days with 2 falls yesterday prompting visit to the ED.    Patient follows with Dr. Oro.  Was diagnosed with Crohn's Disease~2011 and treated with 6-mercaptopurine.  Failed therapy and trialled Entyvio for several years with poor response, and transitioned to infliximab therapy in 8/2021.  Developed anti-infliximab antibodies so began methotrexate with subsequent improvement in antibody load (negative antibodies in 2/2024 with infliximab level of 18).  Currently on mesalamine 1600mg BID, methotrexate 10mg on Mondays with folic acid, prednisone 10mg BID, and infliximab every 4 weeks (last infusion 11/13/2024).  No history of colonic surgeries, fistulas, or strictures.  Last seen by Dr Oro on February 29, 2024.  At baseline has 2-3 bowel movements per day without blood.     Last admitted for IBD flare with hematochezia in 9/2022-10/2022.  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  Treated with solumedrol, infliximab, and methotrexate and transitioned to prednisone taper.  No colonoscopies since.  No EGDs in chart.    On admission patient afebrile but tachycardic.  Presents with bandemia without leukocytosis, HAGMA, lactic acidosis, CRISTINA, hyponatremia, and elevated creatine kinase.  CT abdomen pelvis without evidence of abdominal pathology.  Drop in hemoglobin likely secondary to fluid resuscitation.  Baseline hemoglobin appears to be around 10.  Improving with IV fluids.    Recommendations: Mr Darren Best is a 75 y/o male with a PMHx of IBD (most likely Crohn's), HTN, HLD, T2DM, and cognitive impairment who presents with poor PO intake, fatigue, increased urination, and loose nonbloody, mucous stools for the past 5 days with 2 falls yesterday prompting visit to the ED.    Patient follows with Dr. Oro.  Was diagnosed with Crohn's Disease~2011 and treated with 6-mercaptopurine.  Failed therapy and trialled Entyvio for several years with poor response, and transitioned to infliximab therapy in 8/2021.  Developed anti-infliximab antibodies so began methotrexate with subsequent improvement in antibody load (negative antibodies in 2/2024 with infliximab level of 18).  Currently on mesalamine 1600mg BID, methotrexate 10mg on Mondays with folic acid, prednisone 10mg BID, and infliximab every 4 weeks (last infusion 11/13/2024).  No history of colonic surgeries, fistulas, or strictures.  Last seen by Dr Oro on February 29, 2024.  At baseline has 2-3 bowel movements per day without blood.     Last admitted for IBD flare with hematochezia in 9/2022-10/2022.  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  Treated with solumedrol, infliximab, and methotrexate and transitioned to prednisone taper.  No colonoscopies since.  No EGDs in chart.    On admission patient afebrile but tachycardic.  Presents with bandemia without leukocytosis, HAGMA, lactic acidosis, CRISTINA, hyponatremia, and elevated creatine kinase.  CT abdomen pelvis without evidence of abdominal pathology.  Drop in hemoglobin likely secondary to fluid resuscitation.  Baseline hemoglobin appears to be around 10.  Improving with IV fluids.  Appears to be most likely an enterocolitis.    Recommendations:  - Can resume home mesalamine  - Continue home prednisone  - Hold home methotrexate  - DVT PPx as IBD patients are high risk for thrombosis.   - F/u C diff, GI PCR  - F/u fecal calprotectin  - Obtain CRP and ESR daily.  - Quantiferon  - Hepatitis panel including hepatitis B  - CMV PCR  - Will consider inpatient colonoscopy once enterocolitis improves

## 2024-11-25 NOTE — PROGRESS NOTE ADULT - PROBLEM SELECTOR PLAN 8
- on home Januvia 50mg QD, Tresiba 30 U at bedtime, and 28-30 U premeals TID  - on gabapentin for neuropathy   PLAN:  - 20U Lantus  - 20 U Admelog  - KELLI  - Consistent carb diet, low fiber , full liquid. Adjust insulin regimen as appropriate.

## 2024-11-25 NOTE — CHART NOTE - NSCHARTNOTEFT_GEN_A_CORE
Nutrition Services    Consult received for "MST Score >2." Upon chart review, patient with stable weight. RD remains available for assessment per protocol or as needed.     Giovany Lancaster, [phone] or TEAMS

## 2024-11-26 ENCOUNTER — RESULT REVIEW (OUTPATIENT)
Age: 74
End: 2024-11-26

## 2024-11-26 LAB
ANION GAP SERPL CALC-SCNC: 11 MMOL/L — SIGNIFICANT CHANGE UP (ref 7–14)
BUN SERPL-MCNC: 27 MG/DL — HIGH (ref 7–23)
CALCIUM SERPL-MCNC: 7.1 MG/DL — LOW (ref 8.4–10.5)
CHLORIDE SERPL-SCNC: 105 MMOL/L — SIGNIFICANT CHANGE UP (ref 98–107)
CO2 SERPL-SCNC: 18 MMOL/L — LOW (ref 22–31)
CREAT SERPL-MCNC: 0.87 MG/DL — SIGNIFICANT CHANGE UP (ref 0.5–1.3)
CRP SERPL-MCNC: 123.2 MG/L — HIGH
CULTURE RESULTS: NO GROWTH — SIGNIFICANT CHANGE UP
CULTURE RESULTS: SIGNIFICANT CHANGE UP
EGFR: 89 ML/MIN/1.73M2 — SIGNIFICANT CHANGE UP
ERYTHROCYTE [SEDIMENTATION RATE] IN BLOOD: 53 MM/HR — HIGH (ref 1–15)
GI PCR PANEL: SIGNIFICANT CHANGE UP
GLUCOSE SERPL-MCNC: 171 MG/DL — HIGH (ref 70–99)
HCT VFR BLD CALC: 28.4 % — LOW (ref 39–50)
HGB BLD-MCNC: 8.7 G/DL — LOW (ref 13–17)
MAGNESIUM SERPL-MCNC: 2.3 MG/DL — SIGNIFICANT CHANGE UP (ref 1.6–2.6)
MCHC RBC-ENTMCNC: 23.6 PG — LOW (ref 27–34)
MCHC RBC-ENTMCNC: 30.6 G/DL — LOW (ref 32–36)
MCV RBC AUTO: 77 FL — LOW (ref 80–100)
NRBC # BLD: 0 /100 WBCS — SIGNIFICANT CHANGE UP (ref 0–0)
NRBC # FLD: 0 K/UL — SIGNIFICANT CHANGE UP (ref 0–0)
PHOSPHATE SERPL-MCNC: 1.9 MG/DL — LOW (ref 2.5–4.5)
PLATELET # BLD AUTO: 183 K/UL — SIGNIFICANT CHANGE UP (ref 150–400)
POTASSIUM SERPL-MCNC: 3.6 MMOL/L — SIGNIFICANT CHANGE UP (ref 3.5–5.3)
POTASSIUM SERPL-SCNC: 3.6 MMOL/L — SIGNIFICANT CHANGE UP (ref 3.5–5.3)
RBC # BLD: 3.69 M/UL — LOW (ref 4.2–5.8)
RBC # FLD: 17.9 % — HIGH (ref 10.3–14.5)
SODIUM SERPL-SCNC: 134 MMOL/L — LOW (ref 135–145)
SPECIMEN SOURCE: SIGNIFICANT CHANGE UP
SPECIMEN SOURCE: SIGNIFICANT CHANGE UP
VANCOMYCIN TROUGH SERPL-MCNC: 20.1 UG/ML — HIGH (ref 10–20)
WBC # BLD: 7.08 K/UL — SIGNIFICANT CHANGE UP (ref 3.8–10.5)
WBC # FLD AUTO: 7.08 K/UL — SIGNIFICANT CHANGE UP (ref 3.8–10.5)

## 2024-11-26 PROCEDURE — 99232 SBSQ HOSP IP/OBS MODERATE 35: CPT

## 2024-11-26 PROCEDURE — 93306 TTE W/DOPPLER COMPLETE: CPT | Mod: 26

## 2024-11-26 RX ORDER — PREDNISONE 20 MG/1
10 TABLET ORAL
Refills: 0 | Status: DISCONTINUED | OUTPATIENT
Start: 2024-11-26 | End: 2024-12-03

## 2024-11-26 RX ORDER — POTASSIUM CHLORIDE 600 MG/1
40 TABLET, EXTENDED RELEASE ORAL ONCE
Refills: 0 | Status: COMPLETED | OUTPATIENT
Start: 2024-11-26 | End: 2024-11-26

## 2024-11-26 RX ORDER — LORAZEPAM 2 MG/1
0.5 TABLET ORAL ONCE
Refills: 0 | Status: DISCONTINUED | OUTPATIENT
Start: 2024-11-26 | End: 2024-11-27

## 2024-11-26 RX ORDER — MESALAMINE 375 MG/1
1600 CAPSULE, EXTENDED RELEASE ORAL EVERY 12 HOURS
Refills: 0 | Status: DISCONTINUED | OUTPATIENT
Start: 2024-11-26 | End: 2024-12-03

## 2024-11-26 RX ORDER — POTASSIUM CHLORIDE 600 MG/1
20 TABLET, EXTENDED RELEASE ORAL ONCE
Refills: 0 | Status: DISCONTINUED | OUTPATIENT
Start: 2024-11-26 | End: 2024-11-26

## 2024-11-26 RX ORDER — VANCOMYCIN HCL 900 MCG/MG
1000 POWDER (GRAM) MISCELLANEOUS EVERY 12 HOURS
Refills: 0 | Status: DISCONTINUED | OUTPATIENT
Start: 2024-11-26 | End: 2024-11-26

## 2024-11-26 RX ORDER — 0.9 % SODIUM CHLORIDE 0.9 %
1000 INTRAVENOUS SOLUTION INTRAVENOUS ONCE
Refills: 0 | Status: COMPLETED | OUTPATIENT
Start: 2024-11-26 | End: 2024-11-26

## 2024-11-26 RX ORDER — POTASSIUM PHOSPHATE, MONOBASIC POTASSIUM PHOSPHATE, DIBASIC INJECTION, 236; 224 MG/ML; MG/ML
30 SOLUTION, CONCENTRATE INTRAVENOUS ONCE
Refills: 0 | Status: COMPLETED | OUTPATIENT
Start: 2024-11-26 | End: 2024-11-26

## 2024-11-26 RX ADMIN — Medication 400 UNIT(S): at 11:39

## 2024-11-26 RX ADMIN — Medication 10 UNIT(S): at 09:02

## 2024-11-26 RX ADMIN — Medication 250 MILLIGRAM(S): at 02:13

## 2024-11-26 RX ADMIN — Medication 1 MILLIGRAM(S): at 11:38

## 2024-11-26 RX ADMIN — Medication 5000 UNIT(S): at 17:59

## 2024-11-26 RX ADMIN — CEFEPIME 100 MILLIGRAM(S): 2 INJECTION, POWDER, FOR SOLUTION INTRAVENOUS at 05:22

## 2024-11-26 RX ADMIN — Medication 50 MILLIGRAM(S): at 21:46

## 2024-11-26 RX ADMIN — Medication 10 UNIT(S): at 13:19

## 2024-11-26 RX ADMIN — Medication 1: at 09:01

## 2024-11-26 RX ADMIN — MEMANTINE HYDROCHLORIDE 10 MILLIGRAM(S): 14 CAPSULE, EXTENDED RELEASE ORAL at 05:23

## 2024-11-26 RX ADMIN — PREDNISONE 10 MILLIGRAM(S): 20 TABLET ORAL at 18:01

## 2024-11-26 RX ADMIN — Medication 80 MILLIGRAM(S): at 21:46

## 2024-11-26 RX ADMIN — CHLORHEXIDINE GLUCONATE 1 APPLICATION(S): 1.2 RINSE ORAL at 13:22

## 2024-11-26 RX ADMIN — PREDNISONE 10 MILLIGRAM(S): 20 TABLET ORAL at 12:29

## 2024-11-26 RX ADMIN — MESALAMINE 1600 MILLIGRAM(S): 375 CAPSULE, EXTENDED RELEASE ORAL at 21:43

## 2024-11-26 RX ADMIN — MEMANTINE HYDROCHLORIDE 10 MILLIGRAM(S): 14 CAPSULE, EXTENDED RELEASE ORAL at 17:59

## 2024-11-26 RX ADMIN — Medication 5 MILLIGRAM(S): at 11:38

## 2024-11-26 RX ADMIN — POTASSIUM CHLORIDE 40 MILLIEQUIVALENT(S): 600 TABLET, EXTENDED RELEASE ORAL at 02:13

## 2024-11-26 RX ADMIN — TAMSULOSIN HYDROCHLORIDE 0.4 MILLIGRAM(S): 0.4 CAPSULE ORAL at 21:46

## 2024-11-26 RX ADMIN — POTASSIUM PHOSPHATE, MONOBASIC POTASSIUM PHOSPHATE, DIBASIC INJECTION, 83.33 MILLIMOLE(S): 236; 224 SOLUTION, CONCENTRATE INTRAVENOUS at 11:36

## 2024-11-26 RX ADMIN — Medication 5000 UNIT(S): at 05:22

## 2024-11-26 RX ADMIN — CEFEPIME 100 MILLIGRAM(S): 2 INJECTION, POWDER, FOR SOLUTION INTRAVENOUS at 13:18

## 2024-11-26 RX ADMIN — Medication 1000 MILLILITER(S): at 13:23

## 2024-11-26 RX ADMIN — SERTRALINE HYDROCHLORIDE 100 MILLIGRAM(S): 100 TABLET, FILM COATED ORAL at 11:36

## 2024-11-26 RX ADMIN — Medication 81 MILLIGRAM(S): at 11:38

## 2024-11-26 NOTE — PROGRESS NOTE ADULT - PROBLEM SELECTOR PLAN 7
- Pt with longstanding history of IBD (UC) and recent increase in diarrhea this week  - Most likely UC, biopsy results from 12/202.   - On remicade q 6 weeks (last infusion 11/20), methotrexate 2.5 mg ( 4 tabs on monday mornings), prednisone 10mg BID, mesalamine 800mg   - CT abdomen negative for colitis, bowel obstruction, or active IBD flare    PLAN:   - will hold methotrexate 2.5 mg ( 4 tabs on monday mornings); c/w prednisone 10mg BID, and mesalamine 800mg per GI recommendations  - management of diarrhea as above

## 2024-11-26 NOTE — PROGRESS NOTE ADULT - PROBLEM SELECTOR PLAN 4
- Systolic murmur auscultated during physical exam; per wife no known hx of cardiac murmur.   - TTE as noted above.   - Follow blood cx.    # CAD:   - aspirin 81 mg and atorvastatin 80mg QD (increased from home statin 40mg QD d/t slurred speech)  # Prolonged QTc  - QT interval 471 on EKG. Avoid QT prolonging meds and monitor.

## 2024-11-26 NOTE — PROGRESS NOTE ADULT - PROBLEM SELECTOR PLAN 6
- Cr 1.45 on admission, baseline 1.08  - likely prerenal iso of dehydration, diarrhea, poor PO intake  - improved s/p 2 L 0.9% NaCl    PLAN:   - continue to trend BMP  - avoid nephrotoxins  - renally dose medication

## 2024-11-26 NOTE — PROGRESS NOTE ADULT - PROBLEM SELECTOR PLAN 3
# Hx of last CVA  # New CVA?   - Pt with 2 unwitnessed falls likely iso of dehydration and poor PO intake. Pt recalls falling, reports feeling weak and that his legs gave out. He denies LOC, blurry vision or dizziness.   - Wife reports onset of slurred speech since Thursday 11/21, patient out of the window for tPA   - CT head negative for ischemia, hemorrhage, or midline shift  - ddx orthostatic vs neurogenic vs cardiac syncope    PLAN:  - TTE  - Telemetry   - Orthostatics

## 2024-11-26 NOTE — PROGRESS NOTE ADULT - PROBLEM SELECTOR PLAN 12
- Diet: Full liquid (low carb/low fiber), advance as tolerated   - DVT: heparin 5000 BID  - Dispo: LUZ ELENA

## 2024-11-26 NOTE — PROGRESS NOTE ADULT - PROBLEM SELECTOR PLAN 2
- Pt reports watery diarrhea (>5 times per day) iso of immunosuppression may be 2/2 to C diff infection vs IBD flare  - s/p 2L NS bolus in ED, START NS 80 cc/hr x 18 hours  PLAN:  - C. diff PCR (pt immunosuppressed and on chronic trimethoprim for UTI ppx)  - Stool studies  - Stool O & P   - GI consulted, likely iso colitis. will trend ESR/CRP daily, f/u CMV PCR, quant, hepatitis panel   - GI will consider inpatient colonoscopy once enterocolitis improves  - fecal calprotectin - Pt reports watery diarrhea (>5 times per day) iso of immunosuppression may be 2/2 to C diff infection vs IBD flare  - s/p 2L NS bolus in ED, START NS 80 cc/hr x 18 hours  - CRP elevated  - stool culture negative  - c. diff negative    PLAN:  - Stool O & P   - GI consulted, likely iso colitis. will trend ESR/CRP daily, f/u CMV PCR, quant, hepatitis panel   - GI will consider inpatient colonoscopy once enterocolitis improves  - fecal calprotectin

## 2024-11-26 NOTE — PROVIDER CONTACT NOTE (HYPOGLYCEMIA EVENT) - NS PROVIDER CONTACT RECOMMEND-HYPO
Administer 4 oz apple juice until BG > 100. notify MD Simone Painter
Notify MD Vijaya Summers. Administer 12.5 g IVP D50

## 2024-11-26 NOTE — PROVIDER CONTACT NOTE (HYPOGLYCEMIA EVENT) - NS PROVIDER CONTACT ASSESS-HYPO
Patient observed in bed with no signs or symptoms of acute distress. Patient asymptomatic of hypoglycemia
Patient observed in bed with no s/s of acute distress. Patient asymptomatic of hypoglycemia

## 2024-11-26 NOTE — PROGRESS NOTE ADULT - PROBLEM SELECTOR PLAN 1
- Patient meets SIRS criteria given tachycardia and elevated WBC (33% bandemia)  -  Labs significant for bandemia of 33%, lactate 2.4, and anion gap metabolic acidosis likely iso of IBD flare vs infectious gastroenteritis vs prednisone use  - WBC not elevated likely iso immunosuppression medications   - Source possibly urine, follow urine culture (pt on Trimethoprim QD for ppx, currently held)  - s/p 1g Vanc and 3.375g Zosyn, and 2L 0.9% NaCl    PLAN:  - cefepime + vanc (Start 11/24)  - UA + reflex culture  - f/u Blood culture  - f/u urine culture  - management of diarrhea as below - Patient meets SIRS criteria given tachycardia and elevated WBC (33% bandemia)  -  Labs significant for bandemia of 33%, lactate 2.4, and anion gap metabolic acidosis likely iso of IBD flare vs infectious gastroenteritis vs prednisone use  - WBC not elevated likely iso immunosuppression medications   - Source possibly urine, follow urine culture (pt on Trimethoprim QD for ppx, currently held)  - s/p 1g Vanc and 3.375g Zosyn, and 2L 0.9% NaCl  - blood culture NGTD @ 24 hours  PLAN:  - cefepime + vanc (Start 11/24)  - UA + reflex culture  - f/u urine culture  - management of diarrhea as below - Patient meets SIRS criteria given tachycardia and elevated WBC (33% bandemia)  -  Labs significant for bandemia of 33%, lactate 2.4, and anion gap metabolic acidosis likely iso of IBD flare vs infectious gastroenteritis vs prednisone use  - WBC not elevated likely iso immunosuppression medications   - Source possibly urine, follow urine culture (pt on Trimethoprim QD for ppx, currently held)  - s/p 1g Vanc and 3.375g Zosyn, and 2L 0.9% NaCl  - blood culture NGTD @ 24 hours  PLAN:  - d/c cefepime + vanc (Start 11/24-11/26)  - UA + reflex culture  - f/u urine culture  - management of diarrhea as below

## 2024-11-26 NOTE — PROGRESS NOTE ADULT - SUBJECTIVE AND OBJECTIVE BOX
Interval Events:   Patient reports that his bowel movements are improving.  Reports less abdominal pain.     Hospital Medications:  aspirin enteric coated 81 milliGRAM(s) Oral daily  atorvastatin 80 milliGRAM(s) Oral at bedtime  cefepime   IVPB 2000 milliGRAM(s) IV Intermittent every 8 hours  cefepime   IVPB      chlorhexidine 2% Cloths 1 Application(s) Topical daily  cholecalciferol 400 Unit(s) Oral daily  dextrose 5%. 1000 milliLiter(s) (100 mL/Hr) IV Continuous <Continuous>  dextrose 5%. 1000 milliLiter(s) (50 mL/Hr) IV Continuous <Continuous>  dextrose 50% Injectable 25 Gram(s) IV Push once  dextrose 50% Injectable 12.5 Gram(s) IV Push once  dextrose 50% Injectable 25 Gram(s) IV Push once  dextrose Oral Gel 15 Gram(s) Oral once  finasteride 5 milliGRAM(s) Oral daily  folic acid 1 milliGRAM(s) Oral daily  glucagon  Injectable 1 milliGRAM(s) IntraMuscular once  heparin   Injectable 5000 Unit(s) SubCutaneous every 12 hours  insulin glargine Injectable (LANTUS) 20 Unit(s) SubCutaneous at bedtime  insulin lispro (ADMELOG) corrective regimen sliding scale   SubCutaneous three times a day before meals  insulin lispro (ADMELOG) corrective regimen sliding scale   SubCutaneous at bedtime  insulin lispro Injectable (ADMELOG) 10 Unit(s) SubCutaneous three times a day before meals  memantine 10 milliGRAM(s) Oral two times a day  potassium phosphate IVPB 30 milliMole(s) IV Intermittent once  QUEtiapine 50 milliGRAM(s) Oral at bedtime  sertraline 100 milliGRAM(s) Oral daily  sodium bicarbonate  Infusion 0.173 mEq/kG/Hr (100 mL/Hr) IV Continuous <Continuous>  tamsulosin 0.4 milliGRAM(s) Oral at bedtime  vancomycin  IVPB 1000 milliGRAM(s) IV Intermittent every 12 hours        dextrose 50% Injectable: 12.5 Gram(s) IV Push (24 @ 12:53)  vancomycin  IVPB: 166.67 mL/Hr IV Intermittent (24 @ 12:59)  cholecalciferol: 400 Unit(s) Oral (24 @ 13:00)  folic acid: 1 milliGRAM(s) Oral (24 @ 13:00)  aspirin enteric coated: 81 milliGRAM(s) Oral (24 @ 13:01)  finasteride: 5 milliGRAM(s) Oral (24 @ 13:01)  sertraline: 100 milliGRAM(s) Oral (24 @ 13:01)  chlorhexidine 2% Cloths: 1 Application(s) Topical (24 @ 13:05)  sodium bicarbonate  Infusion: 100 mL/Hr IV Continuous (24 @ 13:43)  cefepime   IVPB: 100 mL/Hr IV Intermittent (24 @ 15:45)  heparin   Injectable: 5000 Unit(s) SubCutaneous (24 @ 18:04)  insulin lispro Injectable (ADMELOG): 10 Unit(s) SubCutaneous (24 @ 18:04)  memantine: 10 milliGRAM(s) Oral (24 @ 18:04)  QUEtiapine: 50 milliGRAM(s) Oral (24 @ 22:47)  atorvastatin: 80 milliGRAM(s) Oral (24 @ 22:47)  cefepime   IVPB: 100 mL/Hr IV Intermittent (24 @ 22:47)  insulin glargine Injectable (LANTUS): 20 Unit(s) SubCutaneous (24 @ 22:47)  tamsulosin: 0.4 milliGRAM(s) Oral (24 @ 22:47)  potassium chloride   Powder: 40 milliEquivalent(s) Oral (24 @ 02:13)  vancomycin  IVPB: 250 mL/Hr IV Intermittent (24 @ 02:13)  cefepime   IVPB: 100 mL/Hr IV Intermittent (24 @ 05:22)  heparin   Injectable: 5000 Unit(s) SubCutaneous (24 @ 05:22)  memantine: 10 milliGRAM(s) Oral (24 @ 05:23)  insulin lispro (ADMELOG) corrective regimen sliding scale: 1 Unit(s) SubCutaneous (24 @ 09:01)  insulin lispro Injectable (ADMELOG): 10 Unit(s) SubCutaneous (24 @ 09:02)              PHYSICAL EXAM:   Vital Signs:  Vital Signs Last 24 Hrs  T(C): 36.5 (2024 05:35), Max: 36.8 (2024 22:33)  T(F): 97.7 (2024 05:35), Max: 98.3 (2024 22:33)  HR: 107 (2024 05:35) (103 - 117)  BP: 125/44 (2024 05:35) (100/60 - 129/65)  BP(mean): --  RR: 17 (2024 05:35) (17 - 19)  SpO2: 98% (2024 05:35) (96% - 100%)    Parameters below as of 2024 05:35  Patient On (Oxygen Delivery Method): room air      Daily     GENERAL:  NAD,  HEENT:  NC/AT,  conjunctivae clear and pink, sclera -anicteric  CHEST:  CTAB, no m/r/g  HEART:  RRR, no m/r/g  ABDOMEN:  Soft, non-distended, TTP in left quadrants  EXTREMITIES:  no cyanosis, clubbing, or edema  SKIN:  Warm & Dry. No rash or erythema  NEURO:  Alert, no focal deficits    LABS:                        8.7    7.08  )-----------( 183      ( 2024 06:25 )             28.4     Last Hb:Hemoglobin: 8.7 g/dL (24 @ 06:25)  Hemoglobin: 9.0 g/dL (24 @ 07:20)  Hemoglobin: 10.3 g/dL (24 @ 08:31)               134   |  105   |  27                 Ca: 7.1    BMP:   ----------------------------< 171    M.30  (24 @ 06:25)             3.6    |  18    | 0.87               Ph: 1.9      LFT:     TPro: 4.9 / Alb: 2.2 / TBili: 0.6 / DBili: x / AST: 63 / ALT: 25 / AlkPhos: 53   (24 @ 07:20)    Creatinine: 0.87 mg/dL  Creatinine: 0.94 mg/dL  Creatinine: 0.97 mg/dL      LIVER FUNCTIONS - ( 2024 07:20 )  Alb: 2.2 g/dL / Pro: 4.9 g/dL / ALK PHOS: 53 U/L / ALT: 25 U/L / AST: 63 U/L / GGT: x             Urinalysis Basic - ( 2024 06:25 )    Color: x / Appearance: x / SG: x / pH: x  Gluc: 171 mg/dL / Ketone: x  / Bili: x / Urobili: x   Blood: x / Protein: x / Nitrite: x   Leuk Esterase: x / RBC: x / WBC x   Sq Epi: x / Non Sq Epi: x / Bacteria: x        Culture - Urine (collected 24 @ 20:07)  Source: Clean Catch Clean Catch (Midstream)  Final Report (24 @ 01:21):    No growth    Culture - Stool (collected 24 @ 19:49)  Source: .Stool  Preliminary Report (24 @ 18:00):    No enteric pathogens to date: Final culture pending    Culture - Blood (collected 24 @ 08:31)  Source: .Blood BLOOD  Preliminary Report (24 @ 13:01):    No growth at 24 hours    Culture - Blood (collected 24 @ 08:15)  Source: .Blood BLOOD  Preliminary Report (24 @ 13:01):    No growth at 24 hours

## 2024-11-26 NOTE — PROGRESS NOTE ADULT - PROBLEM SELECTOR PLAN 10
- baseline AOx2 (did not remember date), CTH w/ no acute changes   - c/w home meds: memantine 10mg, Sertraline 100mg QD, Quetiapine 50mg QHS  - frequent reorientation and monitor sxns - baseline AOx2 (did not remember date), CTH w/ no acute changes   - c/w home meds: memantine 10mg, Sertraline 100mg QD, Quetiapine 50mg QHS  - frequent reorientation and monitor sxns  -CT head for further eval of mental status changes

## 2024-11-26 NOTE — PROVIDER CONTACT NOTE (HYPOGLYCEMIA EVENT) - NS PROVIDER CONTACT BACKGROUND-HYPO
Age: 74y    Gender: Male    POCT Blood Glucose:  119 mg/dL (11-26-24 @ 19:05)  97 mg/dL (11-26-24 @ 18:46)  90 mg/dL (11-26-24 @ 18:29)  84 mg/dL (11-26-24 @ 18:15)  70 mg/dL (11-26-24 @ 17:57)  57 mg/dL (11-26-24 @ 17:36)  62 mg/dL (11-26-24 @ 17:34)      eMAR:      insulin lispro (ADMELOG) corrective regimen sliding scale   1 Unit(s) SubCutaneous (11-26-24 @ 09:01)    insulin lispro Injectable (ADMELOG)   10 Unit(s) SubCutaneous (11-26-24 @ 13:19)      
Age: 74y    Gender: Male    POCT Blood Glucose:  106 mg/dL (11-25-24 @ 13:15)  58 mg/dL (11-25-24 @ 12:37)  51 mg/dL (11-25-24 @ 12:34)      dextrose 50% Injectable   12.5 Gram(s) IV Push (11-25-24 @ 12:53)      insulin lispro (ADMELOG) corrective regimen sliding scale   1 Unit(s) SubCutaneous (11-25-24 @ 09:11)    insulin lispro Injectable (ADMELOG)   20 Unit(s) SubCutaneous (11-25-24 @ 09:10)

## 2024-11-26 NOTE — PROGRESS NOTE ADULT - ASSESSMENT
75 y/o M with PMH  IBD, on methotrexate, remicade (q 6 weeks), HTN, HLD, T2DM, mild cognitive impairment (AOx2 baseline), hx of prior CVA, presenting with poor PO intake, fatigue, 2 unwitnessed falls, slurred speech since 11/21, and nonbloody diarrhea for the past 5 days.  73 y/o M with PMH  IBD, on methotrexate, remicade (q 6 weeks), HTN, HLD, T2DM, mild cognitive impairment (AOx2 baseline), hx of prior CVA, presenting with poor PO intake, fatigue, 2 unwitnessed falls, slurred speech since 11/21, and nonbloody diarrhea for the past 5 days.

## 2024-11-26 NOTE — PROGRESS NOTE ADULT - PROBLEM SELECTOR PLAN 5
- Pt with pH 7.27, HCO3 17, pCO2 28 consistent with high anion gap metabolic and respiratory compensation  - likely iso of starvation ketosis 2/2 to poor PO intake. Lower suspicion for DKA but will monitor closely given no use of Tresiba for 48 hours  - BHB elevated  PLAN:  - will encourage PO intake  - Will restart insulin glargine  - Start bicarb gtt 150 mEq @ 150cc/hr x 10 hours - Pt with pH 7.27, HCO3 17, pCO2 28 consistent with high anion gap metabolic and respiratory compensation  - likely iso of starvation ketosis 2/2 to poor PO intake. Lower suspicion for DKA but will monitor closely given no use of Tresiba for 48 hours  - BHB elevated  -s/p bicarb gtt 150 mEq @ 150cc/hr x 10 hours  - acidotic, with anion gap closing,   PLAN:  - will encourage PO intake  - Will restart insulin glargine - Pt with pH 7.27, HCO3 17, pCO2 28 consistent with high anion gap metabolic and respiratory compensation  - likely iso of starvation ketosis 2/2 to poor PO intake. Lower suspicion for DKA but will monitor closely given no use of Tresiba for 48 hours  - BHB elevated  -s/p bicarb gtt 150 mEq @ 150cc/hr x 10 hours  - acidotic, with anion gap closing,   PLAN:  - will encourage PO intake  - Will restart insulin glargine  - I L LR bolus

## 2024-11-26 NOTE — PROGRESS NOTE ADULT - PROBLEM SELECTOR PLAN 8
- on home Januvia 50mg QD, Tresiba 30 U at bedtime, and 28-30 U premeals TID  - on gabapentin for neuropathy   PLAN:  - 20U Lantus  - 20 U Admelog  - KELLI  - Consistent carb diet, low fiber , full liquid. Adjust insulin regimen as appropriate. - on home Januvia 50mg QD, Tresiba 30 U at bedtime, and 28-30 U premeals TID  - on gabapentin for neuropathy   PLAN:  - 20U Lantus  - 10 U Admelog TID  - KELLI  - Consistent carb diet, low fiber , full liquid. Adjust insulin regimen as appropriate.

## 2024-11-26 NOTE — PROGRESS NOTE ADULT - ASSESSMENT
Mr Darren Best is a 75 y/o male with a PMHx of IBD (most likely Crohn's), HTN, HLD, T2DM, and cognitive impairment who presents with poor PO intake, fatigue, increased urination, and loose nonbloody, mucous stools for the past 5 days with 2 falls yesterday prompting visit to the ED.    Patient follows with Dr. Oro.  Was diagnosed with Crohn's Disease~2011 and treated with 6-mercaptopurine.  Failed therapy and trialled Entyvio for several years with poor response, and transitioned to infliximab therapy in 8/2021.  Developed anti-infliximab antibodies so began methotrexate with subsequent improvement in antibody load (negative antibodies in 2/2024 with infliximab level of 18).  Currently on mesalamine 1600mg BID, methotrexate 10mg on Mondays with folic acid, prednisone 10mg BID, and infliximab every 4 weeks (last infusion 11/13/2024).  No history of colonic surgeries, fistulas, or strictures.  Last seen by Dr Oro on February 29, 2024.  At baseline has 2-3 bowel movements per day without blood.     Last admitted for IBD flare with hematochezia in 9/2022-10/2022.  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  Treated with solumedrol, infliximab, and methotrexate and transitioned to prednisone taper.  No colonoscopies since.  No EGDs in chart.    On admission patient afebrile but tachycardic.  Presents with bandemia without leukocytosis, HAGMA, lactic acidosis, CRISTINA, hyponatremia, and elevated creatine kinase.  CT abdomen pelvis without evidence of abdominal pathology.  Drop in hemoglobin likely secondary to fluid resuscitation.  Baseline hemoglobin appears to be around 10.  Improving with IV fluids.  Appears to be most likely an enterocolitis.  C diff negative, pending GI PCR and calprotectin    Recommendations:  - Can resume home mesalamine  - Can resume home prednisone  - Hold home methotrexate  - DVT PPx as IBD patients are high risk for thrombosis.   - F/u C diff, GI PCR  - F/u fecal calprotectin  - Obtain CRP and ESR daily.  - F/u Quantiferon TB  - Hepatitis panel including hepatitis B surface antibody, surface antigen, core antigen, core IgM  - F/u CMV PCR  - Will consider inpatient colonoscopy once enterocolitis improves

## 2024-11-26 NOTE — PROGRESS NOTE ADULT - SUBJECTIVE AND OBJECTIVE BOX
PROGRESS NOTE:     Patient is a 74y old  Male who presents with a chief complaint of     SUBJECTIVE / OVERNIGHT EVENTS:    OVERNIGHT: No acute overnight events.      Patient was examined at bedside and feels well this morning. Denies fever, chills, chest pain, SOB, nausea, vomiting. ROS otherwise negative and pt is amenable to current treatment plan.      REVIEW OF SYSTEMS:    CONSTITUTIONAL:  No weakness, fevers, or chills  EYES/ENT:  No visual changes, vertigo, or throat pain   NECK:  No pain or stiffness  RESPIRATORY:  No SOB, cough, wheezing, or hemoptysis  CARDIOVASCULAR:  No chest pain or palpitations  GASTROINTESTINAL:  No abdominal pain, nausea, vomiting, or hematemesis; No diarrhea or constipation; No melena or hematochezia.  GENITOURINARY:  No dysuria, change in frequency, or hematuria  NEUROLOGICAL:  No numbness or weakness  SKIN:  No itching or rashes      MEDICATIONS  (STANDING):  aspirin enteric coated 81 milliGRAM(s) Oral daily  atorvastatin 80 milliGRAM(s) Oral at bedtime  cefepime   IVPB 2000 milliGRAM(s) IV Intermittent every 8 hours  cefepime   IVPB      chlorhexidine 2% Cloths 1 Application(s) Topical daily  cholecalciferol 400 Unit(s) Oral daily  dextrose 5%. 1000 milliLiter(s) (100 mL/Hr) IV Continuous <Continuous>  dextrose 5%. 1000 milliLiter(s) (50 mL/Hr) IV Continuous <Continuous>  dextrose 50% Injectable 25 Gram(s) IV Push once  dextrose 50% Injectable 12.5 Gram(s) IV Push once  dextrose 50% Injectable 25 Gram(s) IV Push once  dextrose Oral Gel 15 Gram(s) Oral once  finasteride 5 milliGRAM(s) Oral daily  folic acid 1 milliGRAM(s) Oral daily  glucagon  Injectable 1 milliGRAM(s) IntraMuscular once  heparin   Injectable 5000 Unit(s) SubCutaneous every 12 hours  insulin glargine Injectable (LANTUS) 20 Unit(s) SubCutaneous at bedtime  insulin lispro (ADMELOG) corrective regimen sliding scale   SubCutaneous three times a day before meals  insulin lispro (ADMELOG) corrective regimen sliding scale   SubCutaneous at bedtime  insulin lispro Injectable (ADMELOG) 10 Unit(s) SubCutaneous three times a day before meals  memantine 10 milliGRAM(s) Oral two times a day  QUEtiapine 50 milliGRAM(s) Oral at bedtime  sertraline 100 milliGRAM(s) Oral daily  sodium bicarbonate  Infusion 0.173 mEq/kG/Hr (100 mL/Hr) IV Continuous <Continuous>  tamsulosin 0.4 milliGRAM(s) Oral at bedtime  vancomycin  IVPB 1000 milliGRAM(s) IV Intermittent every 12 hours    MEDICATIONS  (PRN):      CAPILLARY BLOOD GLUCOSE      POCT Blood Glucose.: 102 mg/dL (25 Nov 2024 22:08)  POCT Blood Glucose.: 123 mg/dL (25 Nov 2024 17:49)  POCT Blood Glucose.: 106 mg/dL (25 Nov 2024 13:15)  POCT Blood Glucose.: 58 mg/dL (25 Nov 2024 12:37)  POCT Blood Glucose.: 51 mg/dL (25 Nov 2024 12:34)  POCT Blood Glucose.: 153 mg/dL (25 Nov 2024 09:06)  POCT Blood Glucose.: 163 mg/dL (25 Nov 2024 09:04)    I&O's Summary      PHYSICAL EXAM:  Vital Signs Last 24 Hrs  T(C): 36.5 (26 Nov 2024 05:35), Max: 36.8 (25 Nov 2024 22:33)  T(F): 97.7 (26 Nov 2024 05:35), Max: 98.3 (25 Nov 2024 22:33)  HR: 107 (26 Nov 2024 05:35) (103 - 117)  BP: 125/44 (26 Nov 2024 05:35) (100/60 - 129/65)  BP(mean): --  RR: 17 (26 Nov 2024 05:35) (17 - 19)  SpO2: 98% (26 Nov 2024 05:35) (96% - 100%)    Parameters below as of 26 Nov 2024 05:35  Patient On (Oxygen Delivery Method): room air        CONSTITUTIONAL: NAD; well-developed  HEENT: PERRL, clear conjunctiva  RESPIRATORY: Normal respiratory effort; lungs are clear to auscultation bilaterally; No crackles/rhonchi/wheezing  CARDIOVASCULAR: Regular rate and rhythm, normal S1 and S2, no murmur/rub/gallop; No lower extremity edema; Peripheral pulses are 2+ bilaterally  ABDOMEN: Nontender to palpation, normoactive bowel sounds, no rebound/guarding; No hepatosplenomegaly  MUSCULOSKELETAL: No clubbing or cyanosis of digits; no joint swelling or tenderness to palpation  EXTREMITY: Lower extremities non-tender to palpation; non-erythematous B/L  NEURO: A&Ox3; no focal deficits   PSYCH: Normal mood; affect appropriate    LABS:                        9.0    4.41  )-----------( 163      ( 25 Nov 2024 07:20 )             29.4     11-25    134[L]  |  106  |  32[H]  ----------------------------<  96  3.2[L]   |  17[L]  |  0.94    Ca    7.0[L]      25 Nov 2024 20:17  Phos  1.7     11-25  Mg     2.20     11-25    TPro  4.9[L]  /  Alb  2.2[L]  /  TBili  0.6  /  DBili  x   /  AST  63[H]  /  ALT  25  /  AlkPhos  53  11-25    PT/INR - ( 24 Nov 2024 08:31 )   PT: 13.3 sec;   INR: 1.12 ratio         PTT - ( 24 Nov 2024 08:31 )  PTT:27.3 sec      Urinalysis Basic - ( 25 Nov 2024 20:17 )    Color: x / Appearance: x / SG: x / pH: x  Gluc: 96 mg/dL / Ketone: x  / Bili: x / Urobili: x   Blood: x / Protein: x / Nitrite: x   Leuk Esterase: x / RBC: x / WBC x   Sq Epi: x / Non Sq Epi: x / Bacteria: x        Culture - Urine (collected 24 Nov 2024 20:07)  Source: Clean Catch Clean Catch (Midstream)  Final Report (26 Nov 2024 01:21):    No growth    Culture - Stool (collected 24 Nov 2024 19:49)  Source: .Stool  Preliminary Report (25 Nov 2024 18:00):    No enteric pathogens to date: Final culture pending    Urinalysis with Rflx Culture (collected 24 Nov 2024 10:15)    Culture - Blood (collected 24 Nov 2024 08:31)  Source: .Blood BLOOD  Preliminary Report (25 Nov 2024 13:01):    No growth at 24 hours    Culture - Blood (collected 24 Nov 2024 08:15)  Source: .Blood BLOOD  Preliminary Report (25 Nov 2024 13:01):    No growth at 24 hours        RADIOLOGY & ADDITIONAL TESTS:    N/A PROGRESS NOTE:     Patient is a 74y old  Male who presents with a chief complaint of diarrhea    SUBJECTIVE / OVERNIGHT EVENTS:    OVERNIGHT: tachycardic overnight. Hypokalemic overnight, repleted.  bicarb gtt 150 mEq decreased to 50cc/hr    Patient was examined at bedside. States that his abdominal pain is proving. Diarrhea decreasing (2-3 loose stools) since yesterday. Complains of headache and poor sleep. Denies fever, chills, chest pain, SOB, nausea, vomiting. ROS otherwise negative and pt is amenable to current treatment plan.      REVIEW OF SYSTEMS:    CONSTITUTIONAL:  No weakness, fevers, or chills, + headache  EYES/ENT:  No visual changes, vertigo, or throat pain   NECK:  No pain or stiffness  RESPIRATORY:  No SOB, cough, wheezing, or hemoptysis  CARDIOVASCULAR:  No chest pain or palpitations  GASTROINTESTINAL:  +abdominal pain, No nausea, vomiting, or hematemesis; +diarrhea. No constipation; No melena or hematochezia.  GENITOURINARY:  No dysuria, change in frequency, or hematuria  NEUROLOGICAL:  No numbness or weakness  SKIN:  No itching or rashes      MEDICATIONS  (STANDING):  aspirin enteric coated 81 milliGRAM(s) Oral daily  atorvastatin 80 milliGRAM(s) Oral at bedtime  cefepime   IVPB 2000 milliGRAM(s) IV Intermittent every 8 hours  cefepime   IVPB      chlorhexidine 2% Cloths 1 Application(s) Topical daily  cholecalciferol 400 Unit(s) Oral daily  dextrose 5%. 1000 milliLiter(s) (100 mL/Hr) IV Continuous <Continuous>  dextrose 5%. 1000 milliLiter(s) (50 mL/Hr) IV Continuous <Continuous>  dextrose 50% Injectable 25 Gram(s) IV Push once  dextrose 50% Injectable 12.5 Gram(s) IV Push once  dextrose 50% Injectable 25 Gram(s) IV Push once  dextrose Oral Gel 15 Gram(s) Oral once  finasteride 5 milliGRAM(s) Oral daily  folic acid 1 milliGRAM(s) Oral daily  glucagon  Injectable 1 milliGRAM(s) IntraMuscular once  heparin   Injectable 5000 Unit(s) SubCutaneous every 12 hours  insulin glargine Injectable (LANTUS) 20 Unit(s) SubCutaneous at bedtime  insulin lispro (ADMELOG) corrective regimen sliding scale   SubCutaneous three times a day before meals  insulin lispro (ADMELOG) corrective regimen sliding scale   SubCutaneous at bedtime  insulin lispro Injectable (ADMELOG) 10 Unit(s) SubCutaneous three times a day before meals  memantine 10 milliGRAM(s) Oral two times a day  QUEtiapine 50 milliGRAM(s) Oral at bedtime  sertraline 100 milliGRAM(s) Oral daily  sodium bicarbonate  Infusion 0.173 mEq/kG/Hr (100 mL/Hr) IV Continuous <Continuous>  tamsulosin 0.4 milliGRAM(s) Oral at bedtime  vancomycin  IVPB 1000 milliGRAM(s) IV Intermittent every 12 hours    MEDICATIONS  (PRN):      CAPILLARY BLOOD GLUCOSE      POCT Blood Glucose.: 102 mg/dL (25 Nov 2024 22:08)  POCT Blood Glucose.: 123 mg/dL (25 Nov 2024 17:49)  POCT Blood Glucose.: 106 mg/dL (25 Nov 2024 13:15)  POCT Blood Glucose.: 58 mg/dL (25 Nov 2024 12:37)  POCT Blood Glucose.: 51 mg/dL (25 Nov 2024 12:34)  POCT Blood Glucose.: 153 mg/dL (25 Nov 2024 09:06)  POCT Blood Glucose.: 163 mg/dL (25 Nov 2024 09:04)    I&O's Summary      PHYSICAL EXAM:  Vital Signs Last 24 Hrs  T(C): 36.5 (26 Nov 2024 05:35), Max: 36.8 (25 Nov 2024 22:33)  T(F): 97.7 (26 Nov 2024 05:35), Max: 98.3 (25 Nov 2024 22:33)  HR: 107 (26 Nov 2024 05:35) (103 - 117)  BP: 125/44 (26 Nov 2024 05:35) (100/60 - 129/65)  BP(mean): --  RR: 17 (26 Nov 2024 05:35) (17 - 19)  SpO2: 98% (26 Nov 2024 05:35) (96% - 100%)    Parameters below as of 26 Nov 2024 05:35  Patient On (Oxygen Delivery Method): room air        CONSTITUTIONAL: NAD; well-developed  HEENT: PERRL, conjunctiva pallor, dry mucous membranes  RESPIRATORY: Normal respiratory effort; lungs are clear to auscultation bilaterally; No crackles/rhonchi/wheezing  CARDIOVASCULAR: Regular rate and rhythm, normal S1 and S2, no murmur/rub/gallop; No lower extremity edema; Peripheral pulses are 2+ bilaterally  ABDOMEN: Mild tenderness to palpation, distended,  normoactive bowel sounds, no rebound/guarding; No hepatosplenomegaly  MUSCULOSKELETAL: No clubbing or cyanosis of digits; no joint swelling or tenderness to palpation  EXTREMITY: Lower extremities non-tender to palpation; non-erythematous B/L  NEURO: A&Ox2; no focal deficits   PSYCH: Normal mood; affect appropriate    LABS:                        9.0    4.41  )-----------( 163      ( 25 Nov 2024 07:20 )             29.4     11-25    134[L]  |  106  |  32[H]  ----------------------------<  96  3.2[L]   |  17[L]  |  0.94    Ca    7.0[L]      25 Nov 2024 20:17  Phos  1.7     11-25  Mg     2.20     11-25    TPro  4.9[L]  /  Alb  2.2[L]  /  TBili  0.6  /  DBili  x   /  AST  63[H]  /  ALT  25  /  AlkPhos  53  11-25    PT/INR - ( 24 Nov 2024 08:31 )   PT: 13.3 sec;   INR: 1.12 ratio         PTT - ( 24 Nov 2024 08:31 )  PTT:27.3 sec      Urinalysis Basic - ( 25 Nov 2024 20:17 )    Color: x / Appearance: x / SG: x / pH: x  Gluc: 96 mg/dL / Ketone: x  / Bili: x / Urobili: x   Blood: x / Protein: x / Nitrite: x   Leuk Esterase: x / RBC: x / WBC x   Sq Epi: x / Non Sq Epi: x / Bacteria: x        Culture - Urine (collected 24 Nov 2024 20:07)  Source: Clean Catch Clean Catch (Midstream)  Final Report (26 Nov 2024 01:21):    No growth    Culture - Stool (collected 24 Nov 2024 19:49)  Source: .Stool  Preliminary Report (25 Nov 2024 18:00):    No enteric pathogens to date: Final culture pending    Urinalysis with Rflx Culture (collected 24 Nov 2024 10:15)    Culture - Blood (collected 24 Nov 2024 08:31)  Source: .Blood BLOOD  Preliminary Report (25 Nov 2024 13:01):    No growth at 24 hours    Culture - Blood (collected 24 Nov 2024 08:15)  Source: .Blood BLOOD  Preliminary Report (25 Nov 2024 13:01):    No growth at 24 hours        RADIOLOGY & ADDITIONAL TESTS:    N/A PROGRESS NOTE:     Patient is a 74y old  Male who presents with a chief complaint of diarrhea    SUBJECTIVE / OVERNIGHT EVENTS:    OVERNIGHT: tachycardic overnight. Hypokalemic overnight, repleted.  bicarb gtt 150 mEq decreased to 50cc/hr    Patient was examined at bedside. States that his abdominal pain is improving. Diarrhea decreasing (2-3 loose stools) since yesterday. Complains of headache and poor sleep. Denies fever, chills, chest pain, SOB, nausea, vomiting. ROS otherwise negative and pt is amenable to current treatment plan.      REVIEW OF SYSTEMS:    CONSTITUTIONAL:  No weakness, fevers, or chills, + headache  EYES/ENT:  No visual changes, vertigo, or throat pain   NECK:  No pain or stiffness  RESPIRATORY:  No SOB, cough, wheezing, or hemoptysis  CARDIOVASCULAR:  No chest pain or palpitations  GASTROINTESTINAL:  +abdominal pain, No nausea, vomiting, or hematemesis; +diarrhea. No constipation; No melena or hematochezia.  GENITOURINARY:  No dysuria, change in frequency, or hematuria  NEUROLOGICAL:  No numbness or weakness  SKIN:  No itching or rashes      MEDICATIONS  (STANDING):  aspirin enteric coated 81 milliGRAM(s) Oral daily  atorvastatin 80 milliGRAM(s) Oral at bedtime  cefepime   IVPB 2000 milliGRAM(s) IV Intermittent every 8 hours  cefepime   IVPB      chlorhexidine 2% Cloths 1 Application(s) Topical daily  cholecalciferol 400 Unit(s) Oral daily  dextrose 5%. 1000 milliLiter(s) (100 mL/Hr) IV Continuous <Continuous>  dextrose 5%. 1000 milliLiter(s) (50 mL/Hr) IV Continuous <Continuous>  dextrose 50% Injectable 25 Gram(s) IV Push once  dextrose 50% Injectable 12.5 Gram(s) IV Push once  dextrose 50% Injectable 25 Gram(s) IV Push once  dextrose Oral Gel 15 Gram(s) Oral once  finasteride 5 milliGRAM(s) Oral daily  folic acid 1 milliGRAM(s) Oral daily  glucagon  Injectable 1 milliGRAM(s) IntraMuscular once  heparin   Injectable 5000 Unit(s) SubCutaneous every 12 hours  insulin glargine Injectable (LANTUS) 20 Unit(s) SubCutaneous at bedtime  insulin lispro (ADMELOG) corrective regimen sliding scale   SubCutaneous three times a day before meals  insulin lispro (ADMELOG) corrective regimen sliding scale   SubCutaneous at bedtime  insulin lispro Injectable (ADMELOG) 10 Unit(s) SubCutaneous three times a day before meals  memantine 10 milliGRAM(s) Oral two times a day  QUEtiapine 50 milliGRAM(s) Oral at bedtime  sertraline 100 milliGRAM(s) Oral daily  sodium bicarbonate  Infusion 0.173 mEq/kG/Hr (100 mL/Hr) IV Continuous <Continuous>  tamsulosin 0.4 milliGRAM(s) Oral at bedtime  vancomycin  IVPB 1000 milliGRAM(s) IV Intermittent every 12 hours    MEDICATIONS  (PRN):      CAPILLARY BLOOD GLUCOSE      POCT Blood Glucose.: 102 mg/dL (25 Nov 2024 22:08)  POCT Blood Glucose.: 123 mg/dL (25 Nov 2024 17:49)  POCT Blood Glucose.: 106 mg/dL (25 Nov 2024 13:15)  POCT Blood Glucose.: 58 mg/dL (25 Nov 2024 12:37)  POCT Blood Glucose.: 51 mg/dL (25 Nov 2024 12:34)  POCT Blood Glucose.: 153 mg/dL (25 Nov 2024 09:06)  POCT Blood Glucose.: 163 mg/dL (25 Nov 2024 09:04)    I&O's Summary      PHYSICAL EXAM:  Vital Signs Last 24 Hrs  T(C): 36.5 (26 Nov 2024 05:35), Max: 36.8 (25 Nov 2024 22:33)  T(F): 97.7 (26 Nov 2024 05:35), Max: 98.3 (25 Nov 2024 22:33)  HR: 107 (26 Nov 2024 05:35) (103 - 117)  BP: 125/44 (26 Nov 2024 05:35) (100/60 - 129/65)  BP(mean): --  RR: 17 (26 Nov 2024 05:35) (17 - 19)  SpO2: 98% (26 Nov 2024 05:35) (96% - 100%)    Parameters below as of 26 Nov 2024 05:35  Patient On (Oxygen Delivery Method): room air        CONSTITUTIONAL: NAD; well-developed  HEENT: PERRL, conjunctiva pallor, dry mucous membranes  RESPIRATORY: Normal respiratory effort; lungs are clear to auscultation bilaterally; No crackles/rhonchi/wheezing  CARDIOVASCULAR: Regular rate and rhythm, normal S1 and S2, no murmur/rub/gallop; No lower extremity edema; Peripheral pulses are 2+ bilaterally  ABDOMEN: Mild tenderness to palpation, distended,  normoactive bowel sounds, no rebound/guarding; No hepatosplenomegaly  MUSCULOSKELETAL: No clubbing or cyanosis of digits; no joint swelling or tenderness to palpation  EXTREMITY: Lower extremities non-tender to palpation; non-erythematous B/L  NEURO: A&Ox2; no focal deficits   PSYCH: Normal mood; affect appropriate    LABS:                        9.0    4.41  )-----------( 163      ( 25 Nov 2024 07:20 )             29.4     11-25    134[L]  |  106  |  32[H]  ----------------------------<  96  3.2[L]   |  17[L]  |  0.94    Ca    7.0[L]      25 Nov 2024 20:17  Phos  1.7     11-25  Mg     2.20     11-25    TPro  4.9[L]  /  Alb  2.2[L]  /  TBili  0.6  /  DBili  x   /  AST  63[H]  /  ALT  25  /  AlkPhos  53  11-25    PT/INR - ( 24 Nov 2024 08:31 )   PT: 13.3 sec;   INR: 1.12 ratio         PTT - ( 24 Nov 2024 08:31 )  PTT:27.3 sec      Urinalysis Basic - ( 25 Nov 2024 20:17 )    Color: x / Appearance: x / SG: x / pH: x  Gluc: 96 mg/dL / Ketone: x  / Bili: x / Urobili: x   Blood: x / Protein: x / Nitrite: x   Leuk Esterase: x / RBC: x / WBC x   Sq Epi: x / Non Sq Epi: x / Bacteria: x        Culture - Urine (collected 24 Nov 2024 20:07)  Source: Clean Catch Clean Catch (Midstream)  Final Report (26 Nov 2024 01:21):    No growth    Culture - Stool (collected 24 Nov 2024 19:49)  Source: .Stool  Preliminary Report (25 Nov 2024 18:00):    No enteric pathogens to date: Final culture pending    Urinalysis with Rflx Culture (collected 24 Nov 2024 10:15)    Culture - Blood (collected 24 Nov 2024 08:31)  Source: .Blood BLOOD  Preliminary Report (25 Nov 2024 13:01):    No growth at 24 hours    Culture - Blood (collected 24 Nov 2024 08:15)  Source: .Blood BLOOD  Preliminary Report (25 Nov 2024 13:01):    No growth at 24 hours        RADIOLOGY & ADDITIONAL TESTS:    N/A

## 2024-11-27 ENCOUNTER — TRANSCRIPTION ENCOUNTER (OUTPATIENT)
Age: 74
End: 2024-11-27

## 2024-11-27 LAB
ANION GAP SERPL CALC-SCNC: 8 MMOL/L — SIGNIFICANT CHANGE UP (ref 7–14)
BUN SERPL-MCNC: 18 MG/DL — SIGNIFICANT CHANGE UP (ref 7–23)
CA-I BLD-SCNC: 1.08 MMOL/L — LOW (ref 1.15–1.29)
CALCIUM SERPL-MCNC: 7.2 MG/DL — LOW (ref 8.4–10.5)
CHLORIDE SERPL-SCNC: 107 MMOL/L — SIGNIFICANT CHANGE UP (ref 98–107)
CMV DNA CSF QL NAA+PROBE: ABNORMAL IU/ML
CMV DNA SPEC NAA+PROBE-LOG#: ABNORMAL LOG10IU/ML
CO2 SERPL-SCNC: 20 MMOL/L — LOW (ref 22–31)
CREAT SERPL-MCNC: 0.78 MG/DL — SIGNIFICANT CHANGE UP (ref 0.5–1.3)
CRP SERPL-MCNC: 40.3 MG/L — HIGH
EGFR: 92 ML/MIN/1.73M2 — SIGNIFICANT CHANGE UP
GLUCOSE SERPL-MCNC: 202 MG/DL — HIGH (ref 70–99)
HAV IGM SER-ACNC: SIGNIFICANT CHANGE UP
HBV CORE IGM SER-ACNC: SIGNIFICANT CHANGE UP
HBV SURFACE AG SER-ACNC: SIGNIFICANT CHANGE UP
HCT VFR BLD CALC: 23.8 % — LOW (ref 39–50)
HCT VFR BLD CALC: 26.2 % — LOW (ref 39–50)
HCV AB S/CO SERPL IA: 0.07 S/CO — SIGNIFICANT CHANGE UP (ref 0–0.99)
HCV AB SERPL-IMP: SIGNIFICANT CHANGE UP
HGB BLD-MCNC: 7.5 G/DL — LOW (ref 13–17)
HGB BLD-MCNC: 8 G/DL — LOW (ref 13–17)
MAGNESIUM SERPL-MCNC: 1.5 MG/DL — LOW (ref 1.6–2.6)
MAGNESIUM SERPL-MCNC: 2.2 MG/DL — SIGNIFICANT CHANGE UP (ref 1.6–2.6)
MCHC RBC-ENTMCNC: 24 PG — LOW (ref 27–34)
MCHC RBC-ENTMCNC: 24 PG — LOW (ref 27–34)
MCHC RBC-ENTMCNC: 30.5 G/DL — LOW (ref 32–36)
MCHC RBC-ENTMCNC: 31.5 G/DL — LOW (ref 32–36)
MCV RBC AUTO: 76 FL — LOW (ref 80–100)
MCV RBC AUTO: 78.4 FL — LOW (ref 80–100)
NRBC # BLD: 0 /100 WBCS — SIGNIFICANT CHANGE UP (ref 0–0)
NRBC # BLD: 0 /100 WBCS — SIGNIFICANT CHANGE UP (ref 0–0)
NRBC # FLD: 0 K/UL — SIGNIFICANT CHANGE UP (ref 0–0)
NRBC # FLD: 0 K/UL — SIGNIFICANT CHANGE UP (ref 0–0)
PHOSPHATE SERPL-MCNC: 1.9 MG/DL — LOW (ref 2.5–4.5)
PHOSPHATE SERPL-MCNC: 2.3 MG/DL — LOW (ref 2.5–4.5)
PLATELET # BLD AUTO: 203 K/UL — SIGNIFICANT CHANGE UP (ref 150–400)
PLATELET # BLD AUTO: 206 K/UL — SIGNIFICANT CHANGE UP (ref 150–400)
POTASSIUM SERPL-MCNC: 4.2 MMOL/L — SIGNIFICANT CHANGE UP (ref 3.5–5.3)
POTASSIUM SERPL-SCNC: 4.2 MMOL/L — SIGNIFICANT CHANGE UP (ref 3.5–5.3)
RBC # BLD: 3.13 M/UL — LOW (ref 4.2–5.8)
RBC # BLD: 3.34 M/UL — LOW (ref 4.2–5.8)
RBC # FLD: 17.9 % — HIGH (ref 10.3–14.5)
RBC # FLD: 18.2 % — HIGH (ref 10.3–14.5)
SODIUM SERPL-SCNC: 135 MMOL/L — SIGNIFICANT CHANGE UP (ref 135–145)
WBC # BLD: 6.45 K/UL — SIGNIFICANT CHANGE UP (ref 3.8–10.5)
WBC # BLD: 8.36 K/UL — SIGNIFICANT CHANGE UP (ref 3.8–10.5)
WBC # FLD AUTO: 6.45 K/UL — SIGNIFICANT CHANGE UP (ref 3.8–10.5)
WBC # FLD AUTO: 8.36 K/UL — SIGNIFICANT CHANGE UP (ref 3.8–10.5)

## 2024-11-27 PROCEDURE — 99233 SBSQ HOSP IP/OBS HIGH 50: CPT | Mod: GC

## 2024-11-27 PROCEDURE — 99232 SBSQ HOSP IP/OBS MODERATE 35: CPT

## 2024-11-27 RX ORDER — HEPARIN SODIUM 5000 [USP'U]/.5ML
30 INJECTION, SOLUTION INTRAVENOUS; SUBCUTANEOUS ONCE
Refills: 0 | Status: COMPLETED | OUTPATIENT
Start: 2024-11-27 | End: 2024-11-27

## 2024-11-27 RX ADMIN — Medication 80 MILLIGRAM(S): at 22:15

## 2024-11-27 RX ADMIN — Medication 100 GRAM(S): at 10:12

## 2024-11-27 RX ADMIN — Medication 2: at 12:40

## 2024-11-27 RX ADMIN — MEMANTINE HYDROCHLORIDE 10 MILLIGRAM(S): 14 CAPSULE, EXTENDED RELEASE ORAL at 05:23

## 2024-11-27 RX ADMIN — PREDNISONE 10 MILLIGRAM(S): 20 TABLET ORAL at 18:05

## 2024-11-27 RX ADMIN — Medication 50 MILLIGRAM(S): at 22:15

## 2024-11-27 RX ADMIN — Medication 5000 UNIT(S): at 18:05

## 2024-11-27 RX ADMIN — Medication 1 MILLIGRAM(S): at 12:39

## 2024-11-27 RX ADMIN — Medication 1: at 08:40

## 2024-11-27 RX ADMIN — MESALAMINE 1600 MILLIGRAM(S): 375 CAPSULE, EXTENDED RELEASE ORAL at 05:23

## 2024-11-27 RX ADMIN — INSULIN GLARGINE 20 UNIT(S): 100 INJECTION, SOLUTION SUBCUTANEOUS at 00:18

## 2024-11-27 RX ADMIN — INSULIN GLARGINE 20 UNIT(S): 100 INJECTION, SOLUTION SUBCUTANEOUS at 22:00

## 2024-11-27 RX ADMIN — Medication 81 MILLIGRAM(S): at 12:39

## 2024-11-27 RX ADMIN — Medication 400 UNIT(S): at 12:39

## 2024-11-27 RX ADMIN — TAMSULOSIN HYDROCHLORIDE 0.4 MILLIGRAM(S): 0.4 CAPSULE ORAL at 22:15

## 2024-11-27 RX ADMIN — CHLORHEXIDINE GLUCONATE 1 APPLICATION(S): 1.2 RINSE ORAL at 12:42

## 2024-11-27 RX ADMIN — Medication 5000 UNIT(S): at 05:23

## 2024-11-27 RX ADMIN — Medication 5 MILLIGRAM(S): at 12:38

## 2024-11-27 RX ADMIN — Medication 10 UNIT(S): at 08:41

## 2024-11-27 RX ADMIN — HEPARIN SODIUM 85 MILLIMOLE(S): 5000 INJECTION, SOLUTION INTRAVENOUS; SUBCUTANEOUS at 13:44

## 2024-11-27 RX ADMIN — SERTRALINE HYDROCHLORIDE 100 MILLIGRAM(S): 100 TABLET, FILM COATED ORAL at 12:39

## 2024-11-27 RX ADMIN — MESALAMINE 1600 MILLIGRAM(S): 375 CAPSULE, EXTENDED RELEASE ORAL at 18:04

## 2024-11-27 RX ADMIN — PREDNISONE 10 MILLIGRAM(S): 20 TABLET ORAL at 05:23

## 2024-11-27 RX ADMIN — MEMANTINE HYDROCHLORIDE 10 MILLIGRAM(S): 14 CAPSULE, EXTENDED RELEASE ORAL at 18:04

## 2024-11-27 RX ADMIN — Medication 2: at 18:05

## 2024-11-27 NOTE — DISCHARGE NOTE PROVIDER - NSDCCPCAREPLAN_GEN_ALL_CORE_FT
PRINCIPAL DISCHARGE DIAGNOSIS  Diagnosis: Colitis  Assessment and Plan of Treatment: You were admitted to the hospital due to colitis. You were treated with antibiotics and improved. The gastroenterology team was consulted and recommended____. Please take all medications exactly as prescribed. Please follow up with your gastroenterologist. Please return to the ED if you experience any new or worsening symptoms, including worsening diarrhea, confusion, fevers, chills, falls.  Medication Changes:  Follow Up:  1. Gastroenterology  2. PCP for general health maintenance  3. Cardiology      SECONDARY DISCHARGE DIAGNOSES  Diagnosis: Fall  Assessment and Plan of Treatment: You were evaluated for the 2 falls you experienced at home. You were evaluated with a CT head, which was negative for infarction or bleed. You also received an echcardiogram which showed aortic stenosis. Your falls were likely in the setting of dehydration. Please follow up with your cardiologist.     PRINCIPAL DISCHARGE DIAGNOSIS  Diagnosis: Colitis  Assessment and Plan of Treatment: You were admitted to the hospital due to colitis. You were treated with antibiotics and improved. The gastroenterology team was consulted and recommended an infectious workup, which was negative. You received CT of your abdomen, whcih was also negative for acute pathology. Please take all medications exactly as prescribed. Please follow up with your gastroenterologist. Please return to the ED if you experience any new or worsening symptoms, including worsening diarrhea, confusion, fevers, chills, falls.  Medication Changes:  None   Follow Up:  1. Gastroenterology  2. PCP for general health maintenance  3. Cardiology      SECONDARY DISCHARGE DIAGNOSES  Diagnosis: Fall  Assessment and Plan of Treatment: You were evaluated for the 2 falls you experienced at home. You were evaluated with a CT head, which was negative for infarction or bleed. You also received an echcardiogram which showed aortic stenosis. Your falls were likely in the setting of dehydration. Please follow up with your cardiologist.     PRINCIPAL DISCHARGE DIAGNOSIS  Diagnosis: Colitis  Assessment and Plan of Treatment: You were admitted to the hospital due to colitis. You were treated with antibiotics and improved. The gastroenterology team was consulted and recommended an infectious workup, which was negative. You received CT of your abdomen, whcih was also negative for acute pathology. Please take all medications exactly as prescribed. Please follow up with your gastroenterologist. Please return to the ED if you experience any new or worsening symptoms, including worsening diarrhea, confusion, fevers, chills, falls.  Medication Changes:  None   Follow Up:  1. Gastroenterology  2. PCP for general health maintenance  3. Cardiology      SECONDARY DISCHARGE DIAGNOSES  Diagnosis: Fall  Assessment and Plan of Treatment: You were evaluated for the 2 falls you experienced at home. You were evaluated with a CT head, which was negative for infarction or bleed. You also received an echcardiogram which showed aortic stenosis. Your falls were likely in the setting of dehydration. Please follow up with your cardiologist.    Diagnosis: Type 2 diabetes mellitus  Assessment and Plan of Treatment: Your type diabetes was very well controlled as shown by the A1c of 5.1%. Please stop taking premeal lispro insulin and decrease your Tresiba dose to 20 units at night. Continue your Januvia at current dose. Please follow up with your PCP for further management.    Diagnosis: Renal cyst  Assessment and Plan of Treatment: You have a known left kidney cyst first seen in 2022.  CT scan while admitted on 11/24 showed a right renal cyst as well. Ultrasound of the left kindey cyst showed it was 2.0 cm big. Please follow up with your PCP to monitor it.     PRINCIPAL DISCHARGE DIAGNOSIS  Diagnosis: Colitis  Assessment and Plan of Treatment: You were admitted to the hospital due to colitis. You were treated with antibiotics and improved. The gastroenterology team was consulted and recommended an infectious workup, which was negative. You received CT of your abdomen, whcih was also negative for acute pathology. You had mild elevation in some liver enzymes that may be seen with your condition. Please follow up with your GI doctor. Please take all medications exactly as prescribed. Please follow up with your gastroenterologist. Please return to the ED if you experience any new or worsening symptoms, including worsening diarrhea, confusion, fevers, chills, falls.  Medication Changes:  None   Follow Up:  1. Gastroenterology  2. PCP for general health maintenance  3. Cardiology      SECONDARY DISCHARGE DIAGNOSES  Diagnosis: Fall  Assessment and Plan of Treatment: You were evaluated for the 2 falls you experienced at home. You were evaluated with a CT head, which was negative for infarction or bleed. You also received an echcardiogram which showed aortic stenosis. Your falls were likely in the setting of dehydration. Please follow up with your cardiologist.    Diagnosis: Type 2 diabetes mellitus  Assessment and Plan of Treatment: Your type diabetes was very well controlled as shown by the A1c of 5.1%. Please stop taking premeal lispro insulin and decrease your Tresiba dose to 20 units at night. Continue your Januvia at current dose. Please follow up with your PCP for further management.    Diagnosis: Renal cyst  Assessment and Plan of Treatment: You have a known left kidney cyst first seen in 2022.  CT scan while admitted on 11/24 showed a right renal cyst as well. Ultrasound of the left kindey cyst showed it was 2.0 cm big. Please follow up with your PCP to monitor it.

## 2024-11-27 NOTE — DISCHARGE NOTE PROVIDER - CARE PROVIDERS DIRECT ADDRESSES
[email or web address]

## 2024-11-27 NOTE — DISCHARGE NOTE PROVIDER - HOSPITAL COURSE
HPI:  73 y/o M with PMH  IBD (most likely UC), on methotrexate, remicade (q 6 weeks), HTN, HLD, T2DM, cognitive impairment (AOx2 at baseline) getting worse per wife, presents with poor PO intake, fatigue,  for the past 5 days. Patient has also had increased urination and nonbloody diarrhea. Per wife, pt had 2 unwitnessed falls since yesterday, prompting presentation to the ED. Also around same time, pt has been having slurred speech since 11/21/24 (per wife, speech clear before this). Pt denies headache, chest pain, SOB, nausea, vomiting, recent weight changes, sick contacts, or recent travel.     In the ED, T 97.7F, , BP 98/62, RR 16, SpO2 96% RA. Labs significant for bandemia of 33%, lactate 2.4, and anion gap metabolic acidosis, Patient received CT head, which was negative for ischemia, hemorrhage or midline shift, CT AB negative for ischemia, bowel obstruction, or acute inflammatory bowel disease. Patient was started on 1g Vanc and 3.375g Zosyn, and 2L 0.9% NaCl. Patient admitted to medicine for further management.  (24 Nov 2024 13:16)    Hospital Course:  Patient was admitted to medicine for further management.     Important Medication Changes and Reason:    Active or Pending Issues Requiring Follow-up:    Advanced Directives:   [X ] Full code  [ ] DNR  [ ] Hospice    Discharge Diagnoses:  Enterocolitis       HPI:  75 y/o M with PMH  IBD (most likely UC), on methotrexate, remicade (q 6 weeks), HTN, HLD, T2DM, cognitive impairment (AOx2 at baseline) getting worse per wife, presents with poor PO intake, fatigue,  for the past 5 days. Patient has also had increased urination and nonbloody diarrhea. Per wife, pt had 2 unwitnessed falls since yesterday, prompting presentation to the ED. Also around same time, pt has been having slurred speech since 11/21/24 (per wife, speech clear before this). Pt denies headache, chest pain, SOB, nausea, vomiting, recent weight changes, sick contacts, or recent travel.     In the ED, T 97.7F, , BP 98/62, RR 16, SpO2 96% RA. Labs significant for bandemia of 33%, lactate 2.4, and anion gap metabolic acidosis, Patient received CT head, which was negative for ischemia, hemorrhage or midline shift, CT AB negative for ischemia, bowel obstruction, or acute inflammatory bowel disease. Patient was started on 1g Vanc and 3.375g Zosyn, and 2L 0.9% NaCl. Patient admitted to medicine for further management.  (24 Nov 2024 13:16)    Hospital Course:  Patient was admitted to medicine for further management. For patient's diarrhea, the gastroenterology team was consulted. An infectious workup was sent: C diff negative, GI PCR negative, hepatitis negative; CMV PCR was positive for low grade viremia, but given clinical improvement and resolution of diarrhea, there was no need to treat. The diarrhea was likely in the setting of IBD flare/ colitis. The patient was continued on his home regimen of prednisone and mesalamine, methotrexate was held. As patient's mental status and diarrhea improved, GI deferred inpatient colonoscopy and recommended close follow up outpatient.  Patient also with 2 unwitnessed falls likely iso of dehydration and poor PO intake. Pt recalls falling, reports feeling weak and that his legs gave out. He denies LOC, blurry vision or dizziness. His wife reports onset of slurred speech since Thursday 11/21, patient out of the window for tPA. He was evaluated with a CT head which was negative for ischemia, hemorrhage, or midline shift. He also received a TTE remarkable for moderate aortic stenosis. The physical therapy evaluated the patient and recommended LUZ ELENA, but family wanted to go home with outpatient PT. On day of discharge, patient is clinically stable with no new exam findings or acute symptoms compared to prior. The patient was seen by the attending physician on the date of discharge and deemed stable and acceptable for discharge. The patient's chronic medical conditions were treated accordingly per the patient's home medication regimen. The patient's medication reconciliation (with changes made to chronic medications), follow up appointments, discharge orders, instructions, and significant lab and diagnostic studies are as noted.    Important Medication Changes and Reason:  None     Active or Pending Issues Requiring Follow-up:  Ulcerative colitis     Advanced Directives:   [X ] Full code  [ ] DNR  [ ] Hospice    Discharge Diagnoses:  Enterocolitis       HPI:  75 y/o M with PMH  IBD (most likely UC), on methotrexate, remicade (q 6 weeks), HTN, HLD, T2DM, cognitive impairment (AOx2 at baseline) getting worse per wife, presents with poor PO intake, fatigue,  for the past 5 days. Patient has also had increased urination and nonbloody diarrhea. Per wife, pt had 2 unwitnessed falls since yesterday, prompting presentation to the ED. Also around same time, pt has been having slurred speech since 11/21/24 (per wife, speech clear before this). Pt denies headache, chest pain, SOB, nausea, vomiting, recent weight changes, sick contacts, or recent travel.     In the ED, T 97.7F, , BP 98/62, RR 16, SpO2 96% RA. Labs significant for bandemia of 33%, lactate 2.4, and anion gap metabolic acidosis, Patient received CT head, which was negative for ischemia, hemorrhage or midline shift, CT AB negative for ischemia, bowel obstruction, or acute inflammatory bowel disease. Patient was started on 1g Vanc and 3.375g Zosyn, and 2L 0.9% NaCl. Patient admitted to medicine for further management.  (24 Nov 2024 13:16)    Hospital Course:  Patient was admitted to medicine for further management. For patient's diarrhea, the gastroenterology team was consulted. An infectious workup was sent: C diff negative, GI PCR negative, hepatitis negative; CMV PCR was positive for low grade viremia, but given clinical improvement and resolution of diarrhea, there was no need to treat. The diarrhea was likely in the setting of IBD flare/ colitis. The patient was continued on his home regimen of prednisone and mesalamine, methotrexate was held. As patient's mental status and diarrhea improved, GI deferred inpatient colonoscopy and recommended close follow up outpatient. His outpatient IBD medications were restarted. Patient also with 2 unwitnessed falls likely iso of dehydration and poor PO intake. Pt recalls falling, reports feeling weak and that his legs gave out. He denies LOC, blurry vision or dizziness. His wife reports onset of slurred speech since Thursday 11/21, patient out of the window for tPA. He was evaluated with a CT head which was negative for ischemia, hemorrhage, or midline shift. He also received a TTE remarkable for moderate aortic stenosis. US RUQ for the mild transaminitis was unremarkeable. The physical therapy evaluated the patient and recommended LUZ ELENA. On day of discharge, patient is clinically stable with no new exam findings or acute symptoms compared to prior. The patient was seen by the attending physician on the date of discharge and deemed stable and acceptable for discharge. The patient's chronic medical conditions were treated accordingly per the patient's home medication regimen. The patient's medication reconciliation (with changes made to chronic medications), follow up appointments, discharge orders, instructions, and significant lab and diagnostic studies are as noted.    Important Medication Changes and Reason:  - Stop taking pre-meal lispro insulin  - Decrease dose of Tresiba to 20u every night  - Please see urology again prior to taking Trimethoprim for prophylaxis    Active or Pending Issues Requiring Follow-up:  Ulcerative colitis, follow up with GI  Transaminitis, follow up with GI  Diabetes, follow up with PCP  R. Renal cyst. Monitor with PCP    Advanced Directives:   [X ] Full code  [ ] DNR  [ ] Hospice    Discharge Diagnoses:  Enterocolitis

## 2024-11-27 NOTE — PROGRESS NOTE ADULT - SUBJECTIVE AND OBJECTIVE BOX
PROGRESS NOTE:     Patient is a 74y old  Male who presents with a chief complaint of     SUBJECTIVE / OVERNIGHT EVENTS:    OVERNIGHT: No acute overnight events.      Patient was examined at bedside and feels well this morning. Denies fever, chills, chest pain, SOB, nausea, vomiting. ROS otherwise negative and pt is amenable to current treatment plan.      REVIEW OF SYSTEMS:    CONSTITUTIONAL:  No weakness, fevers, or chills  EYES/ENT:  No visual changes, vertigo, or throat pain   NECK:  No pain or stiffness  RESPIRATORY:  No SOB, cough, wheezing, or hemoptysis  CARDIOVASCULAR:  No chest pain or palpitations  GASTROINTESTINAL:  No abdominal pain, nausea, vomiting, or hematemesis; No diarrhea or constipation; No melena or hematochezia.  GENITOURINARY:  No dysuria, change in frequency, or hematuria  NEUROLOGICAL:  No numbness or weakness  SKIN:  No itching or rashes      MEDICATIONS  (STANDING):  aspirin enteric coated 81 milliGRAM(s) Oral daily  atorvastatin 80 milliGRAM(s) Oral at bedtime  chlorhexidine 2% Cloths 1 Application(s) Topical daily  cholecalciferol 400 Unit(s) Oral daily  dextrose 5%. 1000 milliLiter(s) (100 mL/Hr) IV Continuous <Continuous>  dextrose 5%. 1000 milliLiter(s) (50 mL/Hr) IV Continuous <Continuous>  dextrose 50% Injectable 25 Gram(s) IV Push once  dextrose 50% Injectable 12.5 Gram(s) IV Push once  dextrose 50% Injectable 25 Gram(s) IV Push once  dextrose Oral Gel 15 Gram(s) Oral once  finasteride 5 milliGRAM(s) Oral daily  folic acid 1 milliGRAM(s) Oral daily  glucagon  Injectable 1 milliGRAM(s) IntraMuscular once  heparin   Injectable 5000 Unit(s) SubCutaneous every 12 hours  insulin glargine Injectable (LANTUS) 20 Unit(s) SubCutaneous at bedtime  insulin lispro (ADMELOG) corrective regimen sliding scale   SubCutaneous three times a day before meals  insulin lispro (ADMELOG) corrective regimen sliding scale   SubCutaneous at bedtime  insulin lispro Injectable (ADMELOG) 10 Unit(s) SubCutaneous three times a day before meals  LORazepam   Injectable 0.5 milliGRAM(s) IV Push once  memantine 10 milliGRAM(s) Oral two times a day  mesalamine DR Capsule 1600 milliGRAM(s) Oral every 12 hours  predniSONE   Tablet 10 milliGRAM(s) Oral two times a day  QUEtiapine 50 milliGRAM(s) Oral at bedtime  sertraline 100 milliGRAM(s) Oral daily  sodium bicarbonate  Infusion 0.173 mEq/kG/Hr (100 mL/Hr) IV Continuous <Continuous>  tamsulosin 0.4 milliGRAM(s) Oral at bedtime    MEDICATIONS  (PRN):      CAPILLARY BLOOD GLUCOSE      POCT Blood Glucose.: 189 mg/dL (27 Nov 2024 00:16)  POCT Blood Glucose.: 182 mg/dL (26 Nov 2024 22:09)  POCT Blood Glucose.: 119 mg/dL (26 Nov 2024 19:05)  POCT Blood Glucose.: 97 mg/dL (26 Nov 2024 18:46)  POCT Blood Glucose.: 90 mg/dL (26 Nov 2024 18:29)  POCT Blood Glucose.: 84 mg/dL (26 Nov 2024 18:15)  POCT Blood Glucose.: 70 mg/dL (26 Nov 2024 17:57)  POCT Blood Glucose.: 57 mg/dL (26 Nov 2024 17:36)  POCT Blood Glucose.: 62 mg/dL (26 Nov 2024 17:34)  POCT Blood Glucose.: 109 mg/dL (26 Nov 2024 12:39)  POCT Blood Glucose.: 196 mg/dL (26 Nov 2024 08:52)    I&O's Summary      PHYSICAL EXAM:  Vital Signs Last 24 Hrs  T(C): 36.2 (27 Nov 2024 05:17), Max: 36.7 (26 Nov 2024 09:30)  T(F): 97.2 (27 Nov 2024 05:17), Max: 98.1 (26 Nov 2024 22:34)  HR: 108 (27 Nov 2024 05:17) (100 - 108)  BP: 122/60 (27 Nov 2024 05:17) (119/53 - 124/47)  BP(mean): --  RR: 17 (27 Nov 2024 05:17) (17 - 18)  SpO2: 100% (27 Nov 2024 05:17) (98% - 100%)    Parameters below as of 27 Nov 2024 05:17  Patient On (Oxygen Delivery Method): room air        CONSTITUTIONAL: NAD; well-developed  HEENT: PERRL, clear conjunctiva  RESPIRATORY: Normal respiratory effort; lungs are clear to auscultation bilaterally; No crackles/rhonchi/wheezing  CARDIOVASCULAR: Regular rate and rhythm, normal S1 and S2, no murmur/rub/gallop; No lower extremity edema; Peripheral pulses are 2+ bilaterally  ABDOMEN: Nontender to palpation, normoactive bowel sounds, no rebound/guarding; No hepatosplenomegaly  MUSCULOSKELETAL: No clubbing or cyanosis of digits; no joint swelling or tenderness to palpation  EXTREMITY: Lower extremities non-tender to palpation; non-erythematous B/L  NEURO: A&Ox3; no focal deficits   PSYCH: Normal mood; affect appropriate    LABS:                        7.5    6.45  )-----------( 206      ( 27 Nov 2024 06:20 )             23.8     11-26    134[L]  |  105  |  27[H]  ----------------------------<  171[H]  3.6   |  18[L]  |  0.87    Ca    7.1[L]      26 Nov 2024 06:25  Phos  1.9     11-26  Mg     2.30     11-26            Urinalysis Basic - ( 26 Nov 2024 06:25 )    Color: x / Appearance: x / SG: x / pH: x  Gluc: 171 mg/dL / Ketone: x  / Bili: x / Urobili: x   Blood: x / Protein: x / Nitrite: x   Leuk Esterase: x / RBC: x / WBC x   Sq Epi: x / Non Sq Epi: x / Bacteria: x        Culture - Urine (collected 24 Nov 2024 20:07)  Source: Clean Catch Clean Catch (Midstream)  Final Report (26 Nov 2024 01:21):    No growth    Culture - Stool (collected 24 Nov 2024 19:49)  Source: .Stool  Final Report (26 Nov 2024 17:01):    No enteric pathogens isolated.    (Stool culture examined for Salmonella,    Shigella, Campylobacter, Aeromonas, Plesiomonas,    Vibrio, E.coli O157 and Yersinia)    Urinalysis with Rflx Culture (collected 24 Nov 2024 10:15)    Culture - Blood (collected 24 Nov 2024 08:31)  Source: .Blood BLOOD  Preliminary Report (26 Nov 2024 13:01):    No growth at 48 Hours    Culture - Blood (collected 24 Nov 2024 08:15)  Source: .Blood BLOOD  Preliminary Report (26 Nov 2024 13:01):    No growth at 48 Hours        RADIOLOGY & ADDITIONAL TESTS:    N/A PROGRESS NOTE:     Patient is a 74y old  Male who presents with a chief complaint of     SUBJECTIVE / OVERNIGHT EVENTS:    OVERNIGHT: No acute overnight events.      Patient was examined at bedside and feels well this morning. He is Aox3, states his abdominal pain is improving.  Denies fever, chills, chest pain, SOB, nausea, vomiting. ROS otherwise negative and pt is amenable to current treatment plan.      REVIEW OF SYSTEMS:    CONSTITUTIONAL:  No weakness, fevers, or chills  EYES/ENT:  No visual changes, vertigo, or throat pain   NECK:  No pain or stiffness  RESPIRATORY:  No SOB, cough, wheezing, or hemoptysis  CARDIOVASCULAR:  No chest pain or palpitations  GASTROINTESTINAL:  No abdominal pain, nausea, vomiting, or hematemesis; No diarrhea or constipation; No melena or hematochezia.  GENITOURINARY:  No dysuria, change in frequency, or hematuria  NEUROLOGICAL:  No numbness or weakness  SKIN:  No itching or rashes      MEDICATIONS  (STANDING):  aspirin enteric coated 81 milliGRAM(s) Oral daily  atorvastatin 80 milliGRAM(s) Oral at bedtime  chlorhexidine 2% Cloths 1 Application(s) Topical daily  cholecalciferol 400 Unit(s) Oral daily  dextrose 5%. 1000 milliLiter(s) (100 mL/Hr) IV Continuous <Continuous>  dextrose 5%. 1000 milliLiter(s) (50 mL/Hr) IV Continuous <Continuous>  dextrose 50% Injectable 25 Gram(s) IV Push once  dextrose 50% Injectable 12.5 Gram(s) IV Push once  dextrose 50% Injectable 25 Gram(s) IV Push once  dextrose Oral Gel 15 Gram(s) Oral once  finasteride 5 milliGRAM(s) Oral daily  folic acid 1 milliGRAM(s) Oral daily  glucagon  Injectable 1 milliGRAM(s) IntraMuscular once  heparin   Injectable 5000 Unit(s) SubCutaneous every 12 hours  insulin glargine Injectable (LANTUS) 20 Unit(s) SubCutaneous at bedtime  insulin lispro (ADMELOG) corrective regimen sliding scale   SubCutaneous three times a day before meals  insulin lispro (ADMELOG) corrective regimen sliding scale   SubCutaneous at bedtime  insulin lispro Injectable (ADMELOG) 10 Unit(s) SubCutaneous three times a day before meals  LORazepam   Injectable 0.5 milliGRAM(s) IV Push once  memantine 10 milliGRAM(s) Oral two times a day  mesalamine DR Capsule 1600 milliGRAM(s) Oral every 12 hours  predniSONE   Tablet 10 milliGRAM(s) Oral two times a day  QUEtiapine 50 milliGRAM(s) Oral at bedtime  sertraline 100 milliGRAM(s) Oral daily  sodium bicarbonate  Infusion 0.173 mEq/kG/Hr (100 mL/Hr) IV Continuous <Continuous>  tamsulosin 0.4 milliGRAM(s) Oral at bedtime    MEDICATIONS  (PRN):      CAPILLARY BLOOD GLUCOSE      POCT Blood Glucose.: 189 mg/dL (27 Nov 2024 00:16)  POCT Blood Glucose.: 182 mg/dL (26 Nov 2024 22:09)  POCT Blood Glucose.: 119 mg/dL (26 Nov 2024 19:05)  POCT Blood Glucose.: 97 mg/dL (26 Nov 2024 18:46)  POCT Blood Glucose.: 90 mg/dL (26 Nov 2024 18:29)  POCT Blood Glucose.: 84 mg/dL (26 Nov 2024 18:15)  POCT Blood Glucose.: 70 mg/dL (26 Nov 2024 17:57)  POCT Blood Glucose.: 57 mg/dL (26 Nov 2024 17:36)  POCT Blood Glucose.: 62 mg/dL (26 Nov 2024 17:34)  POCT Blood Glucose.: 109 mg/dL (26 Nov 2024 12:39)  POCT Blood Glucose.: 196 mg/dL (26 Nov 2024 08:52)    I&O's Summary      PHYSICAL EXAM:  Vital Signs Last 24 Hrs  T(C): 36.2 (27 Nov 2024 05:17), Max: 36.7 (26 Nov 2024 09:30)  T(F): 97.2 (27 Nov 2024 05:17), Max: 98.1 (26 Nov 2024 22:34)  HR: 108 (27 Nov 2024 05:17) (100 - 108)  BP: 122/60 (27 Nov 2024 05:17) (119/53 - 124/47)  BP(mean): --  RR: 17 (27 Nov 2024 05:17) (17 - 18)  SpO2: 100% (27 Nov 2024 05:17) (98% - 100%)    Parameters below as of 27 Nov 2024 05:17  Patient On (Oxygen Delivery Method): room air        CONSTITUTIONAL: NAD; well-developed  HEENT: PERRL, clear conjunctiva  RESPIRATORY: Normal respiratory effort; lungs are clear to auscultation bilaterally; No crackles/rhonchi/wheezing  CARDIOVASCULAR: Regular rate and rhythm, normal S1 and S2, no murmur/rub/gallop; No lower extremity edema; Peripheral pulses are 2+ bilaterally  ABDOMEN: Nontender to palpation, normoactive bowel sounds, no rebound/guarding; No hepatosplenomegaly  MUSCULOSKELETAL: No clubbing or cyanosis of digits; no joint swelling or tenderness to palpation  EXTREMITY: Lower extremities non-tender to palpation; non-erythematous B/L  NEURO: A&Ox3; no focal deficits   PSYCH: Normal mood; affect appropriate    LABS:                        7.5    6.45  )-----------( 206      ( 27 Nov 2024 06:20 )             23.8     11-26    134[L]  |  105  |  27[H]  ----------------------------<  171[H]  3.6   |  18[L]  |  0.87    Ca    7.1[L]      26 Nov 2024 06:25  Phos  1.9     11-26  Mg     2.30     11-26            Urinalysis Basic - ( 26 Nov 2024 06:25 )    Color: x / Appearance: x / SG: x / pH: x  Gluc: 171 mg/dL / Ketone: x  / Bili: x / Urobili: x   Blood: x / Protein: x / Nitrite: x   Leuk Esterase: x / RBC: x / WBC x   Sq Epi: x / Non Sq Epi: x / Bacteria: x        Culture - Urine (collected 24 Nov 2024 20:07)  Source: Clean Catch Clean Catch (Midstream)  Final Report (26 Nov 2024 01:21):    No growth    Culture - Stool (collected 24 Nov 2024 19:49)  Source: .Stool  Final Report (26 Nov 2024 17:01):    No enteric pathogens isolated.    (Stool culture examined for Salmonella,    Shigella, Campylobacter, Aeromonas, Plesiomonas,    Vibrio, E.coli O157 and Yersinia)    Urinalysis with Rflx Culture (collected 24 Nov 2024 10:15)    Culture - Blood (collected 24 Nov 2024 08:31)  Source: .Blood BLOOD  Preliminary Report (26 Nov 2024 13:01):    No growth at 48 Hours    Culture - Blood (collected 24 Nov 2024 08:15)  Source: .Blood BLOOD  Preliminary Report (26 Nov 2024 13:01):    No growth at 48 Hours        RADIOLOGY & ADDITIONAL TESTS:    N/A

## 2024-11-27 NOTE — DISCHARGE NOTE PROVIDER - CARE PROVIDER_API CALL
Osei Mendoza  Gastroenterology  [address] [facility], [address]  [place] [address]  Phone: [phone]  Fax: [phone]  Established Patient  Follow Up Time: 1 week    Gatito Oro  Gastroenterology  [address] [place] [address] [address]  [place] [address]  Phone: [phone]  Fax: [phone]  Established Patient  Follow Up Time: 1 week

## 2024-11-27 NOTE — PROGRESS NOTE ADULT - PROBLEM SELECTOR PLAN 3
# Hx of last CVA  # New CVA?   - Pt with 2 unwitnessed falls likely iso of dehydration and poor PO intake. Pt recalls falling, reports feeling weak and that his legs gave out. He denies LOC, blurry vision or dizziness.   - Wife reports onset of slurred speech since Thursday 11/21, patient out of the window for tPA   - CT head negative for ischemia, hemorrhage, or midline shift  - ddx orthostatic vs neurogenic vs cardiac syncope    PLAN:  - TTE  - Telemetry   - Orthostatics # Hx of last CVA  # New CVA?   - Pt with 2 unwitnessed falls likely iso of dehydration and poor PO intake. Pt recalls falling, reports feeling weak and that his legs gave out. He denies LOC, blurry vision or dizziness.   - Wife reports onset of slurred speech since Thursday 11/21, patient out of the window for tPA   - CT head negative for ischemia, hemorrhage, or midline shift  - ddx orthostatic vs neurogenic vs cardiac syncope  - TTE remarkable for moderate aortic stenosis  - likely iso of orthostatic syncope  PLAN:     - Orthostatics

## 2024-11-27 NOTE — DISCHARGE NOTE PROVIDER - PROVIDER TOKENS
PROVIDER:[TOKEN:[[id number]],FOLLOWUP:[1 week],ESTABLISHEDPATIENT:[T]],PROVIDER:[TOKEN:[[id number]],FOLLOWUP:[1 week],ESTABLISHEDPATIENT:[T]]

## 2024-11-27 NOTE — PROGRESS NOTE ADULT - PROBLEM SELECTOR PLAN 5
- Pt with pH 7.27, HCO3 17, pCO2 28 consistent with high anion gap metabolic and respiratory compensation  - likely iso of starvation ketosis 2/2 to poor PO intake. Lower suspicion for DKA but will monitor closely given no use of Tresiba for 48 hours  - BHB elevated  -s/p bicarb gtt 150 mEq @ 150cc/hr x 10 hours  - acidotic, with anion gap closing,   PLAN:  - will encourage PO intake  - Will restart insulin glargine  - I L LR bolus

## 2024-11-27 NOTE — PROGRESS NOTE ADULT - ASSESSMENT
75 y/o M with PMH  IBD, on methotrexate, remicade (q 6 weeks), HTN, HLD, T2DM, mild cognitive impairment (AOx2 baseline), hx of prior CVA, presenting with poor PO intake, fatigue, 2 unwitnessed falls, slurred speech since 11/21, and nonbloody diarrhea for the past 5 days.

## 2024-11-27 NOTE — PROGRESS NOTE ADULT - ASSESSMENT
Mr Darren Best is a 73 y/o male with a PMHx of IBD (most likely Crohn's), HTN, HLD, T2DM, and cognitive impairment who presents with poor PO intake, fatigue, increased urination, and loose nonbloody, mucous stools for the past 5 days with 2 falls yesterday prompting visit to the ED.    Patient follows with Dr. Oro.  Was diagnosed with Crohn's Disease~2011 and treated with 6-mercaptopurine.  Failed therapy and trialled Entyvio for several years with poor response, and transitioned to infliximab therapy in 8/2021.  Developed anti-infliximab antibodies so began methotrexate with subsequent improvement in antibody load (negative antibodies in 2/2024 with infliximab level of 18).  Currently on mesalamine 1600mg BID, methotrexate 10mg on Mondays with folic acid, prednisone 10mg BID, and infliximab every 4 weeks (last infusion 11/13/2024).  No history of colonic surgeries, fistulas, or strictures.  Last seen by Dr Oro on February 29, 2024.  At baseline has 2-3 bowel movements per day without blood.     Last admitted for IBD flare with hematochezia in 9/2022-10/2022.  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  Treated with solumedrol, infliximab, and methotrexate and transitioned to prednisone taper.  No colonoscopies since.  No EGDs in chart.    On admission patient afebrile but tachycardic.  Presents with bandemia without leukocytosis, HAGMA, lactic acidosis, CRISTINA, hyponatremia, and elevated creatine kinase.  CT abdomen pelvis without evidence of abdominal pathology.  Baseline hemoglobin appears to be around 10.  Improving with IV fluids.  Stool frequency improving but still with significant mucus.  C diff negative, GI PCR negative, hepatitis negative.    Recommendations:  - Can resume home mesalamine  - Can resume home prednisone  - Hold home methotrexate  - DVT PPx as IBD patients are high risk for thrombosis.   - F/u fecal calprotectin  - Obtain CRP and ESR daily.  - F/u Quantiferon TB  - F/u CMV PCR  - Will consider inpatient colonoscopy once enterocolitis improves Mr Darren Best is a 75 y/o male with a PMHx of IBD (most likely Crohn's), HTN, HLD, T2DM, and cognitive impairment who presents with poor PO intake, fatigue, increased urination, and loose nonbloody, mucous stools for the past 5 days with 2 falls yesterday prompting visit to the ED.    Patient follows with Dr. Oro.  Was diagnosed with Crohn's Disease~2011 and treated with 6-mercaptopurine.  Failed therapy and trialled Entyvio for several years with poor response, and transitioned to infliximab therapy in 8/2021.  Developed anti-infliximab antibodies so began methotrexate with subsequent improvement in antibody load (negative antibodies in 2/2024 with infliximab level of 18).  Currently on mesalamine 1600mg BID, methotrexate 10mg on Mondays with folic acid, prednisone 10mg BID, and infliximab every 4 weeks (last infusion 11/13/2024).  No history of colonic surgeries, fistulas, or strictures.  Last seen by Dr Oro on February 29, 2024.  At baseline has 2-3 bowel movements per day without blood.     Last admitted for IBD flare with hematochezia in 9/2022-10/2022.  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  Treated with solumedrol, infliximab, and methotrexate and transitioned to prednisone taper.  No colonoscopies since.  No EGDs in chart.    On admission patient afebrile but tachycardic.  Presents with bandemia without leukocytosis, HAGMA, lactic acidosis, CRISTINA, hyponatremia, and elevated creatine kinase.  CT abdomen pelvis without evidence of abdominal pathology.  Baseline hemoglobin appears to be around 10.  Improving with IV fluids.  Stool frequency improving but still with significant mucus.  C diff negative, GI PCR negative, hepatitis negative.    Recommendations:  - Can resume home mesalamine  - Can resume home prednisone  - Can resume home methotrexate when scheduled  - DVT PPx as IBD patients are high risk for thrombosis.   - F/u fecal calprotectin  - Obtain CRP and ESR daily.  - F/u Quantiferon TB  - F/u CMV PCR  - Colitis appears to be improving.  Patient follows closely with Dr Oro.  We feel that from a gastroenterological perspective, the patient can be discharged with close outpatient follow up with Dr Gatito Oro with consideration of endoscopy.    GI to sign off.  Please do not hesitate to reach out with any other questions.

## 2024-11-27 NOTE — DISCHARGE NOTE PROVIDER - NSFOLLOWUPCLINICS_GEN_ALL_ED_FT
Cardiology at [facility]  Cardiology  [address] [place] [id number]  Phone: [phone]  Fax:   Follow Up Time: 1 week

## 2024-11-27 NOTE — PROGRESS NOTE ADULT - SUBJECTIVE AND OBJECTIVE BOX
Interval Events:   Reporting 2BM per day.  Per nursing, stool is loose with significant amountf mucus.    Hospital Medications:  aspirin enteric coated 81 milliGRAM(s) Oral daily  atorvastatin 80 milliGRAM(s) Oral at bedtime  chlorhexidine 2% Cloths 1 Application(s) Topical daily  cholecalciferol 400 Unit(s) Oral daily  dextrose 5%. 1000 milliLiter(s) (100 mL/Hr) IV Continuous <Continuous>  dextrose 5%. 1000 milliLiter(s) (50 mL/Hr) IV Continuous <Continuous>  dextrose 50% Injectable 25 Gram(s) IV Push once  dextrose 50% Injectable 12.5 Gram(s) IV Push once  dextrose 50% Injectable 25 Gram(s) IV Push once  dextrose Oral Gel 15 Gram(s) Oral once  finasteride 5 milliGRAM(s) Oral daily  folic acid 1 milliGRAM(s) Oral daily  glucagon  Injectable 1 milliGRAM(s) IntraMuscular once  heparin   Injectable 5000 Unit(s) SubCutaneous every 12 hours  insulin glargine Injectable (LANTUS) 20 Unit(s) SubCutaneous at bedtime  insulin lispro (ADMELOG) corrective regimen sliding scale   SubCutaneous at bedtime  insulin lispro (ADMELOG) corrective regimen sliding scale   SubCutaneous three times a day before meals  insulin lispro (ADMELOG) corrective regimen sliding scale   SubCutaneous at bedtime  LORazepam   Injectable 0.5 milliGRAM(s) IV Push once  memantine 10 milliGRAM(s) Oral two times a day  mesalamine DR Capsule 1600 milliGRAM(s) Oral every 12 hours  predniSONE   Tablet 10 milliGRAM(s) Oral two times a day  QUEtiapine 50 milliGRAM(s) Oral at bedtime  sertraline 100 milliGRAM(s) Oral daily  sodium bicarbonate  Infusion 0.173 mEq/kG/Hr (100 mL/Hr) IV Continuous <Continuous>  sodium phosphate 30 milliMole(s)/500 mL IVPB 30 milliMole(s) IV Intermittent once  tamsulosin 0.4 milliGRAM(s) Oral at bedtime        potassium phosphate IVPB: 83.33 mL/Hr IV Intermittent (24 @ 11:36)  sertraline: 100 milliGRAM(s) Oral (24 @ 11:36)  aspirin enteric coated: 81 milliGRAM(s) Oral (24 @ 11:38)  finasteride: 5 milliGRAM(s) Oral (24 @ 11:38)  folic acid: 1 milliGRAM(s) Oral (24 @ 11:38)  cholecalciferol: 400 Unit(s) Oral (24 @ 11:39)  predniSONE   Tablet: 10 milliGRAM(s) Oral (24 @ 12:29)  cefepime   IVPB: 100 mL/Hr IV Intermittent (24 @ 13:18)  insulin lispro Injectable (ADMELOG): 10 Unit(s) SubCutaneous (24 @ 13:19)  chlorhexidine 2% Cloths: 1 Application(s) Topical (24 @ 13:22)  lactated ringers Bolus: 1000 mL/Hr IV Bolus (24 @ 13:23)  heparin   Injectable: 5000 Unit(s) SubCutaneous (24 @ 17:59)  memantine: 10 milliGRAM(s) Oral (24 @ 17:59)  predniSONE   Tablet: 10 milliGRAM(s) Oral (24 @ 18:01)  mesalamine DR Capsule: 1600 milliGRAM(s) Oral (24 @ 21:43)  QUEtiapine: 50 milliGRAM(s) Oral (24 @ 21:46)  atorvastatin: 80 milliGRAM(s) Oral (24 @ 21:46)  tamsulosin: 0.4 milliGRAM(s) Oral (24 @ 21:46)  insulin glargine Injectable (LANTUS): 20 Unit(s) SubCutaneous (24 @ 00:18)  heparin   Injectable: 5000 Unit(s) SubCutaneous (24 @ 05:23)  memantine: 10 milliGRAM(s) Oral (24 @ 05:23)  mesalamine DR Capsule: 1600 milliGRAM(s) Oral (24 @ 05:23)  predniSONE   Tablet: 10 milliGRAM(s) Oral (24 @ 05:23)  insulin lispro (ADMELOG) corrective regimen sliding scale: 1 Unit(s) SubCutaneous (24 @ 08:40)  insulin lispro Injectable (ADMELOG): 10 Unit(s) SubCutaneous (24 @ 08:41)  calcium gluconate IVPB: 100 mL/Hr IV Intermittent (24 @ 10:12)  magnesium sulfate  IVPB: 100 mL/Hr IV Intermittent (24 @ 10:12)              PHYSICAL EXAM:   Vital Signs:  Vital Signs Last 24 Hrs  T(C): 36.6 (2024 09:35), Max: 36.7 (2024 22:34)  T(F): 97.9 (2024 09:35), Max: 98.1 (2024 22:34)  HR: 104 (2024 09:35) (100 - 108)  BP: 134/52 (2024 09:35) (119/53 - 134/52)  BP(mean): --  RR: 18 (2024 09:35) (17 - 18)  SpO2: 97% (2024 09:35) (97% - 100%)    Parameters below as of 2024 09:35  Patient On (Oxygen Delivery Method): room air      Daily       GENERAL:  NAD,  HEENT:  NC/AT,  conjunctivae clear and pink, sclera -anicteric  CHEST:  CTAB, no m/r/g  HEART:  RRR, no m/r/g  ABDOMEN:  Soft, non-distended, TTP in left upper quadrant  EXTREMITIES:  no cyanosis, clubbing, or edema  SKIN:  Warm & Dry. No rash or erythema  NEURO:  Alert, no focal deficits    LABS:                        7.5    6.45  )-----------( 206      ( 2024 06:20 )             23.8     Last Hb:Hemoglobin: 7.5 g/dL (24 @ 06:20)  Hemoglobin: 8.7 g/dL (24 @ 06:25)  Hemoglobin: 9.0 g/dL (24 @ 07:20)               135   |  107   |  18                 Ca: 7.2    BMP:   ----------------------------< 202    M.20  (24 @ 09:09)             4.2    |  20    | 0.78               Ph: 2.3      LFT:     TPro: 4.9 / Alb: 2.2 / TBili: 0.6 / DBili: x / AST: 63 / ALT: 25 / AlkPhos: 53   (24 @ 07:20)    Creatinine: 0.78 mg/dL  Creatinine: 0.87 mg/dL  Creatinine: 0.94 mg/dL          Urinalysis Basic - ( 2024 09:09 )    Color: x / Appearance: x / SG: x / pH: x  Gluc: 202 mg/dL / Ketone: x  / Bili: x / Urobili: x   Blood: x / Protein: x / Nitrite: x   Leuk Esterase: x / RBC: x / WBC x   Sq Epi: x / Non Sq Epi: x / Bacteria: x        Culture - Urine (collected 24 @ 20:07)  Source: Clean Catch Clean Catch (Midstream)  Final Report (24 @ 01:21):    No growth    Culture - Stool (collected 24 @ 19:49)  Source: .Stool  Final Report (24 @ 17:01):    No enteric pathogens isolated.    (Stool culture examined for Salmonella,    Shigella, Campylobacter, Aeromonas, Plesiomonas,    Vibrio, E.coli O157 and Yersinia)    Culture - Blood (collected 24 @ 08:31)  Source: .Blood BLOOD  Preliminary Report (24 @ 13:01):    No growth at 48 Hours    Culture - Blood (collected 24 @ 08:15)  Source: .Blood BLOOD  Preliminary Report (24 @ 13:01):    No growth at 48 Hours

## 2024-11-27 NOTE — PROGRESS NOTE ADULT - PROBLEM SELECTOR PLAN 10
- baseline AOx2 (did not remember date), CTH w/ no acute changes   - c/w home meds: memantine 10mg, Sertraline 100mg QD, Quetiapine 50mg QHS  - frequent reorientation and monitor sxns  -CT head for further eval of mental status changes

## 2024-11-27 NOTE — DISCHARGE NOTE PROVIDER - NSDCCPTREATMENT_GEN_ALL_CORE_FT
PRINCIPAL PROCEDURE  Procedure: CT abdomen pelvis  Findings and Treatment: LOWER CHEST: Coronary and aortic valve calcifications.  LIVER: Within normal limits.  BILE DUCTS: Normal caliber.  GALLBLADDER: Within normal limits.  SPLEEN: Within normal limits.  PANCREAS: Within normal limits.  ADRENALS: Within normal limits.  KIDNEYS/URETERS: 6 9 nonobstructive calculus lower pole right kidney.   Cortical scarring in the upper pole of the right kidney. No   hydronephrosis. Subcentimeter hypodense lesion upper pole right kidney,   likely a cyst. 2.6 cm cyst left kidney.  BLADDER: Within normal limits.  REPRODUCTIVE ORGANS: No prostatic enlargement.  BOWEL: Retention of ingested multiple pills, layering within the stomach.   No bowel obstruction. Patulous appendix without evidence of acute   appendicitis. Colonic diverticulosis. No evidence of acute diverticulitis   or active colitis.  PERITONEUM/RETROPERITONEUM: Within normal limits.  VESSELS: Atherosclerotic changes. No abdominal aortic aneurysm.  LYMPH NODES: No lymphadenopathy.  ABDOMINAL WALL: Within normal limits.  BONES: Degenerative changes. No acute fracture.  IMPRESSION:  No acute fracture or evidence of traumatic visceral injury in the abdomen   and pelvis.  No evidence of appendicitis. No bowel obstruction or evidence of acute   inflammatory bowel disease.        SECONDARY PROCEDURE  Procedure: CT head  Findings and Treatment:   CT HEAD:  No acute intracranial hemorrhage, mass effect, or midline shift.  Left temporal parietal lobe cystic encephalomalacia/gliosis.  CT CERVICAL SPINE:  No acute fracture or traumatic subluxation.  Multi-level degenerative changes.      Procedure: US kidney and bladder  Findings and Treatment: FINDINGS:  Right kidney: 10.5 cm. No renal mass, hydronephrosis or calculi.   Prominent column of Sunday in the upper pole corresponding with findings   present on prior CT scans. Small lesionin the medial upper pole of the   right kidney not seen on this sonographic exam.  Left kidney: 10.1 cm. No renal mass, hydronephrosis or calculi. 2.0 x 2.0   x 1.8 cm midpole cyst  Urinary bladder: Mildly trabeculated bladder wall.  IMPRESSION:  *  Left renal cyst measuring 2.0 cm.  *  Subcentimeter lesion in the right kidney seen on prior CT scan is not   identified on the sonographic exam.

## 2024-11-27 NOTE — DISCHARGE NOTE PROVIDER - NSDCFUADDAPPT_GEN_ALL_CORE_FT
APPTS ARE READY TO BE MADE: [ X] YES    Best Family or Patient Contact (if needed):    Additional Information about above appointments (if needed):    1: Gastroenterology for UC  2: PCP for general health maintenance  3:     Other comments or requests:    APPTS ARE READY TO BE MADE: [ X] YES    Best Family or Patient Contact (if needed):    Additional Information about above appointments (if needed):    1: Gastroenterology for UC  2: PCP for general health maintenance  3: Cardiology for Aortic stenosis    Other comments or requests:    APPTS ARE READY TO BE MADE: [ X] YES    Best Family or Patient Contact (if needed):    Additional Information about above appointments (if needed):    1: Gastroenterology for UC  2: PCP for general health maintenance  3: Cardiology for Aortic stenosis    Other comments or requests:     Met with patient face to face and provided the patient with provider referral information, however patient prefers to schedule the appointments on their own.    APPTS ARE READY TO BE MADE: [X] YES    Best Family or Patient Contact (if needed):    Additional Information about above appointments (if needed):    1: Gastroenterology for UC  2: PCP for general health maintenance and diabetes  3: Cardiology for Aortic stenosis  4. Urology prior to starting bactrim for prophylaxis if needed    Other comments or requests:     Met with patient face to face and provided the patient with provider referral information, however patient prefers to schedule the appointments on their own.

## 2024-11-27 NOTE — PROGRESS NOTE ADULT - PROBLEM SELECTOR PLAN 8
- on home Januvia 50mg QD, Tresiba 30 U at bedtime, and 28-30 U premeals TID  - on gabapentin for neuropathy   PLAN:  - 20U Lantus  - 10 U Admelog TID  - KELLI  - Consistent carb diet, low fiber , full liquid. Adjust insulin regimen as appropriate. - on home Januvia 50mg QD, Tresiba 30 U at bedtime, and 28-30 U premeals TID  - on gabapentin for neuropathy   PLAN:  - 20U Lantus  - MISS  - Consistent carb diet, low fiber , full liquid. Adjust insulin regimen as appropriate.

## 2024-11-27 NOTE — DISCHARGE NOTE PROVIDER - NSDCFUSCHEDAPPT_GEN_ALL_CORE_FT
[facility] Physician [place] [id number] [facility]  Scheduled Appointment: 12/11/2024    Osei Mendoza  [facility]  [address]  Scheduled Appointment: 01/29/2025

## 2024-11-27 NOTE — PROGRESS NOTE ADULT - PROBLEM SELECTOR PLAN 2
- Pt reports watery diarrhea (>5 times per day) iso of immunosuppression may be 2/2 to C diff infection vs IBD flare  - s/p 2L NS bolus in ED, START NS 80 cc/hr x 18 hours  - CRP elevated  - stool culture negative  - c. diff negative    PLAN:  - Stool O & P   - GI consulted, likely iso colitis. will trend ESR/CRP daily, f/u CMV PCR, quant, hepatitis panel   - GI will consider inpatient colonoscopy once enterocolitis improves  - fecal calprotectin - Pt reports watery diarrhea (>5 times per day) iso of immunosuppression may be 2/2 to C diff infection vs IBD flare  - s/p 2L NS bolus in ED, START NS 80 cc/hr x 18 hours  - CRP elevated  - stool culture negative  - c. diff negative    PLAN:  - GI consulted, likely iso colitis. will trend ESR/CRP daily, f/u CMV PCR, quant, hepatitis panel   - GI will consider inpatient colonoscopy once enterocolitis improves  - fecal calprotectin

## 2024-11-27 NOTE — DISCHARGE NOTE PROVIDER - NSDCMRMEDTOKEN_GEN_ALL_CORE_FT
aspirin 81 mg oral delayed release tablet: 1 tab(s) orally once a day  atorvastatin 40 mg oral tablet: 1 tab(s) orally once a day (at bedtime)  Centrum Silver oral tablet: 1 tab(s) orally once a day  finasteride 5 mg oral tablet: 1 tab(s) orally once a day  folic acid 1 mg oral tablet: 1 tab(s) orally once a day  gabapentin 100 mg oral capsule: 1 tab(s) orally 2 times a day  insulin lispro 100 units/mL injectable solution: 28 unit(s) injectable 3 times a day (before meals)  Januvia 50 mg oral tablet: 1 tab(s) orally once a day  memantine 10 mg oral tablet: 1 tab(s) orally 2 times a day  mesalamine 800 mg oral delayed release tablet: 2 tab(s) orally 2 times a day  methotrexate 2.5 mg oral tablet: 4 tab(s) orally every 7 days on Mondays, next dose is 11/25/24  predniSONE 10 mg oral tablet: 1 tab(s) orally every 12 hours  QUEtiapine 25 mg oral tablet: 2 tab(s) orally once a day (at bedtime)  Remicade 100 mg intravenous injection: intravenous every 6 weeks  sertraline 100 mg oral tablet: 1 tab(s) orally once a day  tamsulosin 0.4 mg oral capsule: 1 cap(s) orally 2 times a day  Tresiba 100 units/mL subcutaneous solution: 30 unit(s) subcutaneous once a day (at bedtime)  trimethoprim 100 mg oral tablet: 1 tab(s) orally once a day (at bedtime)  Vitamin D3 400 intl units oral tablet: 1 tab(s) orally once a day   aspirin 81 mg oral delayed release tablet: 1 tab(s) orally once a day  atorvastatin 40 mg oral tablet: 1 tab(s) orally once a day (at bedtime)  Centrum Silver oral tablet: 1 tab(s) orally once a day  finasteride 5 mg oral tablet: 1 tab(s) orally once a day  folic acid 1 mg oral tablet: 1 tab(s) orally once a day  gabapentin 100 mg oral capsule: 1 tab(s) orally 2 times a day  Januvia 50 mg oral tablet: 1 tab(s) orally once a day  memantine 10 mg oral tablet: 1 tab(s) orally 2 times a day  mesalamine 800 mg oral delayed release tablet: 2 tab(s) orally 2 times a day  methotrexate 2.5 mg oral tablet: 4 tab(s) orally every 7 days on Mondays, next dose is 11/25/24  predniSONE 10 mg oral tablet: 1 tab(s) orally every 12 hours  QUEtiapine 25 mg oral tablet: 2 tab(s) orally once a day (at bedtime)  Remicade 100 mg intravenous injection: intravenous every 6 weeks  sertraline 100 mg oral tablet: 1 tab(s) orally once a day  tamsulosin 0.4 mg oral capsule: 1 cap(s) orally 2 times a day  Tresiba 100 units/mL subcutaneous solution: 20 unit(s) subcutaneous once a day (at bedtime)  Vitamin D3 400 intl units oral tablet: 1 tab(s) orally once a day

## 2024-11-27 NOTE — PROGRESS NOTE ADULT - PROBLEM SELECTOR PLAN 1
- Patient meets SIRS criteria given tachycardia and elevated WBC (33% bandemia)  -  Labs significant for bandemia of 33%, lactate 2.4, and anion gap metabolic acidosis likely iso of IBD flare vs infectious gastroenteritis vs prednisone use  - WBC not elevated likely iso immunosuppression medications   - Source possibly urine, follow urine culture (pt on Trimethoprim QD for ppx, currently held)  - s/p 1g Vanc and 3.375g Zosyn, and 2L 0.9% NaCl  - blood culture NGTD @ 24 hours  PLAN:  - d/c cefepime + vanc (Start 11/24-11/26)  - UA + reflex culture  - f/u urine culture  - management of diarrhea as below - Patient meets SIRS criteria given tachycardia and elevated WBC (33% bandemia)  -  Labs significant for bandemia of 33%, lactate 2.4, and anion gap metabolic acidosis likely iso of IBD flare vs infectious gastroenteritis vs prednisone use  - WBC not elevated likely iso immunosuppression medications   - Source possibly urine, follow urine culture (pt on Trimethoprim QD for ppx, currently held)  - s/p 1g Vanc and 3.375g Zosyn, and 2L 0.9% NaCl  - blood culture NGTD @ 24 hours  -  s/p cefepime + vanc (Start 11/24-11/26)  PLAN:  - management of diarrhea as below

## 2024-11-28 LAB
ALBUMIN SERPL ELPH-MCNC: 2.2 G/DL — LOW (ref 3.3–5)
ALP SERPL-CCNC: 75 U/L — SIGNIFICANT CHANGE UP (ref 40–120)
ALT FLD-CCNC: 73 U/L — HIGH (ref 4–41)
ANION GAP SERPL CALC-SCNC: 10 MMOL/L — SIGNIFICANT CHANGE UP (ref 7–14)
AST SERPL-CCNC: 99 U/L — HIGH (ref 4–40)
BILIRUB SERPL-MCNC: 0.4 MG/DL — SIGNIFICANT CHANGE UP (ref 0.2–1.2)
BUN SERPL-MCNC: 15 MG/DL — SIGNIFICANT CHANGE UP (ref 7–23)
CALCIUM SERPL-MCNC: 7.2 MG/DL — LOW (ref 8.4–10.5)
CHLORIDE SERPL-SCNC: 106 MMOL/L — SIGNIFICANT CHANGE UP (ref 98–107)
CO2 SERPL-SCNC: 20 MMOL/L — LOW (ref 22–31)
CREAT SERPL-MCNC: 0.71 MG/DL — SIGNIFICANT CHANGE UP (ref 0.5–1.3)
EGFR: 95 ML/MIN/1.73M2 — SIGNIFICANT CHANGE UP
GLUCOSE SERPL-MCNC: 113 MG/DL — HIGH (ref 70–99)
HCT VFR BLD CALC: 26.3 % — LOW (ref 39–50)
HGB BLD-MCNC: 8 G/DL — LOW (ref 13–17)
MAGNESIUM SERPL-MCNC: 2.1 MG/DL — SIGNIFICANT CHANGE UP (ref 1.6–2.6)
MCHC RBC-ENTMCNC: 23.9 PG — LOW (ref 27–34)
MCHC RBC-ENTMCNC: 30.4 G/DL — LOW (ref 32–36)
MCV RBC AUTO: 78.5 FL — LOW (ref 80–100)
NRBC # BLD: 0 /100 WBCS — SIGNIFICANT CHANGE UP (ref 0–0)
NRBC # FLD: 0 K/UL — SIGNIFICANT CHANGE UP (ref 0–0)
PHOSPHATE SERPL-MCNC: 3.3 MG/DL — SIGNIFICANT CHANGE UP (ref 2.5–4.5)
PLATELET # BLD AUTO: 247 K/UL — SIGNIFICANT CHANGE UP (ref 150–400)
POTASSIUM SERPL-MCNC: 3.8 MMOL/L — SIGNIFICANT CHANGE UP (ref 3.5–5.3)
POTASSIUM SERPL-SCNC: 3.8 MMOL/L — SIGNIFICANT CHANGE UP (ref 3.5–5.3)
PROT SERPL-MCNC: 4.6 G/DL — LOW (ref 6–8.3)
RBC # BLD: 3.35 M/UL — LOW (ref 4.2–5.8)
RBC # FLD: 18.5 % — HIGH (ref 10.3–14.5)
SODIUM SERPL-SCNC: 136 MMOL/L — SIGNIFICANT CHANGE UP (ref 135–145)
WBC # BLD: 9.57 K/UL — SIGNIFICANT CHANGE UP (ref 3.8–10.5)
WBC # FLD AUTO: 9.57 K/UL — SIGNIFICANT CHANGE UP (ref 3.8–10.5)

## 2024-11-28 PROCEDURE — 99232 SBSQ HOSP IP/OBS MODERATE 35: CPT | Mod: GC

## 2024-11-28 RX ADMIN — Medication 400 UNIT(S): at 11:09

## 2024-11-28 RX ADMIN — MESALAMINE 1600 MILLIGRAM(S): 375 CAPSULE, EXTENDED RELEASE ORAL at 05:58

## 2024-11-28 RX ADMIN — TAMSULOSIN HYDROCHLORIDE 0.4 MILLIGRAM(S): 0.4 CAPSULE ORAL at 22:25

## 2024-11-28 RX ADMIN — Medication 5000 UNIT(S): at 05:58

## 2024-11-28 RX ADMIN — MEMANTINE HYDROCHLORIDE 10 MILLIGRAM(S): 14 CAPSULE, EXTENDED RELEASE ORAL at 05:58

## 2024-11-28 RX ADMIN — Medication 81 MILLIGRAM(S): at 11:09

## 2024-11-28 RX ADMIN — Medication 5 MILLIGRAM(S): at 11:09

## 2024-11-28 RX ADMIN — Medication 80 MILLIGRAM(S): at 22:25

## 2024-11-28 RX ADMIN — MESALAMINE 1600 MILLIGRAM(S): 375 CAPSULE, EXTENDED RELEASE ORAL at 17:28

## 2024-11-28 RX ADMIN — Medication 2: at 12:26

## 2024-11-28 RX ADMIN — Medication 5000 UNIT(S): at 17:29

## 2024-11-28 RX ADMIN — SERTRALINE HYDROCHLORIDE 100 MILLIGRAM(S): 100 TABLET, FILM COATED ORAL at 11:09

## 2024-11-28 RX ADMIN — Medication 50 MILLIGRAM(S): at 22:25

## 2024-11-28 RX ADMIN — INSULIN GLARGINE 20 UNIT(S): 100 INJECTION, SOLUTION SUBCUTANEOUS at 22:25

## 2024-11-28 RX ADMIN — CHLORHEXIDINE GLUCONATE 1 APPLICATION(S): 1.2 RINSE ORAL at 11:09

## 2024-11-28 RX ADMIN — PREDNISONE 10 MILLIGRAM(S): 20 TABLET ORAL at 05:57

## 2024-11-28 RX ADMIN — Medication 1 MILLIGRAM(S): at 11:09

## 2024-11-28 RX ADMIN — PREDNISONE 10 MILLIGRAM(S): 20 TABLET ORAL at 17:28

## 2024-11-28 RX ADMIN — MEMANTINE HYDROCHLORIDE 10 MILLIGRAM(S): 14 CAPSULE, EXTENDED RELEASE ORAL at 17:29

## 2024-11-28 NOTE — PROGRESS NOTE ADULT - PROBLEM SELECTOR PLAN 1
- Pt reports watery diarrhea (>5 times per day) iso of immunosuppression may be 2/2 to C diff infection vs IBD flare  - s/p 2L NS bolus in ED, START NS 80 cc/hr x 18 hours  - CRP elevated  - stool culture negative  - c. diff negative    PLAN:  - GI consulted, likely iso colitis. will trend ESR/CRP daily, f/u CMV PCR, quant, hepatitis panel   - GI will consider inpatient colonoscopy once enterocolitis improves  - fecal calprotectin - Pt reports watery diarrhea (>5 times per day) iso of immunosuppression may be 2/2 to C diff infection vs IBD flare  - s/p 2L NS bolus in ED, START NS 80 cc/hr x 18 hours  - CRP elevated  - stool culture negative  - c. diff negative    PLAN:  - GI consulted, likely iso colitis. will trend ESR/CRP daily, f/u CMV PCR, quant, hepatitis panel   - fecal calprotectin

## 2024-11-28 NOTE — PROGRESS NOTE ADULT - PROBLEM SELECTOR PLAN 8
- on home Januvia 50mg QD, Tresiba 30 U at bedtime, and 28-30 U premeals TID  - on gabapentin for neuropathy   PLAN:  - 20U Lantus  - MISS  - Consistent carb diet, low fiber , full liquid. Adjust insulin regimen as appropriate.

## 2024-11-28 NOTE — PROGRESS NOTE ADULT - PROBLEM SELECTOR PLAN 4
- Patient meets SIRS criteria given tachycardia and elevated WBC (33% bandemia)  -  Labs significant for bandemia of 33%, lactate 2.4, and anion gap metabolic acidosis likely iso of IBD flare vs infectious gastroenteritis vs prednisone use  - WBC not elevated likely iso immunosuppression medications   - Source possibly urine, follow urine culture (pt on Trimethoprim QD for ppx, currently held)  - s/p 1g Vanc and 3.375g Zosyn, and 2L 0.9% NaCl  - blood culture NGTD @ 24 hours  -  s/p cefepime + vanc (Start 11/24-11/26)  PLAN:  - management of diarrhea as below

## 2024-11-28 NOTE — PROGRESS NOTE ADULT - PROBLEM SELECTOR PLAN 2
# Hx of last CVA  # New CVA?   - Pt with 2 unwitnessed falls likely iso of dehydration and poor PO intake. Pt recalls falling, reports feeling weak and that his legs gave out. He denies LOC, blurry vision or dizziness.   - Wife reports onset of slurred speech since Thursday 11/21, patient out of the window for tPA   - CT head negative for ischemia, hemorrhage, or midline shift  - ddx orthostatic vs neurogenic vs cardiac syncope  - TTE remarkable for moderate aortic stenosis  - likely iso of orthostatic syncope  PLAN:     - Orthostatics # Hx of last CVA  # New CVA?   - Pt with 2 unwitnessed falls likely iso of dehydration and poor PO intake. Pt recalls falling, reports feeling weak and that his legs gave out. He denies LOC, blurry vision or dizziness.   - Wife reports onset of slurred speech since Thursday 11/21, patient out of the window for tPA   - CT head negative for ischemia, hemorrhage, or midline shift  - ddx orthostatic vs neurogenic vs cardiac syncope  - TTE remarkable for moderate aortic stenosis  - likely iso of orthostatic syncope    PLAN:   - Orthostatics

## 2024-11-28 NOTE — PROGRESS NOTE ADULT - SUBJECTIVE AND OBJECTIVE BOX
PROGRESS NOTE:     Patient is a 74y old  Male who presents with a chief complaint of     SUBJECTIVE / OVERNIGHT EVENTS:    OVERNIGHT: No acute overnight events.      Patient was examined at bedside and feels well this morning. Denies fever, chills, chest pain, SOB, nausea, vomiting. ROS otherwise negative and pt is amenable to current treatment plan.      REVIEW OF SYSTEMS:    CONSTITUTIONAL:  No weakness, fevers, or chills  EYES/ENT:  No visual changes, vertigo, or throat pain   NECK:  No pain or stiffness  RESPIRATORY:  No SOB, cough, wheezing, or hemoptysis  CARDIOVASCULAR:  No chest pain or palpitations  GASTROINTESTINAL:  No abdominal pain, nausea, vomiting, or hematemesis; No diarrhea or constipation; No melena or hematochezia.  GENITOURINARY:  No dysuria, change in frequency, or hematuria  NEUROLOGICAL:  No numbness or weakness  SKIN:  No itching or rashes      MEDICATIONS  (STANDING):  aspirin enteric coated 81 milliGRAM(s) Oral daily  atorvastatin 80 milliGRAM(s) Oral at bedtime  chlorhexidine 2% Cloths 1 Application(s) Topical daily  cholecalciferol 400 Unit(s) Oral daily  dextrose 5%. 1000 milliLiter(s) (100 mL/Hr) IV Continuous <Continuous>  dextrose 5%. 1000 milliLiter(s) (50 mL/Hr) IV Continuous <Continuous>  dextrose 50% Injectable 25 Gram(s) IV Push once  dextrose 50% Injectable 12.5 Gram(s) IV Push once  dextrose 50% Injectable 25 Gram(s) IV Push once  dextrose Oral Gel 15 Gram(s) Oral once  finasteride 5 milliGRAM(s) Oral daily  folic acid 1 milliGRAM(s) Oral daily  glucagon  Injectable 1 milliGRAM(s) IntraMuscular once  heparin   Injectable 5000 Unit(s) SubCutaneous every 12 hours  insulin glargine Injectable (LANTUS) 20 Unit(s) SubCutaneous at bedtime  insulin lispro (ADMELOG) corrective regimen sliding scale   SubCutaneous three times a day before meals  insulin lispro (ADMELOG) corrective regimen sliding scale   SubCutaneous at bedtime  memantine 10 milliGRAM(s) Oral two times a day  mesalamine DR Capsule 1600 milliGRAM(s) Oral every 12 hours  predniSONE   Tablet 10 milliGRAM(s) Oral two times a day  QUEtiapine 50 milliGRAM(s) Oral at bedtime  sertraline 100 milliGRAM(s) Oral daily  sodium bicarbonate  Infusion 0.173 mEq/kG/Hr (100 mL/Hr) IV Continuous <Continuous>  tamsulosin 0.4 milliGRAM(s) Oral at bedtime    MEDICATIONS  (PRN):      CAPILLARY BLOOD GLUCOSE      POCT Blood Glucose.: 144 mg/dL (27 Nov 2024 21:10)  POCT Blood Glucose.: 163 mg/dL (27 Nov 2024 17:24)  POCT Blood Glucose.: 198 mg/dL (27 Nov 2024 12:22)  POCT Blood Glucose.: 200 mg/dL (27 Nov 2024 08:38)    I&O's Summary      PHYSICAL EXAM:  Vital Signs Last 24 Hrs  T(C): 36.6 (28 Nov 2024 05:08), Max: 36.8 (27 Nov 2024 21:23)  T(F): 97.9 (28 Nov 2024 05:08), Max: 98.2 (27 Nov 2024 21:23)  HR: 92 (28 Nov 2024 05:08) (88 - 104)  BP: 124/52 (28 Nov 2024 05:08) (119/49 - 134/52)  BP(mean): --  RR: 17 (28 Nov 2024 05:08) (16 - 18)  SpO2: 100% (28 Nov 2024 05:08) (97% - 100%)    Parameters below as of 28 Nov 2024 05:08  Patient On (Oxygen Delivery Method): room air        CONSTITUTIONAL: NAD; well-developed  HEENT: PERRL, clear conjunctiva  RESPIRATORY: Normal respiratory effort; lungs are clear to auscultation bilaterally; No crackles/rhonchi/wheezing  CARDIOVASCULAR: Regular rate and rhythm, normal S1 and S2, no murmur/rub/gallop; No lower extremity edema; Peripheral pulses are 2+ bilaterally  ABDOMEN: Nontender to palpation, normoactive bowel sounds, no rebound/guarding; No hepatosplenomegaly  MUSCULOSKELETAL: No clubbing or cyanosis of digits; no joint swelling or tenderness to palpation  EXTREMITY: Lower extremities non-tender to palpation; non-erythematous B/L  NEURO: A&Ox3; no focal deficits   PSYCH: Normal mood; affect appropriate    LABS:                        8.0    8.36  )-----------( 203      ( 27 Nov 2024 16:00 )             26.2     11-27    135  |  107  |  18  ----------------------------<  202[H]  4.2   |  20[L]  |  0.78    Ca    7.2[L]      27 Nov 2024 09:09  Phos  2.3     11-27  Mg     2.20     11-27            Urinalysis Basic - ( 27 Nov 2024 09:09 )    Color: x / Appearance: x / SG: x / pH: x  Gluc: 202 mg/dL / Ketone: x  / Bili: x / Urobili: x   Blood: x / Protein: x / Nitrite: x   Leuk Esterase: x / RBC: x / WBC x   Sq Epi: x / Non Sq Epi: x / Bacteria: x          RADIOLOGY & ADDITIONAL TESTS:    N/A PROGRESS NOTE:     Patient is a 74y old  Male who presents with a chief complaint of colitis    SUBJECTIVE / OVERNIGHT EVENTS:    OVERNIGHT: No acute overnight events.      Patient was examined at bedside and feels well this morning. He is Aox2. Denies fever, chills, chest pain, SOB, nausea, vomiting, diarrhea. ROS otherwise negative and pt is amenable to current treatment plan.      REVIEW OF SYSTEMS:    CONSTITUTIONAL:  No weakness, fevers, or chills  EYES/ENT:  No visual changes, vertigo, or throat pain   NECK:  No pain or stiffness  RESPIRATORY:  No SOB, cough, wheezing, or hemoptysis  CARDIOVASCULAR:  No chest pain or palpitations  GASTROINTESTINAL:  No abdominal pain, nausea, vomiting, or hematemesis; No diarrhea or constipation; No melena or hematochezia.  GENITOURINARY:  No dysuria, change in frequency, or hematuria  NEUROLOGICAL:  No numbness or weakness  SKIN:  No itching or rashes      MEDICATIONS  (STANDING):  aspirin enteric coated 81 milliGRAM(s) Oral daily  atorvastatin 80 milliGRAM(s) Oral at bedtime  chlorhexidine 2% Cloths 1 Application(s) Topical daily  cholecalciferol 400 Unit(s) Oral daily  dextrose 5%. 1000 milliLiter(s) (100 mL/Hr) IV Continuous <Continuous>  dextrose 5%. 1000 milliLiter(s) (50 mL/Hr) IV Continuous <Continuous>  dextrose 50% Injectable 25 Gram(s) IV Push once  dextrose 50% Injectable 12.5 Gram(s) IV Push once  dextrose 50% Injectable 25 Gram(s) IV Push once  dextrose Oral Gel 15 Gram(s) Oral once  finasteride 5 milliGRAM(s) Oral daily  folic acid 1 milliGRAM(s) Oral daily  glucagon  Injectable 1 milliGRAM(s) IntraMuscular once  heparin   Injectable 5000 Unit(s) SubCutaneous every 12 hours  insulin glargine Injectable (LANTUS) 20 Unit(s) SubCutaneous at bedtime  insulin lispro (ADMELOG) corrective regimen sliding scale   SubCutaneous three times a day before meals  insulin lispro (ADMELOG) corrective regimen sliding scale   SubCutaneous at bedtime  memantine 10 milliGRAM(s) Oral two times a day  mesalamine DR Capsule 1600 milliGRAM(s) Oral every 12 hours  predniSONE   Tablet 10 milliGRAM(s) Oral two times a day  QUEtiapine 50 milliGRAM(s) Oral at bedtime  sertraline 100 milliGRAM(s) Oral daily  sodium bicarbonate  Infusion 0.173 mEq/kG/Hr (100 mL/Hr) IV Continuous <Continuous>  tamsulosin 0.4 milliGRAM(s) Oral at bedtime    MEDICATIONS  (PRN):      CAPILLARY BLOOD GLUCOSE      POCT Blood Glucose.: 144 mg/dL (27 Nov 2024 21:10)  POCT Blood Glucose.: 163 mg/dL (27 Nov 2024 17:24)  POCT Blood Glucose.: 198 mg/dL (27 Nov 2024 12:22)  POCT Blood Glucose.: 200 mg/dL (27 Nov 2024 08:38)    I&O's Summary      PHYSICAL EXAM:  Vital Signs Last 24 Hrs  T(C): 36.6 (28 Nov 2024 05:08), Max: 36.8 (27 Nov 2024 21:23)  T(F): 97.9 (28 Nov 2024 05:08), Max: 98.2 (27 Nov 2024 21:23)  HR: 92 (28 Nov 2024 05:08) (88 - 104)  BP: 124/52 (28 Nov 2024 05:08) (119/49 - 134/52)  BP(mean): --  RR: 17 (28 Nov 2024 05:08) (16 - 18)  SpO2: 100% (28 Nov 2024 05:08) (97% - 100%)    Parameters below as of 28 Nov 2024 05:08  Patient On (Oxygen Delivery Method): room air        CONSTITUTIONAL: NAD; well-developed  HEENT: PERRL, clear conjunctiva  RESPIRATORY: Normal respiratory effort; lungs are clear to auscultation bilaterally; No crackles/rhonchi/wheezing  CARDIOVASCULAR: Regular rate and rhythm, normal S1 and S2, no murmur/rub/gallop; No lower extremity edema; Peripheral pulses are 2+ bilaterally  ABDOMEN: Nontender to palpation, normoactive bowel sounds, no rebound/guarding; No hepatosplenomegaly  MUSCULOSKELETAL: No clubbing or cyanosis of digits; no joint swelling or tenderness to palpation  EXTREMITY: Lower extremities non-tender to palpation; non-erythematous B/L  NEURO: A&Ox3; no focal deficits   PSYCH: Normal mood; affect appropriate    LABS:                        8.0    8.36  )-----------( 203      ( 27 Nov 2024 16:00 )             26.2     11-27    135  |  107  |  18  ----------------------------<  202[H]  4.2   |  20[L]  |  0.78    Ca    7.2[L]      27 Nov 2024 09:09  Phos  2.3     11-27  Mg     2.20     11-27            Urinalysis Basic - ( 27 Nov 2024 09:09 )    Color: x / Appearance: x / SG: x / pH: x  Gluc: 202 mg/dL / Ketone: x  / Bili: x / Urobili: x   Blood: x / Protein: x / Nitrite: x   Leuk Esterase: x / RBC: x / WBC x   Sq Epi: x / Non Sq Epi: x / Bacteria: x          RADIOLOGY & ADDITIONAL TESTS:    N/A

## 2024-11-29 ENCOUNTER — TRANSCRIPTION ENCOUNTER (OUTPATIENT)
Age: 74
End: 2024-11-29

## 2024-11-29 LAB
ANION GAP SERPL CALC-SCNC: 11 MMOL/L — SIGNIFICANT CHANGE UP (ref 7–14)
APPEARANCE UR: CLEAR — SIGNIFICANT CHANGE UP
BILIRUB UR-MCNC: NEGATIVE — SIGNIFICANT CHANGE UP
BUN SERPL-MCNC: 14 MG/DL — SIGNIFICANT CHANGE UP (ref 7–23)
CALCIUM SERPL-MCNC: 7.4 MG/DL — LOW (ref 8.4–10.5)
CHLORIDE SERPL-SCNC: 104 MMOL/L — SIGNIFICANT CHANGE UP (ref 98–107)
CO2 SERPL-SCNC: 20 MMOL/L — LOW (ref 22–31)
COLOR SPEC: YELLOW — SIGNIFICANT CHANGE UP
CREAT SERPL-MCNC: 0.68 MG/DL — SIGNIFICANT CHANGE UP (ref 0.5–1.3)
CULTURE RESULTS: SIGNIFICANT CHANGE UP
CULTURE RESULTS: SIGNIFICANT CHANGE UP
DIFF PNL FLD: NEGATIVE — SIGNIFICANT CHANGE UP
EGFR: 96 ML/MIN/1.73M2 — SIGNIFICANT CHANGE UP
GLUCOSE SERPL-MCNC: 95 MG/DL — SIGNIFICANT CHANGE UP (ref 70–99)
GLUCOSE UR QL: NEGATIVE MG/DL — SIGNIFICANT CHANGE UP
HCT VFR BLD CALC: 28.1 % — LOW (ref 39–50)
HGB BLD-MCNC: 8.6 G/DL — LOW (ref 13–17)
KETONES UR-MCNC: NEGATIVE MG/DL — SIGNIFICANT CHANGE UP
LEUKOCYTE ESTERASE UR-ACNC: NEGATIVE — SIGNIFICANT CHANGE UP
MAGNESIUM SERPL-MCNC: 2 MG/DL — SIGNIFICANT CHANGE UP (ref 1.6–2.6)
MCHC RBC-ENTMCNC: 24 PG — LOW (ref 27–34)
MCHC RBC-ENTMCNC: 30.6 G/DL — LOW (ref 32–36)
MCV RBC AUTO: 78.5 FL — LOW (ref 80–100)
NITRITE UR-MCNC: NEGATIVE — SIGNIFICANT CHANGE UP
NRBC # BLD: 0 /100 WBCS — SIGNIFICANT CHANGE UP (ref 0–0)
NRBC # FLD: 0.02 K/UL — HIGH (ref 0–0)
PH UR: 6 — SIGNIFICANT CHANGE UP (ref 5–8)
PHOSPHATE SERPL-MCNC: 3.3 MG/DL — SIGNIFICANT CHANGE UP (ref 2.5–4.5)
PLATELET # BLD AUTO: 260 K/UL — SIGNIFICANT CHANGE UP (ref 150–400)
POTASSIUM SERPL-MCNC: 4 MMOL/L — SIGNIFICANT CHANGE UP (ref 3.5–5.3)
POTASSIUM SERPL-SCNC: 4 MMOL/L — SIGNIFICANT CHANGE UP (ref 3.5–5.3)
PROT UR-MCNC: NEGATIVE MG/DL — SIGNIFICANT CHANGE UP
RBC # BLD: 3.58 M/UL — LOW (ref 4.2–5.8)
RBC # FLD: 18.6 % — HIGH (ref 10.3–14.5)
SODIUM SERPL-SCNC: 135 MMOL/L — SIGNIFICANT CHANGE UP (ref 135–145)
SP GR SPEC: 1.01 — SIGNIFICANT CHANGE UP (ref 1–1.03)
SPECIMEN SOURCE: SIGNIFICANT CHANGE UP
SPECIMEN SOURCE: SIGNIFICANT CHANGE UP
UROBILINOGEN FLD QL: 0.2 MG/DL — SIGNIFICANT CHANGE UP (ref 0.2–1)
WBC # BLD: 12.84 K/UL — HIGH (ref 3.8–10.5)
WBC # FLD AUTO: 12.84 K/UL — HIGH (ref 3.8–10.5)

## 2024-11-29 PROCEDURE — 99232 SBSQ HOSP IP/OBS MODERATE 35: CPT

## 2024-11-29 RX ADMIN — CHLORHEXIDINE GLUCONATE 1 APPLICATION(S): 1.2 RINSE ORAL at 12:32

## 2024-11-29 RX ADMIN — INSULIN GLARGINE 20 UNIT(S): 100 INJECTION, SOLUTION SUBCUTANEOUS at 21:59

## 2024-11-29 RX ADMIN — MEMANTINE HYDROCHLORIDE 10 MILLIGRAM(S): 14 CAPSULE, EXTENDED RELEASE ORAL at 18:12

## 2024-11-29 RX ADMIN — Medication 5000 UNIT(S): at 18:13

## 2024-11-29 RX ADMIN — Medication 50 MILLIGRAM(S): at 21:13

## 2024-11-29 RX ADMIN — Medication 1 MILLIGRAM(S): at 12:31

## 2024-11-29 RX ADMIN — Medication 2: at 12:32

## 2024-11-29 RX ADMIN — SERTRALINE HYDROCHLORIDE 100 MILLIGRAM(S): 100 TABLET, FILM COATED ORAL at 12:31

## 2024-11-29 RX ADMIN — Medication 5000 UNIT(S): at 05:12

## 2024-11-29 RX ADMIN — MESALAMINE 1600 MILLIGRAM(S): 375 CAPSULE, EXTENDED RELEASE ORAL at 05:13

## 2024-11-29 RX ADMIN — Medication 81 MILLIGRAM(S): at 12:31

## 2024-11-29 RX ADMIN — MEMANTINE HYDROCHLORIDE 10 MILLIGRAM(S): 14 CAPSULE, EXTENDED RELEASE ORAL at 05:12

## 2024-11-29 RX ADMIN — MESALAMINE 1600 MILLIGRAM(S): 375 CAPSULE, EXTENDED RELEASE ORAL at 18:13

## 2024-11-29 RX ADMIN — Medication 400 UNIT(S): at 12:32

## 2024-11-29 RX ADMIN — PREDNISONE 10 MILLIGRAM(S): 20 TABLET ORAL at 05:12

## 2024-11-29 RX ADMIN — Medication 80 MILLIGRAM(S): at 21:14

## 2024-11-29 RX ADMIN — TAMSULOSIN HYDROCHLORIDE 0.4 MILLIGRAM(S): 0.4 CAPSULE ORAL at 21:13

## 2024-11-29 RX ADMIN — Medication 5 MILLIGRAM(S): at 12:32

## 2024-11-29 RX ADMIN — PREDNISONE 10 MILLIGRAM(S): 20 TABLET ORAL at 18:12

## 2024-11-29 NOTE — PROGRESS NOTE ADULT - PROBLEM SELECTOR PLAN 2
- Pt with 2 unwitnessed falls likely iso of dehydration and poor PO intake. Pt recalls falling, reports feeling weak and that his legs gave out. He denies LOC, blurry vision or dizziness.   - Wife reports onset of slurred speech since Thursday 11/21, patient out of the window for tPA   - CT head negative for ischemia, hemorrhage, or midline shift  - ddx orthostatic vs neurogenic vs cardiac syncope  - TTE remarkable for moderate aortic stenosis  - likely iso of orthostatic syncope    PLAN:   - Orthostatics

## 2024-11-29 NOTE — PROGRESS NOTE ADULT - PROBLEM SELECTOR PLAN 1
- Pt reports watery diarrhea (>5 times per day) iso of immunosuppression may be 2/2 to C diff infection vs IBD flare  - s/p 2L NS bolus in ED, START NS 80 cc/hr x 18 hours  - CRP elevated  - stool culture negative  - c. diff negative  - CMV PCR indicative low grade viremia, given clinical improvement no need to treat,  PLAN:  - GI consulted, likely iso colitis. will trend ESR/CRP daily, quant, hepatitis panel   - fecal calprotectin

## 2024-11-29 NOTE — PROGRESS NOTE ADULT - SUBJECTIVE AND OBJECTIVE BOX
PROGRESS NOTE:     Patient is a 74y old  Male who presents with a chief complaint of     SUBJECTIVE / OVERNIGHT EVENTS:    OVERNIGHT: No acute overnight events.      Patient was examined at bedside and feels well this morning. Denies fever, chills, chest pain, SOB, nausea, vomiting. ROS otherwise negative and pt is amenable to current treatment plan.      REVIEW OF SYSTEMS:    CONSTITUTIONAL:  No weakness, fevers, or chills  EYES/ENT:  No visual changes, vertigo, or throat pain   NECK:  No pain or stiffness  RESPIRATORY:  No SOB, cough, wheezing, or hemoptysis  CARDIOVASCULAR:  No chest pain or palpitations  GASTROINTESTINAL:  No abdominal pain, nausea, vomiting, or hematemesis; No diarrhea or constipation; No melena or hematochezia.  GENITOURINARY:  No dysuria, change in frequency, or hematuria  NEUROLOGICAL:  No numbness or weakness  SKIN:  No itching or rashes      MEDICATIONS  (STANDING):  aspirin enteric coated 81 milliGRAM(s) Oral daily  atorvastatin 80 milliGRAM(s) Oral at bedtime  chlorhexidine 2% Cloths 1 Application(s) Topical daily  cholecalciferol 400 Unit(s) Oral daily  dextrose 5%. 1000 milliLiter(s) (100 mL/Hr) IV Continuous <Continuous>  dextrose 5%. 1000 milliLiter(s) (50 mL/Hr) IV Continuous <Continuous>  dextrose 50% Injectable 25 Gram(s) IV Push once  dextrose 50% Injectable 12.5 Gram(s) IV Push once  dextrose 50% Injectable 25 Gram(s) IV Push once  dextrose Oral Gel 15 Gram(s) Oral once  finasteride 5 milliGRAM(s) Oral daily  folic acid 1 milliGRAM(s) Oral daily  glucagon  Injectable 1 milliGRAM(s) IntraMuscular once  heparin   Injectable 5000 Unit(s) SubCutaneous every 12 hours  insulin glargine Injectable (LANTUS) 20 Unit(s) SubCutaneous at bedtime  insulin lispro (ADMELOG) corrective regimen sliding scale   SubCutaneous at bedtime  insulin lispro (ADMELOG) corrective regimen sliding scale   SubCutaneous three times a day before meals  memantine 10 milliGRAM(s) Oral two times a day  mesalamine DR Capsule 1600 milliGRAM(s) Oral every 12 hours  predniSONE   Tablet 10 milliGRAM(s) Oral two times a day  QUEtiapine 50 milliGRAM(s) Oral at bedtime  sertraline 100 milliGRAM(s) Oral daily  sodium bicarbonate  Infusion 0.173 mEq/kG/Hr (100 mL/Hr) IV Continuous <Continuous>  tamsulosin 0.4 milliGRAM(s) Oral at bedtime    MEDICATIONS  (PRN):      CAPILLARY BLOOD GLUCOSE      POCT Blood Glucose.: 167 mg/dL (28 Nov 2024 22:16)  POCT Blood Glucose.: 113 mg/dL (28 Nov 2024 17:24)  POCT Blood Glucose.: 173 mg/dL (28 Nov 2024 12:20)  POCT Blood Glucose.: 138 mg/dL (28 Nov 2024 08:45)    I&O's Summary      PHYSICAL EXAM:  Vital Signs Last 24 Hrs  T(C): 36.7 (29 Nov 2024 05:14), Max: 36.9 (28 Nov 2024 09:00)  T(F): 98 (29 Nov 2024 05:14), Max: 98.4 (28 Nov 2024 09:00)  HR: 98 (29 Nov 2024 05:14) (92 - 102)  BP: 144/61 (29 Nov 2024 05:14) (124/54 - 161/75)  BP(mean): --  RR: 18 (29 Nov 2024 05:14) (17 - 18)  SpO2: 99% (29 Nov 2024 05:14) (98% - 100%)    Parameters below as of 29 Nov 2024 05:14  Patient On (Oxygen Delivery Method): room air        CONSTITUTIONAL: NAD; well-developed  HEENT: PERRL, clear conjunctiva  RESPIRATORY: Normal respiratory effort; lungs are clear to auscultation bilaterally; No crackles/rhonchi/wheezing  CARDIOVASCULAR: Regular rate and rhythm, normal S1 and S2, no murmur/rub/gallop; No lower extremity edema; Peripheral pulses are 2+ bilaterally  ABDOMEN: Nontender to palpation, normoactive bowel sounds, no rebound/guarding; No hepatosplenomegaly  MUSCULOSKELETAL: No clubbing or cyanosis of digits; no joint swelling or tenderness to palpation  EXTREMITY: Lower extremities non-tender to palpation; non-erythematous B/L  NEURO: A&Ox3; no focal deficits   PSYCH: Normal mood; affect appropriate    LABS:                        8.6    12.84 )-----------( 260      ( 29 Nov 2024 05:33 )             28.1     11-29    135  |  104  |  14  ----------------------------<  95  4.0   |  20[L]  |  0.68    Ca    7.4[L]      29 Nov 2024 05:33  Phos  3.3     11-29  Mg     2.00     11-29    TPro  4.6[L]  /  Alb  2.2[L]  /  TBili  0.4  /  DBili  x   /  AST  99[H]  /  ALT  73[H]  /  AlkPhos  75  11-28          Urinalysis Basic - ( 29 Nov 2024 05:33 )    Color: x / Appearance: x / SG: x / pH: x  Gluc: 95 mg/dL / Ketone: x  / Bili: x / Urobili: x   Blood: x / Protein: x / Nitrite: x   Leuk Esterase: x / RBC: x / WBC x   Sq Epi: x / Non Sq Epi: x / Bacteria: x          RADIOLOGY & ADDITIONAL TESTS:    N/A PROGRESS NOTE:     Patient is a 74y old  Male who presents with a chief complaint of fall and diarrhea    SUBJECTIVE / OVERNIGHT EVENTS:    OVERNIGHT: No acute overnight events.      Patient was examined at bedside and feels well this morning. Reports increased urinary frequency overnight. Denies fever, chills, chest pain, SOB, nausea, vomiting. ROS otherwise negative and pt is amenable to current treatment plan.      REVIEW OF SYSTEMS:    CONSTITUTIONAL:  No weakness, fevers, or chills  EYES/ENT:  No visual changes, vertigo, or throat pain   NECK:  No pain or stiffness  RESPIRATORY:  No SOB, cough, wheezing, or hemoptysis  CARDIOVASCULAR:  No chest pain or palpitations  GASTROINTESTINAL:  No abdominal pain, nausea, vomiting, or hematemesis; No diarrhea or constipation; No melena or hematochezia.  GENITOURINARY:  +dysuria,+ change in frequency, or hematuria  NEUROLOGICAL:  No numbness or weakness  SKIN:  No itching or rashes      MEDICATIONS  (STANDING):  aspirin enteric coated 81 milliGRAM(s) Oral daily  atorvastatin 80 milliGRAM(s) Oral at bedtime  chlorhexidine 2% Cloths 1 Application(s) Topical daily  cholecalciferol 400 Unit(s) Oral daily  dextrose 5%. 1000 milliLiter(s) (100 mL/Hr) IV Continuous <Continuous>  dextrose 5%. 1000 milliLiter(s) (50 mL/Hr) IV Continuous <Continuous>  dextrose 50% Injectable 25 Gram(s) IV Push once  dextrose 50% Injectable 12.5 Gram(s) IV Push once  dextrose 50% Injectable 25 Gram(s) IV Push once  dextrose Oral Gel 15 Gram(s) Oral once  finasteride 5 milliGRAM(s) Oral daily  folic acid 1 milliGRAM(s) Oral daily  glucagon  Injectable 1 milliGRAM(s) IntraMuscular once  heparin   Injectable 5000 Unit(s) SubCutaneous every 12 hours  insulin glargine Injectable (LANTUS) 20 Unit(s) SubCutaneous at bedtime  insulin lispro (ADMELOG) corrective regimen sliding scale   SubCutaneous at bedtime  insulin lispro (ADMELOG) corrective regimen sliding scale   SubCutaneous three times a day before meals  memantine 10 milliGRAM(s) Oral two times a day  mesalamine DR Capsule 1600 milliGRAM(s) Oral every 12 hours  predniSONE   Tablet 10 milliGRAM(s) Oral two times a day  QUEtiapine 50 milliGRAM(s) Oral at bedtime  sertraline 100 milliGRAM(s) Oral daily  sodium bicarbonate  Infusion 0.173 mEq/kG/Hr (100 mL/Hr) IV Continuous <Continuous>  tamsulosin 0.4 milliGRAM(s) Oral at bedtime    MEDICATIONS  (PRN):      CAPILLARY BLOOD GLUCOSE      POCT Blood Glucose.: 167 mg/dL (28 Nov 2024 22:16)  POCT Blood Glucose.: 113 mg/dL (28 Nov 2024 17:24)  POCT Blood Glucose.: 173 mg/dL (28 Nov 2024 12:20)  POCT Blood Glucose.: 138 mg/dL (28 Nov 2024 08:45)    I&O's Summary      PHYSICAL EXAM:  Vital Signs Last 24 Hrs  T(C): 36.7 (29 Nov 2024 05:14), Max: 36.9 (28 Nov 2024 09:00)  T(F): 98 (29 Nov 2024 05:14), Max: 98.4 (28 Nov 2024 09:00)  HR: 98 (29 Nov 2024 05:14) (92 - 102)  BP: 144/61 (29 Nov 2024 05:14) (124/54 - 161/75)  BP(mean): --  RR: 18 (29 Nov 2024 05:14) (17 - 18)  SpO2: 99% (29 Nov 2024 05:14) (98% - 100%)    Parameters below as of 29 Nov 2024 05:14  Patient On (Oxygen Delivery Method): room air        CONSTITUTIONAL: NAD; well-developed  HEENT: PERRL, clear conjunctiva  RESPIRATORY: Normal respiratory effort; lungs are clear to auscultation bilaterally; No crackles/rhonchi/wheezing  CARDIOVASCULAR: Regular rate and rhythm, normal S1 and S2, no murmur/rub/gallop; No lower extremity edema; Peripheral pulses are 2+ bilaterally  ABDOMEN: Nontender to palpation, normoactive bowel sounds, no rebound/guarding; No hepatosplenomegaly  MUSCULOSKELETAL: No clubbing or cyanosis of digits; no joint swelling or tenderness to palpation  EXTREMITY: Lower extremities non-tender to palpation; non-erythematous B/L  NEURO: A&Ox2; no focal deficits   PSYCH: Normal mood; affect appropriate    LABS:                        8.6    12.84 )-----------( 260      ( 29 Nov 2024 05:33 )             28.1     11-29    135  |  104  |  14  ----------------------------<  95  4.0   |  20[L]  |  0.68    Ca    7.4[L]      29 Nov 2024 05:33  Phos  3.3     11-29  Mg     2.00     11-29    TPro  4.6[L]  /  Alb  2.2[L]  /  TBili  0.4  /  DBili  x   /  AST  99[H]  /  ALT  73[H]  /  AlkPhos  75  11-28          Urinalysis Basic - ( 29 Nov 2024 05:33 )    Color: x / Appearance: x / SG: x / pH: x  Gluc: 95 mg/dL / Ketone: x  / Bili: x / Urobili: x   Blood: x / Protein: x / Nitrite: x   Leuk Esterase: x / RBC: x / WBC x   Sq Epi: x / Non Sq Epi: x / Bacteria: x          RADIOLOGY & ADDITIONAL TESTS:    N/A

## 2024-11-30 LAB
ANION GAP SERPL CALC-SCNC: 10 MMOL/L — SIGNIFICANT CHANGE UP (ref 7–14)
BUN SERPL-MCNC: 12 MG/DL — SIGNIFICANT CHANGE UP (ref 7–23)
CALCIUM SERPL-MCNC: 7.5 MG/DL — LOW (ref 8.4–10.5)
CHLORIDE SERPL-SCNC: 101 MMOL/L — SIGNIFICANT CHANGE UP (ref 98–107)
CO2 SERPL-SCNC: 23 MMOL/L — SIGNIFICANT CHANGE UP (ref 22–31)
CREAT SERPL-MCNC: 0.69 MG/DL — SIGNIFICANT CHANGE UP (ref 0.5–1.3)
EGFR: 96 ML/MIN/1.73M2 — SIGNIFICANT CHANGE UP
GAMMA INTERFERON BACKGROUND BLD IA-ACNC: 0.03 IU/ML — SIGNIFICANT CHANGE UP
GLUCOSE SERPL-MCNC: 105 MG/DL — HIGH (ref 70–99)
HCT VFR BLD CALC: 28.4 % — LOW (ref 39–50)
HGB BLD-MCNC: 8.4 G/DL — LOW (ref 13–17)
M TB IFN-G BLD-IMP: NEGATIVE — SIGNIFICANT CHANGE UP
M TB IFN-G CD4+ BCKGRND COR BLD-ACNC: 0 IU/ML — SIGNIFICANT CHANGE UP
M TB IFN-G CD4+CD8+ BCKGRND COR BLD-ACNC: 0.01 IU/ML — SIGNIFICANT CHANGE UP
MAGNESIUM SERPL-MCNC: 1.9 MG/DL — SIGNIFICANT CHANGE UP (ref 1.6–2.6)
MCHC RBC-ENTMCNC: 23.7 PG — LOW (ref 27–34)
MCHC RBC-ENTMCNC: 29.6 G/DL — LOW (ref 32–36)
MCV RBC AUTO: 80.2 FL — SIGNIFICANT CHANGE UP (ref 80–100)
NRBC # BLD: 0 /100 WBCS — SIGNIFICANT CHANGE UP (ref 0–0)
NRBC # FLD: 0 K/UL — SIGNIFICANT CHANGE UP (ref 0–0)
PHOSPHATE SERPL-MCNC: 3.8 MG/DL — SIGNIFICANT CHANGE UP (ref 2.5–4.5)
PLATELET # BLD AUTO: 256 K/UL — SIGNIFICANT CHANGE UP (ref 150–400)
POTASSIUM SERPL-MCNC: 3.9 MMOL/L — SIGNIFICANT CHANGE UP (ref 3.5–5.3)
POTASSIUM SERPL-SCNC: 3.9 MMOL/L — SIGNIFICANT CHANGE UP (ref 3.5–5.3)
QUANT TB PLUS MITOGEN MINUS NIL: 3.71 IU/ML — SIGNIFICANT CHANGE UP
RBC # BLD: 3.54 M/UL — LOW (ref 4.2–5.8)
RBC # FLD: 18.9 % — HIGH (ref 10.3–14.5)
SODIUM SERPL-SCNC: 134 MMOL/L — LOW (ref 135–145)
WBC # BLD: 10.92 K/UL — HIGH (ref 3.8–10.5)
WBC # FLD AUTO: 10.92 K/UL — HIGH (ref 3.8–10.5)

## 2024-11-30 PROCEDURE — 99232 SBSQ HOSP IP/OBS MODERATE 35: CPT | Mod: GC

## 2024-11-30 RX ADMIN — Medication 5000 UNIT(S): at 17:49

## 2024-11-30 RX ADMIN — SERTRALINE HYDROCHLORIDE 100 MILLIGRAM(S): 100 TABLET, FILM COATED ORAL at 12:57

## 2024-11-30 RX ADMIN — MESALAMINE 1600 MILLIGRAM(S): 375 CAPSULE, EXTENDED RELEASE ORAL at 21:13

## 2024-11-30 RX ADMIN — Medication 5 MILLIGRAM(S): at 12:58

## 2024-11-30 RX ADMIN — Medication 400 UNIT(S): at 12:58

## 2024-11-30 RX ADMIN — TAMSULOSIN HYDROCHLORIDE 0.4 MILLIGRAM(S): 0.4 CAPSULE ORAL at 21:13

## 2024-11-30 RX ADMIN — Medication 50 MILLIGRAM(S): at 21:13

## 2024-11-30 RX ADMIN — CHLORHEXIDINE GLUCONATE 1 APPLICATION(S): 1.2 RINSE ORAL at 13:00

## 2024-11-30 RX ADMIN — PREDNISONE 10 MILLIGRAM(S): 20 TABLET ORAL at 05:24

## 2024-11-30 RX ADMIN — INSULIN GLARGINE 20 UNIT(S): 100 INJECTION, SOLUTION SUBCUTANEOUS at 22:20

## 2024-11-30 RX ADMIN — MESALAMINE 1600 MILLIGRAM(S): 375 CAPSULE, EXTENDED RELEASE ORAL at 10:16

## 2024-11-30 RX ADMIN — MEMANTINE HYDROCHLORIDE 10 MILLIGRAM(S): 14 CAPSULE, EXTENDED RELEASE ORAL at 05:24

## 2024-11-30 RX ADMIN — Medication 5000 UNIT(S): at 05:25

## 2024-11-30 RX ADMIN — MEMANTINE HYDROCHLORIDE 10 MILLIGRAM(S): 14 CAPSULE, EXTENDED RELEASE ORAL at 17:48

## 2024-11-30 RX ADMIN — PREDNISONE 10 MILLIGRAM(S): 20 TABLET ORAL at 17:48

## 2024-11-30 RX ADMIN — Medication 80 MILLIGRAM(S): at 21:15

## 2024-11-30 RX ADMIN — Medication 81 MILLIGRAM(S): at 12:57

## 2024-11-30 RX ADMIN — Medication 1 MILLIGRAM(S): at 12:57

## 2024-11-30 NOTE — PROGRESS NOTE ADULT - SUBJECTIVE AND OBJECTIVE BOX
PROGRESS NOTE:     Patient is a 74y old  Male who presents with a chief complaint of     SUBJECTIVE / OVERNIGHT EVENTS:    OVERNIGHT: No acute overnight events.      Patient was examined at bedside and feels well this morning. Denies fever, chills, chest pain, SOB, nausea, vomiting. ROS otherwise negative and pt is amenable to current treatment plan.      REVIEW OF SYSTEMS:    CONSTITUTIONAL:  No weakness, fevers, or chills  EYES/ENT:  No visual changes, vertigo, or throat pain   NECK:  No pain or stiffness  RESPIRATORY:  No SOB, cough, wheezing, or hemoptysis  CARDIOVASCULAR:  No chest pain or palpitations  GASTROINTESTINAL:  No abdominal pain, nausea, vomiting, or hematemesis; No diarrhea or constipation; No melena or hematochezia.  GENITOURINARY:  No dysuria, change in frequency, or hematuria  NEUROLOGICAL:  No numbness or weakness  SKIN:  No itching or rashes      MEDICATIONS  (STANDING):  aspirin enteric coated 81 milliGRAM(s) Oral daily  atorvastatin 80 milliGRAM(s) Oral at bedtime  chlorhexidine 2% Cloths 1 Application(s) Topical daily  cholecalciferol 400 Unit(s) Oral daily  dextrose 5%. 1000 milliLiter(s) (50 mL/Hr) IV Continuous <Continuous>  dextrose 5%. 1000 milliLiter(s) (100 mL/Hr) IV Continuous <Continuous>  dextrose 50% Injectable 25 Gram(s) IV Push once  dextrose 50% Injectable 12.5 Gram(s) IV Push once  dextrose 50% Injectable 25 Gram(s) IV Push once  dextrose Oral Gel 15 Gram(s) Oral once  finasteride 5 milliGRAM(s) Oral daily  folic acid 1 milliGRAM(s) Oral daily  glucagon  Injectable 1 milliGRAM(s) IntraMuscular once  heparin   Injectable 5000 Unit(s) SubCutaneous every 12 hours  insulin glargine Injectable (LANTUS) 20 Unit(s) SubCutaneous at bedtime  insulin lispro (ADMELOG) corrective regimen sliding scale   SubCutaneous three times a day before meals  insulin lispro (ADMELOG) corrective regimen sliding scale   SubCutaneous at bedtime  memantine 10 milliGRAM(s) Oral two times a day  mesalamine DR Capsule 1600 milliGRAM(s) Oral every 12 hours  predniSONE   Tablet 10 milliGRAM(s) Oral two times a day  QUEtiapine 50 milliGRAM(s) Oral at bedtime  sertraline 100 milliGRAM(s) Oral daily  sodium bicarbonate  Infusion 0.173 mEq/kG/Hr (100 mL/Hr) IV Continuous <Continuous>  tamsulosin 0.4 milliGRAM(s) Oral at bedtime    MEDICATIONS  (PRN):      CAPILLARY BLOOD GLUCOSE      POCT Blood Glucose.: 151 mg/dL (29 Nov 2024 21:42)  POCT Blood Glucose.: 102 mg/dL (29 Nov 2024 18:08)  POCT Blood Glucose.: 151 mg/dL (29 Nov 2024 12:21)  POCT Blood Glucose.: 107 mg/dL (29 Nov 2024 08:51)    I&O's Summary      PHYSICAL EXAM:  Vital Signs Last 24 Hrs  T(C): 36.6 (30 Nov 2024 05:02), Max: 36.9 (29 Nov 2024 14:50)  T(F): 97.8 (30 Nov 2024 05:02), Max: 98.5 (29 Nov 2024 21:50)  HR: 88 (30 Nov 2024 05:02) (82 - 94)  BP: 145/66 (30 Nov 2024 05:02) (119/52 - 145/66)  BP(mean): --  RR: 17 (30 Nov 2024 05:02) (16 - 18)  SpO2: 100% (30 Nov 2024 05:02) (98% - 100%)    Parameters below as of 30 Nov 2024 05:02  Patient On (Oxygen Delivery Method): room air        CONSTITUTIONAL: NAD; well-developed  HEENT: PERRL, clear conjunctiva  RESPIRATORY: Normal respiratory effort; lungs are clear to auscultation bilaterally; No crackles/rhonchi/wheezing  CARDIOVASCULAR: Regular rate and rhythm, normal S1 and S2, no murmur/rub/gallop; No lower extremity edema; Peripheral pulses are 2+ bilaterally  ABDOMEN: Nontender to palpation, normoactive bowel sounds, no rebound/guarding; No hepatosplenomegaly  MUSCULOSKELETAL: No clubbing or cyanosis of digits; no joint swelling or tenderness to palpation  EXTREMITY: Lower extremities non-tender to palpation; non-erythematous B/L  NEURO: A&Ox2; no focal deficits   PSYCH: Normal mood; affect appropriate    LABS:                        8.4    10.92 )-----------( 256      ( 30 Nov 2024 06:30 )             28.4     11-30    134[L]  |  101  |  12  ----------------------------<  105[H]  3.9   |  23  |  0.69    Ca    7.5[L]      30 Nov 2024 06:30  Phos  3.8     11-30  Mg     1.90     11-30            Urinalysis Basic - ( 30 Nov 2024 06:30 )    Color: x / Appearance: x / SG: x / pH: x  Gluc: 105 mg/dL / Ketone: x  / Bili: x / Urobili: x   Blood: x / Protein: x / Nitrite: x   Leuk Esterase: x / RBC: x / WBC x   Sq Epi: x / Non Sq Epi: x / Bacteria: x        Urinalysis with Rflx Culture (collected 29 Nov 2024 11:02)        RADIOLOGY & ADDITIONAL TESTS:    N/A

## 2024-11-30 NOTE — PROGRESS NOTE ADULT - PROBLEM SELECTOR PROBLEM 5
[Dear  ___] : Dear  [unfilled], [Courtesy Letter:] : I had the pleasure of seeing your patient, [unfilled], in my office today. [Please see my note below.] : Please see my note below. [Consult Closing:] : Thank you very much for allowing me to participate in the care of this patient.  If you have any questions, please do not hesitate to contact me. [Sincerely,] : Sincerely, [[email or web address]] : Keegan Carlisle MD FACS  High anion gap metabolic acidosis

## 2024-12-01 LAB
ANION GAP SERPL CALC-SCNC: 15 MMOL/L — HIGH (ref 7–14)
BUN SERPL-MCNC: 10 MG/DL — SIGNIFICANT CHANGE UP (ref 7–23)
CALCIUM SERPL-MCNC: 7.8 MG/DL — LOW (ref 8.4–10.5)
CHLORIDE SERPL-SCNC: 102 MMOL/L — SIGNIFICANT CHANGE UP (ref 98–107)
CO2 SERPL-SCNC: 23 MMOL/L — SIGNIFICANT CHANGE UP (ref 22–31)
CREAT SERPL-MCNC: 0.74 MG/DL — SIGNIFICANT CHANGE UP (ref 0.5–1.3)
EGFR: 94 ML/MIN/1.73M2 — SIGNIFICANT CHANGE UP
GLUCOSE SERPL-MCNC: 72 MG/DL — SIGNIFICANT CHANGE UP (ref 70–99)
HCT VFR BLD CALC: 31.1 % — LOW (ref 39–50)
HGB BLD-MCNC: 9.4 G/DL — LOW (ref 13–17)
MAGNESIUM SERPL-MCNC: 2 MG/DL — SIGNIFICANT CHANGE UP (ref 1.6–2.6)
MCHC RBC-ENTMCNC: 24.1 PG — LOW (ref 27–34)
MCHC RBC-ENTMCNC: 30.2 G/DL — LOW (ref 32–36)
MCV RBC AUTO: 79.7 FL — LOW (ref 80–100)
NRBC # BLD: 0 /100 WBCS — SIGNIFICANT CHANGE UP (ref 0–0)
NRBC # FLD: 0 K/UL — SIGNIFICANT CHANGE UP (ref 0–0)
PHOSPHATE SERPL-MCNC: 4.4 MG/DL — SIGNIFICANT CHANGE UP (ref 2.5–4.5)
PLATELET # BLD AUTO: 246 K/UL — SIGNIFICANT CHANGE UP (ref 150–400)
POTASSIUM SERPL-MCNC: 4 MMOL/L — SIGNIFICANT CHANGE UP (ref 3.5–5.3)
POTASSIUM SERPL-SCNC: 4 MMOL/L — SIGNIFICANT CHANGE UP (ref 3.5–5.3)
RBC # BLD: 3.9 M/UL — LOW (ref 4.2–5.8)
RBC # FLD: 18.8 % — HIGH (ref 10.3–14.5)
SODIUM SERPL-SCNC: 140 MMOL/L — SIGNIFICANT CHANGE UP (ref 135–145)
WBC # BLD: 9.58 K/UL — SIGNIFICANT CHANGE UP (ref 3.8–10.5)
WBC # FLD AUTO: 9.58 K/UL — SIGNIFICANT CHANGE UP (ref 3.8–10.5)

## 2024-12-01 PROCEDURE — 99232 SBSQ HOSP IP/OBS MODERATE 35: CPT | Mod: GC

## 2024-12-01 RX ORDER — METHOTREXATE 2.5 MG/1
10 TABLET ORAL
Refills: 0 | Status: DISCONTINUED | OUTPATIENT
Start: 2024-12-01 | End: 2024-12-01

## 2024-12-01 RX ORDER — METHOTREXATE 2.5 MG/1
10 TABLET ORAL
Refills: 0 | Status: DISCONTINUED | OUTPATIENT
Start: 2024-12-02 | End: 2024-12-03

## 2024-12-01 RX ORDER — INSULIN GLARGINE 100 [IU]/ML
15 INJECTION, SOLUTION SUBCUTANEOUS AT BEDTIME
Refills: 0 | Status: DISCONTINUED | OUTPATIENT
Start: 2024-12-01 | End: 2024-12-03

## 2024-12-01 RX ADMIN — CHLORHEXIDINE GLUCONATE 1 APPLICATION(S): 1.2 RINSE ORAL at 12:50

## 2024-12-01 RX ADMIN — MEMANTINE HYDROCHLORIDE 10 MILLIGRAM(S): 14 CAPSULE, EXTENDED RELEASE ORAL at 18:12

## 2024-12-01 RX ADMIN — MESALAMINE 1600 MILLIGRAM(S): 375 CAPSULE, EXTENDED RELEASE ORAL at 11:00

## 2024-12-01 RX ADMIN — Medication 400 UNIT(S): at 11:00

## 2024-12-01 RX ADMIN — TAMSULOSIN HYDROCHLORIDE 0.4 MILLIGRAM(S): 0.4 CAPSULE ORAL at 21:55

## 2024-12-01 RX ADMIN — SERTRALINE HYDROCHLORIDE 100 MILLIGRAM(S): 100 TABLET, FILM COATED ORAL at 11:00

## 2024-12-01 RX ADMIN — INSULIN GLARGINE 15 UNIT(S): 100 INJECTION, SOLUTION SUBCUTANEOUS at 21:55

## 2024-12-01 RX ADMIN — PREDNISONE 10 MILLIGRAM(S): 20 TABLET ORAL at 05:19

## 2024-12-01 RX ADMIN — Medication 5000 UNIT(S): at 18:14

## 2024-12-01 RX ADMIN — PREDNISONE 10 MILLIGRAM(S): 20 TABLET ORAL at 18:12

## 2024-12-01 RX ADMIN — Medication 4: at 18:10

## 2024-12-01 RX ADMIN — Medication 81 MILLIGRAM(S): at 11:00

## 2024-12-01 RX ADMIN — Medication 1 MILLIGRAM(S): at 11:00

## 2024-12-01 RX ADMIN — MEMANTINE HYDROCHLORIDE 10 MILLIGRAM(S): 14 CAPSULE, EXTENDED RELEASE ORAL at 05:18

## 2024-12-01 RX ADMIN — Medication 5000 UNIT(S): at 05:18

## 2024-12-01 RX ADMIN — Medication 80 MILLIGRAM(S): at 21:55

## 2024-12-01 RX ADMIN — Medication 5 MILLIGRAM(S): at 11:00

## 2024-12-01 RX ADMIN — Medication 50 MILLIGRAM(S): at 21:55

## 2024-12-01 RX ADMIN — MESALAMINE 1600 MILLIGRAM(S): 375 CAPSULE, EXTENDED RELEASE ORAL at 21:55

## 2024-12-01 NOTE — DIETITIAN INITIAL EVALUATION ADULT - PROBLEM SELECTOR PLAN 6
- Pt reports watery diarrhea (>5 times per day) iso of immunosuppression may be 2/2 to C diff infection vs IBD flare    PLAN:  - C. diff PCR (pt immunosuppressed and on chronic trimethoprim for UTI ppx)  - Stool studies  - Stool O & P   - GI consult   - fecal calprotectin  - s/p 2L NS bolus in ED, START NS 80 cc/hr x 18 hours

## 2024-12-01 NOTE — PROGRESS NOTE ADULT - ASSESSMENT
75 y/o M with PMH  IBD, on methotrexate, remicade (q 6 weeks), HTN, HLD, T2DM, mild cognitive impairment (AOx2 baseline), hx of prior CVA, presenting with poor PO intake, fatigue, 2 unwitnessed falls, slurred speech since 11/21, and nonbloody diarrhea for the past 5 days. Admitted for IBD flare, C Diff and GI PCR Neg, GI recommended continuing all meds at same dose. CT head imaging negative for acute abnoramlity. A1c 5.1% on arrival so will DC with decreased insulin dose. Pending rehab.

## 2024-12-01 NOTE — PROGRESS NOTE ADULT - PROBLEM SELECTOR PLAN 9
- baseline AOx2 (did not remember date), CTH w/ no acute changes   - c/w home meds: memantine 10mg, Sertraline 100mg QD, Quetiapine 50mg QHS  - improved per wife

## 2024-12-01 NOTE — PROGRESS NOTE ADULT - PROBLEM SELECTOR PLAN 3
- Systolic murmur auscultated during physical exam; per wife no known hx of cardiac murmur.   - TTE as noted above.     # CAD:   - aspirin 81 mg and atorvastatin 80mg QD (increased from home statin 40mg QD d/t slurred speech)  # Prolonged QTc  - QT interval 471 on EKG but manually calculated to be 400-440.

## 2024-12-01 NOTE — DIETITIAN INITIAL EVALUATION ADULT - OTHER CALCULATIONS
Apparent ~9 lb (4%) weight loss x7 months (not clinically significant), per history obtained via chart: (11/24 dosing) 191.3 lbs / 86.8 kg, (9/4) 193 lbs, (4/11) 200 lbs.  Ideal Body Weight: 160 lbs / 72.7 kg +/-10%

## 2024-12-01 NOTE — PROGRESS NOTE ADULT - PROBLEM SELECTOR PLAN 2
- Pt with 2 unwitnessed falls likely iso of dehydration and poor PO intake. Pt recalls falling, reports feeling weak and that his legs gave out. He denies LOC, blurry vision or dizziness.   - Wife reports onset of slurred speech since Thursday 11/21, patient out of the window for tPA   - CT head negative for ischemia, hemorrhage, or midline shift  - ddx orthostatic vs neurogenic vs cardiac syncope  - TTE remarkable for moderate aortic stenosis  - likely iso of orthostatic syncope  - Currently back to baseline mental status

## 2024-12-01 NOTE — DIETITIAN INITIAL EVALUATION ADULT - PROBLEM SELECTOR PLAN 7
- Pt with longstanding history of IBD (UC) and recent increase in diarrhea this week  - Most likely UC, biopsy results from 12/202.   - On remicade q 6 weeks (last infusion 11/20), methotrexate 2.5 mg ( 4 tabs on monday mornings), prednisone 10mg BID, mesalamine 800mg   - CT abdomen negative for colitis, bowel obstruction, or active IBD flare    PLAN:   - hold methotrexate 2.5 mg ( 4 tabs on monday mornings), prednisone 10mg BID, and mesalamine 800mg iso infection, until further GI recs and infectious w/u    - management of diarrhea as above  - GI consult

## 2024-12-01 NOTE — PROGRESS NOTE ADULT - PROBLEM SELECTOR PLAN 1
- Pt reports watery diarrhea (>5 times per day) iso of immunosuppression may be 2/2 to C diff infection vs IBD flare  - CRP elevated, stool culture negative, c. diff negative  - CMV PCR indicative low grade viremia, given clinical improvement no need to treat per ID  - Blood cx NGTD  PLAN:  - Per GI, can restart same dose of methotrexate and prednisone, did not require increase in steroid dose, symptoms self resolved

## 2024-12-01 NOTE — DIETITIAN INITIAL EVALUATION ADULT - PERTINENT MEDS FT
atorvastatin   cholecalciferol   folic acid  LANTUS 20U qHS  ADMELOG corrective regimen sliding scale  mesalamine DR Capsule  predniSONE Tablet   sodium bicarbonate IV Continuous

## 2024-12-01 NOTE — PROGRESS NOTE ADULT - PROBLEM SELECTOR PLAN 4
- On arrival Patient met SIRS criteria given tachycardia and elevated WBC (33% bandemia)  -  Labs significant for bandemia of 33%, lactate 2.4, and anion gap metabolic acidosis likely iso of IBD flare vs infectious gastroenteritis vs prednisone use  - WBC not elevated likely iso immunosuppression medications   - blood culture NGTD @ 24 hours, Ucx Neg, GI PCR and C. Diff neg  -  s/p cefepime + vanc ( 11/24-11/26)

## 2024-12-01 NOTE — DIETITIAN INITIAL EVALUATION ADULT - PROBLEM SELECTOR PLAN 4
- Pt with pH 7.27, HCO3 17, pCO2 28 consistent with high anion gap metabolic and respiratory compensation  - likely iso of starvation ketosis 2/2 to poor PO intake. Lower suspicion for DKA but will monitor closely given no use of Tresiba for 48 hours    PLAN:  - beta- hydroxybutyrate level   - will encourage PO intake  - Will restart insulin glargine

## 2024-12-01 NOTE — PROGRESS NOTE ADULT - PROBLEM SELECTOR PLAN 6
- Pt with longstanding history of IBD (UC) and recent increase in diarrhea this week  - Most likely UC, biopsy results from 12/202.   - On remicade q 6 weeks (last infusion 11/20), methotrexate 2.5 mg ( 4 tabs on monday mornings), prednisone 10mg BID, mesalamine 800mg   - CT abdomen negative for colitis, bowel obstruction, or active IBD flare    PLAN:   - originally Held methotrexate 2.5 mg ( 4 tabs on monday mornings) but OK to restart per GI; c/w prednisone 10mg BID, and mesalamine 800mg per GI recommendations  - management of diarrhea as above

## 2024-12-01 NOTE — PROGRESS NOTE ADULT - PROBLEM SELECTOR PROBLEM 1
Patient has hx of CAD and is on Brilinta   - Cardiology consulted for preprocedural evaluation, recs appreciated. Per cardiology, can hold Brilinta for 5 days prior to biopsy. Will f/u cardiology re: continuing with Brilinta after procedure given that stent was in 2016 per family Diarrhea

## 2024-12-01 NOTE — PROGRESS NOTE ADULT - SUBJECTIVE AND OBJECTIVE BOX
Lizy Kay MD  EM/IM PGY-4  Contact via TEAMS  -----------------------------------------    ================== UNFINISHED NOTE =======================    INTERVAL HPI/OVERNIGHT EVENTS: No events overnight. Vitals remained stable on RA. Received all scheduled medications. In the last 24 hours pt required the following PRNs: none    SUBJECTIVE: Patient seen and examined at bedside.     VITAL SIGNS:  T(C): 36.4 (12-01-24 @ 05:21), Max: 37.2 (11-30-24 @ 16:59)  HR: 85 (12-01-24 @ 05:21) (70 - 89)  BP: 136/60 (12-01-24 @ 05:21) (116/60 - 149/62)  RR: 17 (12-01-24 @ 05:21) (16 - 17)  SpO2: 100% (12-01-24 @ 05:21) (97% - 100%)    PHYSICAL EXAM:        MEDICATIONS:  MEDICATIONS  (STANDING):  aspirin enteric coated 81 milliGRAM(s) Oral daily  atorvastatin 80 milliGRAM(s) Oral at bedtime  chlorhexidine 2% Cloths 1 Application(s) Topical daily  cholecalciferol 400 Unit(s) Oral daily  dextrose 5%. 1000 milliLiter(s) (50 mL/Hr) IV Continuous <Continuous>  dextrose 5%. 1000 milliLiter(s) (100 mL/Hr) IV Continuous <Continuous>  dextrose 50% Injectable 25 Gram(s) IV Push once  dextrose 50% Injectable 12.5 Gram(s) IV Push once  dextrose 50% Injectable 25 Gram(s) IV Push once  dextrose Oral Gel 15 Gram(s) Oral once  finasteride 5 milliGRAM(s) Oral daily  folic acid 1 milliGRAM(s) Oral daily  glucagon  Injectable 1 milliGRAM(s) IntraMuscular once  heparin   Injectable 5000 Unit(s) SubCutaneous every 12 hours  insulin glargine Injectable (LANTUS) 15 Unit(s) SubCutaneous at bedtime  insulin lispro (ADMELOG) corrective regimen sliding scale   SubCutaneous three times a day before meals  insulin lispro (ADMELOG) corrective regimen sliding scale   SubCutaneous at bedtime  memantine 10 milliGRAM(s) Oral two times a day  mesalamine DR Capsule 1600 milliGRAM(s) Oral every 12 hours  predniSONE   Tablet 10 milliGRAM(s) Oral two times a day  QUEtiapine 50 milliGRAM(s) Oral at bedtime  sertraline 100 milliGRAM(s) Oral daily  sodium bicarbonate  Infusion 0.173 mEq/kG/Hr (100 mL/Hr) IV Continuous <Continuous>  tamsulosin 0.4 milliGRAM(s) Oral at bedtime    MEDICATIONS  (PRN):      LABS:                        9.4    9.58  )-----------( 246      ( 01 Dec 2024 06:25 )             31.1     11-30    134[L]  |  101  |  12  ----------------------------<  105[H]  3.9   |  23  |  0.69    Ca    7.5[L]      30 Nov 2024 06:30  Phos  3.8     11-30  Mg     1.90     11-30        Urinalysis Basic - ( 30 Nov 2024 06:30 )    Color: x / Appearance: x / SG: x / pH: x  Gluc: 105 mg/dL / Ketone: x  / Bili: x / Urobili: x   Blood: x / Protein: x / Nitrite: x   Leuk Esterase: x / RBC: x / WBC x   Sq Epi: x / Non Sq Epi: x / Bacteria: x         Lizy Kay MD  EM/IM PGY-4  Contact via TEAMS  -----------------------------------------      INTERVAL HPI/OVERNIGHT EVENTS: No events overnight. Vitals remained stable on RA. Received all scheduled medications. In the last 24 hours pt required the following PRNs: none    SUBJECTIVE: Patient seen and examined at bedside. Feels well, tolerating food, no pain.     VITAL SIGNS:  T(C): 36.4 (12-01-24 @ 05:21), Max: 37.2 (11-30-24 @ 16:59)  HR: 85 (12-01-24 @ 05:21) (70 - 89)  BP: 136/60 (12-01-24 @ 05:21) (116/60 - 149/62)  RR: 17 (12-01-24 @ 05:21) (16 - 17)  SpO2: 100% (12-01-24 @ 05:21) (97% - 100%)    PHYSICAL EXAM:  GENERAL: Sitting comfortably in bed in no acute distress  NEURO: Alert and Oriented to person, place, year. Pupils symmetric, No ptosis. No facial asymmetry or dysarthria, no tremor noted.  HEENT: No conjunctival injection or scleral icterus.   CARD: Normal rate and regular rhythm, no murmurs and no gallops appreciated.  RESP: Clear to auscultation bilaterally, No wheezes, rales or rhonchi. Good respiratory effort.  ABD: Nondistended, Soft and nontender to palpation in all quadrants, no guarding, no rigidity.   EXT: No pedal edema.       MEDICATIONS:  MEDICATIONS  (STANDING):  aspirin enteric coated 81 milliGRAM(s) Oral daily  atorvastatin 80 milliGRAM(s) Oral at bedtime  chlorhexidine 2% Cloths 1 Application(s) Topical daily  cholecalciferol 400 Unit(s) Oral daily  dextrose 5%. 1000 milliLiter(s) (50 mL/Hr) IV Continuous <Continuous>  dextrose 5%. 1000 milliLiter(s) (100 mL/Hr) IV Continuous <Continuous>  dextrose 50% Injectable 25 Gram(s) IV Push once  dextrose 50% Injectable 12.5 Gram(s) IV Push once  dextrose 50% Injectable 25 Gram(s) IV Push once  dextrose Oral Gel 15 Gram(s) Oral once  finasteride 5 milliGRAM(s) Oral daily  folic acid 1 milliGRAM(s) Oral daily  glucagon  Injectable 1 milliGRAM(s) IntraMuscular once  heparin   Injectable 5000 Unit(s) SubCutaneous every 12 hours  insulin glargine Injectable (LANTUS) 15 Unit(s) SubCutaneous at bedtime  insulin lispro (ADMELOG) corrective regimen sliding scale   SubCutaneous three times a day before meals  insulin lispro (ADMELOG) corrective regimen sliding scale   SubCutaneous at bedtime  memantine 10 milliGRAM(s) Oral two times a day  mesalamine DR Capsule 1600 milliGRAM(s) Oral every 12 hours  predniSONE   Tablet 10 milliGRAM(s) Oral two times a day  QUEtiapine 50 milliGRAM(s) Oral at bedtime  sertraline 100 milliGRAM(s) Oral daily  sodium bicarbonate  Infusion 0.173 mEq/kG/Hr (100 mL/Hr) IV Continuous <Continuous>  tamsulosin 0.4 milliGRAM(s) Oral at bedtime    MEDICATIONS  (PRN):      LABS:                        9.4    9.58  )-----------( 246      ( 01 Dec 2024 06:25 )             31.1     11-30    134[L]  |  101  |  12  ----------------------------<  105[H]  3.9   |  23  |  0.69    Ca    7.5[L]      30 Nov 2024 06:30  Phos  3.8     11-30  Mg     1.90     11-30        Urinalysis Basic - ( 30 Nov 2024 06:30 )    Color: x / Appearance: x / SG: x / pH: x  Gluc: 105 mg/dL / Ketone: x  / Bili: x / Urobili: x   Blood: x / Protein: x / Nitrite: x   Leuk Esterase: x / RBC: x / WBC x   Sq Epi: x / Non Sq Epi: x / Bacteria: x

## 2024-12-01 NOTE — DIETITIAN INITIAL EVALUATION ADULT - NS FNS DIET ORDER
Full Liquid  Consistent Carbohydrate {Evening Snack}  Fiber/Residue Restricted Consistent Carbohydrate with Evening Snack

## 2024-12-01 NOTE — DIETITIAN INITIAL EVALUATION ADULT - OTHER INFO
Per chart, pt is 74 year old male PMH IBD, HTN, HLD, type 2 DM, mild cognitive impairment (A&Ox2 baseline), CVA presenting with poor PO intake, fatigue, fall x2, slurred speech, diarrhea. GI was consulted. Likely plan for discharge to [facility].     Per chart, pt is 74 year old male PMH IBD, HTN, HLD, type 2 DM, mild cognitive impairment (A&Ox2 baseline), CVA presenting with poor PO intake, fatigue, fall x2, slurred speech, diarrhea. GI was consulted. Likely plan for discharge to [facility].    Pt with limited subjective contribution to discussion. Pt confirms NKFA, denies difficulties chewing/swallowing. Pt lives at home with wife. History of IBD (likely UC) and type 2 DM, HbA1c (11/24) 5.1%. Notable medications PTA inclusive of methotrexate, mesalamine, prednisone, Januvia 50 mg qD, Tresiba 30U qHS and Lispro 28U-30U premeal. Noted with poor PO intake, diarrhea x5 days PTA.     Pt was previously ordered for a full liquid diet, upgraded today per MD. Pt had not yet consumed breakfast however reports good appetite. Pt requires assistance with tray set up but is able to feed self independently. Pt denies current GI distress, diarrhea improved. Last BM 11/29 per flowsheets, fecal incontinence noted. C. diff and stool cultures negative. Fingersticks WDL. Pt continues on steroids.

## 2024-12-01 NOTE — PROGRESS NOTE ADULT - PROBLEM SELECTOR PLAN 7
- on home Januvia 50mg QD, Tresiba 30 U at bedtime, and 28-30 U premeals TID  - on gabapentin for neuropathy   - A1c 5.1% on arrival, indicating likely hypoglycemia and over-control of diabetes at home  PLAN:  - Today 12/1 decrease Lantus to 15 in setting of glucoses still below goal of 140-180  - MISS  - Consistent carb diet, low fiber , full liquid. Adjust insulin regimen as appropriate.

## 2024-12-01 NOTE — PROGRESS NOTE ADULT - PROBLEM SELECTOR PLAN 5
- Cr 1.45 on admission, baseline 1.08  - likely prerenal iso of dehydration, diarrhea, poor PO intake  - improved s/p 2 L 0.9% NaCl  - Now Cr o.6 at baseline

## 2024-12-01 NOTE — DIETITIAN INITIAL EVALUATION ADULT - PROBLEM SELECTOR PLAN 9
- c/w 0.4mg tamsoulosin bid  - c/w finasteride 5mg qd    # On chronic Trimethoprim 100mg QD for UTI ppx. Currently held d/t above abx.

## 2024-12-01 NOTE — DIETITIAN INITIAL EVALUATION ADULT - PROBLEM SELECTOR PLAN 2
# Hx of last CVA  # New CVA?   - Pt with 2 unwitnessed falls likely iso of dehydration and poor PO intake. Pt recalls falling, reports feeling weak and that his legs gave out. He denies LOC, blurry vision or dizziness.   - Wife reports onset of slurred speech since Thursday 11/21, patient out of the window for tPA   - CT head negative for ischemia, hemorrhage, or midline shift  - ddx orthostatic vs neurogenic vs cardiac syncope      PLAN:  - TTE  - Telemetry   - Orthostatics  -  f/u Neuro c/s in AM. Pt already on ASA/statin, will start on Plavix 75mg QD until further neuro recs.   - will consider MRI brain per neuro recs

## 2024-12-02 LAB
ALBUMIN SERPL ELPH-MCNC: 2.7 G/DL — LOW (ref 3.3–5)
ALP SERPL-CCNC: 92 U/L — SIGNIFICANT CHANGE UP (ref 40–120)
ALT FLD-CCNC: 82 U/L — HIGH (ref 4–41)
ANION GAP SERPL CALC-SCNC: 11 MMOL/L — SIGNIFICANT CHANGE UP (ref 7–14)
AST SERPL-CCNC: 68 U/L — HIGH (ref 4–40)
BASOPHILS # BLD AUTO: 0.01 K/UL — SIGNIFICANT CHANGE UP (ref 0–0.2)
BASOPHILS NFR BLD AUTO: 0.1 % — SIGNIFICANT CHANGE UP (ref 0–2)
BILIRUB SERPL-MCNC: 0.5 MG/DL — SIGNIFICANT CHANGE UP (ref 0.2–1.2)
BUN SERPL-MCNC: 12 MG/DL — SIGNIFICANT CHANGE UP (ref 7–23)
CALCIUM SERPL-MCNC: 7.9 MG/DL — LOW (ref 8.4–10.5)
CHLORIDE SERPL-SCNC: 100 MMOL/L — SIGNIFICANT CHANGE UP (ref 98–107)
CO2 SERPL-SCNC: 26 MMOL/L — SIGNIFICANT CHANGE UP (ref 22–31)
CREAT SERPL-MCNC: 0.77 MG/DL — SIGNIFICANT CHANGE UP (ref 0.5–1.3)
CULTURE RESULTS: SIGNIFICANT CHANGE UP
EGFR: 93 ML/MIN/1.73M2 — SIGNIFICANT CHANGE UP
EOSINOPHIL # BLD AUTO: 0.05 K/UL — SIGNIFICANT CHANGE UP (ref 0–0.5)
EOSINOPHIL NFR BLD AUTO: 0.7 % — SIGNIFICANT CHANGE UP (ref 0–6)
GLUCOSE SERPL-MCNC: 88 MG/DL — SIGNIFICANT CHANGE UP (ref 70–99)
HCT VFR BLD CALC: 28.8 % — LOW (ref 39–50)
HGB BLD-MCNC: 8.6 G/DL — LOW (ref 13–17)
IANC: 4.23 K/UL — SIGNIFICANT CHANGE UP (ref 1.8–7.4)
IMM GRANULOCYTES NFR BLD AUTO: 2.8 % — HIGH (ref 0–0.9)
LYMPHOCYTES # BLD AUTO: 2.23 K/UL — SIGNIFICANT CHANGE UP (ref 1–3.3)
LYMPHOCYTES # BLD AUTO: 30.1 % — SIGNIFICANT CHANGE UP (ref 13–44)
MAGNESIUM SERPL-MCNC: 2 MG/DL — SIGNIFICANT CHANGE UP (ref 1.6–2.6)
MCHC RBC-ENTMCNC: 23.8 PG — LOW (ref 27–34)
MCHC RBC-ENTMCNC: 29.9 G/DL — LOW (ref 32–36)
MCV RBC AUTO: 79.6 FL — LOW (ref 80–100)
MONOCYTES # BLD AUTO: 0.67 K/UL — SIGNIFICANT CHANGE UP (ref 0–0.9)
MONOCYTES NFR BLD AUTO: 9.1 % — SIGNIFICANT CHANGE UP (ref 2–14)
NEUTROPHILS # BLD AUTO: 4.23 K/UL — SIGNIFICANT CHANGE UP (ref 1.8–7.4)
NEUTROPHILS NFR BLD AUTO: 57.2 % — SIGNIFICANT CHANGE UP (ref 43–77)
NRBC # BLD: 0 /100 WBCS — SIGNIFICANT CHANGE UP (ref 0–0)
NRBC # FLD: 0 K/UL — SIGNIFICANT CHANGE UP (ref 0–0)
PHOSPHATE SERPL-MCNC: 4 MG/DL — SIGNIFICANT CHANGE UP (ref 2.5–4.5)
PLATELET # BLD AUTO: 251 K/UL — SIGNIFICANT CHANGE UP (ref 150–400)
POTASSIUM SERPL-MCNC: 3.8 MMOL/L — SIGNIFICANT CHANGE UP (ref 3.5–5.3)
POTASSIUM SERPL-SCNC: 3.8 MMOL/L — SIGNIFICANT CHANGE UP (ref 3.5–5.3)
PROT SERPL-MCNC: 5.4 G/DL — LOW (ref 6–8.3)
RBC # BLD: 3.62 M/UL — LOW (ref 4.2–5.8)
RBC # FLD: 18.8 % — HIGH (ref 10.3–14.5)
SODIUM SERPL-SCNC: 137 MMOL/L — SIGNIFICANT CHANGE UP (ref 135–145)
SPECIMEN SOURCE: SIGNIFICANT CHANGE UP
WBC # BLD: 7.4 K/UL — SIGNIFICANT CHANGE UP (ref 3.8–10.5)
WBC # FLD AUTO: 7.4 K/UL — SIGNIFICANT CHANGE UP (ref 3.8–10.5)

## 2024-12-02 PROCEDURE — 99232 SBSQ HOSP IP/OBS MODERATE 35: CPT

## 2024-12-02 RX ORDER — ACETAMINOPHEN 500MG 500 MG/1
650 TABLET, COATED ORAL ONCE
Refills: 0 | Status: COMPLETED | OUTPATIENT
Start: 2024-12-02 | End: 2024-12-02

## 2024-12-02 RX ADMIN — ACETAMINOPHEN 500MG 650 MILLIGRAM(S): 500 TABLET, COATED ORAL at 21:37

## 2024-12-02 RX ADMIN — Medication 81 MILLIGRAM(S): at 12:30

## 2024-12-02 RX ADMIN — Medication 5000 UNIT(S): at 05:18

## 2024-12-02 RX ADMIN — MESALAMINE 1600 MILLIGRAM(S): 375 CAPSULE, EXTENDED RELEASE ORAL at 21:37

## 2024-12-02 RX ADMIN — Medication 2: at 12:31

## 2024-12-02 RX ADMIN — PREDNISONE 10 MILLIGRAM(S): 20 TABLET ORAL at 05:17

## 2024-12-02 RX ADMIN — ACETAMINOPHEN 500MG 650 MILLIGRAM(S): 500 TABLET, COATED ORAL at 12:53

## 2024-12-02 RX ADMIN — Medication 400 UNIT(S): at 12:30

## 2024-12-02 RX ADMIN — PREDNISONE 10 MILLIGRAM(S): 20 TABLET ORAL at 18:28

## 2024-12-02 RX ADMIN — MEMANTINE HYDROCHLORIDE 10 MILLIGRAM(S): 14 CAPSULE, EXTENDED RELEASE ORAL at 05:17

## 2024-12-02 RX ADMIN — METHOTREXATE 10 MILLIGRAM(S): 2.5 TABLET ORAL at 09:40

## 2024-12-02 RX ADMIN — TAMSULOSIN HYDROCHLORIDE 0.4 MILLIGRAM(S): 0.4 CAPSULE ORAL at 21:37

## 2024-12-02 RX ADMIN — CHLORHEXIDINE GLUCONATE 1 APPLICATION(S): 1.2 RINSE ORAL at 12:29

## 2024-12-02 RX ADMIN — INSULIN GLARGINE 15 UNIT(S): 100 INJECTION, SOLUTION SUBCUTANEOUS at 21:45

## 2024-12-02 RX ADMIN — MEMANTINE HYDROCHLORIDE 10 MILLIGRAM(S): 14 CAPSULE, EXTENDED RELEASE ORAL at 18:28

## 2024-12-02 RX ADMIN — MESALAMINE 1600 MILLIGRAM(S): 375 CAPSULE, EXTENDED RELEASE ORAL at 09:39

## 2024-12-02 RX ADMIN — ACETAMINOPHEN 500MG 650 MILLIGRAM(S): 500 TABLET, COATED ORAL at 13:53

## 2024-12-02 RX ADMIN — Medication 5000 UNIT(S): at 18:27

## 2024-12-02 RX ADMIN — Medication 40 MILLIGRAM(S): at 21:45

## 2024-12-02 RX ADMIN — Medication 1 MILLIGRAM(S): at 12:30

## 2024-12-02 RX ADMIN — Medication 50 MILLIGRAM(S): at 21:38

## 2024-12-02 RX ADMIN — Medication 5 MILLIGRAM(S): at 12:30

## 2024-12-02 RX ADMIN — SERTRALINE HYDROCHLORIDE 100 MILLIGRAM(S): 100 TABLET, FILM COATED ORAL at 12:30

## 2024-12-02 RX ADMIN — ACETAMINOPHEN 500MG 650 MILLIGRAM(S): 500 TABLET, COATED ORAL at 22:37

## 2024-12-02 NOTE — PROGRESS NOTE ADULT - PROBLEM SELECTOR PLAN 1
- Pt reports watery diarrhea (>5 times per day) iso of immunosuppression may be 2/2 to C diff infection vs IBD flare  - CRP elevated, stool culture negative, c. diff negative  - CMV PCR indicative low grade viremia, given clinical improvement no need to treat per ID  - Blood cx NGTD  PLAN:  - Per GI, can restart same dose of methotrexate and prednisone, did not require increase in steroid dose, symptoms self resolved - Pt with longstanding history of IBD (UC) and recent increase in diarrhea this week  - Most likely crohns per GI, biopsy results from 12/2022 unclear picture.   - On remicade q 6 weeks (last infusion 11/20), methotrexate 2.5 mg ( 4 tabs on monday mornings), prednisone 10mg BID, mesalamine 800mg   - CT abdomen negative for colitis, bowel obstruction, or active IBD flare    PLAN:   - c/w prednisone 10mg BID, methotrexate 2.5 mg every week ( 4 tabs on monday mornings), and mesalamine 800mg per GI recommendations  - Next dose of remicade dose should be 01/25  - GI follow up outpatient

## 2024-12-02 NOTE — PROGRESS NOTE ADULT - PROBLEM SELECTOR PLAN 8
- c/w 0.4mg tamsoulosin bid  - c/w finasteride 5mg qd Resolved  - On arrival Patient met SIRS criteria given tachycardia and elevated WBC (33% bandemia)  -  Labs significant for bandemia of 33%, lactate 2.4, and anion gap metabolic acidosis likely iso of IBD flare vs infectious gastroenteritis vs prednisone use  - WBC not elevated likely iso immunosuppression medications   - blood culture NGTD @ 24 hours, Ucx Neg, GI PCR and C. Diff neg  -  s/p cefepime + vanc ( 11/24-11/26)

## 2024-12-02 NOTE — PROGRESS NOTE ADULT - ASSESSMENT
75 y/o M with PMH  IBD, on methotrexate, remicade (q 6 weeks), HTN, HLD, T2DM, mild cognitive impairment (AOx2 baseline), hx of prior CVA, presenting with poor PO intake, fatigue, 2 unwitnessed falls, slurred speech since 11/21, and nonbloody diarrhea for the past 5 days. Admitted for IBD flare, C Diff and GI PCR Neg, GI recommended continuing all meds at same dose. CT head imaging negative for acute abnoramlity. A1c 5.1% on arrival so will DC with decreased insulin dose. Pending rehab.  73 y/o M with PMH  IBD, on methotrexate, remicade (q 6 weeks), HTN, HLD, T2DM, mild cognitive impairment (AOx2 baseline), hx of prior CVA, presenting with poor PO intake, fatigue, 2 unwitnessed falls, slurred speech since 11/21, and nonbloody diarrhea for the past 5 days. Admitted for IBD flare, C Diff and GI PCR Neg, GI recommended continuing all meds at same dose. CT head imaging negative for acute abnormality. A1c 5.1% on arrival so will DC with decreased insulin dose. Pending rehab.  75 y/o M with PMH  IBD (likely crohns), on methotrexate, remicade (q 6 weeks), HTN, HLD, T2DM, mild cognitive impairment (AOx2 baseline), hx of prior CVA, presenting with poor PO intake, fatigue, 2 unwitnessed falls, slurred speech since 11/21, and nonbloody diarrhea for the past 5 days. Admitted for IBD flare, C Diff and GI PCR Neg, GI recommended continuing all meds at same dose. CT head imaging negative for acute abnormality. A1c 5.1% on arrival so will DC with decreased insulin dose. Pending rehab.

## 2024-12-02 NOTE — PROGRESS NOTE ADULT - PROBLEM SELECTOR PLAN 5
Resolved  - Cr 1.45 on admission, baseline 1.08  - likely prerenal iso of dehydration, diarrhea, poor PO intake  - improved s/p 2 L 0.9% NaCl  - Now Cr 0.6 at baseline - baseline AOx2 (did not remember date), CTH w/ no acute changes   - c/w home meds: memantine 10mg, Sertraline 100mg QD, Quetiapine 50mg QHS  - improved per wife

## 2024-12-02 NOTE — PROGRESS NOTE ADULT - PROBLEM SELECTOR PLAN 2
- Pt with 2 unwitnessed falls likely iso of dehydration and poor PO intake. Pt recalls falling, reports feeling weak and that his legs gave out. He denies LOC, blurry vision or dizziness.   - Wife reports onset of slurred speech since Thursday 11/21, patient out of the window for tPA   - CT head negative for ischemia, hemorrhage, or midline shift  - ddx orthostatic vs neurogenic vs cardiac syncope  - TTE remarkable for moderate aortic stenosis  - likely iso of orthostatic syncope  - Currently back to baseline mental status - on home Januvia 50mg QD, Tresiba 30 U at bedtime, and 28-30 U premeals TID  - on gabapentin for neuropathy   - A1c 5.1% on arrival, indicating likely hypoglycemia and over-control of diabetes at home  - Has not needed scale during meal times  PLAN:  - Lantus 15. Glucose goal of 140-180  - Plan to d/c on Tresiba 20u and stop pre-meal insulin. Will need outpatient f/u with PCP for better control  - MISS  - Consistent carb diet, low fiber , full liquid.

## 2024-12-02 NOTE — PROGRESS NOTE ADULT - PROBLEM SELECTOR PLAN 11
- Diet: Full liquid (low carb/low fiber), advance as tolerated   - DVT: heparin 5000 BID  - Dispo: [facility] - Fluids: None  - Electrolytes: Will replete to maintain K>4, Phos>3, and Mag>2  - Nutrition: Full liquid (low carb/low fiber), advance as tolerated   - Activity: PT following  - DVT Prophylaxis: heparin 5000 BID  - Disposition: [facility]

## 2024-12-02 NOTE — PROGRESS NOTE ADULT - PROBLEM SELECTOR PLAN 10
Pt has wrist pain and questions   - known L renal cyst from 2022, CT 11/24 showing R renal cyst as well.   - 11/24 kidney US: Left renal cyst measuring 2.0 cm. Resolved  - Cr 1.45 on admission, baseline 1.08  - likely prerenal iso of dehydration, diarrhea, poor PO intake  - improved s/p 2 L 0.9% NaCl  - Now Cr at baseline

## 2024-12-02 NOTE — PROGRESS NOTE ADULT - SUBJECTIVE AND OBJECTIVE BOX
Dhiraj Andre M.D  Resident  Department of Medicine  Available on [employer] Teams    ***********************************************************************  Patient is a 74y old  Male who presents with a chief complaint of Acute renal failure     (01 Dec 2024 07:36)      SUBJECTIVE / OVERNIGHT EVENTS: Patient seen and examined at bedside.     ADDITIONAL REVIEW OF SYSTEMS:    MEDICATIONS  (STANDING):  aspirin enteric coated 81 milliGRAM(s) Oral daily  atorvastatin 80 milliGRAM(s) Oral at bedtime  chlorhexidine 2% Cloths 1 Application(s) Topical daily  cholecalciferol 400 Unit(s) Oral daily  dextrose 5%. 1000 milliLiter(s) (50 mL/Hr) IV Continuous <Continuous>  dextrose 5%. 1000 milliLiter(s) (100 mL/Hr) IV Continuous <Continuous>  dextrose 50% Injectable 25 Gram(s) IV Push once  dextrose 50% Injectable 12.5 Gram(s) IV Push once  dextrose 50% Injectable 25 Gram(s) IV Push once  dextrose Oral Gel 15 Gram(s) Oral once  finasteride 5 milliGRAM(s) Oral daily  folic acid 1 milliGRAM(s) Oral daily  glucagon  Injectable 1 milliGRAM(s) IntraMuscular once  heparin   Injectable 5000 Unit(s) SubCutaneous every 12 hours  insulin glargine Injectable (LANTUS) 15 Unit(s) SubCutaneous at bedtime  insulin lispro (ADMELOG) corrective regimen sliding scale   SubCutaneous three times a day before meals  insulin lispro (ADMELOG) corrective regimen sliding scale   SubCutaneous at bedtime  memantine 10 milliGRAM(s) Oral two times a day  mesalamine DR Capsule 1600 milliGRAM(s) Oral every 12 hours  methotrexate (Non - oncologic) 10 milliGRAM(s) Oral <User Schedule>  predniSONE   Tablet 10 milliGRAM(s) Oral two times a day  QUEtiapine 50 milliGRAM(s) Oral at bedtime  sertraline 100 milliGRAM(s) Oral daily  tamsulosin 0.4 milliGRAM(s) Oral at bedtime    MEDICATIONS  (PRN):      CAPILLARY BLOOD GLUCOSE      POCT Blood Glucose.: 132 mg/dL (01 Dec 2024 21:20)  POCT Blood Glucose.: 244 mg/dL (01 Dec 2024 17:26)  POCT Blood Glucose.: 115 mg/dL (01 Dec 2024 11:58)  POCT Blood Glucose.: 86 mg/dL (01 Dec 2024 08:28)    I&O's Summary      PHYSICAL EXAM:    Vital Signs Last 24 Hrs  T(C): 36.8 (02 Dec 2024 05:06), Max: 37.2 (01 Dec 2024 13:00)  T(F): 98.2 (02 Dec 2024 05:06), Max: 98.9 (01 Dec 2024 13:00)  HR: 94 (02 Dec 2024 05:06) (86 - 98)  BP: 120/61 (02 Dec 2024 05:06) (116/56 - 138/60)  BP(mean): --  RR: 18 (02 Dec 2024 05:06) (17 - 18)  SpO2: 98% (02 Dec 2024 05:06) (96% - 100%)    Parameters below as of 02 Dec 2024 05:06  Patient On (Oxygen Delivery Method): room air        CONSTITUTIONAL: NAD, well-developed, well-groomed  EYES: Conjunctiva and sclera clear  ENMT: Moist oral mucosa, no pharyngeal injection or exudates; normal dentition  NECK: Supple, no palpable masses; no thyromegaly  RESPIRATORY: Normal respiratory effort; lungs are clear to auscultation bilaterally  CARDIOVASCULAR: Regular rate and rhythm, normal S1 and S2, no murmur/rub/gallop; No lower extremity edema; Peripheral pulses are 2+ bilaterally  ABDOMEN: Soft, nontender to palpation, normoactive bowel sounds, no rebound/guarding  MUSCULOSKELETAL: No clubbing or cyanosis of digits; no joint swelling or tenderness to palpation  PSYCH: A+O to person, place, and time; affect appropriate  NEUROLOGY: CN 2-12 are intact and symmetric; no gross sensory deficits   SKIN: No rashes; no palpable lesions    LABS:                        8.6    7.40  )-----------( 251      ( 02 Dec 2024 05:20 )             28.8     12-01    140  |  102  |  10  ----------------------------<  72  4.0   |  23  |  0.74    Ca    7.8[L]      01 Dec 2024 06:25  Phos  4.4     12-01  Mg     2.00     12-01            Urinalysis Basic - ( 01 Dec 2024 06:25 )    Color: x / Appearance: x / SG: x / pH: x  Gluc: 72 mg/dL / Ketone: x  / Bili: x / Urobili: x   Blood: x / Protein: x / Nitrite: x   Leuk Esterase: x / RBC: x / WBC x   Sq Epi: x / Non Sq Epi: x / Bacteria: x        Urinalysis with Rflx Culture (collected 29 Nov 2024 11:02)        RADIOLOGY & ADDITIONAL TESTS: No new imaging  Results Reviewed: Yes  Imaging Personally Reviewed:  Electrocardiogram Personally Reviewed:    COORDINATION OF CARE:  Care Discussed with Consultants/Other Providers [Y/N]:  Prior or Outpatient Records Reviewed [Y/N]:   Dhiraj Andre M.D  Resident  Department of Medicine  Available on [employer] Teams    ***********************************************************************  Patient is a 74y old  Male who presents with a chief complaint of Acute renal failure     (01 Dec 2024 07:36)      SUBJECTIVE / OVERNIGHT EVENTS: Patient seen and examined at bedside. Complains of some abd pain, but denies any diarrhea anymore. Was very sleepy and groggy this am.     ADDITIONAL REVIEW OF SYSTEMS:    MEDICATIONS  (STANDING):  aspirin enteric coated 81 milliGRAM(s) Oral daily  atorvastatin 80 milliGRAM(s) Oral at bedtime  chlorhexidine 2% Cloths 1 Application(s) Topical daily  cholecalciferol 400 Unit(s) Oral daily  dextrose 5%. 1000 milliLiter(s) (50 mL/Hr) IV Continuous <Continuous>  dextrose 5%. 1000 milliLiter(s) (100 mL/Hr) IV Continuous <Continuous>  dextrose 50% Injectable 25 Gram(s) IV Push once  dextrose 50% Injectable 12.5 Gram(s) IV Push once  dextrose 50% Injectable 25 Gram(s) IV Push once  dextrose Oral Gel 15 Gram(s) Oral once  finasteride 5 milliGRAM(s) Oral daily  folic acid 1 milliGRAM(s) Oral daily  glucagon  Injectable 1 milliGRAM(s) IntraMuscular once  heparin   Injectable 5000 Unit(s) SubCutaneous every 12 hours  insulin glargine Injectable (LANTUS) 15 Unit(s) SubCutaneous at bedtime  insulin lispro (ADMELOG) corrective regimen sliding scale   SubCutaneous three times a day before meals  insulin lispro (ADMELOG) corrective regimen sliding scale   SubCutaneous at bedtime  memantine 10 milliGRAM(s) Oral two times a day  mesalamine DR Capsule 1600 milliGRAM(s) Oral every 12 hours  methotrexate (Non - oncologic) 10 milliGRAM(s) Oral <User Schedule>  predniSONE   Tablet 10 milliGRAM(s) Oral two times a day  QUEtiapine 50 milliGRAM(s) Oral at bedtime  sertraline 100 milliGRAM(s) Oral daily  tamsulosin 0.4 milliGRAM(s) Oral at bedtime    MEDICATIONS  (PRN):      CAPILLARY BLOOD GLUCOSE      POCT Blood Glucose.: 132 mg/dL (01 Dec 2024 21:20)  POCT Blood Glucose.: 244 mg/dL (01 Dec 2024 17:26)  POCT Blood Glucose.: 115 mg/dL (01 Dec 2024 11:58)  POCT Blood Glucose.: 86 mg/dL (01 Dec 2024 08:28)    I&O's Summary      PHYSICAL EXAM:    Vital Signs Last 24 Hrs  T(C): 36.8 (02 Dec 2024 05:06), Max: 37.2 (01 Dec 2024 13:00)  T(F): 98.2 (02 Dec 2024 05:06), Max: 98.9 (01 Dec 2024 13:00)  HR: 94 (02 Dec 2024 05:06) (86 - 98)  BP: 120/61 (02 Dec 2024 05:06) (116/56 - 138/60)  BP(mean): --  RR: 18 (02 Dec 2024 05:06) (17 - 18)  SpO2: 98% (02 Dec 2024 05:06) (96% - 100%)    Parameters below as of 02 Dec 2024 05:06  Patient On (Oxygen Delivery Method): room air        CONSTITUTIONAL: NAD, well-developed, well-groomed  EYES: Conjunctiva and sclera clear  ENMT: Moist oral mucosa, no pharyngeal injection or exudates; normal dentition  NECK: Supple, no palpable masses; no thyromegaly  RESPIRATORY: Normal respiratory effort; lungs are clear to auscultation bilaterally  CARDIOVASCULAR: Regular rate and rhythm, normal S1 and S2, no murmur/rub/gallop; No lower extremity edema; Peripheral pulses are 2+ bilaterally  ABDOMEN: Soft, nontender to palpation, normoactive bowel sounds, no rebound/guarding  MUSCULOSKELETAL: No clubbing or cyanosis of digits; no joint swelling or tenderness to palpation  PSYCH: A+O to person, place, and time; affect appropriate  NEUROLOGY: CN 2-12 are intact and symmetric; no gross sensory deficits   SKIN: No rashes; no palpable lesions    LABS:                        8.6    7.40  )-----------( 251      ( 02 Dec 2024 05:20 )             28.8     12-01    140  |  102  |  10  ----------------------------<  72  4.0   |  23  |  0.74    Ca    7.8[L]      01 Dec 2024 06:25  Phos  4.4     12-01  Mg     2.00     12-01            Urinalysis Basic - ( 01 Dec 2024 06:25 )    Color: x / Appearance: x / SG: x / pH: x  Gluc: 72 mg/dL / Ketone: x  / Bili: x / Urobili: x   Blood: x / Protein: x / Nitrite: x   Leuk Esterase: x / RBC: x / WBC x   Sq Epi: x / Non Sq Epi: x / Bacteria: x        Urinalysis with Rflx Culture (collected 29 Nov 2024 11:02)        RADIOLOGY & ADDITIONAL TESTS: No new imaging  Results Reviewed: Yes  Imaging Personally Reviewed:  Electrocardiogram Personally Reviewed:    COORDINATION OF CARE:  Care Discussed with Consultants/Other Providers [Y/N]:  Prior or Outpatient Records Reviewed [Y/N]:   Dhiraj Andre M.D  Resident  Department of Medicine  Available on [employer] Teams    ***********************************************************************  Patient is a 74y old  Male who presents with a chief complaint of Acute renal failure     (01 Dec 2024 07:36)      SUBJECTIVE / OVERNIGHT EVENTS: Patient seen and examined at bedside. Complains of some abd pain, but denies diarrhea. Was very sleepy and groggy this am.     ADDITIONAL REVIEW OF SYSTEMS:    MEDICATIONS  (STANDING):  aspirin enteric coated 81 milliGRAM(s) Oral daily  atorvastatin 80 milliGRAM(s) Oral at bedtime  chlorhexidine 2% Cloths 1 Application(s) Topical daily  cholecalciferol 400 Unit(s) Oral daily  dextrose 5%. 1000 milliLiter(s) (50 mL/Hr) IV Continuous <Continuous>  dextrose 5%. 1000 milliLiter(s) (100 mL/Hr) IV Continuous <Continuous>  dextrose 50% Injectable 25 Gram(s) IV Push once  dextrose 50% Injectable 12.5 Gram(s) IV Push once  dextrose 50% Injectable 25 Gram(s) IV Push once  dextrose Oral Gel 15 Gram(s) Oral once  finasteride 5 milliGRAM(s) Oral daily  folic acid 1 milliGRAM(s) Oral daily  glucagon  Injectable 1 milliGRAM(s) IntraMuscular once  heparin   Injectable 5000 Unit(s) SubCutaneous every 12 hours  insulin glargine Injectable (LANTUS) 15 Unit(s) SubCutaneous at bedtime  insulin lispro (ADMELOG) corrective regimen sliding scale   SubCutaneous three times a day before meals  insulin lispro (ADMELOG) corrective regimen sliding scale   SubCutaneous at bedtime  memantine 10 milliGRAM(s) Oral two times a day  mesalamine DR Capsule 1600 milliGRAM(s) Oral every 12 hours  methotrexate (Non - oncologic) 10 milliGRAM(s) Oral <User Schedule>  predniSONE   Tablet 10 milliGRAM(s) Oral two times a day  QUEtiapine 50 milliGRAM(s) Oral at bedtime  sertraline 100 milliGRAM(s) Oral daily  tamsulosin 0.4 milliGRAM(s) Oral at bedtime    MEDICATIONS  (PRN):      CAPILLARY BLOOD GLUCOSE      POCT Blood Glucose.: 132 mg/dL (01 Dec 2024 21:20)  POCT Blood Glucose.: 244 mg/dL (01 Dec 2024 17:26)  POCT Blood Glucose.: 115 mg/dL (01 Dec 2024 11:58)  POCT Blood Glucose.: 86 mg/dL (01 Dec 2024 08:28)    I&O's Summary      PHYSICAL EXAM:    Vital Signs Last 24 Hrs  T(C): 36.8 (02 Dec 2024 05:06), Max: 37.2 (01 Dec 2024 13:00)  T(F): 98.2 (02 Dec 2024 05:06), Max: 98.9 (01 Dec 2024 13:00)  HR: 94 (02 Dec 2024 05:06) (86 - 98)  BP: 120/61 (02 Dec 2024 05:06) (116/56 - 138/60)  BP(mean): --  RR: 18 (02 Dec 2024 05:06) (17 - 18)  SpO2: 98% (02 Dec 2024 05:06) (96% - 100%)    Parameters below as of 02 Dec 2024 05:06  Patient On (Oxygen Delivery Method): room air        CONSTITUTIONAL: NAD, well-developed, well-groomed  EYES: Conjunctiva and sclera clear  RESPIRATORY: Normal respiratory effort; lungs are clear to auscultation bilaterally  CARDIOVASCULAR: Regular rate and rhythm, normal S1 and S2, no murmur; No lower extremity edema; Peripheral pulses are 2+ bilaterally  ABDOMEN: Soft, nontender to palpation, normoactive bowel sounds, no rebound tenderness  MUSCULOSKELETAL: No clubbing or cyanosis of digits; no joint swelling or tenderness to palpation  PSYCH: A+O to person, place. Unable to confirm rest as patient was sleepy this am  NEUROLOGY: no gross sensory deficits   SKIN: No rashes; no palpable lesions    LABS:                        8.6    7.40  )-----------( 251      ( 02 Dec 2024 05:20 )             28.8     12-01    140  |  102  |  10  ----------------------------<  72  4.0   |  23  |  0.74    Ca    7.8[L]      01 Dec 2024 06:25  Phos  4.4     12-01  Mg     2.00     12-01            Urinalysis Basic - ( 01 Dec 2024 06:25 )    Color: x / Appearance: x / SG: x / pH: x  Gluc: 72 mg/dL / Ketone: x  / Bili: x / Urobili: x   Blood: x / Protein: x / Nitrite: x   Leuk Esterase: x / RBC: x / WBC x   Sq Epi: x / Non Sq Epi: x / Bacteria: x        Urinalysis with Rflx Culture (collected 29 Nov 2024 11:02)        RADIOLOGY & ADDITIONAL TESTS: No new imaging  Results Reviewed: Yes  Imaging Personally Reviewed:  Electrocardiogram Personally Reviewed:    COORDINATION OF CARE:  Care Discussed with Consultants/Other Providers [Y/N]:  Prior or Outpatient Records Reviewed [Y/N]:   Dhiraj Andre M.D  Resident  Department of Medicine  Available on [employer] Teams    ***********************************************************************  Patient is a 74y old  Male who presents with a chief complaint of Acute renal failure     (01 Dec 2024 07:36)      SUBJECTIVE / OVERNIGHT EVENTS: Patient seen and examined at bedside. Complains of some abd pain, but denies diarrhea. Was very sleepy and groggy this am.     ADDITIONAL REVIEW OF SYSTEMS:    MEDICATIONS  (STANDING):  aspirin enteric coated 81 milliGRAM(s) Oral daily  atorvastatin 80 milliGRAM(s) Oral at bedtime  chlorhexidine 2% Cloths 1 Application(s) Topical daily  cholecalciferol 400 Unit(s) Oral daily  dextrose 5%. 1000 milliLiter(s) (50 mL/Hr) IV Continuous <Continuous>  dextrose 5%. 1000 milliLiter(s) (100 mL/Hr) IV Continuous <Continuous>  dextrose 50% Injectable 25 Gram(s) IV Push once  dextrose 50% Injectable 12.5 Gram(s) IV Push once  dextrose 50% Injectable 25 Gram(s) IV Push once  dextrose Oral Gel 15 Gram(s) Oral once  finasteride 5 milliGRAM(s) Oral daily  folic acid 1 milliGRAM(s) Oral daily  glucagon  Injectable 1 milliGRAM(s) IntraMuscular once  heparin   Injectable 5000 Unit(s) SubCutaneous every 12 hours  insulin glargine Injectable (LANTUS) 15 Unit(s) SubCutaneous at bedtime  insulin lispro (ADMELOG) corrective regimen sliding scale   SubCutaneous three times a day before meals  insulin lispro (ADMELOG) corrective regimen sliding scale   SubCutaneous at bedtime  memantine 10 milliGRAM(s) Oral two times a day  mesalamine DR Capsule 1600 milliGRAM(s) Oral every 12 hours  methotrexate (Non - oncologic) 10 milliGRAM(s) Oral <User Schedule>  predniSONE   Tablet 10 milliGRAM(s) Oral two times a day  QUEtiapine 50 milliGRAM(s) Oral at bedtime  sertraline 100 milliGRAM(s) Oral daily  tamsulosin 0.4 milliGRAM(s) Oral at bedtime    MEDICATIONS  (PRN):      CAPILLARY BLOOD GLUCOSE      POCT Blood Glucose.: 132 mg/dL (01 Dec 2024 21:20)  POCT Blood Glucose.: 244 mg/dL (01 Dec 2024 17:26)  POCT Blood Glucose.: 115 mg/dL (01 Dec 2024 11:58)  POCT Blood Glucose.: 86 mg/dL (01 Dec 2024 08:28)    I&O's Summary      PHYSICAL EXAM:    Vital Signs Last 24 Hrs  T(C): 36.8 (02 Dec 2024 05:06), Max: 37.2 (01 Dec 2024 13:00)  T(F): 98.2 (02 Dec 2024 05:06), Max: 98.9 (01 Dec 2024 13:00)  HR: 94 (02 Dec 2024 05:06) (86 - 98)  BP: 120/61 (02 Dec 2024 05:06) (116/56 - 138/60)  BP(mean): --  RR: 18 (02 Dec 2024 05:06) (17 - 18)  SpO2: 98% (02 Dec 2024 05:06) (96% - 100%)    Parameters below as of 02 Dec 2024 05:06  Patient On (Oxygen Delivery Method): room air        CONSTITUTIONAL: NAD, well-developed, well-groomed  EYES: Conjunctiva and sclera clear  RESPIRATORY: Normal respiratory effort; lungs are clear to auscultation bilaterally  CARDIOVASCULAR: Regular rate and rhythm, normal S1 and S2, no murmur; No lower extremity edema; Peripheral pulses are 2+ bilaterally  ABDOMEN: Soft, nontender to palpation, normoactive bowel sounds, no rebound tenderness  MUSCULOSKELETAL: No clubbing or cyanosis of digits; no joint swelling or tenderness to palpation  PSYCH: A+O to person, and month.   NEUROLOGY: no gross sensory deficits   SKIN: No rashes; no palpable lesions    LABS:                        8.6    7.40  )-----------( 251      ( 02 Dec 2024 05:20 )             28.8     12-01    140  |  102  |  10  ----------------------------<  72  4.0   |  23  |  0.74    Ca    7.8[L]      01 Dec 2024 06:25  Phos  4.4     12-01  Mg     2.00     12-01            Urinalysis Basic - ( 01 Dec 2024 06:25 )    Color: x / Appearance: x / SG: x / pH: x  Gluc: 72 mg/dL / Ketone: x  / Bili: x / Urobili: x   Blood: x / Protein: x / Nitrite: x   Leuk Esterase: x / RBC: x / WBC x   Sq Epi: x / Non Sq Epi: x / Bacteria: x        Urinalysis with Rflx Culture (collected 29 Nov 2024 11:02)        RADIOLOGY & ADDITIONAL TESTS: No new imaging  Results Reviewed: Yes  Imaging Personally Reviewed:  Electrocardiogram Personally Reviewed:    COORDINATION OF CARE:  Care Discussed with Consultants/Other Providers [Y/N]:  Prior or Outpatient Records Reviewed [Y/N]:

## 2024-12-02 NOTE — PROGRESS NOTE ADULT - PROBLEM SELECTOR PLAN 4
- On arrival Patient met SIRS criteria given tachycardia and elevated WBC (33% bandemia)  -  Labs significant for bandemia of 33%, lactate 2.4, and anion gap metabolic acidosis likely iso of IBD flare vs infectious gastroenteritis vs prednisone use  - WBC not elevated likely iso immunosuppression medications   - blood culture NGTD @ 24 hours, Ucx Neg, GI PCR and C. Diff neg  -  s/p cefepime + vanc ( 11/24-11/26) - Systolic murmur auscultated during physical exam; per wife no known hx of cardiac murmur.   - TTE as noted above.     # CAD:   - aspirin 81 mg and atorvastatin 40mg QD  # Prolonged QTc  - QT interval 471 on EKG but manually calculated to be 400-440.

## 2024-12-02 NOTE — PROGRESS NOTE ADULT - PROBLEM SELECTOR PLAN 6
- Pt with longstanding history of IBD (UC) and recent increase in diarrhea this week  - Most likely UC, biopsy results from 12/202.   - On remicade q 6 weeks (last infusion 11/20), methotrexate 2.5 mg ( 4 tabs on monday mornings), prednisone 10mg BID, mesalamine 800mg   - CT abdomen negative for colitis, bowel obstruction, or active IBD flare    PLAN:   - originally Held methotrexate 2.5 mg ( 4 tabs on monday mornings) but OK to restart per GI; c/w prednisone 10mg BID, and mesalamine 800mg per GI recommendations  - management of diarrhea as above - known L renal cyst from 2022, CT 11/24 showing R renal cyst as well.   - 11/24 kidney US: Left renal cyst measuring 2.0 cm.

## 2024-12-02 NOTE — PROGRESS NOTE ADULT - TIME BILLING
- Ordering, reviewing, and interpreting labs, testing, and imaging.  - Independently obtaining a review of systems and performing a physical exam  - Reviewing consultant documentation/recommendations in addition to discussing plan of care with consultants.  - Counselling and educating patient and family regarding interpretation of aforementioned items and plan of care.
40 minutes of total time dedicated to this patient visit today including preparing to see the patient (eg. review of tests), obtaining and/or reviewing separately obtained history, obtaining/reviewing vitals, performing a medically appropriate examination and/or evaluation, counseling and educating the patient/family/caregiver, reviewing previous notes and test results, and procedures, communicating with other health professionals (when not separately reported), and documenting clinical information in the electronic health record. This time excludes teaching and separately reported services.
- Ordering, reviewing, and interpreting labs, testing, and imaging.  - Independently obtaining a review of systems and performing a physical exam  - Reviewing consultant documentation/recommendations in addition to discussing plan of care with consultants.  - Counselling and educating patient and family regarding interpretation of aforementioned items and plan of care.
- Ordering, reviewing, and interpreting labs, testing, and imaging.  - Independently obtaining a review of systems and performing a physical exam  - Reviewing consultant documentation/recommendations in addition to discussing plan of care with consultants.  - Counselling and educating patient and family regarding interpretation of aforementioned items and plan of care.
50 minutes of total time dedicated to this patient visit today including preparing to see the patient (eg. review of tests), obtaining and/or reviewing separately obtained history, obtaining/reviewing vitals, performing a medically appropriate examination and/or evaluation, counseling and educating the patient/family/caregiver, reviewing previous notes and test results, and procedures, communicating with other health professionals (when not separately reported), and documenting clinical information in the electronic health record. This time excludes teaching and separately reported services.

## 2024-12-02 NOTE — PROGRESS NOTE ADULT - PROBLEM SELECTOR PLAN 3
- Systolic murmur auscultated during physical exam; per wife no known hx of cardiac murmur.   - TTE as noted above.     # CAD:   - aspirin 81 mg and atorvastatin 80mg QD (increased from home statin 40mg QD d/t slurred speech)  # Prolonged QTc  - QT interval 471 on EKG but manually calculated to be 400-440. - Pt with 2 unwitnessed falls likely iso of dehydration and poor PO intake. Pt recalls falling, reports feeling weak and that his legs gave out. He denies LOC, blurry vision or dizziness.   - Wife reports onset of slurred speech since Thursday 11/21, patient out of the window for tPA   - CT head negative for ischemia, hemorrhage, or midline shift  - ddx orthostatic vs neurogenic vs cardiac syncope vs polypharmacy (on quetiapine, memantine and sertraline)  - TTE remarkable for moderate aortic stenosis  - likely iso of orthostatic syncope  Plan:  - Currently back to baseline mental status  - Neuro referral on d/c   - Cards referral on d/c for aortic stenosis mgmt - Pt with 2 unwitnessed falls likely iso of dehydration and poor PO intake. Pt recalls falling, reports feeling weak and that his legs gave out. He denies LOC, blurry vision or dizziness.    - CT head negative for ischemia, hemorrhage, or midline shift  - ddx orthostatic vs neurogenic vs cardiac syncope vs polypharmacy (on quetiapine, memantine and sertraline)  - TTE remarkable for moderate aortic stenosis  - likely iso of orthostatic syncope  Plan:  - Currently back to baseline mental status  - Neuro referral on d/c   - Cards referral on d/c for aortic stenosis mgmt

## 2024-12-02 NOTE — PROGRESS NOTE ADULT - PROBLEM SELECTOR PLAN 9
- baseline AOx2 (did not remember date), CTH w/ no acute changes   - c/w home meds: memantine 10mg, Sertraline 100mg QD, Quetiapine 50mg QHS  - improved per wife Resolved  - Pt reports watery diarrhea (>5 times per day) iso of immunosuppression may be 2/2 to C diff infection vs IBD flare  - CRP elevated, stool culture negative, c. diff negative  - CMV PCR indicative low grade viremia, given clinical improvement no need to treat per ID  - Blood cx NGTD  PLAN:  - Per GI, can restart same dose of methotrexate and prednisone, did not require increase in steroid dose, symptoms self resolved

## 2024-12-02 NOTE — PROGRESS NOTE ADULT - PROBLEM SELECTOR PLAN 7
- on home Januvia 50mg QD, Tresiba 30 U at bedtime, and 28-30 U premeals TID  - on gabapentin for neuropathy   - A1c 5.1% on arrival, indicating likely hypoglycemia and over-control of diabetes at home  PLAN:  - Today 12/1 decrease Lantus to 15 in setting of glucoses still below goal of 140-180  - MISS  - Consistent carb diet, low fiber , full liquid. Adjust insulin regimen as appropriate. - c/w 0.4mg tamsoulosin bid  - c/w finasteride 5mg qd - c/w 0.4mg tamsoulosin bid  - c/w finasteride 5mg qd       - on chronic Trimethoprim 100mg QD for UTI ppx. Last saw urology in 2023.   - F/u outpatient with urology for reinstating of antibx if needed

## 2024-12-03 ENCOUNTER — TRANSCRIPTION ENCOUNTER (OUTPATIENT)
Age: 74
End: 2024-12-03

## 2024-12-03 VITALS
TEMPERATURE: 98 F | HEART RATE: 90 BPM | OXYGEN SATURATION: 98 % | DIASTOLIC BLOOD PRESSURE: 64 MMHG | SYSTOLIC BLOOD PRESSURE: 116 MMHG | RESPIRATION RATE: 17 BRPM

## 2024-12-03 DIAGNOSIS — R74.01 ELEVATION OF LEVELS OF LIVER TRANSAMINASE LEVELS: ICD-10-CM

## 2024-12-03 DIAGNOSIS — D63.8 ANEMIA IN OTHER CHRONIC DISEASES CLASSIFIED ELSEWHERE: ICD-10-CM

## 2024-12-03 LAB
ADD ON TEST-SPECIMEN IN LAB: SIGNIFICANT CHANGE UP
ALBUMIN SERPL ELPH-MCNC: 2.6 G/DL — LOW (ref 3.3–5)
ALP SERPL-CCNC: 94 U/L — SIGNIFICANT CHANGE UP (ref 40–120)
ALT FLD-CCNC: 85 U/L — HIGH (ref 4–41)
ANION GAP SERPL CALC-SCNC: 10 MMOL/L — SIGNIFICANT CHANGE UP (ref 7–14)
AST SERPL-CCNC: 82 U/L — HIGH (ref 4–40)
BASOPHILS # BLD AUTO: 0.01 K/UL — SIGNIFICANT CHANGE UP (ref 0–0.2)
BASOPHILS NFR BLD AUTO: 0.2 % — SIGNIFICANT CHANGE UP (ref 0–2)
BILIRUB SERPL-MCNC: 0.5 MG/DL — SIGNIFICANT CHANGE UP (ref 0.2–1.2)
BUN SERPL-MCNC: 16 MG/DL — SIGNIFICANT CHANGE UP (ref 7–23)
CALCIUM SERPL-MCNC: 7.9 MG/DL — LOW (ref 8.4–10.5)
CALPROTECTIN STL-MCNT: 735 UG/G — HIGH (ref 0–120)
CHLORIDE SERPL-SCNC: 103 MMOL/L — SIGNIFICANT CHANGE UP (ref 98–107)
CO2 SERPL-SCNC: 24 MMOL/L — SIGNIFICANT CHANGE UP (ref 22–31)
CREAT SERPL-MCNC: 0.72 MG/DL — SIGNIFICANT CHANGE UP (ref 0.5–1.3)
EGFR: 95 ML/MIN/1.73M2 — SIGNIFICANT CHANGE UP
EOSINOPHIL # BLD AUTO: 0.03 K/UL — SIGNIFICANT CHANGE UP (ref 0–0.5)
EOSINOPHIL NFR BLD AUTO: 0.5 % — SIGNIFICANT CHANGE UP (ref 0–6)
GLUCOSE SERPL-MCNC: 114 MG/DL — HIGH (ref 70–99)
HCT VFR BLD CALC: 28.3 % — LOW (ref 39–50)
HGB BLD-MCNC: 8.6 G/DL — LOW (ref 13–17)
IANC: 3.55 K/UL — SIGNIFICANT CHANGE UP (ref 1.8–7.4)
IMM GRANULOCYTES NFR BLD AUTO: 1 % — HIGH (ref 0–0.9)
LYMPHOCYTES # BLD AUTO: 2.02 K/UL — SIGNIFICANT CHANGE UP (ref 1–3.3)
LYMPHOCYTES # BLD AUTO: 32.5 % — SIGNIFICANT CHANGE UP (ref 13–44)
MAGNESIUM SERPL-MCNC: 2 MG/DL — SIGNIFICANT CHANGE UP (ref 1.6–2.6)
MCHC RBC-ENTMCNC: 24.3 PG — LOW (ref 27–34)
MCHC RBC-ENTMCNC: 30.4 G/DL — LOW (ref 32–36)
MCV RBC AUTO: 79.9 FL — LOW (ref 80–100)
MONOCYTES # BLD AUTO: 0.55 K/UL — SIGNIFICANT CHANGE UP (ref 0–0.9)
MONOCYTES NFR BLD AUTO: 8.8 % — SIGNIFICANT CHANGE UP (ref 2–14)
NEUTROPHILS # BLD AUTO: 3.55 K/UL — SIGNIFICANT CHANGE UP (ref 1.8–7.4)
NEUTROPHILS NFR BLD AUTO: 57 % — SIGNIFICANT CHANGE UP (ref 43–77)
NRBC # BLD: 0 /100 WBCS — SIGNIFICANT CHANGE UP (ref 0–0)
NRBC # FLD: 0 K/UL — SIGNIFICANT CHANGE UP (ref 0–0)
PHOSPHATE SERPL-MCNC: 3.5 MG/DL — SIGNIFICANT CHANGE UP (ref 2.5–4.5)
PLATELET # BLD AUTO: 207 K/UL — SIGNIFICANT CHANGE UP (ref 150–400)
POTASSIUM SERPL-MCNC: 4.1 MMOL/L — SIGNIFICANT CHANGE UP (ref 3.5–5.3)
POTASSIUM SERPL-SCNC: 4.1 MMOL/L — SIGNIFICANT CHANGE UP (ref 3.5–5.3)
PROT SERPL-MCNC: 5 G/DL — LOW (ref 6–8.3)
RBC # BLD: 3.54 M/UL — LOW (ref 4.2–5.8)
RBC # FLD: 18.3 % — HIGH (ref 10.3–14.5)
SODIUM SERPL-SCNC: 137 MMOL/L — SIGNIFICANT CHANGE UP (ref 135–145)
WBC # BLD: 6.22 K/UL — SIGNIFICANT CHANGE UP (ref 3.8–10.5)
WBC # FLD AUTO: 6.22 K/UL — SIGNIFICANT CHANGE UP (ref 3.8–10.5)

## 2024-12-03 PROCEDURE — 99232 SBSQ HOSP IP/OBS MODERATE 35: CPT | Mod: GC

## 2024-12-03 PROCEDURE — 76705 ECHO EXAM OF ABDOMEN: CPT | Mod: 26

## 2024-12-03 RX ORDER — CHLORHEXIDINE GLUCONATE 1.2 MG/ML
1 RINSE ORAL DAILY
Refills: 0 | Status: DISCONTINUED | OUTPATIENT
Start: 2024-12-03 | End: 2024-12-03

## 2024-12-03 RX ORDER — INFLIXIMAB 100 MG/10ML
100 INJECTION, POWDER, LYOPHILIZED, FOR SOLUTION INTRAVENOUS
Refills: 0 | Status: ACTIVE | OUTPATIENT
Start: 2024-12-03

## 2024-12-03 RX ORDER — HEPARIN SODIUM,PORCINE 1000/ML
5000 VIAL (ML) INJECTION EVERY 12 HOURS
Refills: 0 | Status: DISCONTINUED | OUTPATIENT
Start: 2024-12-03 | End: 2024-12-03

## 2024-12-03 RX ADMIN — Medication 5000 UNIT(S): at 05:54

## 2024-12-03 RX ADMIN — Medication 5 MILLIGRAM(S): at 12:42

## 2024-12-03 RX ADMIN — Medication 4: at 12:44

## 2024-12-03 RX ADMIN — MEMANTINE HYDROCHLORIDE 10 MILLIGRAM(S): 14 CAPSULE, EXTENDED RELEASE ORAL at 05:54

## 2024-12-03 RX ADMIN — Medication 1 MILLIGRAM(S): at 12:42

## 2024-12-03 RX ADMIN — Medication 81 MILLIGRAM(S): at 12:42

## 2024-12-03 RX ADMIN — CHLORHEXIDINE GLUCONATE 1 APPLICATION(S): 1.2 RINSE ORAL at 12:43

## 2024-12-03 RX ADMIN — Medication 400 UNIT(S): at 12:43

## 2024-12-03 RX ADMIN — MESALAMINE 1600 MILLIGRAM(S): 375 CAPSULE, EXTENDED RELEASE ORAL at 10:38

## 2024-12-03 RX ADMIN — SERTRALINE HYDROCHLORIDE 100 MILLIGRAM(S): 100 TABLET, FILM COATED ORAL at 12:42

## 2024-12-03 RX ADMIN — PREDNISONE 10 MILLIGRAM(S): 20 TABLET ORAL at 05:54

## 2024-12-03 NOTE — DISCHARGE NOTE NURSING/CASE MANAGEMENT/SOCIAL WORK - PATIENT PORTAL LINK FT
You can access the FollowMyHealth Patient Portal offered by Health system by registering at the following website: [email or web address]. By joining [facility]’s FollowMyHealth portal, you will also be able to view your health information using other applications (apps) compatible with our system.

## 2024-12-03 NOTE — PROGRESS NOTE ADULT - PROBLEM SELECTOR PLAN 9
Resolved  - Pt reports watery diarrhea (>5 times per day) iso of immunosuppression may be 2/2 to C diff infection vs IBD flare  - CRP elevated, stool culture negative, c. diff negative  - CMV PCR indicative low grade viremia, given clinical improvement no need to treat per ID  - Blood cx NGTD  PLAN:  - Per GI, can restart same dose of methotrexate and prednisone, did not require increase in steroid dose, symptoms self resolved - c/w 0.4mg tamsoulosin bid  - c/w finasteride 5mg qd       - on chronic Trimethoprim 100mg QD for UTI ppx. Last saw urology in 2023.   - F/u outpatient with urology for reinstating of antibx if needed

## 2024-12-03 NOTE — PROGRESS NOTE ADULT - PROBLEM SELECTOR PLAN 12
Resolved  - Cr 1.45 on admission, baseline 1.08  - likely prerenal iso of dehydration, diarrhea, poor PO intake  - improved s/p 2 L 0.9% NaCl  - Now Cr at baseline

## 2024-12-03 NOTE — PROGRESS NOTE ADULT - PROBLEM SELECTOR PROBLEM 12
Prophylactic measure
CRISTINA (acute kidney injury)

## 2024-12-03 NOTE — PROGRESS NOTE ADULT - PROBLEM SELECTOR PLAN 1
- Pt with longstanding history of IBD (UC) and recent increase in diarrhea this week  - Most likely crohns per GI, biopsy results from 12/2022 unclear picture.   - On remicade q 6 weeks (last infusion 11/20), methotrexate 2.5 mg ( 4 tabs on monday mornings), prednisone 10mg BID, mesalamine 800mg   - CT abdomen negative for colitis, bowel obstruction, or active IBD flare    PLAN:   - c/w prednisone 10mg BID, methotrexate 2.5 mg every week ( 4 tabs on monday mornings), and mesalamine 800mg per GI recommendations  - Next dose of remicade dose should be 01/25  - GI follow up outpatient - Pt with longstanding history of IBD ([facility]) and recent increase in diarrhea this week  - Most likely crohns per GI, biopsy results from 12/2022 unclear picture.   - On remicade q 6 weeks (last infusion 11/20), methotrexate 2.5 mg ( 4 tabs on monday mornings), prednisone 10mg BID, mesalamine 800mg   - CT abdomen negative for colitis, bowel obstruction, or active IBD flare, calprotectin in 700s    PLAN:   - c/w prednisone 10mg BID, methotrexate 2.5 mg every week ( 4 tabs on monday mornings), and mesalamine 800mg per GI recommendations  - Next dose of remicade dose should be 01/25  - GI follow up outpatient

## 2024-12-03 NOTE — PROGRESS NOTE ADULT - SUBJECTIVE AND OBJECTIVE BOX
Dhiraj Andre M.D  Resident  Department of Medicine  Available on [employer] Teams    ***********************************************************************  Patient is a 74y old  Male who presents with a chief complaint of Acute renal failure     (01 Dec 2024 07:36)      SUBJECTIVE / OVERNIGHT EVENTS: Patient seen and examined at bedside.     ADDITIONAL REVIEW OF SYSTEMS:    MEDICATIONS  (STANDING):  aspirin enteric coated 81 milliGRAM(s) Oral daily  atorvastatin 40 milliGRAM(s) Oral at bedtime  chlorhexidine 2% Cloths 1 Application(s) Topical daily  cholecalciferol 400 Unit(s) Oral daily  dextrose 5%. 1000 milliLiter(s) (50 mL/Hr) IV Continuous <Continuous>  dextrose 5%. 1000 milliLiter(s) (100 mL/Hr) IV Continuous <Continuous>  dextrose 50% Injectable 25 Gram(s) IV Push once  dextrose 50% Injectable 12.5 Gram(s) IV Push once  dextrose 50% Injectable 25 Gram(s) IV Push once  dextrose Oral Gel 15 Gram(s) Oral once  finasteride 5 milliGRAM(s) Oral daily  folic acid 1 milliGRAM(s) Oral daily  glucagon  Injectable 1 milliGRAM(s) IntraMuscular once  heparin   Injectable 5000 Unit(s) SubCutaneous every 12 hours  insulin glargine Injectable (LANTUS) 15 Unit(s) SubCutaneous at bedtime  insulin lispro (ADMELOG) corrective regimen sliding scale   SubCutaneous three times a day before meals  insulin lispro (ADMELOG) corrective regimen sliding scale   SubCutaneous at bedtime  memantine 10 milliGRAM(s) Oral two times a day  mesalamine DR Capsule 1600 milliGRAM(s) Oral every 12 hours  methotrexate (Non - oncologic) 10 milliGRAM(s) Oral <User Schedule>  predniSONE   Tablet 10 milliGRAM(s) Oral two times a day  QUEtiapine 50 milliGRAM(s) Oral at bedtime  sertraline 100 milliGRAM(s) Oral daily  tamsulosin 0.4 milliGRAM(s) Oral at bedtime    MEDICATIONS  (PRN):      CAPILLARY BLOOD GLUCOSE      POCT Blood Glucose.: 130 mg/dL (03 Dec 2024 08:28)  POCT Blood Glucose.: 157 mg/dL (02 Dec 2024 21:44)  POCT Blood Glucose.: 107 mg/dL (02 Dec 2024 17:33)  POCT Blood Glucose.: 200 mg/dL (02 Dec 2024 12:04)    I&O's Summary      PHYSICAL EXAM:    Vital Signs Last 24 Hrs  T(C): 37.1 (03 Dec 2024 05:07), Max: 37.1 (03 Dec 2024 05:07)  T(F): 98.8 (03 Dec 2024 05:07), Max: 98.8 (03 Dec 2024 05:07)  HR: 80 (03 Dec 2024 05:07) (80 - 91)  BP: 101/55 (03 Dec 2024 05:07) (101/55 - 123/58)  BP(mean): --  RR: 18 (03 Dec 2024 05:07) (18 - 18)  SpO2: 98% (03 Dec 2024 05:07) (98% - 99%)    Parameters below as of 03 Dec 2024 05:07  Patient On (Oxygen Delivery Method): room air        CONSTITUTIONAL: NAD, well-developed, well-groomed  EYES: Conjunctiva and sclera clear  ENMT: Moist oral mucosa, no pharyngeal injection or exudates; normal dentition  NECK: Supple, no palpable masses; no thyromegaly  RESPIRATORY: Normal respiratory effort; lungs are clear to auscultation bilaterally  CARDIOVASCULAR: Regular rate and rhythm, normal S1 and S2, no murmur/rub/gallop; No lower extremity edema; Peripheral pulses are 2+ bilaterally  ABDOMEN: Soft, nontender to palpation, normoactive bowel sounds, no rebound/guarding  MUSCULOSKELETAL: No clubbing or cyanosis of digits; no joint swelling or tenderness to palpation  PSYCH: A+O to person, place, and time; affect appropriate  NEUROLOGY: CN 2-12 are intact and symmetric; no gross sensory deficits   SKIN: No rashes; no palpable lesions    LABS:                        8.6    6.22  )-----------( 207      ( 03 Dec 2024 05:35 )             28.3     12-03    137  |  103  |  16  ----------------------------<  114[H]  4.1   |  24  |  0.72    Ca    7.9[L]      03 Dec 2024 05:35  Phos  3.5     12-03  Mg     2.00     12-03    TPro  5.0[L]  /  Alb  2.6[L]  /  TBili  0.5  /  DBili  x   /  AST  82[H]  /  ALT  85[H]  /  AlkPhos  94  12-03          Urinalysis Basic - ( 03 Dec 2024 05:35 )    Color: x / Appearance: x / SG: x / pH: x  Gluc: 114 mg/dL / Ketone: x  / Bili: x / Urobili: x   Blood: x / Protein: x / Nitrite: x   Leuk Esterase: x / RBC: x / WBC x   Sq Epi: x / Non Sq Epi: x / Bacteria: x          RADIOLOGY & ADDITIONAL TESTS: No new imaging  Results Reviewed: Yes  Imaging Personally Reviewed:  Electrocardiogram Personally Reviewed:    COORDINATION OF CARE:  Care Discussed with Consultants/Other Providers [Y/N]:  Prior or Outpatient Records Reviewed [Y/N]:   Dhiraj Andre M.D  Resident  Department of Medicine  Available on [employer] Teams    ***********************************************************************  Patient is a 74y old  Male who presents with a chief complaint of Acute renal failure     (01 Dec 2024 07:36)      SUBJECTIVE / OVERNIGHT EVENTS: Patient seen and examined at bedside. Denied any abdominal or LE pain this am.     ADDITIONAL REVIEW OF SYSTEMS:    MEDICATIONS  (STANDING):  aspirin enteric coated 81 milliGRAM(s) Oral daily  atorvastatin 40 milliGRAM(s) Oral at bedtime  chlorhexidine 2% Cloths 1 Application(s) Topical daily  cholecalciferol 400 Unit(s) Oral daily  dextrose 5%. 1000 milliLiter(s) (50 mL/Hr) IV Continuous <Continuous>  dextrose 5%. 1000 milliLiter(s) (100 mL/Hr) IV Continuous <Continuous>  dextrose 50% Injectable 25 Gram(s) IV Push once  dextrose 50% Injectable 12.5 Gram(s) IV Push once  dextrose 50% Injectable 25 Gram(s) IV Push once  dextrose Oral Gel 15 Gram(s) Oral once  finasteride 5 milliGRAM(s) Oral daily  folic acid 1 milliGRAM(s) Oral daily  glucagon  Injectable 1 milliGRAM(s) IntraMuscular once  heparin   Injectable 5000 Unit(s) SubCutaneous every 12 hours  insulin glargine Injectable (LANTUS) 15 Unit(s) SubCutaneous at bedtime  insulin lispro (ADMELOG) corrective regimen sliding scale   SubCutaneous three times a day before meals  insulin lispro (ADMELOG) corrective regimen sliding scale   SubCutaneous at bedtime  memantine 10 milliGRAM(s) Oral two times a day  mesalamine DR Capsule 1600 milliGRAM(s) Oral every 12 hours  methotrexate (Non - oncologic) 10 milliGRAM(s) Oral <User Schedule>  predniSONE   Tablet 10 milliGRAM(s) Oral two times a day  QUEtiapine 50 milliGRAM(s) Oral at bedtime  sertraline 100 milliGRAM(s) Oral daily  tamsulosin 0.4 milliGRAM(s) Oral at bedtime    MEDICATIONS  (PRN):      CAPILLARY BLOOD GLUCOSE      POCT Blood Glucose.: 130 mg/dL (03 Dec 2024 08:28)  POCT Blood Glucose.: 157 mg/dL (02 Dec 2024 21:44)  POCT Blood Glucose.: 107 mg/dL (02 Dec 2024 17:33)  POCT Blood Glucose.: 200 mg/dL (02 Dec 2024 12:04)    I&O's Summary      PHYSICAL EXAM:    Vital Signs Last 24 Hrs  T(C): 37.1 (03 Dec 2024 05:07), Max: 37.1 (03 Dec 2024 05:07)  T(F): 98.8 (03 Dec 2024 05:07), Max: 98.8 (03 Dec 2024 05:07)  HR: 80 (03 Dec 2024 05:07) (80 - 91)  BP: 101/55 (03 Dec 2024 05:07) (101/55 - 123/58)  BP(mean): --  RR: 18 (03 Dec 2024 05:07) (18 - 18)  SpO2: 98% (03 Dec 2024 05:07) (98% - 99%)    Parameters below as of 03 Dec 2024 05:07  Patient On (Oxygen Delivery Method): room air        CONSTITUTIONAL: NAD, well-developed, well-groomed  EYES: Conjunctiva and sclera clear  ENMT: Moist oral mucosa, no pharyngeal injection or exudates; normal dentition  NECK: Supple, no palpable masses; no thyromegaly  RESPIRATORY: Normal respiratory effort; lungs are clear to auscultation bilaterally  CARDIOVASCULAR: Regular rate and rhythm, normal S1 and S2, no murmur/rub/gallop; No lower extremity edema; Peripheral pulses are 2+ bilaterally  ABDOMEN: Soft, nontender to palpation, normoactive bowel sounds, no rebound/guarding  MUSCULOSKELETAL: No clubbing or cyanosis of digits; no joint swelling or tenderness to palpation  PSYCH: A+O to person, place, and time; affect appropriate  NEUROLOGY: CN 2-12 are intact and symmetric; no gross sensory deficits   SKIN: No rashes; no palpable lesions    LABS:                        8.6    6.22  )-----------( 207      ( 03 Dec 2024 05:35 )             28.3     12-03    137  |  103  |  16  ----------------------------<  114[H]  4.1   |  24  |  0.72    Ca    7.9[L]      03 Dec 2024 05:35  Phos  3.5     12-03  Mg     2.00     12-03    TPro  5.0[L]  /  Alb  2.6[L]  /  TBili  0.5  /  DBili  x   /  AST  82[H]  /  ALT  85[H]  /  AlkPhos  94  12-03          Urinalysis Basic - ( 03 Dec 2024 05:35 )    Color: x / Appearance: x / SG: x / pH: x  Gluc: 114 mg/dL / Ketone: x  / Bili: x / Urobili: x   Blood: x / Protein: x / Nitrite: x   Leuk Esterase: x / RBC: x / WBC x   Sq Epi: x / Non Sq Epi: x / Bacteria: x          RADIOLOGY & ADDITIONAL TESTS: No new imaging  Results Reviewed: Yes  Imaging Personally Reviewed:  Electrocardiogram Personally Reviewed:    COORDINATION OF CARE:  Care Discussed with Consultants/Other Providers [Y/N]:  Prior or Outpatient Records Reviewed [Y/N]:   Dhiraj Andre M.D  Resident  Department of Medicine  Available on [employer] Teams    ***********************************************************************  Patient is a 74y old  Male who presents with a chief complaint of Acute renal failure     (01 Dec 2024 07:36)      SUBJECTIVE / OVERNIGHT EVENTS: Patient seen and examined at bedside. Denied any abdominal or LE pain this am.     ADDITIONAL REVIEW OF SYSTEMS:    MEDICATIONS  (STANDING):  aspirin enteric coated 81 milliGRAM(s) Oral daily  atorvastatin 40 milliGRAM(s) Oral at bedtime  chlorhexidine 2% Cloths 1 Application(s) Topical daily  cholecalciferol 400 Unit(s) Oral daily  dextrose 5%. 1000 milliLiter(s) (50 mL/Hr) IV Continuous <Continuous>  dextrose 5%. 1000 milliLiter(s) (100 mL/Hr) IV Continuous <Continuous>  dextrose 50% Injectable 25 Gram(s) IV Push once  dextrose 50% Injectable 12.5 Gram(s) IV Push once  dextrose 50% Injectable 25 Gram(s) IV Push once  dextrose Oral Gel 15 Gram(s) Oral once  finasteride 5 milliGRAM(s) Oral daily  folic acid 1 milliGRAM(s) Oral daily  glucagon  Injectable 1 milliGRAM(s) IntraMuscular once  heparin   Injectable 5000 Unit(s) SubCutaneous every 12 hours  insulin glargine Injectable (LANTUS) 15 Unit(s) SubCutaneous at bedtime  insulin lispro (ADMELOG) corrective regimen sliding scale   SubCutaneous three times a day before meals  insulin lispro (ADMELOG) corrective regimen sliding scale   SubCutaneous at bedtime  memantine 10 milliGRAM(s) Oral two times a day  mesalamine DR Capsule 1600 milliGRAM(s) Oral every 12 hours  methotrexate (Non - oncologic) 10 milliGRAM(s) Oral <User Schedule>  predniSONE   Tablet 10 milliGRAM(s) Oral two times a day  QUEtiapine 50 milliGRAM(s) Oral at bedtime  sertraline 100 milliGRAM(s) Oral daily  tamsulosin 0.4 milliGRAM(s) Oral at bedtime    MEDICATIONS  (PRN):      CAPILLARY BLOOD GLUCOSE      POCT Blood Glucose.: 130 mg/dL (03 Dec 2024 08:28)  POCT Blood Glucose.: 157 mg/dL (02 Dec 2024 21:44)  POCT Blood Glucose.: 107 mg/dL (02 Dec 2024 17:33)  POCT Blood Glucose.: 200 mg/dL (02 Dec 2024 12:04)    I&O's Summary      PHYSICAL EXAM:    Vital Signs Last 24 Hrs  T(C): 37.1 (03 Dec 2024 05:07), Max: 37.1 (03 Dec 2024 05:07)  T(F): 98.8 (03 Dec 2024 05:07), Max: 98.8 (03 Dec 2024 05:07)  HR: 80 (03 Dec 2024 05:07) (80 - 91)  BP: 101/55 (03 Dec 2024 05:07) (101/55 - 123/58)  BP(mean): --  RR: 18 (03 Dec 2024 05:07) (18 - 18)  SpO2: 98% (03 Dec 2024 05:07) (98% - 99%)    Parameters below as of 03 Dec 2024 05:07  Patient On (Oxygen Delivery Method): room air        CONSTITUTIONAL: NAD, well-developed, well-groomed  EYES: Conjunctiva and sclera clear  RESPIRATORY: Normal respiratory effort; lungs are clear to auscultation bilaterally  CARDIOVASCULAR: Regular rate and rhythm, normal S1 and S2, no murmur/rub/gallop; No lower extremity edema; Peripheral pulses are 2+ bilaterally  ABDOMEN: Soft, nontender to palpation, normoactive bowel sounds, no rebound/guarding  MUSCULOSKELETAL: No clubbing or cyanosis of digits; no joint swelling or tenderness to palpation  PSYCH: A+O to person, place  NEUROLOGY: CN 2-12 are intact and symmetric; no gross sensory deficits   SKIN: No rashes; no palpable lesions    LABS:                        8.6    6.22  )-----------( 207      ( 03 Dec 2024 05:35 )             28.3     12-03    137  |  103  |  16  ----------------------------<  114[H]  4.1   |  24  |  0.72    Ca    7.9[L]      03 Dec 2024 05:35  Phos  3.5     12-03  Mg     2.00     12-03    TPro  5.0[L]  /  Alb  2.6[L]  /  TBili  0.5  /  DBili  x   /  AST  82[H]  /  ALT  85[H]  /  AlkPhos  94  12-03          Urinalysis Basic - ( 03 Dec 2024 05:35 )    Color: x / Appearance: x / SG: x / pH: x  Gluc: 114 mg/dL / Ketone: x  / Bili: x / Urobili: x   Blood: x / Protein: x / Nitrite: x   Leuk Esterase: x / RBC: x / WBC x   Sq Epi: x / Non Sq Epi: x / Bacteria: x          RADIOLOGY & ADDITIONAL TESTS: No new imaging  Results Reviewed: Yes  Imaging Personally Reviewed:  Electrocardiogram Personally Reviewed:    COORDINATION OF CARE:  Care Discussed with Consultants/Other Providers [Y/N]:  Prior or Outpatient Records Reviewed [Y/N]:

## 2024-12-03 NOTE — PROGRESS NOTE ADULT - ATTENDING COMMENTS
75 y/o male with a PMHx of IBD (most likely Crohn's), HTN, HLD, T2DM, and cognitive impairment who presents with poor PO intake, fatigue, increased urination, and loose nonbloody, mucous stools for the past 5 days with 2 falls yesterday prompting visit to the ED.  c/w methotrexate /infliximab/steroids
73 y/o M with PMH  IBD, on methotrexate, remicade (q 6 weeks), HTN, HLD, T2DM, mild cognitive impairment (AOx2 baseline), hx of prior CVA, presenting with poor PO intake, fatigue, 2 unwitnessed falls, slurred speech since , and nonbloody diarrhea for the past 5 days.     # Diarrhea - Improved Likely secondary to UC flare vs infection. GI consulted, rec continue Mesalamine and prednisone. Hold MTX. Likely will need colonoscopy oupt   # SIRS- May be secondary to IBD flare. Infectious workup has been neg.  urine culture neg. c diff neg. Blood cx neg.  stool cx neg, f/u GI PCR. Abx discontinued   # Fall- Pt reportedly with chronic weakness. Orthostatics neg, TTE unremarkable. Suspect pt hypovolemic in setting of diarrhea and poor PO intake leading to fall. Pt otherwise non focal. Wife reported pt's speech appeared slurred, not appreciated on my exam.  Continuing home ASA 81mg QD. Home Atorvastatin increased from 40mg QD to 80mg QD. Hold Plavix. PT rec [facility]  # Metabolic acidosis d/t underlying infection. Abx and IVF- change to bicarb gtt   # DM. As noted above.   # Renal cyst. Kidney US r/e simple vs. complex cyst. Outpt urology f/u.       Time spent on discharge planninmin
73 y/o M with PMH  IBD, on methotrexate, remicade (q 6 weeks), HTN, HLD, T2DM, mild cognitive impairment (AOx2 baseline), hx of prior CVA, presenting with poor PO intake, fatigue, 2 unwitnessed falls, slurred speech since 11/21, and nonbloody diarrhea for the past 5 days.     # Diarrhea - Improved Likely secondary to UC flare vs infection. GI consulted, rec continue Mesalamine and prednisone. Resume home MTX 12/2 dose. Likely will need colonoscopy oupt   # SIRS- May be secondary to IBD flare. Infectious workup has been neg.  urine culture neg. c diff neg. Blood cx neg.  stool cx neg, f/u GI PCR. Abx discontinued.  # Fall- Pt reportedly with chronic weakness. Orthostatics neg, TTE unremarkable. Suspect pt hypovolemic in setting of diarrhea and poor PO intake leading to fall. Pt otherwise non focal. Wife reported pt's speech appeared slurred, not appreciated on my exam.  Continuing home ASA 81mg QD. Home Atorvastatin increased from 40mg QD to 80mg QD. Hold Plavix.  # DM. As noted above.   # Renal cyst. Kidney US r/e simple vs. complex cyst. Outpt urology f/u.   # Transaminitis. CT abdomen liver wnl. Monitor LFTs. Avoid hepatotoxic agents.    PT recommends [facility]LENA, f/u SW/CM
73 y/o M with PMH  IBD, on methotrexate, remicade (q 6 weeks), HTN, HLD, T2DM, mild cognitive impairment (AOx2 baseline), hx of prior CVA, presenting with poor PO intake, fatigue, 2 unwitnessed falls, slurred speech since 11/21, and nonbloody diarrhea for the past 5 days.     # Diarrhea - Improved Likely secondary to UC flare vs infection. GI consulted, rec continue Mesalamine and prednisone. Resume home MTX when scheduled. Likely will need colonoscopy oupt   # SIRS- May be secondary to IBD flare. Infectious workup has been neg.  urine culture neg. c diff neg. Blood cx neg.  stool cx neg, f/u GI PCR. Abx discontinued.  # Fall- Pt reportedly with chronic weakness. Orthostatics neg, TTE unremarkable. Suspect pt hypovolemic in setting of diarrhea and poor PO intake leading to fall. Pt otherwise non focal. Wife reported pt's speech appeared slurred, not appreciated on my exam.  Continuing home ASA 81mg QD. Home Atorvastatin increased from 40mg QD to 80mg QD. Hold Plavix. PT rec [facility]  # Metabolic acidosis d/t underlying infection. w/u as above  # DM. As noted above.   # Renal cyst. Kidney US r/e simple vs. complex cyst. Outpt urology f/u.   # Transaminitis. CT abdomen liver wnl. Monitor LFTs. Avoid hepatotoxic agents.    PT recommends [facility]
75 y/o M with PMH  IBD, on methotrexate, remicade (q 6 weeks), HTN, HLD, T2DM, mild cognitive impairment (AOx2 baseline), hx of prior CVA, presenting with poor PO intake, fatigue, 2 unwitnessed falls, slurred speech since 11/21, and nonbloody diarrhea for the past 5 days.     # Diarrhea - Improved Likely secondary to UC flare vs infection. GI consulted, rec continue Mesalamine and prednisone. Resume home MTX when scheduled. Likely will need colonoscopy oupt   # SIRS- May be secondary to IBD flare. Infectious workup has been neg.  urine culture neg. c diff neg. Blood cx neg.  stool cx neg, f/u GI PCR. Abx discontinued.  # Fall- Pt reportedly with chronic weakness. Orthostatics neg, TTE unremarkable. Suspect pt hypovolemic in setting of diarrhea and poor PO intake leading to fall. Pt otherwise non focal. Wife reported pt's speech appeared slurred, not appreciated on my exam.  Continuing home ASA 81mg QD. Home Atorvastatin increased from 40mg QD to 80mg QD. Hold Plavix.  # DM. As noted above.   # Renal cyst. Kidney US r/e simple vs. complex cyst. Outpt urology f/u.   # Transaminitis. CT abdomen liver wnl. Monitor LFTs. Avoid hepatotoxic agents.    PT recommends [facility]
75 y/o M with PMH  IBD, on methotrexate, remicade (q 6 weeks), HTN, HLD, T2DM, mild cognitive impairment (AOx2 baseline), hx of prior CVA, presenting with poor PO intake, fatigue, 2 unwitnessed falls, slurred speech since 11/21, and nonbloody diarrhea for the past 5 days.     # Diarrhea - Improved Likely secondary to UC flare vs infection. GI consulted, rec continue Mesalamine and prednisone. Resume home MTX when scheduled. Likely will need colonoscopy oupt   # SIRS- May be secondary to IBD flare. Infectious workup has been neg.  urine culture neg. c diff neg. Blood cx neg.  stool cx neg, f/u GI PCR. Abx discontinued.  # Fall- Pt reportedly with chronic weakness. Orthostatics neg, TTE unremarkable. Suspect pt hypovolemic in setting of diarrhea and poor PO intake leading to fall. Pt otherwise non focal. Wife reported pt's speech appeared slurred, not appreciated on my exam.  Continuing home ASA 81mg QD. Home Atorvastatin increased from 40mg QD to 80mg QD. Hold Plavix. PT rec [facility]  # Metabolic acidosis d/t underlying infection. w/u as above  # DM. As noted above.   # Renal cyst. Kidney US r/e simple vs. complex cyst. Outpt urology f/u.   # Transaminitis LFTs 63/25->99/73. CT abdomen liver wnl. Monitor LFTs. Avoid hepatotoxic agents.    PT recommends [facility]
75 y/o M with PMH  IBD, on methotrexate, remicade (q 6 weeks), HTN, HLD, T2DM, mild cognitive impairment (AOx2 baseline), hx of prior CVA, presenting with poor PO intake, fatigue, 2 unwitnessed falls, slurred speech since 11/21, and nonbloody diarrhea for the past 5 days.     # Diarrhea - Likely secondary to UC flare vs infection. GI consulted, rec r/o infectious cause. Continue Mesalamine and prednisone. Hold MTX. Likely will need colonoscopy this admission  # SIRS- May be secondary to IBD flare. However, ruling out infectious sources include GI vs urine.  urine culture neg. c diff neg. Blood cx neg.  stool cx neg, f/u GI PCR. Will dc abx   # Fall- Pt reportedly with chronic weakness. Orthostatics neg, TTE unremarkable. Suspect pt hypovolemic in setting of diarrhea and poor PO intake leading to fall. Pt otherwise non focal. Wife reported pt's speech appeared slurred, not appreciated on my exam.  Continuing home ASA 81mg QD. Home Atorvastatin increased from 40mg QD to 80mg QD. Hold Plavix. PT rec [facility]  # Metabolic acidosis d/t underlying infection. Abx and IVF- change to bicarb gtt   # DM. As noted above.   # Renal cyst. Kidney US r/e simple vs. complex cyst. Outpt urology f/u.         Code: Family and patient want full code  DVT ppx: Heparin subq q12h
73 y/o M with PMH  IBD, on methotrexate, remicade (q 6 weeks), HTN, HLD, T2DM, mild cognitive impairment (AOx2 baseline), hx of prior CVA, presenting with poor PO intake, fatigue, 2 unwitnessed falls, slurred speech since 11/21, and nonbloody diarrhea for the past 5 days.     # Diarrhea - Likely secondary to UC flare vs infection. GI consulted, rec r/o infectious cause. Continue Mesalamine and prednisone. Hold MTX. Likely will need colonoscopy this admission  # SIRS- May be secondary to IBD flare. However, ruling out infectious sources include GI vs urine, follow urine culture. Follow blood cx. f/u stool cx, GIC PCR and cdiff  C/w empiric coverage for now.   # Fall- Pt reportedly with chronic weakness. Check orthostatics, TTE. Suspect pt hypovolemic in setting of diarrhea and poor PO intake leading to fall. Pt otherwise non focal. Wife reported pt's speech appeared slurred, not appreciated on my exam.  Continuing home ASA 81mg QD. Home Atorvastatin increased from 40mg QD to 80mg QD. Hold Plavix. Consult PT, fall precautions  # Systolic murmur. Follow TTE.   # Metabolic acidosis d/t underlying infection. Abx and IVF- change to bicarb gtt   # DM. As noted above.   # Renal cyst. Kidney US r/e simple vs. complex cyst. Outpt urology f/u.         Code: Family and patient want full code  DVT ppx: Heparin subq q12h
73 y/o Man IBD, on methotrexate, remicade (q 6 weeks), mild cognitive impairment (AOx2 baseline), hx of prior CVA, presenting with poor PO intake, fatigue, 2 unwitnessed falls and nonbloody diarrhea for the past 5 days.     # Diarrhea - resolved. Resumed home MTX 12/2 dose. Likely will need colonoscopy oupt   # Fall- Pt reportedly with chronic weakness. Orthostatics neg, TTE with mild AS. Suspect pt hypovolemic in setting of diarrhea and poor PO intake leading to fall. Cardiology follow up as o/p. Awaiting rehab placement  # hx CVA: Continue home ASA 81mg QD, statin.   # DM. Continue decreased does of insulin.   # Transaminitis. CT abdomen liver wnl. Monitor LFTs. May be secondary to medication, will check RUQ u/s.
75 y/o Man IBD, on methotrexate, remicade (q 6 weeks), mild cognitive impairment (AOx2 baseline), hx of prior CVA, presenting with poor PO intake, fatigue, 2 unwitnessed falls and nonbloody diarrhea for the past 5 days.     # Diarrhea - resolved. Resume home MTX 12/2 dose. Likely will need colonoscopy oupt   # Fall- Pt reportedly with chronic weakness. Orthostatics neg, TTE with mild AS. Suspect pt hypovolemic in setting of diarrhea and poor PO intake leading to fall. Cardiology follow up as o/p  # hx CVA: Continue home ASA 81mg QD, statin.   # DM. Continue decreased does of insulin.   # Transaminitis. CT abdomen liver wnl. Monitor LFTs. Avoid hepatotoxic agents.    Rest as above

## 2024-12-03 NOTE — PROGRESS NOTE ADULT - PROBLEM SELECTOR PLAN 6
- known L renal cyst from 2022, CT 11/24 showing R renal cyst as well.   - 11/24 kidney US: Left renal cyst measuring 2.0 cm. - 12/03: Iron 24, Tsat 11, Ferritin 131    Likely anemia of chronic disease with a component of ZAID    Plan:  - Hold off iron supplementation at this point

## 2024-12-03 NOTE — PROVIDER CONTACT NOTE (OTHER) - RECOMMENDATIONS
Notify MD
continue current IV fluids
Notify MD
Notify MD
IV fluids.
Notify MD
Notify Md
IV fluids.
Notify Md

## 2024-12-03 NOTE — PROVIDER CONTACT NOTE (OTHER) - ASSESSMENT
Patient called and was wondering if he should stop his Diclofenac when he starts taking his Duloxetine.     He can be reached at [phone].  
No acute distress noted, denies chest pain. /60, Temp 97.2
No signs or symptoms of acute distress noted. HR 89
Patient is A&O times 2 (baseline). Confused. Patient declined headache or dizziness. Patient has total 6 episode of diarrhea, possible contributing to low DBP and tachycardia. VS: BP: 114/52, temp: 98.3, HR: 117, RR: 19, and SpO2: 100%.
Patient is A&O times 2. Confused. Patient declined any chest discomfort or chest pain.. Patient has total 4 episode of diarrhea, contributed to low DBP and tachycardia.. VS: BP: 135/54, temp: 97.4, HR: 112, RR: 17, and SpO2: 100%.
Patient is A&O times 2 (baseline). Confused. Patient was sleeping. Patient declined headache or dizziness. Patient has multiple episodes of diarrhea since 11/25, possible contributing to low DBP and tachycardia. VS: BP: 125/44, temp: 97.7, HR: 107, RR: 17, and SpO2: 98%.
 all other vitals stable. No signs or symptoms of acute distress noted.
/58, hr 88. No acute distress noted.
No acute distress noted, denies dizziness or lightheadedness.
/55 HR 80
Patient is A&O times 2 (baseline). Confused. Patient was sleeping. Patient declined headache or dizziness. Patient has total 6 episode of diarrhea since 11/25, possible contributing to low DBP and tachycardia. VS: BP: 100/60, temp: 97.5, HR: 108, RR: 18, and SpO2: 96%. The BP is going to soft side.
Patient is A&O times 2. Confused. Patient declined headache or dizziness. Patient has total 4 episode of diarrhea, contributed to low DBP. VS: BP: 137/49, temp: 98, HR: 108, RR: 19, and SpO2: 100%.

## 2024-12-03 NOTE — PROGRESS NOTE ADULT - PROBLEM SELECTOR PLAN 8
Resolved  - On arrival Patient met SIRS criteria given tachycardia and elevated WBC (33% bandemia)  -  Labs significant for bandemia of 33%, lactate 2.4, and anion gap metabolic acidosis likely iso of IBD flare vs infectious gastroenteritis vs prednisone use  - WBC not elevated likely iso immunosuppression medications   - blood culture NGTD @ 24 hours, Ucx Neg, GI PCR and C. Diff neg  -  s/p cefepime + vanc ( 11/24-11/26) - known L renal cyst from 2022, CT 11/24 showing R renal cyst as well.   - 11/24 kidney US: Left renal cyst measuring 2.0 cm.

## 2024-12-03 NOTE — DISCHARGE NOTE NURSING/CASE MANAGEMENT/SOCIAL WORK - NSDCFUADDAPPT_GEN_ALL_CORE_FT
APPTS ARE READY TO BE MADE: [ X] YES    Best Family or Patient Contact (if needed):    Additional Information about above appointments (if needed):    1: Gastroenterology for UC  2: PCP for general health maintenance  3: Cardiology for Aortic stenosis    Other comments or requests:     Met with patient face to face and provided the patient with provider referral information, however patient prefers to schedule the appointments on their own.

## 2024-12-03 NOTE — PROGRESS NOTE ADULT - PROVIDER SPECIALTY LIST ADULT
Gastroenterology
Gastroenterology
Internal Medicine

## 2024-12-03 NOTE — PROGRESS NOTE ADULT - PROBLEM SELECTOR PLAN 5
- baseline AOx2 (did not remember date), CTH w/ no acute changes   - c/w home meds: memantine 10mg, Sertraline 100mg QD, Quetiapine 50mg QHS  - improved per wife - Systolic murmur auscultated during physical exam; per wife no known hx of cardiac murmur.   - TTE as noted above.     # CAD:   - aspirin 81 mg and atorvastatin 40mg QD  # Prolonged QTc  - QT interval 471 on EKG but manually calculated to be 400-440.

## 2024-12-03 NOTE — PROVIDER CONTACT NOTE (OTHER) - BACKGROUND
Acute renal failure
Patient admitted for acute renal failure
Patient admitted for acute renal failure with pmh of anemia, depression, CVA, and alcohol abuse.
Patient admitted for acute renal failure with pmh of anemia, depression, CVA, and alcohol abuse.
Patient admitted for acute renal failure
Patient admitted for acute renal failure with pmh of anemia, depression, CVA, and alcohol abuse.
Patient admitted for acute renal failure
Patient admitted for acute renal failure
Patient admitted for acute renal failure with pmh of anemia, depression, CVA, and alcohol abuse.
Patient admitted for acute renal failure with pmh of anemia, depression, CVA, and alcohol abuse.
Acute renal failure

## 2024-12-03 NOTE — PROGRESS NOTE ADULT - ASSESSMENT
73 y/o M with PMH  IBD (likely crohns), on methotrexate, remicade (q 6 weeks), HTN, HLD, T2DM, mild cognitive impairment (AOx2 baseline), hx of prior CVA, presenting with poor PO intake, fatigue, 2 unwitnessed falls, slurred speech since 11/21, and nonbloody diarrhea for the past 5 days. Admitted for IBD flare, C Diff and GI PCR Neg, GI recommended continuing all meds at same dose. CT head imaging negative for acute abnormality. A1c 5.1% on arrival so will DC with decreased insulin dose. Pending rehab.

## 2024-12-03 NOTE — PROVIDER CONTACT NOTE (OTHER) - REASON
/55
HR; 112 (tachycardia)
bp 117/47
BP: 137/49 (low DBP)
/58
/58
BP: 125/44
HR: 108; BP: 100/60
HR: 117

## 2024-12-03 NOTE — PROGRESS NOTE ADULT - PROBLEM SELECTOR PLAN 11
- Fluids: None  - Electrolytes: Will replete to maintain K>4, Phos>3, and Mag>2  - Nutrition: Full liquid (low carb/low fiber), advance as tolerated   - Activity: PT following  - DVT Prophylaxis: heparin 5000 BID  - Disposition: [facility] Resolved  - Pt reports watery diarrhea (>5 times per day) iso of immunosuppression may be 2/2 to C diff infection vs IBD flare  - CRP elevated, stool culture negative, c. diff negative  - CMV PCR indicative low grade viremia, given clinical improvement no need to treat per ID  - Blood cx NGTD  PLAN:  - Per GI, can restart same dose of methotrexate and prednisone, did not require increase in steroid dose, symptoms self resolved

## 2024-12-03 NOTE — PROGRESS NOTE ADULT - PROBLEM SELECTOR PLAN 7
- c/w 0.4mg tamsoulosin bid  - c/w finasteride 5mg qd       - on chronic Trimethoprim 100mg QD for UTI ppx. Last saw urology in 2023.   - F/u outpatient with urology for reinstating of antibx if needed - baseline AOx2 (did not remember date), CTH w/ no acute changes   - c/w home meds: memantine 10mg, Sertraline 100mg QD, Quetiapine 50mg QHS  - improved per wife

## 2024-12-03 NOTE — PROGRESS NOTE ADULT - PROBLEM SELECTOR PLAN 10
Resolved  - Cr 1.45 on admission, baseline 1.08  - likely prerenal iso of dehydration, diarrhea, poor PO intake  - improved s/p 2 L 0.9% NaCl  - Now Cr at baseline Resolved  - On arrival Patient met SIRS criteria given tachycardia and elevated WBC (33% bandemia)  -  Labs significant for bandemia of 33%, lactate 2.4, and anion gap metabolic acidosis likely iso of IBD flare vs infectious gastroenteritis vs prednisone use  - WBC not elevated likely iso immunosuppression medications   - blood culture NGTD @ 24 hours, Ucx Neg, GI PCR and C. Diff neg  -  s/p cefepime + vanc ( 11/24-11/26)

## 2024-12-03 NOTE — PROGRESS NOTE ADULT - PROBLEM SELECTOR PLAN 4
- Systolic murmur auscultated during physical exam; per wife no known hx of cardiac murmur.   - TTE as noted above.     # CAD:   - aspirin 81 mg and atorvastatin 40mg QD  # Prolonged QTc  - QT interval 471 on EKG but manually calculated to be 400-440. - Pt with 2 unwitnessed falls likely iso of dehydration and poor PO intake. Pt recalls falling, reports feeling weak and that his legs gave out. He denies LOC, blurry vision or dizziness.    - CT head negative for ischemia, hemorrhage, or midline shift  - ddx orthostatic vs neurogenic vs cardiac syncope vs polypharmacy (on quetiapine, memantine and sertraline)  - TTE remarkable for moderate aortic stenosis  - likely iso of orthostatic syncope  Plan:  - Currently back to baseline mental status  - Neuro referral on d/c   - Cards referral on d/c for aortic stenosis mgmt

## 2024-12-03 NOTE — PROGRESS NOTE ADULT - PROBLEM SELECTOR PLAN 2
- on home Januvia 50mg QD, Tresiba 30 U at bedtime, and 28-30 U premeals TID  - on gabapentin for neuropathy   - A1c 5.1% on arrival, indicating likely hypoglycemia and over-control of diabetes at home  - Has not needed scale during meal times  PLAN:  - Lantus 15. Glucose goal of 140-180  - Plan to d/c on Tresiba 20u and stop pre-meal insulin. Will need outpatient f/u with PCP for better control  - MISS  - Consistent carb diet, low fiber , full liquid. - mild transaminitis since 11/28 at least. ALP and Bili wnl  - No prior hx of this in outpatient labs  - Patient on chronic methotrexate but unlikely cause as no prior hx of this  - Possibly 2/2 patients presentation of diarrhea. He is asymptomatic  - acute hep panel 11/26 negative  - given hx of IBD patient at increased risk of cholangio-hepatic pathologies vs MAFLD  Plan:  - RUQ US  - Continue trending CMP

## 2024-12-03 NOTE — PROGRESS NOTE ADULT - PROBLEM/PLAN-10
DISPLAY PLAN FREE TEXT
DISPLAY PLAN FREE TEXT
Gen: + fatigue, No fever, decreased appetite  ENT: No ear pain, congestion, sore throat  Resp: No cough or trouble breathing  Cardiovascular: No chest pain or palpitation   Gastroenteric: +diarrhea, +emesis  Skin: No rashes  Neuro: No headache; no abnormal movements  Remainder negative, except as per the HPI
DISPLAY PLAN FREE TEXT

## 2024-12-03 NOTE — DISCHARGE NOTE NURSING/CASE MANAGEMENT/SOCIAL WORK - FINANCIAL ASSISTANCE
[facility] provides services at a reduced cost to those who are determined to be eligible through [facility]’s financial assistance program. Information regarding [facility]’s financial assistance program can be found by going to [email or web address] or by calling [phone].

## 2024-12-03 NOTE — PROVIDER CONTACT NOTE (OTHER) - NAME OF MD/NP/PA/DO NOTIFIED:
MD Faviola Gonzalez
MD, Carlos Navarro
MD Faviola Gonzalez
MD, Carlos Navarro
MD, Carlos Navarro
Kathy Singh
MALENA Kelly.
MALENA Kelly.
MD, Carlos Navarro
MD, Carlos Navarro
Ramon Gonzalez

## 2024-12-03 NOTE — PROGRESS NOTE ADULT - PROBLEM SELECTOR PLAN 3
- Pt with 2 unwitnessed falls likely iso of dehydration and poor PO intake. Pt recalls falling, reports feeling weak and that his legs gave out. He denies LOC, blurry vision or dizziness.    - CT head negative for ischemia, hemorrhage, or midline shift  - ddx orthostatic vs neurogenic vs cardiac syncope vs polypharmacy (on quetiapine, memantine and sertraline)  - TTE remarkable for moderate aortic stenosis  - likely iso of orthostatic syncope  Plan:  - Currently back to baseline mental status  - Neuro referral on d/c   - Cards referral on d/c for aortic stenosis mgmt - on home Januvia 50mg QD, Tresiba 30 U at bedtime, and 28-30 U premeals TID  - on gabapentin for neuropathy   - A1c 5.1% on arrival, indicating likely hypoglycemia and over-control of diabetes at home  - Has not needed scale during meal times  PLAN:  - Lantus 15. Glucose goal of 140-180  - Plan to d/c on Tresiba 20u and stop pre-meal insulin. Will need outpatient f/u with PCP for better control  - MISS  - Consistent carb diet, low fiber , full liquid.

## 2024-12-03 NOTE — PROVIDER CONTACT NOTE (OTHER) - ACTION/TREATMENT ORDERED:
MD notified and aware. No interventions at this time. Continue to monitor.
MD notified and aware. came to bedside and assess. No IV fluids needed. Will do VSq4.
MD notified and aware. No IV fluids needed. Continue to Monitor.
MD notified and aware. No IV fluids needed. encourage PO intake.
MD made aware, no new interventions
MD made aware, no new interventions
MD notified and aware. No IV fluids needed. Continue to Monitor.
MD notified and aware. No interventions at this time. Continue to monitor.
MD notified and aware. continue the current IV fludis. No additional interventions needed.
MD notified, no further interventions ordered
MD notified, no further interventions ordered

## 2024-12-04 ENCOUNTER — NON-APPOINTMENT (OUTPATIENT)
Age: 74
End: 2024-12-04

## 2024-12-11 ENCOUNTER — APPOINTMENT (OUTPATIENT)
Dept: RHEUMATOLOGY | Facility: CLINIC | Age: 74
End: 2024-12-11
Payer: MEDICARE

## 2024-12-11 VITALS
SYSTOLIC BLOOD PRESSURE: 112 MMHG | DIASTOLIC BLOOD PRESSURE: 59 MMHG | TEMPERATURE: 97.6 F | HEART RATE: 94 BPM | OXYGEN SATURATION: 96 %

## 2024-12-11 VITALS — HEART RATE: 74 BPM | DIASTOLIC BLOOD PRESSURE: 65 MMHG | OXYGEN SATURATION: 99 % | SYSTOLIC BLOOD PRESSURE: 99 MMHG

## 2024-12-11 PROCEDURE — 96415 CHEMO IV INFUSION ADDL HR: CPT

## 2024-12-11 PROCEDURE — 96413 CHEMO IV INFUSION 1 HR: CPT

## 2024-12-20 ENCOUNTER — RX RENEWAL (OUTPATIENT)
Age: 74
End: 2024-12-20

## 2024-12-30 ENCOUNTER — RX RENEWAL (OUTPATIENT)
Age: 74
End: 2024-12-30

## 2025-01-08 ENCOUNTER — APPOINTMENT (OUTPATIENT)
Dept: RHEUMATOLOGY | Facility: CLINIC | Age: 75
End: 2025-01-08
Payer: MEDICARE

## 2025-01-08 ENCOUNTER — MED ADMIN CHARGE (OUTPATIENT)
Age: 75
End: 2025-01-08

## 2025-01-08 VITALS
RESPIRATION RATE: 16 BRPM | TEMPERATURE: 97.4 F | SYSTOLIC BLOOD PRESSURE: 124 MMHG | HEART RATE: 90 BPM | DIASTOLIC BLOOD PRESSURE: 77 MMHG | OXYGEN SATURATION: 96 %

## 2025-01-08 VITALS
HEART RATE: 90 BPM | SYSTOLIC BLOOD PRESSURE: 118 MMHG | RESPIRATION RATE: 16 BRPM | OXYGEN SATURATION: 97 % | DIASTOLIC BLOOD PRESSURE: 69 MMHG

## 2025-01-08 PROCEDURE — 96415 CHEMO IV INFUSION ADDL HR: CPT

## 2025-01-08 PROCEDURE — 96413 CHEMO IV INFUSION 1 HR: CPT

## 2025-01-08 RX ORDER — CETIRIZINE HYDROCHLORIDE 10 MG/1
10 TABLET, FILM COATED ORAL
Refills: 0 | Status: ACTIVE | OUTPATIENT
Start: 2025-01-08

## 2025-01-08 RX ORDER — ACETAMINOPHEN 325 MG/1
325 TABLET ORAL
Qty: 0 | Refills: 0 | Status: COMPLETED | OUTPATIENT
Start: 2025-01-08

## 2025-01-08 RX ORDER — ACETAMINOPHEN 325 MG/1
325 TABLET ORAL
Refills: 0 | Status: ACTIVE | OUTPATIENT
Start: 2025-01-08

## 2025-01-08 RX ORDER — INFLIXIMAB 100 MG/10ML
100 INJECTION, POWDER, LYOPHILIZED, FOR SOLUTION INTRAVENOUS
Qty: 1 | Refills: 0 | Status: COMPLETED
Start: 2024-12-03

## 2025-01-08 RX ORDER — CETIRIZINE HYDROCHLORIDE 10 MG/1
10 TABLET, FILM COATED ORAL
Qty: 0 | Refills: 0 | Status: COMPLETED | OUTPATIENT
Start: 2025-01-08

## 2025-01-08 RX ADMIN — CETIRIZINE HYDROCHLORIDE 0 MG: 10 TABLET, FILM COATED ORAL at 00:00

## 2025-01-08 RX ADMIN — ACETAMINOPHEN 0 MG: 325 TABLET ORAL at 00:00

## 2025-01-29 ENCOUNTER — APPOINTMENT (OUTPATIENT)
Dept: INTERNAL MEDICINE | Facility: CLINIC | Age: 75
End: 2025-01-29
Payer: MEDICARE

## 2025-01-29 ENCOUNTER — LABORATORY RESULT (OUTPATIENT)
Age: 75
End: 2025-01-29

## 2025-01-29 VITALS
SYSTOLIC BLOOD PRESSURE: 118 MMHG | OXYGEN SATURATION: 93 % | WEIGHT: 175 LBS | HEIGHT: 67 IN | TEMPERATURE: 98 F | RESPIRATION RATE: 16 BRPM | HEART RATE: 99 BPM | DIASTOLIC BLOOD PRESSURE: 72 MMHG | BODY MASS INDEX: 27.47 KG/M2

## 2025-01-29 DIAGNOSIS — E11.9 TYPE 2 DIABETES MELLITUS W/OUT COMPLICATIONS: ICD-10-CM

## 2025-01-29 DIAGNOSIS — I10 ESSENTIAL (PRIMARY) HYPERTENSION: ICD-10-CM

## 2025-01-29 DIAGNOSIS — K50.80 CROHN'S DISEASE OF BOTH SMALL AND LARGE INTESTINE W/OUT COMPLICATIONS: ICD-10-CM

## 2025-01-29 DIAGNOSIS — E11.40 TYPE 2 DIABETES MELLITUS WITH DIABETIC NEUROPATHY, UNSPECIFIED: ICD-10-CM

## 2025-01-29 DIAGNOSIS — K50.90 CROHN'S DISEASE, UNSPECIFIED, W/OUT COMPLICATIONS: ICD-10-CM

## 2025-01-29 DIAGNOSIS — F03.90 UNSPECIFIED DEMENTIA W/OUT BEHAVIORAL DISTURBANCE: ICD-10-CM

## 2025-01-29 DIAGNOSIS — Z00.00 ENCOUNTER FOR GENERAL ADULT MEDICAL EXAMINATION W/OUT ABNORMAL FINDINGS: ICD-10-CM

## 2025-01-29 DIAGNOSIS — J20.9 ACUTE BRONCHITIS, UNSPECIFIED: ICD-10-CM

## 2025-01-29 DIAGNOSIS — D64.9 ANEMIA, UNSPECIFIED: ICD-10-CM

## 2025-01-29 DIAGNOSIS — J45.909 UNSPECIFIED ASTHMA, UNCOMPLICATED: ICD-10-CM

## 2025-01-29 PROCEDURE — 99214 OFFICE O/P EST MOD 30 MIN: CPT | Mod: 25

## 2025-01-29 PROCEDURE — G0439: CPT

## 2025-01-29 RX ORDER — LEVOFLOXACIN 500 MG/1
500 TABLET, FILM COATED ORAL
Qty: 7 | Refills: 0 | Status: ACTIVE | COMMUNITY
Start: 2025-01-29 | End: 1900-01-01

## 2025-01-30 LAB
ALBUMIN SERPL ELPH-MCNC: 4 G/DL
ALP BLD-CCNC: 105 U/L
ALT SERPL-CCNC: 16 U/L
ANION GAP SERPL CALC-SCNC: 14 MMOL/L
APPEARANCE: ABNORMAL
AST SERPL-CCNC: 23 U/L
BASOPHILS # BLD AUTO: 0.02 K/UL
BASOPHILS NFR BLD AUTO: 0.2 %
BILIRUB SERPL-MCNC: 0.6 MG/DL
BILIRUBIN URINE: ABNORMAL
BLOOD URINE: NEGATIVE
BUN SERPL-MCNC: 26 MG/DL
CALCIUM SERPL-MCNC: 9.3 MG/DL
CHLORIDE SERPL-SCNC: 107 MMOL/L
CHOLEST SERPL-MCNC: 144 MG/DL
CO2 SERPL-SCNC: 22 MMOL/L
COLOR: NORMAL
CREAT SERPL-MCNC: 1.13 MG/DL
EGFR: 67 ML/MIN/1.73M2
EOSINOPHIL # BLD AUTO: 0.01 K/UL
EOSINOPHIL NFR BLD AUTO: 0.1 %
ESTIMATED AVERAGE GLUCOSE: 128 MG/DL
FERRITIN SERPL-MCNC: 87 NG/ML
FOLATE SERPL-MCNC: >20 NG/ML
GLUCOSE QUALITATIVE U: 100 MG/DL
GLUCOSE SERPL-MCNC: 182 MG/DL
HBA1C MFR BLD HPLC: 6.1 %
HCT VFR BLD CALC: 38.6 %
HDLC SERPL-MCNC: 35 MG/DL
HGB BLD-MCNC: 11.2 G/DL
IMM GRANULOCYTES NFR BLD AUTO: 0.6 %
IRON SATN MFR SERPL: 15 %
IRON SERPL-MCNC: 48 UG/DL
KETONES URINE: ABNORMAL MG/DL
LDLC SERPL CALC-MCNC: 65 MG/DL
LEUKOCYTE ESTERASE URINE: ABNORMAL
LYMPHOCYTES # BLD AUTO: 1.08 K/UL
LYMPHOCYTES NFR BLD AUTO: 13.3 %
MAN DIFF?: NORMAL
MCHC RBC-ENTMCNC: 23.9 PG
MCHC RBC-ENTMCNC: 29 G/DL
MCV RBC AUTO: 82.3 FL
MONOCYTES # BLD AUTO: 0.42 K/UL
MONOCYTES NFR BLD AUTO: 5.2 %
NEUTROPHILS # BLD AUTO: 6.54 K/UL
NEUTROPHILS NFR BLD AUTO: 80.6 %
NITRITE URINE: POSITIVE
NONHDLC SERPL-MCNC: 109 MG/DL
NT-PROBNP SERPL-MCNC: 211 PG/ML
PH URINE: 5.5
PLATELET # BLD AUTO: 203 K/UL
POTASSIUM SERPL-SCNC: 4.8 MMOL/L
PROT SERPL-MCNC: 6.7 G/DL
PROTEIN URINE: 30 MG/DL
RBC # BLD: 4.69 M/UL
RBC # FLD: 18 %
SODIUM SERPL-SCNC: 143 MMOL/L
SPECIFIC GRAVITY URINE: 1.03
T4 FREE SERPL-MCNC: 1.2 NG/DL
TIBC SERPL-MCNC: 327 UG/DL
TRIGL SERPL-MCNC: 275 MG/DL
TSH SERPL-ACNC: 0.45 UIU/ML
UIBC SERPL-MCNC: 278 UG/DL
UROBILINOGEN URINE: 1 MG/DL
VIT B12 SERPL-MCNC: 1033 PG/ML
WBC # FLD AUTO: 8.12 K/UL

## 2025-02-05 ENCOUNTER — APPOINTMENT (OUTPATIENT)
Dept: RHEUMATOLOGY | Facility: CLINIC | Age: 75
End: 2025-02-05

## 2025-02-10 ENCOUNTER — APPOINTMENT (OUTPATIENT)
Dept: CARDIOLOGY | Facility: CLINIC | Age: 75
End: 2025-02-10

## 2025-02-10 ENCOUNTER — APPOINTMENT (OUTPATIENT)
Dept: RHEUMATOLOGY | Facility: CLINIC | Age: 75
End: 2025-02-10
Payer: MEDICARE

## 2025-02-10 ENCOUNTER — MED ADMIN CHARGE (OUTPATIENT)
Age: 75
End: 2025-02-10

## 2025-02-10 VITALS
SYSTOLIC BLOOD PRESSURE: 136 MMHG | TEMPERATURE: 98.6 F | HEART RATE: 103 BPM | RESPIRATION RATE: 16 BRPM | OXYGEN SATURATION: 95 % | DIASTOLIC BLOOD PRESSURE: 79 MMHG

## 2025-02-10 VITALS
RESPIRATION RATE: 16 BRPM | SYSTOLIC BLOOD PRESSURE: 145 MMHG | DIASTOLIC BLOOD PRESSURE: 83 MMHG | HEART RATE: 109 BPM | OXYGEN SATURATION: 95 %

## 2025-02-10 PROCEDURE — 96413 CHEMO IV INFUSION 1 HR: CPT

## 2025-02-10 PROCEDURE — 96415 CHEMO IV INFUSION ADDL HR: CPT

## 2025-02-10 RX ORDER — INFLIXIMAB 100 MG/10ML
100 INJECTION, POWDER, LYOPHILIZED, FOR SOLUTION INTRAVENOUS
Qty: 1 | Refills: 0 | Status: COMPLETED
Start: 2024-12-03

## 2025-02-10 RX ORDER — CETIRIZINE HYDROCHLORIDE 10 MG/1
10 TABLET, FILM COATED ORAL
Qty: 0 | Refills: 0 | Status: COMPLETED
Start: 2025-01-08

## 2025-02-10 RX ORDER — ACETAMINOPHEN 325 MG/1
325 TABLET ORAL
Qty: 0 | Refills: 0 | Status: COMPLETED
Start: 2025-01-08

## 2025-03-05 ENCOUNTER — APPOINTMENT (OUTPATIENT)
Dept: ENDOCRINOLOGY | Facility: CLINIC | Age: 75
End: 2025-03-05

## 2025-03-05 VITALS
TEMPERATURE: 97.6 F | HEART RATE: 97 BPM | SYSTOLIC BLOOD PRESSURE: 128 MMHG | HEIGHT: 67 IN | OXYGEN SATURATION: 98 % | DIASTOLIC BLOOD PRESSURE: 77 MMHG

## 2025-03-05 PROCEDURE — 99213 OFFICE O/P EST LOW 20 MIN: CPT

## 2025-03-06 LAB — GLUCOSE BLDC GLUCOMTR-MCNC: 125

## 2025-03-14 ENCOUNTER — APPOINTMENT (OUTPATIENT)
Dept: RHEUMATOLOGY | Facility: CLINIC | Age: 75
End: 2025-03-14
Payer: MEDICARE

## 2025-03-14 ENCOUNTER — MED ADMIN CHARGE (OUTPATIENT)
Age: 75
End: 2025-03-14

## 2025-03-14 VITALS
DIASTOLIC BLOOD PRESSURE: 74 MMHG | RESPIRATION RATE: 16 BRPM | HEART RATE: 77 BPM | OXYGEN SATURATION: 96 % | SYSTOLIC BLOOD PRESSURE: 129 MMHG

## 2025-03-14 VITALS
HEART RATE: 83 BPM | SYSTOLIC BLOOD PRESSURE: 137 MMHG | TEMPERATURE: 97.7 F | DIASTOLIC BLOOD PRESSURE: 76 MMHG | OXYGEN SATURATION: 97 % | RESPIRATION RATE: 16 BRPM

## 2025-03-14 PROCEDURE — 96413 CHEMO IV INFUSION 1 HR: CPT

## 2025-03-14 PROCEDURE — 96415 CHEMO IV INFUSION ADDL HR: CPT

## 2025-03-14 RX ORDER — INFLIXIMAB 100 MG/10ML
100 INJECTION, POWDER, LYOPHILIZED, FOR SOLUTION INTRAVENOUS
Qty: 1 | Refills: 0 | Status: COMPLETED
Start: 2024-12-03

## 2025-03-14 RX ORDER — CETIRIZINE HYDROCHLORIDE 10 MG/1
10 TABLET, FILM COATED ORAL
Qty: 0 | Refills: 0 | Status: COMPLETED
Start: 2025-01-08

## 2025-03-14 RX ORDER — ACETAMINOPHEN 325 MG/1
325 TABLET ORAL
Qty: 0 | Refills: 0 | Status: COMPLETED
Start: 2025-01-08

## 2025-03-23 PROBLEM — Z79.4 INSULIN LONG-TERM USE: Status: ACTIVE | Noted: 2025-03-23

## 2025-04-10 ENCOUNTER — MED ADMIN CHARGE (OUTPATIENT)
Age: 75
End: 2025-04-10

## 2025-04-10 ENCOUNTER — APPOINTMENT (OUTPATIENT)
Dept: RHEUMATOLOGY | Facility: CLINIC | Age: 75
End: 2025-04-10
Payer: MEDICARE

## 2025-04-10 VITALS
DIASTOLIC BLOOD PRESSURE: 81 MMHG | OXYGEN SATURATION: 98 % | RESPIRATION RATE: 16 BRPM | TEMPERATURE: 97.7 F | SYSTOLIC BLOOD PRESSURE: 137 MMHG | HEART RATE: 85 BPM

## 2025-04-10 VITALS — SYSTOLIC BLOOD PRESSURE: 137 MMHG | HEART RATE: 77 BPM | OXYGEN SATURATION: 97 % | DIASTOLIC BLOOD PRESSURE: 77 MMHG

## 2025-04-10 PROCEDURE — 96413 CHEMO IV INFUSION 1 HR: CPT

## 2025-04-10 PROCEDURE — 96415 CHEMO IV INFUSION ADDL HR: CPT

## 2025-04-10 RX ORDER — ACETAMINOPHEN 325 MG/1
325 TABLET ORAL
Qty: 0 | Refills: 0 | Status: COMPLETED
Start: 2025-01-08

## 2025-04-10 RX ORDER — INFLIXIMAB 100 MG/10ML
100 INJECTION, POWDER, LYOPHILIZED, FOR SOLUTION INTRAVENOUS
Qty: 1 | Refills: 0 | Status: COMPLETED
Start: 2024-12-03

## 2025-04-10 RX ORDER — CETIRIZINE HYDROCHLORIDE 10 MG/1
10 TABLET, FILM COATED ORAL
Qty: 0 | Refills: 0 | Status: COMPLETED
Start: 2025-01-08

## 2025-05-20 ENCOUNTER — APPOINTMENT (OUTPATIENT)
Dept: RHEUMATOLOGY | Facility: CLINIC | Age: 75
End: 2025-05-20
Payer: MEDICARE

## 2025-05-20 ENCOUNTER — MED ADMIN CHARGE (OUTPATIENT)
Age: 75
End: 2025-05-20

## 2025-05-20 VITALS
SYSTOLIC BLOOD PRESSURE: 135 MMHG | DIASTOLIC BLOOD PRESSURE: 63 MMHG | TEMPERATURE: 97.3 F | RESPIRATION RATE: 16 BRPM | OXYGEN SATURATION: 98 % | HEART RATE: 88 BPM

## 2025-05-20 VITALS
OXYGEN SATURATION: 97 % | DIASTOLIC BLOOD PRESSURE: 75 MMHG | SYSTOLIC BLOOD PRESSURE: 132 MMHG | RESPIRATION RATE: 16 BRPM | HEART RATE: 88 BPM

## 2025-05-20 PROCEDURE — 96413 CHEMO IV INFUSION 1 HR: CPT

## 2025-05-20 PROCEDURE — 96415 CHEMO IV INFUSION ADDL HR: CPT

## 2025-05-20 RX ORDER — INFLIXIMAB 100 MG/10ML
100 INJECTION, POWDER, LYOPHILIZED, FOR SOLUTION INTRAVENOUS
Qty: 1 | Refills: 0 | Status: COMPLETED
Start: 2024-12-03

## 2025-06-04 ENCOUNTER — APPOINTMENT (OUTPATIENT)
Dept: INTERNAL MEDICINE | Facility: CLINIC | Age: 75
End: 2025-06-04
Payer: MEDICARE

## 2025-06-04 VITALS
WEIGHT: 178 LBS | OXYGEN SATURATION: 97 % | BODY MASS INDEX: 27.94 KG/M2 | HEART RATE: 78 BPM | TEMPERATURE: 97.8 F | SYSTOLIC BLOOD PRESSURE: 136 MMHG | HEIGHT: 67 IN | DIASTOLIC BLOOD PRESSURE: 83 MMHG

## 2025-06-04 DIAGNOSIS — I10 ESSENTIAL (PRIMARY) HYPERTENSION: ICD-10-CM

## 2025-06-04 DIAGNOSIS — F32.89 OTHER SPECIFIED DEPRESSIVE EPISODES: ICD-10-CM

## 2025-06-04 DIAGNOSIS — K50.80 CROHN'S DISEASE OF BOTH SMALL AND LARGE INTESTINE W/OUT COMPLICATIONS: ICD-10-CM

## 2025-06-04 DIAGNOSIS — N40.0 BENIGN PROSTATIC HYPERPLASIA WITHOUT LOWER URINARY TRACT SYMPMS: ICD-10-CM

## 2025-06-04 DIAGNOSIS — F03.90 UNSPECIFIED DEMENTIA W/OUT BEHAVIORAL DISTURBANCE: ICD-10-CM

## 2025-06-04 DIAGNOSIS — Z74.09 OTHER REDUCED MOBILITY: ICD-10-CM

## 2025-06-04 DIAGNOSIS — E11.9 TYPE 2 DIABETES MELLITUS W/OUT COMPLICATIONS: ICD-10-CM

## 2025-06-04 DIAGNOSIS — E11.40 TYPE 2 DIABETES MELLITUS WITH DIABETIC NEUROPATHY, UNSPECIFIED: ICD-10-CM

## 2025-06-04 DIAGNOSIS — J45.909 UNSPECIFIED ASTHMA, UNCOMPLICATED: ICD-10-CM

## 2025-06-04 DIAGNOSIS — Z79.4 LONG TERM (CURRENT) USE OF INSULIN: ICD-10-CM

## 2025-06-04 PROCEDURE — G2211 COMPLEX E/M VISIT ADD ON: CPT

## 2025-06-04 PROCEDURE — 99214 OFFICE O/P EST MOD 30 MIN: CPT

## 2025-06-05 LAB
ALBUMIN SERPL ELPH-MCNC: 4.2 G/DL
ALP BLD-CCNC: 88 U/L
ALT SERPL-CCNC: 15 U/L
ANION GAP SERPL CALC-SCNC: 16 MMOL/L
AST SERPL-CCNC: 22 U/L
BASOPHILS # BLD AUTO: 0.04 K/UL
BASOPHILS NFR BLD AUTO: 0.5 %
BILIRUB SERPL-MCNC: 0.8 MG/DL
BUN SERPL-MCNC: 21 MG/DL
CALCIUM SERPL-MCNC: 9.2 MG/DL
CHLORIDE SERPL-SCNC: 101 MMOL/L
CO2 SERPL-SCNC: 24 MMOL/L
CREAT SERPL-MCNC: 1.18 MG/DL
CRP SERPL-MCNC: <3 MG/L
EGFRCR SERPLBLD CKD-EPI 2021: 64 ML/MIN/1.73M2
EOSINOPHIL # BLD AUTO: 0.05 K/UL
EOSINOPHIL NFR BLD AUTO: 0.6 %
ERYTHROCYTE [SEDIMENTATION RATE] IN BLOOD BY WESTERGREN METHOD: 29 MM/HR
ESTIMATED AVERAGE GLUCOSE: 103 MG/DL
FERRITIN SERPL-MCNC: 60 NG/ML
FOLATE SERPL-MCNC: >20 NG/ML
GLUCOSE SERPL-MCNC: 69 MG/DL
HBA1C MFR BLD HPLC: 5.2 %
HCT VFR BLD CALC: 37.5 %
HGB BLD-MCNC: 10.9 G/DL
IMM GRANULOCYTES NFR BLD AUTO: 0.5 %
IRON SATN MFR SERPL: 60 %
IRON SERPL-MCNC: 196 UG/DL
LYMPHOCYTES # BLD AUTO: 1.25 K/UL
LYMPHOCYTES NFR BLD AUTO: 15.5 %
MAGNESIUM SERPL-MCNC: 2.1 MG/DL
MAN DIFF?: NORMAL
MCHC RBC-ENTMCNC: 24.5 PG
MCHC RBC-ENTMCNC: 29.1 G/DL
MCV RBC AUTO: 84.5 FL
MONOCYTES # BLD AUTO: 0.4 K/UL
MONOCYTES NFR BLD AUTO: 5 %
NEUTROPHILS # BLD AUTO: 6.27 K/UL
NEUTROPHILS NFR BLD AUTO: 77.9 %
PLATELET # BLD AUTO: 170 K/UL
POTASSIUM SERPL-SCNC: 4.2 MMOL/L
PROT SERPL-MCNC: 6.8 G/DL
RBC # BLD: 4.44 M/UL
RBC # FLD: 16.5 %
SODIUM SERPL-SCNC: 141 MMOL/L
T4 FREE SERPL-MCNC: 1.3 NG/DL
TIBC SERPL-MCNC: 328 UG/DL
TSH SERPL-ACNC: 0.95 UIU/ML
UIBC SERPL-MCNC: 132 UG/DL
VIT B12 SERPL-MCNC: 685 PG/ML
WBC # FLD AUTO: 8.05 K/UL

## 2025-06-17 ENCOUNTER — MED ADMIN CHARGE (OUTPATIENT)
Age: 75
End: 2025-06-17

## 2025-06-17 ENCOUNTER — APPOINTMENT (OUTPATIENT)
Dept: RHEUMATOLOGY | Facility: CLINIC | Age: 75
End: 2025-06-17
Payer: MEDICARE

## 2025-06-17 VITALS
HEART RATE: 90 BPM | OXYGEN SATURATION: 98 % | SYSTOLIC BLOOD PRESSURE: 138 MMHG | TEMPERATURE: 97.6 F | DIASTOLIC BLOOD PRESSURE: 67 MMHG | RESPIRATION RATE: 16 BRPM

## 2025-06-17 VITALS
OXYGEN SATURATION: 97 % | SYSTOLIC BLOOD PRESSURE: 141 MMHG | HEART RATE: 88 BPM | DIASTOLIC BLOOD PRESSURE: 84 MMHG | RESPIRATION RATE: 16 BRPM

## 2025-06-17 PROCEDURE — 96415 CHEMO IV INFUSION ADDL HR: CPT

## 2025-06-17 PROCEDURE — 36415 COLL VENOUS BLD VENIPUNCTURE: CPT

## 2025-06-17 PROCEDURE — 96413 CHEMO IV INFUSION 1 HR: CPT

## 2025-06-17 RX ORDER — INFLIXIMAB 100 MG/10ML
100 INJECTION, POWDER, LYOPHILIZED, FOR SOLUTION INTRAVENOUS
Qty: 1 | Refills: 0 | Status: COMPLETED
Start: 2024-12-03

## 2025-06-17 RX ORDER — CETIRIZINE HYDROCHLORIDE 10 MG/1
10 TABLET, FILM COATED ORAL
Qty: 0 | Refills: 0 | Status: COMPLETED | OUTPATIENT
Start: 2025-06-17

## 2025-06-17 RX ORDER — ACETAMINOPHEN 325 MG/1
325 TABLET ORAL
Qty: 0 | Refills: 0 | Status: COMPLETED | OUTPATIENT
Start: 2025-06-17

## 2025-06-17 RX ADMIN — ACETAMINOPHEN 0 MG: 325 TABLET ORAL at 00:00

## 2025-06-17 RX ADMIN — CETIRIZINE HYDROCHLORIDE 0 MG: 10 TABLET, FILM COATED ORAL at 00:00

## 2025-06-18 ENCOUNTER — APPOINTMENT (OUTPATIENT)
Dept: GASTROENTEROLOGY | Facility: CLINIC | Age: 75
End: 2025-06-18

## 2025-06-18 LAB
HAV IGM SER QL: NONREACTIVE
HBV CORE IGG+IGM SER QL: NONREACTIVE
HBV CORE IGM SER QL: NONREACTIVE
HBV SURFACE AG SER QL: NONREACTIVE
HCV AB SER QL: NONREACTIVE
HCV S/CO RATIO: 0.09 S/CO

## 2025-06-20 LAB
M TB IFN-G BLD-IMP: NEGATIVE
QUANTIFERON TB PLUS MITOGEN MINUS NIL: 3.84 IU/ML
QUANTIFERON TB PLUS NIL: 0.03 IU/ML
QUANTIFERON TB PLUS TB1 MINUS NIL: 0 IU/ML
QUANTIFERON TB PLUS TB2 MINUS NIL: 0 IU/ML

## 2025-07-08 ENCOUNTER — APPOINTMENT (OUTPATIENT)
Dept: GASTROENTEROLOGY | Facility: CLINIC | Age: 75
End: 2025-07-08

## 2025-07-30 ENCOUNTER — APPOINTMENT (OUTPATIENT)
Dept: RHEUMATOLOGY | Facility: CLINIC | Age: 75
End: 2025-07-30
Payer: MEDICARE

## 2025-07-30 VITALS
DIASTOLIC BLOOD PRESSURE: 66 MMHG | SYSTOLIC BLOOD PRESSURE: 115 MMHG | HEART RATE: 73 BPM | RESPIRATION RATE: 16 BRPM | OXYGEN SATURATION: 97 %

## 2025-07-30 VITALS
DIASTOLIC BLOOD PRESSURE: 86 MMHG | OXYGEN SATURATION: 96 % | SYSTOLIC BLOOD PRESSURE: 118 MMHG | HEART RATE: 89 BPM | RESPIRATION RATE: 16 BRPM

## 2025-07-30 PROCEDURE — 96413 CHEMO IV INFUSION 1 HR: CPT

## 2025-07-30 PROCEDURE — 96415 CHEMO IV INFUSION ADDL HR: CPT

## 2025-07-30 NOTE — PHYSICAL THERAPY INITIAL EVALUATION ADULT - ASSISTIVE DEVICE, REHAB EVAL
Pt is requesting an rx for lice (prescription strength) stated that the pharmacist said [facility] to [facility]. Pt is Also requesting pharmacy update. Medication pended pharmacy updated.     bed rails

## 2025-08-13 ENCOUNTER — APPOINTMENT (OUTPATIENT)
Dept: GASTROENTEROLOGY | Facility: CLINIC | Age: 75
End: 2025-08-13
Payer: MEDICARE

## 2025-08-13 DIAGNOSIS — K50.90 CROHN'S DISEASE, UNSPECIFIED, W/OUT COMPLICATIONS: ICD-10-CM

## 2025-08-13 PROCEDURE — 99212 OFFICE O/P EST SF 10 MIN: CPT | Mod: 2W

## 2025-08-13 RX ADMIN — CETIRIZINE HYDROCHLORIDE 1 MG: 10 TABLET, FILM COATED ORAL at 00:00

## 2025-08-13 RX ADMIN — ACETAMINOPHEN 0 MG: 325 TABLET ORAL at 00:00

## 2025-08-13 RX ADMIN — INFLIXIMAB 0 MG: 100 INJECTION, POWDER, LYOPHILIZED, FOR SOLUTION INTRAVENOUS at 00:00

## 2025-08-14 ENCOUNTER — NON-APPOINTMENT (OUTPATIENT)
Age: 75
End: 2025-08-14

## 2025-08-14 RX ORDER — INFLIXIMAB 100 MG/10ML
100 INJECTION, POWDER, LYOPHILIZED, FOR SOLUTION INTRAVENOUS
Qty: 1 | Refills: 0 | Status: ACTIVE | OUTPATIENT
Start: 2025-08-13

## 2025-08-14 RX ORDER — CETIRIZINE HYDROCHLORIDE 10 MG/1
10 TABLET, FILM COATED ORAL
Qty: 0 | Refills: 0 | Status: ACTIVE | OUTPATIENT
Start: 2025-08-13

## 2025-08-14 RX ORDER — ACETAMINOPHEN 325 MG/1
325 TABLET ORAL
Qty: 0 | Refills: 0 | Status: ACTIVE | OUTPATIENT
Start: 2025-08-13

## 2025-08-20 ENCOUNTER — APPOINTMENT (OUTPATIENT)
Dept: ENDOCRINOLOGY | Facility: CLINIC | Age: 75
End: 2025-08-20

## 2025-08-27 ENCOUNTER — MED ADMIN CHARGE (OUTPATIENT)
Age: 75
End: 2025-08-27

## 2025-08-27 ENCOUNTER — APPOINTMENT (OUTPATIENT)
Dept: RHEUMATOLOGY | Facility: CLINIC | Age: 75
End: 2025-08-27
Payer: MEDICARE

## 2025-08-27 VITALS
OXYGEN SATURATION: 97 % | RESPIRATION RATE: 16 BRPM | SYSTOLIC BLOOD PRESSURE: 118 MMHG | DIASTOLIC BLOOD PRESSURE: 64 MMHG | HEART RATE: 82 BPM

## 2025-08-27 VITALS
HEART RATE: 83 BPM | RESPIRATION RATE: 16 BRPM | DIASTOLIC BLOOD PRESSURE: 64 MMHG | OXYGEN SATURATION: 97 % | TEMPERATURE: 97.6 F | SYSTOLIC BLOOD PRESSURE: 107 MMHG

## 2025-08-27 PROCEDURE — 96413 CHEMO IV INFUSION 1 HR: CPT

## 2025-08-27 PROCEDURE — 96415 CHEMO IV INFUSION ADDL HR: CPT

## 2025-08-27 RX ORDER — CETIRIZINE HYDROCHLORIDE 10 MG/1
10 TABLET, FILM COATED ORAL
Qty: 0 | Refills: 0 | Status: COMPLETED
Start: 2025-08-13

## 2025-08-27 RX ORDER — INFLIXIMAB 100 MG/10ML
100 INJECTION, POWDER, LYOPHILIZED, FOR SOLUTION INTRAVENOUS
Qty: 1 | Refills: 0 | Status: COMPLETED
Start: 2025-08-13

## 2025-08-27 RX ORDER — ACETAMINOPHEN 325 MG/1
325 TABLET ORAL
Qty: 0 | Refills: 0 | Status: COMPLETED
Start: 2025-08-13

## 2025-08-28 ENCOUNTER — NON-APPOINTMENT (OUTPATIENT)
Age: 75
End: 2025-08-28

## 2025-09-03 ENCOUNTER — APPOINTMENT (OUTPATIENT)
Dept: ENDOCRINOLOGY | Facility: CLINIC | Age: 75
End: 2025-09-03

## 2025-09-03 VITALS
OXYGEN SATURATION: 97 % | SYSTOLIC BLOOD PRESSURE: 97 MMHG | DIASTOLIC BLOOD PRESSURE: 63 MMHG | WEIGHT: 178 LBS | HEART RATE: 95 BPM | BODY MASS INDEX: 27.88 KG/M2 | TEMPERATURE: 97 F

## 2025-09-03 LAB — GLUCOSE BLDC GLUCOMTR-MCNC: 167

## 2025-09-03 PROCEDURE — G2211 COMPLEX E/M VISIT ADD ON: CPT

## 2025-09-03 PROCEDURE — 36415 COLL VENOUS BLD VENIPUNCTURE: CPT

## 2025-09-03 PROCEDURE — 99213 OFFICE O/P EST LOW 20 MIN: CPT

## 2025-09-03 PROCEDURE — 82962 GLUCOSE BLOOD TEST: CPT

## 2025-09-09 LAB
ALBUMIN SERPL ELPH-MCNC: 4.1 G/DL
ALBUMIN, RANDOM URINE: <1.2 MG/DL
ALP BLD-CCNC: 88 U/L
ALT SERPL-CCNC: 19 U/L
ANION GAP SERPL CALC-SCNC: 13 MMOL/L
AST SERPL-CCNC: 27 U/L
BASOPHILS # BLD AUTO: 0.03 K/UL
BASOPHILS NFR BLD AUTO: 0.5 %
BILIRUB SERPL-MCNC: 0.6 MG/DL
BUN SERPL-MCNC: 24 MG/DL
CALCIUM SERPL-MCNC: 9.2 MG/DL
CHLORIDE SERPL-SCNC: 107 MMOL/L
CHOLEST SERPL-MCNC: 144 MG/DL
CO2 SERPL-SCNC: 22 MMOL/L
CREAT SERPL-MCNC: 1.26 MG/DL
CREAT SPEC-SCNC: 132 MG/DL
EGFRCR SERPLBLD CKD-EPI 2021: 59 ML/MIN/1.73M2
EOSINOPHIL # BLD AUTO: 0.08 K/UL
EOSINOPHIL NFR BLD AUTO: 1.3 %
ESTIMATED AVERAGE GLUCOSE: 103 MG/DL
GLUCOSE SERPL-MCNC: 143 MG/DL
HBA1C MFR BLD HPLC: 5.2 %
HCT VFR BLD CALC: 36.5 %
HDLC SERPL-MCNC: 47 MG/DL
HGB BLD-MCNC: 11.2 G/DL
IMM GRANULOCYTES NFR BLD AUTO: 0.5 %
LDLC SERPL-MCNC: 73 MG/DL
LYMPHOCYTES # BLD AUTO: 3.22 K/UL
LYMPHOCYTES NFR BLD AUTO: 51.6 %
MAN DIFF?: NORMAL
MCHC RBC-ENTMCNC: 26.8 PG
MCHC RBC-ENTMCNC: 30.7 G/DL
MCV RBC AUTO: 87.3 FL
MICROALBUMIN/CREAT 24H UR-RTO: NORMAL MG/G
MONOCYTES # BLD AUTO: 0.39 K/UL
MONOCYTES NFR BLD AUTO: 6.3 %
NEUTROPHILS # BLD AUTO: 2.49 K/UL
NEUTROPHILS NFR BLD AUTO: 39.8 %
NONHDLC SERPL-MCNC: 97 MG/DL
PLATELET # BLD AUTO: 158 K/UL
POTASSIUM SERPL-SCNC: 3.7 MMOL/L
PROT SERPL-MCNC: 6.5 G/DL
RBC # BLD: 4.18 M/UL
RBC # FLD: 15.1 %
SODIUM SERPL-SCNC: 142 MMOL/L
TRIGL SERPL-MCNC: 132 MG/DL
TSH SERPL-ACNC: 1.04 UIU/ML
WBC # FLD AUTO: 6.24 K/UL

## (undated) DEVICE — ELCTR GROUNDING PAD ADULT COVIDIEN

## (undated) DEVICE — TUBING MEDI-VAC W MAXIGRIP CONNECTORS 1/4"X6'

## (undated) DEVICE — CLAMP BX HOT RAD JAW 3

## (undated) DEVICE — DRSG BANDAID 0.75X3"

## (undated) DEVICE — GOWN LG

## (undated) DEVICE — BIOPSY FORCEP RADIAL JAW 4 STANDARD WITH NEEDLE

## (undated) DEVICE — TUBING IV SET GRAVITY 3Y 100" MACRO

## (undated) DEVICE — ELCTR ECG CONDUCTIVE ADHESIVE

## (undated) DEVICE — LINE BREATHE SAMPLNG

## (undated) DEVICE — TUBING SUCTION NONCONDUCTIVE 6MM X 12FT

## (undated) DEVICE — SALIVA EJECTOR (BLUE)

## (undated) DEVICE — DRSG CURITY GAUZE SPONGE 4 X 4" 12-PLY NON-STERILE

## (undated) DEVICE — BIOPSY FORCEP COLD DISP

## (undated) DEVICE — CATH IV SAFE BC 22G X 1" (BLUE)

## (undated) DEVICE — PACK IV START WITH CHG

## (undated) DEVICE — DRSG 2X2

## (undated) DEVICE — CONTAINER FORMALIN 10% 60ML

## (undated) DEVICE — BASIN EMESIS 10IN GRADUATED MAUVE

## (undated) DEVICE — LUBRICATING JELLY HR ONE SHOT 3G

## (undated) DEVICE — CONTAINER FORMALIN 80ML YELLOW